# Patient Record
Sex: FEMALE | Race: OTHER | HISPANIC OR LATINO | URBAN - METROPOLITAN AREA
[De-identification: names, ages, dates, MRNs, and addresses within clinical notes are randomized per-mention and may not be internally consistent; named-entity substitution may affect disease eponyms.]

---

## 2017-06-26 ENCOUNTER — EMERGENCY (EMERGENCY)
Facility: HOSPITAL | Age: 52
LOS: 1 days | Discharge: HOME | End: 2017-06-26
Attending: EMERGENCY MEDICINE | Admitting: EMERGENCY MEDICINE
Payer: MEDICAID

## 2017-06-26 VITALS
HEART RATE: 65 BPM | HEIGHT: 65 IN | SYSTOLIC BLOOD PRESSURE: 183 MMHG | OXYGEN SATURATION: 97 % | RESPIRATION RATE: 20 BRPM | TEMPERATURE: 99 F | DIASTOLIC BLOOD PRESSURE: 98 MMHG

## 2017-06-26 LAB
ALBUMIN SERPL ELPH-MCNC: 4.2 G/DL — SIGNIFICANT CHANGE UP (ref 3.3–5)
ALP SERPL-CCNC: 76 U/L — SIGNIFICANT CHANGE UP (ref 40–120)
ALT FLD-CCNC: 25 U/L RC — SIGNIFICANT CHANGE UP (ref 10–45)
ANION GAP SERPL CALC-SCNC: 12 MMOL/L — SIGNIFICANT CHANGE UP (ref 5–17)
APTT BLD: 22.7 SEC — LOW (ref 27.5–37.4)
AST SERPL-CCNC: 68 U/L — HIGH (ref 10–40)
BILIRUB SERPL-MCNC: 0.9 MG/DL — SIGNIFICANT CHANGE UP (ref 0.2–1.2)
BUN SERPL-MCNC: 16 MG/DL — SIGNIFICANT CHANGE UP (ref 7–23)
CALCIUM SERPL-MCNC: 9.4 MG/DL — SIGNIFICANT CHANGE UP (ref 8.4–10.5)
CHLORIDE SERPL-SCNC: 102 MMOL/L — SIGNIFICANT CHANGE UP (ref 96–108)
CK MB BLD-MCNC: 1 % — SIGNIFICANT CHANGE UP (ref 0–3.5)
CK MB CFR SERPL CALC: 1.6 NG/ML — SIGNIFICANT CHANGE UP (ref 0–3.8)
CK SERPL-CCNC: 165 U/L — SIGNIFICANT CHANGE UP (ref 25–170)
CO2 SERPL-SCNC: 22 MMOL/L — SIGNIFICANT CHANGE UP (ref 22–31)
CREAT SERPL-MCNC: 0.85 MG/DL — SIGNIFICANT CHANGE UP (ref 0.5–1.3)
GLUCOSE SERPL-MCNC: 101 MG/DL — HIGH (ref 70–99)
INR BLD: 0.97 RATIO — SIGNIFICANT CHANGE UP (ref 0.88–1.16)
MAGNESIUM SERPL-MCNC: 2.2 MG/DL — SIGNIFICANT CHANGE UP (ref 1.6–2.6)
NT-PROBNP SERPL-SCNC: 14 PG/ML — SIGNIFICANT CHANGE UP (ref 0–300)
PHOSPHATE SERPL-MCNC: 3.4 MG/DL — SIGNIFICANT CHANGE UP (ref 2.5–4.5)
POTASSIUM SERPL-MCNC: 6.5 MMOL/L — CRITICAL HIGH (ref 3.5–5.3)
POTASSIUM SERPL-SCNC: 6.5 MMOL/L — CRITICAL HIGH (ref 3.5–5.3)
PROT SERPL-MCNC: 8.4 G/DL — HIGH (ref 6–8.3)
PROTHROM AB SERPL-ACNC: 10.6 SEC — SIGNIFICANT CHANGE UP (ref 9.8–12.7)
SODIUM SERPL-SCNC: 136 MMOL/L — SIGNIFICANT CHANGE UP (ref 135–145)
TROPONIN T SERPL-MCNC: <0.01 NG/ML — SIGNIFICANT CHANGE UP (ref 0–0.06)

## 2017-06-26 PROCEDURE — 71010: CPT | Mod: 26

## 2017-06-26 PROCEDURE — 99220: CPT | Mod: 25

## 2017-06-26 PROCEDURE — 93010 ELECTROCARDIOGRAM REPORT: CPT

## 2017-06-26 RX ORDER — ASPIRIN/CALCIUM CARB/MAGNESIUM 324 MG
324 TABLET ORAL ONCE
Qty: 0 | Refills: 0 | Status: COMPLETED | OUTPATIENT
Start: 2017-06-26 | End: 2017-06-26

## 2017-06-26 RX ADMIN — Medication 324 MILLIGRAM(S): at 23:16

## 2017-06-26 NOTE — ED PROVIDER NOTE - OBJECTIVE STATEMENT
51yF h/o HTN not on any meds, doesn't follow with a doctor because doesn't have insurance presents with 3 days of L sided chest pressure radiating to her back 51yF h/o HTN not on any meds, doesn't follow with a doctor because doesn't have insurance presents with 3 days of L sided chest pressure radiating to her back also with L arm numbness. Patient's son  last week from massive MI. Patient never smoker. Patient's father  in his 50s from MI. Patient never had cardiac testing. No PE risk factors.

## 2017-06-26 NOTE — ED ADULT NURSE NOTE - OBJECTIVE STATEMENT
Pt is a 50 yo female with the co CP and left arm numbness as tingling intermittently for the past several weeks. Pt denies anything making the pain better or worse, no SOB no N/V/D no cough fever or chills. Pt states she usually raises her arm above her head and the tingling resolved, pt denies any pmh, She reports her son dying 1 week ago due to an MI

## 2017-06-26 NOTE — ED PROVIDER NOTE - ATTENDING CONTRIBUTION TO CARE
I have seen and evaluated this patient with the resident.   I agree with the findings  unless other wise stated.  After my face to face bedside evaluation, I am notin year old female with chest pain intermittent tightness with radiation to left arm and back. PE: att exam: patient awake alert NAD. LUNGS CTAB no wheeze no crackle. CARD RRR no m/r/g.  Abdomen soft NT ND no rebound no guarding no CVA tenderness. EXT no edema no calf tenderness CV 2+DP/PT bilaterally. neuro A&Ox3, no focal deficits, normal sensation in all four extremities, 5/5 b/l le, 5/5 b/l ue, gait normal.  skin warm and dry no rash

## 2017-06-26 NOTE — ED ADULT TRIAGE NOTE - CHIEF COMPLAINT QUOTE
chest pain with LUE numbness x 2=3 wks, worse now, +dizzy, denies nausea/vomiting, (son  from MI 2 wks ago, father  from MI at age 77 yrs), denies long distance travel/+nonsmoker/denies recent surgeries/hormone therapy

## 2017-06-26 NOTE — ED PROVIDER NOTE - MEDICAL DECISION MAKING DETAILS
51yF h/o HTN not on antihypertensives presents with 3 days of chest pressure radiating to back. Strong family h/o heart disease. No PE risk factors. On examination, hypertensive, S1S2 normal, lungs clear, no calf tenderness, no pedal edema. Concern for dissection, ACS. Will obtain bloodwork including cardiac enzymes, CTA r/o dissection and if negative likely CDU for further cardiac workup. Gina PGY3

## 2017-06-27 VITALS
RESPIRATION RATE: 18 BRPM | OXYGEN SATURATION: 100 % | DIASTOLIC BLOOD PRESSURE: 79 MMHG | HEART RATE: 74 BPM | SYSTOLIC BLOOD PRESSURE: 136 MMHG

## 2017-06-27 LAB
ANION GAP SERPL CALC-SCNC: 12 MMOL/L — SIGNIFICANT CHANGE UP (ref 5–17)
BASOPHILS # BLD AUTO: 0.1 K/UL — SIGNIFICANT CHANGE UP (ref 0–0.2)
BASOPHILS NFR BLD AUTO: 1 % — SIGNIFICANT CHANGE UP (ref 0–2)
BUN SERPL-MCNC: 14 MG/DL — SIGNIFICANT CHANGE UP (ref 7–23)
CALCIUM SERPL-MCNC: 9.4 MG/DL — SIGNIFICANT CHANGE UP (ref 8.4–10.5)
CHLORIDE SERPL-SCNC: 105 MMOL/L — SIGNIFICANT CHANGE UP (ref 96–108)
CHOLEST SERPL-MCNC: 231 MG/DL — HIGH (ref 10–199)
CO2 SERPL-SCNC: 21 MMOL/L — LOW (ref 22–31)
CREAT SERPL-MCNC: 0.71 MG/DL — SIGNIFICANT CHANGE UP (ref 0.5–1.3)
EOSINOPHIL # BLD AUTO: 0.2 K/UL — SIGNIFICANT CHANGE UP (ref 0–0.5)
EOSINOPHIL NFR BLD AUTO: 2 % — SIGNIFICANT CHANGE UP (ref 0–6)
GLUCOSE SERPL-MCNC: 120 MG/DL — HIGH (ref 70–99)
HBA1C BLD-MCNC: 5.6 % — SIGNIFICANT CHANGE UP (ref 4–5.6)
HCT VFR BLD CALC: 38.4 % — SIGNIFICANT CHANGE UP (ref 34.5–45)
HDLC SERPL-MCNC: 67 MG/DL — SIGNIFICANT CHANGE UP (ref 40–125)
HGB BLD-MCNC: 13.4 G/DL — SIGNIFICANT CHANGE UP (ref 11.5–15.5)
LIPID PNL WITH DIRECT LDL SERPL: 140 MG/DL — HIGH
LYMPHOCYTES # BLD AUTO: 1.9 K/UL — SIGNIFICANT CHANGE UP (ref 1–3.3)
LYMPHOCYTES # BLD AUTO: 25.8 % — SIGNIFICANT CHANGE UP (ref 13–44)
MCHC RBC-ENTMCNC: 31.6 PG — SIGNIFICANT CHANGE UP (ref 27–34)
MCHC RBC-ENTMCNC: 34.8 GM/DL — SIGNIFICANT CHANGE UP (ref 32–36)
MCV RBC AUTO: 90.8 FL — SIGNIFICANT CHANGE UP (ref 80–100)
MONOCYTES # BLD AUTO: 0.8 K/UL — SIGNIFICANT CHANGE UP (ref 0–0.9)
MONOCYTES NFR BLD AUTO: 11.4 % — SIGNIFICANT CHANGE UP (ref 2–14)
NEUTROPHILS # BLD AUTO: 4.4 K/UL — SIGNIFICANT CHANGE UP (ref 1.8–7.4)
NEUTROPHILS NFR BLD AUTO: 59.8 % — SIGNIFICANT CHANGE UP (ref 43–77)
PLATELET # BLD AUTO: 202 K/UL — SIGNIFICANT CHANGE UP (ref 150–400)
POTASSIUM SERPL-MCNC: 3.8 MMOL/L — SIGNIFICANT CHANGE UP (ref 3.5–5.3)
POTASSIUM SERPL-SCNC: 3.8 MMOL/L — SIGNIFICANT CHANGE UP (ref 3.5–5.3)
RBC # BLD: 4.23 M/UL — SIGNIFICANT CHANGE UP (ref 3.8–5.2)
RBC # FLD: 13.8 % — SIGNIFICANT CHANGE UP (ref 10.3–14.5)
SODIUM SERPL-SCNC: 138 MMOL/L — SIGNIFICANT CHANGE UP (ref 135–145)
TOTAL CHOLESTEROL/HDL RATIO MEASUREMENT: 3.4 RATIO — SIGNIFICANT CHANGE UP (ref 3.3–7.1)
TRIGL SERPL-MCNC: 120 MG/DL — SIGNIFICANT CHANGE UP (ref 10–149)
TROPONIN T SERPL-MCNC: <0.01 NG/ML — SIGNIFICANT CHANGE UP (ref 0–0.06)
TSH SERPL-MCNC: 1.89 UIU/ML — SIGNIFICANT CHANGE UP (ref 0.27–4.2)
WBC # BLD: 7.4 K/UL — SIGNIFICANT CHANGE UP (ref 3.8–10.5)
WBC # FLD AUTO: 7.4 K/UL — SIGNIFICANT CHANGE UP (ref 3.8–10.5)

## 2017-06-27 PROCEDURE — 93017 CV STRESS TEST TRACING ONLY: CPT

## 2017-06-27 PROCEDURE — 78452 HT MUSCLE IMAGE SPECT MULT: CPT | Mod: 26

## 2017-06-27 PROCEDURE — 93018 CV STRESS TEST I&R ONLY: CPT

## 2017-06-27 PROCEDURE — 99217: CPT

## 2017-06-27 PROCEDURE — 85610 PROTHROMBIN TIME: CPT

## 2017-06-27 PROCEDURE — 84443 ASSAY THYROID STIM HORMONE: CPT

## 2017-06-27 PROCEDURE — 84484 ASSAY OF TROPONIN QUANT: CPT

## 2017-06-27 PROCEDURE — 83880 ASSAY OF NATRIURETIC PEPTIDE: CPT

## 2017-06-27 PROCEDURE — 71275 CT ANGIOGRAPHY CHEST: CPT | Mod: 26

## 2017-06-27 PROCEDURE — G0378: CPT

## 2017-06-27 PROCEDURE — 85027 COMPLETE CBC AUTOMATED: CPT

## 2017-06-27 PROCEDURE — 93016 CV STRESS TEST SUPVJ ONLY: CPT

## 2017-06-27 PROCEDURE — 74174 CTA ABD&PLVS W/CONTRAST: CPT

## 2017-06-27 PROCEDURE — 74174 CTA ABD&PLVS W/CONTRAST: CPT | Mod: 26

## 2017-06-27 PROCEDURE — 78452 HT MUSCLE IMAGE SPECT MULT: CPT

## 2017-06-27 PROCEDURE — A9500: CPT

## 2017-06-27 PROCEDURE — 82550 ASSAY OF CK (CPK): CPT

## 2017-06-27 PROCEDURE — 83036 HEMOGLOBIN GLYCOSYLATED A1C: CPT

## 2017-06-27 PROCEDURE — 82553 CREATINE MB FRACTION: CPT

## 2017-06-27 PROCEDURE — 71045 X-RAY EXAM CHEST 1 VIEW: CPT

## 2017-06-27 PROCEDURE — 80048 BASIC METABOLIC PNL TOTAL CA: CPT

## 2017-06-27 PROCEDURE — 84100 ASSAY OF PHOSPHORUS: CPT

## 2017-06-27 PROCEDURE — 99284 EMERGENCY DEPT VISIT MOD MDM: CPT | Mod: 25

## 2017-06-27 PROCEDURE — 85730 THROMBOPLASTIN TIME PARTIAL: CPT

## 2017-06-27 PROCEDURE — 80053 COMPREHEN METABOLIC PANEL: CPT

## 2017-06-27 PROCEDURE — 71275 CT ANGIOGRAPHY CHEST: CPT

## 2017-06-27 PROCEDURE — 83735 ASSAY OF MAGNESIUM: CPT

## 2017-06-27 PROCEDURE — 93005 ELECTROCARDIOGRAM TRACING: CPT | Mod: 76,XU

## 2017-06-27 PROCEDURE — 80061 LIPID PANEL: CPT

## 2017-06-27 RX ADMIN — Medication 0.5 MILLIGRAM(S): at 05:24

## 2017-06-27 NOTE — ED ADULT NURSE REASSESSMENT NOTE - COMFORT CARE
plan of care explained/repositioned/treatment delay explained/warm blanket provided/wait time explained

## 2017-06-27 NOTE — ED CDU PROVIDER NOTE - DETAILS
Chest pain  -Continuous telemetry monitoring  -Serial Cardiac Enzymes with EKG  -Cardiac Stress Test  -Frequent re-evaluations

## 2017-06-27 NOTE — ED CDU PROVIDER NOTE - OBJECTIVE STATEMENT
51yF h/o HTN not on any meds, doesn't follow with a doctor because doesn't have insurance presents with 3 days of L sided chest pressure radiating to her back also with L arm numbness. Patient's son  last week from massive MI. Patient never smoker. Patient's father  in his 50s from MI. Patient never had cardiac testing. CTA chest in ED performed to rule out dissection was a normal study.

## 2017-06-27 NOTE — ED CDU PROVIDER NOTE - PROGRESS NOTE DETAILS
Patient resting in bed comfortably. No distress, no complaints. Vital Signs Stable. No events on telemetry monitor. -Eddi Lau PA-C pt went up for stress. prior to leaving No distress, no complaints. Vital Signs Stable. No events on telemetry monitor -HYACINTH Rivas back from stress. no complaints -HYACINTH Rivas resting. no complaints. stress test with no signs of ischemia. case discussed with Dr. Garcia. MARY Rivas Jose: pt did well last night no pain episodes. Had stress test which showed only stable lesion.  I Eddi Garcia MD have personally performed a face to face diagnostic evaluation on this patient.  I have reviewed the ACP note and agree with the history, exam, and plan of care, except as noted.   My medical decison making and observations are found above.  The patient is stable for discharge home and will follow up with their primary physician.

## 2017-06-27 NOTE — ED CDU PROVIDER NOTE - ATTENDING CONTRIBUTION TO CARE
I have seen and evaluated this patient with the advance practice clinician.   I agree with the findings  unless other wise stated.  After my face to face bedside evaluation, I am notin year old female with left sided chest pressure. PE: att exam: patient awake alert NAD. NC/AT, eomi, PEERLA, LUNGS CTAB no wheeze no crackle. mild chest wall ttp, CARD RRR no m/r/g.  Abdomen soft NT ND no rebound no guarding no CVA tenderness. EXT WWP no edema no calf tenderness CV 2+DP/PT bilaterally. neuro A&Ox3, no focal deficits, gait normal.  skin warm and dry no rash

## 2017-06-27 NOTE — ED ADULT NURSE REASSESSMENT NOTE - NS ED NURSE REASSESS COMMENT FT1
Pts VS stable and she is in no acute distress, pt denies any pain at this time, daughter at bedside. Pt given a sandwich and tolerating PO
Received pt from Adriana RN  from ER , received pt alert and responsive, oriented x4, denies any respiratory distress, SOB, or difficulty breathing. Pt transferred to CDU for observation for chest pain, IV in place, patent and free of signs of infiltration, placed on continuos cardiac monitoring as ordered, NSR HR in the 70's,  pt denies chest pain or palpitations at this time. Awaiting stress test in the morning. V/S stable, pt afebrile, pt denies pain at this time. Pt educated on unit and unit rules, instructed patient to notify RN of any needed assistance, Pt verbalizes understanding, Call bell placed within reach. Safety maintained. Will continue to monitor.

## 2017-06-27 NOTE — ED CDU PROVIDER NOTE - MEDICAL DECISION MAKING DETAILS
Donn: Patient with left sided chest pressure. will c/w tele monitoring, serial cardiac enzymes, stress test, reassess.

## 2017-06-27 NOTE — ED CDU PROVIDER NOTE - PLAN OF CARE
Follow up with your Primary Care Physician within the next 2-3 days  Bring a copy of your test results with you to your appointment  Continue your current medication regimen  Return to the Emergency Room if you experience new or worsening symptoms Follow up with your Primary Care Physician within the next 2-3 days  Bring a copy of your test results with you to your appointment; Diet and exercise for high cholesterol. take aspirin 81mg once daily. Avoid fatty, fried foods.   Continue your current medication regimen  Return to the Emergency Room if you experience new or worsening symptoms

## 2017-08-11 PROBLEM — Z00.00 ENCOUNTER FOR PREVENTIVE HEALTH EXAMINATION: Status: ACTIVE | Noted: 2017-08-11

## 2022-02-12 ENCOUNTER — INPATIENT (INPATIENT)
Facility: HOSPITAL | Age: 57
LOS: 26 days | Discharge: NURSING, REHAB OR LONG-TERM CARE | DRG: 23 | End: 2022-03-11
Attending: NEUROLOGICAL SURGERY | Admitting: NEUROLOGICAL SURGERY
Payer: COMMERCIAL

## 2022-02-12 VITALS
HEART RATE: 111 BPM | RESPIRATION RATE: 18 BRPM | TEMPERATURE: 96 F | OXYGEN SATURATION: 99 % | DIASTOLIC BLOOD PRESSURE: 72 MMHG | SYSTOLIC BLOOD PRESSURE: 131 MMHG

## 2022-02-12 DIAGNOSIS — I10 ESSENTIAL (PRIMARY) HYPERTENSION: ICD-10-CM

## 2022-02-12 DIAGNOSIS — I61.9 NONTRAUMATIC INTRACEREBRAL HEMORRHAGE, UNSPECIFIED: ICD-10-CM

## 2022-02-12 DIAGNOSIS — R73.03 PREDIABETES: ICD-10-CM

## 2022-02-12 LAB
A1C WITH ESTIMATED AVERAGE GLUCOSE RESULT: 5.8 % — HIGH (ref 4–5.6)
ALBUMIN SERPL ELPH-MCNC: 4.4 G/DL — SIGNIFICANT CHANGE UP (ref 3.3–5)
ALBUMIN SERPL ELPH-MCNC: 4.8 G/DL — SIGNIFICANT CHANGE UP (ref 3.3–5)
ALP SERPL-CCNC: 101 U/L — SIGNIFICANT CHANGE UP (ref 40–120)
ALP SERPL-CCNC: 111 U/L — SIGNIFICANT CHANGE UP (ref 40–120)
ALT FLD-CCNC: 22 U/L — SIGNIFICANT CHANGE UP (ref 10–45)
ALT FLD-CCNC: SIGNIFICANT CHANGE UP U/L (ref 10–45)
AMPHET UR-MCNC: NEGATIVE — SIGNIFICANT CHANGE UP
ANION GAP SERPL CALC-SCNC: 16 MMOL/L — SIGNIFICANT CHANGE UP (ref 5–17)
ANION GAP SERPL CALC-SCNC: 18 MMOL/L — HIGH (ref 5–17)
APTT BLD: 30.1 SEC — SIGNIFICANT CHANGE UP (ref 27.5–35.5)
AST SERPL-CCNC: 28 U/L — SIGNIFICANT CHANGE UP (ref 10–40)
AST SERPL-CCNC: SIGNIFICANT CHANGE UP U/L (ref 10–40)
BARBITURATES UR SCN-MCNC: NEGATIVE — SIGNIFICANT CHANGE UP
BASE EXCESS BLDA CALC-SCNC: -2 MMOL/L — SIGNIFICANT CHANGE UP (ref -2–3)
BENZODIAZ UR-MCNC: NEGATIVE — SIGNIFICANT CHANGE UP
BILIRUB SERPL-MCNC: 0.9 MG/DL — SIGNIFICANT CHANGE UP (ref 0.2–1.2)
BILIRUB SERPL-MCNC: 1.3 MG/DL — HIGH (ref 0.2–1.2)
BLD GP AB SCN SERPL QL: NEGATIVE — SIGNIFICANT CHANGE UP
BUN SERPL-MCNC: 10 MG/DL — SIGNIFICANT CHANGE UP (ref 7–23)
BUN SERPL-MCNC: 10 MG/DL — SIGNIFICANT CHANGE UP (ref 7–23)
CALCIUM SERPL-MCNC: 9.7 MG/DL — SIGNIFICANT CHANGE UP (ref 8.4–10.5)
CALCIUM SERPL-MCNC: 9.8 MG/DL — SIGNIFICANT CHANGE UP (ref 8.4–10.5)
CHLORIDE SERPL-SCNC: 104 MMOL/L — SIGNIFICANT CHANGE UP (ref 96–108)
CHLORIDE SERPL-SCNC: 96 MMOL/L — SIGNIFICANT CHANGE UP (ref 96–108)
CHOLEST SERPL-MCNC: 235 MG/DL — HIGH
CHOLEST SERPL-MCNC: 261 MG/DL — HIGH
CO2 BLDA-SCNC: 24 MMOL/L — SIGNIFICANT CHANGE UP (ref 19–24)
CO2 SERPL-SCNC: 18 MMOL/L — LOW (ref 22–31)
CO2 SERPL-SCNC: 19 MMOL/L — LOW (ref 22–31)
COCAINE METAB.OTHER UR-MCNC: NEGATIVE — SIGNIFICANT CHANGE UP
CREAT SERPL-MCNC: 0.45 MG/DL — LOW (ref 0.5–1.3)
CREAT SERPL-MCNC: 0.47 MG/DL — LOW (ref 0.5–1.3)
ESTIMATED AVERAGE GLUCOSE: 120 MG/DL — HIGH (ref 68–114)
GLUCOSE BLDC GLUCOMTR-MCNC: 203 MG/DL — HIGH (ref 70–99)
GLUCOSE SERPL-MCNC: 200 MG/DL — HIGH (ref 70–99)
GLUCOSE SERPL-MCNC: 236 MG/DL — HIGH (ref 70–99)
HCO3 BLDA-SCNC: 23 MMOL/L — SIGNIFICANT CHANGE UP (ref 21–28)
HCT VFR BLD CALC: 44.4 % — SIGNIFICANT CHANGE UP (ref 34.5–45)
HDLC SERPL-MCNC: 51 MG/DL — SIGNIFICANT CHANGE UP
HDLC SERPL-MCNC: 70 MG/DL — SIGNIFICANT CHANGE UP
HGB BLD-MCNC: 15.6 G/DL — HIGH (ref 11.5–15.5)
INR BLD: 0.97 — SIGNIFICANT CHANGE UP (ref 0.88–1.16)
LACTATE SERPL-SCNC: 4.6 MMOL/L — CRITICAL HIGH (ref 0.5–2)
LDLC SERPL DIRECT ASSAY-MCNC: 156 MG/DL — HIGH
LIPID PNL WITH DIRECT LDL SERPL: 145 MG/DL — HIGH
LIPID PNL WITH DIRECT LDL SERPL: SIGNIFICANT CHANGE UP MG/DL
MAGNESIUM SERPL-MCNC: 1.7 MG/DL — SIGNIFICANT CHANGE UP (ref 1.6–2.6)
MCHC RBC-ENTMCNC: 32.5 PG — SIGNIFICANT CHANGE UP (ref 27–34)
MCHC RBC-ENTMCNC: 35.1 GM/DL — SIGNIFICANT CHANGE UP (ref 32–36)
MCV RBC AUTO: 92.5 FL — SIGNIFICANT CHANGE UP (ref 80–100)
METHADONE UR-MCNC: NEGATIVE — SIGNIFICANT CHANGE UP
NON HDL CHOLESTEROL: 165 MG/DL — HIGH
NON HDL CHOLESTEROL: 210 MG/DL — HIGH
NRBC # BLD: 0 /100 WBCS — SIGNIFICANT CHANGE UP (ref 0–0)
OPIATES UR-MCNC: NEGATIVE — SIGNIFICANT CHANGE UP
PCO2 BLDA: 40 MMHG — HIGH (ref 32–35)
PCP SPEC-MCNC: SIGNIFICANT CHANGE UP
PCP UR-MCNC: NEGATIVE — SIGNIFICANT CHANGE UP
PH BLDA: 7.37 — SIGNIFICANT CHANGE UP (ref 7.35–7.45)
PHOSPHATE SERPL-MCNC: 2.5 MG/DL — SIGNIFICANT CHANGE UP (ref 2.5–4.5)
PLATELET # BLD AUTO: 244 K/UL — SIGNIFICANT CHANGE UP (ref 150–400)
PO2 BLDA: 124 MMHG — HIGH (ref 83–108)
POTASSIUM SERPL-MCNC: 4.4 MMOL/L — SIGNIFICANT CHANGE UP (ref 3.5–5.3)
POTASSIUM SERPL-MCNC: SIGNIFICANT CHANGE UP MMOL/L (ref 3.5–5.3)
POTASSIUM SERPL-SCNC: 4.4 MMOL/L — SIGNIFICANT CHANGE UP (ref 3.5–5.3)
POTASSIUM SERPL-SCNC: SIGNIFICANT CHANGE UP MMOL/L (ref 3.5–5.3)
PROT SERPL-MCNC: 8.4 G/DL — HIGH (ref 6–8.3)
PROT SERPL-MCNC: 9 G/DL — HIGH (ref 6–8.3)
PROTHROM AB SERPL-ACNC: 11.6 SEC — SIGNIFICANT CHANGE UP (ref 10.6–13.6)
RBC # BLD: 4.8 M/UL — SIGNIFICANT CHANGE UP (ref 3.8–5.2)
RBC # FLD: 11.3 % — SIGNIFICANT CHANGE UP (ref 10.3–14.5)
RH IG SCN BLD-IMP: POSITIVE — SIGNIFICANT CHANGE UP
SAO2 % BLDA: 99.4 % — HIGH (ref 94–98)
SARS-COV-2 RNA SPEC QL NAA+PROBE: SIGNIFICANT CHANGE UP
SODIUM SERPL-SCNC: 133 MMOL/L — LOW (ref 135–145)
SODIUM SERPL-SCNC: 138 MMOL/L — SIGNIFICANT CHANGE UP (ref 135–145)
THC UR QL: NEGATIVE — SIGNIFICANT CHANGE UP
TRIGL SERPL-MCNC: 102 MG/DL — SIGNIFICANT CHANGE UP
TRIGL SERPL-MCNC: 1823 MG/DL — HIGH
TSH SERPL-MCNC: 0.74 UIU/ML — SIGNIFICANT CHANGE UP (ref 0.27–4.2)
WBC # BLD: 14.5 K/UL — HIGH (ref 3.8–10.5)
WBC # FLD AUTO: 14.5 K/UL — HIGH (ref 3.8–10.5)

## 2022-02-12 PROCEDURE — 71045 X-RAY EXAM CHEST 1 VIEW: CPT | Mod: 26,76

## 2022-02-12 PROCEDURE — 74018 RADEX ABDOMEN 1 VIEW: CPT | Mod: 26

## 2022-02-12 PROCEDURE — 43752 NASAL/OROGASTRIC W/TUBE PLMT: CPT | Mod: 59

## 2022-02-12 PROCEDURE — 70496 CT ANGIOGRAPHY HEAD: CPT | Mod: 26

## 2022-02-12 PROCEDURE — 36620 INSERTION CATHETER ARTERY: CPT

## 2022-02-12 PROCEDURE — 70450 CT HEAD/BRAIN W/O DYE: CPT | Mod: 26,59

## 2022-02-12 PROCEDURE — 61107 TDH PNXR IMPLT VENTR CATH: CPT

## 2022-02-12 PROCEDURE — 99291 CRITICAL CARE FIRST HOUR: CPT

## 2022-02-12 RX ORDER — SENNA PLUS 8.6 MG/1
2 TABLET ORAL AT BEDTIME
Refills: 0 | Status: DISCONTINUED | OUTPATIENT
Start: 2022-02-12 | End: 2022-02-12

## 2022-02-12 RX ORDER — AMLODIPINE BESYLATE 2.5 MG/1
5 TABLET ORAL DAILY
Refills: 0 | Status: DISCONTINUED | OUTPATIENT
Start: 2022-02-12 | End: 2022-02-15

## 2022-02-12 RX ORDER — SODIUM CHLORIDE 9 MG/ML
1000 INJECTION INTRAMUSCULAR; INTRAVENOUS; SUBCUTANEOUS
Refills: 0 | Status: DISCONTINUED | OUTPATIENT
Start: 2022-02-12 | End: 2022-02-12

## 2022-02-12 RX ORDER — SODIUM CHLORIDE 9 MG/ML
1000 INJECTION, SOLUTION INTRAVENOUS
Refills: 0 | Status: DISCONTINUED | OUTPATIENT
Start: 2022-02-12 | End: 2022-02-15

## 2022-02-12 RX ORDER — ATORVASTATIN CALCIUM 80 MG/1
40 TABLET, FILM COATED ORAL AT BEDTIME
Refills: 0 | Status: DISCONTINUED | OUTPATIENT
Start: 2022-02-12 | End: 2022-02-15

## 2022-02-12 RX ORDER — PROPOFOL 10 MG/ML
30 INJECTION, EMULSION INTRAVENOUS
Qty: 1000 | Refills: 0 | Status: DISCONTINUED | OUTPATIENT
Start: 2022-02-12 | End: 2022-02-14

## 2022-02-12 RX ORDER — MIDAZOLAM HYDROCHLORIDE 1 MG/ML
2 INJECTION, SOLUTION INTRAMUSCULAR; INTRAVENOUS ONCE
Refills: 0 | Status: DISCONTINUED | OUTPATIENT
Start: 2022-02-12 | End: 2022-02-12

## 2022-02-12 RX ORDER — CHLORHEXIDINE GLUCONATE 213 G/1000ML
15 SOLUTION TOPICAL EVERY 12 HOURS
Refills: 0 | Status: DISCONTINUED | OUTPATIENT
Start: 2022-02-12 | End: 2022-02-14

## 2022-02-12 RX ORDER — LEVETIRACETAM 250 MG/1
1000 TABLET, FILM COATED ORAL EVERY 12 HOURS
Refills: 0 | Status: DISCONTINUED | OUTPATIENT
Start: 2022-02-12 | End: 2022-02-12

## 2022-02-12 RX ORDER — DEXTROSE 50 % IN WATER 50 %
15 SYRINGE (ML) INTRAVENOUS ONCE
Refills: 0 | Status: DISCONTINUED | OUTPATIENT
Start: 2022-02-12 | End: 2022-02-15

## 2022-02-12 RX ORDER — MIDAZOLAM HYDROCHLORIDE 1 MG/ML
1 INJECTION, SOLUTION INTRAMUSCULAR; INTRAVENOUS ONCE
Refills: 0 | Status: DISCONTINUED | OUTPATIENT
Start: 2022-02-12 | End: 2022-02-12

## 2022-02-12 RX ORDER — PANTOPRAZOLE SODIUM 20 MG/1
40 TABLET, DELAYED RELEASE ORAL DAILY
Refills: 0 | Status: DISCONTINUED | OUTPATIENT
Start: 2022-02-12 | End: 2022-02-15

## 2022-02-12 RX ORDER — SODIUM CHLORIDE 9 MG/ML
1000 INJECTION INTRAMUSCULAR; INTRAVENOUS; SUBCUTANEOUS
Refills: 0 | Status: DISCONTINUED | OUTPATIENT
Start: 2022-02-12 | End: 2022-02-13

## 2022-02-12 RX ORDER — NICARDIPINE HYDROCHLORIDE 30 MG/1
5 CAPSULE, EXTENDED RELEASE ORAL
Qty: 40 | Refills: 0 | Status: DISCONTINUED | OUTPATIENT
Start: 2022-02-12 | End: 2022-02-13

## 2022-02-12 RX ORDER — INSULIN LISPRO 100/ML
VIAL (ML) SUBCUTANEOUS EVERY 6 HOURS
Refills: 0 | Status: DISCONTINUED | OUTPATIENT
Start: 2022-02-12 | End: 2022-02-15

## 2022-02-12 RX ORDER — FENTANYL CITRATE 50 UG/ML
25 INJECTION INTRAVENOUS ONCE
Refills: 0 | Status: DISCONTINUED | OUTPATIENT
Start: 2022-02-12 | End: 2022-02-12

## 2022-02-12 RX ORDER — DEXTROSE 50 % IN WATER 50 %
25 SYRINGE (ML) INTRAVENOUS ONCE
Refills: 0 | Status: DISCONTINUED | OUTPATIENT
Start: 2022-02-12 | End: 2022-02-15

## 2022-02-12 RX ORDER — SODIUM CHLORIDE 9 MG/ML
1000 INJECTION INTRAMUSCULAR; INTRAVENOUS; SUBCUTANEOUS ONCE
Refills: 0 | Status: COMPLETED | OUTPATIENT
Start: 2022-02-12 | End: 2022-02-12

## 2022-02-12 RX ORDER — FENTANYL CITRATE 50 UG/ML
25 INJECTION INTRAVENOUS
Refills: 0 | Status: DISCONTINUED | OUTPATIENT
Start: 2022-02-12 | End: 2022-02-13

## 2022-02-12 RX ORDER — ACETAMINOPHEN 500 MG
650 TABLET ORAL EVERY 6 HOURS
Refills: 0 | Status: DISCONTINUED | OUTPATIENT
Start: 2022-02-12 | End: 2022-02-18

## 2022-02-12 RX ORDER — GLUCAGON INJECTION, SOLUTION 0.5 MG/.1ML
1 INJECTION, SOLUTION SUBCUTANEOUS ONCE
Refills: 0 | Status: DISCONTINUED | OUTPATIENT
Start: 2022-02-12 | End: 2022-02-15

## 2022-02-12 RX ORDER — HYDRALAZINE HCL 50 MG
10 TABLET ORAL EVERY 4 HOURS
Refills: 0 | Status: DISCONTINUED | OUTPATIENT
Start: 2022-02-12 | End: 2022-02-20

## 2022-02-12 RX ORDER — CEFAZOLIN SODIUM 1 G
2000 VIAL (EA) INJECTION ONCE
Refills: 0 | Status: COMPLETED | OUTPATIENT
Start: 2022-02-12 | End: 2022-02-12

## 2022-02-12 RX ORDER — FENTANYL CITRATE 50 UG/ML
50 INJECTION INTRAVENOUS ONCE
Refills: 0 | Status: DISCONTINUED | OUTPATIENT
Start: 2022-02-12 | End: 2022-02-12

## 2022-02-12 RX ORDER — SENNA PLUS 8.6 MG/1
2 TABLET ORAL AT BEDTIME
Refills: 0 | Status: DISCONTINUED | OUTPATIENT
Start: 2022-02-12 | End: 2022-02-18

## 2022-02-12 RX ORDER — FENTANYL CITRATE 50 UG/ML
75 INJECTION INTRAVENOUS ONCE
Refills: 0 | Status: DISCONTINUED | OUTPATIENT
Start: 2022-02-12 | End: 2022-02-12

## 2022-02-12 RX ADMIN — LEVETIRACETAM 400 MILLIGRAM(S): 250 TABLET, FILM COATED ORAL at 17:04

## 2022-02-12 RX ADMIN — PANTOPRAZOLE SODIUM 40 MILLIGRAM(S): 20 TABLET, DELAYED RELEASE ORAL at 13:40

## 2022-02-12 RX ADMIN — FENTANYL CITRATE 25 MICROGRAM(S): 50 INJECTION INTRAVENOUS at 16:45

## 2022-02-12 RX ADMIN — MIDAZOLAM HYDROCHLORIDE 2 MILLIGRAM(S): 1 INJECTION, SOLUTION INTRAMUSCULAR; INTRAVENOUS at 14:20

## 2022-02-12 RX ADMIN — SODIUM CHLORIDE 70 MILLILITER(S): 9 INJECTION INTRAMUSCULAR; INTRAVENOUS; SUBCUTANEOUS at 13:40

## 2022-02-12 RX ADMIN — Medication 100 MILLIGRAM(S): at 13:39

## 2022-02-12 RX ADMIN — PROPOFOL 13.7 MICROGRAM(S)/KG/MIN: 10 INJECTION, EMULSION INTRAVENOUS at 17:14

## 2022-02-12 RX ADMIN — FENTANYL CITRATE 75 MICROGRAM(S): 50 INJECTION INTRAVENOUS at 14:45

## 2022-02-12 RX ADMIN — SODIUM CHLORIDE 1000 MILLILITER(S): 9 INJECTION INTRAMUSCULAR; INTRAVENOUS; SUBCUTANEOUS at 17:38

## 2022-02-12 RX ADMIN — NICARDIPINE HYDROCHLORIDE 25 MG/HR: 30 CAPSULE, EXTENDED RELEASE ORAL at 17:15

## 2022-02-12 RX ADMIN — FENTANYL CITRATE 25 MICROGRAM(S): 50 INJECTION INTRAVENOUS at 17:00

## 2022-02-12 RX ADMIN — SODIUM CHLORIDE 100 MILLILITER(S): 9 INJECTION INTRAMUSCULAR; INTRAVENOUS; SUBCUTANEOUS at 19:24

## 2022-02-12 RX ADMIN — MIDAZOLAM HYDROCHLORIDE 2 MILLIGRAM(S): 1 INJECTION, SOLUTION INTRAMUSCULAR; INTRAVENOUS at 13:30

## 2022-02-12 RX ADMIN — FENTANYL CITRATE 75 MICROGRAM(S): 50 INJECTION INTRAVENOUS at 14:30

## 2022-02-12 RX ADMIN — MIDAZOLAM HYDROCHLORIDE 1 MILLIGRAM(S): 1 INJECTION, SOLUTION INTRAMUSCULAR; INTRAVENOUS at 19:09

## 2022-02-12 RX ADMIN — AMLODIPINE BESYLATE 5 MILLIGRAM(S): 2.5 TABLET ORAL at 23:47

## 2022-02-12 RX ADMIN — FENTANYL CITRATE 50 MICROGRAM(S): 50 INJECTION INTRAVENOUS at 13:55

## 2022-02-12 RX ADMIN — MIDAZOLAM HYDROCHLORIDE 1 MILLIGRAM(S): 1 INJECTION, SOLUTION INTRAMUSCULAR; INTRAVENOUS at 18:05

## 2022-02-12 RX ADMIN — NICARDIPINE HYDROCHLORIDE 25 MG/HR: 30 CAPSULE, EXTENDED RELEASE ORAL at 17:42

## 2022-02-12 RX ADMIN — FENTANYL CITRATE 50 MICROGRAM(S): 50 INJECTION INTRAVENOUS at 13:40

## 2022-02-12 RX ADMIN — PROPOFOL 13.7 MICROGRAM(S)/KG/MIN: 10 INJECTION, EMULSION INTRAVENOUS at 17:42

## 2022-02-12 RX ADMIN — MIDAZOLAM HYDROCHLORIDE 1 MILLIGRAM(S): 1 INJECTION, SOLUTION INTRAMUSCULAR; INTRAVENOUS at 15:25

## 2022-02-12 RX ADMIN — NICARDIPINE HYDROCHLORIDE 25 MG/HR: 30 CAPSULE, EXTENDED RELEASE ORAL at 19:08

## 2022-02-12 RX ADMIN — SENNA PLUS 2 TABLET(S): 8.6 TABLET ORAL at 23:46

## 2022-02-12 RX ADMIN — Medication 4: at 17:19

## 2022-02-12 RX ADMIN — ATORVASTATIN CALCIUM 40 MILLIGRAM(S): 80 TABLET, FILM COATED ORAL at 23:47

## 2022-02-12 RX ADMIN — FENTANYL CITRATE 25 MICROGRAM(S): 50 INJECTION INTRAVENOUS at 22:18

## 2022-02-12 NOTE — H&P ADULT - HISTORY OF PRESENT ILLNESS
57 y/o female with PMHx of HTN, pre-DM presents transferred from [facility] for intracranial hemorrhage. As per [place] ED provider and son, patient called son around 7:30-8:00AM c/o severe headache and nausea. Son immediately rushed to her house and found her out of bed covered in her own vomit and called EMS. During transit, EMS reported SBP within 240s. Upon arrival to [place] ED, SBP within 180s, patient was given Decadron 10mg, Keppra 1g, started on a Cardene drip, Propofol, Mannitol 1mg/kg, GCS was 7 and intubated for airway support. 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  55 y/o female with PMHx of HTN, pre-DM presents transferred from [facility] for intracranial hemorrhage. As per [place] ED provider and son, patient called son around 7:30-8:00AM c/o severe headache and nausea. Son immediately rushed to her house and found her out of bed covered in her own vomit and called EMS. During transit, EMS reported SBP within 240s. Upon arrival to [place] ED, GCS 7, SBP within 180s, patient was given Decadron 10mg, Keppra 1g, started on a Cardene drip, Propofol, Mannitol 1mg/kg, and intubated for airway support. 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  57 y/o female with PMHx of HTN, pre-DM presents transferred from [facility] for intracranial hemorrhage. As per [place] ED provider and son, patient called son around 7:30-8:00AM c/o severe headache and nausea. Son immediately rushed to her house and found her out of bed, moaning and covered in her own vomit. During transit, EMS reported SBP within 240s with intractable vomiting and given Zofran 8mg. Upon arrival to [place] ED, GCS 7, SBP within 180s, patient was given Decadron 10mg, Keppra 1g, started on a Cardene drip, Propofol, Mannitol 1mg/kg. 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 Patient was intubated for airway support and transferred to [facility] for further management. ICH2, NIHSS 8.  Denies use of anticoagulants/antiplatelets.

## 2022-02-12 NOTE — H&P ADULT - NSHPPHYSICALEXAM_GEN_ALL_CORE
Constitutional: 57 y/o female intubated and sedated on propofol  Eyes:  Sclera anicteric, conjunctiva noninjected.  ENMT: Oropharyngeal mucosa moist, pink.   Respiratory: Clear to auscultation bilaterally.  No rales, rhonchi, wheezes.  Cardiovascular: tachycardic  Gastrointestinal:  Soft, nondistended.  Vascular: Extremities warm, no ulcers, no discoloration of skin.   Neurological: intubated and sedated on Propofol  AN x4 antigravity  pupils 2mm sluggish b/l  Skin: Warm, dry, no erythema. Constitutional: 55 y/o female intubated and sedated on propofol  Eyes:  Sclera anicteric, conjunctiva noninjected.  ENMT: Oropharyngeal mucosa moist, pink.   Respiratory: Clear to auscultation bilaterally.  No rales, rhonchi, wheezes.  Cardiovascular: tachycardic  Gastrointestinal:  Soft, nondistended.  Vascular: Extremities warm, no ulcers, no discoloration of skin.   Neurological:   intubated and sedated on Propofol  AN x4 antigravity  pupils 2mm sluggish b/l  + cough/gag   Skin: Warm, dry, no erythema.

## 2022-02-12 NOTE — PROCEDURE NOTE - COMPLICATIONS
No complications Ilumya Counseling: I discussed with the patient the risks of tildrakizumab including but not limited to immunosuppression, malignancy, posterior leukoencephalopathy syndrome, and serious infections.  The patient understands that monitoring is required including a PPD at baseline and must alert us or the primary physician if symptoms of infection or other concerning signs are noted.

## 2022-02-12 NOTE — H&P ADULT - NSHPLABSRESULTS_GEN_ALL_CORE
15.6   14.50 )-----------( 244      ( 12 Feb 2022 13:20 )             44.4     02-12    133<L>  |  96  |  10  ----------------------------<  200<H>  SEE NOTE   |  19<L>  |  0.45<L>    Ca    9.8      12 Feb 2022 13:20  Phos  2.5     02-12  Mg     1.7     02-12    TPro  9.0<H>  /  Alb  4.8  /  TBili  1.3<H>  /  DBili  x   /  AST  SEE NOTE  /  ALT  SEE NOTE  /  AlkPhos  111  02-12

## 2022-02-12 NOTE — PROGRESS NOTE ADULT - SUBJECTIVE AND OBJECTIVE BOX
HPI:    On admission, the patient was:  GCS:  Cifuentes-Rachel:  modified Duval:  ICH score:  NIHSS:      ICU Vital Signs Last 24 Hrs  T(C): 35.6 (12 Feb 2022 14:38), Max: 35.7 (12 Feb 2022 12:45)  T(F): 96.1 (12 Feb 2022 14:38), Max: 96.2 (12 Feb 2022 12:45)  HR: 118 (12 Feb 2022 13:15) (108 - 118)  BP: 158/77 (12 Feb 2022 13:15) (131/72 - 158/77)  BP(mean): 102 (12 Feb 2022 13:15) (97 - 104)  RR: 18 (12 Feb 2022 13:15) (18 - 18)  SpO2: 98% (12 Feb 2022 13:15) (98% - 100%)      02-12-22 @ 07:01  -  02-12-22 @ 14:44  --------------------------------------------------------  IN: 0 mL / OUT: 355 mL / NET: -355 mL      Mode: AC/ CMV (Assist Control/ Continuous Mandatory Ventilation), RR (machine): 18, TV (machine): 400, FiO2: 40, PEEP: 5, ITime: 1, MAP: 8, PIP: 15    EXAMINATION:  General:   HEENT:  MMM  Neuro:   Cards:  RRR  Respiratory:    Abdomen:   Extremities:  no edema  Skin:  warm/dry      MEDICATIONS:   acetaminophen    Suspension .. 650 milliGRAM(s) Oral every 6 hours PRN  dextrose 40% Gel 15 Gram(s) Oral once  dextrose 5%. 1000 milliLiter(s) (50 mL/Hr) IV Continuous <Continuous>  dextrose 50% Injectable 25 Gram(s) IV Push once  glucagon  Injectable 1 milliGRAM(s) IntraMuscular once  insulin lispro (ADMELOG) corrective regimen sliding scale   SubCutaneous every 6 hours  levETIRAcetam  IVPB 1000 milliGRAM(s) IV Intermittent every 12 hours  midazolam Injectable 2 milliGRAM(s) IV Push once  pantoprazole  Injectable 40 milliGRAM(s) IV Push daily  senna 2 Tablet(s) Oral at bedtime  senna 2 Tablet(s) Oral at bedtime PRN  sodium chloride 0.9%. 1000 milliLiter(s) (70 mL/Hr) IV Continuous <Continuous>      LABS:                        15.6   14.50 )-----------( 244      ( 12 Feb 2022 13:20 )             44.4     02-12    133<L>  |  96  |  10  ----------------------------<  200<H>  SEE NOTE   |  19<L>  |  0.45<L>    Ca    9.8      12 Feb 2022 13:20  Phos  2.5     02-12  Mg     1.7     02-12    TPro  9.0<H>  /  Alb  4.8  /  TBili  1.3<H>  /  DBili  x   /  AST  SEE NOTE  /  ALT  SEE NOTE  /  AlkPhos  111  02-12    LIVER FUNCTIONS - ( 12 Feb 2022 13:20 )  Alb: 4.8 g/dL / Pro: 9.0 g/dL / ALK PHOS: 111 U/L / ALT: SEE NOTE U/L / AST: SEE NOTE U/L / GGT: x                  HPI:  57 y/o female with PMHx of HTN, pre-DM presents transferred from [facility] for intracranial hemorrhage. As per [place] ED provider and son, patient called son around 7:30-8:00AM c/o severe headache and nausea. Son immediately rushed to her house and found her out of bed, moaning and covered in her own vomit. During transit, EMS reported SBP within 240s with intractable vomiting and given Zofran 8mg. Upon arrival to [place] ED, GCS 7, SBP within 180s, patient was given Decadron 10mg, Keppra 1g, started on a Cardene drip, Propofol, Mannitol 1mg/kg. 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 Patient was intubated for airway support and transferred to [facility] for further management. ICH2, NIHSS 8.  Denies use of anticoagulants/antiplatelets.  (12 Feb 2022 16:36).    On admission, the patient was:  GCS: 8  Hunt-Rachel:  modified Duval:  ICH score:2  NIHSS:18      ICU Vital Signs Last 24 Hrs  T(C): 35.6 (12 Feb 2022 14:38), Max: 35.7 (12 Feb 2022 12:45)  T(F): 96.1 (12 Feb 2022 14:38), Max: 96.2 (12 Feb 2022 12:45)  HR: 118 (12 Feb 2022 13:15) (108 - 118)  BP: 158/77 (12 Feb 2022 13:15) (131/72 - 158/77)  BP(mean): 102 (12 Feb 2022 13:15) (97 - 104)  RR: 18 (12 Feb 2022 13:15) (18 - 18)  SpO2: 98% (12 Feb 2022 13:15) (98% - 100%)      02-12-22 @ 07:01  -  02-12-22 @ 14:44  --------------------------------------------------------  IN: 0 mL / OUT: 355 mL / NET: -355 mL      Mode: AC/ CMV (Assist Control/ Continuous Mandatory Ventilation), RR (machine): 18, TV (machine): 400, FiO2: 40, PEEP: 5, ITime: 1, MAP: 8, PIP: 15    EXAMINATION:  General: Intubated, restless  HEENT:  MMM  Neuro: Sedated on propofol, pupils 3mm and reactive, cough/gag/ corneal/ overbreathes vent. Not following commands  Moving L>R though all extremities spontaneously, brisk WD of RLE  Cards: RRR  Respiratory: Clear lungs  Abdomen: Soft, non-tender  Extremities: No edema  Skin: Wrm/dry      MEDICATIONS:   acetaminophen    Suspension .. 650 milliGRAM(s) Oral every 6 hours PRN  dextrose 40% Gel 15 Gram(s) Oral once  dextrose 5%. 1000 milliLiter(s) (50 mL/Hr) IV Continuous <Continuous>  dextrose 50% Injectable 25 Gram(s) IV Push once  glucagon  Injectable 1 milliGRAM(s) IntraMuscular once  insulin lispro (ADMELOG) corrective regimen sliding scale   SubCutaneous every 6 hours  levETIRAcetam  IVPB 1000 milliGRAM(s) IV Intermittent every 12 hours  midazolam Injectable 2 milliGRAM(s) IV Push once  pantoprazole  Injectable 40 milliGRAM(s) IV Push daily  senna 2 Tablet(s) Oral at bedtime  senna 2 Tablet(s) Oral at bedtime PRN  sodium chloride 0.9%. 1000 milliLiter(s) (70 mL/Hr) IV Continuous <Continuous>      LABS:                        15.6   14.50 )-----------( 244      ( 12 Feb 2022 13:20 )             44.4     02-12    133<L>  |  96  |  10  ----------------------------<  200<H>  SEE NOTE   |  19<L>  |  0.45<L>    Ca    9.8      12 Feb 2022 13:20  Phos  2.5     02-12  Mg     1.7     02-12    TPro  9.0<H>  /  Alb  4.8  /  TBili  1.3<H>  /  DBili  x   /  AST  SEE NOTE  /  ALT  SEE NOTE  /  AlkPhos  111  02-12    LIVER FUNCTIONS - ( 12 Feb 2022 13:20 )  Alb: 4.8 g/dL / Pro: 9.0 g/dL / ALK PHOS: 111 U/L / ALT: SEE NOTE U/L / AST: SEE NOTE U/L / GGT: x

## 2022-02-12 NOTE — H&P ADULT - PROBLEM SELECTOR PLAN 1
- neuro checks/vital signs q1hr  - pend CTH/CTA  - R frontal EVD open at 15  - sedated on propofol  - Versed and Fentanyl PRN

## 2022-02-12 NOTE — PROGRESS NOTE ADULT - ASSESSMENT
ASSESSMENT/PLAN:    NEURO:  Activity: [] mobilize as tolerated [] Bedrest [] PT [] OT [] PMNR    PULM:    CV:  SBP goal    RENAL:  Fluids:    GI:  Diet:  GI prophylaxis [] not indicated [] PPI [] other:  Bowel regimen [] colace [] senna [] other:    ENDO:   Goal euglycemia (-180)    HEME/ONC:  VTE prophylaxis: [] SCDs [] chemoprophylaxis [] hold chemoprophylaxis due to: [] high risk of DVT/PE on admission due to:    ID:    MISC:    SOCIAL/FAMILY:  [] awaiting [] updated at bedside [] family meeting    CODE STATUS:  [] Full Code [] DNR [] DNI [] Palliative/Comfort Care    DISPOSITION:  [] ICU [] Stroke Unit [] Floor [] EMU [] RCU [] PCU    [] Patient is at high risk of neurologic deterioration/death due to:       Time spent: ___ [] critical care minutes     ASSESSMENT/PLAN: L thalamic bleed. Likely hypertensive  BD1. ICH score 2    NEURO:  Q1 neurochecks  CT/CTA  Sedation: Propofol for RASS 0- neg 2  Fentanyl 25mg Q2 prn  Stroke core measures. Stroke neurology  Activity: [] mobilize as tolerated [x] Bedrest [] PT [] OT [] PMNR    PULM:  ABG, CXR  Keep intubated for now. Wean as tolerated.    VAP bundle.     CV:  SBP goal 100-140  On cardene  Will add PO regimen to decrease cardene use  TTE with bubble study, baseline EKG  Start lipitor    RENAL: Elevated lactate.  Fluids: NS 1L bolus and NS @100cc/hr  Trend lactate Q6 until normalized  Na 135-145  Haley. Is/Os. Supplement magnesium.    GI:   Diet: NPO  GI prophylaxis [] not indicated [x] PPI [] other:  Bowel regimen [] colace [x] senna [] other:    ENDO: Hyperlipidemia, prediates  Goal euglycemia (-180)  A1c 5.8, TSH.  Start statin 40mg    HEME/ONC:  Monitor CBC  VTE prophylaxis: [x] SCDs [] chemoprophylaxis [x] hold chemoprophylaxis due to: [] high risk of DVT/PE on admission due to:    ID:  Afebrile  Leukocytosis    MISC:    SOCIAL/FAMILY:  [] awaiting [x] updated at bedside [] family meeting    CODE STATUS:  [x] Full Code [] DNR [] DNI [] Palliative/Comfort Care    DISPOSITION:  [x] ICU [] Stroke Unit [] Floor [] EMU [] RCU [] PCU    [x] Patient is at high risk of neurologic deterioration/death due to: hydrocephalus, hemorrhage, ICU delirium    Time spent: 35 critical care minutes     ASSESSMENT/PLAN: L thalamic bleed. Likely hypertensive  BD1. ICH score 2    NEURO:  Q1 neurochecks  CT/CTA  IVH/hydrocephalus: Emergent EVD placed. Monitor ICPs/output.   Sedation: Propofol for RASS 0- neg 2  Fentanyl 25mg Q2 prn  Stroke core measures. Stroke neurology  Activity: [] mobilize as tolerated [x] Bedrest [] PT [] OT [] PMNR    PULM:  ABG, CXR  Keep intubated for now. Wean as tolerated.    VAP bundle.     CV:  SBP goal 100-140  On cardene  Will add PO regimen to decrease cardene use  TTE with bubble study, baseline EKG  Start lipitor    RENAL: Elevated lactate.  Fluids: NS 1L bolus and NS @100cc/hr  Trend lactate Q6 until normalized  Na 135-145  Haley. Is/Os. Supplement magnesium.    GI:   Diet: NPO  GI prophylaxis [] not indicated [x] PPI [] other:  Bowel regimen [] colace [x] senna [] other:    ENDO: Hyperlipidemia, prediates  Goal euglycemia (-180)  A1c 5.8, TSH.  Start statin 40mg    HEME/ONC:  Monitor CBC  VTE prophylaxis: [x] SCDs [] chemoprophylaxis [x] hold chemoprophylaxis due to: [] high risk of DVT/PE on admission due to:    ID:  Afebrile  Leukocytosis    MISC:    SOCIAL/FAMILY:  [] awaiting [x] updated at bedside [] family meeting    CODE STATUS:  [x] Full Code [] DNR [] DNI [] Palliative/Comfort Care    DISPOSITION:  [x] ICU [] Stroke Unit [] Floor [] EMU [] RCU [] PCU    [x] Patient is at high risk of neurologic deterioration/death due to: hydrocephalus, hemorrhage, ICU delirium    Time spent: 35 critical care minutes     ASSESSMENT/PLAN: L thalamic bleed with extensive IVH and 5mm L to R MLS.  BD1. ICH score 2    NEURO:  Q1 neurochecks  CT/CTA.  IVH/hydrocephalus: Emergent EVD placed. Monitor ICPs/output.   Sedation: Propofol for RASS 0- neg 2  Fentanyl 25mg Q2 prn  Stroke core measures. Stroke neurology  Activity: [] mobilize as tolerated [x] Bedrest [] PT [] OT [] PMNR    PULM:  ABG, CXR  Keep intubated for now. Wean as tolerated.    VAP bundle.     CV:  SBP goal 100-140  On cardene  Will add PO regimen to decrease cardene use  TTE with bubble study, baseline EKG  Start lipitor    RENAL: Elevated lactate.  Fluids: NS 1L bolus and NS @100cc/hr  Trend lactate Q6 until normalized  Na 135-145  Haley. Is/Os. Supplement magnesium.    GI:   Diet: NPO  GI prophylaxis [] not indicated [x] PPI [] other:  Bowel regimen [] colace [x] senna [] other:    ENDO: Hyperlipidemia, prediates  Goal euglycemia (-180)  A1c 5.8, TSH.  Start statin 40mg    HEME/ONC:  Monitor CBC  VTE prophylaxis: [x] SCDs [] chemoprophylaxis [x] hold chemoprophylaxis due to: [] high risk of DVT/PE on admission due to:    ID:  Afebrile  Leukocytosis    MISC:    SOCIAL/FAMILY:  [] awaiting [x] updated at bedside [] family meeting    CODE STATUS:  [x] Full Code [] DNR [] DNI [] Palliative/Comfort Care    DISPOSITION:  [x] ICU [] Stroke Unit [] Floor [] EMU [] RCU [] PCU    [x] Patient is at high risk of neurologic deterioration/death due to: hydrocephalus, hemorrhage, ICU delirium    Time spent: 35 critical care minutes

## 2022-02-12 NOTE — H&P ADULT - ASSESSMENT
55 y/o female with PMHx of HTN, pre-DM presents transferred from [facility] after c/o severe headache and nausea being found down by son covered in vomit. Initial CTH revealed left thalamic parenchymal hematoma with intraventricular hemorrhage.

## 2022-02-13 LAB
A1C WITH ESTIMATED AVERAGE GLUCOSE RESULT: 5.8 % — HIGH (ref 4–5.6)
ANION GAP SERPL CALC-SCNC: 11 MMOL/L — SIGNIFICANT CHANGE UP (ref 5–17)
BASE EXCESS BLDA CALC-SCNC: 0.1 MMOL/L — SIGNIFICANT CHANGE UP (ref -2–3)
BASE EXCESS BLDA CALC-SCNC: 1 MMOL/L — SIGNIFICANT CHANGE UP (ref -2–3)
BUN SERPL-MCNC: 8 MG/DL — SIGNIFICANT CHANGE UP (ref 7–23)
CALCIUM SERPL-MCNC: 9.6 MG/DL — SIGNIFICANT CHANGE UP (ref 8.4–10.5)
CHLORIDE SERPL-SCNC: 111 MMOL/L — HIGH (ref 96–108)
CO2 BLDA-SCNC: 25 MMOL/L — HIGH (ref 19–24)
CO2 BLDA-SCNC: 26 MMOL/L — HIGH (ref 19–24)
CO2 SERPL-SCNC: 22 MMOL/L — SIGNIFICANT CHANGE UP (ref 22–31)
CREAT SERPL-MCNC: 0.46 MG/DL — LOW (ref 0.5–1.3)
ESTIMATED AVERAGE GLUCOSE: 120 MG/DL — HIGH (ref 68–114)
GAS PNL BLDA: SIGNIFICANT CHANGE UP
GAS PNL BLDA: SIGNIFICANT CHANGE UP
GLUCOSE BLDC GLUCOMTR-MCNC: 125 MG/DL — HIGH (ref 70–99)
GLUCOSE BLDC GLUCOMTR-MCNC: 137 MG/DL — HIGH (ref 70–99)
GLUCOSE BLDC GLUCOMTR-MCNC: 146 MG/DL — HIGH (ref 70–99)
GLUCOSE BLDC GLUCOMTR-MCNC: 147 MG/DL — HIGH (ref 70–99)
GLUCOSE BLDC GLUCOMTR-MCNC: 156 MG/DL — HIGH (ref 70–99)
GLUCOSE SERPL-MCNC: 165 MG/DL — HIGH (ref 70–99)
HCG SERPL-ACNC: 1 MIU/ML — SIGNIFICANT CHANGE UP
HCO3 BLDA-SCNC: 24 MMOL/L — SIGNIFICANT CHANGE UP (ref 21–28)
HCO3 BLDA-SCNC: 25 MMOL/L — SIGNIFICANT CHANGE UP (ref 21–28)
HCT VFR BLD CALC: 38.9 % — SIGNIFICANT CHANGE UP (ref 34.5–45)
HCV AB S/CO SERPL IA: 0.04 S/CO — SIGNIFICANT CHANGE UP
HCV AB SERPL-IMP: SIGNIFICANT CHANGE UP
HGB BLD-MCNC: 12.8 G/DL — SIGNIFICANT CHANGE UP (ref 11.5–15.5)
LACTATE SERPL-SCNC: 1.9 MMOL/L — SIGNIFICANT CHANGE UP (ref 0.5–2)
MAGNESIUM SERPL-MCNC: 2.1 MG/DL — SIGNIFICANT CHANGE UP (ref 1.6–2.6)
MCHC RBC-ENTMCNC: 30.3 PG — SIGNIFICANT CHANGE UP (ref 27–34)
MCHC RBC-ENTMCNC: 32.9 GM/DL — SIGNIFICANT CHANGE UP (ref 32–36)
MCV RBC AUTO: 92.2 FL — SIGNIFICANT CHANGE UP (ref 80–100)
NRBC # BLD: 0 /100 WBCS — SIGNIFICANT CHANGE UP (ref 0–0)
PCO2 BLDA: 34 MMHG — SIGNIFICANT CHANGE UP (ref 32–35)
PCO2 BLDA: 38 MMHG — HIGH (ref 32–35)
PH BLDA: 7.43 — SIGNIFICANT CHANGE UP (ref 7.35–7.45)
PH BLDA: 7.45 — SIGNIFICANT CHANGE UP (ref 7.35–7.45)
PHOSPHATE SERPL-MCNC: 3.3 MG/DL — SIGNIFICANT CHANGE UP (ref 2.5–4.5)
PLATELET # BLD AUTO: 253 K/UL — SIGNIFICANT CHANGE UP (ref 150–400)
PO2 BLDA: 103 MMHG — SIGNIFICANT CHANGE UP (ref 83–108)
PO2 BLDA: 74 MMHG — LOW (ref 83–108)
POTASSIUM SERPL-MCNC: 3.7 MMOL/L — SIGNIFICANT CHANGE UP (ref 3.5–5.3)
POTASSIUM SERPL-SCNC: 3.7 MMOL/L — SIGNIFICANT CHANGE UP (ref 3.5–5.3)
RBC # BLD: 4.22 M/UL — SIGNIFICANT CHANGE UP (ref 3.8–5.2)
RBC # FLD: 11.5 % — SIGNIFICANT CHANGE UP (ref 10.3–14.5)
SAO2 % BLDA: 97.2 % — SIGNIFICANT CHANGE UP (ref 94–98)
SAO2 % BLDA: 99.6 % — HIGH (ref 94–98)
SODIUM SERPL-SCNC: 144 MMOL/L — SIGNIFICANT CHANGE UP (ref 135–145)
TSH SERPL-MCNC: 0.4 UIU/ML — SIGNIFICANT CHANGE UP (ref 0.27–4.2)
WBC # BLD: 15.78 K/UL — HIGH (ref 3.8–10.5)
WBC # FLD AUTO: 15.78 K/UL — HIGH (ref 3.8–10.5)

## 2022-02-13 PROCEDURE — 99291 CRITICAL CARE FIRST HOUR: CPT

## 2022-02-13 PROCEDURE — 71045 X-RAY EXAM CHEST 1 VIEW: CPT | Mod: 26

## 2022-02-13 PROCEDURE — 70450 CT HEAD/BRAIN W/O DYE: CPT | Mod: 26

## 2022-02-13 PROCEDURE — 99222 1ST HOSP IP/OBS MODERATE 55: CPT

## 2022-02-13 PROCEDURE — 93970 EXTREMITY STUDY: CPT | Mod: 26

## 2022-02-13 PROCEDURE — 71045 X-RAY EXAM CHEST 1 VIEW: CPT | Mod: 26,77

## 2022-02-13 RX ORDER — NOREPINEPHRINE BITARTRATE/D5W 8 MG/250ML
0.05 PLASTIC BAG, INJECTION (ML) INTRAVENOUS
Qty: 8 | Refills: 0 | Status: DISCONTINUED | OUTPATIENT
Start: 2022-02-13 | End: 2022-02-13

## 2022-02-13 RX ORDER — FENTANYL CITRATE 50 UG/ML
25 INJECTION INTRAVENOUS ONCE
Refills: 0 | Status: DISCONTINUED | OUTPATIENT
Start: 2022-02-13 | End: 2022-02-13

## 2022-02-13 RX ORDER — POTASSIUM CHLORIDE 20 MEQ
40 PACKET (EA) ORAL ONCE
Refills: 0 | Status: COMPLETED | OUTPATIENT
Start: 2022-02-13 | End: 2022-02-13

## 2022-02-13 RX ORDER — FENTANYL CITRATE 50 UG/ML
25 INJECTION INTRAVENOUS
Refills: 0 | Status: DISCONTINUED | OUTPATIENT
Start: 2022-02-13 | End: 2022-02-14

## 2022-02-13 RX ORDER — POLYETHYLENE GLYCOL 3350 17 G/17G
17 POWDER, FOR SOLUTION ORAL DAILY
Refills: 0 | Status: DISCONTINUED | OUTPATIENT
Start: 2022-02-13 | End: 2022-02-14

## 2022-02-13 RX ORDER — POLYETHYLENE GLYCOL 3350 17 G/17G
17 POWDER, FOR SOLUTION ORAL DAILY
Refills: 0 | Status: DISCONTINUED | OUTPATIENT
Start: 2022-02-13 | End: 2022-02-18

## 2022-02-13 RX ORDER — FENTANYL CITRATE 50 UG/ML
25 INJECTION INTRAVENOUS
Refills: 0 | Status: DISCONTINUED | OUTPATIENT
Start: 2022-02-13 | End: 2022-02-13

## 2022-02-13 RX ORDER — SODIUM CHLORIDE 9 MG/ML
1000 INJECTION INTRAMUSCULAR; INTRAVENOUS; SUBCUTANEOUS ONCE
Refills: 0 | Status: COMPLETED | OUTPATIENT
Start: 2022-02-13 | End: 2022-02-13

## 2022-02-13 RX ORDER — DEXMEDETOMIDINE HYDROCHLORIDE IN 0.9% SODIUM CHLORIDE 4 UG/ML
0.2 INJECTION INTRAVENOUS
Qty: 200 | Refills: 0 | Status: DISCONTINUED | OUTPATIENT
Start: 2022-02-13 | End: 2022-02-13

## 2022-02-13 RX ORDER — MIDAZOLAM HYDROCHLORIDE 1 MG/ML
2 INJECTION, SOLUTION INTRAMUSCULAR; INTRAVENOUS ONCE
Refills: 0 | Status: DISCONTINUED | OUTPATIENT
Start: 2022-02-13 | End: 2022-02-13

## 2022-02-13 RX ORDER — SODIUM CHLORIDE 9 MG/ML
1000 INJECTION INTRAMUSCULAR; INTRAVENOUS; SUBCUTANEOUS
Refills: 0 | Status: DISCONTINUED | OUTPATIENT
Start: 2022-02-13 | End: 2022-02-16

## 2022-02-13 RX ADMIN — Medication 10 MILLIGRAM(S): at 22:12

## 2022-02-13 RX ADMIN — FENTANYL CITRATE 25 MICROGRAM(S): 50 INJECTION INTRAVENOUS at 13:00

## 2022-02-13 RX ADMIN — FENTANYL CITRATE 25 MICROGRAM(S): 50 INJECTION INTRAVENOUS at 19:48

## 2022-02-13 RX ADMIN — FENTANYL CITRATE 25 MICROGRAM(S): 50 INJECTION INTRAVENOUS at 11:00

## 2022-02-13 RX ADMIN — ATORVASTATIN CALCIUM 40 MILLIGRAM(S): 80 TABLET, FILM COATED ORAL at 22:12

## 2022-02-13 RX ADMIN — FENTANYL CITRATE 25 MICROGRAM(S): 50 INJECTION INTRAVENOUS at 22:12

## 2022-02-13 RX ADMIN — FENTANYL CITRATE 25 MICROGRAM(S): 50 INJECTION INTRAVENOUS at 22:26

## 2022-02-13 RX ADMIN — PANTOPRAZOLE SODIUM 40 MILLIGRAM(S): 20 TABLET, DELAYED RELEASE ORAL at 11:59

## 2022-02-13 RX ADMIN — CHLORHEXIDINE GLUCONATE 15 MILLILITER(S): 213 SOLUTION TOPICAL at 06:29

## 2022-02-13 RX ADMIN — FENTANYL CITRATE 25 MICROGRAM(S): 50 INJECTION INTRAVENOUS at 01:59

## 2022-02-13 RX ADMIN — AMLODIPINE BESYLATE 5 MILLIGRAM(S): 2.5 TABLET ORAL at 06:29

## 2022-02-13 RX ADMIN — PROPOFOL 13.7 MICROGRAM(S)/KG/MIN: 10 INJECTION, EMULSION INTRAVENOUS at 11:59

## 2022-02-13 RX ADMIN — MIDAZOLAM HYDROCHLORIDE 2 MILLIGRAM(S): 1 INJECTION, SOLUTION INTRAMUSCULAR; INTRAVENOUS at 03:41

## 2022-02-13 RX ADMIN — FENTANYL CITRATE 25 MICROGRAM(S): 50 INJECTION INTRAVENOUS at 10:21

## 2022-02-13 RX ADMIN — FENTANYL CITRATE 25 MICROGRAM(S): 50 INJECTION INTRAVENOUS at 03:52

## 2022-02-13 RX ADMIN — Medication: at 00:00

## 2022-02-13 RX ADMIN — Medication 40 MILLIEQUIVALENT(S): at 05:13

## 2022-02-13 RX ADMIN — SODIUM CHLORIDE 1000 MILLILITER(S): 9 INJECTION INTRAMUSCULAR; INTRAVENOUS; SUBCUTANEOUS at 14:54

## 2022-02-13 RX ADMIN — PROPOFOL 13.7 MICROGRAM(S)/KG/MIN: 10 INJECTION, EMULSION INTRAVENOUS at 07:54

## 2022-02-13 RX ADMIN — CHLORHEXIDINE GLUCONATE 15 MILLILITER(S): 213 SOLUTION TOPICAL at 18:41

## 2022-02-13 RX ADMIN — FENTANYL CITRATE 25 MICROGRAM(S): 50 INJECTION INTRAVENOUS at 19:59

## 2022-02-13 RX ADMIN — FENTANYL CITRATE 25 MICROGRAM(S): 50 INJECTION INTRAVENOUS at 17:00

## 2022-02-13 RX ADMIN — POLYETHYLENE GLYCOL 3350 17 GRAM(S): 17 POWDER, FOR SOLUTION ORAL at 11:59

## 2022-02-13 RX ADMIN — FENTANYL CITRATE 25 MICROGRAM(S): 50 INJECTION INTRAVENOUS at 03:43

## 2022-02-13 RX ADMIN — FENTANYL CITRATE 25 MICROGRAM(S): 50 INJECTION INTRAVENOUS at 12:00

## 2022-02-13 RX ADMIN — FENTANYL CITRATE 25 MICROGRAM(S): 50 INJECTION INTRAVENOUS at 07:40

## 2022-02-13 RX ADMIN — SENNA PLUS 2 TABLET(S): 8.6 TABLET ORAL at 22:12

## 2022-02-13 RX ADMIN — FENTANYL CITRATE 25 MICROGRAM(S): 50 INJECTION INTRAVENOUS at 05:11

## 2022-02-13 RX ADMIN — FENTANYL CITRATE 25 MICROGRAM(S): 50 INJECTION INTRAVENOUS at 16:32

## 2022-02-13 RX ADMIN — PROPOFOL 13.7 MICROGRAM(S)/KG/MIN: 10 INJECTION, EMULSION INTRAVENOUS at 23:58

## 2022-02-13 RX ADMIN — FENTANYL CITRATE 25 MICROGRAM(S): 50 INJECTION INTRAVENOUS at 04:14

## 2022-02-13 NOTE — DIETITIAN INITIAL EVALUATION ADULT. - OTHER CALCULATIONS
Ideal body weight used for calculations as pt >120% of IBW (%IBW: 159). Adjusted for age, slight increase PRO for critically ill, wound healing, fluid 1ml/kg.

## 2022-02-13 NOTE — DIETITIAN INITIAL EVALUATION ADULT. - OTHER INFO
57 y/o female with PMHx of HTN, pre-DM presents transferred from [facility] after c/o severe headache and nausea being found down by son covered in vomit. Initial CTH revealed left thalamic parenchymal hematoma with intraventricular hemorrhage. ICH score 2. NIHSS 18. s/p R frontal large bore EVD placement 2/12. No ws/p R frontal EVD placement. Stroke measure    Pt seen in 8EA. Pt is intubated on VC/AC mode. Sedated; propofol infusing @25ml/hr (provide 660 kcal/d). /62. MAP: 85. Afebrile 97.5F. NPO. NG tube placed 2/12, consult received for tube feed. See EN recs below. No family member present upon RD visit. Unable to assess PO intake PTA. A1C 5.8, pre-DM. Observed overweight physical appearance without fat/muscle loss. Pt not meeting criteria for malnutrition at this time. NKFA per EMR. RN reports no N/V. Abd soft, NT. Skin: R frontal EVD incision site, staples C/D/I. No edema noted. No BM yet, on bowel regimen. Please see additional nutrition recs below.

## 2022-02-13 NOTE — DIETITIAN INITIAL EVALUATION ADULT. - ADD RECOMMEND
1. NPO. See EN recs above w/ propofol. If able to wean prop, increase TF rate to 45ml/hr (Provide 1296 kcal,64.8g pro). Maintain aspiration precaution at all times. Align nutrition goal w/ POC at all times 2. Monitor [id number], lytes, trend weights, skin integrity 3. Pain, bowel regimen PRN 4. Provide diet education when appropriate 5. RD remains available

## 2022-02-13 NOTE — PROGRESS NOTE ADULT - SUBJECTIVE AND OBJECTIVE BOX
HPI:  57 y/o female with PMHx of HTN, pre-DM presents transferred from [facility] for intracranial hemorrhage. As per [place] ED provider and son, patient called son around 7:30-8:00AM c/o severe headache and nausea. Son immediately rushed to her house and found her out of bed, moaning and covered in her own vomit. During transit, EMS reported SBP within 240s with intractable vomiting and given Zofran 8mg. Upon arrival to [place] ED, GCS 7, SBP within 180s, patient was given Decadron 10mg, Keppra 1g, started on a Cardene drip, Propofol, Mannitol 1mg/kg. 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 Patient was intubated for airway support and transferred to [facility] for further management. ICH2, NIHSS 8.  Denies use of anticoagulants/antiplatelets.  (12 Feb 2022 16:36).    On admission, the patient was:  GCS: 8  Hunt-Rachel:  modified Duval:  ICH score:2  NIHSS:18      ICU Vital Signs Last 24 Hrs  T(C): 37.2 (13 Feb 2022 05:56), Max: 37.9 (13 Feb 2022 04:00)  T(F): 98.9 (13 Feb 2022 05:56), Max: 100.2 (13 Feb 2022 04:00)  HR: 95 (13 Feb 2022 08:00) (95 - 118)  BP: 113/60 (13 Feb 2022 08:00) (113/60 - 158/77)  BP(mean): 80 (13 Feb 2022 08:00) (80 - 104)  ABP: 107/47 (13 Feb 2022 08:00) (107/47 - 153/68)  ABP(mean): 67 (13 Feb 2022 08:00) (67 - 97)  RR: 18 (13 Feb 2022 08:00) (16 - 26)  SpO2: 98% (13 Feb 2022 08:00) (95% - 100%)      02-12-22 @ 07:01  -  02-13-22 @ 07:00  --------------------------------------------------------  IN: 3863.8 mL / OUT: 2807 mL / NET: 1056.8 mL      Mode: AC/ CMV (Assist Control/ Continuous Mandatory Ventilation), RR (machine): 18, TV (machine): 400, FiO2: 50, PEEP: 5, ITime: 1, MAP: 8.9, PIP: 18      EXAMINATION:  General: Intubated, restless  HEENT:  MMM  Neuro: Sedated on propofol (held sedation) pupils 3mm and reactive, cough/gag/ corneal/ overbreathes vent. Not following commands.  Moving L>R though all extremities spontaneously, brisk WD of RLE. WD of RUE  Cards: RRR  Respiratory: Clear lungs  Abdomen: Soft, non-tender  Extremities: No edema  Skin: Warm/dry      MEDICATIONS:   acetaminophen    Suspension .. 650 milliGRAM(s) Oral every 6 hours PRN  amLODIPine   Tablet 5 milliGRAM(s) Oral daily  atorvastatin 40 milliGRAM(s) Oral at bedtime  chlorhexidine 0.12% Liquid 15 milliLiter(s) Oral Mucosa every 12 hours  dextrose 40% Gel 15 Gram(s) Oral once  dextrose 5%. 1000 milliLiter(s) (50 mL/Hr) IV Continuous <Continuous>  dextrose 50% Injectable 25 Gram(s) IV Push once  fentaNYL    Injectable 25 MICROGram(s) IV Push every 2 hours PRN  glucagon  Injectable 1 milliGRAM(s) IntraMuscular once  hydrALAZINE Injectable 10 milliGRAM(s) IV Push every 4 hours PRN  insulin lispro (ADMELOG) corrective regimen sliding scale   SubCutaneous every 6 hours  niCARdipine Infusion 5 mG/Hr (25 mL/Hr) IV Continuous <Continuous>  pantoprazole  Injectable 40 milliGRAM(s) IV Push daily  polyethylene glycol 3350 17 Gram(s) Oral daily PRN  propofol Infusion 30 MICROgram(s)/kG/Min (13.7 mL/Hr) IV Continuous <Continuous>  propranolol 20 milliGRAM(s) Oral every 6 hours  senna 2 Tablet(s) Oral at bedtime  sodium chloride 0.9%. 1000 milliLiter(s) (100 mL/Hr) IV Continuous <Continuous>    LABS:              12.8   15.78 )-----------( 253      ( 13 Feb 2022 04:06 )             38.9     02-13    144  |  111<H>  |  8   ----------------------------<  165<H>  3.7   |  22  |  0.46<L>    Ca    9.6      13 Feb 2022 04:06  Phos  3.3     02-13  Mg     2.1     02-13    TPro  8.4<H>  /  Alb  4.4  /  TBili  0.9  /  DBili  x   /  AST  28  /  ALT  22  /  AlkPhos  101  02-12    LIVER FUNCTIONS - ( 12 Feb 2022 17:15 )  Alb: 4.4 g/dL / Pro: 8.4 g/dL / ALK PHOS: 101 U/L / ALT: 22 U/L / AST: 28 U/L / GGT: x           ABG - ( 13 Feb 2022 00:15 )  pH, Arterial: 7.43  pH, Blood: x     /  pCO2: 38    /  pO2: 74    / HCO3: 25    / Base Excess: 1.0   /  SaO2: 97.2                         HPI:  57 y/o female with PMHx of HTN, pre-DM presents transferred from [facility] for intracranial hemorrhage. As per [place] ED provider and son, patient called son around 7:30-8:00AM c/o severe headache and nausea. Son immediately rushed to her house and found her out of bed, moaning and covered in her own vomit. During transit, EMS reported SBP within 240s with intractable vomiting and given Zofran 8mg. Upon arrival to [place] ED, GCS 7, SBP within 180s, patient was given Decadron 10mg, Keppra 1g, started on a Cardene drip, Propofol, Mannitol 1mg/kg. 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 Patient was intubated for airway support and transferred to [facility] for further management. ICH2, NIHSS 8.  Denies use of anticoagulants/antiplatelets.  (12 Feb 2022 16:36).    On admission, the patient was:  GCS: 8  Hunt-Rachel:  modified Duval:  ICH score:2  NIHSS:18    Hospital Course:   2/12: Transferred from [place]. R frontal EVD emergently. High pressure. Elevated lactate, bolused. Not following commands.   2/13: Pt remains intubated and sedated. Moving all extremities though not following commands. Concern for storming.       ICU Vital Signs Last 24 Hrs  T(C): 37.2 (13 Feb 2022 05:56), Max: 37.9 (13 Feb 2022 04:00)  T(F): 98.9 (13 Feb 2022 05:56), Max: 100.2 (13 Feb 2022 04:00)  HR: 95 (13 Feb 2022 08:00) (95 - 118)  BP: 113/60 (13 Feb 2022 08:00) (113/60 - 158/77)  BP(mean): 80 (13 Feb 2022 08:00) (80 - 104)  ABP: 107/47 (13 Feb 2022 08:00) (107/47 - 153/68)  ABP(mean): 67 (13 Feb 2022 08:00) (67 - 97)  RR: 18 (13 Feb 2022 08:00) (16 - 26)  SpO2: 98% (13 Feb 2022 08:00) (95% - 100%)      02-12-22 @ 07:01  -  02-13-22 @ 07:00  --------------------------------------------------------  IN: 3863.8 mL / OUT: 2807 mL / NET: 1056.8 mL      Mode: AC/ CMV (Assist Control/ Continuous Mandatory Ventilation), RR (machine): 18, TV (machine): 400, FiO2: 50, PEEP: 5, ITime: 1, MAP: 8.9, PIP: 18      EXAMINATION:  General: Intubated, restless  HEENT:  MMM  Neuro: Sedated on propofol (held sedation) pupils 3mm and reactive, cough/gag/ corneal/ overbreathes vent. Not following commands.  Moving L>R though all extremities spontaneously, brisk WD of RLE. WD of RUE  Cards: RRR  Respiratory: Clear lungs  Abdomen: Soft, non-tender  Extremities: No edema  Skin: Warm/dry      MEDICATIONS:   acetaminophen    Suspension .. 650 milliGRAM(s) Oral every 6 hours PRN  amLODIPine   Tablet 5 milliGRAM(s) Oral daily  atorvastatin 40 milliGRAM(s) Oral at bedtime  chlorhexidine 0.12% Liquid 15 milliLiter(s) Oral Mucosa every 12 hours  dextrose 40% Gel 15 Gram(s) Oral once  dextrose 5%. 1000 milliLiter(s) (50 mL/Hr) IV Continuous <Continuous>  dextrose 50% Injectable 25 Gram(s) IV Push once  fentaNYL    Injectable 25 MICROGram(s) IV Push every 2 hours PRN  glucagon  Injectable 1 milliGRAM(s) IntraMuscular once  hydrALAZINE Injectable 10 milliGRAM(s) IV Push every 4 hours PRN  insulin lispro (ADMELOG) corrective regimen sliding scale   SubCutaneous every 6 hours  niCARdipine Infusion 5 mG/Hr (25 mL/Hr) IV Continuous <Continuous>  pantoprazole  Injectable 40 milliGRAM(s) IV Push daily  polyethylene glycol 3350 17 Gram(s) Oral daily PRN  propofol Infusion 30 MICROgram(s)/kG/Min (13.7 mL/Hr) IV Continuous <Continuous>  propranolol 20 milliGRAM(s) Oral every 6 hours  senna 2 Tablet(s) Oral at bedtime  sodium chloride 0.9%. 1000 milliLiter(s) (100 mL/Hr) IV Continuous <Continuous>    LABS:              12.8   15.78 )-----------( 253      ( 13 Feb 2022 04:06 )             38.9     02-13    144  |  111<H>  |  8   ----------------------------<  165<H>  3.7   |  22  |  0.46<L>    Ca    9.6      13 Feb 2022 04:06  Phos  3.3     02-13  Mg     2.1     02-13    TPro  8.4<H>  /  Alb  4.4  /  TBili  0.9  /  DBili  x   /  AST  28  /  ALT  22  /  AlkPhos  101  02-12    LIVER FUNCTIONS - ( 12 Feb 2022 17:15 )  Alb: 4.4 g/dL / Pro: 8.4 g/dL / ALK PHOS: 101 U/L / ALT: 22 U/L / AST: 28 U/L / GGT: x           ABG - ( 13 Feb 2022 00:15 )  pH, Arterial: 7.43  pH, Blood: x     /  pCO2: 38    /  pO2: 74    / HCO3: 25    / Base Excess: 1.0   /  SaO2: 97.2                         HPI:  55 y/o female with PMHx of HTN, pre-DM presents transferred from [facility] for intracranial hemorrhage. As per [place] ED provider and son, patient called son around 7:30-8:00AM c/o severe headache and nausea. Son immediately rushed to her house and found her out of bed, moaning and covered in her own vomit. During transit, EMS reported SBP within 240s with intractable vomiting and given Zofran 8mg. Upon arrival to [place] ED, GCS 7, SBP within 180s, patient was given Decadron 10mg, Keppra 1g, started on a Cardene drip, Propofol, Mannitol 1mg/kg. 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 Patient was intubated for airway support and transferred to [facility] for further management. ICH2, NIHSS 8.  Denies use of anticoagulants/antiplatelets.  (12 Feb 2022 16:36).    On admission, the patient was:  GCS: 8  Hunt-Rachel:  modified Duval:  ICH score:2  NIHSS:18    Hospital Course:   2/12: Transferred from [place]. R frontal EVD emergently. High pressure. Elevated lactate, bolused. Not following commands.   2/13: Pt remains intubated and sedated. Moving all extremities though not following commands. Concern for storming.       ICU Vital Signs Last 24 Hrs  T(C): 37.2 (13 Feb 2022 05:56), Max: 37.9 (13 Feb 2022 04:00)  T(F): 98.9 (13 Feb 2022 05:56), Max: 100.2 (13 Feb 2022 04:00)  HR: 95 (13 Feb 2022 08:00) (95 - 118)  BP: 113/60 (13 Feb 2022 08:00) (113/60 - 158/77)  BP(mean): 80 (13 Feb 2022 08:00) (80 - 104)  ABP: 107/47 (13 Feb 2022 08:00) (107/47 - 153/68)  ABP(mean): 67 (13 Feb 2022 08:00) (67 - 97)  RR: 18 (13 Feb 2022 08:00) (16 - 26)  SpO2: 98% (13 Feb 2022 08:00) (95% - 100%)      02-12-22 @ 07:01  -  02-13-22 @ 07:00  --------------------------------------------------------  IN: 3863.8 mL / OUT: 2807 mL / NET: 1056.8 mL      Mode: AC/ CMV (Assist Control/ Continuous Mandatory Ventilation), RR (machine): 18, TV (machine): 400, FiO2: 50, PEEP: 5, ITime: 1, MAP: 8.9, PIP: 18      EXAMINATION:  General: Intubated, restless  HEENT:  MMM  Neuro: Sedated on propofol (held sedation) pupils 3mm and reactive, cough/gag/ corneal/ overbreathes vent. Not following commands.  Moving L>R though all extremities spontaneously, brisk WD of RLE. WD of RUE. L side purposeful  Cards: RRR  Respiratory: Clear lungs  Abdomen: Soft, non-tender  Extremities: No edema  Skin: Warm/dry      MEDICATIONS:   acetaminophen    Suspension .. 650 milliGRAM(s) Oral every 6 hours PRN  amLODIPine   Tablet 5 milliGRAM(s) Oral daily  atorvastatin 40 milliGRAM(s) Oral at bedtime  chlorhexidine 0.12% Liquid 15 milliLiter(s) Oral Mucosa every 12 hours  dextrose 40% Gel 15 Gram(s) Oral once  dextrose 5%. 1000 milliLiter(s) (50 mL/Hr) IV Continuous <Continuous>  dextrose 50% Injectable 25 Gram(s) IV Push once  fentaNYL    Injectable 25 MICROGram(s) IV Push every 2 hours PRN  glucagon  Injectable 1 milliGRAM(s) IntraMuscular once  hydrALAZINE Injectable 10 milliGRAM(s) IV Push every 4 hours PRN  insulin lispro (ADMELOG) corrective regimen sliding scale   SubCutaneous every 6 hours  niCARdipine Infusion 5 mG/Hr (25 mL/Hr) IV Continuous <Continuous>  pantoprazole  Injectable 40 milliGRAM(s) IV Push daily  polyethylene glycol 3350 17 Gram(s) Oral daily PRN  propofol Infusion 30 MICROgram(s)/kG/Min (13.7 mL/Hr) IV Continuous <Continuous>  propranolol 20 milliGRAM(s) Oral every 6 hours  senna 2 Tablet(s) Oral at bedtime  sodium chloride 0.9%. 1000 milliLiter(s) (100 mL/Hr) IV Continuous <Continuous>    LABS:              12.8   15.78 )-----------( 253      ( 13 Feb 2022 04:06 )             38.9     02-13    144  |  111<H>  |  8   ----------------------------<  165<H>  3.7   |  22  |  0.46<L>    Ca    9.6      13 Feb 2022 04:06  Phos  3.3     02-13  Mg     2.1     02-13    TPro  8.4<H>  /  Alb  4.4  /  TBili  0.9  /  DBili  x   /  AST  28  /  ALT  22  /  AlkPhos  101  02-12    LIVER FUNCTIONS - ( 12 Feb 2022 17:15 )  Alb: 4.4 g/dL / Pro: 8.4 g/dL / ALK PHOS: 101 U/L / ALT: 22 U/L / AST: 28 U/L / GGT: x           ABG - ( 13 Feb 2022 00:15 )  pH, Arterial: 7.43  pH, Blood: x     /  pCO2: 38    /  pO2: 74    / HCO3: 25    / Base Excess: 1.0   /  SaO2: 97.2        Access:   3 peripheral IVs  R radial [place]  R frontal EVD at [id number]  Sudha

## 2022-02-13 NOTE — PROGRESS NOTE ADULT - SUBJECTIVE AND OBJECTIVE BOX
HPI:  57yo F with PMHx HTN, pre-DM, last known normal this morning 8am. Per son, patient was complaining of headaches this morning. When the son didn't hear from her, the son  rushed to the patient's home where patient was noted to be hanging off her bed, vomiting, and moaning. Zofran was given and patient was taken to [facility] where CTH revealed a left thalamic ICH with IVH and hydrocephalus. SBP on arrival was in 240s with GCS 7. Patient was given decadron 10mg, 80mg mannitol, 1g keppra, and started on cardene. Patient was intubated and transferred to [facility] for further management. ICH score 2. NIHSS 18.     S/Overnight events: Pt remains intubated and sedated overnight, SBP goal <140. Pt moving all extremities spontaneously and to noxious L > R.     Hospital Course:   2/12: transferred from [place]. R frontal EVD and R radial a-line placed. pend CTH/CTA. s/p 1L bolus for elevated lactate.   2/13: S/p R frontal EVD placedment @[id number] draining well. TOSHA o/n, neuro exam stable. Pt remains intubated and sedated, AN L>R. Trend lactate and abg. Amlodipine 5mg QD started for SBP goal < 140.     Vital Signs Last 24 Hrs  T(C): 36.6 (12 Feb 2022 21:45), Max: 36.7 (12 Feb 2022 18:01)  T(F): 97.9 (12 Feb 2022 21:45), Max: 98 (12 Feb 2022 18:01)  HR: 108 (12 Feb 2022 20:13) (105 - 118)  BP: 139/71 (12 Feb 2022 20:00) (118/58 - 158/77)  BP(mean): 97 (12 Feb 2022 20:00) (84 - 104)  RR: 18 (12 Feb 2022 20:00) (16 - 26)  SpO2: 100% (12 Feb 2022 20:13) (98% - 100%)    I&O's Detail    12 Feb 2022 07:01  -  13 Feb 2022 00:07  --------------------------------------------------------  IN:    IV PiggyBack: 100 mL    NiCARdipine: 520 mL    Propofol: 182.4 mL    sodium chloride 0.9%: 490 mL    sodium chloride 0.9%: 100 mL    Sodium Chloride 0.9% Bolus: 1000 mL  Total IN: 2392.4 mL    OUT:    External Ventricular Device (mL): 24 mL    Indwelling Catheter - Urethral (mL): 2135 mL  Total OUT: 2159 mL    Total NET: 233.4 mL        I&O's Summary    12 Feb 2022 07:01  -  13 Feb 2022 00:07  --------------------------------------------------------  IN: 2392.4 mL / OUT: 2159 mL / NET: 233.4 mL        PHYSICAL EXAM:  General: NAD, pt is comfortably laying in hospital bed, +intubated on full vent, +sedated   HEENT: PERRL 3mm, +corneal b/l, +cough/gag, +ET tube   Cardiovascular: RRR, normal S1 and S2   Respiratory: lungs CTAB, no wheezing, rhonchi, or crackles   GI: normoactive BS to auscultation, abd soft, NTND   Neuro: not following commands, AN spontaneously and strong L > R   LUE localizes to noxious, LLE briskly w/d, RUE w/d to noxious, RLE w/d to noxious   Extremities: distal pulses 2+ x4   Wound/incision: R frontal large bore EVD incision site with staples C/D/I   Drains: R frontal EVD @[id number]     TUBES/LINES:  [] CVC  [X] A-line - R radial   [] Lumbar Drain  [X] Ventriculostomy - R frontal EVD @ [id number]   [] Other    DIET:  [X] NPO  [] Mechanical  [] Tube feeds    LABS:  PT/INR - ( 12 Feb 2022 13:20 )   PT: 11.6 sec;   INR: 0.97          PTT - ( 12 Feb 2022 13:20 )  PTT:30.1 sec        CAPILLARY BLOOD GLUCOSE      POCT Blood Glucose.: 203 mg/dL (12 Feb 2022 17:08)      Drug Levels: [] N/A    CSF Analysis: [] N/A      Allergies    No Known Allergies    Intolerances      MEDICATIONS:  Antibiotics:    Neuro:  acetaminophen    Suspension .. 650 milliGRAM(s) Oral every 6 hours PRN  fentaNYL    Injectable 25 MICROGram(s) IV Push every 2 hours PRN  propofol Infusion 30 MICROgram(s)/kG/Min IV Continuous <Continuous>    Anticoagulation:    OTHER:  amLODIPine   Tablet 5 milliGRAM(s) Oral daily  atorvastatin 40 milliGRAM(s) Oral at bedtime  chlorhexidine 0.12% Liquid 15 milliLiter(s) Oral Mucosa every 12 hours  dextrose 40% Gel 15 Gram(s) Oral once  dextrose 50% Injectable 25 Gram(s) IV Push once  glucagon  Injectable 1 milliGRAM(s) IntraMuscular once  hydrALAZINE Injectable 10 milliGRAM(s) IV Push every 4 hours PRN  insulin lispro (ADMELOG) corrective regimen sliding scale   SubCutaneous every 6 hours  niCARdipine Infusion 5 mG/Hr IV Continuous <Continuous>  pantoprazole  Injectable 40 milliGRAM(s) IV Push daily  senna 2 Tablet(s) Oral at bedtime    IVF:  dextrose 5%. 1000 milliLiter(s) IV Continuous <Continuous>  sodium chloride 0.9%. 1000 milliLiter(s) IV Continuous <Continuous>    CULTURES:    RADIOLOGY & ADDITIONAL TESTS:  < from: CT Angio Head w/ IV Cont (02.12.22 @ 19:06) >  IMPRESSION:  1. No large vessel occlusion or significant stenosis. No aneurysm.  2. Mild right frontoparietal scalp swelling-hematoma with overlying   staples adjacent to calvarial  ventriculostomy site.    < from: CT Head No Cont (02.12.22 @ 18:36) >  IMPRESSION:  1. Left thalamic intraparenchymal and extensive intraventricular   hemorrhage per patient history.  2. Right frontal ventriculostomy tube.  3. Mild 5 mm left-to-right midline shift at the level of the septum   pellucidum.      ASSESSMENT:  57 y/o female with PMHx of HTN, pre-DM presents transferred from [facility] after c/o severe headache and nausea being found down by son covered in vomit. Initial CTH revealed left thalamic parenchymal hematoma with intraventricular hemorrhage. ICH score 2. NIHSS 18. s/p R frontal large bore EVD placement 2/12.       HEAD BLEED    Handoff    No pertinent past medical history    Hypertension    Pre-diabetes    ICH (intracerebral hemorrhage)    HTN (hypertension)    Pre-diabetes    SysAdmin_VstLnk        PLAN:  NEURO:  - neuro checks (coma checks)/vital signs q1hr  - CTH; L thalamic IPH with extensive IVH and mild L to R MLS (5mm). CTA; no vessel occlusion or aneurysm noted   - R frontal large bore EVD open at [id number], monitor ICP and output  - sedated on propofol  - Fentanyl PRN for agitation  - s/p Keppra 1g at [place] 2/12, no need for additional Keppra at this time  - pend PT/OT assessment when medically appropriate     PULM:  - intubated on full vent support  - vent settings: 400/18/50/5   - wean to extubate when appropriate   - abg with improvement in CO2 and less acidotic     CARDIO:  - SBP goal 100-140  - cardene gtt, wean as tolerated   - started on amlodipine 5mg QD via NGT   - continue lipitor, elevated total cholestrol and LDL   - pend echo on admission     GI:  - NPO except meds   - NGT in place  - bowel regimen PRN     RENAL:  - s/p Mannitol 1g/kg at [place]  - Na goal 135-145  - IVF NS at 100, 1l Bolus given 2/12 for elevated lactate    HEME:  - SCDs for DVT ppx  - no chemoprophylaxis at this time  - f/u LE dopplers    ENDO:  - ISS  - a1c 5.8  - hyperglycemic, trend FS     ID:  - given Ancef while placing EVD  - lactate 4.6 on admission, trend down to 1.9 after fluid bolus      Dispo: ICU status, family updated with plan    Assessment and plan discussed with Dr. D'Amico and Dr. Snow

## 2022-02-13 NOTE — DIETITIAN INITIAL EVALUATION ADULT. - PERTINENT MEDS FT
Insulin Lispro, protonix, lipitor, senna  Drips: Propofol Insulin Lispro, protonix, lipitor, senna  Drips: Propofol infusing @25ml/hr (provide 660 kcal/d)

## 2022-02-13 NOTE — DIETITIAN INITIAL EVALUATION ADULT. - PERTINENT LABORATORY DATA
Na 144, K 3.7, [id number], bUN8, creat 0.46L,  Glucose 165H  A1C: 5.8H  POCT BG 2/13-2/12:  147H, 156H

## 2022-02-13 NOTE — DIETITIAN INITIAL EVALUATION ADULT. - ENTERAL
1 If pt to start TF, Glucerna 1.2 @25ml/hr + sophie from Propofol (660cal)=1380 kcal. Start at 10ml/hr, increase 10ml q6-8hrs (provides 720kcal, 36g pro). Water flush per team.

## 2022-02-13 NOTE — PROGRESS NOTE ADULT - ASSESSMENT
ASSESSMENT/PLAN: L thalamic bleed. Likely hypertensive  BD1. ICH score 2    NEURO:  Q1 neurochecks  CT/CTA  Hydrocephalus; EVD@ [id number]:Monitor ICP/output  Sedation: Propofol for RASS 0- neg 2. Wean as tolerated-> attempt change to precedex.   PSH: Add propanolol. Fentanyl 25mg Q2  Stroke core measures. Stroke neurology  Activity: [] mobilize as tolerated [x] Bedrest [] PT [] OT [] PMNR    PULM:  ABG, CXR  Keep intubated for now. Wean as tolerated.    VAP bundle.     CV:  SBP goal 100-140  On cardene  Will add PO regimen to decrease cardene use*  TTE with bubble study, baseline EKG  Lipitor    RENAL: Elevated lactate.  Fluids: NS 1L bolus and NS @100cc/hr  Trend lactate Q6 until normalized  Na 135-145  Haley. Is/Os. Supplement magnesium.    GI:   Diet: NGT feeds  GI prophylaxis [] not indicated [x] PPI [] other:  Bowel regimen [] colace [x] senna [] other:    ENDO: Hyperlipidemia, prediates  Goal euglycemia (-180)  A1c 5.8, TSH.  Start statin 40mg    HEME/ONC:  Monitor CBC  VTE prophylaxis: [x] SCDs [] chemoprophylaxis [x] hold chemoprophylaxis due to: [] high risk of DVT/PE on admission due to:    ID:  Afebrile  Leukocytosis    MISC:    SOCIAL/FAMILY:  [] awaiting [x] updated at bedside [] family meeting    CODE STATUS:  [x] Full Code [] DNR [] DNI [] Palliative/Comfort Care    DISPOSITION:  [x] ICU [] Stroke Unit [] Floor [] EMU [] RCU [] PCU    [x] Patient is at high risk of neurologic deterioration/death due to: hydrocephalus, hemorrhage, ICU delirium    Time spent: 35 critical care minutes     ASSESSMENT/PLAN: L thalamic bleed wiith extensive IVH and 5mm L to R MLS.  Likely hypertensive.  BD2. ICH score 2    NEURO:  Q1 neurochecks  CT/CTA: Stable. No vessel abnormality noted.   Hydrocephalus; EVD@ [id number]:Monitor ICP/output  Sedation: Propofol for RASS 0- neg 2. Wean as tolerated-> attempt switch to precedex.   PSH: Add propanolol. Fentanyl 25mg Q2  Stroke core measures. Stroke neurology  Activity: [] mobilize as tolerated [x] Bedrest [] PT [] OT [] PMNR    PULM:  ABG, CXR  Keep intubated for now. Wean as tolerated.    VAP bundle.     CV:  SBP goal 100-140  On cardene  Will add PO regimen to decrease cardene*  TTE with bubble study, baseline EKG  Lipitor    RENAL: Elevated lactate.  Fluids: NS 1L bolus and NS @100cc/hr  Trend lactate Q6 until normalized  Na 135-145  Haley. Is/Os. Supplement magnesium.    GI:   Diet: NGT feeds  GI prophylaxis [] not indicated [x] PPI [] other:  Bowel regimen [] colace [x] senna [] other:    ENDO: Hyperlipidemia, prediates  Goal euglycemia (-180)  A1c 5.8, TSH.  Start statin 40mg    HEME/ONC:  Monitor CBC  VTE prophylaxis: [x] SCDs [] chemoprophylaxis [x] hold chemoprophylaxis due to: [] high risk of DVT/PE on admission due to:    ID:  Afebrile  Leukocytosis    MISC:    SOCIAL/FAMILY:  [] awaiting [x] updated at bedside [] family meeting    CODE STATUS:  [x] Full Code [] DNR [] DNI [] Palliative/Comfort Care    DISPOSITION:  [x] ICU [] Stroke Unit [] Floor [] EMU [] RCU [] PCU    [x] Patient is at high risk of neurologic deterioration/death due to: hydrocephalus, hemorrhage, ICU delirium    Time spent: 35 critical care minutes     ASSESSMENT/PLAN: L thalamic bleed with extensive IVH and 5mm L to R MLS.  Likely hypertensive.  BD2. ICH score 2    NEURO:  Q1 neurochecks  CT/CTA: Stable. No vessel abnormality noted.   Hydrocephalus; EVD@ [id number]:Monitor ICP/output (57cc/24 hours). Repeat CT head 2/13  Sedation: Propofol for RASS 0- neg 2. Wean as tolerated-> attempt switch to precedex.   PSH: Add propanolol. Fentanyl 25mg Q2  Stroke core measures. Stroke neurology  Activity: [] mobilize as tolerated [x] Bedrest [] PT [] OT [] PMNR    PULM:  ABG, CXR  Keep intubated for now. Wean as tolerated.    Vent 18/400/40/6.   VAP bundle.     CV:  SBP goal 100-140  Off cardene  On amlodipine  TTE with bubble study, baseline EKG  Continue Lipitor    RENAL: Elevated lactate on admission. S/P Mannitol at OSH  Fluids: NS @ 75  Lactate Q6 until normalized  Na 140- 145  DC antony. Primafit . Is/Os. Supplement lytes    GI:   Diet: NGT feeds. Start glucerna.   GI prophylaxis [] not indicated [x] PPI [] other:  Bowel regimen [] colace [x] senna [] other:  LBM: Awaiting. Add miralax    ENDO: Hyperlipidemia, prediabetic.  Goal euglycemia (-180)  A1c 5.8, TSH pending  On statin 40mg    HEME/ONC:  Monitor CBC  VTE prophylaxis: [x] SCDs [] chemoprophylaxis [x] hold chemoprophylaxis due to: [] high risk of DVT/PE on admission due to:  LE dopplers pending     ID:  Afebrile  Reactive reactive leukocytosis  Ancef 1x prior to eVD    MISC:    SOCIAL/FAMILY:  [] awaiting [x] updated at bedside [] family meeting    CODE STATUS:  [x] Full Code [] DNR [] DNI [] Palliative/Comfort Care    DISPOSITION:  [x] ICU [] Stroke Unit [] Floor [] EMU [] RCU [] PCU    [x] Patient is at high risk of neurologic deterioration/death due to: hydrocephalus, hemorrhage, ICU delirium    Time spent: 35 critical care minutes     ASSESSMENT/PLAN: L thalamic bleed with extensive IVH and 5mm L to R MLS.  Likely hypertensive.  BD2. ICH score 2    NEURO:  Q1 neurochecks  CT/CTA: Stable. No vessel abnormality noted.   Hydrocephalus; EVD@ [id number]:Monitor ICP/output (57cc/24 hours). Repeat CT head 2/13  Sedation: Propofol for RASS 0- neg 2. Wean as tolerated-> attempt switch to precedex for storming.   PSH: Fentanyl 25mg Q2 prn  Stroke core measures. Stroke neurology  Activity: [] mobilize as tolerated [x] Bedrest [] PT [] OT [] PMNR    PULM:  ABG, CXR  Keep intubated for now. Wean as tolerated.    Vent 18/400/40/6.   VAP bundle.     CV:  SBP goal 100-140  Off cardene  On amlodipine  TTE with bubble study, baseline EKG  Continue Lipitor    RENAL: Elevated lactate on admission. S/P Mannitol at OSH  Fluids: NS @ 75  Lactate normalized  Na 140- 145  DC antony. Primafit . Is/Os. Supplement lytes    GI:   Diet: NGT feeds. Start glucerna.   GI prophylaxis [] not indicated [x] PPI [] other:  Bowel regimen [] colace [x] senna [] other:  LBM: Awaiting. Add miralax    ENDO: Hyperlipidemia, prediabetic.  Goal euglycemia (-180)  A1c 5.8, TSH pending  On statin 40mg    HEME/ONC:  Monitor CBC  VTE prophylaxis: [x] SCDs [] chemoprophylaxis [x] hold chemoprophylaxis due to: [] high risk of DVT/PE on admission due to:  LE dopplers pending     ID:  Afebrile  Reactive reactive leukocytosis  Ancef 1x prior to eVD    MISC:    SOCIAL/FAMILY:  [] awaiting [x] updated at bedside son Bassam [] family meeting    CODE STATUS:  [x] Full Code [] DNR [] DNI [] Palliative/Comfort Care    DISPOSITION:  [x] ICU [] Stroke Unit [] Floor [] EMU [] RCU [] PCU    [x] Patient is at high risk of neurologic deterioration/death due to: hydrocephalus, hemorrhage, ICU delirium    Time spent: 40 critical care minutes.

## 2022-02-14 LAB
ANION GAP SERPL CALC-SCNC: 12 MMOL/L — SIGNIFICANT CHANGE UP (ref 5–17)
BASE EXCESS BLDA CALC-SCNC: -0.6 MMOL/L — SIGNIFICANT CHANGE UP (ref -2–3)
BASE EXCESS BLDA CALC-SCNC: -0.7 MMOL/L — SIGNIFICANT CHANGE UP (ref -2–3)
BASE EXCESS BLDA CALC-SCNC: -1.1 MMOL/L — SIGNIFICANT CHANGE UP (ref -2–3)
BASE EXCESS BLDA CALC-SCNC: -1.1 MMOL/L — SIGNIFICANT CHANGE UP (ref -2–3)
BUN SERPL-MCNC: 13 MG/DL — SIGNIFICANT CHANGE UP (ref 7–23)
CALCIUM SERPL-MCNC: 9.1 MG/DL — SIGNIFICANT CHANGE UP (ref 8.4–10.5)
CHLORIDE SERPL-SCNC: 112 MMOL/L — HIGH (ref 96–108)
CO2 BLDA-SCNC: 24 MMOL/L — SIGNIFICANT CHANGE UP (ref 19–24)
CO2 SERPL-SCNC: 21 MMOL/L — LOW (ref 22–31)
CREAT SERPL-MCNC: 0.46 MG/DL — LOW (ref 0.5–1.3)
GAS PNL BLDA: SIGNIFICANT CHANGE UP
GAS PNL BLDA: SIGNIFICANT CHANGE UP
GLUCOSE BLDC GLUCOMTR-MCNC: 120 MG/DL — HIGH (ref 70–99)
GLUCOSE BLDC GLUCOMTR-MCNC: 129 MG/DL — HIGH (ref 70–99)
GLUCOSE BLDC GLUCOMTR-MCNC: 130 MG/DL — HIGH (ref 70–99)
GLUCOSE BLDC GLUCOMTR-MCNC: 139 MG/DL — HIGH (ref 70–99)
GLUCOSE SERPL-MCNC: 139 MG/DL — HIGH (ref 70–99)
HCO3 BLDA-SCNC: 23 MMOL/L — SIGNIFICANT CHANGE UP (ref 21–28)
HCT VFR BLD CALC: 35.6 % — SIGNIFICANT CHANGE UP (ref 34.5–45)
HGB BLD-MCNC: 11.7 G/DL — SIGNIFICANT CHANGE UP (ref 11.5–15.5)
MAGNESIUM SERPL-MCNC: 2.2 MG/DL — SIGNIFICANT CHANGE UP (ref 1.6–2.6)
MCHC RBC-ENTMCNC: 31.1 PG — SIGNIFICANT CHANGE UP (ref 27–34)
MCHC RBC-ENTMCNC: 32.9 GM/DL — SIGNIFICANT CHANGE UP (ref 32–36)
MCV RBC AUTO: 94.7 FL — SIGNIFICANT CHANGE UP (ref 80–100)
NRBC # BLD: 0 /100 WBCS — SIGNIFICANT CHANGE UP (ref 0–0)
PCO2 BLDA: 34 MMHG — SIGNIFICANT CHANGE UP (ref 32–35)
PCO2 BLDA: 34 MMHG — SIGNIFICANT CHANGE UP (ref 32–35)
PCO2 BLDA: 35 MMHG — SIGNIFICANT CHANGE UP (ref 32–35)
PCO2 BLDA: 36 MMHG — HIGH (ref 32–35)
PH BLDA: 7.42 — SIGNIFICANT CHANGE UP (ref 7.35–7.45)
PH BLDA: 7.43 — SIGNIFICANT CHANGE UP (ref 7.35–7.45)
PHOSPHATE SERPL-MCNC: 1.5 MG/DL — LOW (ref 2.5–4.5)
PLATELET # BLD AUTO: 232 K/UL — SIGNIFICANT CHANGE UP (ref 150–400)
PO2 BLDA: 121 MMHG — HIGH (ref 83–108)
PO2 BLDA: 71 MMHG — LOW (ref 83–108)
PO2 BLDA: 90 MMHG — SIGNIFICANT CHANGE UP (ref 83–108)
PO2 BLDA: 91 MMHG — SIGNIFICANT CHANGE UP (ref 83–108)
POTASSIUM SERPL-MCNC: 3.8 MMOL/L — SIGNIFICANT CHANGE UP (ref 3.5–5.3)
POTASSIUM SERPL-SCNC: 3.8 MMOL/L — SIGNIFICANT CHANGE UP (ref 3.5–5.3)
RBC # BLD: 3.76 M/UL — LOW (ref 3.8–5.2)
RBC # FLD: 11.9 % — SIGNIFICANT CHANGE UP (ref 10.3–14.5)
SAO2 % BLDA: 96.9 % — SIGNIFICANT CHANGE UP (ref 94–98)
SAO2 % BLDA: 98.8 % — HIGH (ref 94–98)
SAO2 % BLDA: 99 % — HIGH (ref 94–98)
SAO2 % BLDA: 99.4 % — HIGH (ref 94–98)
SODIUM SERPL-SCNC: 145 MMOL/L — SIGNIFICANT CHANGE UP (ref 135–145)
WBC # BLD: 13.86 K/UL — HIGH (ref 3.8–10.5)
WBC # FLD AUTO: 13.86 K/UL — HIGH (ref 3.8–10.5)

## 2022-02-14 PROCEDURE — 70450 CT HEAD/BRAIN W/O DYE: CPT | Mod: 26

## 2022-02-14 PROCEDURE — 93306 TTE W/DOPPLER COMPLETE: CPT | Mod: 26

## 2022-02-14 PROCEDURE — 71045 X-RAY EXAM CHEST 1 VIEW: CPT | Mod: 26

## 2022-02-14 PROCEDURE — 99291 CRITICAL CARE FIRST HOUR: CPT

## 2022-02-14 PROCEDURE — 99233 SBSQ HOSP IP/OBS HIGH 50: CPT

## 2022-02-14 RX ORDER — IPRATROPIUM/ALBUTEROL SULFATE 18-103MCG
3 AEROSOL WITH ADAPTER (GRAM) INHALATION ONCE
Refills: 0 | Status: COMPLETED | OUTPATIENT
Start: 2022-02-14 | End: 2022-02-14

## 2022-02-14 RX ORDER — FENTANYL CITRATE 50 UG/ML
12.5 INJECTION INTRAVENOUS
Refills: 0 | Status: DISCONTINUED | OUTPATIENT
Start: 2022-02-14 | End: 2022-02-15

## 2022-02-14 RX ORDER — FENTANYL CITRATE 50 UG/ML
25 INJECTION INTRAVENOUS ONCE
Refills: 0 | Status: DISCONTINUED | OUTPATIENT
Start: 2022-02-14 | End: 2022-02-14

## 2022-02-14 RX ORDER — IPRATROPIUM/ALBUTEROL SULFATE 18-103MCG
3 AEROSOL WITH ADAPTER (GRAM) INHALATION EVERY 6 HOURS
Refills: 0 | Status: DISCONTINUED | OUTPATIENT
Start: 2022-02-14 | End: 2022-02-17

## 2022-02-14 RX ORDER — DEXMEDETOMIDINE HYDROCHLORIDE IN 0.9% SODIUM CHLORIDE 4 UG/ML
0.2 INJECTION INTRAVENOUS
Qty: 400 | Refills: 0 | Status: DISCONTINUED | OUTPATIENT
Start: 2022-02-14 | End: 2022-02-15

## 2022-02-14 RX ORDER — POTASSIUM CHLORIDE 20 MEQ
20 PACKET (EA) ORAL ONCE
Refills: 0 | Status: COMPLETED | OUTPATIENT
Start: 2022-02-14 | End: 2022-02-14

## 2022-02-14 RX ORDER — POTASSIUM PHOSPHATE, MONOBASIC POTASSIUM PHOSPHATE, DIBASIC 236; 224 MG/ML; MG/ML
30 INJECTION, SOLUTION INTRAVENOUS ONCE
Refills: 0 | Status: COMPLETED | OUTPATIENT
Start: 2022-02-14 | End: 2022-02-14

## 2022-02-14 RX ORDER — SODIUM CHLORIDE 9 MG/ML
500 INJECTION INTRAMUSCULAR; INTRAVENOUS; SUBCUTANEOUS ONCE
Refills: 0 | Status: COMPLETED | OUTPATIENT
Start: 2022-02-14 | End: 2022-02-14

## 2022-02-14 RX ORDER — ENOXAPARIN SODIUM 100 MG/ML
40 INJECTION SUBCUTANEOUS EVERY 24 HOURS
Refills: 0 | Status: DISCONTINUED | OUTPATIENT
Start: 2022-02-14 | End: 2022-02-24

## 2022-02-14 RX ORDER — QUETIAPINE FUMARATE 200 MG/1
12.5 TABLET, FILM COATED ORAL ONCE
Refills: 0 | Status: COMPLETED | OUTPATIENT
Start: 2022-02-14 | End: 2022-02-14

## 2022-02-14 RX ORDER — LOSARTAN POTASSIUM 100 MG/1
100 TABLET, FILM COATED ORAL DAILY
Refills: 0 | Status: DISCONTINUED | OUTPATIENT
Start: 2022-02-15 | End: 2022-02-20

## 2022-02-14 RX ORDER — NICARDIPINE HYDROCHLORIDE 30 MG/1
5 CAPSULE, EXTENDED RELEASE ORAL
Qty: 40 | Refills: 0 | Status: DISCONTINUED | OUTPATIENT
Start: 2022-02-14 | End: 2022-02-16

## 2022-02-14 RX ADMIN — FENTANYL CITRATE 25 MICROGRAM(S): 50 INJECTION INTRAVENOUS at 08:00

## 2022-02-14 RX ADMIN — FENTANYL CITRATE 25 MICROGRAM(S): 50 INJECTION INTRAVENOUS at 16:10

## 2022-02-14 RX ADMIN — FENTANYL CITRATE 25 MICROGRAM(S): 50 INJECTION INTRAVENOUS at 08:15

## 2022-02-14 RX ADMIN — PANTOPRAZOLE SODIUM 40 MILLIGRAM(S): 20 TABLET, DELAYED RELEASE ORAL at 12:41

## 2022-02-14 RX ADMIN — ATORVASTATIN CALCIUM 40 MILLIGRAM(S): 80 TABLET, FILM COATED ORAL at 21:30

## 2022-02-14 RX ADMIN — DEXMEDETOMIDINE HYDROCHLORIDE IN 0.9% SODIUM CHLORIDE 3.8 MICROGRAM(S)/KG/HR: 4 INJECTION INTRAVENOUS at 22:52

## 2022-02-14 RX ADMIN — FENTANYL CITRATE 25 MICROGRAM(S): 50 INJECTION INTRAVENOUS at 15:55

## 2022-02-14 RX ADMIN — FENTANYL CITRATE 12.5 MICROGRAM(S): 50 INJECTION INTRAVENOUS at 17:20

## 2022-02-14 RX ADMIN — FENTANYL CITRATE 25 MICROGRAM(S): 50 INJECTION INTRAVENOUS at 04:25

## 2022-02-14 RX ADMIN — FENTANYL CITRATE 25 MICROGRAM(S): 50 INJECTION INTRAVENOUS at 12:42

## 2022-02-14 RX ADMIN — POTASSIUM PHOSPHATE, MONOBASIC POTASSIUM PHOSPHATE, DIBASIC 83.33 MILLIMOLE(S): 236; 224 INJECTION, SOLUTION INTRAVENOUS at 12:41

## 2022-02-14 RX ADMIN — FENTANYL CITRATE 25 MICROGRAM(S): 50 INJECTION INTRAVENOUS at 05:00

## 2022-02-14 RX ADMIN — FENTANYL CITRATE 25 MICROGRAM(S): 50 INJECTION INTRAVENOUS at 12:57

## 2022-02-14 RX ADMIN — Medication 0.5 MILLIGRAM(S): at 17:21

## 2022-02-14 RX ADMIN — SODIUM CHLORIDE 1000 MILLILITER(S): 9 INJECTION INTRAMUSCULAR; INTRAVENOUS; SUBCUTANEOUS at 17:25

## 2022-02-14 RX ADMIN — FENTANYL CITRATE 25 MICROGRAM(S): 50 INJECTION INTRAVENOUS at 10:04

## 2022-02-14 RX ADMIN — FENTANYL CITRATE 25 MICROGRAM(S): 50 INJECTION INTRAVENOUS at 06:44

## 2022-02-14 RX ADMIN — FENTANYL CITRATE 25 MICROGRAM(S): 50 INJECTION INTRAVENOUS at 07:00

## 2022-02-14 RX ADMIN — PROPOFOL 13.7 MICROGRAM(S)/KG/MIN: 10 INJECTION, EMULSION INTRAVENOUS at 02:47

## 2022-02-14 RX ADMIN — ENOXAPARIN SODIUM 40 MILLIGRAM(S): 100 INJECTION SUBCUTANEOUS at 21:30

## 2022-02-14 RX ADMIN — AMLODIPINE BESYLATE 5 MILLIGRAM(S): 2.5 TABLET ORAL at 06:44

## 2022-02-14 RX ADMIN — Medication 20 MILLIEQUIVALENT(S): at 18:42

## 2022-02-14 RX ADMIN — DEXMEDETOMIDINE HYDROCHLORIDE IN 0.9% SODIUM CHLORIDE 3.8 MICROGRAM(S)/KG/HR: 4 INJECTION INTRAVENOUS at 13:00

## 2022-02-14 RX ADMIN — FENTANYL CITRATE 25 MICROGRAM(S): 50 INJECTION INTRAVENOUS at 09:49

## 2022-02-14 RX ADMIN — FENTANYL CITRATE 25 MICROGRAM(S): 50 INJECTION INTRAVENOUS at 13:40

## 2022-02-14 RX ADMIN — FENTANYL CITRATE 25 MICROGRAM(S): 50 INJECTION INTRAVENOUS at 00:15

## 2022-02-14 RX ADMIN — FENTANYL CITRATE 25 MICROGRAM(S): 50 INJECTION INTRAVENOUS at 13:55

## 2022-02-14 RX ADMIN — Medication 3 MILLILITER(S): at 16:38

## 2022-02-14 RX ADMIN — FENTANYL CITRATE 25 MICROGRAM(S): 50 INJECTION INTRAVENOUS at 02:00

## 2022-02-14 RX ADMIN — FENTANYL CITRATE 12.5 MICROGRAM(S): 50 INJECTION INTRAVENOUS at 17:35

## 2022-02-14 RX ADMIN — POLYETHYLENE GLYCOL 3350 17 GRAM(S): 17 POWDER, FOR SOLUTION ORAL at 18:43

## 2022-02-14 RX ADMIN — Medication 3 MILLILITER(S): at 21:21

## 2022-02-14 RX ADMIN — NICARDIPINE HYDROCHLORIDE 25 MG/HR: 30 CAPSULE, EXTENDED RELEASE ORAL at 12:58

## 2022-02-14 RX ADMIN — SODIUM CHLORIDE 75 MILLILITER(S): 9 INJECTION INTRAMUSCULAR; INTRAVENOUS; SUBCUTANEOUS at 18:43

## 2022-02-14 RX ADMIN — CHLORHEXIDINE GLUCONATE 15 MILLILITER(S): 213 SOLUTION TOPICAL at 06:44

## 2022-02-14 RX ADMIN — SENNA PLUS 2 TABLET(S): 8.6 TABLET ORAL at 21:30

## 2022-02-14 NOTE — PROGRESS NOTE ADULT - ASSESSMENT
ASSESSMENT/PLAN: L thalamic bleed with extensive IVH and 5mm L to R MLS.  Likely hypertensive.  BD2. ICH score 2    NEURO:  Q1 neurochecks  CT/CTA: Stable. No vessel abnormality noted.   Hydrocephalus; EVD@ [id number]:Monitor ICP/output (57cc/24 hours). Repeat CT head 2/13  Sedation: Propofol for RASS 0- neg 2. Hold for extubation  PSH: Fentanyl 25mg Q2 prn  Stroke core measures. Stroke neurology  Activity: [] mobilize as tolerated [x] Bedrest [] PT [] OT [] PMNR    PULM: Respiratory support secondary to above neurologic injury  ABG, CXR  Possible extubation 2/14  Vent 18/400/40/5  VAP bundle.     CV:  SBP goal 100-140  Off cardene  On amlodipine  TTE with bubble study, baseline EKG  Continue Lipitor    RENAL: Elevated lactate on admission. S/P Mannitol at OSH  Fluids: NS @ 75  Lactate normalized  Na 140- 145  DC antony. Primafit . Is/Os. Supplement lytes    GI:   Diet: NGT feeds. On glucerna- hold for extubation  GI prophylaxis [] not indicated [x] PPI [] other:  Bowel regimen [] colace [x] senna [] other:  LBM: Awaiting. Miralax    ENDO: Hyperlipidemia, prediabetic.  Goal euglycemia (-180)  A1c 5.8, TSH pending  On statin 40mg    HEME/ONC:  Monitor CBC  VTE prophylaxis: [x] SCDs [] chemoprophylaxis [x] hold chemoprophylaxis due to: heme**  LE dopplers pending     ID:  Afebrile  Reactive reactive leukocytosis  Ancef 1x prior to EVD    MISC:    SOCIAL/FAMILY:  [] awaiting [x] updated at bedside son Bassam 2/13 [] family meeting    CODE STATUS:  [x] Full Code [] DNR [] DNI [] Palliative/Comfort Care    DISPOSITION:  [x] ICU [] Stroke Unit [] Floor [] EMU [] RCU [] PCU    [x] Patient is at high risk of neurologic deterioration/death due to: hydrocephalus, hemorrhage, ICU delirium    Time spent: 40 critical care minutes.     ASSESSMENT/PLAN: L thalamic bleed with extensive IVH and 5mm L to R MLS.  Likely hypertensive.  BD3. ICH score 2    NEURO:  Q1 neurochecks  CT/CTA: Stable. No vessel abnormality noted.   Hydrocephalus; EVD@ [id number]-> 5cmH2O. Monitor ICP/output (/24 hours). Repeat CT head 2/14  Sedation: Propofol for RASS 0- neg 2. Hold for extubation  PSH: Fentanyl 25mg Q2 prn  Stroke core measures. Stroke neurology  Activity: [] mobilize as tolerated [x] Bedrest [] PT [] OT [] PMNR    PULM: Respiratory support secondary to above neurologic injury  ABG, CXR  Possible extubation 2/14  Vent 18/400/40/5  VAP bundle.     CV:  SBP goal 100-140  Off cardene  On amlodipine  TTE with bubble study, baseline EKG  Continue Lipitor    RENAL: Elevated lactate on admission. S/P Mannitol at OSH  Fluids: NS @ 75  Lactate normalized  Na 135- 145  Primafit . Is/Os. Supplement lytes    GI:   Diet: NGT feeds. On glucerna- hold for extubation  GI prophylaxis [] not indicated [x] PPI [] other:  Bowel regimen [] colace [x] senna [] other:  LBM: Awaiting. Miralax, senna    ENDO: Hyperlipidemia, prediabetic.  Goal euglycemia (-180)  A1c 5.8, TSH 0.39  On statin 40mg    HEME/ONC:  Monitor CBC  VTE prophylaxis: [x] SCDs [x] chemoprophylaxis start 2/14. Anti Xa level on 2/17 at 2:00am  LE dopplers pending     ID:  Afebrile  Reactive reactive leukocytosis  Ancef 1x prior to EVD    MISC:    SOCIAL/FAMILY:  [] awaiting [x] updated at bedside son Bassam 2/13 [] family meeting    CODE STATUS:  [x] Full Code [] DNR [] DNI [] Palliative/Comfort Care    DISPOSITION:  [x] ICU [] Stroke Unit [] Floor [] EMU [] RCU [] PCU    [x] Patient is at high risk of neurologic deterioration/death due to: hydrocephalus, hemorrhage, ICU delirium    Time spent: 40 critical care minutes.     ASSESSMENT/PLAN: L thalamic bleed with extensive IVH and 5mm L to R MLS.  Likely hypertensive.  BD3. ICH score 2    NEURO:  Q1 neurochecks  CT/CTA: Stable. No vessel abnormality noted.   Hydrocephalus; EVD@ [id number]-> 5cmH2O. Monitor ICP/output. Repeat CT head 2/14  Sedation: Propofol for RASS 0- neg 2. Hold for possible extubation.   Analgesia: Fentanyl prn  Stroke core measures. Stroke neurology consult.   Continue lipitor  Activity: [] mobilize as tolerated [x] Bedrest [] PT [] OT [] PMNR    PULM: Respiratory support secondary to above neurologic condition  Vent 18/400/40/5  ABG, CXR  Possible extubation 2/14  VAP bundle. Chest PT    CV:  SBP goal 100-140  Off cardene  On amlodipine  TTE with bubble study, baseline EKG  Continue Lipitor    RENAL: Elevated lactate on admission. S/P Mannitol at OSH  Fluids: NS @ 75  Lactate normalized  Na 135- 145  Primafit. Is/Os. Supplement lytes    GI:   Diet: NGT feeds. On glucerna- hold for extubation  GI prophylaxis [] not indicated [x] PPI [] other:  Bowel regimen [] colace [x] senna [] other:  LBM: Awaiting. Augment bowel regimen. Miralax, senna    ENDO: Hyperlipidemia, prediabetes.  Goal euglycemia (-180)  A1c 5.8, TSH 0.39  On statin 40mg    HEME/ONC:  Monitor CBC  VTE prophylaxis: [x] SCDs [x] chemoprophylaxis start 2/14. Anti Xa level on 2/17 at 2:00am  LE dopplers pending     ID:  Afebrile  Reactive leukocytosis    MISC:    SOCIAL/FAMILY:  [] awaiting [x] updated at bedside son Bassam 2/13 [] family meeting    CODE STATUS:  [x] Full Code [] DNR [] DNI [] Palliative/Comfort Care    DISPOSITION:  [x] ICU [] Stroke Unit [] Floor [] EMU [] RCU [] PCU    [x] Patient is at high risk of neurologic deterioration/death due to: hydrocephalus, hemorrhage, ICU delirium    Time spent: 40 critical care minutes.

## 2022-02-14 NOTE — PROVIDER CONTACT NOTE (OTHER) - ACTION/TREATMENT ORDERED:
Given ativan and fentanyl. On precedex gtt. Cardene gtt stopped. Given 500cc NS bolus. Pt placed on HFNC at 40% at 40L/min. O2 at 91%. Seroquel ordered. Will continue to monitor.

## 2022-02-14 NOTE — PROGRESS NOTE ADULT - SUBJECTIVE AND OBJECTIVE BOX
HPI:  55 y/o female with PMHx of HTN, pre-DM presents transferred from [facility] for intracranial hemorrhage. As per [place] ED provider and son, patient called son around 7:30-8:00AM c/o severe headache and nausea. Son immediately rushed to her house and found her out of bed, moaning and covered in her own vomit. During transit, EMS reported SBP within 240s with intractable vomiting and given Zofran 8mg. Upon arrival to [place] ED, GCS 7, SBP within 180s, patient was given Decadron 10mg, Keppra 1g, started on a Cardene drip, Propofol, Mannitol 1mg/kg. 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 Patient was intubated for airway support and transferred to [facility] for further management. ICH2, NIHSS 8.  Denies use of anticoagulants/antiplatelets.  (12 Feb 2022 16:36).    On admission, the patient was:  GCS: 8  Hunt-Rachel:  modified Duval:  ICH score:2  NIHSS:18    Hospital Course:   2/12: Transferred from [place]. R frontal EVD emergently. High pressure. Elevated lactate, bolused. Not following commands.   2/13: Pt remains intubated and sedated. Moving all extremities though not following commands. Concern for storming.   2/14: Spontaneous eye opening. Plan to extubate      ICU Vital Signs Last 24 Hrs  T(C): 36.9 (14 Feb 2022 05:44), Max: 36.9 (13 Feb 2022 17:30)  T(F): 98.4 (14 Feb 2022 05:44), Max: 98.5 (13 Feb 2022 17:30)  HR: 77 (14 Feb 2022 06:41) (70 - 104)  BP: 113/61 (14 Feb 2022 05:00) (101/57 - 168/85)  BP(mean): 82 (14 Feb 2022 05:00) (74 - 119)  ABP: 119/47 (14 Feb 2022 05:00) (102/46 - 167/64)  ABP(mean): 71 (14 Feb 2022 05:00) (65 - 109)  RR: 18 (14 Feb 2022 05:00) (18 - 26)  SpO2: 100% (14 Feb 2022 06:41) (96% - 100%)      02-12-22 @ 07:01  -  02-13-22 @ 07:00  --------------------------------------------------------  IN: 3863.8 mL / OUT: 2807 mL / NET: 1056.8 mL    02-13-22 @ 07:01  -  02-14-22 @ 06:44  --------------------------------------------------------  IN: 3343 mL / OUT: 1560 mL / NET: 1783 mL      Mode: AC/ CMV (Assist Control/ Continuous Mandatory Ventilation), RR (machine): 18, TV (machine): 400, FiO2: 40, PEEP: 6, ITime: 1, MAP: 9.1, PIP: 18      EXAMINATION:  General: Intubated, restless  HEENT:  MMM  Neuro: Sedated on propofol (held sedation) pupils 3mm and reactive, cough/gag/ corneal/ overbreathes vent. Not following commands.  Moving L>R though all extremities spontaneously, brisk WD of RLE. WD of RUE. L side purposeful  Cards: RRR  Respiratory: Clear lungs  Abdomen: Soft, non-tender  Extremities: No edema  Skin: Warm/dry      MEDICATIONS:   acetaminophen    Suspension .. 650 milliGRAM(s) Oral every 6 hours PRN  amLODIPine   Tablet 5 milliGRAM(s) Oral daily  atorvastatin 40 milliGRAM(s) Oral at bedtime  chlorhexidine 0.12% Liquid 15 milliLiter(s) Oral Mucosa every 12 hours  dextrose 40% Gel 15 Gram(s) Oral once  dextrose 5%. 1000 milliLiter(s) (50 mL/Hr) IV Continuous <Continuous>  dextrose 50% Injectable 25 Gram(s) IV Push once  fentaNYL    Injectable 25 MICROGram(s) IV Push every 2 hours  glucagon  Injectable 1 milliGRAM(s) IntraMuscular once  hydrALAZINE Injectable 10 milliGRAM(s) IV Push every 4 hours PRN  insulin lispro (ADMELOG) corrective regimen sliding scale   SubCutaneous every 6 hours  pantoprazole  Injectable 40 milliGRAM(s) IV Push daily  polyethylene glycol 3350 17 Gram(s) Oral daily  polyethylene glycol 3350 17 Gram(s) Oral daily PRN  propofol Infusion 30 MICROgram(s)/kG/Min (13.7 mL/Hr) IV Continuous <Continuous>  senna 2 Tablet(s) Oral at bedtime  sodium chloride 0.9%. 1000 milliLiter(s) (75 mL/Hr) IV Continuous <Continuous>                          11.7   13.86 )-----------( 232      ( 14 Feb 2022 06:15 )             35.6     02-13    144  |  111<H>  |  8   ----------------------------<  165<H>  3.7   |  22  |  0.46<L>    Ca    9.6      13 Feb 2022 04:06  Phos  3.3     02-13  Mg     2.1     02-13    TPro  8.4<H>  /  Alb  4.4  /  TBili  0.9  /  DBili  x   /  AST  28  /  ALT  22  /  AlkPhos  101  02-12    LIVER FUNCTIONS - ( 12 Feb 2022 17:15 )  Alb: 4.4 g/dL / Pro: 8.4 g/dL / ALK PHOS: 101 U/L / ALT: 22 U/L / AST: 28 U/L / GGT: x           ABG - ( 13 Feb 2022 16:36 )  pH, Arterial: 7.45  pH, Blood: x     /  pCO2: 34    /  pO2: 103   / HCO3: 24    / Base Excess: 0.1   /  SaO2: 99.6          Access:   3 peripheral IVs  R radial Rosey  R frontal EVD at [id number]  Sudha                 HPI:  55 y/o female with PMHx of HTN, pre-DM presents transferred from [facility] for intracranial hemorrhage. As per [place] ED provider and son, patient called son around 7:30-8:00AM c/o severe headache and nausea. Son immediately rushed to her house and found her out of bed, moaning and covered in her own vomit. During transit, EMS reported SBP within 240s with intractable vomiting and given Zofran 8mg. Upon arrival to [place] ED, GCS 7, SBP within 180s, patient was given Decadron 10mg, Keppra 1g, started on a Cardene drip, Propofol, Mannitol 1mg/kg. 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 Patient was intubated for airway support and transferred to [facility] for further management. ICH2, NIHSS 8.  Denies use of anticoagulants/antiplatelets.  (12 Feb 2022 16:36).    On admission, the patient was:  GCS: 8  Hunt-Rachel:  modified Duval:  ICH score:2  NIHSS:18    Hospital Course:   2/12: Transferred from [place]. R frontal EVD emergently. High pressure. Elevated lactate, bolused. Not following commands.   2/13: Pt remains intubated and sedated. Moving all extremities though not following commands. Concern for storming. Hypotensive on precedex.  2/14: Spontaneous eye opening. Agitated. On propofol- weaning.       ICU Vital Signs Last 24 Hrs  T(C): 36.9 (14 Feb 2022 05:44), Max: 36.9 (13 Feb 2022 17:30)  T(F): 98.4 (14 Feb 2022 05:44), Max: 98.5 (13 Feb 2022 17:30)  HR: 77 (14 Feb 2022 06:41) (70 - 104)  BP: 113/61 (14 Feb 2022 05:00) (101/57 - 168/85)  BP(mean): 82 (14 Feb 2022 05:00) (74 - 119)  ABP: 119/47 (14 Feb 2022 05:00) (102/46 - 167/64)  ABP(mean): 71 (14 Feb 2022 05:00) (65 - 109)  RR: 18 (14 Feb 2022 05:00) (18 - 26)  SpO2: 100% (14 Feb 2022 06:41) (96% - 100%)      02-12-22 @ 07:01  -  02-13-22 @ 07:00  --------------------------------------------------------  IN: 3863.8 mL / OUT: 2807 mL / NET: 1056.8 mL    02-13-22 @ 07:01  -  02-14-22 @ 06:44  --------------------------------------------------------  IN: 3343 mL / OUT: 1560 mL / NET: 1783 mL      Mode: AC/ CMV (Assist Control/ Continuous Mandatory Ventilation), RR (machine): 18, TV (machine): 400, FiO2: 40, PEEP: 6, ITime: 1, MAP: 9.1, PIP: 18    EXAMINATION:  General: Intubated, restless  HEENT:  MMM  Neuro: Sedated on propofol (held sedation) pupils 3mm and reactive, spontaneous eye opening.  Has cough/gag/ corneal/ overbreathes vent. Not following commands.  Moving L>R though all extremities spontaneously, brisk WD of RLE. WD of RUE. L side purposeful  Cards: RRR  Respiratory: Clear lungs  Abdomen: Soft, non-tender  Extremities: No edema  Skin: Warm/dry      MEDICATIONS:   acetaminophen    Suspension .. 650 milliGRAM(s) Oral every 6 hours PRN  amLODIPine   Tablet 5 milliGRAM(s) Oral daily  atorvastatin 40 milliGRAM(s) Oral at bedtime  chlorhexidine 0.12% Liquid 15 milliLiter(s) Oral Mucosa every 12 hours  dextrose 40% Gel 15 Gram(s) Oral once  dextrose 5%. 1000 milliLiter(s) (50 mL/Hr) IV Continuous <Continuous>  dextrose 50% Injectable 25 Gram(s) IV Push once  fentaNYL    Injectable 25 MICROGram(s) IV Push every 2 hours  glucagon  Injectable 1 milliGRAM(s) IntraMuscular once  hydrALAZINE Injectable 10 milliGRAM(s) IV Push every 4 hours PRN  insulin lispro (ADMELOG) corrective regimen sliding scale   SubCutaneous every 6 hours  pantoprazole  Injectable 40 milliGRAM(s) IV Push daily  polyethylene glycol 3350 17 Gram(s) Oral daily  polyethylene glycol 3350 17 Gram(s) Oral daily PRN  propofol Infusion 30 MICROgram(s)/kG/Min (13.7 mL/Hr) IV Continuous <Continuous>  senna 2 Tablet(s) Oral at bedtime  sodium chloride 0.9%. 1000 milliLiter(s) (75 mL/Hr) IV Continuous <Continuous>                          11.7   13.86 )-----------( 232      ( 14 Feb 2022 06:15 )             35.6     02-13    144  |  111<H>  |  8   ----------------------------<  165<H>  3.7   |  22  |  0.46<L>    Ca    9.6      13 Feb 2022 04:06  Phos  3.3     02-13  Mg     2.1     02-13    TPro  8.4<H>  /  Alb  4.4  /  TBili  0.9  /  DBili  x   /  AST  28  /  ALT  22  /  AlkPhos  101  02-12    LIVER FUNCTIONS - ( 12 Feb 2022 17:15 )  Alb: 4.4 g/dL / Pro: 8.4 g/dL / ALK PHOS: 101 U/L / ALT: 22 U/L / AST: 28 U/L / GGT: x           ABG - ( 13 Feb 2022 16:36 )  pH, Arterial: 7.45  pH, Blood: x     /  pCO2: 34    /  pO2: 103   / HCO3: 24    / Base Excess: 0.1   /  SaO2: 99.6          Access:   3 peripheral IVs  R radial Rosey  R frontal EVD at [id number]  Sudha                 HPI:  55 y/o female with PMHx of HTN, pre-DM presents transferred from [facility] for intracranial hemorrhage. As per [place] ED provider and son, patient called son around 7:30-8:00AM c/o severe headache and nausea. Son immediately rushed to her house and found her out of bed, moaning and covered in her own vomit. During transit, EMS reported SBP within 240s with intractable vomiting and given Zofran 8mg. Upon arrival to [place] ED, GCS 7, SBP within 180s, patient was given Decadron 10mg, Keppra 1g, started on a Cardene drip, Propofol, Mannitol 1mg/kg. 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 Patient was intubated for airway support and transferred to [facility] for further management. ICH2, NIHSS 8.  Denies use of anticoagulants/antiplatelets.  (12 Feb 2022 16:36).    On admission, the patient was:  GCS: 8  Hunt-Rachel:  modified Duval:  ICH score:2  NIHSS:18    Hospital Course:   2/12: Transferred from [place]. R frontal EVD emergently. High pressure. Elevated lactate, bolused. Not following commands.   2/13: Pt remains intubated and sedated. Moving all extremities though not following commands. Concern for storming. Hypotensive on precedex.  2/14: Spontaneous eye opening. Agitated. On propofol- weaning. SAT/SBT. Goal to extubate. EVD decreased to 5cmH2O      ICU Vital Signs Last 24 Hrs  T(C): 36.9 (14 Feb 2022 05:44), Max: 36.9 (13 Feb 2022 17:30)  T(F): 98.4 (14 Feb 2022 05:44), Max: 98.5 (13 Feb 2022 17:30)  HR: 77 (14 Feb 2022 06:41) (70 - 104)  BP: 113/61 (14 Feb 2022 05:00) (101/57 - 168/85)  BP(mean): 82 (14 Feb 2022 05:00) (74 - 119)  ABP: 119/47 (14 Feb 2022 05:00) (102/46 - 167/64)  ABP(mean): 71 (14 Feb 2022 05:00) (65 - 109)  RR: 18 (14 Feb 2022 05:00) (18 - 26)  SpO2: 100% (14 Feb 2022 06:41) (96% - 100%)      02-12-22 @ 07:01  -  02-13-22 @ 07:00  --------------------------------------------------------  IN: 3863.8 mL / OUT: 2807 mL / NET: 1056.8 mL    02-13-22 @ 07:01  -  02-14-22 @ 06:44  --------------------------------------------------------  IN: 3343 mL / OUT: 1560 mL / NET: 1783 mL      Mode: AC/ CMV (Assist Control/ Continuous Mandatory Ventilation), RR (machine): 18, TV (machine): 400, FiO2: 40, PEEP: 6, ITime: 1, MAP: 9.1, PIP: 18    EXAMINATION:  General: Intubated, restless when propofol held  HEENT:  MMM  Neuro: Held sedation- alert, restless, pupils 3mm and reactive, spontaneous eye opening.  Has cough/gag/ corneal/ overbreathes vent. Not following commands.  Moving L>R though all extremities spontaneously, brisk WD of RLE. WD of RUE. L side purposeful  Cards: RRR  Respiratory: Clear lungs  Abdomen: Soft, non-tender  Extremities: No edema  Skin: Warm/dry      MEDICATIONS:   acetaminophen    Suspension .. 650 milliGRAM(s) Oral every 6 hours PRN  amLODIPine   Tablet 5 milliGRAM(s) Oral daily  atorvastatin 40 milliGRAM(s) Oral at bedtime  chlorhexidine 0.12% Liquid 15 milliLiter(s) Oral Mucosa every 12 hours  dextrose 40% Gel 15 Gram(s) Oral once  dextrose 5%. 1000 milliLiter(s) (50 mL/Hr) IV Continuous <Continuous>  dextrose 50% Injectable 25 Gram(s) IV Push once  fentaNYL    Injectable 25 MICROGram(s) IV Push every 2 hours  glucagon  Injectable 1 milliGRAM(s) IntraMuscular once  hydrALAZINE Injectable 10 milliGRAM(s) IV Push every 4 hours PRN  insulin lispro (ADMELOG) corrective regimen sliding scale   SubCutaneous every 6 hours  pantoprazole  Injectable 40 milliGRAM(s) IV Push daily  polyethylene glycol 3350 17 Gram(s) Oral daily  polyethylene glycol 3350 17 Gram(s) Oral daily PRN  propofol Infusion 30 MICROgram(s)/kG/Min (13.7 mL/Hr) IV Continuous <Continuous>  senna 2 Tablet(s) Oral at bedtime  sodium chloride 0.9%. 1000 milliLiter(s) (75 mL/Hr) IV Continuous <Continuous>                          11.7   13.86 )-----------( 232      ( 14 Feb 2022 06:15 )             35.6     02-13    144  |  111<H>  |  8   ----------------------------<  165<H>  3.7   |  22  |  0.46<L>    Ca    9.6      13 Feb 2022 04:06  Phos  3.3     02-13  Mg     2.1     02-13    TPro  8.4<H>  /  Alb  4.4  /  TBili  0.9  /  DBili  x   /  AST  28  /  ALT  22  /  AlkPhos  101  02-12    LIVER FUNCTIONS - ( 12 Feb 2022 17:15 )  Alb: 4.4 g/dL / Pro: 8.4 g/dL / ALK PHOS: 101 U/L / ALT: 22 U/L / AST: 28 U/L / GGT: x           ABG - ( 13 Feb 2022 16:36 )  pH, Arterial: 7.45  pH, Blood: x     /  pCO2: 34    /  pO2: 103   / HCO3: 24    / Base Excess: 0.1   /  SaO2: 99.6          Access:   3 peripheral IVs  R radial Rosey  R frontal EVD at 15beD6I  Sudha

## 2022-02-14 NOTE — PROGRESS NOTE ADULT - SUBJECTIVE AND OBJECTIVE BOX
HPI:  57 y/o female with PMHx of HTN, pre-DM presents transferred from [facility] for intracranial hemorrhage. As per [place] ED provider and son, patient called son around 7:30-8:00AM c/o severe headache and nausea. Son immediately rushed to her house and found her out of bed, moaning and covered in her own vomit. During transit, EMS reported SBP within 240s with intractable vomiting and given Zofran 8mg. Upon arrival to [place] ED, GCS 7, SBP within 180s, patient was given Decadron 10mg, Keppra 1g, started on a Cardene drip, Propofol, Mannitol 1mg/kg. 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 Patient was intubated for airway support and transferred to [facility] for further management. ICH2, NIHSS 8.  Denies use of anticoagulants/antiplatelets.  (12 Feb 2022 16:36)    Hospital Course:  2/12: transferred from [place]. R frontal EVD and R radial a-line placed. pend CTH/CTA. s/p 1L bolus for elevated lactate.   2/13: S/p R frontal EVD placedment @[id number] draining well. O/n pt w/ episode of possible storming; tachy, HTN, agitated, temp 100.2F, given 2 versed and 50mcg fentanyl w/ improvement in symptoms Neuro exam stable. Pt remains intubated and sedated, AN L>R. Trend lactate and abg. Amlodipine 5mg QD started for SBP goal < 140, cardene dc'd.  EVD dropped 2/13 at 1pm. propanolol and fent standing added for neuro storming, propofol switched to precedex, with resultant hypotension. Propanolol dc'd, fent changed to prn and precedex off, 1 L bolus given, levo prn   2/14: TOSHA overnight. Patient remains on propofol. EVD open at 17ftQ3B. ICPs WNL. Neuro exam stable.     Vital Signs Last 24 Hrs  T(C): 36.9 (13 Feb 2022 21:25), Max: 37.9 (13 Feb 2022 04:00)  T(F): 98.5 (13 Feb 2022 21:25), Max: 100.2 (13 Feb 2022 04:00)  HR: 78 (14 Feb 2022 00:00) (70 - 111)  BP: 110/57 (14 Feb 2022 00:00) (104/58 - 168/85)  BP(mean): 77 (14 Feb 2022 00:00) (75 - 119)  RR: 18 (14 Feb 2022 00:00) (18 - 22)  SpO2: 98% (14 Feb 2022 00:00) (96% - 100%)    I&O's Summary    12 Feb 2022 07:01  -  13 Feb 2022 07:00  --------------------------------------------------------  IN: 3863.8 mL / OUT: 2807 mL / NET: 1056.8 mL    13 Feb 2022 07:01  -  14 Feb 2022 00:22  --------------------------------------------------------  IN: 2694 mL / OUT: 1224 mL / NET: 1470 mL      PHYSICAL EXAM:  General: NAD, pt is comfortably laying in hospital bed, +intubated on full vent, +sedated on propofol  HEENT: PERRL 3mm, +corneal b/l, +cough/gag, +ET tube   Cardiovascular: RRR, normal S1 and S2   Respiratory: lungs CTAB, no wheezing, rhonchi, or crackles   GI: normoactive BS to auscultation, abd soft, NTND   Neuro: not following commands, LUE/LLE moving spontaneously and strong    RUE and RLE intermittently moving spontaneously and withdrawing to noxious  Extremities: distal pulses 2+ x4   Wound/incision: R frontal large bore EVD incision site with staples C/D/I   Drains: R frontal EVD @22irD0I     TUBES/LINES:  [] CVC  [X] A-line - R radial   [] Lumbar Drain  [X] Ventriculostomy - R frontal EVD @ 65cyD4R   [] Other    DIET:  [] NPO  [] Mechanical  [x] Tube feeds    LABS:                        12.8   15.78 )-----------( 253      ( 13 Feb 2022 04:06 )             38.9     02-13    144  |  111<H>  |  8   ----------------------------<  165<H>  3.7   |  22  |  0.46<L>    Ca    9.6      13 Feb 2022 04:06  Phos  3.3     02-13  Mg     2.1     02-13    TPro  8.4<H>  /  Alb  4.4  /  TBili  0.9  /  DBili  x   /  AST  28  /  ALT  22  /  AlkPhos  101  02-12    PT/INR - ( 12 Feb 2022 13:20 )   PT: 11.6 sec;   INR: 0.97          PTT - ( 12 Feb 2022 13:20 )  PTT:30.1 sec        CAPILLARY BLOOD GLUCOSE      POCT Blood Glucose.: 125 mg/dL (13 Feb 2022 23:49)  POCT Blood Glucose.: 137 mg/dL (13 Feb 2022 17:22)  POCT Blood Glucose.: 146 mg/dL (13 Feb 2022 11:22)  POCT Blood Glucose.: 147 mg/dL (13 Feb 2022 06:33)  POCT Blood Glucose.: 156 mg/dL (13 Feb 2022 00:33)      Drug Levels: [] N/A    CSF Analysis: [] N/A      Allergies    No Known Allergies    Intolerances      MEDICATIONS:  Antibiotics:    Neuro:  acetaminophen    Suspension .. 650 milliGRAM(s) Oral every 6 hours PRN  fentaNYL    Injectable 25 MICROGram(s) IV Push every 2 hours  propofol Infusion 30 MICROgram(s)/kG/Min IV Continuous <Continuous>    Anticoagulation:    OTHER:  amLODIPine   Tablet 5 milliGRAM(s) Oral daily  atorvastatin 40 milliGRAM(s) Oral at bedtime  chlorhexidine 0.12% Liquid 15 milliLiter(s) Oral Mucosa every 12 hours  dextrose 40% Gel 15 Gram(s) Oral once  dextrose 50% Injectable 25 Gram(s) IV Push once  glucagon  Injectable 1 milliGRAM(s) IntraMuscular once  hydrALAZINE Injectable 10 milliGRAM(s) IV Push every 4 hours PRN  insulin lispro (ADMELOG) corrective regimen sliding scale   SubCutaneous every 6 hours  pantoprazole  Injectable 40 milliGRAM(s) IV Push daily  polyethylene glycol 3350 17 Gram(s) Oral daily  polyethylene glycol 3350 17 Gram(s) Oral daily PRN  senna 2 Tablet(s) Oral at bedtime    IVF:  dextrose 5%. 1000 milliLiter(s) IV Continuous <Continuous>  sodium chloride 0.9%. 1000 milliLiter(s) IV Continuous <Continuous>    CULTURES:    RADIOLOGY & ADDITIONAL TESTS:      ASSESSMENT:  57 y/o female with PMHx of HTN, pre-DM presents transferred from [facility] after c/o severe headache and nausea being found down by son covered in vomit. Initial CTH revealed left thalamic parenchymal hematoma with intraventricular hemorrhage. ICH score 2. NIHSS 18. s/p R frontal large bore EVD placement 2/12.     HEAD BLEED    Handoff    No pertinent past medical history    Hypertension    Pre-diabetes    ICH (intracerebral hemorrhage)    HTN (hypertension)    Pre-diabetes    SysAdmin_VstLnk        PLAN:  NEURO:  - neuro checks (coma checks)/vital signs q1hr  - CTH; L thalamic IPH with extensive IVH and mild L to R MLS (5mm). CTA; no vessel occlusion or aneurysm noted   - R frontal large bore EVD open at [id number], monitor ICP and output  - weaning propofol as tolerated, RASS 0 to -1  - Fentanyl 25q2 standing, wean off prop as tolerated, sensitive to precedex, becomes hypotensive   - s/p Keppra 1g at [place] 2/12, no need for additional Keppra at this time  - pend PT/OT assessment when medically appropriate     PULM:  - intubated on full vent support  - vent settings: 400/18/40/6   - wean to extubate when appropriate   - abg with improvement in CO2 and less acidotic     CARDIO:  - SBP goal 100-140  - cardene dc'd, norvasc  - amlodipine 5mg QD    - continue lipitor, elevated total cholestrol and LDL   - pend echo     GI:  - Diet: TF, glucerna via NGT   - bowel regimen     RENAL:  - s/p Mannitol 1g/kg at [place]  - Na goal 135-145  - IVF NS at 75, 1L Bolus given 2/12 for elevated lactate    HEME:  - SCDs for DVT ppx  - no chemoprophylaxis at this time  - f/u LE dopplers read    ENDO:  - f/u TSH  - ISS  - prediabetic, a1c 5.8    ID:  - given Ancef while placing EVD  - lactate 4.6 on admission, trended down to 1.9 after fluid bolus      Dispo: ICU status, family updated with plan    Assessment and plan discussed with Dr. D'Amico and Dr. Snow      Assessment:  Present when checked    []  GCS  E   V  M     Heart Failure: []Acute, [] acute on chronic , []chronic  Heart Failure:  [] Diastolic (HFpEF), [] Systolic (HFrEF), []Combined (HFpEF and HFrEF), [] RHF, [] Pulm HTN, [] Other    [] ZAIDA, [] ATN, [] AIN, [] other  [] CKD1, [] CKD2, [] CKD 3, [] CKD 4, [] CKD 5, []ESRD    Encephalopathy: [] Metabolic, [] Hepatic, [] toxic, [] Neurological, [] Other    Abnormal Nurtitional Status: [] malnurtition (see nutrition note), [ ]underweight: BMI < 19, [] morbid obesity: BMI >40, [] Cachexia    [] Sepsis  [] hypovolemic shock,[] cardiogenic shock, [] hemorrhagic shock, [] neuogenic shock  [] Acute Respiratory Failure  []Cerebral edema, [] Brain compression/ herniation,   [] Functional quadriplegia  [] Acute blood loss anemia

## 2022-02-15 LAB
ANION GAP SERPL CALC-SCNC: 12 MMOL/L — SIGNIFICANT CHANGE UP (ref 5–17)
BASE EXCESS BLDA CALC-SCNC: -0.5 MMOL/L — SIGNIFICANT CHANGE UP (ref -2–3)
BUN SERPL-MCNC: 9 MG/DL — SIGNIFICANT CHANGE UP (ref 7–23)
CALCIUM SERPL-MCNC: 9 MG/DL — SIGNIFICANT CHANGE UP (ref 8.4–10.5)
CHLORIDE SERPL-SCNC: 114 MMOL/L — HIGH (ref 96–108)
CO2 BLDA-SCNC: 24 MMOL/L — SIGNIFICANT CHANGE UP (ref 19–24)
CO2 SERPL-SCNC: 21 MMOL/L — LOW (ref 22–31)
CREAT SERPL-MCNC: 0.37 MG/DL — LOW (ref 0.5–1.3)
GAS PNL BLDA: SIGNIFICANT CHANGE UP
GLUCOSE SERPL-MCNC: 133 MG/DL — HIGH (ref 70–99)
HCO3 BLDA-SCNC: 23 MMOL/L — SIGNIFICANT CHANGE UP (ref 21–28)
HCT VFR BLD CALC: 35.7 % — SIGNIFICANT CHANGE UP (ref 34.5–45)
HGB BLD-MCNC: 11.9 G/DL — SIGNIFICANT CHANGE UP (ref 11.5–15.5)
MAGNESIUM SERPL-MCNC: 1.9 MG/DL — SIGNIFICANT CHANGE UP (ref 1.6–2.6)
MCHC RBC-ENTMCNC: 31 PG — SIGNIFICANT CHANGE UP (ref 27–34)
MCHC RBC-ENTMCNC: 33.3 GM/DL — SIGNIFICANT CHANGE UP (ref 32–36)
MCV RBC AUTO: 93 FL — SIGNIFICANT CHANGE UP (ref 80–100)
NRBC # BLD: 0 /100 WBCS — SIGNIFICANT CHANGE UP (ref 0–0)
PCO2 BLDA: 34 MMHG — SIGNIFICANT CHANGE UP (ref 32–35)
PH BLDA: 7.44 — SIGNIFICANT CHANGE UP (ref 7.35–7.45)
PHOSPHATE SERPL-MCNC: 2.5 MG/DL — SIGNIFICANT CHANGE UP (ref 2.5–4.5)
PLATELET # BLD AUTO: 222 K/UL — SIGNIFICANT CHANGE UP (ref 150–400)
PO2 BLDA: 82 MMHG — LOW (ref 83–108)
POTASSIUM SERPL-MCNC: 3.6 MMOL/L — SIGNIFICANT CHANGE UP (ref 3.5–5.3)
POTASSIUM SERPL-SCNC: 3.6 MMOL/L — SIGNIFICANT CHANGE UP (ref 3.5–5.3)
RBC # BLD: 3.84 M/UL — SIGNIFICANT CHANGE UP (ref 3.8–5.2)
RBC # FLD: 11.3 % — SIGNIFICANT CHANGE UP (ref 10.3–14.5)
SAO2 % BLDA: 98.9 % — HIGH (ref 94–98)
SODIUM SERPL-SCNC: 147 MMOL/L — HIGH (ref 135–145)
WBC # BLD: 8.82 K/UL — SIGNIFICANT CHANGE UP (ref 3.8–10.5)
WBC # FLD AUTO: 8.82 K/UL — SIGNIFICANT CHANGE UP (ref 3.8–10.5)

## 2022-02-15 PROCEDURE — 70450 CT HEAD/BRAIN W/O DYE: CPT | Mod: 26

## 2022-02-15 PROCEDURE — 71045 X-RAY EXAM CHEST 1 VIEW: CPT | Mod: 26

## 2022-02-15 PROCEDURE — 99291 CRITICAL CARE FIRST HOUR: CPT

## 2022-02-15 RX ORDER — METOCLOPRAMIDE HCL 10 MG
10 TABLET ORAL EVERY 8 HOURS
Refills: 0 | Status: DISCONTINUED | OUTPATIENT
Start: 2022-02-15 | End: 2022-02-18

## 2022-02-15 RX ORDER — ONDANSETRON 8 MG/1
4 TABLET, FILM COATED ORAL ONCE
Refills: 0 | Status: COMPLETED | OUTPATIENT
Start: 2022-02-15 | End: 2022-02-15

## 2022-02-15 RX ORDER — TRAMADOL HYDROCHLORIDE 50 MG/1
25 TABLET ORAL ONCE
Refills: 0 | Status: DISCONTINUED | OUTPATIENT
Start: 2022-02-15 | End: 2022-02-15

## 2022-02-15 RX ORDER — AMLODIPINE BESYLATE 2.5 MG/1
10 TABLET ORAL DAILY
Refills: 0 | Status: DISCONTINUED | OUTPATIENT
Start: 2022-02-15 | End: 2022-02-16

## 2022-02-15 RX ORDER — POTASSIUM PHOSPHATE, MONOBASIC POTASSIUM PHOSPHATE, DIBASIC 236; 224 MG/ML; MG/ML
30 INJECTION, SOLUTION INTRAVENOUS ONCE
Refills: 0 | Status: COMPLETED | OUTPATIENT
Start: 2022-02-15 | End: 2022-02-15

## 2022-02-15 RX ORDER — MAGNESIUM SULFATE 500 MG/ML
1 VIAL (ML) INJECTION ONCE
Refills: 0 | Status: COMPLETED | OUTPATIENT
Start: 2022-02-15 | End: 2022-02-15

## 2022-02-15 RX ADMIN — AMLODIPINE BESYLATE 5 MILLIGRAM(S): 2.5 TABLET ORAL at 06:18

## 2022-02-15 RX ADMIN — Medication 100 GRAM(S): at 07:43

## 2022-02-15 RX ADMIN — SODIUM CHLORIDE 75 MILLILITER(S): 9 INJECTION INTRAMUSCULAR; INTRAVENOUS; SUBCUTANEOUS at 22:48

## 2022-02-15 RX ADMIN — POLYETHYLENE GLYCOL 3350 17 GRAM(S): 17 POWDER, FOR SOLUTION ORAL at 06:18

## 2022-02-15 RX ADMIN — LOSARTAN POTASSIUM 100 MILLIGRAM(S): 100 TABLET, FILM COATED ORAL at 06:18

## 2022-02-15 RX ADMIN — NICARDIPINE HYDROCHLORIDE 25 MG/HR: 30 CAPSULE, EXTENDED RELEASE ORAL at 18:02

## 2022-02-15 RX ADMIN — SENNA PLUS 2 TABLET(S): 8.6 TABLET ORAL at 22:49

## 2022-02-15 RX ADMIN — Medication 650 MILLIGRAM(S): at 15:33

## 2022-02-15 RX ADMIN — TRAMADOL HYDROCHLORIDE 25 MILLIGRAM(S): 50 TABLET ORAL at 19:03

## 2022-02-15 RX ADMIN — Medication 3 MILLILITER(S): at 09:36

## 2022-02-15 RX ADMIN — TRAMADOL HYDROCHLORIDE 25 MILLIGRAM(S): 50 TABLET ORAL at 19:30

## 2022-02-15 RX ADMIN — ENOXAPARIN SODIUM 40 MILLIGRAM(S): 100 INJECTION SUBCUTANEOUS at 22:49

## 2022-02-15 RX ADMIN — Medication 650 MILLIGRAM(S): at 14:33

## 2022-02-15 RX ADMIN — Medication 3 MILLILITER(S): at 05:34

## 2022-02-15 RX ADMIN — Medication 3 MILLILITER(S): at 21:57

## 2022-02-15 RX ADMIN — Medication 650 MILLIGRAM(S): at 22:48

## 2022-02-15 RX ADMIN — NICARDIPINE HYDROCHLORIDE 25 MG/HR: 30 CAPSULE, EXTENDED RELEASE ORAL at 23:35

## 2022-02-15 RX ADMIN — ONDANSETRON 4 MILLIGRAM(S): 8 TABLET, FILM COATED ORAL at 13:16

## 2022-02-15 RX ADMIN — DEXMEDETOMIDINE HYDROCHLORIDE IN 0.9% SODIUM CHLORIDE 3.8 MICROGRAM(S)/KG/HR: 4 INJECTION INTRAVENOUS at 04:14

## 2022-02-15 RX ADMIN — POTASSIUM PHOSPHATE, MONOBASIC POTASSIUM PHOSPHATE, DIBASIC 83.33 MILLIMOLE(S): 236; 224 INJECTION, SOLUTION INTRAVENOUS at 09:36

## 2022-02-15 RX ADMIN — Medication 10 MILLIGRAM(S): at 15:52

## 2022-02-15 NOTE — PROGRESS NOTE ADULT - SUBJECTIVE AND OBJECTIVE BOX
EVENTS:   No acute events overnight.    VITALS:  T(C): , Max: 37.5 (02-14-22 @ 17:25)  HR:  (69 - 118)  BP:  (106/63 - 155/94)  ABP:  (112/50 - 178/73)  RR:  (18 - 38)  SpO2:  (88% - 100%)  Wt(kg): --      02-14-22 @ 07:01  -  02-15-22 @ 07:00  --------------------------------------------------------  IN: 3106.1 mL / OUT: 4160 mL / NET: -1053.9 mL      LABS:  Na: 147 (02-15 @ 05:27), 145 (02-14 @ 06:15), 144 (02-13 @ 04:06), 138 (02-12 @ 17:15), 133 (02-12 @ 13:20)  K: 3.6 (02-15 @ 05:27), 3.8 (02-14 @ 06:15), 3.7 (02-13 @ 04:06), 4.4 (02-12 @ 17:15), SEE NOTE (02-12 @ 13:20)  Cl: 114 (02-15 @ 05:27), 112 (02-14 @ 06:15), 111 (02-13 @ 04:06), 104 (02-12 @ 17:15), 96 (02-12 @ 13:20)  CO2: 21 (02-15 @ 05:27), 21 (02-14 @ 06:15), 22 (02-13 @ 04:06), 18 (02-12 @ 17:15), 19 (02-12 @ 13:20)  BUN: 9 (02-15 @ 05:27), 13 (02-14 @ 06:15), 8 (02-13 @ 04:06), 10 (02-12 @ 17:15), 10 (02-12 @ 13:20)  Cr: 0.37 (02-15 @ 05:27), 0.46 (02-14 @ 06:15), 0.46 (02-13 @ 04:06), 0.47 (02-12 @ 17:15), 0.45 (02-12 @ 13:20)  Glu: 133(02-15 @ 05:27), 139(02-14 @ 06:15), 165(02-13 @ 04:06), 236(02-12 @ 17:15), 200(02-12 @ 13:20)    Hgb: 11.9 (02-15 @ 05:27), 11.7 (02-14 @ 06:15), 12.8 (02-13 @ 04:06), 15.6 (02-12 @ 13:20)  Hct: 35.7 (02-15 @ 05:27), 35.6 (02-14 @ 06:15), 38.9 (02-13 @ 04:06), 44.4 (02-12 @ 13:20)  WBC: 8.82 (02-15 @ 05:27), 13.86 (02-14 @ 06:15), 15.78 (02-13 @ 04:06), 14.50 (02-12 @ 13:20)  Plt: 222 (02-15 @ 05:27), 232 (02-14 @ 06:15), 253 (02-13 @ 04:06), 244 (02-12 @ 13:20)    INR: 0.97 02-12-22 @ 13:20  PTT: 30.1 02-12-22 @ 13:20    MEDICATIONS:  acetaminophen    Suspension .. 650 milliGRAM(s) Oral every 6 hours PRN  albuterol/ipratropium for Nebulization 3 milliLiter(s) Nebulizer every 6 hours  amLODIPine   Tablet 5 milliGRAM(s) Oral daily  atorvastatin 40 milliGRAM(s) Oral at bedtime  dexMEDEtomidine Infusion 0.2 MICROgram(s)/kG/Hr IV Continuous <Continuous>  dextrose 40% Gel 15 Gram(s) Oral once  dextrose 5%. 1000 milliLiter(s) IV Continuous <Continuous>  dextrose 50% Injectable 25 Gram(s) IV Push once  enoxaparin Injectable 40 milliGRAM(s) SubCutaneous every 24 hours  fentaNYL    Injectable 12.5 MICROGram(s) IV Push every 3 hours PRN  glucagon  Injectable 1 milliGRAM(s) IntraMuscular once  hydrALAZINE Injectable 10 milliGRAM(s) IV Push every 4 hours PRN  insulin lispro (ADMELOG) corrective regimen sliding scale   SubCutaneous every 6 hours  losartan 100 milliGRAM(s) Oral daily  niCARdipine Infusion 5 mG/Hr IV Continuous <Continuous>  pantoprazole  Injectable 40 milliGRAM(s) IV Push daily  polyethylene glycol 3350 17 Gram(s) Oral every 12 hours  potassium phosphate IVPB 30 milliMole(s) IV Intermittent once  senna 2 Tablet(s) Oral at bedtime  sodium chloride 0.9%. 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 CTA neg for vascular malformation. S/p R frontal EVD. Intubated, now extubated 2/14. Last HCT 2/14 with improved hydrocephalus but still dilated R lateral ventrcile.     NEURO:  Q1 neurochecks  Hydrocephalus; EVD@ 29mvH0F-> 5cmH2O. Monitor ICP/output. Repeat CT head 2/14  Sedation: Propofol for RASS 0- neg 2. Hold for possible extubation.   Analgesia: Fentanyl prn  Stroke core measures. Stroke neurology consult.   Continue lipitor  Activity: [] mobilize as tolerated [x] Bedrest [] PT [] OT [] PMNR    PULM: Respiratory support secondary to above neurologic condition  Vent 18/400/40/5  ABG, CXR  Possible extubation 2/14  VAP bundle. Chest PT    CV:  SBP goal 100-140  Off cardene  On amlodipine  TTE with bubble study, baseline EKG  Continue Lipitor    RENAL: Elevated lactate on admission. S/P Mannitol at OSH  Fluids: NS @ 75  Lactate normalized  Na 135- 145  Primafit. Is/Os. Supplement lytes    GI:   Diet: NGT feeds. On glucerna- hold for extubation  GI prophylaxis [] not indicated [x] PPI [] other:  Bowel regimen [] colace [x] senna [] other:  LBM: Awaiting. Augment bowel regimen. Miralax, senna    ENDO: Hyperlipidemia, prediabetes.  Goal euglycemia (-180)  A1c 5.8, TSH 0.39  On statin 40mg    HEME/ONC:  Monitor CBC  VTE prophylaxis: [x] SCDs [x] chemoprophylaxis start 2/14. Anti Xa level on 2/17 at 2:00am  LE dopplers pending     ID:  Afebrile  Reactive leukocytosis   EVENTS:   No acute events overnight.  Afebrile.   EVD at 5, ICP 3-16, output for 24 hours was 185.   On cardene at 5.   Off precedex     VITALS:  T(C): , Max: 37.5 (02-14-22 @ 17:25)  HR:  (69 - 118)  BP:  (106/63 - 155/94)  ABP:  (112/50 - 178/73)  RR:  (18 - 38)  SpO2:  (88% - 100%)  Wt(kg): --      02-14-22 @ 07:01  -  02-15-22 @ 07:00  --------------------------------------------------------  IN: 3106.1 mL / OUT: 4160 mL / NET: -1053.9 mL      LABS:  Na: 147 (02-15 @ 05:27), 145 (02-14 @ 06:15), 144 (02-13 @ 04:06), 138 (02-12 @ 17:15), 133 (02-12 @ 13:20)  K: 3.6 (02-15 @ 05:27), 3.8 (02-14 @ 06:15), 3.7 (02-13 @ 04:06), 4.4 (02-12 @ 17:15), SEE NOTE (02-12 @ 13:20)  Cl: 114 (02-15 @ 05:27), 112 (02-14 @ 06:15), 111 (02-13 @ 04:06), 104 (02-12 @ 17:15), 96 (02-12 @ 13:20)  CO2: 21 (02-15 @ 05:27), 21 (02-14 @ 06:15), 22 (02-13 @ 04:06), 18 (02-12 @ 17:15), 19 (02-12 @ 13:20)  BUN: 9 (02-15 @ 05:27), 13 (02-14 @ 06:15), 8 (02-13 @ 04:06), 10 (02-12 @ 17:15), 10 (02-12 @ 13:20)  Cr: 0.37 (02-15 @ 05:27), 0.46 (02-14 @ 06:15), 0.46 (02-13 @ 04:06), 0.47 (02-12 @ 17:15), 0.45 (02-12 @ 13:20)  Glu: 133(02-15 @ 05:27), 139(02-14 @ 06:15), 165(02-13 @ 04:06), 236(02-12 @ 17:15), 200(02-12 @ 13:20)    Hgb: 11.9 (02-15 @ 05:27), 11.7 (02-14 @ 06:15), 12.8 (02-13 @ 04:06), 15.6 (02-12 @ 13:20)  Hct: 35.7 (02-15 @ 05:27), 35.6 (02-14 @ 06:15), 38.9 (02-13 @ 04:06), 44.4 (02-12 @ 13:20)  WBC: 8.82 (02-15 @ 05:27), 13.86 (02-14 @ 06:15), 15.78 (02-13 @ 04:06), 14.50 (02-12 @ 13:20)  Plt: 222 (02-15 @ 05:27), 232 (02-14 @ 06:15), 253 (02-13 @ 04:06), 244 (02-12 @ 13:20)    INR: 0.97 02-12-22 @ 13:20  PTT: 30.1 02-12-22 @ 13:20    MEDICATIONS:  acetaminophen    Suspension .. 650 milliGRAM(s) Oral every 6 hours PRN  albuterol/ipratropium for Nebulization 3 milliLiter(s) Nebulizer every 6 hours  amLODIPine   Tablet 5 milliGRAM(s) Oral daily  atorvastatin 40 milliGRAM(s) Oral at bedtime  dexMEDEtomidine Infusion 0.2 MICROgram(s)/kG/Hr IV Continuous <Continuous>  dextrose 40% Gel 15 Gram(s) Oral once  dextrose 5%. 1000 milliLiter(s) IV Continuous <Continuous>  dextrose 50% Injectable 25 Gram(s) IV Push once  enoxaparin Injectable 40 milliGRAM(s) SubCutaneous every 24 hours  fentaNYL    Injectable 12.5 MICROGram(s) IV Push every 3 hours PRN  glucagon  Injectable 1 milliGRAM(s) IntraMuscular once  hydrALAZINE Injectable 10 milliGRAM(s) IV Push every 4 hours PRN  insulin lispro (ADMELOG) corrective regimen sliding scale   SubCutaneous every 6 hours  losartan 100 milliGRAM(s) Oral daily  niCARdipine Infusion 5 mG/Hr IV Continuous <Continuous>  pantoprazole  Injectable 40 milliGRAM(s) IV Push daily  polyethylene glycol 3350 17 Gram(s) Oral every 12 hours  potassium phosphate IVPB 30 milliMole(s) IV Intermittent once  senna 2 Tablet(s) Oral at bedtime  sodium chloride 0.9%. 1000 milliLiter(s) IV Continuous <Continuous>    EXAMINATION:  General:  in NAD  HEENT:  MMM  Neuro:  awake, alert, oriented to hospital and month, but not year, follows commands, EOMI, face symmetric, R arm 2/5 but 4+/5 in biceps, DF 5/5, sensation intact to LT in all 4 extremities   Cards:  RRR  Respiratory:  no respiratory distress  Abdomen:  soft  Extremities:  no LE edema    Assessment/Plan:   55 y/o female with PMHx of HTN, pre-DM, admitted 2/12 with headache, found ot have a 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 CTA neg for vascular malformation. S/p R frontal EVD. Intubated, now extubated 2/14. Last HCT 2/14 with improved hydrocephalus but still dilated R lateral ventrcile.     NEURO:  Q1 neurochecks  Hydrocephalus; EVD@ 44sbQ1N-> 5cmH2O. Monitor ICP/output. Repeat CT head 2/14  Sedation: Propofol for RASS 0- neg 2. Hold for possible extubation.   Analgesia: Fentanyl prn  Stroke core measures. Stroke neurology consult.   Continue lipitor  PT/OT    PULM: Respiratory support secondary to above neurologic condition  wean HF    CV:  SBP goal 100-140  Off cardene  On amlodipine 5 mg daily and home losartan 100 mg daily (losartan started this AM)  TTE with bubble study, baseline EKG  Continue Lipitor    RENAL: Elevated lactate on admission. S/P Mannitol at OSH  Fluids: NS @ 75  Lactate normalized  Na 135- 145  Straight caths. Is/Os. Supplement lytes    GI:   Off tube feeds   Stop PPI  Bowel regimen [] colace [x] senna [] other:  LBM: Awaiting. Augment bowel regimen. Miralax, senna    ENDO: Hyperlipidemia, prediabetes.  Goal euglycemia (-180)  A1c 5.8, TSH 0.39  On statin 40mg    HEME/ONC:  Monitor CBC  Lovenox   LE dopplers pending     ID:  Afebrile  Reactive leukocytosis   EVENTS:   No acute events overnight.  Afebrile.   EVD at 5, ICP 3-16, output for 24 hours was 185.   On cardene at 5.   Off precedex     VITALS:  T(C): , Max: 37.5 (02-14-22 @ 17:25)  HR:  (69 - 118)  BP:  (106/63 - 155/94)  ABP:  (112/50 - 178/73)  RR:  (18 - 38)  SpO2:  (88% - 100%)  Wt(kg): --      02-14-22 @ 07:01  -  02-15-22 @ 07:00  --------------------------------------------------------  IN: 3106.1 mL / OUT: 4160 mL / NET: -1053.9 mL      LABS:  Na: 147 (02-15 @ 05:27), 145 (02-14 @ 06:15), 144 (02-13 @ 04:06), 138 (02-12 @ 17:15), 133 (02-12 @ 13:20)  K: 3.6 (02-15 @ 05:27), 3.8 (02-14 @ 06:15), 3.7 (02-13 @ 04:06), 4.4 (02-12 @ 17:15), SEE NOTE (02-12 @ 13:20)  Cl: 114 (02-15 @ 05:27), 112 (02-14 @ 06:15), 111 (02-13 @ 04:06), 104 (02-12 @ 17:15), 96 (02-12 @ 13:20)  CO2: 21 (02-15 @ 05:27), 21 (02-14 @ 06:15), 22 (02-13 @ 04:06), 18 (02-12 @ 17:15), 19 (02-12 @ 13:20)  BUN: 9 (02-15 @ 05:27), 13 (02-14 @ 06:15), 8 (02-13 @ 04:06), 10 (02-12 @ 17:15), 10 (02-12 @ 13:20)  Cr: 0.37 (02-15 @ 05:27), 0.46 (02-14 @ 06:15), 0.46 (02-13 @ 04:06), 0.47 (02-12 @ 17:15), 0.45 (02-12 @ 13:20)  Glu: 133(02-15 @ 05:27), 139(02-14 @ 06:15), 165(02-13 @ 04:06), 236(02-12 @ 17:15), 200(02-12 @ 13:20)    Hgb: 11.9 (02-15 @ 05:27), 11.7 (02-14 @ 06:15), 12.8 (02-13 @ 04:06), 15.6 (02-12 @ 13:20)  Hct: 35.7 (02-15 @ 05:27), 35.6 (02-14 @ 06:15), 38.9 (02-13 @ 04:06), 44.4 (02-12 @ 13:20)  WBC: 8.82 (02-15 @ 05:27), 13.86 (02-14 @ 06:15), 15.78 (02-13 @ 04:06), 14.50 (02-12 @ 13:20)  Plt: 222 (02-15 @ 05:27), 232 (02-14 @ 06:15), 253 (02-13 @ 04:06), 244 (02-12 @ 13:20)    INR: 0.97 02-12-22 @ 13:20  PTT: 30.1 02-12-22 @ 13:20    MEDICATIONS:  acetaminophen    Suspension .. 650 milliGRAM(s) Oral every 6 hours PRN  albuterol/ipratropium for Nebulization 3 milliLiter(s) Nebulizer every 6 hours  amLODIPine   Tablet 5 milliGRAM(s) Oral daily  atorvastatin 40 milliGRAM(s) Oral at bedtime  dexMEDEtomidine Infusion 0.2 MICROgram(s)/kG/Hr IV Continuous <Continuous>  dextrose 40% Gel 15 Gram(s) Oral once  dextrose 5%. 1000 milliLiter(s) IV Continuous <Continuous>  dextrose 50% Injectable 25 Gram(s) IV Push once  enoxaparin Injectable 40 milliGRAM(s) SubCutaneous every 24 hours  fentaNYL    Injectable 12.5 MICROGram(s) IV Push every 3 hours PRN  glucagon  Injectable 1 milliGRAM(s) IntraMuscular once  hydrALAZINE Injectable 10 milliGRAM(s) IV Push every 4 hours PRN  insulin lispro (ADMELOG) corrective regimen sliding scale   SubCutaneous every 6 hours  losartan 100 milliGRAM(s) Oral daily  niCARdipine Infusion 5 mG/Hr IV Continuous <Continuous>  pantoprazole  Injectable 40 milliGRAM(s) IV Push daily  polyethylene glycol 3350 17 Gram(s) Oral every 12 hours  potassium phosphate IVPB 30 milliMole(s) IV Intermittent once  senna 2 Tablet(s) Oral at bedtime  sodium chloride 0.9%. 1000 milliLiter(s) IV Continuous <Continuous>    EXAMINATION:  General:  in NAD  HEENT:  MMM  Neuro:  awake, alert, oriented to hospital and month, but not year, follows commands, EOMI, face symmetric, R arm 2/5 but 4+/5 in biceps, DF 5/5, sensation intact to LT in all 4 extremities   Cards:  RRR  Respiratory:  no respiratory distress  Abdomen:  soft  Extremities:  no LE edema    Assessment/Plan:   55 yo woman with HTN and pre-DM, admitted 2/12 with headache, found to have a 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 ICH score 2. CTA neg for vascular malformation. S/p R frontal EVD. Intubated, now extubated 2/14. Last HCT 2/14 with improved hydrocephalus but still dilated R lateral ventricle. Exam significantly improved. Etiology of ICH is likely 2/2 HTN.      NEURO:  Q1 neurochecks  Hydrocephalus; EVD@ 5cmH2O  off sedation  Tylenol PRN for pain  Stroke core measures. Stroke neurology consult.   Stop lipitor  PT/OT    PULM: extubated 2/14, on HFNC  wean HF    CV: TTE normal EF  SBP goal 100-140  Off cardene  On amlodipine 5 mg daily (new med) and home losartan 100 mg daily (losartan started this AM)    RENAL:   Fluids: NS @ 75 while NPO  Na goal 135- 145  Straight caths    GI:   Off tube feeds   Stop PPI  Senna    ENDO: pre-DM, A1c 5.8, TSH 0.39  Goal euglycemia (-180), JAMES    HEME/ONC: LE dopplers 2/13 neg   Lovenox ppx    ID: Afebrile, leukocytosis resolved   JAMES    Patient seen and examined by attending on 2/15/2022.    Patient is critically ill due to ICH with IVH and at high risk for neurological deterioration or death due to: ICH with IVH, hydrocephalus requiring an EVD for CSF diversion, hypoxic respiratory failure requiring high flow

## 2022-02-15 NOTE — PROGRESS NOTE ADULT - SUBJECTIVE AND OBJECTIVE BOX
HPI:  55 y/o female with PMHx of HTN, pre-DM presents transferred from [facility] for intracranial hemorrhage. As per [place] ED provider and son, patient called son around 7:30-8:00AM c/o severe headache and nausea. Son immediately rushed to her house and found her out of bed, moaning and covered in her own vomit. During transit, EMS reported SBP within 240s with intractable vomiting and given Zofran 8mg. Upon arrival to [place] ED, GCS 7, SBP within 180s, patient was given Decadron 10mg, Keppra 1g, started on a Cardene drip, Propofol, Mannitol 1mg/kg. 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 Patient was intubated for airway support and transferred to [facility] for further management. ICH2, NIHSS 8.  Denies use of anticoagulants/antiplatelets.  (12 Feb 2022 16:36)    OVERNIGHT EVENTS: no acute events, on precedex   Vital Signs Last 24 Hrs  T(C): 36.6 (15 Feb 2022 02:00), Max: 37.5 (14 Feb 2022 17:25)  T(F): 97.8 (15 Feb 2022 02:00), Max: 99.5 (14 Feb 2022 17:25)  HR: 71 (15 Feb 2022 02:00) (71 - 118)  BP: 111/60 (15 Feb 2022 02:00) (106/63 - 160/83)  BP(mean): 80 (15 Feb 2022 02:00) (78 - 117)  RR: 24 (15 Feb 2022 02:00) (18 - 38)  SpO2: 97% (15 Feb 2022 02:00) (88% - 100%)    I&O's Summary    13 Feb 2022 07:01  -  14 Feb 2022 07:00  --------------------------------------------------------  IN: 3577 mL / OUT: 2172 mL / NET: 1405 mL    14 Feb 2022 07:01  -  15 Feb 2022 03:54  --------------------------------------------------------  IN: 2514.6 mL / OUT: 4126 mL / NET: -1611.4 mL        PHYSICAL EXAM:  Neurological:    Motor exam:         [] Upper extremity              Bi(c5)  WE(c6)  EE(c7)   FF(c8)                                                R         5/5        5/5        5/5       5/5                                               L          5/5        5/5        5/5       5/5         [] Lower extremeity          HF(l2)   KE(l3)    TA(l4)   EHL(l5)  GS(s1)                                                 R        5/5        5/5        5/5       5/5         5/5                                               L         5/5        5/5       5/5       5/5          5/5                                                        [] warm well perfused; capillary refill <3 seconds     Sensation: [] intact to light touch  [] decreased:       Cardiovascular:  Respiratory:  Gastrointestinal:  Genitourinary:  Extremities:  Incision/Wound:    TUBES/LINES:  [] Haley  [] Lumbar Drain  [] Wound Drains  [] Others      DIET:  [] NPO  [] Mechanical  [] Tube feeds    LABS:                        11.7   13.86 )-----------( 232      ( 14 Feb 2022 06:15 )             35.6     02-14    145  |  112<H>  |  13  ----------------------------<  139<H>  3.8   |  21<L>  |  0.46<L>    Ca    9.1      14 Feb 2022 06:15  Phos  1.5     02-14  Mg     2.2     02-14              CAPILLARY BLOOD GLUCOSE      POCT Blood Glucose.: 129 mg/dL (14 Feb 2022 23:51)  POCT Blood Glucose.: 139 mg/dL (14 Feb 2022 16:36)  POCT Blood Glucose.: 120 mg/dL (14 Feb 2022 11:25)  POCT Blood Glucose.: 130 mg/dL (14 Feb 2022 06:22)      Drug Levels: [] N/A    CSF Analysis: [] N/A      Allergies    No Known Allergies    Intolerances      MEDICATIONS:  Antibiotics:    Neuro:  acetaminophen    Suspension .. 650 milliGRAM(s) Oral every 6 hours PRN  dexMEDEtomidine Infusion 0.2 MICROgram(s)/kG/Hr IV Continuous <Continuous>  fentaNYL    Injectable 12.5 MICROGram(s) IV Push every 3 hours PRN    Anticoagulation:  enoxaparin Injectable 40 milliGRAM(s) SubCutaneous every 24 hours    OTHER:  albuterol/ipratropium for Nebulization 3 milliLiter(s) Nebulizer every 6 hours  amLODIPine   Tablet 5 milliGRAM(s) Oral daily  atorvastatin 40 milliGRAM(s) Oral at bedtime  dextrose 40% Gel 15 Gram(s) Oral once  dextrose 50% Injectable 25 Gram(s) IV Push once  glucagon  Injectable 1 milliGRAM(s) IntraMuscular once  hydrALAZINE Injectable 10 milliGRAM(s) IV Push every 4 hours PRN  insulin lispro (ADMELOG) corrective regimen sliding scale   SubCutaneous every 6 hours  losartan 100 milliGRAM(s) Oral daily  niCARdipine Infusion 5 mG/Hr IV Continuous <Continuous>  pantoprazole  Injectable 40 milliGRAM(s) IV Push daily  polyethylene glycol 3350 17 Gram(s) Oral every 12 hours  senna 2 Tablet(s) Oral at bedtime    IVF:  dextrose 5%. 1000 milliLiter(s) IV Continuous <Continuous>  sodium chloride 0.9%. 1000 milliLiter(s) IV Continuous <Continuous>    CULTURES:    RADIOLOGY & ADDITIONAL TESTS:      ASSESSMENT:  56y Female s/p    HEAD BLEED    Handoff    No pertinent past medical history    Hypertension    Pre-diabetes    ICH (intracerebral hemorrhage)    HTN (hypertension)    Pre-diabetes    [id number]        PLAN:  NEURO:    CARDIOVASCULAR:    PULMONARY:    RENAL:    GI:    HEME:    ID:    ENDO:    DVT PROPHYLAXIS:  [] Venodynes                                [] Heparin/Lovenox    DISPOSITION:    Assessment:  Present when checked    []  GCS  E   V  M     Heart Failure: []Acute, [] acute on chronic , []chronic  Heart Failure:  [] Diastolic (HFpEF), [] Systolic (HFrEF), []Combined (HFpEF and HFrEF), [] RHF, [] Pulm HTN, [] Other    [] ZAIDA, [] ATN, [] AIN, [] other  [] CKD1, [] CKD2, [] CKD 3, [] CKD 4, [] CKD 5, []ESRD    Encephalopathy: [] Metabolic, [] Hepatic, [] toxic, [] Neurological, [] Other    Abnormal Nurtitional Status: [] malnurtition (see nutrition note), [ ]underweight: BMI < 19, [] morbid obesity: BMI >40, [] Cachexia    [] Sepsis  [] hypovolemic shock,[] cardiogenic shock, [] hemorrhagic shock, [] neuogenic shock  [] Acute Respiratory Failure  []Cerebral edema, [] Brain compression/ herniation,   [] Functional quadriplegia  [] Acute blood loss anemia   HPI:  57 y/o female with PMHx of HTN, pre-DM presents transferred from [facility] for intracranial hemorrhage. As per [place] ED provider and son, patient called son around 7:30-8:00AM c/o severe headache and nausea. Son immediately rushed to her house and found her out of bed, moaning and covered in her own vomit. During transit, EMS reported SBP within 240s with intractable vomiting and given Zofran 8mg. Upon arrival to [place] ED, GCS 7, SBP within 180s, patient was given Decadron 10mg, Keppra 1g, started on a Cardene drip, Propofol, Mannitol 1mg/kg. 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 Patient was intubated for airway support and transferred to [facility] for further management. ICH2, NIHSS 8.  Denies use of anticoagulants/antiplatelets.  (12 Feb 2022 16:36)  Hospital course:   2/12: transferred from [place]. R frontal EVD and R radial a-line placed. pend CTH/CTA. s/p 1L bolus for elevated lactate.   2/13: S/p R frontal EVD placedment @05dmC4C draining well. O/n pt w/ episode of possible storming; tachy, HTN, agitated, temp 100.2F, given 2 versed and 50mcg fentanyl w/ improvement in symptoms Neuro exam stable. Pt remains intubated and sedated, AN L>R. Trend lactate and abg. Amlodipine 5mg QD started for SBP goal < 140, cardene dc'd.  EVD dropped 2/13 at 1pm. propanolol and fent standing added for neuro storming, propofol switched to precedex, with resultant hypotension. Propanolol dc'd, fent changed to prn and precedex off, 1 L bolus given, levo prn   2/14: TOSHA overnight. Patient remains on propofol. EVD open at 76icP8L. ICPs WNL. Neuro exam stable. increased bowel regimen. started SQL. extubated on precedex. given 0.5 Ativan + Fentanyl pushes PRN for agitation. 500cc NS bolus.   2/15: BD#4.  On HFNC d/t desat to 88% on 70% non-rebreather.     OVERNIGHT EVENTS: no acute events, on precedex   Vital Signs Last 24 Hrs  T(C): 36.6 (15 Feb 2022 02:00), Max: 37.5 (14 Feb 2022 17:25)  T(F): 97.8 (15 Feb 2022 02:00), Max: 99.5 (14 Feb 2022 17:25)  HR: 71 (15 Feb 2022 02:00) (71 - 118)  BP: 111/60 (15 Feb 2022 02:00) (106/63 - 160/83)  BP(mean): 80 (15 Feb 2022 02:00) (78 - 117)  RR: 24 (15 Feb 2022 02:00) (18 - 38)  SpO2: 97% (15 Feb 2022 02:00) (88% - 100%)    I&O's Summary    13 Feb 2022 07:01  -  14 Feb 2022 07:00  --------------------------------------------------------  IN: 3577 mL / OUT: 2172 mL / NET: 1405 mL    14 Feb 2022 07:01  -  15 Feb 2022 03:54  --------------------------------------------------------  IN: 2514.6 mL / OUT: 4126 mL / NET: -1611.4 mL      PHYSICAL EXAM:  General: NAD, pt is comfortably laying in hospital bed, +intubated on full vent, +sedated on propofol  HEENT: PERRL 3mm, +corneal b/l, +cough/gag, +ET tube   Cardiovascular: RRR, normal S1 and S2   Respiratory: lungs CTAB, no wheezing, rhonchi, or crackles   GI: normoactive BS to auscultation, abd soft, NTND   Neuro: not following commands, LUE/LLE moving spontaneously and strong    RUE and RLE intermittently moving spontaneously and withdrawing to noxious  Extremities: distal pulses 2+ x4   Wound/incision: R frontal large bore EVD incision site with staples C/D/I   Drains: R frontal EVD @5cmH2O     TUBES/LINES:  [] CVC  [X] A-line - R radial   [] Lumbar Drain  [X] Ventriculostomy - R frontal EVD @ 5cmH2O   [] Other    DIET:  [] NPO  [] Mechanical  [x] Tube feeds    LABS:                        11.7   13.86 )-----------( 232      ( 14 Feb 2022 06:15 )             35.6     02-14    145  |  112<H>  |  13  ----------------------------<  139<H>  3.8   |  21<L>  |  0.46<L>    Ca    9.1      14 Feb 2022 06:15  Phos  1.5     02-14  Mg     2.2     02-14              CAPILLARY BLOOD GLUCOSE      POCT Blood Glucose.: 129 mg/dL (14 Feb 2022 23:51)  POCT Blood Glucose.: 139 mg/dL (14 Feb 2022 16:36)  POCT Blood Glucose.: 120 mg/dL (14 Feb 2022 11:25)  POCT Blood Glucose.: 130 mg/dL (14 Feb 2022 06:22)      Drug Levels: [] N/A    CSF Analysis: [] N/A      Allergies    No Known Allergies    Intolerances      MEDICATIONS:  Antibiotics:    Neuro:  acetaminophen    Suspension .. 650 milliGRAM(s) Oral every 6 hours PRN  dexMEDEtomidine Infusion 0.2 MICROgram(s)/kG/Hr IV Continuous <Continuous>  fentaNYL    Injectable 12.5 MICROGram(s) IV Push every 3 hours PRN    Anticoagulation:  enoxaparin Injectable 40 milliGRAM(s) SubCutaneous every 24 hours    OTHER:  albuterol/ipratropium for Nebulization 3 milliLiter(s) Nebulizer every 6 hours  amLODIPine   Tablet 5 milliGRAM(s) Oral daily  atorvastatin 40 milliGRAM(s) Oral at bedtime  dextrose 40% Gel 15 Gram(s) Oral once  dextrose 50% Injectable 25 Gram(s) IV Push once  glucagon  Injectable 1 milliGRAM(s) IntraMuscular once  hydrALAZINE Injectable 10 milliGRAM(s) IV Push every 4 hours PRN  insulin lispro (ADMELOG) corrective regimen sliding scale   SubCutaneous every 6 hours  losartan 100 milliGRAM(s) Oral daily  niCARdipine Infusion 5 mG/Hr IV Continuous <Continuous>  pantoprazole  Injectable 40 milliGRAM(s) IV Push daily  polyethylene glycol 3350 17 Gram(s) Oral every 12 hours  senna 2 Tablet(s) Oral at bedtime    IVF:  dextrose 5%. 1000 milliLiter(s) IV Continuous <Continuous>  sodium chloride 0.9%. 1000 milliLiter(s) IV Continuous <Continuous>    CULTURES:    RADIOLOGY & ADDITIONAL TESTS:    ASSESSMENT:  57 y/o female with PMHx of HTN, pre-DM presents transferred from [facility] after c/o severe headache and nausea being found down by son covered in vomit. Initial CTH revealed left thalamic parenchymal hematoma with intraventricular hemorrhage. ICH score 2. NIHSS 18. s/p R frontal large bore EVD placement 2/12.     PLAN:  NEURO:  - neuro checks (coma checks)/vital signs q1hr  - CTH; L thalamic IPH with extensive IVH and mild L to R MLS (5mm). CTA; no vessel occlusion or aneurysm noted   - R frontal large bore EVD open at 23ipH4X, monitor ICP and output  - RASS 0 to -1  - Fentanyl 25q2 standing  - sedated on precedex   - s/p Keppra 1g at [place] 2/12, no need for additional Keppra at this time  - pend PT/OT assessment when medically appropriate     PULM:   - extubated 2/14, on HFNC  - abg with improvement in CO2 and less acidotic, monitor hypercapnia.    CARDIO:  - SBP goal 100-140  - cardene dc'd, norvasc  - amlodipine 5mg QD    - lsoartan 100mg QD (home med) started 2/15   - continue lipitor, elevated total cholesterol and LDL   - echo 2/14: EF 65-70%     GI:  - Diet: TF, glucerna via NGT held for HFNC  - bowel regimen     RENAL:  - Na goal 135-145    HEME:  - SCDs/SQL for DVT ppx  - pend antixa level 2/17 @2AM  - f/u LE dopplers read    ENDO:  - ISS  - prediabetic, a1c 5.8  - TSH wnl     ID:  - given Ancef while placing EVD  - lactate 4.6 on admission, trended down to 1.9 after fluid bolus      Dispo: ICU status, family updated with plan    Assessment and plan discussed with Dr. D'Amico and Dr. Snow     HPI:  57 y/o female with PMHx of HTN, pre-DM presents transferred from [facility] for intracranial hemorrhage. As per [place] ED provider and son, patient called son around 7:30-8:00AM c/o severe headache and nausea. Son immediately rushed to her house and found her out of bed, moaning and covered in her own vomit. During transit, EMS reported SBP within 240s with intractable vomiting and given Zofran 8mg. Upon arrival to [place] ED, GCS 7, SBP within 180s, patient was given Decadron 10mg, Keppra 1g, started on a Cardene drip, Propofol, Mannitol 1mg/kg. 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 Patient was intubated for airway support and transferred to [facility] for further management. ICH2, NIHSS 8.  Denies use of anticoagulants/antiplatelets.  (12 Feb 2022 16:36)  Hospital course:   2/12: transferred from [facility]. R frontal EVD and R radial a-line placed. pend CTH/CTA. s/p 1L bolus for elevated lactate.   2/13: S/p R frontal EVD placedment @83ivW5Y draining well. O/n pt w/ episode of possible storming; tachy, HTN, agitated, temp 100.2F, given 2 versed and 50mcg fentanyl w/ improvement in symptoms Neuro exam stable. Pt remains intubated and sedated, AN L>R. Trend lactate and abg. Amlodipine 5mg QD started for SBP goal < 140, cardene dc'd.  EVD dropped 2/13 at 1pm. propanolol and fent standing added for neuro storming, propofol switched to precedex, with resultant hypotension. Propanolol dc'd, fent changed to prn and precedex off, 1 L bolus given, levo prn   2/14: TOSHA overnight. Patient remains on propofol. EVD open at 25foX2W. ICPs WNL. Neuro exam stable. increased bowel regimen. started SQL. extubated on precedex. given 0.5 Ativan + Fentanyl pushes PRN for agitation. 500cc NS bolus.   2/15: BD#4.  On HFNC d/t desat to 88% on 70% non-rebreather.     OVERNIGHT EVENTS: no acute events, on precedex   Vital Signs Last 24 Hrs  T(C): 36.6 (15 Feb 2022 02:00), Max: 37.5 (14 Feb 2022 17:25)  T(F): 97.8 (15 Feb 2022 02:00), Max: 99.5 (14 Feb 2022 17:25)  HR: 71 (15 Feb 2022 02:00) (71 - 118)  BP: 111/60 (15 Feb 2022 02:00) (106/63 - 160/83)  BP(mean): 80 (15 Feb 2022 02:00) (78 - 117)  RR: 24 (15 Feb 2022 02:00) (18 - 38)  SpO2: 97% (15 Feb 2022 02:00) (88% - 100%)    I&O's Summary    13 Feb 2022 07:01  -  14 Feb 2022 07:00  --------------------------------------------------------  IN: 3577 mL / OUT: 2172 mL / NET: 1405 mL    14 Feb 2022 07:01  -  15 Feb 2022 03:54  --------------------------------------------------------  IN: 2514.6 mL / OUT: 4126 mL / NET: -1611.4 mL      PHYSICAL EXAM:  General: NAD, pt is comfortably laying in hospital bed, +intubated on full vent, +sedated on propofol  HEENT: PERRL 3mm, +corneal b/l, +cough/gag, +ET tube   Cardiovascular: RRR, normal S1 and S2   Respiratory: lungs CTAB, no wheezing, rhonchi, or crackles   GI: normoactive BS to auscultation, abd soft, NTND   Neuro: not following commands, LUE/LLE moving spontaneously and strong    RUE and RLE intermittently moving spontaneously and withdrawing to noxious  Extremities: distal pulses 2+ x4   Wound/incision: R frontal large bore EVD incision site with staples C/D/I   Drains: R frontal EVD @5cmH2O     TUBES/LINES:  [] CVC  [X] A-line - R radial   [] Lumbar Drain  [X] Ventriculostomy - R frontal EVD @ 5cmH2O   [] Other    DIET:  [] NPO  [] Mechanical  [x] Tube feeds    LABS:                        11.7   13.86 )-----------( 232      ( 14 Feb 2022 06:15 )             35.6     02-14    145  |  112<H>  |  13  ----------------------------<  139<H>  3.8   |  21<L>  |  0.46<L>    Ca    9.1      14 Feb 2022 06:15  Phos  1.5     02-14  Mg     2.2     02-14              CAPILLARY BLOOD GLUCOSE      POCT Blood Glucose.: 129 mg/dL (14 Feb 2022 23:51)  POCT Blood Glucose.: 139 mg/dL (14 Feb 2022 16:36)  POCT Blood Glucose.: 120 mg/dL (14 Feb 2022 11:25)  POCT Blood Glucose.: 130 mg/dL (14 Feb 2022 06:22)      Drug Levels: [] N/A    CSF Analysis: [] N/A      Allergies    No Known Allergies    Intolerances      MEDICATIONS:  Antibiotics:    Neuro:  acetaminophen    Suspension .. 650 milliGRAM(s) Oral every 6 hours PRN  dexMEDEtomidine Infusion 0.2 MICROgram(s)/kG/Hr IV Continuous <Continuous>  fentaNYL    Injectable 12.5 MICROGram(s) IV Push every 3 hours PRN    Anticoagulation:  enoxaparin Injectable 40 milliGRAM(s) SubCutaneous every 24 hours    OTHER:  albuterol/ipratropium for Nebulization 3 milliLiter(s) Nebulizer every 6 hours  amLODIPine   Tablet 5 milliGRAM(s) Oral daily  atorvastatin 40 milliGRAM(s) Oral at bedtime  dextrose 40% Gel 15 Gram(s) Oral once  dextrose 50% Injectable 25 Gram(s) IV Push once  glucagon  Injectable 1 milliGRAM(s) IntraMuscular once  hydrALAZINE Injectable 10 milliGRAM(s) IV Push every 4 hours PRN  insulin lispro (ADMELOG) corrective regimen sliding scale   SubCutaneous every 6 hours  losartan 100 milliGRAM(s) Oral daily  niCARdipine Infusion 5 mG/Hr IV Continuous <Continuous>  pantoprazole  Injectable 40 milliGRAM(s) IV Push daily  polyethylene glycol 3350 17 Gram(s) Oral every 12 hours  senna 2 Tablet(s) Oral at bedtime    IVF:  dextrose 5%. 1000 milliLiter(s) IV Continuous <Continuous>  sodium chloride 0.9%. 1000 milliLiter(s) IV Continuous <Continuous>    CULTURES:    RADIOLOGY & ADDITIONAL TESTS:    ASSESSMENT:  57 y/o female with PMHx of HTN, pre-DM presents transferred from [facility] after c/o severe headache and nausea being found down by son covered in vomit. Initial CTH revealed left thalamic parenchymal hematoma with intraventricular hemorrhage. ICH score 2. NIHSS 18. s/p R frontal large bore EVD placement 2/12.     PLAN:  NEURO:  - neuro checks (coma checks)/vital signs q1hr  - CTH; L thalamic IPH with extensive IVH and mild L to R MLS (5mm). CTA; no vessel occlusion or aneurysm noted   - R frontal large bore EVD open at 5cmH2O, monitor ICP and output  - RASS 0 to -1  - Fentanyl 25q2 standing  - sedated on precedex   - s/p Keppra 1g at [facility] 2/12, no need for additional Keppra at this time  - pend PT/OT assessment when medically appropriate     PULM:   - extubated 2/14, on HFNC  - abg with improvement in CO2 and less acidotic, monitor hypercapnia.    CARDIO:  - SBP goal 100-140  - cardene dc'd, norvasc  - amlodipine 5mg QD    - lsoartan 100mg QD (home med) started 2/15   - continue lipitor, elevated total cholesterol and LDL   - echo 2/14: EF 65-70%     GI:  - Diet: TF, glucerna via NGT held for HFNC  - bowel regimen     RENAL:  - Na goal 135-145    HEME:  - SCDs/SQL for DVT ppx  - pend antixa level 2/17 @2AM  - f/u LE dopplers read    ENDO:  - ISS  - prediabetic, a1c 5.8  - TSH wnl     ID:  - given Ancef while placing EVD  - lactate 4.6 on admission, trended down to 1.9 after fluid bolus      Dispo: ICU status, family updated with plan    Assessment and plan discussed with Dr. D'Amico and Dr. Snow

## 2022-02-15 NOTE — PROVIDER CONTACT NOTE (CHANGE IN STATUS NOTIFICATION) - ASSESSMENT
Pt alert to self and place. R pupil 4 mm, L pupil 3mm, brisk, reactive. Follows commands on all four extremities. EVD patent and draining. ICP WDL.

## 2022-02-16 LAB
ALBUMIN SERPL ELPH-MCNC: 4 G/DL — SIGNIFICANT CHANGE UP (ref 3.3–5)
ALP SERPL-CCNC: 88 U/L — SIGNIFICANT CHANGE UP (ref 40–120)
ALT FLD-CCNC: 25 U/L — SIGNIFICANT CHANGE UP (ref 10–45)
ANION GAP SERPL CALC-SCNC: 12 MMOL/L — SIGNIFICANT CHANGE UP (ref 5–17)
ANION GAP SERPL CALC-SCNC: 13 MMOL/L — SIGNIFICANT CHANGE UP (ref 5–17)
ANION GAP SERPL CALC-SCNC: 9 MMOL/L — SIGNIFICANT CHANGE UP (ref 5–17)
AST SERPL-CCNC: 29 U/L — SIGNIFICANT CHANGE UP (ref 10–40)
BILIRUB DIRECT SERPL-MCNC: 0.2 MG/DL — SIGNIFICANT CHANGE UP (ref 0–0.3)
BILIRUB INDIRECT FLD-MCNC: 0.8 MG/DL — SIGNIFICANT CHANGE UP (ref 0.2–1)
BILIRUB SERPL-MCNC: 1 MG/DL — SIGNIFICANT CHANGE UP (ref 0.2–1.2)
BUN SERPL-MCNC: 7 MG/DL — SIGNIFICANT CHANGE UP (ref 7–23)
BUN SERPL-MCNC: 8 MG/DL — SIGNIFICANT CHANGE UP (ref 7–23)
BUN SERPL-MCNC: 9 MG/DL — SIGNIFICANT CHANGE UP (ref 7–23)
CALCIUM SERPL-MCNC: 8.6 MG/DL — SIGNIFICANT CHANGE UP (ref 8.4–10.5)
CALCIUM SERPL-MCNC: 8.6 MG/DL — SIGNIFICANT CHANGE UP (ref 8.4–10.5)
CALCIUM SERPL-MCNC: 8.7 MG/DL — SIGNIFICANT CHANGE UP (ref 8.4–10.5)
CHLORIDE SERPL-SCNC: 110 MMOL/L — HIGH (ref 96–108)
CHLORIDE SERPL-SCNC: 110 MMOL/L — HIGH (ref 96–108)
CHLORIDE SERPL-SCNC: 111 MMOL/L — HIGH (ref 96–108)
CO2 SERPL-SCNC: 18 MMOL/L — LOW (ref 22–31)
CO2 SERPL-SCNC: 18 MMOL/L — LOW (ref 22–31)
CO2 SERPL-SCNC: 20 MMOL/L — LOW (ref 22–31)
CREAT SERPL-MCNC: 0.38 MG/DL — LOW (ref 0.5–1.3)
CREAT SERPL-MCNC: 0.38 MG/DL — LOW (ref 0.5–1.3)
CREAT SERPL-MCNC: 0.4 MG/DL — LOW (ref 0.5–1.3)
GLUCOSE SERPL-MCNC: 141 MG/DL — HIGH (ref 70–99)
GLUCOSE SERPL-MCNC: 149 MG/DL — HIGH (ref 70–99)
GLUCOSE SERPL-MCNC: 159 MG/DL — HIGH (ref 70–99)
HCT VFR BLD CALC: 37.1 % — SIGNIFICANT CHANGE UP (ref 34.5–45)
HGB BLD-MCNC: 12 G/DL — SIGNIFICANT CHANGE UP (ref 11.5–15.5)
LIDOCAIN IGE QN: 16 U/L — SIGNIFICANT CHANGE UP (ref 7–60)
MAGNESIUM SERPL-MCNC: 1.9 MG/DL — SIGNIFICANT CHANGE UP (ref 1.6–2.6)
MCHC RBC-ENTMCNC: 29.9 PG — SIGNIFICANT CHANGE UP (ref 27–34)
MCHC RBC-ENTMCNC: 32.3 GM/DL — SIGNIFICANT CHANGE UP (ref 32–36)
MCV RBC AUTO: 92.3 FL — SIGNIFICANT CHANGE UP (ref 80–100)
NRBC # BLD: 0 /100 WBCS — SIGNIFICANT CHANGE UP (ref 0–0)
PHOSPHATE SERPL-MCNC: 2.3 MG/DL — LOW (ref 2.5–4.5)
PLATELET # BLD AUTO: 258 K/UL — SIGNIFICANT CHANGE UP (ref 150–400)
POTASSIUM SERPL-MCNC: 3.2 MMOL/L — LOW (ref 3.5–5.3)
POTASSIUM SERPL-MCNC: 3.9 MMOL/L — SIGNIFICANT CHANGE UP (ref 3.5–5.3)
POTASSIUM SERPL-MCNC: 3.9 MMOL/L — SIGNIFICANT CHANGE UP (ref 3.5–5.3)
POTASSIUM SERPL-SCNC: 3.2 MMOL/L — LOW (ref 3.5–5.3)
POTASSIUM SERPL-SCNC: 3.9 MMOL/L — SIGNIFICANT CHANGE UP (ref 3.5–5.3)
POTASSIUM SERPL-SCNC: 3.9 MMOL/L — SIGNIFICANT CHANGE UP (ref 3.5–5.3)
PROCALCITONIN SERPL-MCNC: 0.05 NG/ML — SIGNIFICANT CHANGE UP (ref 0.02–0.1)
PROT SERPL-MCNC: 7.2 G/DL — SIGNIFICANT CHANGE UP (ref 6–8.3)
RBC # BLD: 4.02 M/UL — SIGNIFICANT CHANGE UP (ref 3.8–5.2)
RBC # FLD: 11.3 % — SIGNIFICANT CHANGE UP (ref 10.3–14.5)
SODIUM SERPL-SCNC: 139 MMOL/L — SIGNIFICANT CHANGE UP (ref 135–145)
SODIUM SERPL-SCNC: 140 MMOL/L — SIGNIFICANT CHANGE UP (ref 135–145)
SODIUM SERPL-SCNC: 142 MMOL/L — SIGNIFICANT CHANGE UP (ref 135–145)
WBC # BLD: 8.82 K/UL — SIGNIFICANT CHANGE UP (ref 3.8–10.5)
WBC # FLD AUTO: 8.82 K/UL — SIGNIFICANT CHANGE UP (ref 3.8–10.5)

## 2022-02-16 PROCEDURE — 71275 CT ANGIOGRAPHY CHEST: CPT | Mod: 26

## 2022-02-16 PROCEDURE — 71045 X-RAY EXAM CHEST 1 VIEW: CPT | Mod: 26

## 2022-02-16 PROCEDURE — 99233 SBSQ HOSP IP/OBS HIGH 50: CPT

## 2022-02-16 PROCEDURE — 99291 CRITICAL CARE FIRST HOUR: CPT

## 2022-02-16 RX ORDER — INFLUENZA VIRUS VACCINE 15; 15; 15; 15 UG/.5ML; UG/.5ML; UG/.5ML; UG/.5ML
0.5 SUSPENSION INTRAMUSCULAR ONCE
Refills: 0 | Status: DISCONTINUED | OUTPATIENT
Start: 2022-02-16 | End: 2022-03-11

## 2022-02-16 RX ORDER — SODIUM CHLORIDE 5 G/100ML
500 INJECTION, SOLUTION INTRAVENOUS
Refills: 0 | Status: DISCONTINUED | OUTPATIENT
Start: 2022-02-16 | End: 2022-02-17

## 2022-02-16 RX ORDER — SODIUM CHLORIDE 5 G/100ML
500 INJECTION, SOLUTION INTRAVENOUS
Refills: 0 | Status: DISCONTINUED | OUTPATIENT
Start: 2022-02-16 | End: 2022-02-16

## 2022-02-16 RX ORDER — SODIUM,POTASSIUM PHOSPHATES 278-250MG
1 POWDER IN PACKET (EA) ORAL EVERY 6 HOURS
Refills: 0 | Status: COMPLETED | OUTPATIENT
Start: 2022-02-16 | End: 2022-02-16

## 2022-02-16 RX ORDER — AMLODIPINE BESYLATE 2.5 MG/1
10 TABLET ORAL DAILY
Refills: 0 | Status: DISCONTINUED | OUTPATIENT
Start: 2022-02-16 | End: 2022-02-18

## 2022-02-16 RX ORDER — HYDROMORPHONE HYDROCHLORIDE 2 MG/ML
0.25 INJECTION INTRAMUSCULAR; INTRAVENOUS; SUBCUTANEOUS ONCE
Refills: 0 | Status: DISCONTINUED | OUTPATIENT
Start: 2022-02-16 | End: 2022-02-16

## 2022-02-16 RX ORDER — POTASSIUM CHLORIDE 20 MEQ
40 PACKET (EA) ORAL EVERY 4 HOURS
Refills: 0 | Status: COMPLETED | OUTPATIENT
Start: 2022-02-16 | End: 2022-02-16

## 2022-02-16 RX ORDER — SODIUM CHLORIDE 9 MG/ML
1000 INJECTION, SOLUTION INTRAVENOUS ONCE
Refills: 0 | Status: COMPLETED | OUTPATIENT
Start: 2022-02-16 | End: 2022-02-16

## 2022-02-16 RX ORDER — SODIUM,POTASSIUM PHOSPHATES 278-250MG
1 POWDER IN PACKET (EA) ORAL EVERY 6 HOURS
Refills: 0 | Status: DISCONTINUED | OUTPATIENT
Start: 2022-02-16 | End: 2022-02-16

## 2022-02-16 RX ORDER — HYDROMORPHONE HYDROCHLORIDE 2 MG/ML
0.5 INJECTION INTRAMUSCULAR; INTRAVENOUS; SUBCUTANEOUS ONCE
Refills: 0 | Status: DISCONTINUED | OUTPATIENT
Start: 2022-02-16 | End: 2022-02-16

## 2022-02-16 RX ORDER — SODIUM CHLORIDE 9 MG/ML
1000 INJECTION INTRAMUSCULAR; INTRAVENOUS; SUBCUTANEOUS ONCE
Refills: 0 | Status: DISCONTINUED | OUTPATIENT
Start: 2022-02-16 | End: 2022-02-16

## 2022-02-16 RX ORDER — NICARDIPINE HYDROCHLORIDE 30 MG/1
5 CAPSULE, EXTENDED RELEASE ORAL
Qty: 40 | Refills: 0 | Status: ACTIVE | OUTPATIENT
Start: 2022-02-16 | End: 2023-01-15

## 2022-02-16 RX ORDER — TRAMADOL HYDROCHLORIDE 50 MG/1
25 TABLET ORAL ONCE
Refills: 0 | Status: DISCONTINUED | OUTPATIENT
Start: 2022-02-16 | End: 2022-02-16

## 2022-02-16 RX ORDER — NICARDIPINE HYDROCHLORIDE 30 MG/1
5 CAPSULE, EXTENDED RELEASE ORAL
Qty: 40 | Refills: 0 | Status: DISCONTINUED | OUTPATIENT
Start: 2022-02-16 | End: 2022-02-16

## 2022-02-16 RX ADMIN — SODIUM CHLORIDE 1000 MILLILITER(S): 9 INJECTION, SOLUTION INTRAVENOUS at 17:56

## 2022-02-16 RX ADMIN — HYDROMORPHONE HYDROCHLORIDE 0.25 MILLIGRAM(S): 2 INJECTION INTRAMUSCULAR; INTRAVENOUS; SUBCUTANEOUS at 04:15

## 2022-02-16 RX ADMIN — HYDROMORPHONE HYDROCHLORIDE 0.5 MILLIGRAM(S): 2 INJECTION INTRAMUSCULAR; INTRAVENOUS; SUBCUTANEOUS at 15:13

## 2022-02-16 RX ADMIN — Medication 1 PACKET(S): at 11:55

## 2022-02-16 RX ADMIN — TRAMADOL HYDROCHLORIDE 25 MILLIGRAM(S): 50 TABLET ORAL at 04:00

## 2022-02-16 RX ADMIN — Medication 650 MILLIGRAM(S): at 18:00

## 2022-02-16 RX ADMIN — Medication 1 PACKET(S): at 17:00

## 2022-02-16 RX ADMIN — HYDROMORPHONE HYDROCHLORIDE 0.5 MILLIGRAM(S): 2 INJECTION INTRAMUSCULAR; INTRAVENOUS; SUBCUTANEOUS at 15:15

## 2022-02-16 RX ADMIN — Medication 10 MILLIGRAM(S): at 03:09

## 2022-02-16 RX ADMIN — SODIUM CHLORIDE 50 MILLILITER(S): 5 INJECTION, SOLUTION INTRAVENOUS at 22:30

## 2022-02-16 RX ADMIN — Medication 40 MILLIEQUIVALENT(S): at 09:12

## 2022-02-16 RX ADMIN — Medication 3 MILLILITER(S): at 10:53

## 2022-02-16 RX ADMIN — HYDROMORPHONE HYDROCHLORIDE 0.25 MILLIGRAM(S): 2 INJECTION INTRAMUSCULAR; INTRAVENOUS; SUBCUTANEOUS at 04:07

## 2022-02-16 RX ADMIN — ENOXAPARIN SODIUM 40 MILLIGRAM(S): 100 INJECTION SUBCUTANEOUS at 22:01

## 2022-02-16 RX ADMIN — SODIUM CHLORIDE 30 MILLILITER(S): 5 INJECTION, SOLUTION INTRAVENOUS at 11:21

## 2022-02-16 RX ADMIN — NICARDIPINE HYDROCHLORIDE 25 MG/HR: 30 CAPSULE, EXTENDED RELEASE ORAL at 22:30

## 2022-02-16 RX ADMIN — Medication 650 MILLIGRAM(S): at 11:13

## 2022-02-16 RX ADMIN — POLYETHYLENE GLYCOL 3350 17 GRAM(S): 17 POWDER, FOR SOLUTION ORAL at 05:36

## 2022-02-16 RX ADMIN — Medication 40 MILLIEQUIVALENT(S): at 14:57

## 2022-02-16 RX ADMIN — Medication 650 MILLIGRAM(S): at 22:15

## 2022-02-16 RX ADMIN — Medication 3 MILLILITER(S): at 04:21

## 2022-02-16 RX ADMIN — NICARDIPINE HYDROCHLORIDE 25 MG/HR: 30 CAPSULE, EXTENDED RELEASE ORAL at 03:51

## 2022-02-16 RX ADMIN — SENNA PLUS 2 TABLET(S): 8.6 TABLET ORAL at 22:01

## 2022-02-16 RX ADMIN — Medication 650 MILLIGRAM(S): at 10:38

## 2022-02-16 RX ADMIN — Medication 650 MILLIGRAM(S): at 00:00

## 2022-02-16 RX ADMIN — NICARDIPINE HYDROCHLORIDE 25 MG/HR: 30 CAPSULE, EXTENDED RELEASE ORAL at 10:00

## 2022-02-16 RX ADMIN — NICARDIPINE HYDROCHLORIDE 25 MG/HR: 30 CAPSULE, EXTENDED RELEASE ORAL at 16:57

## 2022-02-16 RX ADMIN — POLYETHYLENE GLYCOL 3350 17 GRAM(S): 17 POWDER, FOR SOLUTION ORAL at 17:00

## 2022-02-16 RX ADMIN — Medication 650 MILLIGRAM(S): at 16:58

## 2022-02-16 RX ADMIN — LOSARTAN POTASSIUM 100 MILLIGRAM(S): 100 TABLET, FILM COATED ORAL at 05:35

## 2022-02-16 RX ADMIN — TRAMADOL HYDROCHLORIDE 25 MILLIGRAM(S): 50 TABLET ORAL at 03:24

## 2022-02-16 RX ADMIN — AMLODIPINE BESYLATE 10 MILLIGRAM(S): 2.5 TABLET ORAL at 09:12

## 2022-02-16 RX ADMIN — AMLODIPINE BESYLATE 10 MILLIGRAM(S): 2.5 TABLET ORAL at 05:35

## 2022-02-16 NOTE — PROGRESS NOTE ADULT - SUBJECTIVE AND OBJECTIVE BOX
EVENTS:   No acute events overnight.  Afebrile.  EVD at 5, ICP   On cardene at ?    VITALS:  T(C): , Max: 37.3 (02-15-22 @ 15:00)  HR:  (82 - 117)  BP:  (114/63 - 151/72)  ABP:  (109/81 - 144/57)  RR:  (20 - 34)  SpO2:  (94% - 98%)  Wt(kg): --      02-15-22 @ 07:01  -  02-16-22 @ 07:00  --------------------------------------------------------  IN: 3700.6 mL / OUT: 3086 mL / NET: 614.6 mL    02-16-22 @ 07:01  -  02-16-22 @ 07:41  --------------------------------------------------------  IN: 85 mL / OUT: 0 mL / NET: 85 mL      LABS:  Na: 140 (02-16 @ 05:22), 147 (02-15 @ 05:27), 145 (02-14 @ 06:15)  K: 3.2 (02-16 @ 05:22), 3.6 (02-15 @ 05:27), 3.8 (02-14 @ 06:15)  Cl: 110 (02-16 @ 05:22), 114 (02-15 @ 05:27), 112 (02-14 @ 06:15)  CO2: 18 (02-16 @ 05:22), 21 (02-15 @ 05:27), 21 (02-14 @ 06:15)  BUN: 7 (02-16 @ 05:22), 9 (02-15 @ 05:27), 13 (02-14 @ 06:15)  Cr: 0.38 (02-16 @ 05:22), 0.37 (02-15 @ 05:27), 0.46 (02-14 @ 06:15)  Glu: 159(02-16 @ 05:22), 133(02-15 @ 05:27), 139(02-14 @ 06:15)    Hgb: 12.0 (02-16 @ 05:22), 11.9 (02-15 @ 05:27), 11.7 (02-14 @ 06:15)  Hct: 37.1 (02-16 @ 05:22), 35.7 (02-15 @ 05:27), 35.6 (02-14 @ 06:15)  WBC: 8.82 (02-16 @ 05:22), 8.82 (02-15 @ 05:27), 13.86 (02-14 @ 06:15)  Plt: 258 (02-16 @ 05:22), 222 (02-15 @ 05:27), 232 (02-14 @ 06:15)    MEDICATIONS:  acetaminophen    Suspension .. 650 milliGRAM(s) Oral every 6 hours PRN  albuterol/ipratropium for Nebulization 3 milliLiter(s) Nebulizer every 6 hours  amLODIPine   Tablet 10 milliGRAM(s) Oral daily  enoxaparin Injectable 40 milliGRAM(s) SubCutaneous every 24 hours  hydrALAZINE Injectable 10 milliGRAM(s) IV Push every 4 hours PRN  losartan 100 milliGRAM(s) Oral daily  metoclopramide Injectable 10 milliGRAM(s) IV Push every 8 hours PRN  niCARdipine Infusion 5 mG/Hr IV Continuous <Continuous>  polyethylene glycol 3350 17 Gram(s) Oral every 12 hours  potassium chloride   Solution 40 milliEquivalent(s) Oral every 4 hours  potassium phosphate / sodium phosphate Powder (PHOS-NaK) 1 Packet(s) Oral every 6 hours  senna 2 Tablet(s) Oral at bedtime  sodium chloride 0.9%. 1000 milliLiter(s) IV Continuous <Continuous>    EXAMINATION:  General:  in NAD  HEENT:  MMM  Neuro:  awake, alert, oriented to hospital and month, but not year, follows commands, EOMI, face symmetric, R arm 2/5 but 4+/5 in biceps, DF 5/5, sensation intact to LT in all 4 extremities   Cards:  RRR  Respiratory:  no respiratory distress  Abdomen:  soft  Extremities:  no LE edema    Assessment/Plan:   55 yo woman with HTN and pre-DM, admitted 2/12 with headache, found to have a 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 ICH score 2. CTA neg for vascular malformation. S/p R frontal EVD. Intubated, now extubated 2/14. Last HCT 2/14 with improved hydrocephalus but still dilated R lateral ventricle. Exam significantly improved. Etiology of ICH is likely 2/2 HTN.      NEURO:  Q1 neurochecks  Hydrocephalus; EVD@ 5cmH2O  off sedation  Tylenol PRN for pain  Stroke core measures. Stroke neurology consult.   Stop lipitor  PT/OT    PULM: extubated 2/14, on HFNC  wean HF    CV: TTE normal EF  SBP goal 100-140  Off cardene  On amlodipine 5 mg daily (new med) and home losartan 100 mg daily (losartan started this AM)    RENAL:   Fluids: NS @ 75 while NPO  Na goal 135- 145  Straight caths    GI:   Off tube feeds   Stop PPI  Senna    ENDO: pre-DM, A1c 5.8, TSH 0.39  Goal euglycemia (-180), JAMES    HEME/ONC: LE dopplers 2/13 neg   Lovenox ppx    ID: Afebrile, leukocytosis resolved   JAMES    Patient seen and examined by attending on 2/16/2022.    Patient is critically ill due to ICH with IVH and at high risk for neurological deterioration or death due to: ICH with IVH, hydrocephalus requiring an EVD for CSF diversion, hypoxic respiratory failure requiring high flow    EVENTS:   Tachycardic overnight, improved with Dilaudid   Afebrile.  EVD at 5, ICPs -3 to 7, output 186 for 24 hours.   On cardene at 10cc/hr.   Appears uncomfortable but denies HA and nausea.     VITALS:  T(C): , Max: 37.3 (02-15-22 @ 15:00)  HR:  (82 - 117)  BP:  (114/63 - 151/72)  ABP:  (109/81 - 144/57)  RR:  (20 - 34)  SpO2:  (94% - 98%)  Wt(kg): --      02-15-22 @ 07:01  -  02-16-22 @ 07:00  --------------------------------------------------------  IN: 3700.6 mL / OUT: 3086 mL / NET: 614.6 mL    02-16-22 @ 07:01  -  02-16-22 @ 07:41  --------------------------------------------------------  IN: 85 mL / OUT: 0 mL / NET: 85 mL      LABS:  Na: 140 (02-16 @ 05:22), 147 (02-15 @ 05:27), 145 (02-14 @ 06:15)  K: 3.2 (02-16 @ 05:22), 3.6 (02-15 @ 05:27), 3.8 (02-14 @ 06:15)  Cl: 110 (02-16 @ 05:22), 114 (02-15 @ 05:27), 112 (02-14 @ 06:15)  CO2: 18 (02-16 @ 05:22), 21 (02-15 @ 05:27), 21 (02-14 @ 06:15)  BUN: 7 (02-16 @ 05:22), 9 (02-15 @ 05:27), 13 (02-14 @ 06:15)  Cr: 0.38 (02-16 @ 05:22), 0.37 (02-15 @ 05:27), 0.46 (02-14 @ 06:15)  Glu: 159(02-16 @ 05:22), 133(02-15 @ 05:27), 139(02-14 @ 06:15)    Hgb: 12.0 (02-16 @ 05:22), 11.9 (02-15 @ 05:27), 11.7 (02-14 @ 06:15)  Hct: 37.1 (02-16 @ 05:22), 35.7 (02-15 @ 05:27), 35.6 (02-14 @ 06:15)  WBC: 8.82 (02-16 @ 05:22), 8.82 (02-15 @ 05:27), 13.86 (02-14 @ 06:15)  Plt: 258 (02-16 @ 05:22), 222 (02-15 @ 05:27), 232 (02-14 @ 06:15)    MEDICATIONS:  acetaminophen    Suspension .. 650 milliGRAM(s) Oral every 6 hours PRN  albuterol/ipratropium for Nebulization 3 milliLiter(s) Nebulizer every 6 hours  amLODIPine   Tablet 10 milliGRAM(s) Oral daily  enoxaparin Injectable 40 milliGRAM(s) SubCutaneous every 24 hours  hydrALAZINE Injectable 10 milliGRAM(s) IV Push every 4 hours PRN  losartan 100 milliGRAM(s) Oral daily  metoclopramide Injectable 10 milliGRAM(s) IV Push every 8 hours PRN  niCARdipine Infusion 5 mG/Hr IV Continuous <Continuous>  polyethylene glycol 3350 17 Gram(s) Oral every 12 hours  potassium chloride   Solution 40 milliEquivalent(s) Oral every 4 hours  potassium phosphate / sodium phosphate Powder (PHOS-NaK) 1 Packet(s) Oral every 6 hours  senna 2 Tablet(s) Oral at bedtime  sodium chloride 0.9%. 1000 milliLiter(s) IV Continuous <Continuous>    EXAMINATION:  General: appear uncomfortable  HEENT:  MMM  Neuro:  awake, alert, oriented to hospital and year but not month, follows commands, R pupil 4mm, L pupil 2 mm both briskly reactive, EOMI, face symmetric, R arm 1/5 to noxious, L arm AG, moves b/l legs briefly AG, sensation intact in all 4 extremities   Cards:  RRR  Respiratory:  no respiratory distress  Abdomen:  soft  Extremities:  no LE edema    Assessment/Plan:   57 yo woman with HTN and pre-DM, admitted 2/12 with headache, found to have a 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 ICH score 2. CTA neg for vascular malformation. S/p R frontal EVD. Intubated, now extubated 2/14. Last HCT 2/14 with improved hydrocephalus but still dilated R lateral ventricle. Exam significantly improved. Etiology of ICH is likely 2/2 HTN.      NEURO:  Q1 neurochecks  Hydrocephalus; EVD@ 5cmH2O  off sedation  Tylenol PRN for pain  Stroke core measures. Stroke neurology consult.   PT/OT    PULM: extubated 2/14  on 4L NC, wean as tolerated     CV: TTE normal EF  SBP goal 100-140  Off cardene  On amlodipine 10 mg daily (new med) and home losartan 100 mg daily    RENAL:   Fluids: start HTS 3% at 30cc/hr for Na goal 145-155 for midline shift and dilated R pupil  Voiding, with primafit    GI: BM 2/15  On tube feeds, increase to goal   off PPI  Senna    ENDO: pre-DM, A1c 5.8, TSH 0.39  Goal euglycemia (-180), JAMES    HEME/ONC: LE dopplers 2/13 neg   Lovenox ppx    ID: Afebrile, leukocytosis resolved   JAMES    Patient seen and examined by attending on 2/16/2022.    Patient is critically ill due to ICH with IVH and at high risk for neurological deterioration or death due to: ICH with IVH, hydrocephalus requiring an EVD for CSF diversion, hypoxic respiratory failure requiring high flow    EVENTS:   Tachycardic overnight, improved with Dilaudid.  Afebrile.  EVD at 5, ICPs -3 to 7, output 186 for 24 hours.   On cardene at 10cc/hr.   Appears uncomfortable but denies HA and nausea.     VITALS:  T(C): , Max: 37.3 (02-15-22 @ 15:00)  HR:  (82 - 117)  BP:  (114/63 - 151/72)  ABP:  (109/81 - 144/57)  RR:  (20 - 34)  SpO2:  (94% - 98%)  Wt(kg): --      02-15-22 @ 07:01  -  02-16-22 @ 07:00  --------------------------------------------------------  IN: 3700.6 mL / OUT: 3086 mL / NET: 614.6 mL    02-16-22 @ 07:01  -  02-16-22 @ 07:41  --------------------------------------------------------  IN: 85 mL / OUT: 0 mL / NET: 85 mL      LABS:  Na: 140 (02-16 @ 05:22), 147 (02-15 @ 05:27), 145 (02-14 @ 06:15)  K: 3.2 (02-16 @ 05:22), 3.6 (02-15 @ 05:27), 3.8 (02-14 @ 06:15)  Cl: 110 (02-16 @ 05:22), 114 (02-15 @ 05:27), 112 (02-14 @ 06:15)  CO2: 18 (02-16 @ 05:22), 21 (02-15 @ 05:27), 21 (02-14 @ 06:15)  BUN: 7 (02-16 @ 05:22), 9 (02-15 @ 05:27), 13 (02-14 @ 06:15)  Cr: 0.38 (02-16 @ 05:22), 0.37 (02-15 @ 05:27), 0.46 (02-14 @ 06:15)  Glu: 159(02-16 @ 05:22), 133(02-15 @ 05:27), 139(02-14 @ 06:15)    Hgb: 12.0 (02-16 @ 05:22), 11.9 (02-15 @ 05:27), 11.7 (02-14 @ 06:15)  Hct: 37.1 (02-16 @ 05:22), 35.7 (02-15 @ 05:27), 35.6 (02-14 @ 06:15)  WBC: 8.82 (02-16 @ 05:22), 8.82 (02-15 @ 05:27), 13.86 (02-14 @ 06:15)  Plt: 258 (02-16 @ 05:22), 222 (02-15 @ 05:27), 232 (02-14 @ 06:15)    MEDICATIONS:  acetaminophen    Suspension .. 650 milliGRAM(s) Oral every 6 hours PRN  albuterol/ipratropium for Nebulization 3 milliLiter(s) Nebulizer every 6 hours  amLODIPine   Tablet 10 milliGRAM(s) Oral daily  enoxaparin Injectable 40 milliGRAM(s) SubCutaneous every 24 hours  hydrALAZINE Injectable 10 milliGRAM(s) IV Push every 4 hours PRN  losartan 100 milliGRAM(s) Oral daily  metoclopramide Injectable 10 milliGRAM(s) IV Push every 8 hours PRN  niCARdipine Infusion 5 mG/Hr IV Continuous <Continuous>  polyethylene glycol 3350 17 Gram(s) Oral every 12 hours  potassium chloride   Solution 40 milliEquivalent(s) Oral every 4 hours  potassium phosphate / sodium phosphate Powder (PHOS-NaK) 1 Packet(s) Oral every 6 hours  senna 2 Tablet(s) Oral at bedtime  sodium chloride 0.9%. 1000 milliLiter(s) IV Continuous <Continuous>    EXAMINATION:  General: appear uncomfortable  HEENT:  MMM  Neuro:  awake, alert, oriented to hospital and year but not month, follows commands, R pupil 4mm, L pupil 2 mm both briskly reactive, EOMI, face symmetric, R arm 1/5 to noxious (worse than yesterday), L arm AG, moves b/l legs briefly AG, sensation intact in all 4 extremities   Cards:  RRR  Respiratory:  no respiratory distress  Abdomen:  soft  Extremities:  no LE edema    Assessment/Plan:   57 yo woman with HTN and pre-DM, admitted 2/12 with headache, found to have a 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 ICH score 2. CTA neg for vascular malformation. S/p R frontal EVD. Intubated, now extubated 2/14. Last HCT 2/15 with improved hydrocephalus but trapped R lateral ventricle and 4mm MLS. Exam significantly improved but with dilated R pupil for which we are starting hypertonics. Etiology of ICH is likely 2/2 HTN.      NEURO:  Q1 neurochecks  Hydrocephalus; EVD@ 5cmH2O  off sedation  Tylenol PRN for pain  PT/OT    PULM: extubated 2/14  on 4L NC, wean as tolerated     CV: TTE normal EF  SBP goal 100-140  Wean cardene as tolerated   On amlodipine 10 mg daily (new med) and home losartan 100 mg daily    RENAL:   Fluids: start HTS 3% at 30cc/hr for Na goal 145-155 for midline shift and dilated R pupil  Voiding, with primafit    GI: BM 2/15  On tube feeds, increase to goal   off PPI  Senna    ENDO: pre-DM, A1c 5.8, TSH 0.39  Goal euglycemia (-180), JAMES    HEME/ONC: LE dopplers 2/13 neg   Lovenox ppx    ID: Afebrile, leukocytosis resolved   JAMES    Patient seen and examined by attending on 2/16/2022.    Patient is critically ill due to ICH with IVH and at high risk for neurological deterioration or death due to: ICH with IVH, hydrocephalus requiring an EVD for CSF diversion, midline shift of brain

## 2022-02-16 NOTE — PROVIDER CONTACT NOTE (OTHER) - ASSESSMENT
AOx1-2. Able to state she is in the hospital, but not which one.  Left pupil 2mm, brisk. Right pupil 4mm, brisk.  Left-side extremities: No drift. RUE: No effort to gravity. RLE: Some effort to gravity.

## 2022-02-16 NOTE — CHART NOTE - NSCHARTNOTEFT_GEN_A_CORE
Provider alerted for HR upto 120s. Pt was seen and examined at bedside. Pt is comfortably laying in bed, in no acute distress, respiratory or otherwise. Neuro exam is at baseline (note below on exam), pt   is on 3L NC, weaned down from 6 L overnight. On cardene 7.5mg. BP parameter has just been broadened (100-160)       Exam  A&O x 2, OES, following commands.   Pupils anisocoric, R 4mm and L 2mm briskly reactive   R facial droop.   RUE unable to lift antigravity, w/d to noxious  LUE and LLE moving spont.  5/5 strength, RLE 4+/5  No respiratory distress   grimaces with deep palpation of calf/legs      Vital Signs Last 24 Hrs  T(C): 37.9 (16 Feb 2022 19:00), Max: 38.7 (16 Feb 2022 17:00)  T(F): 100.2 (16 Feb 2022 19:00), Max: 101.6 (16 Feb 2022 17:00)  HR: 115 (16 Feb 2022 21:00) (96 - 122)  BP: 148/72 (16 Feb 2022 21:00) (123/56 - 151/75)  BP(mean): 103 (16 Feb 2022 21:00) (79 - 104)  RR: 28 (16 Feb 2022 21:00) (20 - 34)  SpO2: 96% (16 Feb 2022 21:00) (94% - 98%)    MEDICATIONS  (STANDING):  albuterol/ipratropium for Nebulization 3 milliLiter(s) Nebulizer every 6 hours  amLODIPine   Tablet 10 milliGRAM(s) Oral daily  enoxaparin Injectable 40 milliGRAM(s) SubCutaneous every 24 hours  influenza   Vaccine 0.5 milliLiter(s) IntraMuscular once  losartan 100 milliGRAM(s) Oral daily  niCARdipine Infusion 5 mG/Hr (25 mL/Hr) IV Continuous <Continuous>  polyethylene glycol 3350 17 Gram(s) Oral every 12 hours  senna 2 Tablet(s) Oral at bedtime  sodium chloride 3%. 500 milliLiter(s) (50 mL/Hr) IV Continuous <Continuous>      < from: CT Head No Cont (02.15.22 @ 17:04) >    decompressed right lateral ventricle with small amount of hemorrhage. The   left lateral ventricle is moderately dilated, nearly full width dense   hemorrhagic clot, similar to prior exam. Additional hemorrhage is again   noted within the third and fourth ventricles, which are again not   dilated. No significant change left thalamic hematoma with surrounding   edema no extra-axial collection. Unchanged 4 mm rightward shift at the   septum pellucidum accounting for differences in scan technique/   angulation.    < end of copied text >        A/P: Pt. 55 y/o female with PMHx of HTN, pre-DM w left thalamic parenchymal hematoma with extensive intraventricular hemorrhage. ICH score 2. NIHSS 18. s/p R frontal large bore EVD placement 2/12. Pt is now hypertensive requiring Cardene, on 2 standing antihypertensive agent. Tachycardic upto 120s. s/p boluses and straight cath for retention without improvement, and pupilary anisocoria.       - Neuro:   EVD is draining well and ICPs are low single digits. Recent CT from 2/15 shows that R vent is now collapsed, however L vent still contains casted hemorrhagic clot, MLS is still unchanged at 4mm, with perihematomal edema (L thalamic), Pt is weaker on R upper and R pupil is larger, however, this is likely the expected clinical course resulting from swelling. Although pt is on cardene, it's unlikely tachycardia is related to paroxysmal sympathetic hyperacitivity, however can not be ruled out. Plan to watch neuro exam. Continue hypertonics therapy for a Na+ above >145. hyperosmolar therapy as needed. Plan to keep R EVD at 5cmH2O.     - Cardio:   BP goal relaxed 100-160. Attempt to wean off Cardene. Recent echo significant for symmetric LVH with normal systolic function, EF wnl, recent doppler (2/14/22) neg for DVT. no arrhythmia noted on EKG. electrolytes repleted this morning.   - Pulm:   low suspicion for PE given improvement of O2 requirement,  xray shows questionable hazy opacity RLL (unclear if consolidation), pt was febrile earlier, however, no leukocytosis, procal is wnl. pt was pancultured, Provider alerted for HR upto 120s. Pt was seen and examined at bedside. Pt is comfortably laying in bed, in no acute distress, respiratory or otherwise. Neuro exam is at baseline (note below on exam), pt   is on 3L NC, weaned down from 6 L overnight. On cardene 7.5mg. BP parameter has just been broadened (100-160)       Exam  A&O x 2, OES, following commands.   Pupils anisocoric, R 4mm and L 2mm briskly reactive   R facial droop.   RUE unable to lift antigravity, w/d to noxious  LUE and LLE moving spont.  5/5 strength, RLE 4+/5  No respiratory distress   grimaces with deep palpation of calf/legs      Vital Signs Last 24 Hrs  T(C): 37.9 (16 Feb 2022 19:00), Max: 38.7 (16 Feb 2022 17:00)  T(F): 100.2 (16 Feb 2022 19:00), Max: 101.6 (16 Feb 2022 17:00)  HR: 115 (16 Feb 2022 21:00) (96 - 122)  BP: 148/72 (16 Feb 2022 21:00) (123/56 - 151/75)  BP(mean): 103 (16 Feb 2022 21:00) (79 - 104)  RR: 28 (16 Feb 2022 21:00) (20 - 34)  SpO2: 96% (16 Feb 2022 21:00) (94% - 98%)    MEDICATIONS  (STANDING):  albuterol/ipratropium for Nebulization 3 milliLiter(s) Nebulizer every 6 hours  amLODIPine   Tablet 10 milliGRAM(s) Oral daily  enoxaparin Injectable 40 milliGRAM(s) SubCutaneous every 24 hours  influenza   Vaccine 0.5 milliLiter(s) IntraMuscular once  losartan 100 milliGRAM(s) Oral daily  niCARdipine Infusion 5 mG/Hr (25 mL/Hr) IV Continuous <Continuous>  polyethylene glycol 3350 17 Gram(s) Oral every 12 hours  senna 2 Tablet(s) Oral at bedtime  sodium chloride 3%. 500 milliLiter(s) (50 mL/Hr) IV Continuous <Continuous>      < from: CT Head No Cont (02.15.22 @ 17:04) >    decompressed right lateral ventricle with small amount of hemorrhage. The   left lateral ventricle is moderately dilated, nearly full width dense   hemorrhagic clot, similar to prior exam. Additional hemorrhage is again   noted within the third and fourth ventricles, which are again not   dilated. No significant change left thalamic hematoma with surrounding   edema no extra-axial collection. Unchanged 4 mm rightward shift at the   septum pellucidum accounting for differences in scan technique/   angulation.    < end of copied text >        A/P: Pt. 55 y/o female with PMHx of HTN, pre-DM w left thalamic parenchymal hematoma with extensive intraventricular hemorrhage. ICH score 2. NIHSS 18. s/p R frontal large bore EVD placement 2/12. Pt is now hypertensive requiring Cardene, on 2 standing antihypertensive agent. Tachycardic upto 120s. s/p boluses and straight cath for retention without improvement, and pupilary anisocoria.       - Neuro:   EVD is draining well and ICPs are low single digits. Recent CT from 2/15 shows that R vent is now collapsed, however L vent still contains casted hemorrhagic clot, MLS is still unchanged at 4mm, with perihematomal edema (L thalamic), Pt is weaker on R upper and R pupil is larger, however, this is likely the expected clinical course resulting from swelling. Although pt is on cardene, it's unlikely tachycardia is related to paroxysmal sympathetic hyperacitivity, however can not be ruled out. Plan to watch neuro exam. Continue hypertonics therapy for a Na+ above >145. hyperosmolar therapy as needed. Plan to keep R EVD at 5cmH2O.   - Cardio:   BP goal relaxed 100-160. Attempt to wean off Cardene. Recent echo significant for symmetric LVH with normal systolic function, EF wnl, recent doppler (2/14/22) neg for DVT. no arrhythmia noted on EKG. electrolytes repleted this morning.   - Renal:   pt is intermittently retaining, however, HR is unaffected by bladder scan/straight catheterization.   - psych: pt takes xanax at home, however tachycardia is not correlating with anxiety episodes. pt was persistently tachy after dilaudid dose earlier, when resting/sleeping.   - Pulm/ID:   low suspicion for PE given improvement of O2 requirement, lack of hemodynamic instability, recent normal echo, neg doppler, xray shows questionable hazy opacity RLL (unclear if consolidation), pt was febrile earlier, however, no leukocytosis, procal is wnl. pt was pancultured, sputum pending (pt is not vented). however, since no clear etiology of tachycardia, lack of response from boluses/fever control, decision is made to proceed with PE protocol and close monitoring of hemodynamic status.     d/w Dr. D'amico

## 2022-02-16 NOTE — PROGRESS NOTE ADULT - SUBJECTIVE AND OBJECTIVE BOX
HPI: 57yo F with PMHx HTN, pre-DM, last known normal this morning 8am. Per son, patient was complaining of headaches this morning. When the son didn't hear from her, the son  rushed to the patient's home where patient was noted to be hanging off her bed, vomiting, and moaning. Zofran was given and patient was taken to [facility] where CTH revealed a left thalamic ICH with IVH and hydrocephalus. SBP on arrival was in 240s with GCS 7. Patient was given decadron 10mg, 80mg mannitol, 1g keppra, and started on cardene. Patient was intubated and transferred to [facility] for further management. ICH score 2. NIHSS 18. BD 1= 2/12.       S/Overnight events:  TOSHA overnight, neuro stable. EVD open @ 5    Hospital Course:   2/12: transferred from [place]. R frontal EVD and R radial a-line placed. pend CTH/CTA. s/p 1L bolus for elevated lactate.   2/13: S/p R frontal EVD placedment @[id number] draining well. O/n pt w/ episode of possible storming; tachy, HTN, agitated, temp 100.2F, given 2 versed and 50mcg fentanyl w/ improvement in symptoms Neuro exam stable. Pt remains intubated and sedated, AN L>R. Trend lactate and abg. Amlodipine 5mg QD started for SBP goal < 140, cardene dc'd.  EVD dropped 2/13 at 1pm. propanolol and fent standing added for neuro storming, propofol switched to precedex, with resultant hypotension. Propanolol dc'd, fent changed to prn and precedex off, 1 L bolus given, levo prn   2/14: TOSHA overnight. Patient remains on propofol. EVD open at [id number]. ICPs WNL. Neuro exam stable. increased bowel regimen. started SQL. extubated on precedex. given 0.5 Ativan + Fentanyl pushes PRN for agitation. 500cc NS bolus.   2/15: BD#4.  On HFNC d/t desat to 88% on 70% non-rebreather. On 0.5 precedex   2/16: BD5, TOSHA overnight, on 4L NC, s/p vomiting yesterday, TF being held, formal S/S pending, off precedex. EVD open @ 5      Vital Signs Last 24 Hrs  T(C): 37.1 (15 Feb 2022 23:00), Max: 37.3 (15 Feb 2022 15:00)  T(F): 98.8 (15 Feb 2022 23:00), Max: 99.2 (15 Feb 2022 15:00)  HR: 114 (16 Feb 2022 00:00) (69 - 116)  BP: 139/70 (16 Feb 2022 00:00) (111/60 - 149/68)  BP(mean): 96 (16 Feb 2022 00:00) (80 - 100)  RR: 27 (16 Feb 2022 00:00) (20 - 32)  SpO2: 95% (16 Feb 2022 00:00) (94% - 99%)    I&O's Detail    14 Feb 2022 07:01  -  15 Feb 2022 07:00  --------------------------------------------------------  IN:    Dexmedetomidine: 212.3 mL    Enteral Tube Flush: 80 mL    IV PiggyBack: 999.8 mL    NiCARdipine: 380 mL    Propofol: 84 mL    sodium chloride 0.9%: 1350 mL  Total IN: 3106.1 mL    OUT:    External Ventricular Device (mL): 185 mL    Intermittent Catheterization - Urethral (mL): 4325 mL    Voided (mL): 450 mL  Total OUT: 4960 mL    Total NET: -1853.9 mL      15 Feb 2022 07:01  -  16 Feb 2022 01:07  --------------------------------------------------------  IN:    Dexmedetomidine: 13.3 mL    Enteral Tube Flush: 140 mL    IV PiggyBack: 599.8 mL    NiCARdipine: 630 mL    sodium chloride 0.9%: 1350 mL  Total IN: 2733.1 mL    OUT:    External Ventricular Device (mL): 134 mL    Intermittent Catheterization - Urethral (mL): 850 mL    Voided (mL): 1850 mL  Total OUT: 2834 mL    Total NET: -100.9 mL        I&O's Summary    14 Feb 2022 07:01  -  15 Feb 2022 07:00  --------------------------------------------------------  IN: 3106.1 mL / OUT: 4960 mL / NET: -1853.9 mL    15 Feb 2022 07:01  -  16 Feb 2022 01:07  --------------------------------------------------------  IN: 2733.1 mL / OUT: 2834 mL / NET: -100.9 mL      PHYSICAL EXAM:  General: NAD, pt is comfortably laying in hospital bed  HEENT: Aniscoric pupils, R 4mm and L 3mm  Cardiovascular: RRR, normal S1 and S2   Respiratory: lungs CTAB, no wheezing, rhonchi, or crackles   GI: normoactive BS to auscultation, abd soft, NTND   Neuro: speech clear, A&O x2 to self and place, awake and alert. AN x4 spontaneously, L>R, R side 4/5, L side 4+/5  Extremities: distal pulses 2+ x4   Wound/incision: R frontal large bore EVD incision site with staples C/D/I     TUBES/LINES:  [] CVC  [X] A-line  [] Lumbar Drain  [X] Ventriculostomy- R frontal open @ 5  [] Other    DIET:  [x] NPO- due to nausea  [] Mechanical  [] Tube feeds    LABS:                        11.9   8.82  )-----------( 222      ( 15 Feb 2022 05:27 )             35.7     02-15    147<H>  |  114<H>  |  9   ----------------------------<  133<H>  3.6   |  21<L>  |  0.37<L>    Ca    9.0      15 Feb 2022 05:27  Phos  2.5     02-15  Mg     1.9     02-15              CAPILLARY BLOOD GLUCOSE          Drug Levels: [] N/A    CSF Analysis: [] N/A      Allergies    No Known Allergies    Intolerances      MEDICATIONS:  Antibiotics:    Neuro:  acetaminophen    Suspension .. 650 milliGRAM(s) Oral every 6 hours PRN  metoclopramide Injectable 10 milliGRAM(s) IV Push every 8 hours PRN    Anticoagulation:  enoxaparin Injectable 40 milliGRAM(s) SubCutaneous every 24 hours    OTHER:  albuterol/ipratropium for Nebulization 3 milliLiter(s) Nebulizer every 6 hours  amLODIPine   Tablet 10 milliGRAM(s) Oral daily  hydrALAZINE Injectable 10 milliGRAM(s) IV Push every 4 hours PRN  losartan 100 milliGRAM(s) Oral daily  niCARdipine Infusion 5 mG/Hr IV Continuous <Continuous>  polyethylene glycol 3350 17 Gram(s) Oral every 12 hours  senna 2 Tablet(s) Oral at bedtime    IVF:  sodium chloride 0.9%. 1000 milliLiter(s) IV Continuous <Continuous>    CULTURES:    RADIOLOGY & ADDITIONAL TESTS:      ASSESSMENT:  55 y/o female with PMHx of HTN, pre-DM presents transferred from [facility] after c/o severe headache and nausea being found down by son covered in vomit. Initial CTH revealed left thalamic parenchymal hematoma with intraventricular hemorrhage. ICH score 2. NIHSS 18. s/p R frontal large bore EVD placement 2/12.     PLAN:  NEURO:  - neuro checks (coma checks)/vital signs q1hr  - CTH; L thalamic IPH with extensive IVH and mild L to R MLS (5mm). CTA; no vessel occlusion or aneurysm noted  - CTH 2/15 for anisocoria, stable, trapped L ventricle, Dr. D'amico aware   - R frontal large bore EVD open at 5cmH2O, monitor ICP and output  - off precedex 2/15  - s/p Keppra 1g at [place] 2/12, no need for additional Keppra at this time    PULM:   - extubated 2/14, now on NC    CARDIO:  - SBP goal 100-140  - wean off cardene  - amlodipine 10mg QD    - losartan 100mg QD (home med) started 2/15   - dc'd lipitor s/p ICH, elevated total cholestrol and LDL   - echo 2/14: EF 65-70%     GI:  - Diet: TF held s/p nausea and vomiting, pending S/S   - bowel regimen   - Protonix dc'd (s/p extubation)     RENAL:  - Na goal 135-145  - NS@75 while NPO  - failed TOV, bladder scan q6hr, straight cath prn     HEME:  - SCDs/SQL for DVT ppx  - pend antixa level 2/17 @2AM  - Screening dopplers negative     ENDO:  - ISS  - prediabetic, a1c 5.8  - TSH wnl   - atorvastatin dc'd     ID:  - s/p Ancef while placing EVD  - lactate 4.6 on admission, trended down to 1.9 after fluid bolus    - afebrile    Dispo: ICU status, family updated with plan, pending PT/OT    Assessment and plan discussed with Dr. D'Amico and Dr. Smith

## 2022-02-16 NOTE — PATIENT PROFILE ADULT - FALL HARM RISK - HARM RISK INTERVENTIONS

## 2022-02-16 NOTE — SWALLOW BEDSIDE ASSESSMENT ADULT - NS SPL SWALLOW CLINIC TRIAL FT
24 mo WELL CHILD EXAM     Shannon  is a 1  y.o. 0  m.o. female child     History given by Parents     CONCERNS/QUESTIONS:   On Monday had nose bleeds back to back for 3 days.  Very constipated. Really only likes meat and rice. Trying to give prune juice,  Milk and water. Noticed some blood with stooling.    IMMUNIZATION: up to date and documented     NUTRITION HISTORY:   Vegetables? Limited  Fruits? Limited  Meats? Yes  Juice?  Yes  Water? Yes  Milk? Yes  Type:  Whole, 16oz/day    MULTIVITAMIN: Yes    ELIMINATION:   Has adequate wet diapers per day.   BM is soft? No    SLEEP PATTERN:   Sleeps through the night? Still waking at least once in the night generally between 4-5.  Sleeps in bed? Yes  Sleeps with parent? No      SOCIAL HISTORY:   The patient lives at home with parents, and does not attend day care. Has 0  siblings.  Smokers at home? No  Pets at home? No    DENTAL HISTORY  Family history of dental problems? No  Brushing teeth twice daily? Yes  Established dental home? No      Patient's medications, allergies, past medical, surgical, social and family histories were reviewed and updated as appropriate.    Past Medical History:   Diagnosis Date   • Healthy pediatric patient      Patient Active Problem List    Diagnosis Date Noted   • Healthy pediatric patient      No past surgical history on file.  Pediatric History   Patient Guardian Status   • Mother:  Aspen Santillan     Other Topics Concern   • Second-Hand Smoke Exposure No     Social History Narrative   • No narrative on file     Family History   Problem Relation Age of Onset   • Hypertension Mother      Gestation   • Hypertension Maternal Grandmother    • Diabetes Maternal Grandmother    • Cancer Maternal Grandfather      Lung   • Diabetes Maternal Grandfather    • Hypertension Maternal Grandfather      No current outpatient prescriptions on file.     No current facility-administered medications for this visit.      No Known Allergies    REVIEW OF  "SYSTEMS:  Constipation. No complaints of HEENT, chest, GI/, skin, neuro, or musculoskeletal problems.     DEVELOPMENT:  Reviewed Growth Chart in EMR.   Walks up steps? Yes  Scribbles? Yes  Throws ball overhand? Yes  Number of words? Full alphabet and counts to 20, 50+ wordsTwo word phrases? Yes  Kicks ball? Yes  Removes clothes? Yes  Knows one body part? Yes  Uses spoon well? Yes  Simple tasks around the house? Yes    ANTICIPATORY GUIDANCE (discussed the following):   Nutrition-May change to 1% or 2% milk.  Limit to 24 oz/day. Limit juice to 6 oz/ day.  Bedtime routine  Car seat safety  Routine safety measures  Routine toddler care  Signs of illness/when to call doctor   Tobacco free home/car  Toilet Training  Discipline-Time out       PHYSICAL EXAM:   Reviewed vital signs and growth parameters in EMR.     Pulse 132   Temp 36.8 °C (98.2 °F)   Resp 40   Ht 0.864 m (2' 10\")   Wt 11.2 kg (24 lb 11.9 oz)   HC 48.3 cm (19.02\")   BMI 15.05 kg/m²     Height - 60 %ile (Z= 0.25) based on CDC 2-20 Years stature-for-age data using vitals from 10/11/2017.  Weight - 22 %ile (Z= -0.76) based on CDC 2-20 Years weight-for-age data using vitals from 10/11/2017.  BMI - 15 %ile (Z= -1.04) based on CDC 2-20 Years BMI-for-age data using vitals from 10/11/2017.    General: This is an alert, active child in no distress.   HEAD: Normocephalic, atraumatic.   EYES: PERRL, positive red reflex bilaterally. No conjunctival injection or discharge.   EARS: TM’s are transparent with good landmarks. Canals are patent.  NOSE: Nares are patent and free of congestion.  THROAT: Oropharynx has no lesions, moist mucus membranes. Pharynx without erythema, tonsils normal.   NECK: Supple, no lymphadenopathy or masses.   HEART: Regular rate and rhythm without murmur. Pulses are 2+ and equal.   LUNGS: Clear bilaterally to auscultation, no wheezes or rhonchi. No retractions, nasal flaring, or distress noted.  ABDOMEN: Normal bowel sounds, soft and " non-tender without hepatomegaly or splenomegaly or masses.   GENITALIA: Normal female genitalia. Normal external genitalia, no erythema, no discharge  MUSCULOSKELETAL: Spine is straight. Extremities are without abnormalities. Moves all extremities well and symmetrically with normal tone.    NEURO: Active, alert, oriented per age.    SKIN: Intact without significant rash or birthmarks. Skin is warm, dry, and pink.     ASSESSMENT:     1. Well Child Exam:  Healthy 1  y.o. 0  m.o. with good growth and development.   2. No epistaxis noted on exam today.  3. Constipation - Encourage regular fruits and vegetables. Increase water intake. Increase fiber - may want to add fiber gummy daily. Toilet time 5 min twice daily after meals. Discussed daily Miralax to titrate to effect.     PLAN:    1. Anticipatory guidance was reviewed as above and Bright Futures handout provided.  2. Return to clinic for 2 year well child exam or as needed.  3. Immunizations given today: DtaP and Hep A  4. Vaccine Information statements given for each vaccine if administered.Discussed benefits and side effects of each vaccine with patient and family. Answered all patient /family questions.  5. Multivitamin with 400iu of Vitamin D po qd.  6. See Dentist yearly.       Limited assessment of oral phase as solids were deferred d/t observed deficits. However, pt with adequate bolus containment and processing with trialed consistencies. Verbal prompts occasionally provided to elicit swallow reflex. With purees x3/3, pt with delayed hawking and expectoration of puree. Observations are consistent with c/o sticking sensation with puree (?UES dysfunction).

## 2022-02-17 LAB
ANION GAP SERPL CALC-SCNC: 10 MMOL/L — SIGNIFICANT CHANGE UP (ref 5–17)
ANION GAP SERPL CALC-SCNC: 11 MMOL/L — SIGNIFICANT CHANGE UP (ref 5–17)
ANION GAP SERPL CALC-SCNC: 13 MMOL/L — SIGNIFICANT CHANGE UP (ref 5–17)
APPEARANCE CSF: SIGNIFICANT CHANGE UP
APPEARANCE SPUN FLD: ABNORMAL
APPEARANCE UR: CLEAR — SIGNIFICANT CHANGE UP
BACTERIA # UR AUTO: PRESENT /HPF
BILIRUB UR-MCNC: NEGATIVE — SIGNIFICANT CHANGE UP
BUN SERPL-MCNC: 8 MG/DL — SIGNIFICANT CHANGE UP (ref 7–23)
BUN SERPL-MCNC: 8 MG/DL — SIGNIFICANT CHANGE UP (ref 7–23)
BUN SERPL-MCNC: 9 MG/DL — SIGNIFICANT CHANGE UP (ref 7–23)
CALCIUM SERPL-MCNC: 8.8 MG/DL — SIGNIFICANT CHANGE UP (ref 8.4–10.5)
CALCIUM SERPL-MCNC: 8.9 MG/DL — SIGNIFICANT CHANGE UP (ref 8.4–10.5)
CALCIUM SERPL-MCNC: 9.4 MG/DL — SIGNIFICANT CHANGE UP (ref 8.4–10.5)
CHLORIDE SERPL-SCNC: 110 MMOL/L — HIGH (ref 96–108)
CHLORIDE SERPL-SCNC: 110 MMOL/L — HIGH (ref 96–108)
CHLORIDE SERPL-SCNC: 117 MMOL/L — HIGH (ref 96–108)
CO2 SERPL-SCNC: 18 MMOL/L — LOW (ref 22–31)
CO2 SERPL-SCNC: 19 MMOL/L — LOW (ref 22–31)
CO2 SERPL-SCNC: 20 MMOL/L — LOW (ref 22–31)
COLOR CSF: ABNORMAL
COLOR SPEC: YELLOW — SIGNIFICANT CHANGE UP
CREAT SERPL-MCNC: 0.35 MG/DL — LOW (ref 0.5–1.3)
CREAT SERPL-MCNC: 0.37 MG/DL — LOW (ref 0.5–1.3)
CREAT SERPL-MCNC: 0.39 MG/DL — LOW (ref 0.5–1.3)
CSF COMMENTS: SIGNIFICANT CHANGE UP
DIFF PNL FLD: NEGATIVE — SIGNIFICANT CHANGE UP
EPI CELLS # UR: SIGNIFICANT CHANGE UP /HPF (ref 0–5)
GLUCOSE CSF-MCNC: 106 MG/DL — HIGH (ref 40–70)
GLUCOSE SERPL-MCNC: 159 MG/DL — HIGH (ref 70–99)
GLUCOSE SERPL-MCNC: 160 MG/DL — HIGH (ref 70–99)
GLUCOSE SERPL-MCNC: 183 MG/DL — HIGH (ref 70–99)
GLUCOSE UR QL: 250
HCT VFR BLD CALC: 38.8 % — SIGNIFICANT CHANGE UP (ref 34.5–45)
HGB BLD-MCNC: 12.8 G/DL — SIGNIFICANT CHANGE UP (ref 11.5–15.5)
KETONES UR-MCNC: NEGATIVE — SIGNIFICANT CHANGE UP
LEUKOCYTE ESTERASE UR-ACNC: ABNORMAL
LMWH PPP CHRO-ACNC: 0.09 IU/ML — LOW (ref 0.5–1.1)
MAGNESIUM SERPL-MCNC: 2.1 MG/DL — SIGNIFICANT CHANGE UP (ref 1.6–2.6)
MCHC RBC-ENTMCNC: 30.3 PG — SIGNIFICANT CHANGE UP (ref 27–34)
MCHC RBC-ENTMCNC: 33 GM/DL — SIGNIFICANT CHANGE UP (ref 32–36)
MCV RBC AUTO: 91.7 FL — SIGNIFICANT CHANGE UP (ref 80–100)
MONOS+MACROS NFR CSF: 2 % — LOW (ref 15–45)
NEUTROPHILS # CSF: 1 % — SIGNIFICANT CHANGE UP (ref 0–6)
NITRITE UR-MCNC: NEGATIVE — SIGNIFICANT CHANGE UP
NRBC # BLD: 0 /100 WBCS — SIGNIFICANT CHANGE UP (ref 0–0)
NRBC NFR CSF: 3 /UL — SIGNIFICANT CHANGE UP (ref 0–5)
PH UR: 6.5 — SIGNIFICANT CHANGE UP (ref 5–8)
PHOSPHATE SERPL-MCNC: 2 MG/DL — LOW (ref 2.5–4.5)
PLATELET # BLD AUTO: 260 K/UL — SIGNIFICANT CHANGE UP (ref 150–400)
POTASSIUM SERPL-MCNC: 3.6 MMOL/L — SIGNIFICANT CHANGE UP (ref 3.5–5.3)
POTASSIUM SERPL-MCNC: 3.7 MMOL/L — SIGNIFICANT CHANGE UP (ref 3.5–5.3)
POTASSIUM SERPL-MCNC: 4 MMOL/L — SIGNIFICANT CHANGE UP (ref 3.5–5.3)
POTASSIUM SERPL-SCNC: 3.6 MMOL/L — SIGNIFICANT CHANGE UP (ref 3.5–5.3)
POTASSIUM SERPL-SCNC: 3.7 MMOL/L — SIGNIFICANT CHANGE UP (ref 3.5–5.3)
POTASSIUM SERPL-SCNC: 4 MMOL/L — SIGNIFICANT CHANGE UP (ref 3.5–5.3)
PROCALCITONIN SERPL-MCNC: 0.06 NG/ML — SIGNIFICANT CHANGE UP (ref 0.02–0.1)
PROT CSF-MCNC: 38 MG/DL — SIGNIFICANT CHANGE UP (ref 15–45)
PROT UR-MCNC: NEGATIVE MG/DL — SIGNIFICANT CHANGE UP
RBC # BLD: 4.23 M/UL — SIGNIFICANT CHANGE UP (ref 3.8–5.2)
RBC # CSF: 1400 /UL — HIGH (ref 0–0)
RBC # FLD: 11.6 % — SIGNIFICANT CHANGE UP (ref 10.3–14.5)
RBC CASTS # UR COMP ASSIST: < 5 /HPF — SIGNIFICANT CHANGE UP
SODIUM SERPL-SCNC: 141 MMOL/L — SIGNIFICANT CHANGE UP (ref 135–145)
SODIUM SERPL-SCNC: 141 MMOL/L — SIGNIFICANT CHANGE UP (ref 135–145)
SODIUM SERPL-SCNC: 146 MMOL/L — HIGH (ref 135–145)
SP GR SPEC: 1.02 — SIGNIFICANT CHANGE UP (ref 1–1.03)
TUBE TYPE: SIGNIFICANT CHANGE UP
UROBILINOGEN FLD QL: 1 E.U./DL — SIGNIFICANT CHANGE UP
WBC # BLD: 8.57 K/UL — SIGNIFICANT CHANGE UP (ref 3.8–10.5)
WBC # FLD AUTO: 8.57 K/UL — SIGNIFICANT CHANGE UP (ref 3.8–10.5)
WBC UR QL: ABNORMAL /HPF

## 2022-02-17 PROCEDURE — 87070 CULTURE OTHR SPECIMN AEROBIC: CPT

## 2022-02-17 PROCEDURE — 99291 CRITICAL CARE FIRST HOUR: CPT

## 2022-02-17 RX ORDER — POTASSIUM PHOSPHATE, MONOBASIC POTASSIUM PHOSPHATE, DIBASIC 236; 224 MG/ML; MG/ML
30 INJECTION, SOLUTION INTRAVENOUS ONCE
Refills: 0 | Status: DISCONTINUED | OUTPATIENT
Start: 2022-02-17 | End: 2022-02-17

## 2022-02-17 RX ORDER — SODIUM CHLORIDE 9 MG/ML
500 INJECTION INTRAMUSCULAR; INTRAVENOUS; SUBCUTANEOUS ONCE
Refills: 0 | Status: COMPLETED | OUTPATIENT
Start: 2022-02-17 | End: 2022-02-17

## 2022-02-17 RX ORDER — POTASSIUM CHLORIDE 20 MEQ
20 PACKET (EA) ORAL ONCE
Refills: 0 | Status: COMPLETED | OUTPATIENT
Start: 2022-02-17 | End: 2022-02-17

## 2022-02-17 RX ORDER — NYSTATIN 500MM UNIT
500000 POWDER (EA) MISCELLANEOUS THREE TIMES A DAY
Refills: 0 | Status: COMPLETED | OUTPATIENT
Start: 2022-02-17 | End: 2022-02-22

## 2022-02-17 RX ORDER — POTASSIUM PHOSPHATE, MONOBASIC POTASSIUM PHOSPHATE, DIBASIC 236; 224 MG/ML; MG/ML
30 INJECTION, SOLUTION INTRAVENOUS ONCE
Refills: 0 | Status: COMPLETED | OUTPATIENT
Start: 2022-02-17 | End: 2022-02-17

## 2022-02-17 RX ORDER — SODIUM CHLORIDE 9 MG/ML
2 INJECTION INTRAMUSCULAR; INTRAVENOUS; SUBCUTANEOUS EVERY 6 HOURS
Refills: 0 | Status: DISCONTINUED | OUTPATIENT
Start: 2022-02-17 | End: 2022-02-17

## 2022-02-17 RX ORDER — ACETAMINOPHEN 500 MG
1000 TABLET ORAL ONCE
Refills: 0 | Status: COMPLETED | OUTPATIENT
Start: 2022-02-17 | End: 2022-02-17

## 2022-02-17 RX ORDER — POTASSIUM CHLORIDE 20 MEQ
20 PACKET (EA) ORAL ONCE
Refills: 0 | Status: DISCONTINUED | OUTPATIENT
Start: 2022-02-17 | End: 2022-02-17

## 2022-02-17 RX ORDER — HYDRALAZINE HCL 50 MG
25 TABLET ORAL ONCE
Refills: 0 | Status: COMPLETED | OUTPATIENT
Start: 2022-02-17 | End: 2022-02-17

## 2022-02-17 RX ORDER — IPRATROPIUM/ALBUTEROL SULFATE 18-103MCG
3 AEROSOL WITH ADAPTER (GRAM) INHALATION EVERY 6 HOURS
Refills: 0 | Status: DISCONTINUED | OUTPATIENT
Start: 2022-02-17 | End: 2022-03-11

## 2022-02-17 RX ORDER — SODIUM CHLORIDE 9 MG/ML
3 INJECTION INTRAMUSCULAR; INTRAVENOUS; SUBCUTANEOUS EVERY 6 HOURS
Refills: 0 | Status: DISCONTINUED | OUTPATIENT
Start: 2022-02-17 | End: 2022-02-18

## 2022-02-17 RX ORDER — SODIUM CHLORIDE 5 G/100ML
500 INJECTION, SOLUTION INTRAVENOUS
Refills: 0 | Status: DISCONTINUED | OUTPATIENT
Start: 2022-02-17 | End: 2022-02-17

## 2022-02-17 RX ORDER — HYDRALAZINE HCL 50 MG
50 TABLET ORAL EVERY 8 HOURS
Refills: 0 | Status: DISCONTINUED | OUTPATIENT
Start: 2022-02-17 | End: 2022-02-19

## 2022-02-17 RX ORDER — SODIUM CHLORIDE 5 G/100ML
500 INJECTION, SOLUTION INTRAVENOUS
Refills: 0 | Status: DISCONTINUED | OUTPATIENT
Start: 2022-02-17 | End: 2022-02-18

## 2022-02-17 RX ORDER — HYDRALAZINE HCL 50 MG
25 TABLET ORAL EVERY 8 HOURS
Refills: 0 | Status: DISCONTINUED | OUTPATIENT
Start: 2022-02-17 | End: 2022-02-17

## 2022-02-17 RX ORDER — OXYCODONE HYDROCHLORIDE 5 MG/1
5 TABLET ORAL EVERY 6 HOURS
Refills: 0 | Status: DISCONTINUED | OUTPATIENT
Start: 2022-02-17 | End: 2022-02-18

## 2022-02-17 RX ADMIN — POLYETHYLENE GLYCOL 3350 17 GRAM(S): 17 POWDER, FOR SOLUTION ORAL at 17:12

## 2022-02-17 RX ADMIN — NICARDIPINE HYDROCHLORIDE 25 MG/HR: 30 CAPSULE, EXTENDED RELEASE ORAL at 12:13

## 2022-02-17 RX ADMIN — OXYCODONE HYDROCHLORIDE 5 MILLIGRAM(S): 5 TABLET ORAL at 09:23

## 2022-02-17 RX ADMIN — Medication 1000 MILLIGRAM(S): at 09:24

## 2022-02-17 RX ADMIN — NICARDIPINE HYDROCHLORIDE 25 MG/HR: 30 CAPSULE, EXTENDED RELEASE ORAL at 17:51

## 2022-02-17 RX ADMIN — SODIUM CHLORIDE 2 GRAM(S): 9 INJECTION INTRAMUSCULAR; INTRAVENOUS; SUBCUTANEOUS at 12:42

## 2022-02-17 RX ADMIN — OXYCODONE HYDROCHLORIDE 5 MILLIGRAM(S): 5 TABLET ORAL at 20:02

## 2022-02-17 RX ADMIN — OXYCODONE HYDROCHLORIDE 5 MILLIGRAM(S): 5 TABLET ORAL at 17:50

## 2022-02-17 RX ADMIN — Medication 3 MILLILITER(S): at 00:08

## 2022-02-17 RX ADMIN — Medication 3 MILLILITER(S): at 04:31

## 2022-02-17 RX ADMIN — Medication 20 MILLIEQUIVALENT(S): at 07:43

## 2022-02-17 RX ADMIN — Medication 1000 MILLIGRAM(S): at 10:21

## 2022-02-17 RX ADMIN — Medication 25 MILLIGRAM(S): at 17:12

## 2022-02-17 RX ADMIN — NICARDIPINE HYDROCHLORIDE 25 MG/HR: 30 CAPSULE, EXTENDED RELEASE ORAL at 06:28

## 2022-02-17 RX ADMIN — SODIUM CHLORIDE 1000 MILLILITER(S): 9 INJECTION INTRAMUSCULAR; INTRAVENOUS; SUBCUTANEOUS at 10:03

## 2022-02-17 RX ADMIN — AMLODIPINE BESYLATE 10 MILLIGRAM(S): 2.5 TABLET ORAL at 05:59

## 2022-02-17 RX ADMIN — Medication 650 MILLIGRAM(S): at 20:02

## 2022-02-17 RX ADMIN — LOSARTAN POTASSIUM 100 MILLIGRAM(S): 100 TABLET, FILM COATED ORAL at 05:59

## 2022-02-17 RX ADMIN — SODIUM CHLORIDE 1000 MILLILITER(S): 9 INJECTION INTRAMUSCULAR; INTRAVENOUS; SUBCUTANEOUS at 17:51

## 2022-02-17 RX ADMIN — Medication 500000 UNIT(S): at 10:02

## 2022-02-17 RX ADMIN — Medication 25 MILLIGRAM(S): at 17:50

## 2022-02-17 RX ADMIN — POTASSIUM PHOSPHATE, MONOBASIC POTASSIUM PHOSPHATE, DIBASIC 83.33 MILLIMOLE(S): 236; 224 INJECTION, SOLUTION INTRAVENOUS at 05:15

## 2022-02-17 RX ADMIN — SODIUM CHLORIDE 70 MILLILITER(S): 5 INJECTION, SOLUTION INTRAVENOUS at 04:00

## 2022-02-17 RX ADMIN — Medication 650 MILLIGRAM(S): at 01:00

## 2022-02-17 RX ADMIN — SODIUM CHLORIDE 75 MILLILITER(S): 5 INJECTION, SOLUTION INTRAVENOUS at 21:10

## 2022-02-17 RX ADMIN — Medication 25 MILLIGRAM(S): at 10:05

## 2022-02-17 RX ADMIN — SODIUM CHLORIDE 3 GRAM(S): 9 INJECTION INTRAMUSCULAR; INTRAVENOUS; SUBCUTANEOUS at 23:17

## 2022-02-17 RX ADMIN — Medication 650 MILLIGRAM(S): at 17:50

## 2022-02-17 RX ADMIN — Medication 500000 UNIT(S): at 17:12

## 2022-02-17 RX ADMIN — OXYCODONE HYDROCHLORIDE 5 MILLIGRAM(S): 5 TABLET ORAL at 10:22

## 2022-02-17 RX ADMIN — SODIUM CHLORIDE 50 MILLILITER(S): 5 INJECTION, SOLUTION INTRAVENOUS at 15:29

## 2022-02-17 RX ADMIN — SODIUM CHLORIDE 2 GRAM(S): 9 INJECTION INTRAMUSCULAR; INTRAVENOUS; SUBCUTANEOUS at 17:12

## 2022-02-17 NOTE — SWALLOW FEES ASSESSMENT ADULT - DIAGNOSTIC IMPRESSIONS
Pt presents with mild oropharyngeal dysphagia characterized by piecemeal deglutition, reduced bolus control and varying swallow timeliness. Transient laryngeal penetration occurred with larger liquid volumes. No episodes of aspiration were appreciated. However, pt with impaired sensory input triggering hyperactive gag vs psychogenic component. Suspect dysphagia to be acute 2/2 neurological event, endotracheal intubation and drowsiness. Pt appears to be at risk for dysphagia related aspiration PNA given reduced mobility and dependency on others for feeding assistance/oral hygiene. I suspect pt will be able to transition to a less restrictive solid diet as arousal continues to improve.

## 2022-02-17 NOTE — PROGRESS NOTE ADULT - SUBJECTIVE AND OBJECTIVE BOX
EVENTS:   No acute events overnight.    VITALS:  T(C): , Max: 38.7 (02-17-22 @ 17:38)  HR:  (100 - 123)  BP:  (148/72 - 152/76)  ABP:  (118/47 - 169/63)  RR:  (21 - 34)  SpO2:  (93% - 98%)  Wt(kg): --      02-16-22 @ 07:01  -  02-17-22 @ 07:00  --------------------------------------------------------  IN: 4495 mL / OUT: 3278 mL / NET: 1217 mL    02-17-22 @ 07:01  -  02-17-22 @ 20:43  --------------------------------------------------------  IN: 2837.5 mL / OUT: 1819 mL / NET: 1018.5 mL      LABS:  Na: 141 (02-17 @ 18:01), 146 (02-17 @ 09:49), 141 (02-17 @ 02:16), 142 (02-16 @ 22:32), 139 (02-16 @ 15:49), 140 (02-16 @ 05:22), 147 (02-15 @ 05:27)  K: 3.6 (02-17 @ 18:01), 4.0 (02-17 @ 09:49), 3.7 (02-17 @ 02:16), 3.9 (02-16 @ 22:32), 3.9 (02-16 @ 15:49), 3.2 (02-16 @ 05:22), 3.6 (02-15 @ 05:27)  Cl: 110 (02-17 @ 18:01), 117 (02-17 @ 09:49), 110 (02-17 @ 02:16), 111 (02-16 @ 22:32), 110 (02-16 @ 15:49), 110 (02-16 @ 05:22), 114 (02-15 @ 05:27)  CO2: 18 (02-17 @ 18:01), 19 (02-17 @ 09:49), 20 (02-17 @ 02:16), 18 (02-16 @ 22:32), 20 (02-16 @ 15:49), 18 (02-16 @ 05:22), 21 (02-15 @ 05:27)  BUN: 8 (02-17 @ 18:01), 9 (02-17 @ 09:49), 8 (02-17 @ 02:16), 8 (02-16 @ 22:32), 9 (02-16 @ 15:49), 7 (02-16 @ 05:22), 9 (02-15 @ 05:27)  Cr: 0.35 (02-17 @ 18:01), 0.39 (02-17 @ 09:49), 0.37 (02-17 @ 02:16), 0.38 (02-16 @ 22:32), 0.40 (02-16 @ 15:49), 0.38 (02-16 @ 05:22), 0.37 (02-15 @ 05:27)  Glu: 160(02-17 @ 18:01), 183(02-17 @ 09:49), 159(02-17 @ 02:16), 141(02-16 @ 22:32), 149(02-16 @ 15:49), 159(02-16 @ 05:22), 133(02-15 @ 05:27)    Hgb: 12.8 (02-17 @ 02:16), 12.0 (02-16 @ 05:22), 11.9 (02-15 @ 05:27)  Hct: 38.8 (02-17 @ 02:16), 37.1 (02-16 @ 05:22), 35.7 (02-15 @ 05:27)  WBC: 8.57 (02-17 @ 02:16), 8.82 (02-16 @ 05:22), 8.82 (02-15 @ 05:27)  Plt: 260 (02-17 @ 02:16), 258 (02-16 @ 05:22), 222 (02-15 @ 05:27)    MEDICATIONS:  acetaminophen    Suspension .. 650 milliGRAM(s) Oral every 6 hours PRN  albuterol/ipratropium for Nebulization 3 milliLiter(s) Nebulizer every 6 hours PRN  amLODIPine   Tablet 10 milliGRAM(s) Oral daily  enoxaparin Injectable 40 milliGRAM(s) SubCutaneous every 24 hours  hydrALAZINE 50 milliGRAM(s) Oral every 8 hours  hydrALAZINE Injectable 10 milliGRAM(s) IV Push every 4 hours PRN  influenza   Vaccine 0.5 milliLiter(s) IntraMuscular once  losartan 100 milliGRAM(s) Oral daily  metoclopramide Injectable 10 milliGRAM(s) IV Push every 8 hours PRN  niCARdipine Infusion 5 mG/Hr IV Continuous <Continuous>  nystatin    Suspension 033860 Unit(s) Oral three times a day  oxyCODONE    IR 5 milliGRAM(s) Oral every 6 hours PRN  polyethylene glycol 3350 17 Gram(s) Oral every 12 hours  senna 2 Tablet(s) Oral at bedtime  sodium chloride 2 Gram(s) Oral every 6 hours  sodium chloride 3%. 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 ICH score 2. CTA neg for vascular malformation. S/p R frontal EVD. Intubated, now extubated 2/14. Last HCT 2/15 with improved hydrocephalus but trapped R lateral ventricle and 4mm MLS. Exam significantly improved but with dilated R pupil for which we are starting hypertonics. Etiology of ICH is likely 2/2 HTN.   EVENTS:   Patient seen on evening rounds, no acute events.  Febrile to 38.7, cultured yesterday. CSF sent today. Tachycardic. Now RA.   Cardene at 5.   EVD at 5mm H2O, ICP   Last BM this AM.     VITALS:  T(C): , Max: 38.7 (02-17-22 @ 17:38)  HR:  (100 - 123)  BP:  (148/72 - 152/76)  ABP:  (118/47 - 169/63)  RR:  (21 - 34)  SpO2:  (93% - 98%)  Wt(kg): --      02-16-22 @ 07:01  -  02-17-22 @ 07:00  --------------------------------------------------------  IN: 4495 mL / OUT: 3278 mL / NET: 1217 mL    02-17-22 @ 07:01  -  02-17-22 @ 20:43  --------------------------------------------------------  IN: 2837.5 mL / OUT: 1819 mL / NET: 1018.5 mL      LABS:  Na: 141 (02-17 @ 18:01), 146 (02-17 @ 09:49), 141 (02-17 @ 02:16), 142 (02-16 @ 22:32), 139 (02-16 @ 15:49), 140 (02-16 @ 05:22), 147 (02-15 @ 05:27)  K: 3.6 (02-17 @ 18:01), 4.0 (02-17 @ 09:49), 3.7 (02-17 @ 02:16), 3.9 (02-16 @ 22:32), 3.9 (02-16 @ 15:49), 3.2 (02-16 @ 05:22), 3.6 (02-15 @ 05:27)  Cl: 110 (02-17 @ 18:01), 117 (02-17 @ 09:49), 110 (02-17 @ 02:16), 111 (02-16 @ 22:32), 110 (02-16 @ 15:49), 110 (02-16 @ 05:22), 114 (02-15 @ 05:27)  CO2: 18 (02-17 @ 18:01), 19 (02-17 @ 09:49), 20 (02-17 @ 02:16), 18 (02-16 @ 22:32), 20 (02-16 @ 15:49), 18 (02-16 @ 05:22), 21 (02-15 @ 05:27)  BUN: 8 (02-17 @ 18:01), 9 (02-17 @ 09:49), 8 (02-17 @ 02:16), 8 (02-16 @ 22:32), 9 (02-16 @ 15:49), 7 (02-16 @ 05:22), 9 (02-15 @ 05:27)  Cr: 0.35 (02-17 @ 18:01), 0.39 (02-17 @ 09:49), 0.37 (02-17 @ 02:16), 0.38 (02-16 @ 22:32), 0.40 (02-16 @ 15:49), 0.38 (02-16 @ 05:22), 0.37 (02-15 @ 05:27)  Glu: 160(02-17 @ 18:01), 183(02-17 @ 09:49), 159(02-17 @ 02:16), 141(02-16 @ 22:32), 149(02-16 @ 15:49), 159(02-16 @ 05:22), 133(02-15 @ 05:27)    Hgb: 12.8 (02-17 @ 02:16), 12.0 (02-16 @ 05:22), 11.9 (02-15 @ 05:27)  Hct: 38.8 (02-17 @ 02:16), 37.1 (02-16 @ 05:22), 35.7 (02-15 @ 05:27)  WBC: 8.57 (02-17 @ 02:16), 8.82 (02-16 @ 05:22), 8.82 (02-15 @ 05:27)  Plt: 260 (02-17 @ 02:16), 258 (02-16 @ 05:22), 222 (02-15 @ 05:27)    MEDICATIONS:  acetaminophen    Suspension .. 650 milliGRAM(s) Oral every 6 hours PRN  albuterol/ipratropium for Nebulization 3 milliLiter(s) Nebulizer every 6 hours PRN  amLODIPine   Tablet 10 milliGRAM(s) Oral daily  enoxaparin Injectable 40 milliGRAM(s) SubCutaneous every 24 hours  hydrALAZINE 50 milliGRAM(s) Oral every 8 hours  hydrALAZINE Injectable 10 milliGRAM(s) IV Push every 4 hours PRN  influenza   Vaccine 0.5 milliLiter(s) IntraMuscular once  losartan 100 milliGRAM(s) Oral daily  metoclopramide Injectable 10 milliGRAM(s) IV Push every 8 hours PRN  niCARdipine Infusion 5 mG/Hr IV Continuous <Continuous>  nystatin    Suspension 470857 Unit(s) Oral three times a day  oxyCODONE    IR 5 milliGRAM(s) Oral every 6 hours PRN  polyethylene glycol 3350 17 Gram(s) Oral every 12 hours  senna 2 Tablet(s) Oral at bedtime  sodium chloride 2 Gram(s) Oral every 6 hours  sodium chloride 3%. 500 milliLiter(s) IV Continuous <Continuous>    EXAMINATION:  General: appear comfortable   HEENT:  MMM  Neuro:  opens eyes to voice, oriented to hospital and month but not the year, follows commands, R pupil 4mm, L pupil 2 mm both briskly reactive, EOMI, mild R facial, R triceps 4/5 with encouragement but R arm not AG, R leg antigravity, L arm 5/5, L leg 5/5, sensation intact in all 4 extremities   Cards:  RRR  Respiratory:  no respiratory distress  Abdomen:  soft  Extremities:  no LE edema    Assessment/Plan:   57 yo woman with HTN and pre-DM, admitted 2/12 with headache, found to have a 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 ICH score 2. CTA neg for vascular malformation. S/p R frontal EVD. Intubated, now extubated 2/14. Last HCT 2/15 with improved hydrocephalus but trapped R lateral ventricle and 4mm MLS. Exam significantly improved but with dilated R pupil for which we are starting hypertonics. Etiology of ICH is likely 2/2 HTN.    Fevers are likely central, infectious w/u including UA, CSF and procal neg.     No change to plan from daytime except   increased naCl tabs to 3g q6h and increased rate of 3% HTS to 75cc/hr for Na goal 145-155    CT head in the AM to look at trapped L ventricle  SBO goal 1001-160  Lov ppx EVENTS:   Patient seen on evening rounds, no acute events.  Febrile to 38.7, cultured yesterday. CSF sent today. All cultures neg. CT PE neg for PE, with b/l lower lobe consolidations concerning for atelectasis. Tachycardia improved. Now RA.   Cardene at 5.   EVD at 5mm H2O, ICP 1-11, output for shift 109.   Last BM this AM.     VITALS:  T(C): , Max: 38.7 (02-17-22 @ 17:38)  HR:  (100 - 123)  BP:  (148/72 - 152/76)  ABP:  (118/47 - 169/63)  RR:  (21 - 34)  SpO2:  (93% - 98%)  Wt(kg): --      02-16-22 @ 07:01  -  02-17-22 @ 07:00  --------------------------------------------------------  IN: 4495 mL / OUT: 3278 mL / NET: 1217 mL    02-17-22 @ 07:01  -  02-17-22 @ 20:43  --------------------------------------------------------  IN: 2837.5 mL / OUT: 1819 mL / NET: 1018.5 mL      LABS:  Na: 141 (02-17 @ 18:01), 146 (02-17 @ 09:49), 141 (02-17 @ 02:16), 142 (02-16 @ 22:32), 139 (02-16 @ 15:49), 140 (02-16 @ 05:22), 147 (02-15 @ 05:27)  K: 3.6 (02-17 @ 18:01), 4.0 (02-17 @ 09:49), 3.7 (02-17 @ 02:16), 3.9 (02-16 @ 22:32), 3.9 (02-16 @ 15:49), 3.2 (02-16 @ 05:22), 3.6 (02-15 @ 05:27)  Cl: 110 (02-17 @ 18:01), 117 (02-17 @ 09:49), 110 (02-17 @ 02:16), 111 (02-16 @ 22:32), 110 (02-16 @ 15:49), 110 (02-16 @ 05:22), 114 (02-15 @ 05:27)  CO2: 18 (02-17 @ 18:01), 19 (02-17 @ 09:49), 20 (02-17 @ 02:16), 18 (02-16 @ 22:32), 20 (02-16 @ 15:49), 18 (02-16 @ 05:22), 21 (02-15 @ 05:27)  BUN: 8 (02-17 @ 18:01), 9 (02-17 @ 09:49), 8 (02-17 @ 02:16), 8 (02-16 @ 22:32), 9 (02-16 @ 15:49), 7 (02-16 @ 05:22), 9 (02-15 @ 05:27)  Cr: 0.35 (02-17 @ 18:01), 0.39 (02-17 @ 09:49), 0.37 (02-17 @ 02:16), 0.38 (02-16 @ 22:32), 0.40 (02-16 @ 15:49), 0.38 (02-16 @ 05:22), 0.37 (02-15 @ 05:27)  Glu: 160(02-17 @ 18:01), 183(02-17 @ 09:49), 159(02-17 @ 02:16), 141(02-16 @ 22:32), 149(02-16 @ 15:49), 159(02-16 @ 05:22), 133(02-15 @ 05:27)    Hgb: 12.8 (02-17 @ 02:16), 12.0 (02-16 @ 05:22), 11.9 (02-15 @ 05:27)  Hct: 38.8 (02-17 @ 02:16), 37.1 (02-16 @ 05:22), 35.7 (02-15 @ 05:27)  WBC: 8.57 (02-17 @ 02:16), 8.82 (02-16 @ 05:22), 8.82 (02-15 @ 05:27)  Plt: 260 (02-17 @ 02:16), 258 (02-16 @ 05:22), 222 (02-15 @ 05:27)    MEDICATIONS:  acetaminophen    Suspension .. 650 milliGRAM(s) Oral every 6 hours PRN  albuterol/ipratropium for Nebulization 3 milliLiter(s) Nebulizer every 6 hours PRN  amLODIPine   Tablet 10 milliGRAM(s) Oral daily  enoxaparin Injectable 40 milliGRAM(s) SubCutaneous every 24 hours  hydrALAZINE 50 milliGRAM(s) Oral every 8 hours  hydrALAZINE Injectable 10 milliGRAM(s) IV Push every 4 hours PRN  influenza   Vaccine 0.5 milliLiter(s) IntraMuscular once  losartan 100 milliGRAM(s) Oral daily  metoclopramide Injectable 10 milliGRAM(s) IV Push every 8 hours PRN  niCARdipine Infusion 5 mG/Hr IV Continuous <Continuous>  nystatin    Suspension 088478 Unit(s) Oral three times a day  oxyCODONE    IR 5 milliGRAM(s) Oral every 6 hours PRN  polyethylene glycol 3350 17 Gram(s) Oral every 12 hours  senna 2 Tablet(s) Oral at bedtime  sodium chloride 2 Gram(s) Oral every 6 hours  sodium chloride 3%. 500 milliLiter(s) IV Continuous <Continuous>    EXAMINATION:  General: appear comfortable   HEENT:  MMM  Neuro:  opens eyes to voice, oriented to hospital and month but not the year, follows commands, R pupil 4mm, L pupil 2 mm both briskly reactive, EOMI, mild R facial, R triceps 4/5 with encouragement but R arm not AG, R leg antigravity, L arm 5/5, L leg 5/5, sensation intact in all 4 extremities   Cards:  RRR  Respiratory:  no respiratory distress  Abdomen:  soft  Extremities:  no LE edema    Assessment/Plan:   55 yo woman with HTN and pre-DM, admitted 2/12 with headache, found to have a 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 ICH score 2. CTA neg for vascular malformation. S/p R frontal EVD. Intubated, now extubated 2/14. Last HCT 2/15 with improved hydrocephalus but trapped R lateral ventricle and 4mm MLS. Exam stable. Etiology of ICH is likely HTN.    Fevers are likely central, infectious w/u including UA, CSF and procal neg. CTPE neg.     No change to plan from daytime except   increased NaCl tabs to 3g q6h and increased rate of 3% HTS to 75cc/hr for Na goal 145-155    CT head in the AM to follow trapped L ventricle  SBP goal 100-160  Lov ppx    Tiffanie Smith  Neurocritical Care Attending

## 2022-02-17 NOTE — SWALLOW FEES ASSESSMENT ADULT - COMMENTS
Risks, benefits, and alternatives to this study were reviewed with pt who provided verbal agreement. Flexible endoscope passed transnasally to further assess swallow anatomy and physiology. Pt tolerated the passing of the scope but was highly sensate to its presence. Baseline diffuse pooling of secretions

## 2022-02-17 NOTE — PROGRESS NOTE ADULT - SUBJECTIVE AND OBJECTIVE BOX
HPI:  57 y/o female with PMHx of HTN, pre-DM presents transferred from [facility] for intracranial hemorrhage. As per [facility] ED provider and son, patient called son around 7:30-8:00AM c/o severe headache and nausea. Son immediately rushed to her house and found her out of bed, moaning and covered in her own vomit. During transit, EMS reported SBP within 240s with intractable vomiting and given Zofran 8mg. Upon arrival to [facility] ED, GCS 7, SBP within 180s, patient was given Decadron 10mg, Keppra 1g, started on a Cardene drip, Propofol, Mannitol 1mg/kg. 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 Patient was intubated for airway support and transferred to [facility] for further management. ICH2, NIHSS 8.  Denies use of anticoagulants/antiplatelets.  (12 Feb 2022 16:36)        S/Overnight events:   tachycardic, PE protocol. cardene gtt. neuro unchanged. EVD @ 5cmH2O.       Hospital Course:   2/12: transferred from [facility]. R frontal EVD and R radial a-line placed. pend CTH/CTA. s/p 1L bolus for elevated lactate.   2/13: S/p R frontal EVD placedment @[id number] draining well. O/n pt w/ episode of possible storming; tachy, HTN, agitated, temp 100.2F, given 2 versed and 50mcg fentanyl w/ improvement in symptoms Neuro exam stable. Pt remains intubated and sedated, AN L>R. Trend lactate and abg. Amlodipine 5mg QD started for SBP goal < 140, cardene dc'd.  EVD dropped 2/13 at 1pm. propanolol and fent standing added for neuro storming, propofol switched to precedex, with resultant hypotension. Propanolol dc'd, fent changed to prn and precedex off, 1 L bolus given, levo prn   2/14: TOSHA overnight. Patient remains on propofol. EVD open at [id number]. ICPs WNL. Neuro exam stable. increased bowel regimen. started SQL. extubated on precedex. given 0.5 Ativan + Fentanyl pushes PRN for agitation. 500cc NS bolus.   2/15: BD#4.  On HFNC d/t desat to 88% on 70% non-rebreather. On 0.5 precedex   2/16: BD5, TOSHA overnight, on 4L NC, s/p vomiting yesterday, TF being held, formal S/S pending, off precedex. EVD open @ 5. Started on 3% at 30, tmax 100.7   2/17: BD6   tachycardic, PE protocol. cardene gtt. neuro unchanged. EVD @ 5cmH2O.       Vital Signs Last 24 Hrs  T(C): 38 (16 Feb 2022 21:00), Max: 38.7 (16 Feb 2022 17:00)  T(F): 100.4 (16 Feb 2022 21:00), Max: 101.6 (16 Feb 2022 17:00)  HR: 108 (17 Feb 2022 00:11) (96 - 122)  BP: 152/76 (16 Feb 2022 22:00) (123/56 - 152/76)  BP(mean): 107 (16 Feb 2022 22:00) (79 - 107)  RR: 22 (17 Feb 2022 00:11) (20 - 34)  SpO2: 94% (17 Feb 2022 00:11) (93% - 98%)    I&O's Detail    15 Feb 2022 07:01  -  16 Feb 2022 07:00  --------------------------------------------------------  IN:    Dexmedetomidine: 13.3 mL    Enteral Tube Flush: 400 mL    Glucerna: 20 mL    IV PiggyBack: 599.8 mL    NiCARdipine: 867.5 mL    sodium chloride 0.9%: 1800 mL  Total IN: 3700.6 mL    OUT:    External Ventricular Device (mL): 186 mL    Intermittent Catheterization - Urethral (mL): 850 mL    Voided (mL): 2050 mL  Total OUT: 3086 mL    Total NET: 614.6 mL      16 Feb 2022 07:01  -  17 Feb 2022 00:43  --------------------------------------------------------  IN:    Enteral Tube Flush: 640 mL    Glucerna: 545 mL    Lactated Ringers Bolus: 1000 mL    NiCARdipine: 500 mL    NiCARdipine: 37.5 mL    sodium chloride 0.9%: 225 mL    sodium chloride 3%: 180 mL    sodium chloride 3%: 400 mL  Total IN: 3527.5 mL    OUT:    External Ventricular Device (mL): 143 mL    Intermittent Catheterization - Urethral (mL): 725 mL    Voided (mL): 1300 mL  Total OUT: 2168 mL    Total NET: 1359.5 mL        I&O's Summary    15 Feb 2022 07:01  -  16 Feb 2022 07:00  --------------------------------------------------------  IN: 3700.6 mL / OUT: 3086 mL / NET: 614.6 mL    16 Feb 2022 07:01  -  17 Feb 2022 00:43  --------------------------------------------------------  IN: 3527.5 mL / OUT: 2168 mL / NET: 1359.5 mL        PHYSICAL EXAM:  OE spont, conversive, A&O x 2, pupils anisocoric, R 4mm and Left 2 mm both briskly reactive, RUE w/d to pain, L side spont and 5/5, RLE 4/5, R facial droop   R calf ttp     DEVICE/DRAIN DRESSING:  EVD @ 5cmH2O (R frontal)     TUBES/LINES:  [] CVC  [x] A-line  [] Lumbar Drain  [] Ventriculostomy  [] Other    DIET:  [] NPO  [] Mechanical  [x] Tube feeds    LABS:                        12.0   8.82  )-----------( 258      ( 16 Feb 2022 05:22 )             37.1     02-16    142  |  111<H>  |  8   ----------------------------<  141<H>  3.9   |  18<L>  |  0.38<L>    Ca    8.6      16 Feb 2022 22:32  Phos  2.3     02-16  Mg     1.9     02-16    TPro  7.2  /  Alb  4.0  /  TBili  1.0  /  DBili  0.2  /  AST  29  /  ALT  25  /  AlkPhos  88  02-16            CAPILLARY BLOOD GLUCOSE          Drug Levels: [] N/A    CSF Analysis: [] N/A      Allergies    No Known Allergies    Intolerances      MEDICATIONS:  Antibiotics:    Neuro:  acetaminophen    Suspension .. 650 milliGRAM(s) Oral every 6 hours PRN  metoclopramide Injectable 10 milliGRAM(s) IV Push every 8 hours PRN    Anticoagulation:  enoxaparin Injectable 40 milliGRAM(s) SubCutaneous every 24 hours    OTHER:  albuterol/ipratropium for Nebulization 3 milliLiter(s) Nebulizer every 6 hours  amLODIPine   Tablet 10 milliGRAM(s) Oral daily  hydrALAZINE Injectable 10 milliGRAM(s) IV Push every 4 hours PRN  influenza   Vaccine 0.5 milliLiter(s) IntraMuscular once  losartan 100 milliGRAM(s) Oral daily  niCARdipine Infusion 5 mG/Hr IV Continuous <Continuous>  polyethylene glycol 3350 17 Gram(s) Oral every 12 hours  senna 2 Tablet(s) Oral at bedtime    IVF:  sodium chloride 3%. 500 milliLiter(s) IV Continuous <Continuous>    CULTURES:    RADIOLOGY & ADDITIONAL TESTS:      ASSESSMENT:  Pt. 57 y/o female with PMHx of HTN, pre-DM w left thalamic parenchymal hematoma with extensive intraventricular hemorrhage. ICH score 2. NIHSS 18. s/p R frontal large bore EVD placement 2/12. Pt is now hypertensive requiring Cardene, on 2 standing antihypertensive agent. Tachycardic upto 120s. s/p boluses and straight cath for retention without improvement, and pupilary anisocoria.     HEAD BLEED    Handoff    No pertinent past medical history    Hypertension    Pre-diabetes    ICH (intracerebral hemorrhage)    HTN (hypertension)    Pre-diabetes    SysAdmin_VstLnk        Plan:   ***  NEURO:  - CT from 2/15 shows that R vent is now collapsed, however L vent still contains casted hemorrhagic clot, MLS is still unchanged at 4mm, with perihematomal edema (L thalamic)  - L trapped Ventricle:  Dr. D'amico aware Continue hypertonics therapy for a Na+ above >145. hyperosmolar therapy as needed.  - R frontal large bore EVD open at 5cmH2O, ICPs single digits, trend ICPs and output for now   - RUE weakness and pupilary anisocoria: expected likely from perihematomal edema;   - possible paroxysmal sympathetic hyperacitivity? (tachy/htn..), continue monitoring neuro/vitals q 1   - CTA: no vessel occlusion or aneurysm noted  - off precedex 2/15, Keppra 1g given at [facility] 2/12, no keppra for now.    Cardio:   - SBP goal relaxed to 100-160 on 2/16   - cont weaning cardene off  - cont. amlodipine 10mg QD, losartan 100mg QD (home med) (2/15-)    - dc'd lipitor s/p ICH, elevated total cholestrol and LDL   - echo 2/14:  significant for symmetric LVH with normal systolic function, EF: 65-70%   - recent doppler (2/14/22) neg for DVT. no arrhythmia noted on EKG. electrolytes repleted this morning.     Pulm:   -   PULM/ID:   - extubated 2/14, HFNC--> now on NC  - O2 requirement is coming down   - no respiratory distress  - ? possible aspiration pna- febrile, however no leukocytosis w/ normal procal,   - xray without obvious consolidation. sputum culture not sent (not vented, oral contamination risk)   - will hold off empiric abx tx     Renal:   - intermittent straight cath for retention   - cont bladder scan q 6 hr   - Na goal >145  - 3% @ 50/hr, repeat labs     GI:  - Diet: TF held s/p nausea and vomiting, pending S/S   - bowel regimen   - Protonix dc'd (s/p extubation)   - BM 2/15     HEME:  - SCDs/SQL for DVT ppx  - pend antixa level 2/17 @2AM  - Screening dopplers negative     - psych: pt takes xanax at home, however tachycardia is not correlating with anxiety episodes. pt was persistently tachy after dilaudid dose earlier, when resting/sleeping.   - Pulm/ID:   low suspicion for PE given improvement of O2 requirement, lack of hemodynamic instability, recent normal echo, neg doppler, xray shows questionable hazy opacity RLL (unclear if consolidation), pt was febrile earlier, however, no leukocytosis, procal is wnl. pt was pancultured, sputum pending (pt is not vented). however, since no clear etiology of tachycardia, lack of response from boluses/fever control, decision is made to proceed with PE protocol and close monitoring of hemodynamic status.               ENDO:  - ISS  - prediabetic, a1c 5.8  - TSH wnl   - atorvastatin dc'd     ID:  - s/p Ancef while placing EVD  - lactate 4.6 on admission, trended down to 1.9 after fluid bolus    - afebrile    Dispo: ICU status, family updated with plan, pending PT/OT    Assessment and plan discussed with Dr. D'Amico and Dr. Smith   HPI:  55 y/o female with PMHx of HTN, pre-DM presents transferred from [facility] for intracranial hemorrhage. As per [facility] ED provider and son, patient called son around 7:30-8:00AM c/o severe headache and nausea. Son immediately rushed to her house and found her out of bed, moaning and covered in her own vomit. During transit, EMS reported SBP within 240s with intractable vomiting and given Zofran 8mg. Upon arrival to [facility] ED, GCS 7, SBP within 180s, patient was given Decadron 10mg, Keppra 1g, started on a Cardene drip, Propofol, Mannitol 1mg/kg. 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 Patient was intubated for airway support and transferred to [facility] for further management. ICH2, NIHSS 8.  Denies use of anticoagulants/antiplatelets.  (12 Feb 2022 16:36)        S/Overnight events:   tachycardic, PE protocol. cardene gtt. neuro unchanged. EVD @ 5cmH2O.       Hospital Course:   2/12: transferred from [facility]. R frontal EVD and R radial a-line placed. pend CTH/CTA. s/p 1L bolus for elevated lactate.   2/13: S/p R frontal EVD placedment @[id number] draining well. O/n pt w/ episode of possible storming; tachy, HTN, agitated, temp 100.2F, given 2 versed and 50mcg fentanyl w/ improvement in symptoms Neuro exam stable. Pt remains intubated and sedated, AN L>R. Trend lactate and abg. Amlodipine 5mg QD started for SBP goal < 140, cardene dc'd.  EVD dropped 2/13 at 1pm. propanolol and fent standing added for neuro storming, propofol switched to precedex, with resultant hypotension. Propanolol dc'd, fent changed to prn and precedex off, 1 L bolus given, levo prn   2/14: TOSHA overnight. Patient remains on propofol. EVD open at [id number]. ICPs WNL. Neuro exam stable. increased bowel regimen. started SQL. extubated on precedex. given 0.5 Ativan + Fentanyl pushes PRN for agitation. 500cc NS bolus.   2/15: BD#4.  On HFNC d/t desat to 88% on 70% non-rebreather. On 0.5 precedex   2/16: BD5, TOSHA overnight, on 4L NC, s/p vomiting yesterday, TF being held, formal S/S pending, off precedex. EVD open @ 5. Started on 3% at 30, tmax 100.7   2/17: BD6   tachycardic, PE protocol. cardene gtt. neuro unchanged. EVD @ 5cmH2O.       Vital Signs Last 24 Hrs  T(C): 38 (16 Feb 2022 21:00), Max: 38.7 (16 Feb 2022 17:00)  T(F): 100.4 (16 Feb 2022 21:00), Max: 101.6 (16 Feb 2022 17:00)  HR: 108 (17 Feb 2022 00:11) (96 - 122)  BP: 152/76 (16 Feb 2022 22:00) (123/56 - 152/76)  BP(mean): 107 (16 Feb 2022 22:00) (79 - 107)  RR: 22 (17 Feb 2022 00:11) (20 - 34)  SpO2: 94% (17 Feb 2022 00:11) (93% - 98%)    I&O's Detail    15 Feb 2022 07:01  -  16 Feb 2022 07:00  --------------------------------------------------------  IN:    Dexmedetomidine: 13.3 mL    Enteral Tube Flush: 400 mL    Glucerna: 20 mL    IV PiggyBack: 599.8 mL    NiCARdipine: 867.5 mL    sodium chloride 0.9%: 1800 mL  Total IN: 3700.6 mL    OUT:    External Ventricular Device (mL): 186 mL    Intermittent Catheterization - Urethral (mL): 850 mL    Voided (mL): 2050 mL  Total OUT: 3086 mL    Total NET: 614.6 mL      16 Feb 2022 07:01  -  17 Feb 2022 00:43  --------------------------------------------------------  IN:    Enteral Tube Flush: 640 mL    Glucerna: 545 mL    Lactated Ringers Bolus: 1000 mL    NiCARdipine: 500 mL    NiCARdipine: 37.5 mL    sodium chloride 0.9%: 225 mL    sodium chloride 3%: 180 mL    sodium chloride 3%: 400 mL  Total IN: 3527.5 mL    OUT:    External Ventricular Device (mL): 143 mL    Intermittent Catheterization - Urethral (mL): 725 mL    Voided (mL): 1300 mL  Total OUT: 2168 mL    Total NET: 1359.5 mL        I&O's Summary    15 Feb 2022 07:01  -  16 Feb 2022 07:00  --------------------------------------------------------  IN: 3700.6 mL / OUT: 3086 mL / NET: 614.6 mL    16 Feb 2022 07:01  -  17 Feb 2022 00:43  --------------------------------------------------------  IN: 3527.5 mL / OUT: 2168 mL / NET: 1359.5 mL        PHYSICAL EXAM:  OE spont, conversive, A&O x 2, pupils anisocoric, R 4mm and Left 2 mm both briskly reactive, RUE w/d to pain, L side spont and 5/5, RLE 4/5, R facial droop   R calf ttp     DEVICE/DRAIN DRESSING:  EVD @ 5cmH2O (R frontal)     TUBES/LINES:  [] CVC  [x] A-line  [] Lumbar Drain  [] Ventriculostomy  [] Other    DIET:  [] NPO  [] Mechanical  [x] Tube feeds    LABS:                        12.0   8.82  )-----------( 258      ( 16 Feb 2022 05:22 )             37.1     02-16    142  |  111<H>  |  8   ----------------------------<  141<H>  3.9   |  18<L>  |  0.38<L>    Ca    8.6      16 Feb 2022 22:32  Phos  2.3     02-16  Mg     1.9     02-16    TPro  7.2  /  Alb  4.0  /  TBili  1.0  /  DBili  0.2  /  AST  29  /  ALT  25  /  AlkPhos  88  02-16            CAPILLARY BLOOD GLUCOSE          Drug Levels: [] N/A    CSF Analysis: [] N/A      Allergies    No Known Allergies    Intolerances      MEDICATIONS:  Antibiotics:    Neuro:  acetaminophen    Suspension .. 650 milliGRAM(s) Oral every 6 hours PRN  metoclopramide Injectable 10 milliGRAM(s) IV Push every 8 hours PRN    Anticoagulation:  enoxaparin Injectable 40 milliGRAM(s) SubCutaneous every 24 hours    OTHER:  albuterol/ipratropium for Nebulization 3 milliLiter(s) Nebulizer every 6 hours  amLODIPine   Tablet 10 milliGRAM(s) Oral daily  hydrALAZINE Injectable 10 milliGRAM(s) IV Push every 4 hours PRN  influenza   Vaccine 0.5 milliLiter(s) IntraMuscular once  losartan 100 milliGRAM(s) Oral daily  niCARdipine Infusion 5 mG/Hr IV Continuous <Continuous>  polyethylene glycol 3350 17 Gram(s) Oral every 12 hours  senna 2 Tablet(s) Oral at bedtime    IVF:  sodium chloride 3%. 500 milliLiter(s) IV Continuous <Continuous>    CULTURES:    RADIOLOGY & ADDITIONAL TESTS:      ASSESSMENT:  Pt. 55 y/o female with PMHx of HTN, pre-DM w left thalamic parenchymal hematoma with extensive intraventricular hemorrhage. ICH score 2. NIHSS 18. s/p R frontal large bore EVD placement 2/12. Pt is now hypertensive requiring Cardene, on 2 standing antihypertensive agent. Tachycardic upto 120s. s/p boluses and straight cath for retention without improvement, and pupilary anisocoria.     HEAD BLEED    Handoff    No pertinent past medical history    Hypertension    Pre-diabetes    ICH (intracerebral hemorrhage)    HTN (hypertension)    Pre-diabetes    SysAdmin_VstLnk        Plan:     NEURO:  - CT from 2/15 shows that R vent is now collapsed, however L vent still contains casted hemorrhagic clot, MLS is still unchanged at 4mm, with perihematomal edema (L thalamic)  - L trapped Ventricle:  Dr. D'amico aware Continue hypertonics therapy for a Na+ above >145. hyperosmolar therapy as needed.  - R frontal large bore EVD open at 5cmH2O, ICPs single digits, trend ICPs and output for now   - RUE weakness and pupilary anisocoria: expected likely from perihematomal edema;   - possible paroxysmal sympathetic hyperacitivity? (tachy/htn..), continue monitoring neuro/vitals q 1   - CTA: no vessel occlusion or aneurysm noted  - off precedex 2/15, Keppra 1g given at [facility] 2/12, no keppra for now.    Cardio:   - SBP goal relaxed to 100-160 on 2/16   - cont weaning cardene off  - cont. amlodipine 10mg QD, losartan 100mg QD (home med) (2/15-)    - dc'd lipitor s/p ICH, elevated total cholestrol and LDL   - echo 2/14:  significant for symmetric LVH with normal systolic function, EF: 65-70%   - recent doppler (2/14/22) neg for DVT. no arrhythmia noted on EKG. electrolytes repleted this morning.     Pulm:   -   PULM/ID:   - extubated 2/14, HFNC--> now on NC  - O2 requirement is coming down   - f/u PE scan (tachycardic persistently )   - no respiratory distress  - ? possible aspiration pna- febrile, however no leukocytosis w/ normal procal,   - xray without obvious consolidation. sputum culture not sent (not vented, oral contamination risk)   - otherwise pancultured for fever w/u   - will hold off empiric abx tx     Renal:   - intermittent straight cath for retention   - cont bladder scan q 6 hr   - Na goal >145  - 3% @ 50/hr, repeat labs     GI:  - Diet: TF held s/p nausea and vomiting, pending S/S   - bowel regimen   - Protonix dc'd (s/p extubation)   - BM 2/15     HEME:  - SCDs/SQL for DVT ppx  - pend antixa level 2/17 @2AM  - Screening dopplers negative       ENDO:  - ISS  - prediabetic, a1c 5.8  - TSH wnl   - atorvastatin dc'd     - psych: pt takes xanax at home, however tachycardia is not correlating with anxiety episodes. pt was persistently tachy after dilaudid dose earlier, when resting/sleeping.     Dispo: ICU status, family updated with plan, pending PT/OT    Assessment and plan discussed with Dr. D'Amico and Dr. Smith

## 2022-02-17 NOTE — CONSULT NOTE ADULT - ASSESSMENT
56y Female with PMHx of HTN, Pre DM presents as a transfer to [facility] on 2/12 from [facility] after being found down by her family member at home in her own vomit. She called her son prior to being found saying that she had a terrible HA and was very nauseous. On route to [facility] SBP was in the 200s with perfuse vomitting. CTH upon arrival showed L thalamic ICH with hydro. Patient was intubated and received mannitol and Dex at [facility] and was transferred here, admitted to the [facility] service. Of note on this hospitalization patient s/p EVD placement and was admitted to the ICU, course complicated by tachycardia in which a CT PE was completed on 2/16 and was negative. Stability CTHs have since been stable.  Neurology was consulted on 2/17 to help assess patient and optimization.     Plan:  - continue SBP control <140  - RF EVD @ 5/hr per nsgy  - seizure precautions- consider Keppra PPX   - PTOT  - care primary team     Case to be discussed with Dr. Merrill

## 2022-02-17 NOTE — SWALLOW FEES ASSESSMENT ADULT - ADDITIONAL RECOMMENDATIONS
Control risk factors for aspiration PNA via providing frequent oral care and increasing physical mobility as tolerated

## 2022-02-17 NOTE — PROGRESS NOTE ADULT - SUBJECTIVE AND OBJECTIVE BOX
HPI:  57 y/o female with PMHx of HTN, pre-DM presents transferred from [facility] for intracranial hemorrhage. As per [place] ED provider and son, patient called son around 7:30-8:00AM c/o severe headache and nausea. Son immediately rushed to her house and found her out of bed, moaning and covered in her own vomit. During transit, EMS reported SBP within 240s with intractable vomiting and given Zofran 8mg. Upon arrival to [place] ED, GCS 7, SBP within 180s, patient was given Decadron 10mg, Keppra 1g, started on a Cardene drip, Propofol, Mannitol 1mg/kg. 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 Patient was intubated for airway support and transferred to [facility] for further management. ICH2, NIHSS 8.  Denies use of anticoagulants/antiplatelets.  (12 Feb 2022 16:36).    On admission, the patient was:  GCS: 8  Hunt-Rachel:  modified Duval:  ICH score:2  NIHSS:18    Hospital Course:   2/12: Transferred from [place]. R frontal EVD emergently. High pressure. Elevated lactate, bolused. Not following commands.   2/13: Pt remains intubated and sedated. Moving all extremities though not following commands. Concern for storming. Hypotensive on precedex.  2/14: Spontaneous eye opening. Agitated. On propofol- weaning. SAT/SBT. Goal to extubate. EVD decreased to 5cmH2O. EXTUBATED TO [facility]  2/15:  2/16:   2/17:       ICU Vital Signs Last 24 Hrs  T(C): 37.4 (17 Feb 2022 05:28), Max: 38.7 (16 Feb 2022 17:00)  T(F): 99.4 (17 Feb 2022 05:28), Max: 101.6 (16 Feb 2022 17:00)  HR: 114 (17 Feb 2022 05:30) (96 - 122)  BP: 152/76 (16 Feb 2022 22:00) (127/55 - 152/76)  BP(mean): 107 (16 Feb 2022 22:00) (85 - 107)  ABP: 156/67 (17 Feb 2022 04:00) (109/81 - 165/62)  ABP(mean): 99 (17 Feb 2022 04:00) (70 - 99)  RR: 26 (17 Feb 2022 05:30) (20 - 32)  SpO2: 95% (17 Feb 2022 05:30) (93% - 97%)      02-15-22 @ 07:01  -  02-16-22 @ 07:00  --------------------------------------------------------  IN: 3700.6 mL / OUT: 3086 mL / NET: 614.6 mL    02-16-22 @ 07:01  -  02-17-22 @ 06:42  --------------------------------------------------------  IN: 4035 mL / OUT: 2695 mL / NET: 1340 mL      General: Appear uncomfortable  HEENT:  MMM  Neuro:  awake, alert, oriented to hospital and year but not month, follows commands, R pupil 4mm, L pupil 2 mm both briskly reactive, EOMI, face symmetric, R arm 1/5 to noxious (worse than yesterday), L arm AG, moves b/l legs briefly AG, sensation intact in all 4 extremities   Cards:  RRR, tachycardic  Respiratory:  no respiratory distress  Abdomen:  soft  Extremities:  no LE edema      MEDICATIONS:   acetaminophen    Suspension .. 650 milliGRAM(s) Oral every 6 hours PRN  albuterol/ipratropium for Nebulization 3 milliLiter(s) Nebulizer every 6 hours  amLODIPine   Tablet 10 milliGRAM(s) Oral daily  enoxaparin Injectable 40 milliGRAM(s) SubCutaneous every 24 hours  hydrALAZINE Injectable 10 milliGRAM(s) IV Push every 4 hours PRN  influenza   Vaccine 0.5 milliLiter(s) IntraMuscular once  losartan 100 milliGRAM(s) Oral daily  metoclopramide Injectable 10 milliGRAM(s) IV Push every 8 hours PRN  niCARdipine Infusion 5 mG/Hr (25 mL/Hr) IV Continuous <Continuous>  polyethylene glycol 3350 17 Gram(s) Oral every 12 hours  senna 2 Tablet(s) Oral at bedtime  sodium chloride 3%. 500 milliLiter(s) (70 mL/Hr) IV Continuous <Continuous>                          12.8   8.57  )-----------( 260      ( 17 Feb 2022 02:16 )             38.8     02-17    141  |  110<H>  |  8   ----------------------------<  159<H>  3.7   |  20<L>  |  0.37<L>    Ca    9.4      17 Feb 2022 02:16  Phos  2.0     02-17  Mg     2.1     02-17    TPro  7.2  /  Alb  4.0  /  TBili  1.0  /  DBili  0.2  /  AST  29  /  ALT  25  /  AlkPhos  88  02-16    LIVER FUNCTIONS - ( 16 Feb 2022 15:49 )  Alb: 4.0 g/dL / Pro: 7.2 g/dL / ALK PHOS: 88 U/L / ALT: 25 U/L / AST: 29 U/L / GGT: x                   Access:   3 peripheral IVs  R radial Rosey  R frontal EVD at [id number]  Sudha                 HPI:  55 y/o female with PMHx of HTN, pre-DM presents transferred from [facility] for intracranial hemorrhage. As per [place] ED provider and son, patient called son around 7:30-8:00AM c/o severe headache and nausea. Son immediately rushed to her house and found her out of bed, moaning and covered in her own vomit. During transit, EMS reported SBP within 240s with intractable vomiting and given Zofran 8mg. Upon arrival to [place] ED, GCS 7, SBP within 180s, patient was given Decadron 10mg, Keppra 1g, started on a Cardene drip, Propofol, Mannitol 1mg/kg. 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 Patient was intubated for airway support and transferred to [facility] for further management. ICH2, NIHSS 8.  Denies use of anticoagulants/antiplatelets.  (12 Feb 2022 16:36).    On admission, the patient was:  GCS: 8  Hunt-Rachel:  modified Duval:  ICH score:2  NIHSS:18    Hospital Course:   2/12: Transferred from [place]. R frontal EVD emergently. High pressure. Elevated lactate, bolused. Not following commands.   2/13: Pt remains intubated and sedated. Moving all extremities though not following commands. Concern for storming. Hypotensive on precedex.  2/14: Spontaneous eye opening. Agitated. On propofol- weaning. SAT/SBT. Goal to extubate. EVD decreased to 5cmH2O. EXTUBATED TO [facility]  2/17: Persistent tachycardia HR 120s. CT/PE protocol done. Prelim neg. Febrile today 102.7F      ICU Vital Signs Last 24 Hrs  T(C): 37.4 (17 Feb 2022 05:28), Max: 38.7 (16 Feb 2022 17:00)  T(F): 99.4 (17 Feb 2022 05:28), Max: 101.6 (16 Feb 2022 17:00)  HR: 109 (17 Feb 2022 07:00) (105 - 122)  BP: 152/76 (16 Feb 2022 22:00) (127/55 - 152/76)  BP(mean): 107 (16 Feb 2022 22:00) (85 - 107)  ABP: 155/57 (17 Feb 2022 07:00) (121/47 - 165/62)  ABP(mean): 90 (17 Feb 2022 07:00) (70 - 99)  RR: 26 (17 Feb 2022 07:00) (21 - 34)  SpO2: 95% (17 Feb 2022 07:00) (93% - 98%)      02-15-22 @ 07:01  -  02-16-22 @ 07:00  --------------------------------------------------------  IN: 3700.6 mL / OUT: 3086 mL / NET: 614.6 mL    02-16-22 @ 07:01  -  02-17-22 @ 06:42  --------------------------------------------------------  IN: 4035 mL / OUT: 2695 mL / NET: 1340 mL      General: Appears intermittently restless   HEENT:  MMM  Neuro: Awake, alert, oriented to hospital and year but not month, follows commands, R pupil 3mm, L pupil 3 mm both briskly reactive, EOMI, face symmetric, R arm WD to noxious, L arm AG, moves b/l legs briefly AG, sensation intact in all 4 extremities   Cards:  RRR, tachycardic S1/S2  Respiratory: Clear, no wheeze.  Abdomen: Soft, non tender  Extremities:  no LE edema    MEDICATIONS:   acetaminophen    Suspension .. 650 milliGRAM(s) Oral every 6 hours PRN  albuterol/ipratropium for Nebulization 3 milliLiter(s) Nebulizer every 6 hours  amLODIPine   Tablet 10 milliGRAM(s) Oral daily  enoxaparin Injectable 40 milliGRAM(s) SubCutaneous every 24 hours  hydrALAZINE Injectable 10 milliGRAM(s) IV Push every 4 hours PRN  influenza   Vaccine 0.5 milliLiter(s) IntraMuscular once  losartan 100 milliGRAM(s) Oral daily  metoclopramide Injectable 10 milliGRAM(s) IV Push every 8 hours PRN  niCARdipine Infusion 5 mG/Hr (25 mL/Hr) IV Continuous <Continuous>  polyethylene glycol 3350 17 Gram(s) Oral every 12 hours  senna 2 Tablet(s) Oral at bedtime  sodium chloride 3%. 500 milliLiter(s) (70 mL/Hr) IV Continuous <Continuous>                          12.8   8.57  )-----------( 260      ( 17 Feb 2022 02:16 )             38.8     02-17    141  |  110<H>  |  8   ----------------------------<  159<H>  3.7   |  20<L>  |  0.37<L>    Ca    9.4      17 Feb 2022 02:16  Phos  2.0     02-17  Mg     2.1     02-17    TPro  7.2  /  Alb  4.0  /  TBili  1.0  /  DBili  0.2  /  AST  29  /  ALT  25  /  AlkPhos  88  02-16    LIVER FUNCTIONS - ( 16 Feb 2022 15:49 )  Alb: 4.0 g/dL / Pro: 7.2 g/dL / ALK PHOS: 88 U/L / ALT: 25 U/L / AST: 29 U/L / GGT: x             Access:   3 peripheral IVs  R radial Fulton  R frontal EVD at 5cmH2O  NGT                 HPI:  55 y/o female with PMHx of HTN, pre-DM presents transferred from [facility] for intracranial hemorrhage. As per [place] ED provider and son, patient called son around 7:30-8:00AM c/o severe headache and nausea. Son immediately rushed to her house and found her out of bed, moaning and covered in her own vomit. During transit, EMS reported SBP within 240s with intractable vomiting and given Zofran 8mg. Upon arrival to [place] ED, GCS 7, SBP within 180s, patient was given Decadron 10mg, Keppra 1g, started on a Cardene drip, Propofol, Mannitol 1mg/kg. 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 Patient was intubated for airway support and transferred to [facility] for further management. ICH2, NIHSS 8.  Denies use of anticoagulants/antiplatelets.  (12 Feb 2022 16:36).    On admission, the patient was:  GCS: 8  Hunt-Rachel:  modified Duval:  ICH score:2  NIHSS:18    Hospital Course:   2/12: Transferred from [place]. R frontal EVD emergently. High pressure. Elevated lactate, bolused. Not following commands.   2/13: Pt remains intubated and sedated. Moving all extremities though not following commands. Concern for storming. Hypotensive on precedex.  2/14: Spontaneous eye opening. Agitated. On propofol- weaning. SAT/SBT. Goal to extubate. EVD decreased to 5cmH2O. Extubated to [facility]NC  2/15: Anisocoria noted. CT head stable (trapped L vent). Na goal increased 145- 155.  2/17: Persistent tachycardia HR 120s. CT/PE protocol done. Prelim neg. Febrile today 102.7F. CSF and urine cx sent      ROS:   Patient denies pain, discomfort, SOB or any acute complaints.    ICU Vital Signs Last 24 Hrs  T(C): 37.4 (17 Feb 2022 05:28), Max: 38.7 (16 Feb 2022 17:00)  T(F): 99.4 (17 Feb 2022 05:28), Max: 101.6 (16 Feb 2022 17:00)  HR: 109 (17 Feb 2022 07:00) (105 - 122)  BP: 152/76 (16 Feb 2022 22:00) (127/55 - 152/76)  BP(mean): 107 (16 Feb 2022 22:00) (85 - 107)  ABP: 155/57 (17 Feb 2022 07:00) (121/47 - 165/62)  ABP(mean): 90 (17 Feb 2022 07:00) (70 - 99)  RR: 26 (17 Feb 2022 07:00) (21 - 34)  SpO2: 95% (17 Feb 2022 07:00) (93% - 98%)      02-15-22 @ 07:01  -  02-16-22 @ 07:00  --------------------------------------------------------  IN: 3700.6 mL / OUT: 3086 mL / NET: 614.6 mL    02-16-22 @ 07:01  -  02-17-22 @ 06:42  --------------------------------------------------------  IN: 4035 mL / OUT: 2695 mL / NET: 1340 mL      General: Appears intermittently restless   HEENT:  MMM  Neuro: Awake, alert, oriented to person and hospital (does not know year or month), follows commands, R pupil 3mm, L pupil 2mm both briskly reactive  EOMI, face symmetric, R arm WD to noxious, L arm AG, moves b/l legs briefly AG, sensation intact in all 4 extremities   Cards:  RRR, tachycardic S1/S2  Respiratory: Clear, no wheeze.  Abdomen: Soft, non tender  Extremities:  no LE edema    MEDICATIONS:   acetaminophen    Suspension .. 650 milliGRAM(s) Oral every 6 hours PRN  albuterol/ipratropium for Nebulization 3 milliLiter(s) Nebulizer every 6 hours  amLODIPine   Tablet 10 milliGRAM(s) Oral daily  enoxaparin Injectable 40 milliGRAM(s) SubCutaneous every 24 hours  hydrALAZINE Injectable 10 milliGRAM(s) IV Push every 4 hours PRN  influenza   Vaccine 0.5 milliLiter(s) IntraMuscular once  losartan 100 milliGRAM(s) Oral daily  metoclopramide Injectable 10 milliGRAM(s) IV Push every 8 hours PRN  niCARdipine Infusion 5 mG/Hr (25 mL/Hr) IV Continuous <Continuous>  polyethylene glycol 3350 17 Gram(s) Oral every 12 hours  senna 2 Tablet(s) Oral at bedtime  sodium chloride 3%. 500 milliLiter(s) (70 mL/Hr) IV Continuous <Continuous>      LABS:              12.8   8.57  )-----------( 260      ( 17 Feb 2022 02:16 )             38.8     02-17    141  |  110<H>  |  8   ----------------------------<  159<H>  3.7   |  20<L>  |  0.37<L>    Ca    9.4      17 Feb 2022 02:16  Phos  2.0     02-17  Mg     2.1     02-17    TPro  7.2  /  Alb  4.0  /  TBili  1.0  /  DBili  0.2  /  AST  29  /  ALT  25  /  AlkPhos  88  02-16    LIVER FUNCTIONS - ( 16 Feb 2022 15:49 )  Alb: 4.0 g/dL / Pro: 7.2 g/dL / ALK PHOS: 88 U/L / ALT: 25 U/L / AST: 29 U/L / GGT: x             Access:   3 peripheral IVs  R radial Rosey  R frontal EVD at 5cmH2O  NGT

## 2022-02-17 NOTE — SWALLOW FEES ASSESSMENT ADULT - ORAL PHASE COMMENTS
Intermittent verbal prompts to elicit lip rounding to spoon and cup. Reduced bolus control resulted in rare premature spillage of liquids.

## 2022-02-17 NOTE — SWALLOW FEES ASSESSMENT ADULT - PHARYNGEAL PHASE COMMENTS
Timeliness of the pharyngeal swallow was variable. At times, liquid spilled to the pyriform sinuses prior to the swallow trigger. With larger liquid volumes, bolus appeared to spill over the superior edge of the epiglottis and into the laryngeal vestibule prior to the swallow. However, bolus was consistently squeezed out during the swallow with no (visible) episodes of aspiration. Piecemeal deglutition noted across consistencies. Following the initial swallow, ~1/4 bolus spilled to the pyriform sinuses but was cleared with a spontaneous secondary swallow. Pharyngeal swallow efficiency was relatively WFL with only a trace amount of residue noted within the pharynx.

## 2022-02-17 NOTE — PROGRESS NOTE ADULT - ASSESSMENT
ASSESSMENT/PLAN: L thalamic bleed with extensive IVH and 5mm L to R MLS.  Likely hypertensive.  BD6. ICH score 2    NEURO:  Q1 neurochecks  CT/CTA: Stable. No vessel abnormality noted.   Hydrocephalus; EVD@ 85gpC3H-> 5cmH2O. Monitor ICP/output.  Sedation: None  Analgesia: Tylenol  Stroke core measures. Stroke neurology consult.   Continue lipitor  Activity: [] mobilize as tolerated [x] Bedrest [] PT [] OT [] PMNR    PULM:  CT PE neg    CV: Tachycardia  SBP goal 100-140  Off cardene. On amlodipine, losartan  TTE with bubble study, baseline EKG  Continue Lipitor    RENAL: Elevated lactate on admission. S/P Mannitol at OSH  Fluids: 3%  Na 145- 155  Primafit. Is/Os. Supplement lytes    GI:   Diet: NGT feeds. On glucerna- hold for extubation  GI prophylaxis [] not indicated [x] PPI [] other:  Bowel regimen [] colace [x] senna [] other:  LBM: Awaiting. Augment bowel regimen. Miralax, senna    ENDO: Hyperlipidemia, prediabetes.  Goal euglycemia (-180)  A1c 5.8, TSH 0.39  On statin 40mg    HEME/ONC:  Monitor CBC  VTE prophylaxis: [x] SCDs [x] chemoprophylaxis start 2/14. Anti Xa level 0.09. Repeat  LE dopplers neg    ID:  Afebrile  Reactive leukocytosis    MISC:    SOCIAL/FAMILY:  [] awaiting [x] updated at bedside son Bassam 2/13 [] family meeting    CODE STATUS:  [x] Full Code [] DNR [] DNI [] Palliative/Comfort Care    DISPOSITION:  [x] ICU [] Stroke Unit [] Floor [] EMU [] RCU [] PCU    [x] Patient is at high risk of neurologic deterioration/death due to: hydrocephalus, hemorrhage, ICU delirium    Time spent: 40 critical care minutes.     ASSESSMENT/PLAN: L thalamic bleed with extensive IVH and 5mm L to R MLS.  Likely hypertensive.  BD6. ICH score 2    NEURO:  Q1 neurochecks  CT/CTA: Stable. No vessel abnormality noted.   CT head 2/18  Hydrocephalus; EVD@ 5cmH2O. Monitor ICP/output. ICPs wnl  Sedation: None. Analgesia: Tylenol/ oxycodone  Stroke core measures. Stroke neurology consult.   Activity: [] mobilize as tolerated [] Bedrest [x] PT [x] OT [] PMNR    PULM:  CT PE protocol re; tachycardia  Weaned from HFNC to 2LNC. Now on room air.   Encourage incentive spirometry     CV: Tachycardia (rule out PE vs 2/2 pain/anxiety)  SBP goal 100-160  On cardene. On amlodipine 10mg (new med) losartan 100mg (home med)  Add hydralazine 25mg tid (2/17). Wean cardene  TTE with bubble study EF 65-70%    RENAL: Elevated lactate on admission. S/P Mannitol at OSH  Fluids: 3% @70  Na 145- 150. Follow up BMP.  Primafit. Is/Os. Supplement lytes  500cc NS bolus    GI:   Diet: NGT feeds. On glucerna at goal 45cc/hr   S/S following.   GI prophylaxis [x] not indicated [] PPI [] other:  Bowel regimen [] colace [x] senna [] other. LBM: 2/16    ENDO: Hyperlipidemia, prediabetes.  Goal euglycemia (-180)  A1c 5.8, TSH 0.39    HEME/ONC:  CBC Stable  VTE prophylaxis: [x] SCDs [x] Lovenox. Anti Xa level 0.09. Repeat 2/18   LE dopplers neg. CTPE protocol    ID: Persistently febrile  UA equivocal. Procal neg. CTA-? consolidation.  Send CSF, urine culture  Defer antibiotics at this time until more data obtained    MISC:    SOCIAL/FAMILY:  [] awaiting [x] updated at bedside son Bassam 2/13 [] family meeting    CODE STATUS:  [x] Full Code [] DNR [] DNI [] Palliative/Comfort Care    DISPOSITION:  [x] ICU [] Stroke Unit [] Floor [] EMU [] RCU [] PCU    [x] Patient is at high risk of neurologic deterioration/death due to: hydrocephalus, hemorrhage, ICU delirium    Time spent: 40 critical care minutes.     ASSESSMENT/PLAN: L thalamic bleed with extensive IVH and 5mm L to R MLS.  Likely hypertensive.  BD6. ICH score 2    NEURO:  Q1 neurochecks  CT/CTA: Stable. No vessel abnormality noted.   Repeat CT head 2/18  Hydrocephalus; EVD@ 5cmH2O. Monitor ICP/output. ICPs wnl  Sedation: None. Analgesia: Tylenol/ oxycodone  Stroke core measures. Stroke neurology consult.   Activity: [] mobilize as tolerated [] Bedrest [x] PT [x] OT [] PMNR    PULM:  CT PE protocol re: tachycardia. No PE. Does have bilateral consolidation ?atelectasis.  Weaned from HFNC ->Now on room air.   Encourage incentive spirometry     CV: Tachycardia (rule out PE vs 2/2 pain/anxiety)  SBP goal 100-160  On cardene. On amlodipine 10mg (new med) losartan 100mg (home med)  Add hydralazine 25mg tid (2/17). Wean cardene to off as tolerated  TTE with bubble study EF 65-70%    RENAL: Elevated lactate on admission. S/P Mannitol at OSH  Fluids: 3% @50. Add salt tabs and wean gtt.   Na 145- 150. Follow up BMPs.  Primafit. Is/Os. Supplement lytes  500cc NS bolus    GI:   Diet: NGT feeds. On glucerna at goal 45cc/hr   S/S following.   GI prophylaxis [x] not indicated [] PPI [] other:  Bowel regimen [] colace [x] senna [] other. LBM: 2/16    ENDO: Hyperlipidemia, prediabetes.  Goal euglycemia (-180)  A1c 5.8, TSH 0.39    HEME/ONC:  CBC Stable  VTE prophylaxis: [x] SCDs [x] Lovenox. Anti Xa level 0.09. Repeat 2/18   LE dopplers neg. CTPE negative    ID: Persistently febrile  UA equivocal. Procal neg. CTA-? consolidation vs atelectasis. No WBC count.  Send CSF, urine culture  Defer antibiotics at this time until more data obtained.    MISC:    SOCIAL/FAMILY:  [x] awaiting [] updated  [] family meeting    CODE STATUS:  [x] Full Code [] DNR [] DNI [] Palliative/Comfort Care    DISPOSITION:  [x] ICU [] Stroke Unit [] Floor [] EMU [] RCU [] PCU    [x] Patient is at high risk of neurologic deterioration/death due to: hydrocephalus, hemorrhage, ICU delirium    Time spent: 35 critical care minutes.

## 2022-02-18 LAB
-  COAGULASE NEGATIVE STAPHYLOCOCCUS: SIGNIFICANT CHANGE UP
ANION GAP SERPL CALC-SCNC: 10 MMOL/L — SIGNIFICANT CHANGE UP (ref 5–17)
ANION GAP SERPL CALC-SCNC: 11 MMOL/L — SIGNIFICANT CHANGE UP (ref 5–17)
ANION GAP SERPL CALC-SCNC: 12 MMOL/L — SIGNIFICANT CHANGE UP (ref 5–17)
APPEARANCE CSF: SIGNIFICANT CHANGE UP
APPEARANCE SPUN FLD: ABNORMAL
BUN SERPL-MCNC: 6 MG/DL — LOW (ref 7–23)
BUN SERPL-MCNC: 7 MG/DL — SIGNIFICANT CHANGE UP (ref 7–23)
BUN SERPL-MCNC: 7 MG/DL — SIGNIFICANT CHANGE UP (ref 7–23)
CALCIUM SERPL-MCNC: 8.5 MG/DL — SIGNIFICANT CHANGE UP (ref 8.4–10.5)
CALCIUM SERPL-MCNC: 8.6 MG/DL — SIGNIFICANT CHANGE UP (ref 8.4–10.5)
CALCIUM SERPL-MCNC: 9.1 MG/DL — SIGNIFICANT CHANGE UP (ref 8.4–10.5)
CHLORIDE SERPL-SCNC: 109 MMOL/L — HIGH (ref 96–108)
CHLORIDE SERPL-SCNC: 109 MMOL/L — HIGH (ref 96–108)
CHLORIDE SERPL-SCNC: 112 MMOL/L — HIGH (ref 96–108)
CO2 SERPL-SCNC: 16 MMOL/L — LOW (ref 22–31)
CO2 SERPL-SCNC: 17 MMOL/L — LOW (ref 22–31)
CO2 SERPL-SCNC: 18 MMOL/L — LOW (ref 22–31)
COLOR CSF: ABNORMAL
CREAT SERPL-MCNC: 0.37 MG/DL — LOW (ref 0.5–1.3)
CREAT SERPL-MCNC: 0.37 MG/DL — LOW (ref 0.5–1.3)
CREAT SERPL-MCNC: 0.4 MG/DL — LOW (ref 0.5–1.3)
CSF COMMENTS: SIGNIFICANT CHANGE UP
ENTEROCOC DNA BLD POS QL NAA+NON-PROBE: SIGNIFICANT CHANGE UP
GLUCOSE CSF-MCNC: 86 MG/DL — HIGH (ref 40–70)
GLUCOSE SERPL-MCNC: 150 MG/DL — HIGH (ref 70–99)
GLUCOSE SERPL-MCNC: 161 MG/DL — HIGH (ref 70–99)
GLUCOSE SERPL-MCNC: 167 MG/DL — HIGH (ref 70–99)
GRAM STN FLD: SIGNIFICANT CHANGE UP
GRAM STN FLD: SIGNIFICANT CHANGE UP
HCT VFR BLD CALC: 34.7 % — SIGNIFICANT CHANGE UP (ref 34.5–45)
HGB BLD-MCNC: 11.5 G/DL — SIGNIFICANT CHANGE UP (ref 11.5–15.5)
LMWH PPP CHRO-ACNC: 0.23 IU/ML — LOW (ref 0.5–1.1)
LYMPHOCYTES # CSF: 1 % — LOW (ref 40–80)
MAGNESIUM SERPL-MCNC: 1.8 MG/DL — SIGNIFICANT CHANGE UP (ref 1.6–2.6)
MCHC RBC-ENTMCNC: 30.8 PG — SIGNIFICANT CHANGE UP (ref 27–34)
MCHC RBC-ENTMCNC: 33.1 GM/DL — SIGNIFICANT CHANGE UP (ref 32–36)
MCV RBC AUTO: 93 FL — SIGNIFICANT CHANGE UP (ref 80–100)
METHOD TYPE: SIGNIFICANT CHANGE UP
MRSA PCR RESULT.: NEGATIVE — SIGNIFICANT CHANGE UP
NEUTROPHILS # CSF: 1 % — SIGNIFICANT CHANGE UP (ref 0–6)
NRBC # BLD: 0 /100 WBCS — SIGNIFICANT CHANGE UP (ref 0–0)
NRBC NFR CSF: 2 /UL — SIGNIFICANT CHANGE UP (ref 0–5)
PHOSPHATE SERPL-MCNC: 2.4 MG/DL — LOW (ref 2.5–4.5)
PLATELET # BLD AUTO: 245 K/UL — SIGNIFICANT CHANGE UP (ref 150–400)
POTASSIUM SERPL-MCNC: 3.5 MMOL/L — SIGNIFICANT CHANGE UP (ref 3.5–5.3)
POTASSIUM SERPL-MCNC: 3.8 MMOL/L — SIGNIFICANT CHANGE UP (ref 3.5–5.3)
POTASSIUM SERPL-MCNC: 4 MMOL/L — SIGNIFICANT CHANGE UP (ref 3.5–5.3)
POTASSIUM SERPL-SCNC: 3.5 MMOL/L — SIGNIFICANT CHANGE UP (ref 3.5–5.3)
POTASSIUM SERPL-SCNC: 3.8 MMOL/L — SIGNIFICANT CHANGE UP (ref 3.5–5.3)
POTASSIUM SERPL-SCNC: 4 MMOL/L — SIGNIFICANT CHANGE UP (ref 3.5–5.3)
PROCALCITONIN SERPL-MCNC: 0.09 NG/ML — SIGNIFICANT CHANGE UP (ref 0.02–0.1)
PROT CSF-MCNC: 55 MG/DL — HIGH (ref 15–45)
RBC # BLD: 3.73 M/UL — LOW (ref 3.8–5.2)
RBC # CSF: HIGH /UL (ref 0–0)
RBC # FLD: 11.9 % — SIGNIFICANT CHANGE UP (ref 10.3–14.5)
S AUREUS DNA NOSE QL NAA+PROBE: NEGATIVE — SIGNIFICANT CHANGE UP
SODIUM SERPL-SCNC: 137 MMOL/L — SIGNIFICANT CHANGE UP (ref 135–145)
SODIUM SERPL-SCNC: 137 MMOL/L — SIGNIFICANT CHANGE UP (ref 135–145)
SODIUM SERPL-SCNC: 140 MMOL/L — SIGNIFICANT CHANGE UP (ref 135–145)
SPECIMEN SOURCE: SIGNIFICANT CHANGE UP
TUBE TYPE: SIGNIFICANT CHANGE UP
WBC # BLD: 10.04 K/UL — SIGNIFICANT CHANGE UP (ref 3.8–10.5)
WBC # FLD AUTO: 10.04 K/UL — SIGNIFICANT CHANGE UP (ref 3.8–10.5)

## 2022-02-18 PROCEDURE — 76937 US GUIDE VASCULAR ACCESS: CPT | Mod: 26

## 2022-02-18 PROCEDURE — 71045 X-RAY EXAM CHEST 1 VIEW: CPT | Mod: 26

## 2022-02-18 PROCEDURE — 36000 PLACE NEEDLE IN VEIN: CPT

## 2022-02-18 PROCEDURE — ZZZZZ: CPT

## 2022-02-18 PROCEDURE — 99222 1ST HOSP IP/OBS MODERATE 55: CPT

## 2022-02-18 PROCEDURE — 70450 CT HEAD/BRAIN W/O DYE: CPT | Mod: 26

## 2022-02-18 PROCEDURE — 99291 CRITICAL CARE FIRST HOUR: CPT

## 2022-02-18 RX ORDER — OXYCODONE HYDROCHLORIDE 5 MG/1
10 TABLET ORAL EVERY 6 HOURS
Refills: 0 | Status: DISCONTINUED | OUTPATIENT
Start: 2022-02-18 | End: 2022-02-18

## 2022-02-18 RX ORDER — OXYCODONE HYDROCHLORIDE 5 MG/1
5 TABLET ORAL ONCE
Refills: 0 | Status: DISCONTINUED | OUTPATIENT
Start: 2022-02-18 | End: 2022-02-18

## 2022-02-18 RX ORDER — AMLODIPINE BESYLATE 2.5 MG/1
10 TABLET ORAL DAILY
Refills: 0 | Status: DISCONTINUED | OUTPATIENT
Start: 2022-02-18 | End: 2022-03-11

## 2022-02-18 RX ORDER — POTASSIUM CHLORIDE 20 MEQ
40 PACKET (EA) ORAL ONCE
Refills: 0 | Status: COMPLETED | OUTPATIENT
Start: 2022-02-18 | End: 2022-02-18

## 2022-02-18 RX ORDER — SODIUM,POTASSIUM PHOSPHATES 278-250MG
1 POWDER IN PACKET (EA) ORAL ONCE
Refills: 0 | Status: COMPLETED | OUTPATIENT
Start: 2022-02-18 | End: 2022-02-18

## 2022-02-18 RX ORDER — LABETALOL HCL 100 MG
100 TABLET ORAL EVERY 8 HOURS
Refills: 0 | Status: DISCONTINUED | OUTPATIENT
Start: 2022-02-18 | End: 2022-03-11

## 2022-02-18 RX ORDER — ACETAMINOPHEN 500 MG
650 TABLET ORAL EVERY 6 HOURS
Refills: 0 | Status: DISCONTINUED | OUTPATIENT
Start: 2022-02-18 | End: 2022-03-11

## 2022-02-18 RX ORDER — SODIUM CHLORIDE 9 MG/ML
3 INJECTION INTRAMUSCULAR; INTRAVENOUS; SUBCUTANEOUS EVERY 6 HOURS
Refills: 0 | Status: DISCONTINUED | OUTPATIENT
Start: 2022-02-18 | End: 2022-03-05

## 2022-02-18 RX ORDER — SENNA PLUS 8.6 MG/1
2 TABLET ORAL AT BEDTIME
Refills: 0 | Status: DISCONTINUED | OUTPATIENT
Start: 2022-02-18 | End: 2022-03-11

## 2022-02-18 RX ORDER — ACETAMINOPHEN 500 MG
650 TABLET ORAL ONCE
Refills: 0 | Status: COMPLETED | OUTPATIENT
Start: 2022-02-18 | End: 2022-02-18

## 2022-02-18 RX ORDER — VANCOMYCIN HCL 1 G
1000 VIAL (EA) INTRAVENOUS EVERY 12 HOURS
Refills: 0 | Status: DISCONTINUED | OUTPATIENT
Start: 2022-02-18 | End: 2022-02-20

## 2022-02-18 RX ORDER — OXYCODONE HYDROCHLORIDE 5 MG/1
5 TABLET ORAL EVERY 6 HOURS
Refills: 0 | Status: DISCONTINUED | OUTPATIENT
Start: 2022-02-18 | End: 2022-02-20

## 2022-02-18 RX ORDER — SODIUM CHLORIDE 9 MG/ML
500 INJECTION INTRAMUSCULAR; INTRAVENOUS; SUBCUTANEOUS ONCE
Refills: 0 | Status: COMPLETED | OUTPATIENT
Start: 2022-02-18 | End: 2022-02-18

## 2022-02-18 RX ORDER — MAGNESIUM SULFATE 500 MG/ML
2 VIAL (ML) INJECTION ONCE
Refills: 0 | Status: COMPLETED | OUTPATIENT
Start: 2022-02-18 | End: 2022-02-18

## 2022-02-18 RX ORDER — SODIUM CHLORIDE 5 G/100ML
500 INJECTION, SOLUTION INTRAVENOUS
Refills: 0 | Status: DISCONTINUED | OUTPATIENT
Start: 2022-02-18 | End: 2022-02-19

## 2022-02-18 RX ORDER — SODIUM CHLORIDE 5 G/100ML
500 INJECTION, SOLUTION INTRAVENOUS
Refills: 0 | Status: DISCONTINUED | OUTPATIENT
Start: 2022-02-18 | End: 2022-02-18

## 2022-02-18 RX ORDER — POLYETHYLENE GLYCOL 3350 17 G/17G
17 POWDER, FOR SOLUTION ORAL EVERY 12 HOURS
Refills: 0 | Status: DISCONTINUED | OUTPATIENT
Start: 2022-02-18 | End: 2022-03-11

## 2022-02-18 RX ADMIN — SODIUM CHLORIDE 3 GRAM(S): 9 INJECTION INTRAMUSCULAR; INTRAVENOUS; SUBCUTANEOUS at 12:34

## 2022-02-18 RX ADMIN — Medication 50 MILLIGRAM(S): at 22:26

## 2022-02-18 RX ADMIN — SODIUM CHLORIDE 100 MILLILITER(S): 5 INJECTION, SOLUTION INTRAVENOUS at 20:50

## 2022-02-18 RX ADMIN — Medication 650 MILLIGRAM(S): at 05:12

## 2022-02-18 RX ADMIN — Medication 100 MILLIGRAM(S): at 12:34

## 2022-02-18 RX ADMIN — LOSARTAN POTASSIUM 100 MILLIGRAM(S): 100 TABLET, FILM COATED ORAL at 05:13

## 2022-02-18 RX ADMIN — OXYCODONE HYDROCHLORIDE 5 MILLIGRAM(S): 5 TABLET ORAL at 23:56

## 2022-02-18 RX ADMIN — SODIUM CHLORIDE 75 MILLILITER(S): 5 INJECTION, SOLUTION INTRAVENOUS at 00:19

## 2022-02-18 RX ADMIN — Medication 40 MILLIEQUIVALENT(S): at 07:53

## 2022-02-18 RX ADMIN — Medication 100 MILLIGRAM(S): at 22:27

## 2022-02-18 RX ADMIN — AMLODIPINE BESYLATE 10 MILLIGRAM(S): 2.5 TABLET ORAL at 05:13

## 2022-02-18 RX ADMIN — OXYCODONE HYDROCHLORIDE 5 MILLIGRAM(S): 5 TABLET ORAL at 10:00

## 2022-02-18 RX ADMIN — OXYCODONE HYDROCHLORIDE 5 MILLIGRAM(S): 5 TABLET ORAL at 06:10

## 2022-02-18 RX ADMIN — OXYCODONE HYDROCHLORIDE 10 MILLIGRAM(S): 5 TABLET ORAL at 18:38

## 2022-02-18 RX ADMIN — Medication 650 MILLIGRAM(S): at 00:00

## 2022-02-18 RX ADMIN — Medication 250 MILLIGRAM(S): at 17:00

## 2022-02-18 RX ADMIN — SODIUM CHLORIDE 3 GRAM(S): 9 INJECTION INTRAMUSCULAR; INTRAVENOUS; SUBCUTANEOUS at 23:56

## 2022-02-18 RX ADMIN — Medication 650 MILLIGRAM(S): at 16:30

## 2022-02-18 RX ADMIN — OXYCODONE HYDROCHLORIDE 10 MILLIGRAM(S): 5 TABLET ORAL at 19:06

## 2022-02-18 RX ADMIN — NICARDIPINE HYDROCHLORIDE 25 MG/HR: 30 CAPSULE, EXTENDED RELEASE ORAL at 14:17

## 2022-02-18 RX ADMIN — Medication 650 MILLIGRAM(S): at 23:10

## 2022-02-18 RX ADMIN — OXYCODONE HYDROCHLORIDE 10 MILLIGRAM(S): 5 TABLET ORAL at 11:00

## 2022-02-18 RX ADMIN — SODIUM CHLORIDE 75 MILLILITER(S): 5 INJECTION, SOLUTION INTRAVENOUS at 14:18

## 2022-02-18 RX ADMIN — Medication 650 MILLIGRAM(S): at 05:45

## 2022-02-18 RX ADMIN — ENOXAPARIN SODIUM 40 MILLIGRAM(S): 100 INJECTION SUBCUTANEOUS at 22:26

## 2022-02-18 RX ADMIN — POLYETHYLENE GLYCOL 3350 17 GRAM(S): 17 POWDER, FOR SOLUTION ORAL at 18:38

## 2022-02-18 RX ADMIN — Medication 650 MILLIGRAM(S): at 17:00

## 2022-02-18 RX ADMIN — SODIUM CHLORIDE 3 GRAM(S): 9 INJECTION INTRAMUSCULAR; INTRAVENOUS; SUBCUTANEOUS at 18:38

## 2022-02-18 RX ADMIN — Medication 1 PACKET(S): at 07:55

## 2022-02-18 RX ADMIN — Medication 25 GRAM(S): at 07:53

## 2022-02-18 RX ADMIN — SODIUM CHLORIDE 75 MILLILITER(S): 5 INJECTION, SOLUTION INTRAVENOUS at 06:49

## 2022-02-18 RX ADMIN — OXYCODONE HYDROCHLORIDE 5 MILLIGRAM(S): 5 TABLET ORAL at 06:30

## 2022-02-18 RX ADMIN — Medication 500000 UNIT(S): at 05:13

## 2022-02-18 RX ADMIN — Medication 500000 UNIT(S): at 22:27

## 2022-02-18 RX ADMIN — Medication 650 MILLIGRAM(S): at 13:00

## 2022-02-18 RX ADMIN — OXYCODONE HYDROCHLORIDE 10 MILLIGRAM(S): 5 TABLET ORAL at 10:47

## 2022-02-18 RX ADMIN — Medication 50 MILLIGRAM(S): at 14:43

## 2022-02-18 RX ADMIN — Medication 650 MILLIGRAM(S): at 22:51

## 2022-02-18 RX ADMIN — SODIUM CHLORIDE 3 GRAM(S): 9 INJECTION INTRAMUSCULAR; INTRAVENOUS; SUBCUTANEOUS at 05:13

## 2022-02-18 RX ADMIN — Medication 50 MILLIGRAM(S): at 05:13

## 2022-02-18 RX ADMIN — SODIUM CHLORIDE 1000 MILLILITER(S): 9 INJECTION INTRAMUSCULAR; INTRAVENOUS; SUBCUTANEOUS at 22:00

## 2022-02-18 RX ADMIN — OXYCODONE HYDROCHLORIDE 5 MILLIGRAM(S): 5 TABLET ORAL at 09:30

## 2022-02-18 RX ADMIN — SENNA PLUS 2 TABLET(S): 8.6 TABLET ORAL at 22:58

## 2022-02-18 RX ADMIN — OXYCODONE HYDROCHLORIDE 5 MILLIGRAM(S): 5 TABLET ORAL at 02:32

## 2022-02-18 RX ADMIN — OXYCODONE HYDROCHLORIDE 5 MILLIGRAM(S): 5 TABLET ORAL at 03:00

## 2022-02-18 RX ADMIN — Medication 650 MILLIGRAM(S): at 12:40

## 2022-02-18 NOTE — OCCUPATIONAL THERAPY INITIAL EVALUATION ADULT - MD ORDER
Patient 57 y/o female with PMHx of HTN, pre-DM w/ left thalamic parenchymal hematoma with extensive intraventricular hemorrhage. ICH score 2. NIHSS 18. s/p R frontal large bore EVD placement 2/12. Pt is now hypertensive requiring Cardene, on 2 standing antihypertensive agent. Tachycardic upto 120s. s/p boluses and straight cath for retention without improvement, and pupilary anisocoria.

## 2022-02-18 NOTE — PHYSICAL THERAPY INITIAL EVALUATION ADULT - IMPAIRED TRANSFERS: SIT/STAND, REHAB EVAL
Statement Selected impaired balance/impaired motor control/impaired postural control/decreased strength

## 2022-02-18 NOTE — CHART NOTE - NSCHARTNOTEFT_GEN_A_CORE
Admitting Diagnosis:   Patient is a 56y old  Female who presents with a chief complaint of ICH (18 Feb 2022 06:49)    PAST MEDICAL & SURGICAL HISTORY:  Hypertension  Pre-diabetes    Current Nutrition Order: Minced and Moist diet     PO Intake: Good (%) [   ]  Fair (50-75%) [   ] Poor (<25%) [   ]    GI Issues:   WDL, last BM 2/14   No n/v/d. Started on bowel regimen  No abd distention noted    Pain:  No pain noted at this time    Skin Integrity:  Surgical incision, pal score 13  No edema present  No pressure ulcers noted    Labs:   02-18    140  |  112<H>  |  7   ----------------------------<  150<H>  3.5   |  18<L>  |  0.37<L>    Ca    8.5      18 Feb 2022 02:00  Phos  2.4     02-18  Mg     1.8     02-18    TPro  7.2  /  Alb  4.0  /  TBili  1.0  /  DBili  0.2  /  AST  29  /  ALT  25  /  AlkPhos  88  02-16    Medications:  MEDICATIONS  (STANDING):  amLODIPine   Tablet 10 milliGRAM(s) Oral daily  enoxaparin Injectable 40 milliGRAM(s) SubCutaneous every 24 hours  hydrALAZINE 50 milliGRAM(s) Oral every 8 hours  influenza   Vaccine 0.5 milliLiter(s) IntraMuscular once  losartan 100 milliGRAM(s) Oral daily  niCARdipine Infusion 5 mG/Hr (25 mL/Hr) IV Continuous <Continuous>  nystatin    Suspension 070838 Unit(s) Oral three times a day  polyethylene glycol 3350 17 Gram(s) Oral every 12 hours  senna 2 Tablet(s) Oral at bedtime  sodium chloride 3 Gram(s) Oral every 6 hours  sodium chloride 3%. 500 milliLiter(s) (75 mL/Hr) IV Continuous <Continuous>    MEDICATIONS  (PRN):  acetaminophen    Suspension .. 650 milliGRAM(s) Oral every 6 hours PRN Temp greater or equal to 38C (100.4F), Mild Pain (1 - 3)  albuterol/ipratropium for Nebulization 3 milliLiter(s) Nebulizer every 6 hours PRN Shortness of Breath and/or Wheezing  hydrALAZINE Injectable 10 milliGRAM(s) IV Push every 4 hours PRN SBP>140  metoclopramide Injectable 10 milliGRAM(s) IV Push every 8 hours PRN nausea  oxyCODONE    IR 5 milliGRAM(s) Oral every 6 hours PRN Moderate Pain (4 - 6)  oxyCODONE    IR 10 milliGRAM(s) Oral every 6 hours PRN Severe Pain (7 - 10)    Admitting Anthropometrics:  · Height for BMI (FEET)	5 Feet  · Height for BMI (INCHES)	1 Inch(s)  · Height for BMI (CENTIMETERS)	154.94 Centimeter(s)  · Weight for BMI (lbs)	167 lb  · Weight for BMI (kg)	75.7 kg  · Body Mass Index	31.5   · Ideal Body Weight (lbs)	105   · Ideal Body Weight (kg)	47.6     Weight:  2/12 167lbs  2/18 160lbs    Weight Change: Pt with a noted -7lb/ 4% weight loss over 1 week likely 2/2 inadequate energy intake. Please cont to trend weights weekly.     Nutrition Focused Physical Exam: Completed [   ]  Not Pertinent [ x  ]  Pt meets ASPEN guidelines for Moderate Malnutrition based on two criteria. Please see subjective from 2/18 for further details.     Estimated energy needs:   Ideal body weight used for calculations as pt >120% of IBW (%IBW: 159). Adjusted for age, slight increase PRO for critically ill, wound healing, suspected malnutrition.   Kcal (25-30 kcal/kg): 0921-9989 kcal  Protein (1.2-1.4 g/kg pro): 57-67g pro  Fluids (25-30 ml/kg): 9606-9414 ml    Subjective:   55 y/o female with PMHx of HTN, pre-DM presents transferred from [facility] after c/o severe headache and nausea being found down by son covered in vomit. Initial CTH revealed left thalamic parenchymal hematoma with intraventricular hemorrhage. ICH score 2. NIHSS 18. s/p R frontal large bore EVD placement 2/12. Now s/p R frontal EVD placement- dropped 2/13. Pt weaned and extubated 2/14. S/p formal SLP eval 2/16 with recs for NPO + NGT except for tsp of water pending reassessment. Follow Up FEES 2/17 with recs for minced and moist diet. EN d/c 02/17 once diet was advanced.     On assessment, pt resting in bed noted to be confused, but mental status improving per team. Currently on Minced and Moist diet tolerating PO.     Per new weight obtained 2/18, pt with a noted -7lbs/ 4% weight loss over 1 week. Suspect weight loss due to varying EN goal rates, and being NPO x4 days. Suspect meeting <75% est needs x1 week. NFPE was unremarkable. Per ASPEN guidelines, pt meets criteria for moderate malnutrition.     Previous Nutrition Diagnosis: Increased Nutrient Needs RT increased physical demand AEB intubated, s/p R frontal EVD.     Active [   ]  Resolved [ x  ]    If resolved, new PES: Moderate PCM RT suspected inadequate energy intake AEB meeting <75% est needs x1 week, -7lb/ 4% wt loss x1 week.     Goal/Expected Outcome Pt to meet >75% EER with good tolerance via tolerable route.    Recommendations:  1. Cont with Minced and Moist diet  >>Recommend addition of   2. Recommend addition of MVI daily  3. Pain and bowel regimen per team   4. Cont to monitor lytes and replete prn   5. RD diet edu prn    Education:     Risk Level: High [ x  ] Moderate [   ] Low [   ] Scheduled with Alvaro   Admitting Diagnosis:   Patient is a 56y old  Female who presents with a chief complaint of ICH (18 Feb 2022 06:49)    PAST MEDICAL & SURGICAL HISTORY:  Hypertension  Pre-diabetes    Current Nutrition Order: Minced and Moist diet     PO Intake: Good (%) [   ]  Fair (50-75%) [   ] Poor (<25%) [ x  ]    GI Issues:   WDL, last BM 2/14   No n/v/d. Started on bowel regimen  No abd distention noted    Pain:  No pain noted at this time    Skin Integrity:  Surgical incision, pal score 13  No edema present  No pressure ulcers noted    Labs:   02-18    140  |  112<H>  |  7   ----------------------------<  150<H>  3.5   |  18<L>  |  0.37<L>    Ca    8.5      18 Feb 2022 02:00  Phos  2.4     02-18  Mg     1.8     02-18    TPro  7.2  /  Alb  4.0  /  TBili  1.0  /  DBili  0.2  /  AST  29  /  ALT  25  /  AlkPhos  88  02-16    Medications:  MEDICATIONS  (STANDING):  amLODIPine   Tablet 10 milliGRAM(s) Oral daily  enoxaparin Injectable 40 milliGRAM(s) SubCutaneous every 24 hours  hydrALAZINE 50 milliGRAM(s) Oral every 8 hours  influenza   Vaccine 0.5 milliLiter(s) IntraMuscular once  losartan 100 milliGRAM(s) Oral daily  niCARdipine Infusion 5 mG/Hr (25 mL/Hr) IV Continuous <Continuous>  nystatin    Suspension 845342 Unit(s) Oral three times a day  polyethylene glycol 3350 17 Gram(s) Oral every 12 hours  senna 2 Tablet(s) Oral at bedtime  sodium chloride 3 Gram(s) Oral every 6 hours  sodium chloride 3%. 500 milliLiter(s) (75 mL/Hr) IV Continuous <Continuous>    MEDICATIONS  (PRN):  acetaminophen    Suspension .. 650 milliGRAM(s) Oral every 6 hours PRN Temp greater or equal to 38C (100.4F), Mild Pain (1 - 3)  albuterol/ipratropium for Nebulization 3 milliLiter(s) Nebulizer every 6 hours PRN Shortness of Breath and/or Wheezing  hydrALAZINE Injectable 10 milliGRAM(s) IV Push every 4 hours PRN SBP>140  metoclopramide Injectable 10 milliGRAM(s) IV Push every 8 hours PRN nausea  oxyCODONE    IR 5 milliGRAM(s) Oral every 6 hours PRN Moderate Pain (4 - 6)  oxyCODONE    IR 10 milliGRAM(s) Oral every 6 hours PRN Severe Pain (7 - 10)    Admitting Anthropometrics:  · Height for BMI (FEET)	5 Feet  · Height for BMI (INCHES)	1 Inch(s)  · Height for BMI (CENTIMETERS)	154.94 Centimeter(s)  · Weight for BMI (lbs)	167 lb  · Weight for BMI (kg)	75.7 kg  · Body Mass Index	31.5   · Ideal Body Weight (lbs)	105   · Ideal Body Weight (kg)	47.6     Weight:  2/12 167lbs  2/18 160lbs    Weight Change: Pt with a noted -7lb/ 4% weight loss over 1 week likely 2/2 inadequate energy intake. Please cont to trend weights weekly.     Nutrition Focused Physical Exam: Completed [   ]  Not Pertinent [ x  ]  Pt meets ASPEN guidelines for Moderate Malnutrition based on two criteria. Please see subjective from 2/18 for further details.     Estimated energy needs:   Ideal body weight used for calculations as pt >120% of IBW (%IBW: 159). Adjusted for age, slight increase PRO for critically ill, wound healing, suspected malnutrition.   Kcal (25-30 kcal/kg): 8825-0150 kcal  Protein (1.2-1.4 g/kg pro): 57-67g pro  Fluids (25-30 ml/kg): 1106-6760 ml    Subjective:   57 y/o female with PMHx of HTN, pre-DM presents transferred from [facility] after c/o severe headache and nausea being found down by son covered in vomit. Initial CTH revealed left thalamic parenchymal hematoma with intraventricular hemorrhage. ICH score 2. NIHSS 18. s/p R frontal large bore EVD placement 2/12. Now s/p R frontal EVD placement- dropped 2/13. Pt weaned and extubated 2/14. S/p formal SLP eval 2/16 with recs for NPO + NGT except for tsp of water pending reassessment. Follow Up FEES 2/17 with recs for minced and moist diet. EN d/c 02/17 once diet was advanced.     On assessment, pt resting in bed soundly sleeping with daughter at bedside. Pt mental status improving per team. Currently on Minced and Moist diet tolerating PO. Pt had minimal PO intake this am per team, and refused to eat lunch as she wanted to sleep. Daughter and RN will attempt to feed pt once she wakes up. No n/v/d/c. No Abd distention. Per new weight obtained 2/18, pt with a noted -7lbs/ 4% weight loss over 1 week. Suspect weight loss due to varying EN goal rates, and being NPO x4 days. Suspect meeting <75% est needs x1 week. NFPE was unremarkable. Per ASPEN guidelines, pt meets criteria for moderate malnutrition. Please see nutr recs below. RD to follow.     Previous Nutrition Diagnosis: Increased Nutrient Needs RT increased physical demand AEB intubated, s/p R frontal EVD.     Active [   ]  Resolved [ x  ]    If resolved, new PES: Moderate PCM RT suspected inadequate energy intake AEB meeting <75% est needs x1 week, -7lb/ 4% wt loss x1 week.     Goal/Expected Outcome Pt to meet >75% EER with good tolerance via tolerable route.    Recommendations:  1. Cont with Minced and Moist diet  >>Recommend addition of Ensure Enlive BID (700 kcal, 40g protein, 360 mL free H2O)  2. Recommend addition of MVI daily  3. Pain and bowel regimen per team   4. Cont to monitor lytes and replete prn   5. RD diet edu prn    Education: Deferred at this time.     Risk Level: High [ x  ] Moderate [   ] Low [   ]

## 2022-02-18 NOTE — OCCUPATIONAL THERAPY INITIAL EVALUATION ADULT - PLANNED THERAPY INTERVENTIONS, OT EVAL
ADL retraining/balance training/bed mobility training/fine motor coordination training/neuromuscular re-education/strengthening/transfer training

## 2022-02-18 NOTE — PHYSICAL THERAPY INITIAL EVALUATION ADULT - MANUAL MUSCLE TESTING RESULTS, REHAB EVAL
R LE: knee ext 3+/5, knee flex 3+/5, hip flex - pt unable to perform in sitting, DF 3+/5, PF 5/5. See OT note for UE. Left LE 5/5

## 2022-02-18 NOTE — OCCUPATIONAL THERAPY INITIAL EVALUATION ADULT - MANUAL MUSCLE TESTING RESULTS, REHAB EVAL
LUE/BLE 4+/5, RUE shoulder/elbow flexion 2-/5, wrist flexion/extension/supination/pronation 3/5, R hand grasp 3/5, L hand grasp 4+/5

## 2022-02-18 NOTE — PROCEDURE NOTE - NSPROCDETAILS_GEN_ALL_CORE
ultrasound-guided/location identified, draped/prepped, sterile technique used/blood seen on insertion/dressing applied/flushes easily/secured in place/sterile technique, catheter placed

## 2022-02-18 NOTE — OCCUPATIONAL THERAPY INITIAL EVALUATION ADULT - FINE MOTOR COORDINATION, LEFT HAND, FINGER TO NOSE, OT EVAL
Difficulty with motor planning and following multistep commands however able to perform 3/3/mild impairment

## 2022-02-18 NOTE — OCCUPATIONAL THERAPY INITIAL EVALUATION ADULT - COORDINATION ASSESSED, REHAB EVAL
3/3 with LUE however difficulty following multistep commands unable to rotate forearm to touch therapist finger, DANNI with RUE/finger to nose

## 2022-02-18 NOTE — OCCUPATIONAL THERAPY INITIAL EVALUATION ADULT - SENSORY TESTS
Visual fields are full to confrontation, H and Quad test intact, Eye movements are intact without nystagmus, Pupils equally round and reactive to light, facial sensation V1-V3 equal and intact, face is symmetric with normal eye closure and smile, tongue protrudes midlines, shoulder shrugs intact, puffing cheeks intact, b/l eye brow shrugs intact, hearing bilaterally with rubs intact

## 2022-02-18 NOTE — PHYSICAL THERAPY INITIAL EVALUATION ADULT - PERTINENT HX OF CURRENT PROBLEM, REHAB EVAL
56F w/ PMHx of HTN, pre-DM pres xfer from [facility] for intracranial hemorrhage. As per [place] ED provider and son, patient called son around 7:30-8:00AM c/o severe headache and nausea. Son immediately rushed to her house and found her out of bed, moaning and covered in her own vomit. 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 Patient was intubated for airway support and transferred to [facility] for further management, S/p R frontal EVD placedment

## 2022-02-18 NOTE — CONSULT NOTE ADULT - ASSESSMENT
57 yo F with HTN with L thalamic bleed (likely hypertensive) with fever, Enterococcal bacteremia, etiology unclear.  She had urinary retention requiring straight catheterization – could be from urine - now voiding well without urinary symptoms.  Otherwise, would evaluate for intra-abdominal source.  Liver enzymes were normal on 2/16, she is s/p cholecystectomy and has no abd signs/symptoms - low suspicion for biliary source.  Has never had colonoscopy.  No central lines.    Suggest:  - Please send differential with CBCs  - CMP with next labs  - F/U blood culture from 2/16 for ID of Enterococcus and susceptibilities  - F/U blood cultures X 2 from today  - F/U urine culture from today.  If negative, would consider CT A/P w/wo IVC.  - F/U CSF culture from 2/17.    - Start vancomycin 1 g IV q12h.  Trough prior to 4th administration.  Goal 13-17  Recommendations discussed with primary team.  Will follow with you – ID Team 2.  Dr. Kali Escalante will cover from [facility] through 2/21, I will return 2/22.   57 yo F with HTN with L thalamic bleed (likely hypertensive) with fever, Enterococcal bacteremia, etiology unclear.  She had urinary retention requiring straight catheterization – could be from urine - now voiding well without urinary symptoms.  Otherwise, would evaluate for intra-abdominal source.  Liver enzymes were normal on 2/16, she is s/p cholecystectomy and has no abd signs/symptoms - low suspicion for biliary source.  Has never had colonoscopy.  No central lines.  Suspect CNS isolate in blood culture is contaminant but hard to tell with one set of blood cultures.  Suggest:  - Please send differential with CBCs  - CMP with next labs  - F/U blood culture from 2/16 for ID of Enterococcus and susceptibilities  - F/U blood cultures X 2 from today  - F/U urine culture from today.  If negative, would consider CT A/P w/wo IVC.  - F/U CSF culture from 2/17.    - Start vancomycin 1 g IV q12h.  Trough prior to 4th administration.  Goal 13-17  Recommendations discussed with primary team.  Will follow with you – ID Team 2.  Dr. Kali Escalante will cover from [facility] through 2/21, I will return 2/22.   55 yo F with HTN with L thalamic bleed (likely hypertensive) with fever, Enterococcal bacteremia, etiology unclear.  She had urinary retention requiring straight catheterization – could be from urine - now voiding well without urinary symptoms.  Otherwise, would evaluate for intra-abdominal source.  Liver enzymes were normal on 2/16, she is s/p cholecystectomy and has no abd signs/symptoms - low suspicion for biliary source.  Has never had colonoscopy.  No central lines.  Suspect CNS isolate in blood culture is contaminant but hard to tell with one set of blood cultures.  Suggest:  - Please send differential with CBCs  - CMP with next labs  - F/U blood culture from 2/16 for ID of Enterococcus and susceptibilities  - F/U blood cultures X 2 from today  - F/U urine culture from today.  If negative, would consider CT A/P w/wo IVC.  - F/U CSF culture from 2/17.    - TTE  - Start vancomycin 1 g IV q12h.  Trough prior to 4th administration.  Goal 13-17  Recommendations discussed with primary team.  Will follow with you – ID Team 2.  Dr. Kali Escalante will cover from [facility] through 2/21, I will return 2/22.

## 2022-02-18 NOTE — PROGRESS NOTE ADULT - ASSESSMENT
ASSESSMENT/PLAN: L thalamic bleed with extensive IVH and 5mm L to R MLS.  Likely hypertensive.  BD7 ICH score 2    NEURO:  Q1 neurochecks  CT/CTA: Stable. No vessel abnormality noted.   Repeat CT head 2/18  Hydrocephalus; EVD@ 5cmH2O. Monitor ICP/output. ICPs wnl  Sedation: None. Analgesia: Tylenol/ oxycodone  Stroke core measures. Stroke neurology consult.   Activity: [] mobilize as tolerated [] Bedrest [x] PT [x] OT [] PMNR    PULM:  CT PE protocol re: tachycardia. No PE. Does have bilateral consolidation ?atelectasis.  Weaned from HFNC ->Now on room air.   Encourage incentive spirometry     CV: Tachycardia (rule out PE vs 2/2 pain/anxiety/fever)  SBP goal 100-160  On cardene. On amlodipine 10mg (new med) losartan 100mg (home med), hydralazine 50mg tid.  Wean cardene to off as tolerated  TTE with bubble study EF 65-70%    RENAL:  Fluids: 3% @75ml/hr. Salt tabs 3g Q6  Na 145- 150. Follow up BMPs.  Primafit. Is/Os. Supplement lytes    GI:   Diet: NGT feeds. On glucerna at goal 45cc/hr   S/S following.  Minced and moist  GI prophylaxis [x] not indicated [] PPI [] other:  Bowel regimen [] colace [x] senna [] other. LBM: 2/16    ENDO: Hyperlipidemia, prediabetes.  Goal euglycemia (-180)  A1c 5.8, TSH 0.39    HEME/ONC:  CBC Stable  VTE prophylaxis: [x] SCDs [x] Lovenox. Anti Xa level 0.23  LE dopplers neg. CTPE negative    ID: Persistently febrile  UA equivocal. Procal neg. CTA-? consolidation vs atelectasis. No WBC count.  Follow up CSF  Defer antibiotics at this time until more culture data obtained.    MISC:    SOCIAL/FAMILY:  [x] awaiting [] updated  [] family meeting    CODE STATUS:  [x] Full Code [] DNR [] DNI [] Palliative/Comfort Care    DISPOSITION:  [x] ICU [] Stroke Unit [] Floor [] EMU [] RCU [] PCU    [x] Patient is at high risk of neurologic deterioration/death due to: hydrocephalus, hemorrhage, ICU delirium    Time spent: 40 critical care minutes.     ASSESSMENT/PLAN: L thalamic bleed with extensive IVH and 5mm L to R MLS.  Likely hypertensive.  BD7 ICH score 2    NEURO:  Q1 neurochecks  CT/CTA: Stable. No vessel abnormality noted.   Repeat CT head 2/18: Stable vent size- decreased blood in R ventricle   Hydrocephalus; EVD@ 5cmH2O. Monitor ICP/output. ICPs wnl.  Sedation: None. Analgesia: Tylenol/ oxycodone  Stroke core measures. Stroke neurology consult.   Activity: [] mobilize as tolerated [] Bedrest [x] PT [x] OT [] PMNR    PULM:  CT PE protocol re: tachycardia. No PE. Does have bilateral consolidation ?atelectasis.  Weaned from HFNC ->Now on room air.   Encourage incentive spirometry     CV: Tachycardia (rule out PE vs 2/2 pain/anxiety/fever)  SBP goal 100-160  On cardene. On amlodipine 10mg (new med) losartan 100mg (home med), hydralazine 50mg tid.  Add labetalol.   Wean cardene to off as tolerated  TTE with bubble study EF 65-70%    RENAL:  Fluids: 3% @75ml/hr. Salt tabs 3g Q6  Na 145- 150. Follow up BMPs.   Primafit. Is/Os. Supplement lytes    GI:   Diet: NGT feeds. On glucerna at goal 45cc/hr   Minced and moist  GI prophylaxis [x] not indicated [] PPI [] other:  Bowel regimen [] colace [x] senna [] other. LBM: 2/17    ENDO: Hyperlipidemia, prediabetes.  Goal euglycemia (-180)  A1c 5.8, TSH 0.39    HEME/ONC:  CBC Stable  VTE prophylaxis: [x] SCDs [x] Lovenox. Anti Xa level 0.23  LE dopplers neg. CTPE negative    ID: Persistently febrile. GPC in clusters 2/16. Repeat.   UA equivocal. Procal neg. CTA-? consolidation vs atelectasis. No WBC count.  Follow up CSF culture 2/18  Defer antibiotics at this time until more culture data obtained.  MRSA nares     MISC:  LUE warm compresses for localised phlebitis.     SOCIAL/FAMILY:  [x] awaiting [] updated  [] family meeting    CODE STATUS:  [x] Full Code [] DNR [] DNI [] Palliative/Comfort Care    DISPOSITION:  [x] ICU [] Stroke Unit [] Floor [] EMU [] RCU [] PCU    [x] Patient is at high risk of neurologic deterioration/death due to: hydrocephalus, hemorrhage, ICU delirium    Time spent: 40 critical care minutes.     ASSESSMENT/PLAN: L thalamic bleed with extensive IVH and 5mm L to R MLS.  Likely hypertensive.  BD7 ICH score 2    NEURO:  Q1 neurochecks  Hydrocephalus; EVD@ 5cmH2O. Monitor ICP/output. ICPs wnl.  Repeat CT head 2/18: Stable vent size- decreased blood in R ventricle   Analgesia: Tylenol/ oxycodone  Stroke core measures (hold statin for now given bleed). Stroke neurology following.  Activity: [] mobilize as tolerated [] Bedrest [x] PT [x] OT [] PMNR    PULM:  CT PE protocol neg PE. Does have bilateral consolidation ?atelectasis.  Weaned from HFNC ->Now on room air.   Encourage incentive spirometry     CV: Tachycardia (rule out PE vs 2/2 pain/anxiety/fever)  SBP goal 100-160  On cardene. On amlodipine 10mg (new med) losartan 100mg (home med), hydralazine 50mg tid.  Add labetalol. Treat underlying factors above. Wean cardene to off as tolerated  TTE with bubble study EF 65-70%    RENAL:  Fluids: 3% @75ml/hr. Salt tabs 3g Q6  Na 145- 150. Follow up BMPs Q6.   Primafit. Is/Os. Supplement lytes    GI:   Diet: Minced and moist  GI prophylaxis [x] not indicated [] PPI [] other:  Bowel regimen [] colace [x] senna [] other. LBM: 2/17    ENDO: Hyperlipidemia, prediabetes.  Goal euglycemia (-180)  A1c 5.8, TSH 0.39    HEME/ONC:  CBC Stable  VTE prophylaxis: [x] SCDs [x] Lovenox. Anti Xa level 0.23  LE dopplers neg. CTPE negative    ID: GPC in clusters 2/16 (in one bottle- likely contaminant). Repeat pending.   UA equivocal. Procal neg. CTA-? consolidation vs atelectasis. No WBC count.  Follow up CSF culture 2/18  Defer antibiotics at this time until more culture data obtained.  Check MRSA nares     MISC:  LUE warm compresses for localised phlebitis.     SOCIAL/FAMILY:  [x] awaiting [] updated  [] family meeting    CODE STATUS:  [x] Full Code [] DNR [] DNI [] Palliative/Comfort Care    DISPOSITION:  [x] ICU [] Stroke Unit [] Floor [] EMU [] RCU [] PCU    [x] Patient is at high risk of neurologic deterioration/death due to: hydrocephalus, hemorrhage, ICU delirium    Time spent: 40 critical care minutes.     ASSESSMENT/PLAN: L thalamic bleed with extensive IVH and 5mm L to R MLS.  Likely hypertensive.  BD7 ICH score 2    NEURO:  Q1 neurochecks  Hydrocephalus; EVD@ 5cmH2O. Monitor ICP/output. ICPs wnl.  Repeat CT head 2/18: Stable vent size- decreased blood in R ventricle   Analgesia: Tylenol/ oxycodone  Stroke core measures (hold statin for now given bleed). Stroke neurology following.  Activity: [] mobilize as tolerated [] Bedrest [x] PT [x] OT [] PMNR    PULM:  CT PE protocol neg PE. Does have bilateral consolidation ?atelectasis.  Weaned from HFNC ->Now on room air.   Encourage incentive spirometry     CV: Tachycardia (rule out PE vs 2/2 pain/anxiety/fever)  SBP goal 100-160  On cardene. On amlodipine 10mg (new med) losartan 100mg (home med), hydralazine 50mg tid.  Add labetalol. Treat underlying factors above. Wean cardene to off as tolerated  TTE with bubble study EF 65-70%    RENAL:  Fluids: 3% @75ml/hr. Salt tabs 3g Q6  Na 145- 150. Follow up BMPs Q6.   Primafit. Is/Os. Supplement lytes    GI:   Diet: Minced and moist  GI prophylaxis [x] not indicated [] PPI [] other:  Bowel regimen [] colace [x] senna [] other. LBM: 2/17    ENDO: Hyperlipidemia, prediabetes.  Goal euglycemia (-180)  A1c 5.8, TSH 0.39    HEME/ONC:  CBC Stable  VTE prophylaxis: [x] SCDs [x] Lovenox. Anti Xa level 0.23  LE dopplers neg. CTPE negative    ID: GPC in clusters 2/16 (in one bottle- likely contaminant). Repeat pending.   UA equivocal. Procal neg. CTA-? consolidation vs atelectasis. No WBC count.  Follow up CSF culture 2/18  Defer antibiotics at this time until more culture data obtained.  Check MRSA nares     MISC:  LUE warm compresses for localised phlebitis.     SOCIAL/FAMILY:  [x] awaiting [] updated  [] family meeting    CODE STATUS:  [x] Full Code [] DNR [] DNI [] Palliative/Comfort Care    DISPOSITION:  [x] ICU [] Stroke Unit [] Floor [] EMU [] RCU [] PCU    [x] Patient is at high risk of neurologic deterioration/death due to: hydrocephalus, hemorrhage, ICU delirium    Time spent: 40 critical care minutes.

## 2022-02-18 NOTE — OCCUPATIONAL THERAPY INITIAL EVALUATION ADULT - ADDITIONAL COMMENTS
Patient reports living alone in a private home with 1 BRUNO and no steps inside. Patient states she was independent with all ADL's, IADL's, and functional mobility with no AD prior to admission. Patient is R hand dominant.

## 2022-02-18 NOTE — OCCUPATIONAL THERAPY INITIAL EVALUATION ADULT - MODIFIED CLINICAL TEST OF SENSORY INTEGRATION IN BALANCE TEST
Patient functionally side stepped x 5 to the R with b/l HH Mod A x 2 persons- patient noted with R foot eversion, difficulty weight shifting and initiating movement with RLE. Patient unable to initiate steps with b/l HH Mod A x 2 persons- patient noted with R foot eversion, difficulty weight shifting and initiating movement with RLE.

## 2022-02-18 NOTE — OCCUPATIONAL THERAPY INITIAL EVALUATION ADULT - LEVEL OF INDEPENDENCE: GROOMING, OT EVAL
Patient sat at EOB and performed wiping face/forehead with LUE using wet cloth with CGA/contact guard

## 2022-02-18 NOTE — OCCUPATIONAL THERAPY INITIAL EVALUATION ADULT - DIAGNOSIS, OT EVAL
Patient s/p R Frontal EVD on 2/13, BD 7, presents lethargic, difficulty maintaining eyes opened, c/o of head discomfort 6/10 throughout, decreased RUE/RLE muscle strength, trunk control, balance and activity tolerance impacting independence with functional activities and mobility. MRS 5. Patient s/p R Frontal EVD on 2/13, BD 7, presents lethargic, difficulty maintaining eyes opened, c/o of head discomfort 6/10 throughout, decreased RUE/RLE muscle strength, trunk control, balance and activity tolerance impacting independence with functional activities and mobility.

## 2022-02-18 NOTE — PROGRESS NOTE ADULT - SUBJECTIVE AND OBJECTIVE BOX
EVENTS:   No acute events overnight.    VITALS:  T(C): , Max: 38.9 (02-18-22 @ 16:00)  HR:  (84 - 111)  BP:  (137/69 - 153/96)  ABP:  (102/62 - 173/73)  RR:  (20 - 31)  SpO2:  (94% - 99%)  Wt(kg): --      02-17-22 @ 07:01  -  02-18-22 @ 07:00  --------------------------------------------------------  IN: 4695 mL / OUT: 2296 mL / NET: 2399 mL    02-18-22 @ 07:01  -  02-18-22 @ 20:02  --------------------------------------------------------  IN: 2445 mL / OUT: 1922 mL / NET: 523 mL      LABS:  Na: 137 (02-18 @ 18:28), 137 (02-18 @ 11:43), 140 (02-18 @ 02:00), 141 (02-17 @ 18:01), 146 (02-17 @ 09:49), 141 (02-17 @ 02:16), 142 (02-16 @ 22:32), 139 (02-16 @ 15:49), 140 (02-16 @ 05:22)  K: 3.8 (02-18 @ 18:28), 4.0 (02-18 @ 11:43), 3.5 (02-18 @ 02:00), 3.6 (02-17 @ 18:01), 4.0 (02-17 @ 09:49), 3.7 (02-17 @ 02:16), 3.9 (02-16 @ 22:32), 3.9 (02-16 @ 15:49), 3.2 (02-16 @ 05:22)  Cl: 109 (02-18 @ 18:28), 109 (02-18 @ 11:43), 112 (02-18 @ 02:00), 110 (02-17 @ 18:01), 117 (02-17 @ 09:49), 110 (02-17 @ 02:16), 111 (02-16 @ 22:32), 110 (02-16 @ 15:49), 110 (02-16 @ 05:22)  CO2: 16 (02-18 @ 18:28), 17 (02-18 @ 11:43), 18 (02-18 @ 02:00), 18 (02-17 @ 18:01), 19 (02-17 @ 09:49), 20 (02-17 @ 02:16), 18 (02-16 @ 22:32), 20 (02-16 @ 15:49), 18 (02-16 @ 05:22)  BUN: 6 (02-18 @ 18:28), 7 (02-18 @ 11:43), 7 (02-18 @ 02:00), 8 (02-17 @ 18:01), 9 (02-17 @ 09:49), 8 (02-17 @ 02:16), 8 (02-16 @ 22:32), 9 (02-16 @ 15:49), 7 (02-16 @ 05:22)  Cr: 0.40 (02-18 @ 18:28), 0.37 (02-18 @ 11:43), 0.37 (02-18 @ 02:00), 0.35 (02-17 @ 18:01), 0.39 (02-17 @ 09:49), 0.37 (02-17 @ 02:16), 0.38 (02-16 @ 22:32), 0.40 (02-16 @ 15:49), 0.38 (02-16 @ 05:22)  Glu: 161(02-18 @ 18:28), 167(02-18 @ 11:43), 150(02-18 @ 02:00), 160(02-17 @ 18:01), 183(02-17 @ 09:49), 159(02-17 @ 02:16), 141(02-16 @ 22:32), 149(02-16 @ 15:49), 159(02-16 @ 05:22)    Hgb: 11.5 (02-18 @ 02:00), 12.8 (02-17 @ 02:16), 12.0 (02-16 @ 05:22)  Hct: 34.7 (02-18 @ 02:00), 38.8 (02-17 @ 02:16), 37.1 (02-16 @ 05:22)  WBC: 10.04 (02-18 @ 02:00), 8.57 (02-17 @ 02:16), 8.82 (02-16 @ 05:22)  Plt: 245 (02-18 @ 02:00), 260 (02-17 @ 02:16), 258 (02-16 @ 05:22)      MEDICATIONS:  acetaminophen    Suspension .. 650 milliGRAM(s) Oral every 6 hours PRN  albuterol/ipratropium for Nebulization 3 milliLiter(s) Nebulizer every 6 hours PRN  amLODIPine   Tablet 10 milliGRAM(s) Oral daily  enoxaparin Injectable 40 milliGRAM(s) SubCutaneous every 24 hours  hydrALAZINE 50 milliGRAM(s) Oral every 8 hours  hydrALAZINE Injectable 10 milliGRAM(s) IV Push every 4 hours PRN  influenza   Vaccine 0.5 milliLiter(s) IntraMuscular once  labetalol 100 milliGRAM(s) Oral every 8 hours  losartan 100 milliGRAM(s) Oral daily  metoclopramide Injectable 10 milliGRAM(s) IV Push every 8 hours PRN  niCARdipine Infusion 5 mG/Hr IV Continuous <Continuous>  nystatin    Suspension 223887 Unit(s) Oral three times a day  oxyCODONE    IR 5 milliGRAM(s) Oral every 6 hours PRN  oxyCODONE    IR 10 milliGRAM(s) Oral every 6 hours PRN  polyethylene glycol 3350 17 Gram(s) Oral every 12 hours  senna 2 Tablet(s) Oral at bedtime  sodium chloride 3 Gram(s) Oral every 6 hours  sodium chloride 0.9% Bolus 500 milliLiter(s) IV Bolus once  sodium chloride 3%. 500 milliLiter(s) IV Continuous <Continuous>  vancomycin  IVPB 1000 milliGRAM(s) IV Intermittent every 12 hours    EXAMINATION:  General: appear comfortable   HEENT:  MMM  Neuro:  opens eyes to voice, oriented to hospital and month but not the year, follows commands, R pupil 4mm, L pupil 2 mm both briskly reactive, EOMI, mild R facial, R triceps 4/5 with encouragement but R arm not AG, R leg antigravity, L arm 5/5, L leg 5/5, sensation intact in all 4 extremities   Cards:  RRR  Respiratory:  no respiratory distress  Abdomen:  soft  Extremities:  no LE edema    Assessment/Plan:   55 yo woman with HTN and pre-DM, admitted 2/12 with headache, found to have a 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 ICH score 2. CTA neg for vascular malformation. S/p R frontal EVD. Intubated, now extubated 2/14. Last HCT 2/15 with improved hydrocephalus but trapped R lateral ventricle and 4mm MLS. Exam stable. Etiology of ICH is likely HTN.    With fevers and BCx 2/16 positive for Enterococcus.    No change to plan from daytime.  c/w vanc for enterococcus bacteremia   SBP goal 100-160  Lov ppx    Tiffanie Smith  Neurocritical Care Attending EVENTS:   No acute events overnight.  CT head today with mild improvement in the size of the L lateral ventricle, stable 4mm MLS.     VITALS:  T(C): , Max: 38.9 (02-18-22 @ 16:00)  HR:  (84 - 111)  BP:  (137/69 - 153/96)  ABP:  (102/62 - 173/73)  RR:  (20 - 31)  SpO2:  (94% - 99%)  Wt(kg): --      02-17-22 @ 07:01  -  02-18-22 @ 07:00  --------------------------------------------------------  IN: 4695 mL / OUT: 2296 mL / NET: 2399 mL    02-18-22 @ 07:01  -  02-18-22 @ 20:02  --------------------------------------------------------  IN: 2445 mL / OUT: 1922 mL / NET: 523 mL      LABS:  Na: 137 (02-18 @ 18:28), 137 (02-18 @ 11:43), 140 (02-18 @ 02:00), 141 (02-17 @ 18:01), 146 (02-17 @ 09:49), 141 (02-17 @ 02:16), 142 (02-16 @ 22:32), 139 (02-16 @ 15:49), 140 (02-16 @ 05:22)  K: 3.8 (02-18 @ 18:28), 4.0 (02-18 @ 11:43), 3.5 (02-18 @ 02:00), 3.6 (02-17 @ 18:01), 4.0 (02-17 @ 09:49), 3.7 (02-17 @ 02:16), 3.9 (02-16 @ 22:32), 3.9 (02-16 @ 15:49), 3.2 (02-16 @ 05:22)  Cl: 109 (02-18 @ 18:28), 109 (02-18 @ 11:43), 112 (02-18 @ 02:00), 110 (02-17 @ 18:01), 117 (02-17 @ 09:49), 110 (02-17 @ 02:16), 111 (02-16 @ 22:32), 110 (02-16 @ 15:49), 110 (02-16 @ 05:22)  CO2: 16 (02-18 @ 18:28), 17 (02-18 @ 11:43), 18 (02-18 @ 02:00), 18 (02-17 @ 18:01), 19 (02-17 @ 09:49), 20 (02-17 @ 02:16), 18 (02-16 @ 22:32), 20 (02-16 @ 15:49), 18 (02-16 @ 05:22)  BUN: 6 (02-18 @ 18:28), 7 (02-18 @ 11:43), 7 (02-18 @ 02:00), 8 (02-17 @ 18:01), 9 (02-17 @ 09:49), 8 (02-17 @ 02:16), 8 (02-16 @ 22:32), 9 (02-16 @ 15:49), 7 (02-16 @ 05:22)  Cr: 0.40 (02-18 @ 18:28), 0.37 (02-18 @ 11:43), 0.37 (02-18 @ 02:00), 0.35 (02-17 @ 18:01), 0.39 (02-17 @ 09:49), 0.37 (02-17 @ 02:16), 0.38 (02-16 @ 22:32), 0.40 (02-16 @ 15:49), 0.38 (02-16 @ 05:22)  Glu: 161(02-18 @ 18:28), 167(02-18 @ 11:43), 150(02-18 @ 02:00), 160(02-17 @ 18:01), 183(02-17 @ 09:49), 159(02-17 @ 02:16), 141(02-16 @ 22:32), 149(02-16 @ 15:49), 159(02-16 @ 05:22)    Hgb: 11.5 (02-18 @ 02:00), 12.8 (02-17 @ 02:16), 12.0 (02-16 @ 05:22)  Hct: 34.7 (02-18 @ 02:00), 38.8 (02-17 @ 02:16), 37.1 (02-16 @ 05:22)  WBC: 10.04 (02-18 @ 02:00), 8.57 (02-17 @ 02:16), 8.82 (02-16 @ 05:22)  Plt: 245 (02-18 @ 02:00), 260 (02-17 @ 02:16), 258 (02-16 @ 05:22)      MEDICATIONS:  acetaminophen    Suspension .. 650 milliGRAM(s) Oral every 6 hours PRN  albuterol/ipratropium for Nebulization 3 milliLiter(s) Nebulizer every 6 hours PRN  amLODIPine   Tablet 10 milliGRAM(s) Oral daily  enoxaparin Injectable 40 milliGRAM(s) SubCutaneous every 24 hours  hydrALAZINE 50 milliGRAM(s) Oral every 8 hours  hydrALAZINE Injectable 10 milliGRAM(s) IV Push every 4 hours PRN  influenza   Vaccine 0.5 milliLiter(s) IntraMuscular once  labetalol 100 milliGRAM(s) Oral every 8 hours  losartan 100 milliGRAM(s) Oral daily  metoclopramide Injectable 10 milliGRAM(s) IV Push every 8 hours PRN  niCARdipine Infusion 5 mG/Hr IV Continuous <Continuous>  nystatin    Suspension 674577 Unit(s) Oral three times a day  oxyCODONE    IR 5 milliGRAM(s) Oral every 6 hours PRN  oxyCODONE    IR 10 milliGRAM(s) Oral every 6 hours PRN  polyethylene glycol 3350 17 Gram(s) Oral every 12 hours  senna 2 Tablet(s) Oral at bedtime  sodium chloride 3 Gram(s) Oral every 6 hours  sodium chloride 0.9% Bolus 500 milliLiter(s) IV Bolus once  sodium chloride 3%. 500 milliLiter(s) IV Continuous <Continuous>  vancomycin  IVPB 1000 milliGRAM(s) IV Intermittent every 12 hours    EXAMINATION:  General: appear comfortable   HEENT:  MMM  Neuro:  opens eyes to voice, oriented to hospital and month but not the year, follows commands, R pupil 4mm, L pupil 2 mm both briskly reactive, EOMI, mild R facial, R triceps 4/5 with encouragement but R arm not AG, R leg antigravity, L arm 5/5, L leg 5/5, sensation intact in all 4 extremities   Cards:  RRR  Respiratory:  no respiratory distress  Abdomen:  soft  Extremities:  no LE edema    Assessment/Plan:   55 yo woman with HTN and pre-DM, admitted 2/12 with headache, found to have a 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 ICH score 2. CTA neg for vascular malformation. S/p R frontal EVD. Intubated, now extubated 2/14. Last HCT 2/15 with improved hydrocephalus but trapped R lateral ventricle and 4mm MLS. Exam stable. Etiology of ICH is likely HTN.    With fevers and BCx 2/16 positive for Enterococcus.    No change to plan from daytime.  c/w vanc for enterococcus bacteremia   SBP goal 100-160  Lov ppx    Tiffanie Smith  Neurocritical Care Attending EVENTS:   No acute events overnight.  CT head today with mild improvement in the size of the L lateral ventricle, stable 4mm MLS.   Febrile today, cultured today with no leukocytosis, neg CSF, procalc wnl, CXR clear. BCx 2/16 positive for Enterococcus, now on Vanc. ID following.   HR improved.   Started on Labetalol 100mg q8h during the day.   EVD at 5mm H2O, ICP 2-16, output for shift 122.  Cardene at     VITALS:  T(C): , Max: 38.9 (02-18-22 @ 16:00)  HR:  (84 - 111)  BP:  (137/69 - 153/96)  ABP:  (102/62 - 173/73)  RR:  (20 - 31)  SpO2:  (94% - 99%)  Wt(kg): --      02-17-22 @ 07:01  -  02-18-22 @ 07:00  --------------------------------------------------------  IN: 4695 mL / OUT: 2296 mL / NET: 2399 mL    02-18-22 @ 07:01  -  02-18-22 @ 20:02  --------------------------------------------------------  IN: 2445 mL / OUT: 1922 mL / NET: 523 mL      LABS:  Na: 137 (02-18 @ 18:28), 137 (02-18 @ 11:43), 140 (02-18 @ 02:00), 141 (02-17 @ 18:01), 146 (02-17 @ 09:49), 141 (02-17 @ 02:16), 142 (02-16 @ 22:32), 139 (02-16 @ 15:49), 140 (02-16 @ 05:22)  K: 3.8 (02-18 @ 18:28), 4.0 (02-18 @ 11:43), 3.5 (02-18 @ 02:00), 3.6 (02-17 @ 18:01), 4.0 (02-17 @ 09:49), 3.7 (02-17 @ 02:16), 3.9 (02-16 @ 22:32), 3.9 (02-16 @ 15:49), 3.2 (02-16 @ 05:22)  Cl: 109 (02-18 @ 18:28), 109 (02-18 @ 11:43), 112 (02-18 @ 02:00), 110 (02-17 @ 18:01), 117 (02-17 @ 09:49), 110 (02-17 @ 02:16), 111 (02-16 @ 22:32), 110 (02-16 @ 15:49), 110 (02-16 @ 05:22)  CO2: 16 (02-18 @ 18:28), 17 (02-18 @ 11:43), 18 (02-18 @ 02:00), 18 (02-17 @ 18:01), 19 (02-17 @ 09:49), 20 (02-17 @ 02:16), 18 (02-16 @ 22:32), 20 (02-16 @ 15:49), 18 (02-16 @ 05:22)  BUN: 6 (02-18 @ 18:28), 7 (02-18 @ 11:43), 7 (02-18 @ 02:00), 8 (02-17 @ 18:01), 9 (02-17 @ 09:49), 8 (02-17 @ 02:16), 8 (02-16 @ 22:32), 9 (02-16 @ 15:49), 7 (02-16 @ 05:22)  Cr: 0.40 (02-18 @ 18:28), 0.37 (02-18 @ 11:43), 0.37 (02-18 @ 02:00), 0.35 (02-17 @ 18:01), 0.39 (02-17 @ 09:49), 0.37 (02-17 @ 02:16), 0.38 (02-16 @ 22:32), 0.40 (02-16 @ 15:49), 0.38 (02-16 @ 05:22)  Glu: 161(02-18 @ 18:28), 167(02-18 @ 11:43), 150(02-18 @ 02:00), 160(02-17 @ 18:01), 183(02-17 @ 09:49), 159(02-17 @ 02:16), 141(02-16 @ 22:32), 149(02-16 @ 15:49), 159(02-16 @ 05:22)    Hgb: 11.5 (02-18 @ 02:00), 12.8 (02-17 @ 02:16), 12.0 (02-16 @ 05:22)  Hct: 34.7 (02-18 @ 02:00), 38.8 (02-17 @ 02:16), 37.1 (02-16 @ 05:22)  WBC: 10.04 (02-18 @ 02:00), 8.57 (02-17 @ 02:16), 8.82 (02-16 @ 05:22)  Plt: 245 (02-18 @ 02:00), 260 (02-17 @ 02:16), 258 (02-16 @ 05:22)      MEDICATIONS:  acetaminophen    Suspension .. 650 milliGRAM(s) Oral every 6 hours PRN  albuterol/ipratropium for Nebulization 3 milliLiter(s) Nebulizer every 6 hours PRN  amLODIPine   Tablet 10 milliGRAM(s) Oral daily  enoxaparin Injectable 40 milliGRAM(s) SubCutaneous every 24 hours  hydrALAZINE 50 milliGRAM(s) Oral every 8 hours  hydrALAZINE Injectable 10 milliGRAM(s) IV Push every 4 hours PRN  influenza   Vaccine 0.5 milliLiter(s) IntraMuscular once  labetalol 100 milliGRAM(s) Oral every 8 hours  losartan 100 milliGRAM(s) Oral daily  metoclopramide Injectable 10 milliGRAM(s) IV Push every 8 hours PRN  niCARdipine Infusion 5 mG/Hr IV Continuous <Continuous>  nystatin    Suspension 920860 Unit(s) Oral three times a day  oxyCODONE    IR 5 milliGRAM(s) Oral every 6 hours PRN  oxyCODONE    IR 10 milliGRAM(s) Oral every 6 hours PRN  polyethylene glycol 3350 17 Gram(s) Oral every 12 hours  senna 2 Tablet(s) Oral at bedtime  sodium chloride 3 Gram(s) Oral every 6 hours  sodium chloride 0.9% Bolus 500 milliLiter(s) IV Bolus once  sodium chloride 3%. 500 milliLiter(s) IV Continuous <Continuous>  vancomycin  IVPB 1000 milliGRAM(s) IV Intermittent every 12 hours    EXAMINATION:  General: appear comfortable   HEENT:  MMM  Neuro:  opens eyes to voice, oriented to hospital and month but not the year, follows commands, R pupil 4mm, L pupil 2 mm both briskly reactive, EOMI, mild R facial, R triceps 4/5 with encouragement but R arm not AG, R leg antigravity, L arm 5/5, L leg 5/5, sensation intact in all 4 extremities   Cards:  RRR  Respiratory:  no respiratory distress  Abdomen:  soft  Extremities:  no LE edema    Assessment/Plan:   57 yo woman with HTN and pre-DM, admitted 2/12 with headache, found to have a 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 ICH score 2. CTA neg for vascular malformation. S/p R frontal EVD. Intubated, now extubated 2/14. Last HCT 2/15 with improved hydrocephalus but trapped R lateral ventricle and 4mm MLS. Exam stable. Etiology of ICH is likely HTN.    With fevers and BCx 2/16 positive for Enterococcus.    No change to plan from daytime.  c/w vanc for enterococcus bacteremia   SBP goal 100-160  c/w 3% at 100cc/hr   Lov ppx    Tiffanie Smith  Neurocritical Care Attending EVENTS:   Patient seen on evening rounds, no acute events.   CT head today with mild improvement in the size of the L lateral ventricle, stable 4mm MLS.   Febrile today, cultured today with no leukocytosis, neg CSF, procalc wnl, CXR clear. BCx 2/16 positive for Enterococcus, now on Vanc. ID following.   HR improved.   Started on Labetalol 100mg q8h during the day.   EVD at 5mm H2O, ICP 2-16, output for shift 122.  Cardene at 6.   Patient denies headache, nausea and abdominal pain.     VITALS:  T(C): , Max: 38.9 (02-18-22 @ 16:00)  HR:  (84 - 111)  BP:  (137/69 - 153/96)  ABP:  (102/62 - 173/73)  RR:  (20 - 31)  SpO2:  (94% - 99%)  Wt(kg): --      02-17-22 @ 07:01  -  02-18-22 @ 07:00  --------------------------------------------------------  IN: 4695 mL / OUT: 2296 mL / NET: 2399 mL    02-18-22 @ 07:01  -  02-18-22 @ 20:02  --------------------------------------------------------  IN: 2445 mL / OUT: 1922 mL / NET: 523 mL      LABS:  Na: 137 (02-18 @ 18:28), 137 (02-18 @ 11:43), 140 (02-18 @ 02:00), 141 (02-17 @ 18:01), 146 (02-17 @ 09:49), 141 (02-17 @ 02:16), 142 (02-16 @ 22:32), 139 (02-16 @ 15:49), 140 (02-16 @ 05:22)  K: 3.8 (02-18 @ 18:28), 4.0 (02-18 @ 11:43), 3.5 (02-18 @ 02:00), 3.6 (02-17 @ 18:01), 4.0 (02-17 @ 09:49), 3.7 (02-17 @ 02:16), 3.9 (02-16 @ 22:32), 3.9 (02-16 @ 15:49), 3.2 (02-16 @ 05:22)  Cl: 109 (02-18 @ 18:28), 109 (02-18 @ 11:43), 112 (02-18 @ 02:00), 110 (02-17 @ 18:01), 117 (02-17 @ 09:49), 110 (02-17 @ 02:16), 111 (02-16 @ 22:32), 110 (02-16 @ 15:49), 110 (02-16 @ 05:22)  CO2: 16 (02-18 @ 18:28), 17 (02-18 @ 11:43), 18 (02-18 @ 02:00), 18 (02-17 @ 18:01), 19 (02-17 @ 09:49), 20 (02-17 @ 02:16), 18 (02-16 @ 22:32), 20 (02-16 @ 15:49), 18 (02-16 @ 05:22)  BUN: 6 (02-18 @ 18:28), 7 (02-18 @ 11:43), 7 (02-18 @ 02:00), 8 (02-17 @ 18:01), 9 (02-17 @ 09:49), 8 (02-17 @ 02:16), 8 (02-16 @ 22:32), 9 (02-16 @ 15:49), 7 (02-16 @ 05:22)  Cr: 0.40 (02-18 @ 18:28), 0.37 (02-18 @ 11:43), 0.37 (02-18 @ 02:00), 0.35 (02-17 @ 18:01), 0.39 (02-17 @ 09:49), 0.37 (02-17 @ 02:16), 0.38 (02-16 @ 22:32), 0.40 (02-16 @ 15:49), 0.38 (02-16 @ 05:22)  Glu: 161(02-18 @ 18:28), 167(02-18 @ 11:43), 150(02-18 @ 02:00), 160(02-17 @ 18:01), 183(02-17 @ 09:49), 159(02-17 @ 02:16), 141(02-16 @ 22:32), 149(02-16 @ 15:49), 159(02-16 @ 05:22)    Hgb: 11.5 (02-18 @ 02:00), 12.8 (02-17 @ 02:16), 12.0 (02-16 @ 05:22)  Hct: 34.7 (02-18 @ 02:00), 38.8 (02-17 @ 02:16), 37.1 (02-16 @ 05:22)  WBC: 10.04 (02-18 @ 02:00), 8.57 (02-17 @ 02:16), 8.82 (02-16 @ 05:22)  Plt: 245 (02-18 @ 02:00), 260 (02-17 @ 02:16), 258 (02-16 @ 05:22)      MEDICATIONS:  acetaminophen    Suspension .. 650 milliGRAM(s) Oral every 6 hours PRN  albuterol/ipratropium for Nebulization 3 milliLiter(s) Nebulizer every 6 hours PRN  amLODIPine   Tablet 10 milliGRAM(s) Oral daily  enoxaparin Injectable 40 milliGRAM(s) SubCutaneous every 24 hours  hydrALAZINE 50 milliGRAM(s) Oral every 8 hours  hydrALAZINE Injectable 10 milliGRAM(s) IV Push every 4 hours PRN  influenza   Vaccine 0.5 milliLiter(s) IntraMuscular once  labetalol 100 milliGRAM(s) Oral every 8 hours  losartan 100 milliGRAM(s) Oral daily  metoclopramide Injectable 10 milliGRAM(s) IV Push every 8 hours PRN  niCARdipine Infusion 5 mG/Hr IV Continuous <Continuous>  nystatin    Suspension 324859 Unit(s) Oral three times a day  oxyCODONE    IR 5 milliGRAM(s) Oral every 6 hours PRN  oxyCODONE    IR 10 milliGRAM(s) Oral every 6 hours PRN  polyethylene glycol 3350 17 Gram(s) Oral every 12 hours  senna 2 Tablet(s) Oral at bedtime  sodium chloride 3 Gram(s) Oral every 6 hours  sodium chloride 0.9% Bolus 500 milliLiter(s) IV Bolus once  sodium chloride 3%. 500 milliLiter(s) IV Continuous <Continuous>  vancomycin  IVPB 1000 milliGRAM(s) IV Intermittent every 12 hours    EXAMINATION:  General: appear comfortable   HEENT:  MMM  Neuro:  alert, oriented to hospital and month but not the year, follows commands, R pupil 3mm, L pupil 2 mm both briskly reactive, EOMI, mild R facial, R biceps 2/5, R triceps 4-/5, able to straighten R fingers, but R arm not AG, R leg moves in plane of the bed, L arm 5/5, L leg 5/5, sensation intact in all 4 extremities   Cards:  RRR  Respiratory:  no respiratory distress  Abdomen:  soft, not distended, not tender  Extremities:  no LE edema. With several warm erythematous area in the L arm concerning for venous clots.    Assessment/Plan:   57 yo woman with HTN and pre-DM, admitted 2/12 with headache, found to have a 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 ICH score 2. CTA neg for vascular malformation. S/p R frontal EVD. Intubated, now extubated 2/14. Last HCT 2/18 with improved hydrocephalus but trapped R lateral ventricle and 4mm MLS. Exam stable. Etiology of ICH is likely HTN.    With fevers and BCx 2/16 positive for Enterococcus, unclear source but possible sources are urine, less likely abdomen. Abdominal exam benign.     No change to plan from daytime except for d/c'ed oxy 10mg because made patient sedated  US of L arm pending for L arm clots   c/w vanc for enterococcus bacteremia   SBP goal 100-160, if still requiring cardene would increase labetalol   c/w 3% at 75cc/hr, BMPs q6h  Lov ppx    Tiffanie Smith  Neurocritical Care Attending

## 2022-02-18 NOTE — OCCUPATIONAL THERAPY INITIAL EVALUATION ADULT - GENERAL OBSERVATIONS, REHAB EVAL
Patient cleared by HYACINTH Hodgson. Patient received semisupine in bed +tele, +EVD (clamped by Yessenia), +R EVD incision w staples C/D/I, +primafit, +IV, +b/l SCD's, room air, NAD. Patient A&Ox4, agreeable and tolerated session fairly. Patient cleared by HYACINTH Hodgson. Patient received semisupine in bed +tele, +EVD (clamped by Yessenia), +R EVD incision w staples C/D/I, +NGT, +b/l wrist restraints, +primafit, +IV, +b/l SCD's, room air, NAD. Patient A&Ox4, agreeable and tolerated session fairly.

## 2022-02-18 NOTE — PROGRESS NOTE ADULT - SUBJECTIVE AND OBJECTIVE BOX
HPI:  57 y/o female with PMHx of HTN, pre-DM presents transferred from [facility] for intracranial hemorrhage. As per [place] ED provider and son, patient called son around 7:30-8:00AM c/o severe headache and nausea. Son immediately rushed to her house and found her out of bed, moaning and covered in her own vomit. During transit, EMS reported SBP within 240s with intractable vomiting and given Zofran 8mg. Upon arrival to [place] ED, GCS 7, SBP within 180s, patient was given Decadron 10mg, Keppra 1g, started on a Cardene drip, Propofol, Mannitol 1mg/kg. 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 Patient was intubated for airway support and transferred to [facility] for further management. ICH2, NIHSS 8.  Denies use of anticoagulants/antiplatelets.  (2022 16:36)    Hospital Course:   : transferred from [place]. R frontal EVD and R radial a-line placed. pend CTH/CTA. s/p 1L bolus for elevated lactate.   : S/p R frontal EVD placedment @[id number] draining well. O/n pt w/ episode of possible storming; tachy, HTN, agitated, temp 100.2F, given 2 versed and 50mcg fentanyl w/ improvement in symptoms Neuro exam stable. Pt remains intubated and sedated, AN L>R. Trend lactate and abg. Amlodipine 5mg QD started for SBP goal < 140, cardene dc'd.  EVD dropped  at 1pm. propanolol and fent standing added for neuro storming, propofol switched to precedex, with resultant hypotension. Propanolol dc'd, fent changed to prn and precedex off, 1 L bolus given, levo prn   : TOSHA overnight. Patient remains on propofol. EVD open at 06bqV0L. ICPs WNL. Neuro exam stable. increased bowel regimen. started SQL. extubated on precedex. given 0.5 Ativan + Fentanyl pushes PRN for agitation. 500cc NS bolus.   2/15: BD#4.  On HFNC d/t desat to 88% on 70% non-rebreather. On 0.5 precedex   2/16: BD5, TOSHA overnight, on 4L NC, s/p vomiting yesterday, TF being held, formal S/S pending, off precedex. EVD open @ 5. Started on 3% at 30, tmax 100.7   : BD6   tachycardic, PE protocol. cardene gtt. neuro unchanged. EVD @ 5cmH2O.   : BD 7. 3% Increased 75 cc/hr. TOSHA o/n.     Vital Signs Last 24 Hrs  T(C): 37.6 (2022 22:04), Max: 38.7 (2022 17:38)  T(F): 99.6 (2022 22:04), Max: 101.7 (2022 17:38)  HR: 105 (2022 01:00) (100 - 123)  BP: --  BP(mean): --  RR: 27 (2022 01:00) (24 - 34)  SpO2: 94% (2022 01:00) (93% - 98%)    I&O's Detail    2022 07:01  -  2022 07:00  --------------------------------------------------------  IN:    Enteral Tube Flush: 820 mL    Glucerna: 815 mL    Lactated Ringers Bolus: 1000 mL    NiCARdipine: 500 mL    NiCARdipine: 175 mL    sodium chloride 0.9%: 225 mL    sodium chloride 3%: 280 mL    sodium chloride 3%: 180 mL    sodium chloride 3%: 500 mL  Total IN: 4495 mL    OUT:    External Ventricular Device (mL): 203 mL    Intermittent Catheterization - Urethral (mL): 725 mL    Voided (mL): 2350 mL  Total OUT: 3278 mL    Total NET: 1217 mL    2022 07:01  -  2022 01:20  --------------------------------------------------------  IN:    Enteral Tube Flush: 225 mL    Glucerna: 585 mL    NiCARdipine: 450 mL    Sodium Chloride 0.9% Bolus: 1000 mL    sodium chloride 3%: 620 mL    sodium chloride 3%: 70 mL    sodium chloride 3%: 450 mL  Total IN: 3400 mL    OUT:    External Ventricular Device (mL): 155 mL    Voided (mL): 2100 mL  Total OUT: 2255 mL    Total NET: 1145 mL    I&O's Summary    2022 07:01  -  2022 07:00  --------------------------------------------------------  IN: 4495 mL / OUT: 3278 mL / NET: 1217 mL    2022 07:01  -  2022 01:20  --------------------------------------------------------  IN: 3400 mL / OUT: 2255 mL / NET: 1145 mL    PHYSICAL EXAM:   General: NAD, pt is comfortably laying in hospital bed   HEENT: Aniscoric pupils, R 4mm and L 3mm   Cardiovascular: RRR, normal S1 and S2   Respiratory: lungs CTAB, no wheezing, rhonchi, or crackles   GI: normoactive BS to auscultation, abd soft, NTND   Neuro: speech clear, A&O x 2 to self and place, awake and alert. AN x4 spontaneously, L>R, R side 4/5, L side 4+/5.   Extremities: distal pulses 2+ x 4    Wound/incision: R frontal large bore EVD incision site with staples C/D/I   EVD @ 5 C/D/I     LABS:                        12.8   8.57  )-----------( 260      ( 2022 02:16 )             38.8     02-17    141  |  110<H>  |  8   ----------------------------<  160<H>  3.6   |  18<L>  |  0.35<L>    Ca    8.8      2022 18:01  Phos  2.0       Mg     2.1         TPro  7.2  /  Alb  4.0  /  TBili  1.0  /  DBili  0.2  /  AST  29  /  ALT  25  /  AlkPhos  88      Urinalysis Basic - ( 2022 05:51 )    Color: Yellow / Appearance: Clear / S.020 / pH: x  Gluc: x / Ketone: NEGATIVE  / Bili: Negative / Urobili: 1.0 E.U./dL   Blood: x / Protein: NEGATIVE mg/dL / Nitrite: NEGATIVE   Leuk Esterase: Trace / RBC: < 5 /HPF / WBC 5-10 /HPF   Sq Epi: x / Non Sq Epi: 0-5 /HPF / Bacteria: Present /HPF    CAPILLARY BLOOD GLUCOSE    Drug Levels: [] N/A    CSF Analysis: [] N/A  RBC Count - Spinal Fluid: 1400 /uL ( @ 12:38)  Glucose, CSF: 106 mg/dL ( @ 12:38)  Protein, CSF: 38 mg/dL ( @ 12:38)    Allergies    No Known Allergies    Intolerances    MEDICATIONS:  Antibiotics:   nystatin Suspension 884813 Unit(s) Oral three times a day    Neuro:   acetaminophen    Suspension .. 650 milliGRAM(s) Oral every 6 hours PRN  metoclopramide Injectable 10 milliGRAM(s) IV Push every 8 hours PRN  oxyCODONE    IR 5 milliGRAM(s) Oral every 6 hours PRN    Anticoagulation:   enoxaparin Injectable 40 milliGRAM(s) SubCutaneous every 24 hours    OTHER:   albuterol/ipratropium for Nebulization 3 milliLiter(s) Nebulizer every 6 hours PRN  amLODIPine   Tablet 10 milliGRAM(s) Oral daily  hydrALAZINE 50 milliGRAM(s) Oral every 8 hours  hydrALAZINE Injectable 10 milliGRAM(s) IV Push every 4 hours PRN  influenza   Vaccine 0.5 milliLiter(s) IntraMuscular once  losartan 100 milliGRAM(s) Oral daily  niCARdipine Infusion 5 mG/Hr IV Continuous <Continuous>  polyethylene glycol 3350 17 Gram(s) Oral every 12 hours  senna 2 Tablet(s) Oral at bedtime    IVF:   sodium chloride 3 Gram(s) Oral every 6 hours  sodium chloride 3%. 500 milliLiter(s) IV Continuous <Continuous>    CULTURES:   Culture Results:   No growth at 1 day. ( @ 18:36)    RADIOLOGY & ADDITIONAL TESTS:    ASSESSMENT:  Patient 57 y/o female with PMHx of HTN, pre-DM w/ left thalamic parenchymal hematoma with extensive intraventricular hemorrhage. ICH score 2. NIHSS 18. s/p R frontal large bore EVD placement . Pt is now hypertensive requiring Cardene, on 2 standing antihypertensive agent. Tachycardic upto 120s. s/p boluses and straight cath for retention without improvement, and pupilary anisocoria.     Plan:   NEURO:   - CT from 2/15 shows that R vent is now collapsed, however L vent still contains casted hemorrhagic clot, MLS is still unchanged at 4mm, with perihematomal edema (L thalamic)   - L trapped Ventricle:  Dr. D'amico aware Continue hypertonics therapy for a Na+ above >145. hyperosmolar therapy as needed   - R frontal large bore EVD open at 5cmH2O, ICPs single digits, trend ICPs and output for now   - pend CTH    - RUE weakness and pupilary anisocoria: expected likely from perihematomal edema   - possible paroxysmal sympathetic hyperacitivity? (tachy/htn..), continue monitoring neuro/vitals q 1   - CTA: no vessel occlusion or aneurysm noted   - Keppra 1g given at [place] , no keppra for now     Cardio:   - SBP goal relaxed to 100-160 on    - Cont weaning cardene off  - Cont. amlodipine 10mg QD, losartan 100mg QD (home med) (2/15-), Hydralazine 50 mg TID (-)   - dc'd lipitor s/p ICH, elevated total cholestrol and LDL   - Echo : significant for symmetric LVH with normal systolic function, EF: 65-70%   - Persistent tachycardia - s/p bolus ?hypovolemia, ?fever, ?pain   - Recent doppler (22) neg for DVT. No arrhythmia noted on EKG. Electrolytes repleted this morning.     PULM:  - Extubated , on RA  - Duonebs PRN   - O2 requirement is coming down   - CT chest : Negative for PE, some consolidation   - IS    Renal:   - Intermittent straight cath for retention   - Cont bladder scan q6hr   - Na goal 145-150  - 3% @ 75/hr, salt tabs 3 g q 6     GI:  - Diet: TF + minced & moist diet  - Bowel regimen, last BM     HEME:  - SCDs/SQL for DVT ppx   - Antixa level : 0.09, pend repeat  @ 2AM  - LE dopplers : neg for DVT     ENDO:  - ISS  - Prediabetic, a1c 5.8   - TSH wnl   - atorvastatin dc'd     ID:  - ? possible aspiration pna- febrile, however no leukocytosis w/ normal procal   - Xray without obvious consolidation. sputum culture not sent (not vented, oral contamination risk)   - Pancx fever w/u (22), CSF sent    - Will hold off empiric abx tx     PSYCH:   - Pt takes xanax at home, however tachycardia is not correlating with anxiety episodes  - Pt was persistently tachy after dilaudid dose earlier, when resting/sleeping     Dispo: ICU status, family updated with plan, pending PT/OT    D/w Dr. D'Amico

## 2022-02-18 NOTE — DIETITIAN NUTRITION RISK NOTIFICATION - ADDITIONAL COMMENTS/DIETITIAN RECOMMENDATIONS
Recommendations:  1. Cont with Minced and Moist diet  >>Recommend addition of Ensure Enlive BID (700 kcal, 40g protein, 360 mL free H2O)  2. Recommend addition of MVI daily  3. Pain and bowel regimen per team   4. Cont to monitor lytes and replete prn   5. RD diet edu prn

## 2022-02-18 NOTE — PROGRESS NOTE ADULT - SUBJECTIVE AND OBJECTIVE BOX
HPI:  55 y/o female with PMHx of HTN, pre-DM presents transferred from [facility] for intracranial hemorrhage. As per [place] ED provider and son, patient called son around 7:30-8:00AM c/o severe headache and nausea. Son immediately rushed to her house and found her out of bed, moaning and covered in her own vomit. During transit, EMS reported SBP within 240s with intractable vomiting and given Zofran 8mg. Upon arrival to [place] ED, GCS 7, SBP within 180s, patient was given Decadron 10mg, Keppra 1g, started on a Cardene drip, Propofol, Mannitol 1mg/kg. 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 Patient was intubated for airway support and transferred to [facility] for further management. ICH2, NIHSS 8.  Denies use of anticoagulants/antiplatelets.  (12 Feb 2022 16:36).    On admission, the patient was:  GCS: 8  Hunt-Rachel:  modified [place]:  ICH score:2  NIHSS:18    Hospital Course:   2/12: Transferred from [place]. R frontal EVD emergently. High pressure. Elevated lactate, bolused. Not following commands.   2/13: Pt remains intubated and sedated. Moving all extremities though not following commands. Concern for storming. Hypotensive on precedex.  2/14: Spontaneous eye opening. Agitated. On propofol- weaning. SAT/SBT. Goal to extubate. EVD decreased to 5cmH2O. Extubated to HFNC  2/15: Anisocoria noted. CT head stable (trapped L vent). Na goal increased 145- 155.  2/17: Persistent tachycardia HR 120s. CT/PE protocol done. Prelim neg. Febrile today 102.7F. CSF and urine cx sent.  2/18:      ROS:   Patient denies pain, discomfort, SOB or any acute complaints.      ICU Vital Signs Last 24 Hrs  T(C): 36.8 (18 Feb 2022 06:01), Max: 38.7 (17 Feb 2022 17:38)  T(F): 98.2 (18 Feb 2022 06:01), Max: 101.7 (17 Feb 2022 17:38)  HR: 109 (18 Feb 2022 04:00) (97 - 123)  ABP: 155/61 (18 Feb 2022 04:00) (118/47 - 169/63)  ABP(mean): 95 (18 Feb 2022 04:00) (71 - 98)  RR: 25 (18 Feb 2022 04:00) (24 - 34)  SpO2: 96% (18 Feb 2022 04:00) (93% - 98%)      02-16-22 @ 07:01  -  02-17-22 @ 07:00  --------------------------------------------------------  IN: 4495 mL / OUT: 3278 mL / NET: 1217 mL    02-17-22 @ 07:01  -  02-18-22 @ 06:51  --------------------------------------------------------  IN: 3556 mL / OUT: 2275 mL / NET: 1281 mL      General:   HEENT:  MMM  Neuro: Awake, alert, oriented to person and hospital (does not know year or month), follows commands, R pupil 3mm, L pupil 2mm both briskly reactive  EOMI, face symmetric, R arm WD to noxious, L arm AG, moves b/l legs briefly AG, sensation intact in all 4 extremities   Cards:  RRR, tachycardic S1/S2  Respiratory: Clear, no wheeze.  Abdomen: Soft, non tender  Extremities:  no LE edema      MEDICATIONS:   acetaminophen    Suspension .. 650 milliGRAM(s) Oral every 6 hours PRN  albuterol/ipratropium for Nebulization 3 milliLiter(s) Nebulizer every 6 hours PRN  amLODIPine   Tablet 10 milliGRAM(s) Oral daily  enoxaparin Injectable 40 milliGRAM(s) SubCutaneous every 24 hours  hydrALAZINE 50 milliGRAM(s) Oral every 8 hours  hydrALAZINE Injectable 10 milliGRAM(s) IV Push every 4 hours PRN  influenza   Vaccine 0.5 milliLiter(s) IntraMuscular once  losartan 100 milliGRAM(s) Oral daily  metoclopramide Injectable 10 milliGRAM(s) IV Push every 8 hours PRN  niCARdipine Infusion 5 mG/Hr (25 mL/Hr) IV Continuous <Continuous>  nystatin    Suspension 878863 Unit(s) Oral three times a day  oxyCODONE    IR 5 milliGRAM(s) Oral every 6 hours PRN  oxyCODONE    IR 10 milliGRAM(s) Oral every 6 hours PRN  polyethylene glycol 3350 17 Gram(s) Oral every 12 hours  senna 2 Tablet(s) Oral at bedtime  sodium chloride 3 Gram(s) Oral every 6 hours  sodium chloride 3%. 500 milliLiter(s) (75 mL/Hr) IV Continuous <Continuous>      LABS:                               11.5   10.04 )-----------( 245      ( 18 Feb 2022 02:00 )             34.7     02-18    140  |  112<H>  |  7   ----------------------------<  150<H>  3.5   |  18<L>  |  0.37<L>    Ca    8.5      18 Feb 2022 02:00  Phos  2.4     02-18  Mg     1.8     02-18    TPro  7.2  /  Alb  4.0  /  TBili  1.0  /  DBili  0.2  /  AST  29  /  ALT  25  /  AlkPhos  88  02-16    LIVER FUNCTIONS - ( 16 Feb 2022 15:49 )  Alb: 4.0 g/dL / Pro: 7.2 g/dL / ALK PHOS: 88 U/L / ALT: 25 U/L / AST: 29 U/L / GGT: x             Access:   3 peripheral IVs  R radial [place]  R frontal EVD at 5cmH2O  NGT                 HPI:  57 y/o female with PMHx of HTN, pre-DM presents transferred from [facility] for intracranial hemorrhage. As per [place] ED provider and son, patient called son around 7:30-8:00AM c/o severe headache and nausea. Son immediately rushed to her house and found her out of bed, moaning and covered in her own vomit. During transit, EMS reported SBP within 240s with intractable vomiting and given Zofran 8mg. Upon arrival to [place] ED, GCS 7, SBP within 180s, patient was given Decadron 10mg, Keppra 1g, started on a Cardene drip, Propofol, Mannitol 1mg/kg. 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 Patient was intubated for airway support and transferred to [facility] for further management. ICH2, NIHSS 8.  Denies use of anticoagulants/antiplatelets.  (12 Feb 2022 16:36).    On admission, the patient was:  GCS: 8  Hunt-Rachel:  modified Duval:  ICH score:2  NIHSS:18    Hospital Course:   2/12: Transferred from [place]. R frontal EVD emergently. High pressure. Elevated lactate, bolused. Not following commands.   2/13: Pt remains intubated and sedated. Moving all extremities though not following commands. Concern for storming. Hypotensive on precedex.  2/14: Spontaneous eye opening. Agitated. On propofol- weaning. SAT/SBT. Goal to extubate. EVD decreased to 5cmH2O. Extubated to HFNC  2/15: Anisocoria noted. CT head stable (trapped L vent). Na goal increased 145- 155.  2/17: Persistent tachycardia HR 120s. CT/PE protocol done. Prelim neg. Febrile today 102.7F. CSF and urine cx sent.  2/18: CT head stable. Neuroexam continues to improved. GPC in clusters in blood cultures.     ROS:   Patient denies pain, discomfort, SOB or any acute complaints.  Says she slept well    ICU Vital Signs Last 24 Hrs  T(C): 36.8 (18 Feb 2022 06:01), Max: 38.7 (17 Feb 2022 17:38)  T(F): 98.2 (18 Feb 2022 06:01), Max: 101.7 (17 Feb 2022 17:38)  HR: 109 (18 Feb 2022 04:00) (97 - 123)  ABP: 155/61 (18 Feb 2022 04:00) (118/47 - 169/63)  ABP(mean): 95 (18 Feb 2022 04:00) (71 - 98)  RR: 25 (18 Feb 2022 04:00) (24 - 34)  SpO2: 96% (18 Feb 2022 04:00) (93% - 98%)      02-16-22 @ 07:01  -  02-17-22 @ 07:00  --------------------------------------------------------  IN: 4495 mL / OUT: 3278 mL / NET: 1217 mL    02-17-22 @ 07:01  -  02-18-22 @ 06:51  --------------------------------------------------------  IN: 3556 mL / OUT: 2275 mL / NET: 1281 mL      General:   HEENT:  MMM  Neuro: Awake, alert, oriented to person and hospital- also knows month with choice. Follows commands, R pupil 3mm, L pupil 2mm both briskly reactive  EOMI, face symmetric, RUE WD, otherwise bicep 1/5, 4/5 tricep. RLE 4-/5. Left side full strength  Sensation intact in all 4 extremities   Cards:  RRR, tachycardic S1/S2  Respiratory: Clear, no wheeze.  Abdomen: Soft, non tender  Extremities:  no LE edema      MEDICATIONS:   acetaminophen    Suspension .. 650 milliGRAM(s) Oral every 6 hours PRN  albuterol/ipratropium for Nebulization 3 milliLiter(s) Nebulizer every 6 hours PRN  amLODIPine   Tablet 10 milliGRAM(s) Oral daily  enoxaparin Injectable 40 milliGRAM(s) SubCutaneous every 24 hours  hydrALAZINE 50 milliGRAM(s) Oral every 8 hours  hydrALAZINE Injectable 10 milliGRAM(s) IV Push every 4 hours PRN  influenza   Vaccine 0.5 milliLiter(s) IntraMuscular once  losartan 100 milliGRAM(s) Oral daily  metoclopramide Injectable 10 milliGRAM(s) IV Push every 8 hours PRN  niCARdipine Infusion 5 mG/Hr (25 mL/Hr) IV Continuous <Continuous>  nystatin    Suspension 586207 Unit(s) Oral three times a day  oxyCODONE    IR 5 milliGRAM(s) Oral every 6 hours PRN  oxyCODONE    IR 10 milliGRAM(s) Oral every 6 hours PRN  polyethylene glycol 3350 17 Gram(s) Oral every 12 hours  senna 2 Tablet(s) Oral at bedtime  sodium chloride 3 Gram(s) Oral every 6 hours  sodium chloride 3%. 500 milliLiter(s) (75 mL/Hr) IV Continuous <Continuous>      LABS:                               11.5   10.04 )-----------( 245      ( 18 Feb 2022 02:00 )             34.7     02-18    140  |  112<H>  |  7   ----------------------------<  150<H>  3.5   |  18<L>  |  0.37<L>    Ca    8.5      18 Feb 2022 02:00  Phos  2.4     02-18  Mg     1.8     02-18    TPro  7.2  /  Alb  4.0  /  TBili  1.0  /  DBili  0.2  /  AST  29  /  ALT  25  /  AlkPhos  88  02-16    LIVER FUNCTIONS - ( 16 Feb 2022 15:49 )  Alb: 4.0 g/dL / Pro: 7.2 g/dL / ALK PHOS: 88 U/L / ALT: 25 U/L / AST: 29 U/L / GGT: x             Access:   3 peripheral IVs  R radial [place]  R frontal EVD at 5cmH2O  NGT                 HPI:  55 y/o female with PMHx of HTN, pre-DM presents transferred from [facility] for intracranial hemorrhage. As per [place] ED provider and son, patient called son around 7:30-8:00AM c/o severe headache and nausea. Son immediately rushed to her house and found her out of bed, moaning and covered emesis. During transit, EMS reported SBP within 240s with intractable vomiting and given Zofran 8mg. Upon arrival to [place] ED, GCS 7, SBP within 180s, patient was given Decadron 10mg, Keppra 1g, started on a Cardene drip, Propofol, Mannitol 1mg/kg. 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 Patient was intubated for airway support and transferred to [facility] for further management. ICH2, NIHSS 8.  Denies use of anticoagulants/antiplatelets. (12 Feb 2022 16:36).    On admission, the patient was:  GCS: 8  Hunt-Rachel:  modified [place]:  ICH score:2    Hospital Course:   2/12: Transferred from [place]. R frontal EVD emergently. High pressure. Elevated lactate, bolused. Not following commands.   2/13: Pt remains intubated and sedated. Moving all extremities though not following commands. Concern for storming. Hypotensive on precedex.  2/14: Spontaneous eye opening. Agitated. On propofol- weaning. SAT/SBT. Goal to extubate. EVD decreased to 5cmH2O. Extubated to HFNC  2/15: Anisocoria noted. CT head stable (trapped L vent). Na goal increased 145- 155.  2/17: Persistent tachycardia HR 120s. CT/PE protocol done. Prelim neg. Febrile today 102.7F. CSF and urine cx sent.  2/18: CT head stable. Neuroexam continues to improve. GPC in clusters in blood cultures. Repeat cultures sent. Afebrile.     ROS:   Patient denies pain, discomfort, SOB or any acute complaints.  Says that she slept well.    ICU Vital Signs Last 24 Hrs  T(C): 36.8 (18 Feb 2022 06:01), Max: 38.7 (17 Feb 2022 17:38)  T(F): 98.2 (18 Feb 2022 06:01), Max: 101.7 (17 Feb 2022 17:38)  HR: 109 (18 Feb 2022 04:00) (97 - 123)  ABP: 155/61 (18 Feb 2022 04:00) (118/47 - 169/63)  ABP(mean): 95 (18 Feb 2022 04:00) (71 - 98)  RR: 25 (18 Feb 2022 04:00) (24 - 34)  SpO2: 96% (18 Feb 2022 04:00) (93% - 98%)      02-16-22 @ 07:01  -  02-17-22 @ 07:00  --------------------------------------------------------  IN: 4495 mL / OUT: 3278 mL / NET: 1217 mL    02-17-22 @ 07:01  -  02-18-22 @ 06:51  --------------------------------------------------------  IN: 3556 mL / OUT: 2275 mL / NET: 1281 mL      General:   HEENT: MMM  Neuro: Awake, alert, oriented to person and hospital- also knows month with choice. Follows commands, R pupil 3mm, L pupil 2mm both briskly reactive  EOMI, face symmetric, RUE WD, otherwise R bicep 1/5, 4/5 tricep. RLE 4-/5. Left side full strength  Sensation intact in all 4 extremities   Cards:  RRR, tachycardic S1/S2  Respiratory: Clear, no wheeze.  Abdomen: Soft, non tender  Extremities:  no LE edema      MEDICATIONS:   acetaminophen    Suspension .. 650 milliGRAM(s) Oral every 6 hours PRN  albuterol/ipratropium for Nebulization 3 milliLiter(s) Nebulizer every 6 hours PRN  amLODIPine   Tablet 10 milliGRAM(s) Oral daily  enoxaparin Injectable 40 milliGRAM(s) SubCutaneous every 24 hours  hydrALAZINE 50 milliGRAM(s) Oral every 8 hours  hydrALAZINE Injectable 10 milliGRAM(s) IV Push every 4 hours PRN  influenza   Vaccine 0.5 milliLiter(s) IntraMuscular once  losartan 100 milliGRAM(s) Oral daily  metoclopramide Injectable 10 milliGRAM(s) IV Push every 8 hours PRN  niCARdipine Infusion 5 mG/Hr (25 mL/Hr) IV Continuous <Continuous>  nystatin    Suspension 012079 Unit(s) Oral three times a day  oxyCODONE    IR 5 milliGRAM(s) Oral every 6 hours PRN  oxyCODONE    IR 10 milliGRAM(s) Oral every 6 hours PRN  polyethylene glycol 3350 17 Gram(s) Oral every 12 hours  senna 2 Tablet(s) Oral at bedtime  sodium chloride 3 Gram(s) Oral every 6 hours  sodium chloride 3%. 500 milliLiter(s) (75 mL/Hr) IV Continuous <Continuous>      LABS:                               11.5   10.04 )-----------( 245      ( 18 Feb 2022 02:00 )             34.7     02-18    140  |  112<H>  |  7   ----------------------------<  150<H>  3.5   |  18<L>  |  0.37<L>    Ca    8.5      18 Feb 2022 02:00  Phos  2.4     02-18  Mg     1.8     02-18    TPro  7.2  /  Alb  4.0  /  TBili  1.0  /  DBili  0.2  /  AST  29  /  ALT  25  /  AlkPhos  88  02-16    LIVER FUNCTIONS - ( 16 Feb 2022 15:49 )  Alb: 4.0 g/dL / Pro: 7.2 g/dL / ALK PHOS: 88 U/L / ALT: 25 U/L / AST: 29 U/L / GGT: x             Access:   3 peripheral IVs  R radial [place]  R frontal EVD at 5cmH2O  NGT

## 2022-02-19 LAB
-  AMPICILLIN/SULBACTAM: SIGNIFICANT CHANGE UP
-  AMPICILLIN/SULBACTAM: SIGNIFICANT CHANGE UP
-  AMPICILLIN: SIGNIFICANT CHANGE UP
-  AMPICILLIN: SIGNIFICANT CHANGE UP
-  CEFAZOLIN: SIGNIFICANT CHANGE UP
-  CEFAZOLIN: SIGNIFICANT CHANGE UP
-  CEFEPIME: SIGNIFICANT CHANGE UP
-  CEFTRIAXONE: SIGNIFICANT CHANGE UP
-  CEFTRIAXONE: SIGNIFICANT CHANGE UP
-  CIPROFLOXACIN: SIGNIFICANT CHANGE UP
-  CIPROFLOXACIN: SIGNIFICANT CHANGE UP
-  ERTAPENEM: SIGNIFICANT CHANGE UP
-  ERTAPENEM: SIGNIFICANT CHANGE UP
-  GENTAMICIN: SIGNIFICANT CHANGE UP
-  GENTAMICIN: SIGNIFICANT CHANGE UP
-  NITROFURANTOIN: SIGNIFICANT CHANGE UP
-  NITROFURANTOIN: SIGNIFICANT CHANGE UP
-  PIPERACILLIN/TAZOBACTAM: SIGNIFICANT CHANGE UP
-  PIPERACILLIN/TAZOBACTAM: SIGNIFICANT CHANGE UP
-  TOBRAMYCIN: SIGNIFICANT CHANGE UP
-  TOBRAMYCIN: SIGNIFICANT CHANGE UP
-  TRIMETHOPRIM/SULFAMETHOXAZOLE: SIGNIFICANT CHANGE UP
-  TRIMETHOPRIM/SULFAMETHOXAZOLE: SIGNIFICANT CHANGE UP
ALBUMIN SERPL ELPH-MCNC: 3.6 G/DL — SIGNIFICANT CHANGE UP (ref 3.3–5)
ALP SERPL-CCNC: 74 U/L — SIGNIFICANT CHANGE UP (ref 40–120)
ALT FLD-CCNC: 22 U/L — SIGNIFICANT CHANGE UP (ref 10–45)
ANION GAP SERPL CALC-SCNC: 10 MMOL/L — SIGNIFICANT CHANGE UP (ref 5–17)
ANION GAP SERPL CALC-SCNC: 10 MMOL/L — SIGNIFICANT CHANGE UP (ref 5–17)
ANION GAP SERPL CALC-SCNC: 11 MMOL/L — SIGNIFICANT CHANGE UP (ref 5–17)
ANION GAP SERPL CALC-SCNC: 9 MMOL/L — SIGNIFICANT CHANGE UP (ref 5–17)
AST SERPL-CCNC: 27 U/L — SIGNIFICANT CHANGE UP (ref 10–40)
BILIRUB DIRECT SERPL-MCNC: 0.2 MG/DL — SIGNIFICANT CHANGE UP (ref 0–0.3)
BILIRUB INDIRECT FLD-MCNC: 0.8 MG/DL — SIGNIFICANT CHANGE UP (ref 0.2–1)
BILIRUB SERPL-MCNC: 1 MG/DL — SIGNIFICANT CHANGE UP (ref 0.2–1.2)
BUN SERPL-MCNC: 5 MG/DL — LOW (ref 7–23)
BUN SERPL-MCNC: 5 MG/DL — LOW (ref 7–23)
BUN SERPL-MCNC: 7 MG/DL — SIGNIFICANT CHANGE UP (ref 7–23)
BUN SERPL-MCNC: 7 MG/DL — SIGNIFICANT CHANGE UP (ref 7–23)
CALCIUM SERPL-MCNC: 7.8 MG/DL — LOW (ref 8.4–10.5)
CALCIUM SERPL-MCNC: 8.2 MG/DL — LOW (ref 8.4–10.5)
CALCIUM SERPL-MCNC: 8.3 MG/DL — LOW (ref 8.4–10.5)
CALCIUM SERPL-MCNC: 8.6 MG/DL — SIGNIFICANT CHANGE UP (ref 8.4–10.5)
CHLORIDE SERPL-SCNC: 109 MMOL/L — HIGH (ref 96–108)
CHLORIDE SERPL-SCNC: 113 MMOL/L — HIGH (ref 96–108)
CHLORIDE SERPL-SCNC: 115 MMOL/L — HIGH (ref 96–108)
CHLORIDE SERPL-SCNC: 115 MMOL/L — HIGH (ref 96–108)
CO2 SERPL-SCNC: 14 MMOL/L — LOW (ref 22–31)
CO2 SERPL-SCNC: 16 MMOL/L — LOW (ref 22–31)
CO2 SERPL-SCNC: 17 MMOL/L — LOW (ref 22–31)
CO2 SERPL-SCNC: 17 MMOL/L — LOW (ref 22–31)
CREAT SERPL-MCNC: 0.31 MG/DL — LOW (ref 0.5–1.3)
CREAT SERPL-MCNC: 0.34 MG/DL — LOW (ref 0.5–1.3)
CREAT SERPL-MCNC: 0.38 MG/DL — LOW (ref 0.5–1.3)
CREAT SERPL-MCNC: 0.39 MG/DL — LOW (ref 0.5–1.3)
CULTURE RESULTS: SIGNIFICANT CHANGE UP
GLUCOSE SERPL-MCNC: 140 MG/DL — HIGH (ref 70–99)
GLUCOSE SERPL-MCNC: 146 MG/DL — HIGH (ref 70–99)
GLUCOSE SERPL-MCNC: 156 MG/DL — HIGH (ref 70–99)
GLUCOSE SERPL-MCNC: 183 MG/DL — HIGH (ref 70–99)
HCT VFR BLD CALC: 35.3 % — SIGNIFICANT CHANGE UP (ref 34.5–45)
HGB BLD-MCNC: 11.3 G/DL — LOW (ref 11.5–15.5)
LACTATE CSF-MCNC: 3.2 MMOL/L — HIGH (ref 1.1–2.4)
MAGNESIUM SERPL-MCNC: 2 MG/DL — SIGNIFICANT CHANGE UP (ref 1.6–2.6)
MCHC RBC-ENTMCNC: 29.7 PG — SIGNIFICANT CHANGE UP (ref 27–34)
MCHC RBC-ENTMCNC: 32 GM/DL — SIGNIFICANT CHANGE UP (ref 32–36)
MCV RBC AUTO: 92.7 FL — SIGNIFICANT CHANGE UP (ref 80–100)
METHOD TYPE: SIGNIFICANT CHANGE UP
METHOD TYPE: SIGNIFICANT CHANGE UP
NRBC # BLD: 0 /100 WBCS — SIGNIFICANT CHANGE UP (ref 0–0)
ORGANISM # SPEC MICROSCOPIC CNT: SIGNIFICANT CHANGE UP
PHOSPHATE SERPL-MCNC: 2.4 MG/DL — LOW (ref 2.5–4.5)
PLATELET # BLD AUTO: 239 K/UL — SIGNIFICANT CHANGE UP (ref 150–400)
POTASSIUM SERPL-MCNC: 3.3 MMOL/L — LOW (ref 3.5–5.3)
POTASSIUM SERPL-MCNC: 3.6 MMOL/L — SIGNIFICANT CHANGE UP (ref 3.5–5.3)
POTASSIUM SERPL-MCNC: 3.8 MMOL/L — SIGNIFICANT CHANGE UP (ref 3.5–5.3)
POTASSIUM SERPL-MCNC: 3.8 MMOL/L — SIGNIFICANT CHANGE UP (ref 3.5–5.3)
POTASSIUM SERPL-SCNC: 3.3 MMOL/L — LOW (ref 3.5–5.3)
POTASSIUM SERPL-SCNC: 3.6 MMOL/L — SIGNIFICANT CHANGE UP (ref 3.5–5.3)
POTASSIUM SERPL-SCNC: 3.8 MMOL/L — SIGNIFICANT CHANGE UP (ref 3.5–5.3)
POTASSIUM SERPL-SCNC: 3.8 MMOL/L — SIGNIFICANT CHANGE UP (ref 3.5–5.3)
PROT SERPL-MCNC: 6.9 G/DL — SIGNIFICANT CHANGE UP (ref 6–8.3)
RBC # BLD: 3.81 M/UL — SIGNIFICANT CHANGE UP (ref 3.8–5.2)
RBC # FLD: 11.8 % — SIGNIFICANT CHANGE UP (ref 10.3–14.5)
SARS-COV-2 RNA SPEC QL NAA+PROBE: SIGNIFICANT CHANGE UP
SODIUM SERPL-SCNC: 136 MMOL/L — SIGNIFICANT CHANGE UP (ref 135–145)
SODIUM SERPL-SCNC: 139 MMOL/L — SIGNIFICANT CHANGE UP (ref 135–145)
SODIUM SERPL-SCNC: 140 MMOL/L — SIGNIFICANT CHANGE UP (ref 135–145)
SODIUM SERPL-SCNC: 141 MMOL/L — SIGNIFICANT CHANGE UP (ref 135–145)
SPECIMEN SOURCE: SIGNIFICANT CHANGE UP
VANCOMYCIN TROUGH SERPL-MCNC: 4 UG/ML — LOW (ref 10–20)
WBC # BLD: 10.35 K/UL — SIGNIFICANT CHANGE UP (ref 3.8–10.5)
WBC # FLD AUTO: 10.35 K/UL — SIGNIFICANT CHANGE UP (ref 3.8–10.5)

## 2022-02-19 PROCEDURE — 99291 CRITICAL CARE FIRST HOUR: CPT

## 2022-02-19 PROCEDURE — 36415 COLL VENOUS BLD VENIPUNCTURE: CPT

## 2022-02-19 PROCEDURE — 71045 X-RAY EXAM CHEST 1 VIEW: CPT | Mod: 26

## 2022-02-19 PROCEDURE — 99232 SBSQ HOSP IP/OBS MODERATE 35: CPT

## 2022-02-19 PROCEDURE — 36556 INSERT NON-TUNNEL CV CATH: CPT

## 2022-02-19 RX ORDER — ACETAMINOPHEN 500 MG
1000 TABLET ORAL ONCE
Refills: 0 | Status: COMPLETED | OUTPATIENT
Start: 2022-02-19 | End: 2022-02-19

## 2022-02-19 RX ORDER — POTASSIUM CHLORIDE 20 MEQ
40 PACKET (EA) ORAL
Refills: 0 | Status: DISCONTINUED | OUTPATIENT
Start: 2022-02-19 | End: 2022-02-19

## 2022-02-19 RX ORDER — HYDRALAZINE HCL 50 MG
75 TABLET ORAL EVERY 8 HOURS
Refills: 0 | Status: DISCONTINUED | OUTPATIENT
Start: 2022-02-19 | End: 2022-02-20

## 2022-02-19 RX ORDER — SODIUM CHLORIDE 9 MG/ML
1000 INJECTION INTRAMUSCULAR; INTRAVENOUS; SUBCUTANEOUS ONCE
Refills: 0 | Status: COMPLETED | OUTPATIENT
Start: 2022-02-19 | End: 2022-02-19

## 2022-02-19 RX ORDER — LIDOCAINE 4 G/100G
1 CREAM TOPICAL DAILY
Refills: 0 | Status: DISCONTINUED | OUTPATIENT
Start: 2022-02-19 | End: 2022-02-24

## 2022-02-19 RX ORDER — CEFTRIAXONE 500 MG/1
INJECTION, POWDER, FOR SOLUTION INTRAMUSCULAR; INTRAVENOUS
Refills: 0 | Status: DISCONTINUED | OUTPATIENT
Start: 2022-02-19 | End: 2022-02-22

## 2022-02-19 RX ORDER — MIDAZOLAM HYDROCHLORIDE 1 MG/ML
2 INJECTION, SOLUTION INTRAMUSCULAR; INTRAVENOUS ONCE
Refills: 0 | Status: DISCONTINUED | OUTPATIENT
Start: 2022-02-19 | End: 2022-02-19

## 2022-02-19 RX ORDER — HYDRALAZINE HCL 50 MG
25 TABLET ORAL ONCE
Refills: 0 | Status: COMPLETED | OUTPATIENT
Start: 2022-02-19 | End: 2022-02-19

## 2022-02-19 RX ORDER — CEFTRIAXONE 500 MG/1
2000 INJECTION, POWDER, FOR SOLUTION INTRAMUSCULAR; INTRAVENOUS EVERY 24 HOURS
Refills: 0 | Status: DISCONTINUED | OUTPATIENT
Start: 2022-02-20 | End: 2022-02-22

## 2022-02-19 RX ORDER — LACTOBACILLUS ACIDOPHILUS 100MM CELL
1 CAPSULE ORAL DAILY
Refills: 0 | Status: DISCONTINUED | OUTPATIENT
Start: 2022-02-19 | End: 2022-03-11

## 2022-02-19 RX ORDER — POTASSIUM CHLORIDE 20 MEQ
20 PACKET (EA) ORAL
Refills: 0 | Status: COMPLETED | OUTPATIENT
Start: 2022-02-19 | End: 2022-02-19

## 2022-02-19 RX ORDER — SODIUM,POTASSIUM PHOSPHATES 278-250MG
1 POWDER IN PACKET (EA) ORAL EVERY 6 HOURS
Refills: 0 | Status: COMPLETED | OUTPATIENT
Start: 2022-02-19 | End: 2022-02-19

## 2022-02-19 RX ORDER — CEFTRIAXONE 500 MG/1
INJECTION, POWDER, FOR SOLUTION INTRAMUSCULAR; INTRAVENOUS
Refills: 0 | Status: DISCONTINUED | OUTPATIENT
Start: 2022-02-19 | End: 2022-02-19

## 2022-02-19 RX ORDER — CEFTRIAXONE 500 MG/1
2000 INJECTION, POWDER, FOR SOLUTION INTRAMUSCULAR; INTRAVENOUS ONCE
Refills: 0 | Status: COMPLETED | OUTPATIENT
Start: 2022-02-19 | End: 2022-02-19

## 2022-02-19 RX ORDER — CYCLOBENZAPRINE HYDROCHLORIDE 10 MG/1
5 TABLET, FILM COATED ORAL ONCE
Refills: 0 | Status: COMPLETED | OUTPATIENT
Start: 2022-02-19 | End: 2022-02-19

## 2022-02-19 RX ORDER — SODIUM CHLORIDE 5 G/100ML
500 INJECTION, SOLUTION INTRAVENOUS
Refills: 0 | Status: DISCONTINUED | OUTPATIENT
Start: 2022-02-19 | End: 2022-02-20

## 2022-02-19 RX ORDER — FENTANYL CITRATE 50 UG/ML
25 INJECTION INTRAVENOUS ONCE
Refills: 0 | Status: DISCONTINUED | OUTPATIENT
Start: 2022-02-19 | End: 2022-02-19

## 2022-02-19 RX ORDER — SODIUM CHLORIDE 9 MG/ML
500 INJECTION INTRAMUSCULAR; INTRAVENOUS; SUBCUTANEOUS ONCE
Refills: 0 | Status: COMPLETED | OUTPATIENT
Start: 2022-02-19 | End: 2022-02-19

## 2022-02-19 RX ADMIN — Medication 1000 MILLIGRAM(S): at 19:53

## 2022-02-19 RX ADMIN — ENOXAPARIN SODIUM 40 MILLIGRAM(S): 100 INJECTION SUBCUTANEOUS at 21:24

## 2022-02-19 RX ADMIN — SODIUM CHLORIDE 500 MILLILITER(S): 9 INJECTION INTRAMUSCULAR; INTRAVENOUS; SUBCUTANEOUS at 18:20

## 2022-02-19 RX ADMIN — FENTANYL CITRATE 25 MICROGRAM(S): 50 INJECTION INTRAVENOUS at 14:21

## 2022-02-19 RX ADMIN — Medication 650 MILLIGRAM(S): at 11:37

## 2022-02-19 RX ADMIN — SODIUM CHLORIDE 3 GRAM(S): 9 INJECTION INTRAMUSCULAR; INTRAVENOUS; SUBCUTANEOUS at 05:38

## 2022-02-19 RX ADMIN — LIDOCAINE 1 PATCH: 4 CREAM TOPICAL at 18:43

## 2022-02-19 RX ADMIN — SODIUM CHLORIDE 3 GRAM(S): 9 INJECTION INTRAMUSCULAR; INTRAVENOUS; SUBCUTANEOUS at 11:37

## 2022-02-19 RX ADMIN — LIDOCAINE 1 PATCH: 4 CREAM TOPICAL at 19:54

## 2022-02-19 RX ADMIN — SODIUM CHLORIDE 1000 MILLILITER(S): 9 INJECTION INTRAMUSCULAR; INTRAVENOUS; SUBCUTANEOUS at 16:53

## 2022-02-19 RX ADMIN — OXYCODONE HYDROCHLORIDE 5 MILLIGRAM(S): 5 TABLET ORAL at 18:23

## 2022-02-19 RX ADMIN — OXYCODONE HYDROCHLORIDE 5 MILLIGRAM(S): 5 TABLET ORAL at 17:31

## 2022-02-19 RX ADMIN — Medication 25 MILLIGRAM(S): at 09:48

## 2022-02-19 RX ADMIN — SODIUM CHLORIDE 3 GRAM(S): 9 INJECTION INTRAMUSCULAR; INTRAVENOUS; SUBCUTANEOUS at 17:08

## 2022-02-19 RX ADMIN — Medication 500000 UNIT(S): at 21:29

## 2022-02-19 RX ADMIN — Medication 400 MILLIGRAM(S): at 06:40

## 2022-02-19 RX ADMIN — Medication 100 MILLIGRAM(S): at 21:23

## 2022-02-19 RX ADMIN — Medication 75 MILLIGRAM(S): at 13:12

## 2022-02-19 RX ADMIN — NICARDIPINE HYDROCHLORIDE 25 MG/HR: 30 CAPSULE, EXTENDED RELEASE ORAL at 03:51

## 2022-02-19 RX ADMIN — Medication 650 MILLIGRAM(S): at 22:30

## 2022-02-19 RX ADMIN — Medication 250 MILLIGRAM(S): at 05:40

## 2022-02-19 RX ADMIN — Medication 50 MILLIEQUIVALENT(S): at 19:36

## 2022-02-19 RX ADMIN — OXYCODONE HYDROCHLORIDE 5 MILLIGRAM(S): 5 TABLET ORAL at 00:30

## 2022-02-19 RX ADMIN — Medication 650 MILLIGRAM(S): at 12:55

## 2022-02-19 RX ADMIN — Medication 50 MILLIGRAM(S): at 05:39

## 2022-02-19 RX ADMIN — FENTANYL CITRATE 25 MICROGRAM(S): 50 INJECTION INTRAVENOUS at 14:53

## 2022-02-19 RX ADMIN — Medication 1 PACKET(S): at 09:48

## 2022-02-19 RX ADMIN — Medication 1000 MILLIGRAM(S): at 06:57

## 2022-02-19 RX ADMIN — Medication 500000 UNIT(S): at 05:39

## 2022-02-19 RX ADMIN — Medication 650 MILLIGRAM(S): at 22:00

## 2022-02-19 RX ADMIN — Medication 250 MILLIGRAM(S): at 17:08

## 2022-02-19 RX ADMIN — AMLODIPINE BESYLATE 10 MILLIGRAM(S): 2.5 TABLET ORAL at 05:39

## 2022-02-19 RX ADMIN — Medication 400 MILLIGRAM(S): at 19:52

## 2022-02-19 RX ADMIN — Medication 100 MILLIGRAM(S): at 05:39

## 2022-02-19 RX ADMIN — CYCLOBENZAPRINE HYDROCHLORIDE 5 MILLIGRAM(S): 10 TABLET, FILM COATED ORAL at 21:29

## 2022-02-19 RX ADMIN — Medication 50 MILLIEQUIVALENT(S): at 21:00

## 2022-02-19 RX ADMIN — MIDAZOLAM HYDROCHLORIDE 2 MILLIGRAM(S): 1 INJECTION, SOLUTION INTRAMUSCULAR; INTRAVENOUS at 14:21

## 2022-02-19 RX ADMIN — OXYCODONE HYDROCHLORIDE 5 MILLIGRAM(S): 5 TABLET ORAL at 06:00

## 2022-02-19 RX ADMIN — Medication 1 PACKET(S): at 17:08

## 2022-02-19 RX ADMIN — OXYCODONE HYDROCHLORIDE 5 MILLIGRAM(S): 5 TABLET ORAL at 05:38

## 2022-02-19 RX ADMIN — LOSARTAN POTASSIUM 100 MILLIGRAM(S): 100 TABLET, FILM COATED ORAL at 05:38

## 2022-02-19 RX ADMIN — Medication 50 MILLIEQUIVALENT(S): at 22:00

## 2022-02-19 RX ADMIN — POLYETHYLENE GLYCOL 3350 17 GRAM(S): 17 POWDER, FOR SOLUTION ORAL at 05:38

## 2022-02-19 RX ADMIN — Medication 75 MILLIGRAM(S): at 21:23

## 2022-02-19 RX ADMIN — Medication 500000 UNIT(S): at 13:13

## 2022-02-19 RX ADMIN — CEFTRIAXONE 100 MILLIGRAM(S): 500 INJECTION, POWDER, FOR SOLUTION INTRAMUSCULAR; INTRAVENOUS at 13:21

## 2022-02-19 RX ADMIN — SODIUM CHLORIDE 100 MILLILITER(S): 5 INJECTION, SOLUTION INTRAVENOUS at 23:07

## 2022-02-19 RX ADMIN — Medication 100 MILLIGRAM(S): at 13:13

## 2022-02-19 NOTE — PROGRESS NOTE ADULT - SUBJECTIVE AND OBJECTIVE BOX
HPI:  57 y/o female with PMHx of HTN, pre-DM presents transferred from [facility] for intracranial hemorrhage. As per [place] ED provider and son, patient called son around 7:30-8:00AM c/o severe headache and nausea. Son immediately rushed to her house and found her out of bed, moaning and covered in her own vomit. During transit, EMS reported SBP within 240s with intractable vomiting and given Zofran 8mg. Upon arrival to [place] ED, GCS 7, SBP within 180s, patient was given Decadron 10mg, Keppra 1g, started on a Cardene drip, Propofol, Mannitol 1mg/kg. 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 Patient was intubated for airway support and transferred to [facility] for further management. ICH2, NIHSS 8.  Denies use of anticoagulants/antiplatelets.  (2022 16:36)    OVERNIGHT EVENTS: TOSHA    Hospital Course:   : transferred from [place]. R frontal EVD and R radial a-line placed. pend CTH/CTA. s/p 1L bolus for elevated lactate.   : S/p R frontal EVD placedment @[id number] draining well. O/n pt w/ episode of possible storming; tachy, HTN, agitated, temp 100.2F, given 2 versed and 50mcg fentanyl w/ improvement in symptoms Neuro exam stable. Pt remains intubated and sedated, AN L>R. Trend lactate and abg. Amlodipine 5mg QD started for SBP goal < 140, cardene dc'd.  EVD dropped  at 1pm. propanolol and fent standing added for neuro storming, propofol switched to precedex, with resultant hypotension. Propanolol dc'd, fent changed to prn and precedex off, 1 L bolus given, levo prn   : TOSHA overnight. Patient remains on propofol. EVD open at [id number]. ICPs WNL. Neuro exam stable. increased bowel regimen. started SQL. extubated on precedex. given 0.5 Ativan + Fentanyl pushes PRN for agitation. 500cc NS bolus.   2/15: BD#4.  On HFNC d/t desat to 88% on 70% non-rebreather. On 0.5 precedex   : BD5, TOSHA overnight, on 4L NC, s/p vomiting yesterday, TF being held, formal S/S pending, off precedex. EVD open @ 5. Started on 3% at 30, tmax 100.7   : BD# 6   tachycardic, PE protocol. cardene gtt. neuro unchanged. EVD @ 5cmH2O. 1g IV tylenol for 102.7 fever, duonebs standing to PRN. UA with reflux ordered. Hydralazine 25mg TID added, increased to 50q8. 500cc NS bolus x2. started on nystatin for thrush. started oxy 5q6 PRN for pain for tachycardia. repeat Na 146, decreased 3% @50, started salt tabs 2q6. FEES done. started minced and moist diet  : BD 7. 3% Increased 75 cc/hr  : BD8. TOSHA o/n neuro stable. 3%@75cc/hr    Vital Signs Last 24 Hrs  T(C): 39 (2022 22:01), Max: 39 (2022 22:01)  T(F): 102.2 (2022 22:01), Max: 102.2 (2022 22:01)  HR: 103 (2022 01:00) (84 - 111)  BP: 161/73 (2022 01:00) (137/69 - 161/73)  BP(mean): 105 (2022 01:00) (90 - 121)  RR: 27 (2022 01:00) (20 - 30)  SpO2: 96% (2022 01:00) (94% - 99%)    I&O's Summary    2022 07:01  -  2022 07:00  --------------------------------------------------------  IN: 4695 mL / OUT: 2296 mL / NET: 2399 mL    2022 07:01  -  2022 01:04  --------------------------------------------------------  IN: 3215 mL / OUT: 2770 mL / NET: 445 mL        PHYSICAL EXAM:  GEN: laying in bed, appears well, NAD  NEURO: AOx2 (self/place). FC, OE spont, speech intact, face symmetric. CNII-XII intact. PERRL, EOMI. LUE/LLE 5/5. RUE delts/triceps 4-/5, biceps 2/5, HG 0/5. RLE 2/5  CV: RRR +S1/S2  PULM: CTAB  GI: Abd soft, NT/ND  EXT: ext warm, dry, nontender. b/l UE with multiple areas of erythema from IV infiltration    TUBES/LINES:  [] Haley  [] Lumbar Drain  [] Wound Drains  [x] R frontal ventriculostomy       DIET:  [] NPO  [x] Mechanical  [] Tube feeds    LABS:                        11.5   10.04 )-----------( 245      ( 2022 02:00 )             34.7     02-18    137  |  109<H>  |  6<L>  ----------------------------<  161<H>  3.8   |  16<L>  |  0.40<L>    Ca    9.1      2022 18:28  Phos  2.4       Mg     1.8             Urinalysis Basic - ( 2022 05:51 )    Color: Yellow / Appearance: Clear / S.020 / pH: x  Gluc: x / Ketone: NEGATIVE  / Bili: Negative / Urobili: 1.0 E.U./dL   Blood: x / Protein: NEGATIVE mg/dL / Nitrite: NEGATIVE   Leuk Esterase: Trace / RBC: < 5 /HPF / WBC 5-10 /HPF   Sq Epi: x / Non Sq Epi: 0-5 /HPF / Bacteria: Present /HPF          CAPILLARY BLOOD GLUCOSE          Drug Levels: [] N/A    CSF Analysis: [] N/A  RBC Count - Spinal Fluid: 23507 /uL ( @ 12:21)  CSF Lymphocytes: 1 % ( @ 12:21)  Glucose, CSF: 86 mg/dL ( @ 12:21)  Protein, CSF: 55 mg/dL ( @ 12:21)      Allergies    No Known Allergies    Intolerances      MEDICATIONS:  Antibiotics:  nystatin    Suspension 137068 Unit(s) Oral three times a day  vancomycin  IVPB 1000 milliGRAM(s) IV Intermittent every 12 hours    Neuro:  acetaminophen     Tablet .. 650 milliGRAM(s) Oral every 6 hours PRN  oxyCODONE    IR 5 milliGRAM(s) Oral every 6 hours PRN    Anticoagulation:  enoxaparin Injectable 40 milliGRAM(s) SubCutaneous every 24 hours    OTHER:  albuterol/ipratropium for Nebulization 3 milliLiter(s) Nebulizer every 6 hours PRN  amLODIPine   Tablet 10 milliGRAM(s) Oral daily  hydrALAZINE 50 milliGRAM(s) Oral every 8 hours  hydrALAZINE Injectable 10 milliGRAM(s) IV Push every 4 hours PRN  influenza   Vaccine 0.5 milliLiter(s) IntraMuscular once  labetalol 100 milliGRAM(s) Oral every 8 hours  losartan 100 milliGRAM(s) Oral daily  niCARdipine Infusion 5 mG/Hr IV Continuous <Continuous>  polyethylene glycol 3350 17 Gram(s) Oral every 12 hours  senna 2 Tablet(s) Oral at bedtime    IVF:  sodium chloride 3 Gram(s) Oral every 6 hours  sodium chloride 3%. 500 milliLiter(s) IV Continuous <Continuous>    CULTURES:  Culture Results:   No growth at 12 hours ( @ 12:45)  Culture Results:   No growth at 12 hours ( @ 12:45)    RADIOLOGY & ADDITIONAL TESTS:      ASSESSMENT:  Patient 57 y/o female with PMHx of HTN, pre-DM w/ left thalamic parenchymal hematoma with extensive intraventricular hemorrhage. ICH score 2. NIHSS 18. s/p R frontal large bore EVD placement . Pt is now hypertensive requiring Cardene, on 2 standing antihypertensive agent. Tachycardic upto 120s. s/p boluses and straight cath for retention without improvement, and pupilary anisocoria.     HEAD BLEED    Handoff    No pertinent past medical history    Hypertension    Pre-diabetes    ICH (intracerebral hemorrhage)    HTN (hypertension)    Pre-diabetes    Insertion of intravenous catheter with ultrasound guidance    Insertion of intravenous catheter with ultrasound guidance    [id number]        PLAN:  NEURO:  - neuro/vital q1h  - CT from 2/15 shows that R vent is now collapsed, however L vent still contains casted hemorrhagic clot, MLS is still unchanged at 4mm, with perihematomal edema (L thalamic)  - L trapped Ventricle:  Dr. D'amico aware Continue hypertonics therapy for a Na+ above >145. hyperosmolar therapy as needed.  - R frontal large bore EVD open at 5cmH2O, ICPs single digits, trend ICPs and output for now   - CTH  stable  - RUE weakness and pupilary anisocoria: expected likely from perihematomal edema;   - possible paroxysmal sympathetic hyperacitivity? (tachy/htn..), continue monitoring neuro/vitals q 1   - CTA: no vessel occlusion or aneurysm noted   - Keppra 1g given at [place] , no keppra for now     Cardio:   - SBP goal relaxed to 100-160 on    - cont weaning cardene off  - cont. amlodipine 10mg QD, losartan 100mg QD (home med) (2/15-), Hydralazine 50 mg TID (-), labetalol 100 q8h   - dc'd lipitor s/p ICH, elevated total cholestrol and LDL   - echo : significant for symmetric LVH with normal systolic function, EF: 65-70%   - Persistent tachycardia - s/p bolus ?hypovolemia, ?fever, ?pain  - recent doppler (22) neg for DVT. no arrhythmia noted on EKG. electrolytes repleted this morning.     PULM:  - extubated , on RA  - duonebs PRN   - O2 requirement is coming down   - CT chest : Negative for PE, some consolidation   - IS    Renal:   - intermittent straight cath for retention   - cont bladder scan q6hr   - Na goal 145-150  - 3% @ 75/hr, salt tabs 3 q6     GI:  - Diet: minced & moist diet, NGT removed    - bowel regimen, last BM     HEME:  - SCDs/SQL for DVT ppx   - antixa level : 0.09,  0.23  - LE dopplers : neg for DVT     ENDO:  - ISS  - prediabetic, a1c 5.8  - TSH wnl   - atorvastatin dc'd     ID:  - xray without obvious consolidation. sputum culture not sent (not vented, oral contamination risk)   - blood cx  +enterococcus, coag neg staph  - vanco 1g q12 per ID Dr. Salcedo [- ]; if negative w/u for infectious cause abdomen with abd US or abd CT (pt w/ h/o cholecystectomy)   - MRSA negative    PSYCH: pt takes xanax at home, however tachycardia is not correlating with anxiety episodes. pt was persistently tachy after dilaudid dose earlier, when resting/sleeping.     Dispo: ICU status, family updated with plan, pending PT/OT    D/w Dr. D'Amico         Assessment:  Present when checked    []  GCS  E   V  M     Heart Failure: []Acute, [] acute on chronic , []chronic  Heart Failure:  [] Diastolic (HFpEF), [] Systolic (HFrEF), []Combined (HFpEF and HFrEF), [] RHF, [] Pulm HTN, [] Other    [] ZAIDA, [] ATN, [] AIN, [] other  [] CKD1, [] CKD2, [] CKD 3, [] CKD 4, [] CKD 5, []ESRD    Encephalopathy: [] Metabolic, [] Hepatic, [] toxic, [] Neurological, [] Other    Abnormal Nurtitional Status: [] malnurtition (see nutrition note), [ ]underweight: BMI < 19, [] morbid obesity: BMI >40, [] Cachexia    [] Sepsis  [] hypovolemic shock,[] cardiogenic shock, [] hemorrhagic shock, [] neuogenic shock  [] Acute Respiratory Failure  []Cerebral edema, [] Brain compression/ herniation,   [] Functional quadriplegia  [] Acute blood loss anemia

## 2022-02-19 NOTE — PROGRESS NOTE ADULT - SUBJECTIVE AND OBJECTIVE BOX
HPI:  55 y/o female with PMHx of HTN, pre-DM presents transferred from [facility] for intracranial hemorrhage. As per [place] ED provider and son, patient called son around 7:30-8:00AM c/o severe headache and nausea. Son immediately rushed to her house and found her out of bed, moaning and covered emesis. During transit, EMS reported SBP within 240s with intractable vomiting and given Zofran 8mg. Upon arrival to [place] ED, GCS 7, SBP within 180s, patient was given Decadron 10mg, Keppra 1g, started on a Cardene drip, Propofol, Mannitol 1mg/kg. 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 Patient was intubated for airway support and transferred to [facility] for further management. ICH2, NIHSS 8.  Denies use of anticoagulants/antiplatelets. (12 Feb 2022 16:36).    On admission, the patient was:  GCS: 8  Hunt-Rachel:  modified Duval:  ICH score:2    Hospital Course:   2/12: Transferred from [place]. R frontal EVD emergently. High pressure. Elevated lactate, bolused. Not following commands.   2/13: Pt remains intubated and sedated. Moving all extremities though not following commands. Concern for storming. Hypotensive on precedex.  2/14: Spontaneous eye opening. Agitated. On propofol- weaning. SAT/SBT. Goal to extubate. EVD decreased to 5cmH2O. Extubated to HFNC  2/15: Anisocoria noted. CT head stable (trapped L vent). Na goal increased 145- 155.  2/17: Persistent tachycardia HR 120s. CT/PE protocol done. Prelim neg. Febrile today 102.7F. CSF and urine cx sent.  2/18: CT head stable. Neuroexam continues to improve. GPC in clusters in blood cultures. Repeat cultures sent. Afebrile.   2/19:     ROS:     ICU vital Signs Last 24 Hrs  T(C): 37.2 (19 Feb 2022 05:25), Max: 39 (18 Feb 2022 22:01)  T(F): 98.9 (19 Feb 2022 05:25), Max: 102.2 (18 Feb 2022 22:01)  HR: 122 (19 Feb 2022 05:00) (84 - 122)  BP: 159/69 (19 Feb 2022 02:00) (137/69 - 161/73)  BP(mean): 99 (19 Feb 2022 02:00) (90 - 121)  ABP: 155/62 (19 Feb 2022 05:00) (102/62 - 173/73)  ABP(mean): 92 (19 Feb 2022 05:00) (80 - 107)  RR: 27 (19 Feb 2022 05:00) (20 - 27)  SpO2: 97% (19 Feb 2022 05:00) (94% - 99%)      02-17-22 @ 07:01  -  02-18-22 @ 07:00  --------------------------------------------------------  IN: 4695 mL / OUT: 2296 mL / NET: 2399 mL    02-18-22 @ 07:01  -  02-19-22 @ 06:48  --------------------------------------------------------  IN: 4835 mL / OUT: 3101 mL / NET: 1734 mL      General:   HEENT: MMM  Neuro: Awake, alert, oriented to person and hospital- also knows month with choice. Follows commands, R pupil 3mm, L pupil 2mm both briskly reactive  EOMI, face symmetric, RUE WD, otherwise R bicep 1/5, 4/5 tricep. RLE 4-/5. Left side full strength  Sensation intact in all 4 extremities   Cards:  RRR, tachycardic S1/S2  Respiratory: Clear, no wheeze.  Abdomen: Soft, non tender  Extremities: Localized areas of thrombophlebitis on B/L upper extremities.       MEDICATIONS:   acetaminophen     Tablet .. 650 milliGRAM(s) Oral every 6 hours PRN  albuterol/ipratropium for Nebulization 3 milliLiter(s) Nebulizer every 6 hours PRN  amLODIPine   Tablet 10 milliGRAM(s) Oral daily  enoxaparin Injectable 40 milliGRAM(s) SubCutaneous every 24 hours  hydrALAZINE 50 milliGRAM(s) Oral every 8 hours  hydrALAZINE Injectable 10 milliGRAM(s) IV Push every 4 hours PRN  influenza   Vaccine 0.5 milliLiter(s) IntraMuscular once  labetalol 100 milliGRAM(s) Oral every 8 hours  losartan 100 milliGRAM(s) Oral daily  niCARdipine Infusion 5 mG/Hr (25 mL/Hr) IV Continuous <Continuous>  nystatin    Suspension 324178 Unit(s) Oral three times a day  oxyCODONE    IR 5 milliGRAM(s) Oral every 6 hours PRN  polyethylene glycol 3350 17 Gram(s) Oral every 12 hours  senna 2 Tablet(s) Oral at bedtime  sodium chloride 3 Gram(s) Oral every 6 hours  sodium chloride 3%. 500 milliLiter(s) (100 mL/Hr) IV Continuous <Continuous>  vancomycin  IVPB 1000 milliGRAM(s) IV Intermittent every 12 hours      LABS:                      11.3   10.35 )-----------( 239      ( 19 Feb 2022 04:50 )             35.3     02-19    141  |  113<H>  |  7   ----------------------------<  140<H>  3.6   |  17<L>  |  0.38<L>    Ca    8.6      19 Feb 2022 04:50  Phos  2.4     02-19  Mg     2.0     02-19        Culture - Blood (collected 02-18-22 @ 12:45)  Source: .Blood Blood  Preliminary Report (02-19-22 @ 01:01):    No growth at 12 hours    Culture - Blood (collected 02-18-22 @ 12:45)  Source: .Blood Blood  Preliminary Report (02-19-22 @ 01:01):    No growth at 12 hours    Culture - CSF with Gram Stain (collected 02-18-22 @ 12:43)  Source: .CSF CSF  Gram Stain (02-18-22 @ 13:43):    No organisms seen    Few WBC's        Access:   3 peripheral IVs  R radial [place]  R frontal EVD at 5cmH2O  NGT                 HPI:  57 y/o female with PMHx of HTN, pre-DM presents transferred from [facility] for intracranial hemorrhage. As per [place] ED provider and son, patient called son around 7:30-8:00AM c/o severe headache and nausea. Son immediately rushed to her house and found her out of bed, moaning and covered emesis. During transit, EMS reported SBP within 240s with intractable vomiting and given Zofran 8mg. Upon arrival to [place] ED, GCS 7, SBP within 180s, patient was given Decadron 10mg, Keppra 1g, started on a Cardene drip, Propofol, Mannitol 1mg/kg. 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 Patient was intubated for airway support and transferred to [facility] for further management. ICH2, NIHSS 8.  Denies use of anticoagulants/antiplatelets. (12 Feb 2022 16:36).    On admission, the patient was:  GCS: 8  Hunt-Rachel:  modified Duval:  ICH score:2    Hospital Course:   2/12: Transferred from [place]. R frontal EVD emergently. High pressure. Elevated lactate, bolused. Not following commands.   2/13: Pt remains intubated and sedated. Moving all extremities though not following commands. Concern for storming. Hypotensive on precedex.  2/14: Spontaneous eye opening. Agitated. On propofol- weaning. SAT/SBT. Goal to extubate. EVD decreased to 5cmH2O. Extubated to HFNC  2/15: Anisocoria noted. CT head stable (trapped L vent). Na goal increased 145- 155.  2/17: Persistent tachycardia HR 120s. CT/PE protocol done. Prelim neg. Febrile today 102.7F. CSF and urine cx sent.  2/18: CT head stable. Neuroexam continues to improve. GPC in clusters in blood cultures. Repeat cultures sent. Afebrile.   2/19: Neuroexam stable. Sites of IV shows induration/ erythema/edema.     ROS:   Pt complains of chills. Otherwise wnl.     ICU vital Signs Last 24 Hrs  T(C): 37.2 (19 Feb 2022 05:25), Max: 39 (18 Feb 2022 22:01)  T(F): 98.9 (19 Feb 2022 05:25), Max: 102.2 (18 Feb 2022 22:01)  HR: 122 (19 Feb 2022 05:00) (84 - 122)  BP: 159/69 (19 Feb 2022 02:00) (137/69 - 161/73)  BP(mean): 99 (19 Feb 2022 02:00) (90 - 121)  ABP: 155/62 (19 Feb 2022 05:00) (102/62 - 173/73)  ABP(mean): 92 (19 Feb 2022 05:00) (80 - 107)  RR: 27 (19 Feb 2022 05:00) (20 - 27)  SpO2: 97% (19 Feb 2022 05:00) (94% - 99%)      02-17-22 @ 07:01  -  02-18-22 @ 07:00  --------------------------------------------------------  IN: 4695 mL / OUT: 2296 mL / NET: 2399 mL    02-18-22 @ 07:01  -  02-19-22 @ 06:48  --------------------------------------------------------  IN: 4835 mL / OUT: 3101 mL / NET: 1734 mL      General:   HEENT: MMM  Neuro: Awake, alert, oriented to person and hospital- also knows month with choice. Follows commands, R pupil 3mm, L pupil 2mm both briskly reactive  EOMI, face symmetric, RUE WD, otherwise R bicep 1/5, 4/5 tricep. RLE 4-/5. Left side full strength  Sensation intact in all 4 extremities   Cards:  RRR, tachycardic S1/S2  Respiratory: Clear, no wheeze.  Abdomen: Soft, non tender  Extremities: Localized areas of thrombophlebitis on B/L upper extremities.       MEDICATIONS:   acetaminophen     Tablet .. 650 milliGRAM(s) Oral every 6 hours PRN  albuterol/ipratropium for Nebulization 3 milliLiter(s) Nebulizer every 6 hours PRN  amLODIPine   Tablet 10 milliGRAM(s) Oral daily  enoxaparin Injectable 40 milliGRAM(s) SubCutaneous every 24 hours  hydrALAZINE 50 milliGRAM(s) Oral every 8 hours  hydrALAZINE Injectable 10 milliGRAM(s) IV Push every 4 hours PRN  influenza   Vaccine 0.5 milliLiter(s) IntraMuscular once  labetalol 100 milliGRAM(s) Oral every 8 hours  losartan 100 milliGRAM(s) Oral daily  niCARdipine Infusion 5 mG/Hr (25 mL/Hr) IV Continuous <Continuous>  nystatin    Suspension 383626 Unit(s) Oral three times a day  oxyCODONE    IR 5 milliGRAM(s) Oral every 6 hours PRN  polyethylene glycol 3350 17 Gram(s) Oral every 12 hours  senna 2 Tablet(s) Oral at bedtime  sodium chloride 3 Gram(s) Oral every 6 hours  sodium chloride 3%. 500 milliLiter(s) (100 mL/Hr) IV Continuous <Continuous>  vancomycin  IVPB 1000 milliGRAM(s) IV Intermittent every 12 hours      LABS:                      11.3   10.35 )-----------( 239      ( 19 Feb 2022 04:50 )             35.3     02-19    141  |  113<H>  |  7   ----------------------------<  140<H>  3.6   |  17<L>  |  0.38<L>    Ca    8.6      19 Feb 2022 04:50  Phos  2.4     02-19  Mg     2.0     02-19        Culture - Blood (collected 02-18-22 @ 12:45)  Source: .Blood Blood  Preliminary Report (02-19-22 @ 01:01):    No growth at 12 hours    Culture - Blood (collected 02-18-22 @ 12:45)  Source: .Blood Blood  Preliminary Report (02-19-22 @ 01:01):    No growth at 12 hours    Culture - CSF with Gram Stain (collected 02-18-22 @ 12:43)  Source: .CSF CSF  Gram Stain (02-18-22 @ 13:43):    No organisms seen    Few WBC's        Access:   3 peripheral IVs  R radial [place]  R frontal EVD at 5cmH2O  NGT                 HPI:  55 y/o F PMH of HTN, pre-DM presents transferred from [facility] for intracranial hemorrhage. As per [place] ED provider and son, patient called son around 7:30-8:00AM c/o severe headache and nausea. Son immediately rushed to her house and found her out of bed, moaning and covered emesis. During transit, EMS reported SBP within 240s with intractable vomiting and given Zofran 8mg. Upon arrival to [place] ED, GCS 7, SBP within 180s, patient was given Decadron 10mg, Keppra 1g, started on a Cardene drip, Propofol, Mannitol 1mg/kg. 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 Patient was intubated for airway support and transferred to [facility] for further management. ICH2, NIHSS 8.  Denies use of anticoagulants/antiplatelets. (12 Feb 2022 16:36).    On admission, the patient was:  GCS: 8  Hunt-Rachel:  modified Duval:  ICH score:2    Hospital Course:   2/12: Transferred from [place]. R frontal EVD emergently. High pressure. Elevated lactate, bolused. Not following commands.   2/13: Pt remains intubated and sedated. Moving all extremities though not following commands. Concern for storming. Hypotensive on precedex.  2/14: Spontaneous eye opening. Agitated. On propofol- weaning. SAT/SBT. Goal to extubate. EVD decreased to 5cmH2O. Extubated to HFNC  2/15: Anisocoria noted. CT head stable (trapped L vent). Na goal increased 145- 155.  2/17: Persistent tachycardia HR 120s. CT/PE protocol done. Prelim neg. Febrile today 102.7F. CSF and urine cx sent.  2/18: CT head stable. Neuroexam continues to improve. GPC in clusters in blood cultures. Repeat cultures sent. Afebrile.   2/19: Neuroexam stable. Sites of IV shows induration/ erythema/edema. Central line. DC peripherals. Urine culture: Klebsiella and citrobacter. Ceftriaxone ordered.     ROS:   Pt complains of chills. Otherwise wnl.     ICU vital Signs Last 24 Hrs  T(C): 37.2 (19 Feb 2022 05:25), Max: 39 (18 Feb 2022 22:01)  T(F): 98.9 (19 Feb 2022 05:25), Max: 102.2 (18 Feb 2022 22:01)  HR: 122 (19 Feb 2022 05:00) (84 - 122)  BP: 159/69 (19 Feb 2022 02:00) (137/69 - 161/73)  BP(mean): 99 (19 Feb 2022 02:00) (90 - 121)  ABP: 155/62 (19 Feb 2022 05:00) (102/62 - 173/73)  ABP(mean): 92 (19 Feb 2022 05:00) (80 - 107)  RR: 27 (19 Feb 2022 05:00) (20 - 27)  SpO2: 97% (19 Feb 2022 05:00) (94% - 99%)      02-17-22 @ 07:01  -  02-18-22 @ 07:00  --------------------------------------------------------  IN: 4695 mL / OUT: 2296 mL / NET: 2399 mL    02-18-22 @ 07:01  -  02-19-22 @ 06:48  --------------------------------------------------------  IN: 4835 mL / OUT: 3101 mL / NET: 1734 mL      General: Pt with rigors.   HEENT: MMM  Neuro: Awake, alert, oriented to person and hospital- also knows month with choice. Follows commands, R pupil 3mm, L pupil 2mm both briskly reactive  EOMI, face symmetric, RUE bicep 3/5, tricep 4/5. RLE 4-/5. Left side full strength  Sensation intact in all 4 extremities   Cards:  RRR, tachycardic S1/S2  Respiratory: Clear, no wheeze.  Abdomen: Soft, non tender  Extremities: Localized, multiple areas of thrombophlebitis on B/L upper extremities.       MEDICATIONS:   acetaminophen     Tablet .. 650 milliGRAM(s) Oral every 6 hours PRN  albuterol/ipratropium for Nebulization 3 milliLiter(s) Nebulizer every 6 hours PRN  amLODIPine   Tablet 10 milliGRAM(s) Oral daily  enoxaparin Injectable 40 milliGRAM(s) SubCutaneous every 24 hours  hydrALAZINE 50 milliGRAM(s) Oral every 8 hours  hydrALAZINE Injectable 10 milliGRAM(s) IV Push every 4 hours PRN  influenza   Vaccine 0.5 milliLiter(s) IntraMuscular once  labetalol 100 milliGRAM(s) Oral every 8 hours  losartan 100 milliGRAM(s) Oral daily  niCARdipine Infusion 5 mG/Hr (25 mL/Hr) IV Continuous <Continuous>  nystatin    Suspension 007033 Unit(s) Oral three times a day  oxyCODONE    IR 5 milliGRAM(s) Oral every 6 hours PRN  polyethylene glycol 3350 17 Gram(s) Oral every 12 hours  senna 2 Tablet(s) Oral at bedtime  sodium chloride 3 Gram(s) Oral every 6 hours  sodium chloride 3%. 500 milliLiter(s) (100 mL/Hr) IV Continuous <Continuous>  vancomycin  IVPB 1000 milliGRAM(s) IV Intermittent every 12 hours      LABS:                      11.3   10.35 )-----------( 239      ( 19 Feb 2022 04:50 )             35.3     02-19    141  |  113<H>  |  7   ----------------------------<  140<H>  3.6   |  17<L>  |  0.38<L>    Ca    8.6      19 Feb 2022 04:50  Phos  2.4     02-19  Mg     2.0     02-19        Culture - Blood (collected 02-18-22 @ 12:45)  Source: .Blood Blood  Preliminary Report (02-19-22 @ 01:01):    No growth at 12 hours    Culture - Blood (collected 02-18-22 @ 12:45)  Source: .Blood Blood  Preliminary Report (02-19-22 @ 01:01):    No growth at 12 hours    Culture - CSF with Gram Stain (collected 02-18-22 @ 12:43)  Source: .CSF CSF  Gram Stain (02-18-22 @ 13:43):    No organisms seen    Few WBC's      Access:   2 peripheral IVs-> placing central line.   R radial [place]  R frontal EVD at 5cmH2O                   HPI:  55 y/o F PMH of HTN, pre-DM presents transferred from [facility] for intracranial hemorrhage. As per [place] ED provider and son, patient called son around 7:30-8:00AM c/o severe headache and nausea. Son immediately rushed to her house and found her out of bed, moaning and covered emesis. During transit, EMS reported SBP within 240s with intractable vomiting and given Zofran 8mg. Upon arrival to [place] ED, GCS 7, SBP within 180s, patient was given Decadron 10mg, Keppra 1g, started on a Cardene drip, Propofol, Mannitol 1mg/kg. 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 Patient was intubated for airway support and transferred to [facility] for further management. ICH2, NIHSS 8.  Denies use of anticoagulants/antiplatelets. (12 Feb 2022 16:36).    On admission, the patient was:  GCS: 8  Hunt-Rachel:  modified Duval:  ICH score:2    Hospital Course:   2/12: Transferred from [place]. R frontal EVD emergently. High pressure. Elevated lactate, bolused. Not following commands.   2/13: Pt remains intubated and sedated. Moving all extremities though not following commands. Concern for storming. Hypotensive on precedex.  2/14: Spontaneous eye opening. Agitated. On propofol- weaning. SAT/SBT. Goal to extubate. EVD decreased to 5cmH2O. Extubated to HFNC  2/15: Anisocoria noted. CT head stable (trapped L vent). Na goal increased 145- 155.  2/17: Persistent tachycardia HR 120s. CT/PE protocol done. Prelim neg. Febrile today 102.7F. CSF and urine cx sent.  2/18: CT head stable. Neuroexam continues to improve. GPC in clusters in blood cultures. Repeat cultures sent. Afebrile.   2/19: Neuroexam stable. Sites of IV shows induration/ erythema/edema. Central line. DC peripherals. Urine culture: Klebsiella and citrobacter. Ceftriaxone ordered.     ROS:   Pt complains of chills.     ICU vital Signs Last 24 Hrs  T(C): 37.2 (19 Feb 2022 05:25), Max: 39 (18 Feb 2022 22:01)  T(F): 98.9 (19 Feb 2022 05:25), Max: 102.2 (18 Feb 2022 22:01)  HR: 122 (19 Feb 2022 05:00) (84 - 122)  BP: 159/69 (19 Feb 2022 02:00) (137/69 - 161/73)  BP(mean): 99 (19 Feb 2022 02:00) (90 - 121)  ABP: 155/62 (19 Feb 2022 05:00) (102/62 - 173/73)  ABP(mean): 92 (19 Feb 2022 05:00) (80 - 107)  RR: 27 (19 Feb 2022 05:00) (20 - 27)  SpO2: 97% (19 Feb 2022 05:00) (94% - 99%)      02-17-22 @ 07:01  -  02-18-22 @ 07:00  --------------------------------------------------------  IN: 4695 mL / OUT: 2296 mL / NET: 2399 mL    02-18-22 @ 07:01  -  02-19-22 @ 06:48  --------------------------------------------------------  IN: 4835 mL / OUT: 3101 mL / NET: 1734 mL      General: Pt with rigors.   HEENT: MMM  Neuro: Awake, alert, oriented to person and hospital- also knows month with choice. Follows commands, R pupil 3mm, L pupil 2mm both briskly reactive  EOMI, face symmetric, RUE bicep 3/5, tricep 4/5. RLE 4-/5. Left side full strength  Sensation intact in all 4 extremities   Cards:  RRR, tachycardic S1/S2  Respiratory: Clear, no wheeze.  Abdomen: Soft, non tender  Extremities: Localized, multiple areas of thrombophlebitis on B/L upper extremities.       MEDICATIONS:   acetaminophen     Tablet .. 650 milliGRAM(s) Oral every 6 hours PRN  albuterol/ipratropium for Nebulization 3 milliLiter(s) Nebulizer every 6 hours PRN  amLODIPine   Tablet 10 milliGRAM(s) Oral daily  enoxaparin Injectable 40 milliGRAM(s) SubCutaneous every 24 hours  hydrALAZINE 50 milliGRAM(s) Oral every 8 hours  hydrALAZINE Injectable 10 milliGRAM(s) IV Push every 4 hours PRN  influenza   Vaccine 0.5 milliLiter(s) IntraMuscular once  labetalol 100 milliGRAM(s) Oral every 8 hours  losartan 100 milliGRAM(s) Oral daily  niCARdipine Infusion 5 mG/Hr (25 mL/Hr) IV Continuous <Continuous>  nystatin    Suspension 323218 Unit(s) Oral three times a day  oxyCODONE    IR 5 milliGRAM(s) Oral every 6 hours PRN  polyethylene glycol 3350 17 Gram(s) Oral every 12 hours  senna 2 Tablet(s) Oral at bedtime  sodium chloride 3 Gram(s) Oral every 6 hours  sodium chloride 3%. 500 milliLiter(s) (100 mL/Hr) IV Continuous <Continuous>  vancomycin  IVPB 1000 milliGRAM(s) IV Intermittent every 12 hours      LABS:                      11.3   10.35 )-----------( 239      ( 19 Feb 2022 04:50 )             35.3     02-19    141  |  113<H>  |  7   ----------------------------<  140<H>  3.6   |  17<L>  |  0.38<L>    Ca    8.6      19 Feb 2022 04:50  Phos  2.4     02-19  Mg     2.0     02-19        Culture - Blood (collected 02-18-22 @ 12:45)  Source: .Blood Blood  Preliminary Report (02-19-22 @ 01:01):    No growth at 12 hours    Culture - Blood (collected 02-18-22 @ 12:45)  Source: .Blood Blood  Preliminary Report (02-19-22 @ 01:01):    No growth at 12 hours    Culture - CSF with Gram Stain (collected 02-18-22 @ 12:43)  Source: .CSF CSF  Gram Stain (02-18-22 @ 13:43):    No organisms seen    Few WBC's      Access:   2 peripheral IVs-> placing central line.   R radial [place]  R frontal EVD at 5cmH2O

## 2022-02-19 NOTE — PROGRESS NOTE ADULT - ASSESSMENT
ASSESSMENT/PLAN: L thalamic bleed with extensive IVH and 5mm L to R MLS.  Likely hypertensive.  BD 8 ICH score 2    NEURO:  Q1 neurochecks  Hydrocephalus; EVD@ 5cmH2O. Monitor ICP/output. ICPs wnl.  Repeat CT head 2/18: Stable vent size- decreased blood in R ventricle   Analgesia: Tylenol/ oxycodone  Stroke core measures (hold statin for now given bleed). Stroke neurology following.  Activity: [] mobilize as tolerated [] Bedrest [x] PT [x] OT [] PMNR    PULM:  CT PE protocol neg PE. Does have bilateral consolidation ?atelectasis.  Weaned from HFNC ->Now on room air.   Encourage incentive spirometry     CV: Tachycardia (rule out PE vs 2/2 pain/anxiety/fever)  SBP goal 100-160  On cardene. On amlodipine 10mg (new med) losartan 100mg (home med), hydralazine 50mg tid.  Add labetalol. Treat underlying factors above. Wean cardene to off as tolerated  TTE with bubble study EF 65-70%    RENAL:  Fluids: 3% @100ml/hr. Salt tabs 3g Q6  Na 145- 150. Follow up BMPs Q6.   Primafit. Is/Os. Supplement lytes    GI:   Diet: Minced and moist  GI prophylaxis [x] not indicated [] PPI [] other:  Bowel regimen [] colace [x] senna [] other. LBM: 2/17    ENDO: Hyperlipidemia, prediabetes.  Goal euglycemia (-180)  A1c 5.8, TSH 0.39    HEME/ONC:  CBC Stable  VTE prophylaxis: [x] SCDs [x] Lovenox. Anti Xa level 0.23  LE dopplers neg. CTPE negative    ID: Enterococcus and coag neg staph bacteremia noted from cultures 2/16  Repeat cultures sent 2/19  ID following  On vancomycin 1g bid. Monitor troughs  Follow up CSF culture 2/18  MRSA nares neg    MISC:  Warm compresses for localised phlebitis.     SOCIAL/FAMILY:  [x] awaiting [] updated  [] family meeting    CODE STATUS:  [x] Full Code [] DNR [] DNI [] Palliative/Comfort Care    DISPOSITION:  [x] ICU [] Stroke Unit [] Floor [] EMU [] RCU [] PCU    [x] Patient is at high risk of neurologic deterioration/death due to: hydrocephalus, hemorrhage, ICU delirium, sepsis/shock, bacteremia, VTE.     Time spent: 40 critical care minutes.       ASSESSMENT/PLAN: L thalamic bleed with extensive IVH and 5mm L to R MLS.  Likely hypertensive.  BD 8 ICH score 2    NEURO:  Q1 neurochecks  Hydrocephalus; EVD@ 5cmH2O. Monitor ICP/output. ICPs wnl.  Repeat CT head 2/18: Stable vent size- decreased blood in R ventricle   Analgesia: Tylenol/ oxycodone  Stroke core measures (hold statin for now given bleed). Stroke neurology following.  Activity: [] mobilize as tolerated [] Bedrest [x] PT [x] OT [] PMNR    PULM:  CT PE protocol neg PE. Does have bilateral consolidation ?atelectasis.  Weaned from HFNC ->Now on room air.   Encourage incentive spirometry   CXR clear 2/18    CV: Tachycardia (rule out PE vs 2/2 pain/anxiety/fever)  SBP goal 100-160  On cardene. On amlodipine 10mg (new med) losartan 100mg (home med), hydralazine 50mg tid.  Add labetalol. Treat underlying factors above. Wean cardene to off as tolerated  TTE with bubble study EF 65-70%  TLC    RENAL:  Fluids: 3% @100ml/hr. Salt tabs 3g Q6  Na 145- 150. Follow up BMPs Q6.   Primafit. Is/Os. Supplement lytes    GI:   Diet: Minced and moist  GI prophylaxis [x] not indicated [] PPI [] other:  Bowel regimen [] colace [x] senna [] other.   LBM: 2/19  Add LFTs    ENDO: Hyperlipidemia, prediabetes.  Goal euglycemia (-180)  A1c 5.8, TSH 0.39    HEME/ONC:  CBC Stable  VTE prophylaxis: [x] SCDs [x] Lovenox. Anti Xa level 0.23  LE dopplers neg. CTPE negative  Check bilateral upper extremity dopplers.    ID: Febrile. Enterococcus and coag neg staph bacteremia noted from cultures 2/16. SOURCE UNCLEAR.   Repeat cultures sent 2/18 (thus far neg) repeat cultures on 2/18.    Urine culture result still pending. Will contact lab as done >24 hours. If urine neg, will scan abd/pelvis  ID following. On vancomycin 1g bid. Monitor troughs  Follow up CSF culture 2/18 (thus far NGTD)  MRSA nares neg    MISC:  DC all peripheral IVs once TLC in place. Warm compresses for areas of localised phlebitis.     SOCIAL/FAMILY:  [x] awaiting [] updated  [] family meeting    CODE STATUS:  [x] Full Code [] DNR [] DNI [] Palliative/Comfort Care    DISPOSITION:  [x] ICU [] Stroke Unit [] Floor [] EMU [] RCU [] PCU    [x] Patient is at high risk of neurologic deterioration/death due to: hydrocephalus, hemorrhage, ICU delirium, sepsis/shock, bacteremia, VTE.     Time spent: 40 critical care minutes.       ASSESSMENT/PLAN: L thalamic bleed with extensive IVH and 5mm L to R MLS.  Likely hypertensive.  BD 8 ICH score 2    NEURO:  Q1 neurochecks  Hydrocephalus; EVD@ 5cmH2O. Monitor ICP/output. ICPs wnl.  Repeat CT head 2/18: Stable vent size- decreased blood in R ventricle   Analgesia: Tylenol/ oxycodone  Stroke core measures (hold statin for now given bleed). Stroke neurology following.  Activity: [] mobilize as tolerated [] Bedrest [x] PT [x] OT [] PMNR    PULM:  CT PE protocol neg PE. Does have bilateral consolidation ?atelectasis.  Weaned from HFNC -> currently on room air.   Encourage incentive spirometry   CXR clear 2/18    CV: Tachycardia- likely related to fever/ bacteremia. HTN.  SBP goal 100-160  On cardene: Wean with amlodipine 10mg (new med) losartan 100mg (home med), hydralazine 75mg tid, labetalol.   TTE with bubble study EF 65-70%  TLC placement 2/19.     RENAL:  Fluids: 3% @100ml/hr. Salt tabs 3g Q6  Na 145- 150. Follow up BMPs Q6.   Primafit. Is/Os. Supplement lytes    GI:   Diet: Minced and moist  GI prophylaxis [x] not indicated [] PPI [] other:  Bowel regimen [] colace [x] senna [] other.   LBM: 2/19  Add LFTs    ENDO: Hyperlipidemia, prediabetes.  Goal euglycemia (-180)  A1c 5.8, TSH 0.39    HEME/ONC:  CBC Stable  VTE prophylaxis: [x] SCDs [x] Lovenox. Anti Xa level 0.23  LE dopplers neg. CTPE sutdy negative  Check bilateral upper extremity dopplers.    ID: Febrile. Enterococcus and coag neg staph bacteremia noted from cultures 2/16. SOURCE UNCLEAR.   Repeat cultures sent 2/18 (thus far neg). Will repeat cultures again on 2/20.   Urine culture result still pending. Will contact lab as done >24 hours. If urine neg, will scan abd/pelvis for source  ID following. On vancomycin 1g bid. Monitor troughs  Follow up CSF culture 2/18 (thus far NGTD)  MRSA nares neg    MISC:  DC all peripheral IVs once TLC in place. Warm compresses for areas of localised phlebitis. Avoid venipuncture.     SOCIAL/FAMILY:  [x] awaiting [] updated  [] family meeting    CODE STATUS:  [x] Full Code [] DNR [] DNI [] Palliative/Comfort Care    DISPOSITION:  [x] ICU [] Stroke Unit [] Floor [] EMU [] RCU [] PCU    [x] Patient is at high risk of neurologic deterioration/death due to: hydrocephalus, hemorrhage, ICU delirium, sepsis/shock, bacteremia, VTE.     Time spent: 40 critical care minutes.       ASSESSMENT/PLAN: L thalamic bleed with extensive IVH and 5mm L to R MLS.  Likely hypertensive.  BD 8 ICH score 2    NEURO:  Q1 neurochecks  Hydrocephalus; EVD@ 5cmH2O. Monitor ICP/output. ICPs wnl.  Repeat CT head 2/18: Stable vent size- decreased blood in R ventricle   Analgesia: Tylenol/ oxycodone  Stroke core measures (hold statin for now given bleed). Stroke neurology following.  Activity: [] mobilize as tolerated [] Bedrest [x] PT [x] OT [] PMNR    PULM:  CT PE protocol neg PE. Does have bilateral consolidation ?atelectasis.  Weaned from HFNC -> currently on room air.   Encourage incentive spirometry   CXR clear 2/18    CV: Tachycardia- likely related to fever/ bacteremia.   SBP goal 100-160  HTN: On cardene: Wean with amlodipine 10mg (new med) losartan 100mg (home med), hydralazine 75mg tid, labetalol.   TTE with bubble study EF 65-70%  TLC placement 2/19.     RENAL:  Fluids: 3% @100ml/hr. Salt tabs 3g Q6  Na 145- 150. Follow up BMPs Q6.   Primafit. Is/Os. Supplement lytes    GI:   Diet: Minced and moist  GI prophylaxis [x] not indicated [] PPI [] other:  Bowel regimen [] colace [x] senna [] other.   LBM: 2/19  Add LFTs    ENDO: Hyperlipidemia, prediabetes.  Goal euglycemia (-180)  A1c 5.8, TSH 0.39    HEME/ONC:  CBC Stable  VTE prophylaxis: [x] SCDs [x] Lovenox. Anti Xa level 0.23  LE dopplers neg. CTPE sutdy negative  Check bilateral upper extremity dopplers.    ID: Febrile. Enterococcus and coag neg staph bacteremia noted from cultures 2/16. SOURCE UNCLEAR.   Repeat blood cultures sent 2/18 (thus far neg). Will repeat blood cultures again on 2/19  Urine culture result still pending. Will contact lab as done >24 hours. If urine neg, will scan abd/pelvis for source  ID following. On vancomycin 1g bid. Monitor troughs  Follow up CSF culture 2/18 (thus far NGTD)  MRSA nares neg    MISC:  DC all peripheral IVs once TLC in place. Warm compresses for areas of localised phlebitis. Avoid venipuncture.     SOCIAL/FAMILY:  [x] awaiting [] updated  [] family meeting    CODE STATUS:  [x] Full Code [] DNR [] DNI [] Palliative/Comfort Care    DISPOSITION:  [x] ICU [] Stroke Unit [] Floor [] EMU [] RCU [] PCU    [x] Patient is at high risk of neurologic deterioration/death due to: hydrocephalus, hemorrhage, ICU delirium, sepsis/shock, bacteremia, VTE.     Time spent: 40 critical care minutes.

## 2022-02-19 NOTE — PROGRESS NOTE ADULT - ASSESSMENT
L thalamic CVA, course c/b urinary retention, fever/rigors on 2/19, Enterococcus spp in bcx and Klebsiella and Citrobacter in ucx.  - advise repeat bcx 2/19 in setting of new Tm and rigors  - f/u bcx 2/18  - f/u Enterococcus speciation and susceptibility from cx 2/16  - f/u TTE  - continue vancomycin 1g IV q12h and check true trough prior to AM dose 2/20  - start ceftriaxone 2g IV q24h    d/w primary team

## 2022-02-19 NOTE — PROGRESS NOTE ADULT - SUBJECTIVE AND OBJECTIVE BOX
INTERVAL HPI/OVERNIGHT EVENTS: Rigors today.    ROS: UTO      ANTIBIOTICS/RELEVANT:    MEDICATIONS  (STANDING):  amLODIPine   Tablet 10 milliGRAM(s) Oral daily  cefTRIAXone   IVPB      enoxaparin Injectable 40 milliGRAM(s) SubCutaneous every 24 hours  hydrALAZINE 75 milliGRAM(s) Oral every 8 hours  influenza   Vaccine 0.5 milliLiter(s) IntraMuscular once  labetalol 100 milliGRAM(s) Oral every 8 hours  losartan 100 milliGRAM(s) Oral daily  niCARdipine Infusion 5 mG/Hr (25 mL/Hr) IV Continuous <Continuous>  nystatin    Suspension 394905 Unit(s) Oral three times a day  polyethylene glycol 3350 17 Gram(s) Oral every 12 hours  potassium phosphate / sodium phosphate Powder (PHOS-NaK) 1 Packet(s) Oral every 6 hours  senna 2 Tablet(s) Oral at bedtime  sodium chloride 3 Gram(s) Oral every 6 hours  sodium chloride 3%. 500 milliLiter(s) (100 mL/Hr) IV Continuous <Continuous>  vancomycin  IVPB 1000 milliGRAM(s) IV Intermittent every 12 hours    MEDICATIONS  (PRN):  acetaminophen     Tablet .. 650 milliGRAM(s) Oral every 6 hours PRN Temp greater or equal to 38C (100.4F), Mild Pain (1 - 3)  albuterol/ipratropium for Nebulization 3 milliLiter(s) Nebulizer every 6 hours PRN Shortness of Breath and/or Wheezing  hydrALAZINE Injectable 10 milliGRAM(s) IV Push every 4 hours PRN SBP>140  oxyCODONE    IR 5 milliGRAM(s) Oral every 6 hours PRN Moderate Pain (4 - 6)        Vital Signs Last 24 Hrs  T(C): 38.1 (19 Feb 2022 14:01), Max: 39.7 (19 Feb 2022 06:00)  T(F): 100.6 (19 Feb 2022 14:01), Max: 103.4 (19 Feb 2022 06:00)  HR: 107 (19 Feb 2022 13:30) (93 - 122)  BP: 153/72 (19 Feb 2022 13:30) (138/69 - 161/73)  BP(mean): 104 (19 Feb 2022 13:30) (90 - 106)  RR: 28 (19 Feb 2022 13:30) (20 - 31)  SpO2: 96% (19 Feb 2022 13:30) (94% - 99%)    PHYSICAL EXAM:  Constitutional: NAD  Eyes: CHRISTIANO, EOMI  Ear/Nose/Throat: no oral lesion, no sinus tenderness on percussion	  Neck: no JVD, no lymphadenopathy, supple  Respiratory: CTA [place]  Cardiovascular: S1S2 RRR, no murmurs  Gastrointestinal:soft, (+) BS, no HSM  Extremities:no e/e/c  Vascular: DP Pulse:	right normal; left normal      LABS:                        11.3   10.35 )-----------( 239      ( 19 Feb 2022 04:50 )             35.3     02-19    141  |  113<H>  |  7   ----------------------------<  140<H>  3.6   |  17<L>  |  0.38<L>    Ca    8.6      19 Feb 2022 04:50  Phos  2.4     02-19  Mg     2.0     02-19    TPro  6.9  /  Alb  3.6  /  TBili  1.0  /  DBili  0.2  /  AST  27  /  ALT  22  /  AlkPhos  74  02-19          MICROBIOLOGY: Culture - Blood (02.16.22 @ 18:36)    -  Coagulase negative Staphylococcus: Detec    -  Enterococcus species: Detec    Gram Stain:   Anaerobic Bottle:  Gram Positive Cocci in Clusters  Result called to and read back byOscar Travis RN 02/18/2022 09:23:31  ***Blood Panel PCR results on this specimen are available  approximately 3 hours after the Gram stain result.***  Gram stain, PCR, and/or culture results may not always  correspond due to difference in methodologies.  ************************************************************  This PCR assay was performed using [employer].  The following targets are tested for: Enterococcus,  vancomycin resistant enterococci, Listeria monocytogenes,  coagulase negative staphylococci, S. aureus,  methicillin resistant S. aureus, Streptococcus agalactiae  (Group B), S. pneumoniae, S. pyogenes (Group A),  Acinetobacter baumannii,Enterobacter cloacae, E. coli,  Klebsiella oxytoca, K. pneumoniae, Proteus sp.,  Serratia marcescens, Haemophilus influenzae,  Neisseria meningitidis, Pseudomonas aeruginosa, Candida  albicans, C. glabrata, C krusei, C parapsilosis,  C. tropicalis and the KPC resistance gene.    Specimen Source: .Blood Blood    Organism: Blood Culture PCR    Culture Results:   Growth in anaerobic bottle: Staphylococcus epidermidis  Susceptibility to follow.  Culture in progress  Aerobic Bottle: No growth to date.  Change in culture status will be immediately reported.    Organism Identification: Blood Culture PCR    Method Type: PCR    Culture - Urine (02.17.22 @ 13:38)    -  Ampicillin: R >16 These ampicillin results predict results for amoxicillin    -  Ampicillin: R >16 These ampicillin results predict results for amoxicillin    -  Ampicillin/Sulbactam: S <=4/2 Enterobacter, Klebsiella aerogenes, Citrobacter, and Serratia may develop resistance during prolonged therapy (3-4 days)    -  Ampicillin/Sulbactam: S <=4/2 Enterobacter, Klebsiella aerogenes, Citrobacter, and Serratia may develop resistance during prolonged therapy (3-4 days)    -  Cefazolin: S <=2 ([id number]) For uncomplicated UTI with K. pneumoniae, E. coli, or P. mirablis: SASKIA <=16 is sensitive and SASKIA >=32 is resistant. This also predicts results for oral agents cefaclor, cefdinir, cefpodoxime, cefprozil, cefuroxime axetil, cephalexin and locarbef for uncomplicated UTI. Note that some isolates may be susceptible to these agents while testing resistant to cefazolin.    -  Cefazolin: S <=2    -  Cefepime: S <=2    -  Ceftriaxone: S <=1 Enterobacter, Klebsiella aerogenes, Citrobacter, and Serratia may develop resistance during prolonged therapy    -  Ceftriaxone: S <=1 Enterobacter, Klebsiella aerogenes, Citrobacter, and Serratia may develop resistance during prolonged therapy    -  Ciprofloxacin: S <=0.25    -  Ciprofloxacin: S <=0.25    -  Ertapenem: S <=0.5    -  Ertapenem: S <=0.5    -  Gentamicin: S <=2    -  Gentamicin: S <=2    -  Nitrofurantoin: S <=32 Should not be used to treat pyelonephritis    -  Nitrofurantoin: S <=32 Should not be used to treat pyelonephritis    -  Piperacillin/Tazobactam: S <=8    -  Piperacillin/Tazobactam: S <=8    -  Tobramycin: S <=2    -  Tobramycin: S <=2    -  Trimethoprim/Sulfamethoxazole: S <=0.5/9.5    -  Trimethoprim/Sulfamethoxazole: S <=0.5/9.5    Specimen Source: Catheterized Catheterized    Culture Results:   >100,000 CFU/ml Klebsiella pneumoniae  80,000 CFU/ml Citrobacter koseri    Organism Identification: Klebsiella pneumoniae  Citrobacter koseri    Organism: Klebsiella pneumoniae    Organism: Citrobacter koseri    Method Type: SASKIA    Method Type: SASKIA        RADIOLOGY & ADDITIONAL STUDIES: reviewed

## 2022-02-20 LAB
-  CEFAZOLIN: SIGNIFICANT CHANGE UP
-  CLINDAMYCIN: SIGNIFICANT CHANGE UP
-  ERYTHROMYCIN: SIGNIFICANT CHANGE UP
-  LINEZOLID: SIGNIFICANT CHANGE UP
-  OXACILLIN: SIGNIFICANT CHANGE UP
-  RIFAMPIN: SIGNIFICANT CHANGE UP
-  TRIMETHOPRIM/SULFAMETHOXAZOLE: SIGNIFICANT CHANGE UP
-  VANCOMYCIN: SIGNIFICANT CHANGE UP
ANION GAP SERPL CALC-SCNC: 10 MMOL/L — SIGNIFICANT CHANGE UP (ref 5–17)
ANION GAP SERPL CALC-SCNC: 11 MMOL/L — SIGNIFICANT CHANGE UP (ref 5–17)
ANION GAP SERPL CALC-SCNC: 13 MMOL/L — SIGNIFICANT CHANGE UP (ref 5–17)
ANION GAP SERPL CALC-SCNC: 8 MMOL/L — SIGNIFICANT CHANGE UP (ref 5–17)
BUN SERPL-MCNC: 4 MG/DL — LOW (ref 7–23)
BUN SERPL-MCNC: 5 MG/DL — LOW (ref 7–23)
CALCIUM SERPL-MCNC: 8.4 MG/DL — SIGNIFICANT CHANGE UP (ref 8.4–10.5)
CALCIUM SERPL-MCNC: 8.6 MG/DL — SIGNIFICANT CHANGE UP (ref 8.4–10.5)
CALCIUM SERPL-MCNC: 8.8 MG/DL — SIGNIFICANT CHANGE UP (ref 8.4–10.5)
CALCIUM SERPL-MCNC: 9.2 MG/DL — SIGNIFICANT CHANGE UP (ref 8.4–10.5)
CHLORIDE SERPL-SCNC: 110 MMOL/L — HIGH (ref 96–108)
CHLORIDE SERPL-SCNC: 111 MMOL/L — HIGH (ref 96–108)
CHLORIDE SERPL-SCNC: 115 MMOL/L — HIGH (ref 96–108)
CHLORIDE SERPL-SCNC: 116 MMOL/L — HIGH (ref 96–108)
CO2 SERPL-SCNC: 16 MMOL/L — LOW (ref 22–31)
CO2 SERPL-SCNC: 17 MMOL/L — LOW (ref 22–31)
CO2 SERPL-SCNC: 18 MMOL/L — LOW (ref 22–31)
CO2 SERPL-SCNC: 18 MMOL/L — LOW (ref 22–31)
CREAT SERPL-MCNC: 0.35 MG/DL — LOW (ref 0.5–1.3)
CREAT SERPL-MCNC: 0.36 MG/DL — LOW (ref 0.5–1.3)
CREAT SERPL-MCNC: 0.36 MG/DL — LOW (ref 0.5–1.3)
CREAT SERPL-MCNC: 0.38 MG/DL — LOW (ref 0.5–1.3)
GLUCOSE SERPL-MCNC: 138 MG/DL — HIGH (ref 70–99)
GLUCOSE SERPL-MCNC: 141 MG/DL — HIGH (ref 70–99)
GLUCOSE SERPL-MCNC: 144 MG/DL — HIGH (ref 70–99)
GLUCOSE SERPL-MCNC: 178 MG/DL — HIGH (ref 70–99)
HCT VFR BLD CALC: 35.9 % — SIGNIFICANT CHANGE UP (ref 34.5–45)
HGB BLD-MCNC: 11.7 G/DL — SIGNIFICANT CHANGE UP (ref 11.5–15.5)
MAGNESIUM SERPL-MCNC: 1.9 MG/DL — SIGNIFICANT CHANGE UP (ref 1.6–2.6)
MCHC RBC-ENTMCNC: 30 PG — SIGNIFICANT CHANGE UP (ref 27–34)
MCHC RBC-ENTMCNC: 32.6 GM/DL — SIGNIFICANT CHANGE UP (ref 32–36)
MCV RBC AUTO: 92.1 FL — SIGNIFICANT CHANGE UP (ref 80–100)
METHOD TYPE: SIGNIFICANT CHANGE UP
NRBC # BLD: 0 /100 WBCS — SIGNIFICANT CHANGE UP (ref 0–0)
PHOSPHATE SERPL-MCNC: 2.3 MG/DL — LOW (ref 2.5–4.5)
PLATELET # BLD AUTO: 270 K/UL — SIGNIFICANT CHANGE UP (ref 150–400)
POTASSIUM SERPL-MCNC: 3.3 MMOL/L — LOW (ref 3.5–5.3)
POTASSIUM SERPL-MCNC: 3.4 MMOL/L — LOW (ref 3.5–5.3)
POTASSIUM SERPL-MCNC: 3.6 MMOL/L — SIGNIFICANT CHANGE UP (ref 3.5–5.3)
POTASSIUM SERPL-MCNC: 3.8 MMOL/L — SIGNIFICANT CHANGE UP (ref 3.5–5.3)
POTASSIUM SERPL-SCNC: 3.3 MMOL/L — LOW (ref 3.5–5.3)
POTASSIUM SERPL-SCNC: 3.4 MMOL/L — LOW (ref 3.5–5.3)
POTASSIUM SERPL-SCNC: 3.6 MMOL/L — SIGNIFICANT CHANGE UP (ref 3.5–5.3)
POTASSIUM SERPL-SCNC: 3.8 MMOL/L — SIGNIFICANT CHANGE UP (ref 3.5–5.3)
PROCALCITONIN SERPL-MCNC: 0.09 NG/ML — SIGNIFICANT CHANGE UP (ref 0.02–0.1)
PROCALCITONIN SERPL-MCNC: 0.1 NG/ML — SIGNIFICANT CHANGE UP (ref 0.02–0.1)
RBC # BLD: 3.9 M/UL — SIGNIFICANT CHANGE UP (ref 3.8–5.2)
RBC # FLD: 12 % — SIGNIFICANT CHANGE UP (ref 10.3–14.5)
SODIUM SERPL-SCNC: 139 MMOL/L — SIGNIFICANT CHANGE UP (ref 135–145)
SODIUM SERPL-SCNC: 140 MMOL/L — SIGNIFICANT CHANGE UP (ref 135–145)
SODIUM SERPL-SCNC: 142 MMOL/L — SIGNIFICANT CHANGE UP (ref 135–145)
SODIUM SERPL-SCNC: 142 MMOL/L — SIGNIFICANT CHANGE UP (ref 135–145)
VANCOMYCIN TROUGH SERPL-MCNC: 4 UG/ML — LOW (ref 10–20)
WBC # BLD: 11.39 K/UL — HIGH (ref 3.8–10.5)
WBC # FLD AUTO: 11.39 K/UL — HIGH (ref 3.8–10.5)

## 2022-02-20 PROCEDURE — 99233 SBSQ HOSP IP/OBS HIGH 50: CPT

## 2022-02-20 PROCEDURE — 99291 CRITICAL CARE FIRST HOUR: CPT

## 2022-02-20 PROCEDURE — 36620 INSERTION CATHETER ARTERY: CPT

## 2022-02-20 RX ORDER — HYDRALAZINE HCL 50 MG
100 TABLET ORAL EVERY 8 HOURS
Refills: 0 | Status: DISCONTINUED | OUTPATIENT
Start: 2022-02-20 | End: 2022-03-08

## 2022-02-20 RX ORDER — ACETAMINOPHEN 500 MG
1000 TABLET ORAL ONCE
Refills: 0 | Status: COMPLETED | OUTPATIENT
Start: 2022-02-20 | End: 2022-02-20

## 2022-02-20 RX ORDER — LINEZOLID 600 MG/300ML
600 INJECTION, SOLUTION INTRAVENOUS EVERY 12 HOURS
Refills: 0 | Status: COMPLETED | OUTPATIENT
Start: 2022-02-20 | End: 2022-02-25

## 2022-02-20 RX ORDER — LABETALOL HCL 100 MG
10 TABLET ORAL ONCE
Refills: 0 | Status: COMPLETED | OUTPATIENT
Start: 2022-02-20 | End: 2022-02-20

## 2022-02-20 RX ORDER — LINEZOLID 600 MG/300ML
600 INJECTION, SOLUTION INTRAVENOUS EVERY 12 HOURS
Refills: 0 | Status: DISCONTINUED | OUTPATIENT
Start: 2022-02-20 | End: 2022-02-20

## 2022-02-20 RX ORDER — VANCOMYCIN HCL 1 G
1500 VIAL (EA) INTRAVENOUS ONCE
Refills: 0 | Status: COMPLETED | OUTPATIENT
Start: 2022-02-20 | End: 2022-02-20

## 2022-02-20 RX ORDER — POTASSIUM CHLORIDE 20 MEQ
40 PACKET (EA) ORAL ONCE
Refills: 0 | Status: COMPLETED | OUTPATIENT
Start: 2022-02-20 | End: 2022-02-20

## 2022-02-20 RX ORDER — FLUDROCORTISONE ACETATE 0.1 MG/1
0.1 TABLET ORAL
Refills: 0 | Status: DISCONTINUED | OUTPATIENT
Start: 2022-02-20 | End: 2022-02-23

## 2022-02-20 RX ORDER — MAGNESIUM SULFATE 500 MG/ML
1 VIAL (ML) INJECTION ONCE
Refills: 0 | Status: COMPLETED | OUTPATIENT
Start: 2022-02-20 | End: 2022-02-20

## 2022-02-20 RX ORDER — POTASSIUM CHLORIDE 20 MEQ
40 PACKET (EA) ORAL
Refills: 0 | Status: COMPLETED | OUTPATIENT
Start: 2022-02-20 | End: 2022-02-21

## 2022-02-20 RX ORDER — IBUPROFEN 200 MG
400 TABLET ORAL ONCE
Refills: 0 | Status: COMPLETED | OUTPATIENT
Start: 2022-02-20 | End: 2022-02-20

## 2022-02-20 RX ORDER — VANCOMYCIN HCL 1 G
VIAL (EA) INTRAVENOUS
Refills: 0 | Status: DISCONTINUED | OUTPATIENT
Start: 2022-02-20 | End: 2022-02-20

## 2022-02-20 RX ORDER — LOSARTAN POTASSIUM 100 MG/1
100 TABLET, FILM COATED ORAL DAILY
Refills: 0 | Status: DISCONTINUED | OUTPATIENT
Start: 2022-02-20 | End: 2022-03-11

## 2022-02-20 RX ORDER — SODIUM CHLORIDE 5 G/100ML
500 INJECTION, SOLUTION INTRAVENOUS
Refills: 0 | Status: DISCONTINUED | OUTPATIENT
Start: 2022-02-20 | End: 2022-02-21

## 2022-02-20 RX ORDER — IBUPROFEN 200 MG
400 TABLET ORAL EVERY 8 HOURS
Refills: 0 | Status: DISCONTINUED | OUTPATIENT
Start: 2022-02-20 | End: 2022-03-11

## 2022-02-20 RX ORDER — VANCOMYCIN HCL 1 G
1500 VIAL (EA) INTRAVENOUS EVERY 12 HOURS
Refills: 0 | Status: DISCONTINUED | OUTPATIENT
Start: 2022-02-20 | End: 2022-02-20

## 2022-02-20 RX ORDER — POTASSIUM CHLORIDE 20 MEQ
40 PACKET (EA) ORAL EVERY 4 HOURS
Refills: 0 | Status: COMPLETED | OUTPATIENT
Start: 2022-02-20 | End: 2022-02-20

## 2022-02-20 RX ORDER — HYDRALAZINE HCL 50 MG
25 TABLET ORAL ONCE
Refills: 0 | Status: COMPLETED | OUTPATIENT
Start: 2022-02-20 | End: 2022-02-20

## 2022-02-20 RX ADMIN — Medication 40 MILLIEQUIVALENT(S): at 13:29

## 2022-02-20 RX ADMIN — Medication 400 MILLIGRAM(S): at 11:03

## 2022-02-20 RX ADMIN — FLUDROCORTISONE ACETATE 0.1 MILLIGRAM(S): 0.1 TABLET ORAL at 17:28

## 2022-02-20 RX ADMIN — Medication 100 GRAM(S): at 04:45

## 2022-02-20 RX ADMIN — LOSARTAN POTASSIUM 100 MILLIGRAM(S): 100 TABLET, FILM COATED ORAL at 06:13

## 2022-02-20 RX ADMIN — FLUDROCORTISONE ACETATE 0.1 MILLIGRAM(S): 0.1 TABLET ORAL at 13:29

## 2022-02-20 RX ADMIN — Medication 100 MILLIGRAM(S): at 21:18

## 2022-02-20 RX ADMIN — LIDOCAINE 1 PATCH: 4 CREAM TOPICAL at 06:14

## 2022-02-20 RX ADMIN — Medication 100 MILLIGRAM(S): at 13:29

## 2022-02-20 RX ADMIN — OXYCODONE HYDROCHLORIDE 5 MILLIGRAM(S): 5 TABLET ORAL at 01:37

## 2022-02-20 RX ADMIN — SODIUM CHLORIDE 3 GRAM(S): 9 INJECTION INTRAMUSCULAR; INTRAVENOUS; SUBCUTANEOUS at 00:18

## 2022-02-20 RX ADMIN — CEFTRIAXONE 100 MILLIGRAM(S): 500 INJECTION, POWDER, FOR SOLUTION INTRAMUSCULAR; INTRAVENOUS at 13:30

## 2022-02-20 RX ADMIN — Medication 1 TABLET(S): at 11:45

## 2022-02-20 RX ADMIN — SODIUM CHLORIDE 3 GRAM(S): 9 INJECTION INTRAMUSCULAR; INTRAVENOUS; SUBCUTANEOUS at 05:40

## 2022-02-20 RX ADMIN — Medication 650 MILLIGRAM(S): at 17:41

## 2022-02-20 RX ADMIN — Medication 10 MILLIGRAM(S): at 00:40

## 2022-02-20 RX ADMIN — LIDOCAINE 1 PATCH: 4 CREAM TOPICAL at 11:46

## 2022-02-20 RX ADMIN — SODIUM CHLORIDE 3 GRAM(S): 9 INJECTION INTRAMUSCULAR; INTRAVENOUS; SUBCUTANEOUS at 17:28

## 2022-02-20 RX ADMIN — SODIUM CHLORIDE 3 GRAM(S): 9 INJECTION INTRAMUSCULAR; INTRAVENOUS; SUBCUTANEOUS at 11:45

## 2022-02-20 RX ADMIN — Medication 400 MILLIGRAM(S): at 23:16

## 2022-02-20 RX ADMIN — Medication 500000 UNIT(S): at 21:19

## 2022-02-20 RX ADMIN — Medication 75 MILLIGRAM(S): at 05:40

## 2022-02-20 RX ADMIN — Medication 85 MILLIMOLE(S): at 09:14

## 2022-02-20 RX ADMIN — OXYCODONE HYDROCHLORIDE 5 MILLIGRAM(S): 5 TABLET ORAL at 01:22

## 2022-02-20 RX ADMIN — ENOXAPARIN SODIUM 40 MILLIGRAM(S): 100 INJECTION SUBCUTANEOUS at 21:18

## 2022-02-20 RX ADMIN — Medication 400 MILLIGRAM(S): at 11:55

## 2022-02-20 RX ADMIN — Medication 1000 MILLIGRAM(S): at 10:13

## 2022-02-20 RX ADMIN — Medication 400 MILLIGRAM(S): at 17:29

## 2022-02-20 RX ADMIN — Medication 100 MILLIGRAM(S): at 05:40

## 2022-02-20 RX ADMIN — Medication 500000 UNIT(S): at 05:40

## 2022-02-20 RX ADMIN — Medication 400 MILLIGRAM(S): at 04:24

## 2022-02-20 RX ADMIN — NICARDIPINE HYDROCHLORIDE 25 MG/HR: 30 CAPSULE, EXTENDED RELEASE ORAL at 02:50

## 2022-02-20 RX ADMIN — Medication 1000 MILLIGRAM(S): at 23:30

## 2022-02-20 RX ADMIN — LINEZOLID 600 MILLIGRAM(S): 600 INJECTION, SOLUTION INTRAVENOUS at 17:28

## 2022-02-20 RX ADMIN — Medication 40 MILLIEQUIVALENT(S): at 11:48

## 2022-02-20 RX ADMIN — Medication 1000 MILLIGRAM(S): at 09:14

## 2022-02-20 RX ADMIN — Medication 40 MILLIEQUIVALENT(S): at 04:50

## 2022-02-20 RX ADMIN — Medication 25 MILLIGRAM(S): at 11:48

## 2022-02-20 RX ADMIN — Medication 10 MILLIGRAM(S): at 01:34

## 2022-02-20 RX ADMIN — SODIUM CHLORIDE 3 GRAM(S): 9 INJECTION INTRAMUSCULAR; INTRAVENOUS; SUBCUTANEOUS at 23:38

## 2022-02-20 RX ADMIN — Medication 650 MILLIGRAM(S): at 16:56

## 2022-02-20 RX ADMIN — Medication 300 MILLIGRAM(S): at 06:18

## 2022-02-20 RX ADMIN — Medication 500000 UNIT(S): at 13:28

## 2022-02-20 RX ADMIN — Medication 1000 MILLIGRAM(S): at 05:00

## 2022-02-20 RX ADMIN — AMLODIPINE BESYLATE 10 MILLIGRAM(S): 2.5 TABLET ORAL at 05:40

## 2022-02-20 NOTE — PROGRESS NOTE ADULT - ASSESSMENT
L thalamic CVA, course c/b urinary retention, fever/rigors on 2/19, Enterococcus spp in bcx and Klebsiella and Citrobacter in ucx. Fever curve improved with UTI treatment and in setting of subtherapeutic vancomycin levels. d/w microbiology--scant growth of colony pending further identification. Suspect Enterococcus to be a blood culture contaminant. LUE ?thrombophlebitis at sites of prior PIVs. Advise warm compresses and LUE US and start linezolid 600mg PO q12h. Continue ceftriaxone.  d/w primary team  Dr. Salcedo ID Team [id number] to resume care Tue 2/22

## 2022-02-20 NOTE — PROGRESS NOTE ADULT - SUBJECTIVE AND OBJECTIVE BOX
HPI:  57 y/o F PMH of HTN, pre-DM presents transferred from [facility] for intracranial hemorrhage. As per [place] ED provider and son, patient called son around 7:30-8:00AM c/o severe headache and nausea. Son immediately rushed to her house and found her out of bed, moaning and covered emesis. During transit, EMS reported SBP within 240s with intractable vomiting and given Zofran 8mg. Upon arrival to [facility] ED, GCS 7, SBP within 180s, patient was given Decadron 10mg, Keppra 1g, started on a Cardene drip, Propofol, Mannitol 1mg/kg. 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 Patient was intubated for airway support and transferred to [facility] for further management. ICH2, NIHSS 8.  Denies use of anticoagulants/antiplatelets. (12 Feb 2022 16:36).    On admission, the patient was:  GCS: 8  Hunt-Rachel:  modified Duval:  ICH score:2    Hospital Course:   2/12: Transferred from [place]. R frontal EVD emergently. High pressure. Elevated lactate, bolused. Not following commands.   2/13: Pt remains intubated and sedated. Moving all extremities though not following commands. Concern for storming. Hypotensive on precedex.  2/14: Spontaneous eye opening. Agitated. On propofol- weaning. SAT/SBT. Goal to extubate. EVD decreased to 5cmH2O. Extubated to HFNC  2/15: Anisocoria noted. CT head stable (trapped L vent). Na goal increased 145- 155.  2/17: Persistent tachycardia HR 120s. CT/PE protocol done. Prelim neg. Febrile today 102.7F. CSF and urine cx sent.  2/18: CT head stable. Neuroexam continues to improve. GPC in clusters in blood cultures. Repeat cultures sent. Afebrile.   2/19: Febrile with chills. Tmax 39F. Neuroexam stable. Sites of IV shows induration/ erythema/edema. Central line. DC peripherals. Urine culture: Klebsiella and citrobacter. Ceftriaxone ordered.   2/20:       ROS:       ICU Vital Signs Last 24 Hrs  T(C): 37.8 (20 Feb 2022 05:36), Max: 38.8 (19 Feb 2022 13:00)  T(F): 100 (20 Feb 2022 05:36), Max: 101.8 (19 Feb 2022 13:00)  HR: 95 (20 Feb 2022 06:00) (86 - 116)  BP: 133/62 (20 Feb 2022 06:00) (130/63 - 170/76)  BP(mean): 88 (20 Feb 2022 06:00) (88 - 122)  ABP: 128/52 (20 Feb 2022 06:00) (40/40 - 201/72)  ABP(mean): 79 (20 Feb 2022 06:00) (40 - 145)  RR: 20 (20 Feb 2022 06:00) (20 - 38)  SpO2: 98% (20 Feb 2022 06:00) (94% - 99%)      02-18-22 @ 07:01  -  02-19-22 @ 07:00  --------------------------------------------------------  IN: 5105 mL / OUT: 4116 mL / NET: 989 mL    02-19-22 @ 07:01  -  02-20-22 @ 06:57  --------------------------------------------------------  IN: 5893 mL / OUT: 5480 mL / NET: 413 mL    General:  HEENT: MMM  Neuro: Awake, alert, oriented to person and hospital- also knows month with choice. Follows commands, R pupil 3mm, L pupil 2mm both briskly reactive  EOMI, face symmetric, RUE bicep 3/5, tricep 4/5. RLE 4-/5. Left side full strength  Sensation intact in all 4 extremities   Cards:  RRR, tachycardic S1/S2  Respiratory: Clear, no wheeze.  Abdomen: Soft, non tender  Extremities: Localized, multiple areas of thrombophlebitis on B/L upper extremities.       MEDICATIONS:   acetaminophen     Tablet .. 650 milliGRAM(s) Oral every 6 hours PRN  albuterol/ipratropium for Nebulization 3 milliLiter(s) Nebulizer every 6 hours PRN  amLODIPine   Tablet 10 milliGRAM(s) Oral daily  cefTRIAXone   IVPB      cefTRIAXone   IVPB 2000 milliGRAM(s) IV Intermittent every 24 hours  enoxaparin Injectable 40 milliGRAM(s) SubCutaneous every 24 hours  hydrALAZINE 75 milliGRAM(s) Oral every 8 hours  hydrALAZINE Injectable 10 milliGRAM(s) IV Push every 4 hours PRN  influenza   Vaccine 0.5 milliLiter(s) IntraMuscular once  labetalol 100 milliGRAM(s) Oral every 8 hours  lactobacillus acidophilus 1 Tablet(s) Oral daily  lidocaine   4% Patch 1 Patch Transdermal daily  losartan 100 milliGRAM(s) Oral daily  niCARdipine Infusion 5 mG/Hr (25 mL/Hr) IV Continuous <Continuous>  nystatin    Suspension 145416 Unit(s) Oral three times a day  oxyCODONE    IR 5 milliGRAM(s) Oral every 6 hours PRN  polyethylene glycol 3350 17 Gram(s) Oral every 12 hours  senna 2 Tablet(s) Oral at bedtime  sodium chloride 3 Gram(s) Oral every 6 hours  sodium chloride 3%. 500 milliLiter(s) (100 mL/Hr) IV Continuous <Continuous>  sodium phosphate IVPB 30 milliMole(s) IV Intermittent once  vancomycin  IVPB      vancomycin  IVPB 1500 milliGRAM(s) IV Intermittent every 12 hours      LABS:                  11.7   11.39 )-----------( 270      ( 20 Feb 2022 03:47 )             35.9     02-20    140  |  111<H>  |  5<L>  ----------------------------<  144<H>  3.6   |  16<L>  |  0.36<L>    Ca    9.2      20 Feb 2022 03:47  Phos  2.3     02-20  Mg     1.9     02-20    TPro  6.9  /  Alb  3.6  /  TBili  1.0  /  DBili  0.2  /  AST  27  /  ALT  22  /  AlkPhos  74  02-19    LIVER FUNCTIONS - ( 19 Feb 2022 04:50 )  Alb: 3.6 g/dL / Pro: 6.9 g/dL / ALK PHOS: 74 U/L / ALT: 22 U/L / AST: 27 U/L / GGT: x           Culture - Blood (collected 02-19-22 @ 17:10)  Source: .Blood Blood  Preliminary Report (02-20-22 @ 06:00):    No growth at 12 hours      Culture - Blood (collected 02-18-22 @ 12:45)  Source: .Blood Blood  Preliminary Report (02-19-22 @ 01:01):    No growth at 12 hours    Culture - Blood (collected 02-18-22 @ 12:45)  Source: .Blood Blood  Preliminary Report (02-19-22 @ 01:01):    No growth at 12 hours    Culture - CSF with Gram Stain (collected 02-18-22 @ 12:43)  Source: .CSF CSF  Gram Stain (02-18-22 @ 13:43):    No organisms seen    Few WBC's      Access:   L I JTLC  **  R frontal EVD at 5cmH2O                   HPI:  55 y/o F PMH of HTN, pre-DM presents transferred from [facility] for intracranial hemorrhage. As per [place] ED provider and son, patient called son around 7:30-8:00AM c/o severe headache and nausea. Son immediately rushed to her house and found her out of bed, moaning and covered emesis. During transit, EMS reported SBP within 240s with intractable vomiting and given Zofran 8mg. Upon arrival to [place] ED, GCS 7, SBP within 180s, patient was given Decadron 10mg, Keppra 1g, started on a Cardene drip, Propofol, Mannitol 1mg/kg. 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 Patient was intubated for airway support and transferred to [facility] for further management. ICH2, NIHSS 8.  Denies use of anticoagulants/antiplatelets. (12 Feb 2022 16:36).    On admission, the patient was:  GCS: 8  Hunt-Rachel:  modified Duval:  ICH score:2    Hospital Course:   2/12: Transferred from [place]. R frontal EVD emergently. High pressure. Elevated lactate, bolused. Not following commands.   2/13: Pt remains intubated and sedated. Moving all extremities though not following commands. Concern for storming. Hypotensive on precedex.  2/14: Spontaneous eye opening. Agitated. On propofol- weaning. SAT/SBT. Goal to extubate. EVD decreased to 5cmH2O. Extubated to HFNC  2/15: Anisocoria noted. CT head stable (trapped L vent). Na goal increased 145- 155.  2/17: Persistent tachycardia HR 120s. CT/PE protocol done. Prelim neg. Febrile today 102.7F. CSF and urine cx sent.  2/18: CT head stable. Neuroexam continues to improve. GPC in clusters in blood cultures. Repeat cultures sent. Afebrile.   2/19: Febrile with chills. Tmax 39F. Neuroexam stable. Sites of IV shows induration/ erythema/edema. Central line. DC peripherals. Urine culture: Klebsiella and citrobacter. Ceftriaxone ordered.   2/20: Tmax 38.8C    ROS: Denies pain though still complains of severe chills and feeling cold.    ICU Vital Signs Last 24 Hrs  T(C): 37.8 (20 Feb 2022 05:36), Max: 38.8 (19 Feb 2022 13:00)  T(F): 100 (20 Feb 2022 05:36), Max: 101.8 (19 Feb 2022 13:00)  HR: 95 (20 Feb 2022 06:00) (86 - 116)  BP: 133/62 (20 Feb 2022 06:00) (130/63 - 170/76)  BP(mean): 88 (20 Feb 2022 06:00) (88 - 122)  ABP: 128/52 (20 Feb 2022 06:00) (40/40 - 201/72)  ABP(mean): 79 (20 Feb 2022 06:00) (40 - 145)  RR: 20 (20 Feb 2022 06:00) (20 - 38)  SpO2: 98% (20 Feb 2022 06:00) (94% - 99%)      02-18-22 @ 07:01  -  02-19-22 @ 07:00  --------------------------------------------------------  IN: 5105 mL / OUT: 4116 mL / NET: 989 mL    02-19-22 @ 07:01  -  02-20-22 @ 06:57  --------------------------------------------------------  IN: 5893 mL / OUT: 5480 mL / NET: 413 mL    General:  HEENT: MMM  Neuro: Drowsy but arousable, oriented to person and hospital, knows month- did not know year. Follows commands, R pupil 3mm, L pupil 2mm both briskly reactive  EOMI, face symmetric, RUE bicep 3/5, tricep 4/5. RLE 4-/5. Left side full strength.   Sensation intact in all 4 extremities   Cards:  RRR, tachycardic S1/S2  Respiratory: Clear, no wheeze.  Abdomen: Soft, non tender  Extremities: Localized, multiple areas of thrombophlebitis on B/L upper extremities.       MEDICATIONS:   acetaminophen     Tablet .. 650 milliGRAM(s) Oral every 6 hours PRN  albuterol/ipratropium for Nebulization 3 milliLiter(s) Nebulizer every 6 hours PRN  amLODIPine   Tablet 10 milliGRAM(s) Oral daily  cefTRIAXone   IVPB      cefTRIAXone   IVPB 2000 milliGRAM(s) IV Intermittent every 24 hours  enoxaparin Injectable 40 milliGRAM(s) SubCutaneous every 24 hours  hydrALAZINE 75 milliGRAM(s) Oral every 8 hours  hydrALAZINE Injectable 10 milliGRAM(s) IV Push every 4 hours PRN  influenza   Vaccine 0.5 milliLiter(s) IntraMuscular once  labetalol 100 milliGRAM(s) Oral every 8 hours  lactobacillus acidophilus 1 Tablet(s) Oral daily  lidocaine   4% Patch 1 Patch Transdermal daily  losartan 100 milliGRAM(s) Oral daily  niCARdipine Infusion 5 mG/Hr (25 mL/Hr) IV Continuous <Continuous>  nystatin    Suspension 013134 Unit(s) Oral three times a day  oxyCODONE    IR 5 milliGRAM(s) Oral every 6 hours PRN  polyethylene glycol 3350 17 Gram(s) Oral every 12 hours  senna 2 Tablet(s) Oral at bedtime  sodium chloride 3 Gram(s) Oral every 6 hours  sodium chloride 3%. 500 milliLiter(s) (100 mL/Hr) IV Continuous <Continuous>  sodium phosphate IVPB 30 milliMole(s) IV Intermittent once  vancomycin  IVPB      vancomycin  IVPB 1500 milliGRAM(s) IV Intermittent every 12 hours      LABS:                  11.7   11.39 )-----------( 270      ( 20 Feb 2022 03:47 )             35.9     02-20    140  |  111<H>  |  5<L>  ----------------------------<  144<H>  3.6   |  16<L>  |  0.36<L>    Ca    9.2      20 Feb 2022 03:47  Phos  2.3     02-20  Mg     1.9     02-20    TPro  6.9  /  Alb  3.6  /  TBili  1.0  /  DBili  0.2  /  AST  27  /  ALT  22  /  AlkPhos  74  02-19    LIVER FUNCTIONS - ( 19 Feb 2022 04:50 )  Alb: 3.6 g/dL / Pro: 6.9 g/dL / ALK PHOS: 74 U/L / ALT: 22 U/L / AST: 27 U/L / GGT: x           Culture - Blood (collected 02-19-22 @ 17:10)  Source: .Blood Blood  Preliminary Report (02-20-22 @ 06:00):    No growth at 12 hours      Culture - Blood (collected 02-18-22 @ 12:45)  Source: .Blood Blood  Preliminary Report (02-19-22 @ 01:01):    No growth at 12 hours    Culture - Blood (collected 02-18-22 @ 12:45)  Source: .Blood Blood  Preliminary Report (02-19-22 @ 01:01):    No growth at 12 hours    Culture - CSF with Gram Stain (collected 02-18-22 @ 12:43)  Source: .CSF CSF  Gram Stain (02-18-22 @ 13:43):    No organisms seen    Few WBC's      Access:   LIF TLC  R frontal EVD at 5cmH2O

## 2022-02-20 NOTE — PROGRESS NOTE ADULT - ASSESSMENT
ASSESSMENT/PLAN: L thalamic bleed with extensive IVH and 5mm L to R MLS.  Likely hypertensive.  BD 9 ICH score 2    NEURO:  Q1 neurochecks  Hydrocephalus; EVD@ 5cmH2O. Monitor ICP/output. ICPs wnl.  Repeat CT head 2/18: Stable vent size- decreased blood in R ventricle   Analgesia: Tylenol/ oxycodone  Stroke core measures (hold statin for now given bleed). Stroke neurology following.  Activity: [] mobilize as tolerated [] Bedrest [x] PT [x] OT [] PMNR    PULM:  CT PE protocol neg PE. Does have bilateral consolidation ?atelectasis.  Weaned from HFNC -> currently on room air.   Encourage incentive spirometry   CXR clear 2/18    CV: Tachycardia- likely related to fever/ bacteremia.   SBP goal 100-160  HTN: On cardene: Wean with amlodipine 10mg (new med) losartan 100mg (home med), hydralazine 75mg tid, labetalol.   TTE with bubble study EF 65-70%. Repeat TTE in setting of bacteremia  TLC    RENAL:  Fluids: 3% @100ml/hr. Salt tabs 3g Q6  Na 140- 150. Follow up BMPs Q6.   Primafit. Is/Os. Supplement lytes    GI:   Diet: Minced and moist  GI prophylaxis [x] not indicated [] PPI [] other:  Bowel regimen [] colace [x] senna [] other.   LBM: 2/19  LFTs    ENDO: Hyperlipidemia, prediabetes.  Goal euglycemia (-180)  A1c 5.8, TSH 0.39    HEME/ONC:  CBC Stable  VTE prophylaxis: [x] SCDs [x] Lovenox. Anti Xa level 0.23  LE dopplers neg. CTPE study negative  Bilateral upper extremity dopplers pending:     ID: Febrile. Enterococcus and coag neg staph bacteremia noted from cultures 2/16. SOURCE UNCLEAR.   Repeat blood cultures sent 2/18 (thus far neg). Repeat blood cultures again on 2/19- follow up  Urine culture: citrobacter and klebsiella  ID following. On vancomycin 1500mg bid and ceftriaxone 2g Q24 hours.  Monitor troughs  Follow up CSF culture 2/18 (thus far NGTD)  MRSA nares neg    MISC:  DC all peripheral IVs once TLC in place. Warm compresses for areas of localised phlebitis. Avoid venipuncture.     SOCIAL/FAMILY:  [x] awaiting [] updated  [] family meeting    CODE STATUS:  [x] Full Code [] DNR [] DNI [] Palliative/Comfort Care    DISPOSITION:  [x] ICU [] Stroke Unit [] Floor [] EMU [] RCU [] PCU    [x] Patient is at high risk of neurologic deterioration/death due to: hydrocephalus, hemorrhage, ICU delirium, sepsis/shock, bacteremia, VTE.     Time spent: 40 critical care minutes.       ASSESSMENT/PLAN: L thalamic bleed with extensive IVH and 5mm L to R MLS.  Likely hypertensive.  BD 9 ICH score 2    NEURO:  Q1 neurochecks  Hydrocephalus; EVD@ 5cmH2O. Monitor ICP/output. ICPs wnl.  Repeat CT head 2/18: Stable vent size- decreased blood in R ventricle   Analgesia: Tylenol/ oxycodone  Stroke core measures (hold statin for now given bleed). Stroke neurology following.  Febrile encephalopathy: See ID. Add ibuprofen.   Activity: [] mobilize as tolerated [] Bedrest [x] PT [x] OT [] PMNR    PULM:  CT PE protocol neg PE. Does have bilateral consolidation ?atelectasis.  Weaned from HFNC -> currently on room air.   Encourage incentive spirometry   CXR clear 2/19 clear.     CV: Tachycardia- likely related to fever/ bacteremia.   SBP goal 100-160  HTN: On cardene: Wean with amlodipine 10mg (new med) losartan 100mg (home med), hydralazine 100mg tid, labetalol.   TTE with bubble study EF 65-70%. Repeat TTE in setting of bacteremia.  TLC in place    RENAL:  Fluids: 3% @100ml/hr. Salt tabs 3g Q6  Na 140- 150. Follow up BMPs Q6.   Primafit. Is/Os. Supplement lytes    GI:   Diet: Minced and moist  GI prophylaxis [x] not indicated [] PPI [] other:  Bowel regimen [] colace [x] senna [] other.   LBM: 2/19  LFTs wnl    ENDO: Hyperlipidemia, prediabetes.  Goal euglycemia (-180)  A1c 5.8, TSH 0.39    HEME/ONC:  CBC Stable  VTE prophylaxis: [x] SCDs [x] Lovenox. Anti Xa level 0.23  LE dopplers neg on 2/13.   CT PE study: negative  Bilateral upper extremity and lower extremity dopplers pending    ID: Febrile. Enterococcus and coag neg staph bacteremia noted from cultures 2/16. SOURCE UNCLEAR.   Repeat blood cultures sent 2/18 (thus far neg). Repeat blood cultures again on 2/19- follow up  Urine culture: Citrobacter and Klebsiella  ID following. On vancomycin (increased to 1500mg bid) and ceftriaxone 2g Q24 hours. Monitor trough 2/20 5:00pm.   Follow up CSF culture 2/18 (thus far NGTD)  MRSA nares neg    MISC:  Warm compresses for areas of localised phlebitis. Avoid venipuncture.     SOCIAL/FAMILY:  [x] awaiting [] updated  [] family meeting    CODE STATUS:  [x] Full Code [] DNR [] DNI [] Palliative/Comfort Care    DISPOSITION:  [x] ICU [] Stroke Unit [] Floor [] EMU [] RCU [] PCU    [x] Patient is at high risk of neurologic deterioration/death due to: hydrocephalus, hemorrhage, ICU delirium, sepsis/shock, bacteremia, VTE.     Time spent: 40 critical care minutes.

## 2022-02-20 NOTE — PROGRESS NOTE ADULT - SUBJECTIVE AND OBJECTIVE BOX
HPI:  57 y/o female with PMHx of HTN, pre-DM presents transferred from [facility] for intracranial hemorrhage. As per [facility] ED provider and son, patient called son around 7:30-8:00AM c/o severe headache and nausea. Son immediately rushed to her house and found her out of bed, moaning and covered in her own vomit. During transit, EMS reported SBP within 240s with intractable vomiting and given Zofran 8mg. Upon arrival to [facility] ED, GCS 7, SBP within 180s, patient was given Decadron 10mg, Keppra 1g, started on a Cardene drip, Propofol, Mannitol 1mg/kg. 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 Patient was intubated for airway support and transferred to [facility] for further management. ICH2, NIHSS 8.  Denies use of anticoagulants/antiplatelets.  (12 Feb 2022 16:36)    OVERNIGHT EVENTS: 101.3 fever o/n. neuro stable    Hospital Course:   2/12: transferred from [facility]. R frontal EVD and R radial a-line placed. pend CTH/CTA. s/p 1L bolus for elevated lactate.   2/13: S/p R frontal EVD placedment @[id number] draining well. O/n pt w/ episode of possible storming; tachy, HTN, agitated, temp 100.2F, given 2 versed and 50mcg fentanyl w/ improvement in symptoms Neuro exam stable. Pt remains intubated and sedated, AN L>R. Trend lactate and abg. Amlodipine 5mg QD started for SBP goal < 140, cardene dc'd.  EVD dropped 2/13 at 1pm. propanolol and fent standing added for neuro storming, propofol switched to precedex, with resultant hypotension. Propanolol dc'd, fent changed to prn and precedex off, 1 L bolus given, levo prn   2/14: TOSHA overnight. Patient remains on propofol. EVD open at [id number]. ICPs WNL. Neuro exam stable. increased bowel regimen. started SQL. extubated on precedex. given 0.5 Ativan + Fentanyl pushes PRN for agitation. 500cc NS bolus.   2/15: BD#4.  On HFNC d/t desat to 88% on 70% non-rebreather. On 0.5 precedex   2/16: BD5, TOSHA overnight, on 4L NC, s/p vomiting yesterday, TF being held, formal S/S pending, off precedex. EVD open @ 5. Started on 3% at 30, tmax 100.7   2/17: BD# 6   tachycardic, PE protocol. cardene gtt. neuro unchanged. EVD @ 5cmH2O. 1g IV tylenol for 102.7 fever, duonebs standing to PRN. UA with reflux ordered. Hydralazine 25mg TID added, increased to 50q8. 500cc NS bolus x2. started on nystatin for thrush. started oxy 5q6 PRN for pain for tachycardia. repeat Na 146, decreased 3% @50, started salt tabs 2q6. FEES done. started minced and moist diet  2/18: BD 7. 3% Increased 75 cc/hr  2/19: BD8. TOSHA o/n neuro stable. 3%@75cc/hr. central line placed for vascular access  2/20: BD9. tmax 101.3 o/n, neuro stable. 3%@100cc/hr.     Vital Signs Last 24 Hrs  T(C): 38.1 (19 Feb 2022 23:00), Max: 39.7 (19 Feb 2022 06:00)  T(F): 100.5 (19 Feb 2022 23:00), Max: 103.4 (19 Feb 2022 06:00)  HR: 91 (19 Feb 2022 23:00) (91 - 122)  BP: 139/69 (19 Feb 2022 23:00) (130/63 - 170/76)  BP(mean): 95 (19 Feb 2022 23:00) (90 - 122)  RR: 22 (19 Feb 2022 23:00) (20 - 38)  SpO2: 98% (19 Feb 2022 23:00) (94% - 98%)    I&O's Summary    18 Feb 2022 07:01  -  19 Feb 2022 07:00  --------------------------------------------------------  IN: 5105 mL / OUT: 4116 mL / NET: 989 mL    19 Feb 2022 07:01  -  20 Feb 2022 00:05  --------------------------------------------------------  IN: 4235 mL / OUT: 3001 mL / NET: 1234 mL        PHYSICAL EXAM:  GEN: laying in bed, appears well, NAD  NEURO: AOx2 (self/place). FC, OE spont, speech intact, face symmetric. CNII-XII intact. PERRL, EOMI. LUE/LLE 5/5. RUE delts/triceps 4-/5, biceps 2/5, HG 1/5. RLE 2/5  CV: RRR +S1/S2  PULM: CTAB  GI: Abd soft, NT/ND  EXT: ext warm, dry, nontender. b/l UE with multiple areas of erythema from IV infiltration    TUBES/LINES:  [] Haley  [] Lumbar Drain  [] Wound Drains  [x] R frontal EVD  [x ] R IJ CVC      DIET:  [] NPO  [x] Mechanical  [] Tube feeds    LABS:                        11.3   10.35 )-----------( 239      ( 19 Feb 2022 04:50 )             35.3     02-19    140  |  115<H>  |  5<L>  ----------------------------<  146<H>  3.8   |  16<L>  |  0.34<L>    Ca    8.3<L>      19 Feb 2022 21:22  Phos  2.4     02-19  Mg     2.0     02-19    TPro  6.9  /  Alb  3.6  /  TBili  1.0  /  DBili  0.2  /  AST  27  /  ALT  22  /  AlkPhos  74  02-19            CAPILLARY BLOOD GLUCOSE          Drug Levels: [] N/A  Vancomycin Level, Trough: 4.0 ug/mL (02-19 @ 04:50)    CSF Analysis: [] N/A      Allergies    No Known Allergies    Intolerances      MEDICATIONS:  Antibiotics:  cefTRIAXone   IVPB      nystatin    Suspension 481051 Unit(s) Oral three times a day  vancomycin  IVPB 1000 milliGRAM(s) IV Intermittent every 12 hours    Neuro:  acetaminophen     Tablet .. 650 milliGRAM(s) Oral every 6 hours PRN  oxyCODONE    IR 5 milliGRAM(s) Oral every 6 hours PRN    Anticoagulation:  enoxaparin Injectable 40 milliGRAM(s) SubCutaneous every 24 hours    OTHER:  albuterol/ipratropium for Nebulization 3 milliLiter(s) Nebulizer every 6 hours PRN  amLODIPine   Tablet 10 milliGRAM(s) Oral daily  hydrALAZINE 75 milliGRAM(s) Oral every 8 hours  hydrALAZINE Injectable 10 milliGRAM(s) IV Push every 4 hours PRN  influenza   Vaccine 0.5 milliLiter(s) IntraMuscular once  labetalol 100 milliGRAM(s) Oral every 8 hours  lactobacillus acidophilus 1 Tablet(s) Oral daily  lidocaine   4% Patch 1 Patch Transdermal daily  losartan 100 milliGRAM(s) Oral daily  niCARdipine Infusion 5 mG/Hr IV Continuous <Continuous>  polyethylene glycol 3350 17 Gram(s) Oral every 12 hours  senna 2 Tablet(s) Oral at bedtime    IVF:  sodium chloride 3 Gram(s) Oral every 6 hours  sodium chloride 3%. 500 milliLiter(s) IV Continuous <Continuous>    CULTURES:  Culture Results:   No growth at 1 day. (02-18 @ 12:45)  Culture Results:   No growth at 1 day. (02-18 @ 12:45)    RADIOLOGY & ADDITIONAL TESTS:      ASSESSMENT:  Patient 57 y/o female with PMHx of HTN, pre-DM w/ left thalamic parenchymal hematoma with extensive intraventricular hemorrhage. ICH score 2. NIHSS 18. s/p R frontal large bore EVD placement 2/12. Pt is now hypertensive requiring Cardene, on 2 standing antihypertensive agent. Tachycardic upto 120s. s/p boluses and straight cath for retention without improvement, and pupilary anisocoria.     HEAD BLEED    Handoff    No pertinent past medical history    Hypertension    Pre-diabetes    ICH (intracerebral hemorrhage)    HTN (hypertension)    Pre-diabetes    Insertion of intravenous catheter with ultrasound guidance    Insertion of intravenous catheter with ultrasound guidance    [email or web address]        PLAN:  NEURO:  - neuro/vital q1h  - R frontal large bore EVD open at 5cmH2O, trend ICP/output  - CT from 2/15 shows that R vent is now collapsed, however L vent still contains casted hemorrhagic clot, MLS is still unchanged at 4mm, with perihematomal edema (L thalamic, CTH 2/18 stable  - CTA: no vessel occlusion or aneurysm noted   - Keppra 1g given at [facility] 2/12, no keppra for now     Cardio:   - SBP goal 100-160  - off cardene  - cont. amlodipine 10mg QD, losartan 100mg, Hydralazine 75 mg TID, labetalol 100 q8h   - echo 2/14: significant for symmetric LVH with normal systolic function, EF: 65-70%   - repeat echo pending to r/o endocarditis  - Persistent tachycardia - s/p bolus ?hypovolemia, ?fever, ?pain    PULM:  - extubated 2/14, on RA  - duonebs PRN   - CT chest 2/17: Negative for PE, some consolidation   - IS    Renal:   - intermittent straight cath for retention   - cont bladder scan q6hr   - Na goal 145-150  - 3% @ 100/hr, salt tabs 3 q6     GI:  - Diet: minced & moist diet  - bowel regimen, last BM 2/19    HEME:  - SCDs/SQL for DVT ppx   - antixa level 2/17: 0.09, 2/18 0.23  - LE dopplers 2/13: neg for DVT   - LUE dopplers pending for multiple sites infiltration    ENDO:  - ISS  - prediabetic, a1c 5.8  - TSH wnl     ID:  - blood cx 2/16 +staph epidermidis  - urine cx 2/17 +citrobacter koseri and klebsiella  - ID following, rec vanco [2/18- ], ceftriaxone [2/19- ]  - MRSA negative    PSYCH:   -pt takes xanax at home, however tachycardia is not correlating with anxiety episodes. pt was persistently tachy after dilaudid dose earlier, when resting/sleeping.     Dispo: ICU status, family updated with plan, pending AR    D/w Dr. D'Amico         Assessment:  Present when checked    []  GCS  E   V  M     Heart Failure: []Acute, [] acute on chronic , []chronic  Heart Failure:  [] Diastolic (HFpEF), [] Systolic (HFrEF), []Combined (HFpEF and HFrEF), [] RHF, [] Pulm HTN, [] Other    [] ZAIDA, [] ATN, [] AIN, [] other  [] CKD1, [] CKD2, [] CKD 3, [] CKD 4, [] CKD 5, []ESRD    Encephalopathy: [] Metabolic, [] Hepatic, [] toxic, [] Neurological, [] Other    Abnormal Nurtitional Status: [] malnurtition (see nutrition note), [ ]underweight: BMI < 19, [] morbid obesity: BMI >40, [] Cachexia    [] Sepsis  [] hypovolemic shock,[] cardiogenic shock, [] hemorrhagic shock, [] neuogenic shock  [] Acute Respiratory Failure  []Cerebral edema, [] Brain compression/ herniation,   [] Functional quadriplegia  [] Acute blood loss anemia

## 2022-02-20 NOTE — PROGRESS NOTE ADULT - SUBJECTIVE AND OBJECTIVE BOX
INTERVAL HPI/OVERNIGHT EVENTS: Fever curve improved.    ROS: UTO      ANTIBIOTICS/RELEVANT:    MEDICATIONS  (STANDING):  amLODIPine   Tablet 10 milliGRAM(s) Oral daily  cefTRIAXone   IVPB      cefTRIAXone   IVPB 2000 milliGRAM(s) IV Intermittent every 24 hours  enoxaparin Injectable 40 milliGRAM(s) SubCutaneous every 24 hours  fludroCORTISONE 0.1 milliGRAM(s) Oral two times a day  hydrALAZINE 100 milliGRAM(s) Oral every 8 hours  influenza   Vaccine 0.5 milliLiter(s) IntraMuscular once  labetalol 100 milliGRAM(s) Oral every 8 hours  lactobacillus acidophilus 1 Tablet(s) Oral daily  lidocaine   4% Patch 1 Patch Transdermal daily  linezolid    Tablet 600 milliGRAM(s) Oral every 12 hours  losartan 100 milliGRAM(s) Oral daily  niCARdipine Infusion 5 mG/Hr (25 mL/Hr) IV Continuous <Continuous>  nystatin    Suspension 741596 Unit(s) Oral three times a day  polyethylene glycol 3350 17 Gram(s) Oral every 12 hours  senna 2 Tablet(s) Oral at bedtime  sodium chloride 3 Gram(s) Oral every 6 hours  sodium chloride 3%. 500 milliLiter(s) (100 mL/Hr) IV Continuous <Continuous>    MEDICATIONS  (PRN):  acetaminophen     Tablet .. 650 milliGRAM(s) Oral every 6 hours PRN Temp greater or equal to 38C (100.4F), Mild Pain (1 - 3)  albuterol/ipratropium for Nebulization 3 milliLiter(s) Nebulizer every 6 hours PRN Shortness of Breath and/or Wheezing        Vital Signs Last 24 Hrs  T(C): 37.7 (20 Feb 2022 12:15), Max: 38.5 (19 Feb 2022 22:30)  T(F): 99.9 (20 Feb 2022 12:15), Max: 101.3 (19 Feb 2022 22:30)  HR: 91 (20 Feb 2022 15:00) (86 - 116)  BP: 128/65 (20 Feb 2022 15:00) (127/60 - 169/83)  BP(mean): 90 (20 Feb 2022 15:00) (87 - 119)  RR: 22 (20 Feb 2022 15:00) (20 - 32)  SpO2: 98% (20 Feb 2022 15:00) (94% - 100%)    PHYSICAL EXAM:  Constitutional: NAD  Eyes: CHRISTIANO, EOMI  Ear/Nose/Throat: no oral lesion, no sinus tenderness on percussion	  Neck: no JVD, no lymphadenopathy, supple  Respiratory: CTA [place]  Cardiovascular: S1S2 RRR, no murmurs  Gastrointestinal:soft, (+) BS, no HSM  Extremities L forearm two areas of induration, erythema; more proximal area with palpable cord at site of prior PIV  Vascular: DP Pulse:	right normal; left normal      LABS:                        11.7   11.39 )-----------( 270      ( 20 Feb 2022 03:47 )             35.9     02-20    139  |  110<H>  |  5<L>  ----------------------------<  178<H>  3.3<L>   |  18<L>  |  0.35<L>    Ca    8.6      20 Feb 2022 10:35  Phos  2.3     02-20  Mg     1.9     02-20    TPro  6.9  /  Alb  3.6  /  TBili  1.0  /  DBili  0.2  /  AST  27  /  ALT  22  /  AlkPhos  74  02-19          MICROBIOLOGY: Culture - Blood (02.19.22 @ 17:10)    Specimen Source: .Blood Blood    Culture Results:   No growth at 12 hours        RADIOLOGY & ADDITIONAL STUDIES: reviewed

## 2022-02-21 LAB
ANION GAP SERPL CALC-SCNC: 10 MMOL/L — SIGNIFICANT CHANGE UP (ref 5–17)
ANION GAP SERPL CALC-SCNC: 10 MMOL/L — SIGNIFICANT CHANGE UP (ref 5–17)
ANION GAP SERPL CALC-SCNC: 11 MMOL/L — SIGNIFICANT CHANGE UP (ref 5–17)
APPEARANCE CSF: SIGNIFICANT CHANGE UP
APPEARANCE SPUN FLD: SIGNIFICANT CHANGE UP
BUN SERPL-MCNC: 3 MG/DL — LOW (ref 7–23)
BUN SERPL-MCNC: 4 MG/DL — LOW (ref 7–23)
BUN SERPL-MCNC: 5 MG/DL — LOW (ref 7–23)
CALCIUM SERPL-MCNC: 8.6 MG/DL — SIGNIFICANT CHANGE UP (ref 8.4–10.5)
CALCIUM SERPL-MCNC: 8.7 MG/DL — SIGNIFICANT CHANGE UP (ref 8.4–10.5)
CALCIUM SERPL-MCNC: 8.7 MG/DL — SIGNIFICANT CHANGE UP (ref 8.4–10.5)
CHLORIDE SERPL-SCNC: 110 MMOL/L — HIGH (ref 96–108)
CHLORIDE SERPL-SCNC: 112 MMOL/L — HIGH (ref 96–108)
CHLORIDE SERPL-SCNC: 115 MMOL/L — HIGH (ref 96–108)
CO2 SERPL-SCNC: 18 MMOL/L — LOW (ref 22–31)
CO2 SERPL-SCNC: 20 MMOL/L — LOW (ref 22–31)
CO2 SERPL-SCNC: 21 MMOL/L — LOW (ref 22–31)
COLOR CSF: ABNORMAL
CREAT SERPL-MCNC: 0.33 MG/DL — LOW (ref 0.5–1.3)
CREAT SERPL-MCNC: 0.35 MG/DL — LOW (ref 0.5–1.3)
CREAT SERPL-MCNC: 0.36 MG/DL — LOW (ref 0.5–1.3)
GLUCOSE CSF-MCNC: 89 MG/DL — HIGH (ref 40–70)
GLUCOSE SERPL-MCNC: 121 MG/DL — HIGH (ref 70–99)
GLUCOSE SERPL-MCNC: 126 MG/DL — HIGH (ref 70–99)
GLUCOSE SERPL-MCNC: 142 MG/DL — HIGH (ref 70–99)
GRAM STN FLD: SIGNIFICANT CHANGE UP
HCT VFR BLD CALC: 31.5 % — LOW (ref 34.5–45)
HGB BLD-MCNC: 10.4 G/DL — LOW (ref 11.5–15.5)
LACTATE CSF-MCNC: 3 MMOL/L — HIGH (ref 1.1–2.4)
LYMPHOCYTES # CSF: 22 % — LOW (ref 40–80)
MAGNESIUM SERPL-MCNC: 1.9 MG/DL — SIGNIFICANT CHANGE UP (ref 1.6–2.6)
MCHC RBC-ENTMCNC: 30.8 PG — SIGNIFICANT CHANGE UP (ref 27–34)
MCHC RBC-ENTMCNC: 33 GM/DL — SIGNIFICANT CHANGE UP (ref 32–36)
MCV RBC AUTO: 93.2 FL — SIGNIFICANT CHANGE UP (ref 80–100)
NEUTROPHILS # CSF: 1 % — SIGNIFICANT CHANGE UP (ref 0–6)
NRBC # BLD: 0 /100 WBCS — SIGNIFICANT CHANGE UP (ref 0–0)
NRBC NFR CSF: 23 /UL — HIGH (ref 0–5)
PHOSPHATE SERPL-MCNC: 2.4 MG/DL — LOW (ref 2.5–4.5)
PLATELET # BLD AUTO: 249 K/UL — SIGNIFICANT CHANGE UP (ref 150–400)
POTASSIUM SERPL-MCNC: 3.5 MMOL/L — SIGNIFICANT CHANGE UP (ref 3.5–5.3)
POTASSIUM SERPL-MCNC: 3.7 MMOL/L — SIGNIFICANT CHANGE UP (ref 3.5–5.3)
POTASSIUM SERPL-MCNC: 3.7 MMOL/L — SIGNIFICANT CHANGE UP (ref 3.5–5.3)
POTASSIUM SERPL-SCNC: 3.5 MMOL/L — SIGNIFICANT CHANGE UP (ref 3.5–5.3)
POTASSIUM SERPL-SCNC: 3.7 MMOL/L — SIGNIFICANT CHANGE UP (ref 3.5–5.3)
POTASSIUM SERPL-SCNC: 3.7 MMOL/L — SIGNIFICANT CHANGE UP (ref 3.5–5.3)
PROCALCITONIN SERPL-MCNC: 0.09 NG/ML — SIGNIFICANT CHANGE UP (ref 0.02–0.1)
PROT CSF-MCNC: 27 MG/DL — SIGNIFICANT CHANGE UP (ref 15–45)
RBC # BLD: 3.38 M/UL — LOW (ref 3.8–5.2)
RBC # CSF: HIGH /UL (ref 0–0)
RBC # FLD: 12.2 % — SIGNIFICANT CHANGE UP (ref 10.3–14.5)
SODIUM SERPL-SCNC: 141 MMOL/L — SIGNIFICANT CHANGE UP (ref 135–145)
SODIUM SERPL-SCNC: 142 MMOL/L — SIGNIFICANT CHANGE UP (ref 135–145)
SODIUM SERPL-SCNC: 144 MMOL/L — SIGNIFICANT CHANGE UP (ref 135–145)
SPECIMEN SOURCE: SIGNIFICANT CHANGE UP
TUBE TYPE: SIGNIFICANT CHANGE UP
WBC # BLD: 9.34 K/UL — SIGNIFICANT CHANGE UP (ref 3.8–10.5)
WBC # FLD AUTO: 9.34 K/UL — SIGNIFICANT CHANGE UP (ref 3.8–10.5)

## 2022-02-21 PROCEDURE — 70450 CT HEAD/BRAIN W/O DYE: CPT | Mod: 26

## 2022-02-21 PROCEDURE — 99233 SBSQ HOSP IP/OBS HIGH 50: CPT

## 2022-02-21 PROCEDURE — 99291 CRITICAL CARE FIRST HOUR: CPT

## 2022-02-21 RX ORDER — SODIUM CHLORIDE 9 MG/ML
500 INJECTION INTRAMUSCULAR; INTRAVENOUS; SUBCUTANEOUS ONCE
Refills: 0 | Status: COMPLETED | OUTPATIENT
Start: 2022-02-21 | End: 2022-02-21

## 2022-02-21 RX ORDER — POTASSIUM PHOSPHATE, MONOBASIC POTASSIUM PHOSPHATE, DIBASIC 236; 224 MG/ML; MG/ML
15 INJECTION, SOLUTION INTRAVENOUS ONCE
Refills: 0 | Status: COMPLETED | OUTPATIENT
Start: 2022-02-21 | End: 2022-02-21

## 2022-02-21 RX ORDER — POTASSIUM CHLORIDE 20 MEQ
40 PACKET (EA) ORAL ONCE
Refills: 0 | Status: COMPLETED | OUTPATIENT
Start: 2022-02-21 | End: 2022-02-21

## 2022-02-21 RX ORDER — BROMOCRIPTINE MESYLATE 5 MG/1
5 CAPSULE ORAL EVERY 8 HOURS
Refills: 0 | Status: DISCONTINUED | OUTPATIENT
Start: 2022-02-21 | End: 2022-02-23

## 2022-02-21 RX ORDER — SODIUM CHLORIDE 5 G/100ML
500 INJECTION, SOLUTION INTRAVENOUS
Refills: 0 | Status: DISCONTINUED | OUTPATIENT
Start: 2022-02-21 | End: 2022-02-22

## 2022-02-21 RX ORDER — MAGNESIUM SULFATE 500 MG/ML
1 VIAL (ML) INJECTION ONCE
Refills: 0 | Status: COMPLETED | OUTPATIENT
Start: 2022-02-21 | End: 2022-02-21

## 2022-02-21 RX ADMIN — Medication 100 MILLIGRAM(S): at 05:16

## 2022-02-21 RX ADMIN — SODIUM CHLORIDE 3 GRAM(S): 9 INJECTION INTRAMUSCULAR; INTRAVENOUS; SUBCUTANEOUS at 05:18

## 2022-02-21 RX ADMIN — Medication 500000 UNIT(S): at 13:32

## 2022-02-21 RX ADMIN — LINEZOLID 600 MILLIGRAM(S): 600 INJECTION, SOLUTION INTRAVENOUS at 18:23

## 2022-02-21 RX ADMIN — FLUDROCORTISONE ACETATE 0.1 MILLIGRAM(S): 0.1 TABLET ORAL at 18:24

## 2022-02-21 RX ADMIN — LIDOCAINE 1 PATCH: 4 CREAM TOPICAL at 18:18

## 2022-02-21 RX ADMIN — Medication 40 MILLIEQUIVALENT(S): at 06:23

## 2022-02-21 RX ADMIN — Medication 650 MILLIGRAM(S): at 14:57

## 2022-02-21 RX ADMIN — Medication 40 MILLIEQUIVALENT(S): at 03:53

## 2022-02-21 RX ADMIN — Medication 400 MILLIGRAM(S): at 05:16

## 2022-02-21 RX ADMIN — Medication 100 MILLIGRAM(S): at 21:25

## 2022-02-21 RX ADMIN — LIDOCAINE 1 PATCH: 4 CREAM TOPICAL at 00:34

## 2022-02-21 RX ADMIN — SODIUM CHLORIDE 3 GRAM(S): 9 INJECTION INTRAMUSCULAR; INTRAVENOUS; SUBCUTANEOUS at 12:11

## 2022-02-21 RX ADMIN — Medication 400 MILLIGRAM(S): at 05:40

## 2022-02-21 RX ADMIN — BROMOCRIPTINE MESYLATE 5 MILLIGRAM(S): 5 CAPSULE ORAL at 18:24

## 2022-02-21 RX ADMIN — SENNA PLUS 2 TABLET(S): 8.6 TABLET ORAL at 21:26

## 2022-02-21 RX ADMIN — FLUDROCORTISONE ACETATE 0.1 MILLIGRAM(S): 0.1 TABLET ORAL at 06:50

## 2022-02-21 RX ADMIN — Medication 100 MILLIGRAM(S): at 05:18

## 2022-02-21 RX ADMIN — Medication 500000 UNIT(S): at 21:26

## 2022-02-21 RX ADMIN — LINEZOLID 600 MILLIGRAM(S): 600 INJECTION, SOLUTION INTRAVENOUS at 05:16

## 2022-02-21 RX ADMIN — Medication 1 TABLET(S): at 12:11

## 2022-02-21 RX ADMIN — Medication 400 MILLIGRAM(S): at 15:53

## 2022-02-21 RX ADMIN — Medication 500000 UNIT(S): at 05:15

## 2022-02-21 RX ADMIN — Medication 400 MILLIGRAM(S): at 16:53

## 2022-02-21 RX ADMIN — SODIUM CHLORIDE 3 GRAM(S): 9 INJECTION INTRAMUSCULAR; INTRAVENOUS; SUBCUTANEOUS at 23:02

## 2022-02-21 RX ADMIN — Medication 650 MILLIGRAM(S): at 13:57

## 2022-02-21 RX ADMIN — BROMOCRIPTINE MESYLATE 5 MILLIGRAM(S): 5 CAPSULE ORAL at 21:25

## 2022-02-21 RX ADMIN — Medication 100 GRAM(S): at 06:23

## 2022-02-21 RX ADMIN — SODIUM CHLORIDE 1000 MILLILITER(S): 9 INJECTION INTRAMUSCULAR; INTRAVENOUS; SUBCUTANEOUS at 14:11

## 2022-02-21 RX ADMIN — Medication 40 MILLIEQUIVALENT(S): at 00:29

## 2022-02-21 RX ADMIN — LIDOCAINE 1 PATCH: 4 CREAM TOPICAL at 12:11

## 2022-02-21 RX ADMIN — Medication 100 MILLIGRAM(S): at 13:32

## 2022-02-21 RX ADMIN — POTASSIUM PHOSPHATE, MONOBASIC POTASSIUM PHOSPHATE, DIBASIC 62.5 MILLIMOLE(S): 236; 224 INJECTION, SOLUTION INTRAVENOUS at 06:50

## 2022-02-21 RX ADMIN — CEFTRIAXONE 100 MILLIGRAM(S): 500 INJECTION, POWDER, FOR SOLUTION INTRAMUSCULAR; INTRAVENOUS at 13:31

## 2022-02-21 RX ADMIN — LOSARTAN POTASSIUM 100 MILLIGRAM(S): 100 TABLET, FILM COATED ORAL at 05:18

## 2022-02-21 RX ADMIN — SODIUM CHLORIDE 3 GRAM(S): 9 INJECTION INTRAMUSCULAR; INTRAVENOUS; SUBCUTANEOUS at 18:24

## 2022-02-21 RX ADMIN — Medication 100 MILLIGRAM(S): at 13:31

## 2022-02-21 RX ADMIN — AMLODIPINE BESYLATE 10 MILLIGRAM(S): 2.5 TABLET ORAL at 06:21

## 2022-02-21 RX ADMIN — SODIUM CHLORIDE 25 MILLILITER(S): 5 INJECTION, SOLUTION INTRAVENOUS at 22:01

## 2022-02-21 RX ADMIN — ENOXAPARIN SODIUM 40 MILLIGRAM(S): 100 INJECTION SUBCUTANEOUS at 21:25

## 2022-02-21 NOTE — PROCEDURE NOTE - GENERAL PROCEDURE DETAILS
2 sets of blood cultures were collected in a sterile fashion
9cc CSF was collected in a sterile fashion
The EVD was cleaned with chlorhexidene. Sterile gloves were donned. Syringe was attached to the EVD and 5 cc of freshly drained CSF was drained. 2.5 cc of CSF were put into sterile specimen containers.
Tourniquet was applied to patients left wrist. Venipuncture site was cleaned with chlorhexidene. Blood culture was obtained. Tourniuiet was removed. Gauze and tegaderm was placed over site after bleeding subsided.
1 set of blood cultures were obtained in a sterile fashion

## 2022-02-21 NOTE — PROCEDURE NOTE - GENERAL PROCEDURE NAME
CSF collection
Collection of Blood Cultures
Collection of Blood Cultures
CSF cultures
CSF cultures
Blood Culture

## 2022-02-21 NOTE — PROCEDURE NOTE - ADDITIONAL PROCEDURE DETAILS
The EVD was cleaned with chlorhexidene. Sterile gloves were donned. Syringe was attached to the EVD and 5 cc of freshly drained CSF was drained. 2.5 cc of CSF were put into sterile specimen containers. The EVD was cleaned with chlorhexidene. Sterile gloves were donned. Syringe was attached to the EVD and 5 cc of freshly drained CSF was drained and sent to lab. EVD reopened at 20.

## 2022-02-21 NOTE — PROGRESS NOTE ADULT - SUBJECTIVE AND OBJECTIVE BOX
O:  T(C): 37.9 (02-21-22 @ 04:53), Max: 38.5 (02-20-22 @ 11:00)  HR: 92 (02-21-22 @ 04:00) (85 - 105)  BP: 153/81 (02-21-22 @ 04:00) (122/56 - 163/74)  RR: 22 (02-21-22 @ 04:00) (20 - 32)  SpO2: 100% (02-21-22 @ 04:00) (94% - 100%)  02-19-22 @ 07:01  -  02-20-22 @ 07:00  --------------------------------------------------------  IN: 6518 mL / OUT: 6087 mL / NET: 431 mL    02-20-22 @ 07:01  -  02-21-22 @ 05:05  --------------------------------------------------------  IN: 3945 mL / OUT: 3452 mL / NET: 493 mL    acetaminophen     Tablet .. 650 milliGRAM(s) Oral every 6 hours PRN  albuterol/ipratropium for Nebulization 3 milliLiter(s) Nebulizer every 6 hours PRN  amLODIPine   Tablet 10 milliGRAM(s) Oral daily  cefTRIAXone   IVPB      cefTRIAXone   IVPB 2000 milliGRAM(s) IV Intermittent every 24 hours  enoxaparin Injectable 40 milliGRAM(s) SubCutaneous every 24 hours  fludroCORTISONE 0.1 milliGRAM(s) Oral two times a day  hydrALAZINE 100 milliGRAM(s) Oral every 8 hours  ibuprofen  Tablet. 400 milliGRAM(s) Oral every 8 hours PRN  influenza   Vaccine 0.5 milliLiter(s) IntraMuscular once  labetalol 100 milliGRAM(s) Oral every 8 hours  lactobacillus acidophilus 1 Tablet(s) Oral daily  lidocaine   4% Patch 1 Patch Transdermal daily  linezolid    Tablet 600 milliGRAM(s) Oral every 12 hours  losartan 100 milliGRAM(s) Oral daily  nystatin    Suspension 917627 Unit(s) Oral three times a day  polyethylene glycol 3350 17 Gram(s) Oral every 12 hours  senna 2 Tablet(s) Oral at bedtime  sodium chloride 3 Gram(s) Oral every 6 hours  sodium chloride 3%. 500 milliLiter(s) IV Continuous <Continuous>    General:  HEENT: MMM  Neuro: Drowsy but arousable, oriented to person and hospital, knows month- did not know year. Follows commands, R pupil 3mm, L pupil 2mm both briskly reactive  EOMI, face symmetric, RUE bicep 3/5, tricep 4/5. RLE 4-/5. Left side full strength.   Sensation intact in all 4 extremities   Cards:  RRR, tachycardic S1/S2  Respiratory: Clear, no wheeze.  Abdomen: Soft, non tender  Extremities: Localized, multiple areas of thrombophlebitis on B/L upper extremities.     LABS:  Na: 144 (02-21 @ 03:50), 142 (02-20 @ 22:56), 142 (02-20 @ 16:16), 139 (02-20 @ 10:35), 140 (02-20 @ 03:47), 140 (02-19 @ 21:22), 139 (02-19 @ 18:01), 141 (02-19 @ 04:50), 136 (02-19 @ 00:44), 137 (02-18 @ 18:28), 137 (02-18 @ 11:43)  K: 3.7 (02-21 @ 03:50), 3.4 (02-20 @ 22:56), 3.8 (02-20 @ 16:16), 3.3 (02-20 @ 10:35), 3.6 (02-20 @ 03:47), 3.8 (02-19 @ 21:22), 3.3 (02-19 @ 18:01), 3.6 (02-19 @ 04:50), 3.8 (02-19 @ 00:44), 3.8 (02-18 @ 18:28), 4.0 (02-18 @ 11:43)  Cl: 115 (02-21 @ 03:50), 115 (02-20 @ 22:56), 116 (02-20 @ 16:16), 110 (02-20 @ 10:35), 111 (02-20 @ 03:47), 115 (02-19 @ 21:22), 115 (02-19 @ 18:01), 113 (02-19 @ 04:50), 109 (02-19 @ 00:44), 109 (02-18 @ 18:28), 109 (02-18 @ 11:43)  CO2: 18 (02-21 @ 03:50), 17 (02-20 @ 22:56), 18 (02-20 @ 16:16), 18 (02-20 @ 10:35), 16 (02-20 @ 03:47), 16 (02-19 @ 21:22), 14 (02-19 @ 18:01), 17 (02-19 @ 04:50), 17 (02-19 @ 00:44), 16 (02-18 @ 18:28), 17 (02-18 @ 11:43)  BUN: 3 (02-21 @ 03:50), 4 (02-20 @ 22:56), 5 (02-20 @ 16:16), 5 (02-20 @ 10:35), 5 (02-20 @ 03:47), 5 (02-19 @ 21:22), 5 (02-19 @ 18:01), 7 (02-19 @ 04:50), 7 (02-19 @ 00:44), 6 (02-18 @ 18:28), 7 (02-18 @ 11:43)  Cr: 0.36 (02-21 @ 03:50), 0.38 (02-20 @ 22:56), 0.36 (02-20 @ 16:16), 0.35 (02-20 @ 10:35), 0.36 (02-20 @ 03:47), 0.34 (02-19 @ 21:22), 0.31 (02-19 @ 18:01), 0.38 (02-19 @ 04:50), 0.39 (02-19 @ 00:44), 0.40 (02-18 @ 18:28), 0.37 (02-18 @ 11:43)  Glu: 126(02-21 @ 03:50), 138(02-20 @ 22:56), 141(02-20 @ 16:16), 178(02-20 @ 10:35), 144(02-20 @ 03:47), 146(02-19 @ 21:22), 183(02-19 @ 18:01), 140(02-19 @ 04:50), 156(02-19 @ 00:44), 161(02-18 @ 18:28), 167(02-18 @ 11:43)    Hgb: 10.4 (02-21 @ 03:50), 11.7 (02-20 @ 03:47), 11.3 (02-19 @ 04:50)  Hct: 31.5 (02-21 @ 03:50), 35.9 (02-20 @ 03:47), 35.3 (02-19 @ 04:50)  WBC: 9.34 (02-21 @ 03:50), 11.39 (02-20 @ 03:47), 10.35 (02-19 @ 04:50)  Plt: 249 (02-21 @ 03:50), 270 (02-20 @ 03:47), 239 (02-19 @ 04:50)    INR:   PTT:             Culture - Blood (collected 19 Feb 2022 17:10)  Source: .Blood Blood  Preliminary Report (20 Feb 2022 18:00):    No growth at 1 day.    Culture - Blood (collected 18 Feb 2022 12:45)  Source: .Blood Blood  Preliminary Report (20 Feb 2022 13:00):    No growth at 2 days.    Culture - Blood (collected 18 Feb 2022 12:45)  Source: .Blood Blood  Preliminary Report (20 Feb 2022 13:00):    No growth at 2 days.    Culture - CSF with Gram Stain (collected 18 Feb 2022 12:43)  Source: .CSF CSF  Gram Stain (18 Feb 2022 13:43):    No organisms seen    Few WBC's  Preliminary Report (19 Feb 2022 10:52):    No growth to date      a/p left thalamic ICH with IVH and hydrocephalus   pathogenesis likely HTN   CTA no vascular malformation   Hydrocephalus: EVD at 5 cm water, output in 24 hr     , keep ICP< 22 mmhg. CPP> 65-70   Brain edema, hypertonic saline 3 %                 , keep sodium in                  , BMP q 6 hr , salt tablet  CV : SBP goal   100-160 mmhg   HTN on losartan, labetolol, amlodipine , hydralazine   Pulm:RA  GI: CCD diet   last BM   senna and miralax   Renal: see neuro   SIADH on salt tablets and hypertonic saline   ID enterococcus /sepsis  on linezolide and ceftriaxone   ID on consult  recent blood cx neg   Endo:  target sugar 120-180   Hem: SCD,  lovenox 40 mg sc qhs     35 critical care time as patient is at risk for brain herniation ICP crisis, seizures, ICH      O: low grade fever     T(C): 37.9 (02-21-22 @ 04:53), Max: 38.5 (02-20-22 @ 11:00)  HR: 92 (02-21-22 @ 04:00) (85 - 105)  BP: 153/81 (02-21-22 @ 04:00) (122/56 - 163/74)  RR: 22 (02-21-22 @ 04:00) (20 - 32)  SpO2: 100% (02-21-22 @ 04:00) (94% - 100%)  02-19-22 @ 07:01  -  02-20-22 @ 07:00  --------------------------------------------------------  IN: 6518 mL / OUT: 6087 mL / NET: 431 mL    02-20-22 @ 07:01  -  02-21-22 @ 05:05  --------------------------------------------------------  IN: 3945 mL / OUT: 3452 mL / NET: 493 mL    acetaminophen     Tablet .. 650 milliGRAM(s) Oral every 6 hours PRN  albuterol/ipratropium for Nebulization 3 milliLiter(s) Nebulizer every 6 hours PRN  amLODIPine   Tablet 10 milliGRAM(s) Oral daily  cefTRIAXone   IVPB      cefTRIAXone   IVPB 2000 milliGRAM(s) IV Intermittent every 24 hours  enoxaparin Injectable 40 milliGRAM(s) SubCutaneous every 24 hours  fludroCORTISONE 0.1 milliGRAM(s) Oral two times a day  hydrALAZINE 100 milliGRAM(s) Oral every 8 hours  ibuprofen  Tablet. 400 milliGRAM(s) Oral every 8 hours PRN  influenza   Vaccine 0.5 milliLiter(s) IntraMuscular once  labetalol 100 milliGRAM(s) Oral every 8 hours  lactobacillus acidophilus 1 Tablet(s) Oral daily  lidocaine   4% Patch 1 Patch Transdermal daily  linezolid    Tablet 600 milliGRAM(s) Oral every 12 hours  losartan 100 milliGRAM(s) Oral daily  nystatin    Suspension 608932 Unit(s) Oral three times a day  polyethylene glycol 3350 17 Gram(s) Oral every 12 hours  senna 2 Tablet(s) Oral at bedtime  sodium chloride 3 Gram(s) Oral every 6 hours  sodium chloride 3%. 500 milliLiter(s) IV Continuous <Continuous>    General:  HEENT: MMM  Neuro: Drowsy but arousable, oriented to person and hospital, knows month- did not know year. Follows commands, R pupil 3mm, L pupil 2mm both briskly reactive  EOMI, face symmetric, RUE bicep 3/5, tricep 4/5. RLE 4-/5. Left side full strength.   Sensation intact in all 4 extremities   Cards:  RRR, tachycardic S1/S2  Respiratory: Clear, no wheeze.  Abdomen: Soft, non tender  Extremities: Localized, multiple areas of thrombophlebitis on B/L upper extremities.     LABS:  Na: 144 (02-21 @ 03:50), 142 (02-20 @ 22:56), 142 (02-20 @ 16:16), 139 (02-20 @ 10:35), 140 (02-20 @ 03:47), 140 (02-19 @ 21:22), 139 (02-19 @ 18:01), 141 (02-19 @ 04:50), 136 (02-19 @ 00:44), 137 (02-18 @ 18:28), 137 (02-18 @ 11:43)  K: 3.7 (02-21 @ 03:50), 3.4 (02-20 @ 22:56), 3.8 (02-20 @ 16:16), 3.3 (02-20 @ 10:35), 3.6 (02-20 @ 03:47), 3.8 (02-19 @ 21:22), 3.3 (02-19 @ 18:01), 3.6 (02-19 @ 04:50), 3.8 (02-19 @ 00:44), 3.8 (02-18 @ 18:28), 4.0 (02-18 @ 11:43)  Cl: 115 (02-21 @ 03:50), 115 (02-20 @ 22:56), 116 (02-20 @ 16:16), 110 (02-20 @ 10:35), 111 (02-20 @ 03:47), 115 (02-19 @ 21:22), 115 (02-19 @ 18:01), 113 (02-19 @ 04:50), 109 (02-19 @ 00:44), 109 (02-18 @ 18:28), 109 (02-18 @ 11:43)  CO2: 18 (02-21 @ 03:50), 17 (02-20 @ 22:56), 18 (02-20 @ 16:16), 18 (02-20 @ 10:35), 16 (02-20 @ 03:47), 16 (02-19 @ 21:22), 14 (02-19 @ 18:01), 17 (02-19 @ 04:50), 17 (02-19 @ 00:44), 16 (02-18 @ 18:28), 17 (02-18 @ 11:43)  BUN: 3 (02-21 @ 03:50), 4 (02-20 @ 22:56), 5 (02-20 @ 16:16), 5 (02-20 @ 10:35), 5 (02-20 @ 03:47), 5 (02-19 @ 21:22), 5 (02-19 @ 18:01), 7 (02-19 @ 04:50), 7 (02-19 @ 00:44), 6 (02-18 @ 18:28), 7 (02-18 @ 11:43)  Cr: 0.36 (02-21 @ 03:50), 0.38 (02-20 @ 22:56), 0.36 (02-20 @ 16:16), 0.35 (02-20 @ 10:35), 0.36 (02-20 @ 03:47), 0.34 (02-19 @ 21:22), 0.31 (02-19 @ 18:01), 0.38 (02-19 @ 04:50), 0.39 (02-19 @ 00:44), 0.40 (02-18 @ 18:28), 0.37 (02-18 @ 11:43)  Glu: 126(02-21 @ 03:50), 138(02-20 @ 22:56), 141(02-20 @ 16:16), 178(02-20 @ 10:35), 144(02-20 @ 03:47), 146(02-19 @ 21:22), 183(02-19 @ 18:01), 140(02-19 @ 04:50), 156(02-19 @ 00:44), 161(02-18 @ 18:28), 167(02-18 @ 11:43)    Hgb: 10.4 (02-21 @ 03:50), 11.7 (02-20 @ 03:47), 11.3 (02-19 @ 04:50)  Hct: 31.5 (02-21 @ 03:50), 35.9 (02-20 @ 03:47), 35.3 (02-19 @ 04:50)  WBC: 9.34 (02-21 @ 03:50), 11.39 (02-20 @ 03:47), 10.35 (02-19 @ 04:50)  Plt: 249 (02-21 @ 03:50), 270 (02-20 @ 03:47), 239 (02-19 @ 04:50)    INR:   PTT:             Culture - Blood (collected 19 Feb 2022 17:10)  Source: .Blood Blood  Preliminary Report (20 Feb 2022 18:00):    No growth at 1 day.    Culture - Blood (collected 18 Feb 2022 12:45)  Source: .Blood Blood  Preliminary Report (20 Feb 2022 13:00):    No growth at 2 days.    Culture - Blood (collected 18 Feb 2022 12:45)  Source: .Blood Blood  Preliminary Report (20 Feb 2022 13:00):    No growth at 2 days.    Culture - CSF with Gram Stain (collected 18 Feb 2022 12:43)  Source: .CSF CSF  Gram Stain (18 Feb 2022 13:43):    No organisms seen    Few WBC's  Preliminary Report (19 Feb 2022 10:52):    No growth to date      a/p left thalamic ICH with IVH and hydrocephalus   pathogenesis likely HTN   CTA no vascular malformation   Hydrocephalus: EVD at 5 cm water, output in 24 hr     , keep ICP< 22 mmhg. CPP> 65-70 , EVD output 225 ml in 24 hr , clamp EVD   EEG if no seizures, will d/c   Brain edema, hypertonic saline 3 %                 , keep sodium in                  , BMP q 6 hr , salt tablet  CV : SBP goal   100-160 mmhg   HTN on losartan, labetolol, amlodipine , hydralazine   Pulm:RA  GI: CCD diet   last BM   senna and miralax   Renal: see neuro   SIADH on salt tablets and hypertonic saline   ID enterococcus /sepsis  on linezolide and ceftriaxone   ID on consult  recent blood cx neg   Endo:  target sugar 120-180   Hem: SCD,  lovenox 40 mg sc qhs     35 critical care time as patient is at risk for brain herniation ICP crisis, seizures, ICH      O: low grade fever     T(C): 37.9 (02-21-22 @ 04:53), Max: 38.5 (02-20-22 @ 11:00)  HR: 92 (02-21-22 @ 04:00) (85 - 105)  BP: 153/81 (02-21-22 @ 04:00) (122/56 - 163/74)  RR: 22 (02-21-22 @ 04:00) (20 - 32)  SpO2: 100% (02-21-22 @ 04:00) (94% - 100%)  02-19-22 @ 07:01  -  02-20-22 @ 07:00  --------------------------------------------------------  IN: 6518 mL / OUT: 6087 mL / NET: 431 mL    02-20-22 @ 07:01  -  02-21-22 @ 05:05  --------------------------------------------------------  IN: 3945 mL / OUT: 3452 mL / NET: 493 mL    acetaminophen     Tablet .. 650 milliGRAM(s) Oral every 6 hours PRN  albuterol/ipratropium for Nebulization 3 milliLiter(s) Nebulizer every 6 hours PRN  amLODIPine   Tablet 10 milliGRAM(s) Oral daily  cefTRIAXone   IVPB      cefTRIAXone   IVPB 2000 milliGRAM(s) IV Intermittent every 24 hours  enoxaparin Injectable 40 milliGRAM(s) SubCutaneous every 24 hours  fludroCORTISONE 0.1 milliGRAM(s) Oral two times a day  hydrALAZINE 100 milliGRAM(s) Oral every 8 hours  ibuprofen  Tablet. 400 milliGRAM(s) Oral every 8 hours PRN  influenza   Vaccine 0.5 milliLiter(s) IntraMuscular once  labetalol 100 milliGRAM(s) Oral every 8 hours  lactobacillus acidophilus 1 Tablet(s) Oral daily  lidocaine   4% Patch 1 Patch Transdermal daily  linezolid    Tablet 600 milliGRAM(s) Oral every 12 hours  losartan 100 milliGRAM(s) Oral daily  nystatin    Suspension 842737 Unit(s) Oral three times a day  polyethylene glycol 3350 17 Gram(s) Oral every 12 hours  senna 2 Tablet(s) Oral at bedtime  sodium chloride 3 Gram(s) Oral every 6 hours  sodium chloride 3%. 500 milliLiter(s) IV Continuous <Continuous>    General:  HEENT: MMM  Neuro: awake, alert  oriented to person and hospital, knows month- did not know year. Follows commands, R pupil 3mm, L pupil 2mm both briskly reactive, intact EOM  EOMI, face symmetric, RUE bicep 3/5, tricep 4/5. RLE 4-/5. Left side full strength.   Sensation intact in all 4 extremities   Cards:  RRR, tachycardic S1/S2  Respiratory: Clear, no wheeze.  Abdomen: Soft, non tender  Extremities: Localized, multiple areas of thrombophlebitis on B/L upper extremities.     LABS:  Na: 144 (02-21 @ 03:50), 142 (02-20 @ 22:56), 142 (02-20 @ 16:16), 139 (02-20 @ 10:35), 140 (02-20 @ 03:47), 140 (02-19 @ 21:22), 139 (02-19 @ 18:01), 141 (02-19 @ 04:50), 136 (02-19 @ 00:44), 137 (02-18 @ 18:28), 137 (02-18 @ 11:43)  K: 3.7 (02-21 @ 03:50), 3.4 (02-20 @ 22:56), 3.8 (02-20 @ 16:16), 3.3 (02-20 @ 10:35), 3.6 (02-20 @ 03:47), 3.8 (02-19 @ 21:22), 3.3 (02-19 @ 18:01), 3.6 (02-19 @ 04:50), 3.8 (02-19 @ 00:44), 3.8 (02-18 @ 18:28), 4.0 (02-18 @ 11:43)  Cl: 115 (02-21 @ 03:50), 115 (02-20 @ 22:56), 116 (02-20 @ 16:16), 110 (02-20 @ 10:35), 111 (02-20 @ 03:47), 115 (02-19 @ 21:22), 115 (02-19 @ 18:01), 113 (02-19 @ 04:50), 109 (02-19 @ 00:44), 109 (02-18 @ 18:28), 109 (02-18 @ 11:43)  CO2: 18 (02-21 @ 03:50), 17 (02-20 @ 22:56), 18 (02-20 @ 16:16), 18 (02-20 @ 10:35), 16 (02-20 @ 03:47), 16 (02-19 @ 21:22), 14 (02-19 @ 18:01), 17 (02-19 @ 04:50), 17 (02-19 @ 00:44), 16 (02-18 @ 18:28), 17 (02-18 @ 11:43)  BUN: 3 (02-21 @ 03:50), 4 (02-20 @ 22:56), 5 (02-20 @ 16:16), 5 (02-20 @ 10:35), 5 (02-20 @ 03:47), 5 (02-19 @ 21:22), 5 (02-19 @ 18:01), 7 (02-19 @ 04:50), 7 (02-19 @ 00:44), 6 (02-18 @ 18:28), 7 (02-18 @ 11:43)  Cr: 0.36 (02-21 @ 03:50), 0.38 (02-20 @ 22:56), 0.36 (02-20 @ 16:16), 0.35 (02-20 @ 10:35), 0.36 (02-20 @ 03:47), 0.34 (02-19 @ 21:22), 0.31 (02-19 @ 18:01), 0.38 (02-19 @ 04:50), 0.39 (02-19 @ 00:44), 0.40 (02-18 @ 18:28), 0.37 (02-18 @ 11:43)  Glu: 126(02-21 @ 03:50), 138(02-20 @ 22:56), 141(02-20 @ 16:16), 178(02-20 @ 10:35), 144(02-20 @ 03:47), 146(02-19 @ 21:22), 183(02-19 @ 18:01), 140(02-19 @ 04:50), 156(02-19 @ 00:44), 161(02-18 @ 18:28), 167(02-18 @ 11:43)    Hgb: 10.4 (02-21 @ 03:50), 11.7 (02-20 @ 03:47), 11.3 (02-19 @ 04:50)  Hct: 31.5 (02-21 @ 03:50), 35.9 (02-20 @ 03:47), 35.3 (02-19 @ 04:50)  WBC: 9.34 (02-21 @ 03:50), 11.39 (02-20 @ 03:47), 10.35 (02-19 @ 04:50)  Plt: 249 (02-21 @ 03:50), 270 (02-20 @ 03:47), 239 (02-19 @ 04:50)    INR:   PTT:             Culture - Blood (collected 19 Feb 2022 17:10)  Source: .Blood Blood  Preliminary Report (20 Feb 2022 18:00):    No growth at 1 day.    Culture - Blood (collected 18 Feb 2022 12:45)  Source: .Blood Blood  Preliminary Report (20 Feb 2022 13:00):    No growth at 2 days.    Culture - Blood (collected 18 Feb 2022 12:45)  Source: .Blood Blood  Preliminary Report (20 Feb 2022 13:00):    No growth at 2 days.    Culture - CSF with Gram Stain (collected 18 Feb 2022 12:43)  Source: .CSF CSF  Gram Stain (18 Feb 2022 13:43):    No organisms seen    Few WBC's  Preliminary Report (19 Feb 2022 10:52):    No growth to date      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   pathogenesis likely HTN   CTA no vascular malformation   neuro checks q 1 hr  Hydrocephalus: EVD at 5 cm water, output in 24 hr  214 ml    , keep ICP< 22 mmhg. CPP> 65-70   CT head todya, might clamp EVD    Brain edema, hypertonic saline 3 % 25 cc/hr  , keep sodium in 140-150 (sodium 144), BMP q 6 hr , salt tablet 3 gr q 6 hr   CV : SBP goal   100-160 mmhg   HTN on losartan 100 mg daily , labetolol 100 mg q 8 hr , amlodipine 10 mg daily  , hydralazine 100 mg q  8 hr   a line   left IJ central line   Pulm:RA  GI:minced and moist diet   last BM  2/19   senna and miralax   Renal: see neuro   hyponatremia on salt tablets and hypertonic saline , florinef ? salt wasting vs SIADH  ID enterococcus /sepsis  on linezolide 2/20 and ceftriaxone 2/19  ID on consult  recent blood cx neg   CSF cx neg  urine cx 2/17 citrobacter and klebsiella   tylenol and ibuprofen PRN for fever   Endo:  target sugar 120-180   HBA1 5.8   Hem:   UE and LE dopplers pending    lovenox 40 mg sc qhs     35 critical care time as patient is at risk for brain herniation ICP crisis, seizures, ICH

## 2022-02-21 NOTE — PROCEDURE NOTE - ADDITIONAL PROCEDURE DETAILS
Tourniquet was applied to patients left wrist. Venipuncture site was cleaned with chlorhexidene. Blood culture was obtained. Tourniquet was removed. Gauze and tegaderm was placed over site after bleeding subsided. blood cultures collected via venipuncture in sterile fashion, no complications.

## 2022-02-21 NOTE — PROGRESS NOTE ADULT - SUBJECTIVE AND OBJECTIVE BOX
HPI:  55 y/o female with PMHx of HTN, pre-DM presents transferred from [facility] for intracranial hemorrhage. As per [facility] ED provider and son, patient called son around 7:30-8:00AM c/o severe headache and nausea. Son immediately rushed to her house and found her out of bed, moaning and covered in her own vomit. During transit, EMS reported SBP within 240s with intractable vomiting and given Zofran 8mg. Upon arrival to [facility] ED, GCS 7, SBP within 180s, patient was given Decadron 10mg, Keppra 1g, started on a Cardene drip, Propofol, Mannitol 1mg/kg. 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 Patient was intubated for airway support and transferred to [facility] for further management. ICH2, NIHSS 8.  Denies use of anticoagulants/antiplatelets.  (12 Feb 2022 16:36)    Hospital Course:  2/12: transferred from [facility]. R frontal EVD and R radial a-line placed. pend CTH/CTA. s/p 1L bolus for elevated lactate.   2/13: S/p R frontal EVD placedment @[id number] draining well. O/n pt w/ episode of possible storming; tachy, HTN, agitated, temp 100.2F, given 2 versed and 50mcg fentanyl w/ improvement in symptoms Neuro exam stable. Pt remains intubated and sedated, AN L>R. Trend lactate and abg. Amlodipine 5mg QD started for SBP goal < 140, cardene dc'd.  EVD dropped 2/13 at 1pm. propanolol and fent standing added for neuro storming, propofol switched to precedex, with resultant hypotension. Propanolol dc'd, fent changed to prn and precedex off, 1 L bolus given, levo prn   2/14: TOSHA overnight. Patient remains on propofol. EVD open at [id number]. ICPs WNL. Neuro exam stable. increased bowel regimen. started SQL. extubated on precedex. given 0.5 Ativan + Fentanyl pushes PRN for agitation. 500cc NS bolus.   2/15: BD#4.  On HFNC d/t desat to 88% on 70% non-rebreather. On 0.5 precedex   2/16: BD5, TOSHA overnight, on 4L NC, s/p vomiting yesterday, TF being held, formal S/S pending, off precedex. EVD open @ 5. Started on 3% at 30, tmax 100.7   2/17: BD# 6   tachycardic, PE protocol. cardene gtt. neuro unchanged. EVD @ 5cmH2O. 1g IV tylenol for 102.7 fever, duonebs standing to PRN. UA with reflux ordered. Hydralazine 25mg TID added, increased to 50q8. 500cc NS bolus x2. started on nystatin for thrush. started oxy 5q6 PRN for pain for tachycardia. repeat Na 146, decreased 3% @50, started salt tabs 2q6. FEES done. started minced and moist diet  2/18: BD 7. 3% Increased 75 cc/hr  2/19: BD8. TOSHA o/n neuro stable. 3%@75cc/hr. central line placed for vascular access  2/20: BD9. tmax 101.3 o/n, neuro stable. 3%@100cc/hr. cardene gtt restarted. Ibuprofen x1 given for fever, ok per Dr. D'Amico. Vanc d/c'd per ID (MRSA neg), treating UTI not bacteremia, likely contaminant. Linezolid started for suspected thrombophlebitis and GS + coverage.   2/21: BD 10 Overnight, IV tylenol given for headache. Right radial arterial line re-wired. EVD remains at 5. ICPs WNL. Neuro exam stable. Remains on 3% lowered to 75cc/hr    Vital Signs Last 24 Hrs  T(C): 37.3 (20 Feb 2022 21:00), Max: 38.5 (20 Feb 2022 11:00)  T(F): 99.2 (20 Feb 2022 21:00), Max: 101.3 (20 Feb 2022 11:00)  HR: 92 (21 Feb 2022 00:00) (85 - 116)  BP: 140/67 (21 Feb 2022 00:00) (122/56 - 169/83)  BP(mean): 96 (21 Feb 2022 00:00) (83 - 119)  RR: 26 (21 Feb 2022 00:00) (20 - 32)  SpO2: 98% (21 Feb 2022 00:00) (94% - 100%)    I&O's Summary    19 Feb 2022 07:01  -  20 Feb 2022 07:00  --------------------------------------------------------  IN: 6518 mL / OUT: 6087 mL / NET: 431 mL    20 Feb 2022 07:01  -  21 Feb 2022 00:45  --------------------------------------------------------  IN: 3720 mL / OUT: 3011 mL / NET: 709 mL    PHYSICAL EXAM:  GEN: laying in bed, appears well, NAD  NEURO: AOx2 (self/place). FC, OE spont, speech intact, face symmetric. CNII-XII intact. PERRL, EOMI. LUE/LLE 5/5. RUE delts/triceps 4-/5, biceps 2-3/5, HG 4/5. RLE 2/5 HF otherwise 4-/5 throughout  CV: RRR +S1/S2  PULM: CTAB  GI: Abd soft, NT/ND  EXT: ext warm, dry, nontender. b/l UE with multiple areas of erythema/induration from IV infiltration (improving)  Scalp EVD site C/D/I    TUBES/LINES:  [] Haley  [] Lumbar Drain  [] Wound Drains  [x] R frontal EVD open at 5  [x ] L IJ CVC  [x] R radial a-line    DIET:  [] NPO  [x] Mechanical  [] Tube feeds    LABS:                        11.7   11.39 )-----------( 270      ( 20 Feb 2022 03:47 )             35.9     02-20    142  |  115<H>  |  4<L>  ----------------------------<  138<H>  3.4<L>   |  17<L>  |  0.38<L>    Ca    8.8      20 Feb 2022 22:56  Phos  2.3     02-20  Mg     1.9     02-20    TPro  6.9  /  Alb  3.6  /  TBili  1.0  /  DBili  0.2  /  AST  27  /  ALT  22  /  AlkPhos  74  02-19            CAPILLARY BLOOD GLUCOSE          Drug Levels: [] N/A  Vancomycin Level, Trough: 4.0 ug/mL (02-20 @ 03:47)  Vancomycin Level, Trough: 4.0 ug/mL (02-19 @ 04:50)    CSF Analysis: [] N/A      Allergies    No Known Allergies    Intolerances      MEDICATIONS:  Antibiotics:  cefTRIAXone   IVPB      cefTRIAXone   IVPB 2000 milliGRAM(s) IV Intermittent every 24 hours  linezolid    Tablet 600 milliGRAM(s) Oral every 12 hours  nystatin    Suspension 089074 Unit(s) Oral three times a day    Neuro:  acetaminophen     Tablet .. 650 milliGRAM(s) Oral every 6 hours PRN  ibuprofen  Tablet. 400 milliGRAM(s) Oral every 8 hours PRN    Anticoagulation:  enoxaparin Injectable 40 milliGRAM(s) SubCutaneous every 24 hours    OTHER:  albuterol/ipratropium for Nebulization 3 milliLiter(s) Nebulizer every 6 hours PRN  amLODIPine   Tablet 10 milliGRAM(s) Oral daily  fludroCORTISONE 0.1 milliGRAM(s) Oral two times a day  hydrALAZINE 100 milliGRAM(s) Oral every 8 hours  influenza   Vaccine 0.5 milliLiter(s) IntraMuscular once  labetalol 100 milliGRAM(s) Oral every 8 hours  lactobacillus acidophilus 1 Tablet(s) Oral daily  lidocaine   4% Patch 1 Patch Transdermal daily  losartan 100 milliGRAM(s) Oral daily  polyethylene glycol 3350 17 Gram(s) Oral every 12 hours  senna 2 Tablet(s) Oral at bedtime    IVF:  potassium chloride   Solution 40 milliEquivalent(s) Oral every 2 hours  sodium chloride 3 Gram(s) Oral every 6 hours  sodium chloride 3%. 500 milliLiter(s) IV Continuous <Continuous>    CULTURES:  Culture Results:   No growth at 1 day. (02-19 @ 17:10)  Culture Results:   No growth at 2 days. (02-18 @ 12:45)    RADIOLOGY & ADDITIONAL TESTS:      ASSESSMENT:  Patient 55 y/o female with PMHx of HTN, pre-DM w/ left thalamic parenchymal hematoma with extensive intraventricular hemorrhage. ICH score 2. NIHSS 18. s/p R frontal large bore EVD placement 2/12/22    HEAD BLEED    Handoff    No pertinent past medical history    Hypertension    Pre-diabetes    ICH (intracerebral hemorrhage)    HTN (hypertension)    Pre-diabetes    Insertion of intravenous catheter with ultrasound guidance    Insertion of intravenous catheter with ultrasound guidance    [email or web address]      PLAN:   NEURO:  - neuro/vital q1h  - R frontal large bore EVD open at 5cmH2O, trend ICP/output  - CT from 2/15 shows that R vent is now collapsed, however L vent still contains casted hemorrhagic clot, MLS is still unchanged at 4mm, with perihematomal edema (L thalamic)  - CTH 2/18 stable  - CTA 2/12: no vessel occlusion or aneurysm noted   - Keppra 1g given at [facility] 2/12, no keppra for now   - ibuprofen ok PRN for fever     Cardio:   - SBP goal 100-160  - cardene gtt  - cont. amlodipine 10mg QD, losartan 100mg, Hydralazine 100 mg TID, labetalol 100 q8h   - echo 2/14: significant for symmetric LVH with normal systolic function, EF: 65-70%   - repeat echo pending to r/o endocarditis  - Persistent tachycardia - s/p bolus ?hypovolemia, ?fever, ?pain    PULM:  - extubated 2/14, on RA  - duonebs PRN   - CT chest 2/17: Negative for PE, some consolidation   - IS  - CXR 2/20: clear     Renal:   - intermittent straight cath for retention   - cont bladder scan q6hr   - Na goal > 140  - 3% @ 75/hr, salt tabs 3 q6 and florinef 0.1mg BID    GI:  - Diet: minced & moist diet  - bowel regimen, last BM 2/19    HEME:  - SCDs/SQL for DVT ppx   - antixa level 2/17: 0.09, 2/18 0.23  - LE dopplers 2/13: neg for DVT   - Lower and upper extremitiy dopplers pending for multiple sites infiltration    ENDO:  - ISS  - prediabetic, a1c 5.8  - TSH wnl     ID:  - blood cx 2/16 +staph epidermidis  - urine cx 2/17 +citrobacter koseri and klebsiella  - ID following, vanco [2/18-2/20], ceftriaxone for UTI [2/19- ]  - linezolid for phlebilitis [2/20- ]  - MRSA negative    PSYCH:   -pt takes xanax at home, however tachycardia is not correlating with anxiety episodes. pt was persistently tachy after dilaudid when resting/sleeping.     Dispo: ICU status, full code, family updated with plan  -PT recs: AR    D/w Dr. D'Amico and Dr. Snow

## 2022-02-22 LAB
ALBUMIN SERPL ELPH-MCNC: 3.3 G/DL — SIGNIFICANT CHANGE UP (ref 3.3–5)
ALP SERPL-CCNC: 73 U/L — SIGNIFICANT CHANGE UP (ref 40–120)
ALT FLD-CCNC: 40 U/L — SIGNIFICANT CHANGE UP (ref 10–45)
AMYLASE P1 CFR SERPL: 29 U/L — SIGNIFICANT CHANGE UP (ref 25–125)
ANION GAP SERPL CALC-SCNC: 10 MMOL/L — SIGNIFICANT CHANGE UP (ref 5–17)
AST SERPL-CCNC: 35 U/L — SIGNIFICANT CHANGE UP (ref 10–40)
BASOPHILS # BLD AUTO: 0.05 K/UL — SIGNIFICANT CHANGE UP (ref 0–0.2)
BASOPHILS NFR BLD AUTO: 0.5 % — SIGNIFICANT CHANGE UP (ref 0–2)
BILIRUB DIRECT SERPL-MCNC: <0.2 MG/DL — SIGNIFICANT CHANGE UP (ref 0–0.3)
BILIRUB INDIRECT FLD-MCNC: SIGNIFICANT CHANGE UP MG/DL (ref 0.2–1)
BILIRUB SERPL-MCNC: 0.5 MG/DL — SIGNIFICANT CHANGE UP (ref 0.2–1.2)
BUN SERPL-MCNC: 4 MG/DL — LOW (ref 7–23)
BUN SERPL-MCNC: 5 MG/DL — LOW (ref 7–23)
BUN SERPL-MCNC: 6 MG/DL — LOW (ref 7–23)
CALCIUM SERPL-MCNC: 8.3 MG/DL — LOW (ref 8.4–10.5)
CALCIUM SERPL-MCNC: 8.6 MG/DL — SIGNIFICANT CHANGE UP (ref 8.4–10.5)
CALCIUM SERPL-MCNC: 9 MG/DL — SIGNIFICANT CHANGE UP (ref 8.4–10.5)
CHLORIDE SERPL-SCNC: 109 MMOL/L — HIGH (ref 96–108)
CHLORIDE SERPL-SCNC: 109 MMOL/L — HIGH (ref 96–108)
CHLORIDE SERPL-SCNC: 110 MMOL/L — HIGH (ref 96–108)
CO2 SERPL-SCNC: 18 MMOL/L — LOW (ref 22–31)
CO2 SERPL-SCNC: 20 MMOL/L — LOW (ref 22–31)
CO2 SERPL-SCNC: 20 MMOL/L — LOW (ref 22–31)
CREAT SERPL-MCNC: 0.35 MG/DL — LOW (ref 0.5–1.3)
CREAT SERPL-MCNC: 0.38 MG/DL — LOW (ref 0.5–1.3)
CREAT SERPL-MCNC: 0.38 MG/DL — LOW (ref 0.5–1.3)
CULTURE RESULTS: SIGNIFICANT CHANGE UP
EOSINOPHIL # BLD AUTO: 0.16 K/UL — SIGNIFICANT CHANGE UP (ref 0–0.5)
EOSINOPHIL NFR BLD AUTO: 1.5 % — SIGNIFICANT CHANGE UP (ref 0–6)
GLUCOSE SERPL-MCNC: 124 MG/DL — HIGH (ref 70–99)
GLUCOSE SERPL-MCNC: 138 MG/DL — HIGH (ref 70–99)
GLUCOSE SERPL-MCNC: 144 MG/DL — HIGH (ref 70–99)
HCT VFR BLD CALC: 31.1 % — LOW (ref 34.5–45)
HGB BLD-MCNC: 10.2 G/DL — LOW (ref 11.5–15.5)
IMM GRANULOCYTES NFR BLD AUTO: 0.9 % — SIGNIFICANT CHANGE UP (ref 0–1.5)
LACTATE CSF-MCNC: 3.7 MMOL/L — HIGH (ref 1.1–2.4)
LIDOCAIN IGE QN: 27 U/L — SIGNIFICANT CHANGE UP (ref 7–60)
LYMPHOCYTES # BLD AUTO: 1.88 K/UL — SIGNIFICANT CHANGE UP (ref 1–3.3)
LYMPHOCYTES # BLD AUTO: 17.9 % — SIGNIFICANT CHANGE UP (ref 13–44)
MAGNESIUM SERPL-MCNC: 2 MG/DL — SIGNIFICANT CHANGE UP (ref 1.6–2.6)
MCHC RBC-ENTMCNC: 30.8 PG — SIGNIFICANT CHANGE UP (ref 27–34)
MCHC RBC-ENTMCNC: 32.8 GM/DL — SIGNIFICANT CHANGE UP (ref 32–36)
MCV RBC AUTO: 94 FL — SIGNIFICANT CHANGE UP (ref 80–100)
MONOCYTES # BLD AUTO: 0.94 K/UL — HIGH (ref 0–0.9)
MONOCYTES NFR BLD AUTO: 8.9 % — SIGNIFICANT CHANGE UP (ref 2–14)
NEUTROPHILS # BLD AUTO: 7.41 K/UL — HIGH (ref 1.8–7.4)
NEUTROPHILS NFR BLD AUTO: 70.3 % — SIGNIFICANT CHANGE UP (ref 43–77)
NRBC # BLD: 0 /100 WBCS — SIGNIFICANT CHANGE UP (ref 0–0)
ORGANISM # SPEC MICROSCOPIC CNT: SIGNIFICANT CHANGE UP
PHOSPHATE SERPL-MCNC: 2.5 MG/DL — SIGNIFICANT CHANGE UP (ref 2.5–4.5)
PLATELET # BLD AUTO: 284 K/UL — SIGNIFICANT CHANGE UP (ref 150–400)
POTASSIUM SERPL-MCNC: 3.6 MMOL/L — SIGNIFICANT CHANGE UP (ref 3.5–5.3)
POTASSIUM SERPL-MCNC: 3.6 MMOL/L — SIGNIFICANT CHANGE UP (ref 3.5–5.3)
POTASSIUM SERPL-MCNC: 3.9 MMOL/L — SIGNIFICANT CHANGE UP (ref 3.5–5.3)
POTASSIUM SERPL-SCNC: 3.6 MMOL/L — SIGNIFICANT CHANGE UP (ref 3.5–5.3)
POTASSIUM SERPL-SCNC: 3.6 MMOL/L — SIGNIFICANT CHANGE UP (ref 3.5–5.3)
POTASSIUM SERPL-SCNC: 3.9 MMOL/L — SIGNIFICANT CHANGE UP (ref 3.5–5.3)
PROT SERPL-MCNC: 6.4 G/DL — SIGNIFICANT CHANGE UP (ref 6–8.3)
RBC # BLD: 3.31 M/UL — LOW (ref 3.8–5.2)
RBC # FLD: 12.2 % — SIGNIFICANT CHANGE UP (ref 10.3–14.5)
SODIUM SERPL-SCNC: 137 MMOL/L — SIGNIFICANT CHANGE UP (ref 135–145)
SODIUM SERPL-SCNC: 139 MMOL/L — SIGNIFICANT CHANGE UP (ref 135–145)
SODIUM SERPL-SCNC: 140 MMOL/L — SIGNIFICANT CHANGE UP (ref 135–145)
SPECIMEN SOURCE: SIGNIFICANT CHANGE UP
WBC # BLD: 10.33 K/UL — SIGNIFICANT CHANGE UP (ref 3.8–10.5)
WBC # FLD AUTO: 10.33 K/UL — SIGNIFICANT CHANGE UP (ref 3.8–10.5)

## 2022-02-22 PROCEDURE — 99291 CRITICAL CARE FIRST HOUR: CPT

## 2022-02-22 PROCEDURE — 99232 SBSQ HOSP IP/OBS MODERATE 35: CPT

## 2022-02-22 RX ORDER — SODIUM CHLORIDE 5 G/100ML
500 INJECTION, SOLUTION INTRAVENOUS
Refills: 0 | Status: DISCONTINUED | OUTPATIENT
Start: 2022-02-22 | End: 2022-02-22

## 2022-02-22 RX ORDER — SODIUM CHLORIDE 5 G/100ML
500 INJECTION, SOLUTION INTRAVENOUS
Refills: 0 | Status: DISCONTINUED | OUTPATIENT
Start: 2022-02-22 | End: 2022-02-23

## 2022-02-22 RX ORDER — TRAMADOL HYDROCHLORIDE 50 MG/1
50 TABLET ORAL ONCE
Refills: 0 | Status: DISCONTINUED | OUTPATIENT
Start: 2022-02-22 | End: 2022-02-22

## 2022-02-22 RX ORDER — CEFEPIME 1 G/1
2000 INJECTION, POWDER, FOR SOLUTION INTRAMUSCULAR; INTRAVENOUS EVERY 8 HOURS
Refills: 0 | Status: DISCONTINUED | OUTPATIENT
Start: 2022-02-22 | End: 2022-02-25

## 2022-02-22 RX ORDER — POTASSIUM CHLORIDE 20 MEQ
40 PACKET (EA) ORAL ONCE
Refills: 0 | Status: COMPLETED | OUTPATIENT
Start: 2022-02-22 | End: 2022-02-22

## 2022-02-22 RX ORDER — SODIUM CHLORIDE 9 MG/ML
1000 INJECTION INTRAMUSCULAR; INTRAVENOUS; SUBCUTANEOUS ONCE
Refills: 0 | Status: COMPLETED | OUTPATIENT
Start: 2022-02-22 | End: 2022-02-22

## 2022-02-22 RX ORDER — ACETAMINOPHEN 500 MG
1000 TABLET ORAL ONCE
Refills: 0 | Status: COMPLETED | OUTPATIENT
Start: 2022-02-22 | End: 2022-02-22

## 2022-02-22 RX ORDER — SODIUM,POTASSIUM PHOSPHATES 278-250MG
1 POWDER IN PACKET (EA) ORAL ONCE
Refills: 0 | Status: COMPLETED | OUTPATIENT
Start: 2022-02-22 | End: 2022-02-22

## 2022-02-22 RX ADMIN — SODIUM CHLORIDE 3 GRAM(S): 9 INJECTION INTRAMUSCULAR; INTRAVENOUS; SUBCUTANEOUS at 17:19

## 2022-02-22 RX ADMIN — POLYETHYLENE GLYCOL 3350 17 GRAM(S): 17 POWDER, FOR SOLUTION ORAL at 17:19

## 2022-02-22 RX ADMIN — SODIUM CHLORIDE 45 MILLILITER(S): 5 INJECTION, SOLUTION INTRAVENOUS at 13:58

## 2022-02-22 RX ADMIN — SENNA PLUS 2 TABLET(S): 8.6 TABLET ORAL at 21:01

## 2022-02-22 RX ADMIN — SODIUM CHLORIDE 1000 MILLILITER(S): 9 INJECTION INTRAMUSCULAR; INTRAVENOUS; SUBCUTANEOUS at 09:52

## 2022-02-22 RX ADMIN — Medication 100 MILLIGRAM(S): at 05:47

## 2022-02-22 RX ADMIN — LIDOCAINE 1 PATCH: 4 CREAM TOPICAL at 00:15

## 2022-02-22 RX ADMIN — LINEZOLID 600 MILLIGRAM(S): 600 INJECTION, SOLUTION INTRAVENOUS at 17:19

## 2022-02-22 RX ADMIN — LIDOCAINE 1 PATCH: 4 CREAM TOPICAL at 12:15

## 2022-02-22 RX ADMIN — FLUDROCORTISONE ACETATE 0.1 MILLIGRAM(S): 0.1 TABLET ORAL at 05:47

## 2022-02-22 RX ADMIN — BROMOCRIPTINE MESYLATE 5 MILLIGRAM(S): 5 CAPSULE ORAL at 21:01

## 2022-02-22 RX ADMIN — BROMOCRIPTINE MESYLATE 5 MILLIGRAM(S): 5 CAPSULE ORAL at 05:47

## 2022-02-22 RX ADMIN — Medication 40 MILLIEQUIVALENT(S): at 07:34

## 2022-02-22 RX ADMIN — Medication 1 TABLET(S): at 12:14

## 2022-02-22 RX ADMIN — LIDOCAINE 1 PATCH: 4 CREAM TOPICAL at 19:25

## 2022-02-22 RX ADMIN — Medication 100 MILLIGRAM(S): at 21:01

## 2022-02-22 RX ADMIN — Medication 1 PACKET(S): at 07:34

## 2022-02-22 RX ADMIN — TRAMADOL HYDROCHLORIDE 50 MILLIGRAM(S): 50 TABLET ORAL at 19:25

## 2022-02-22 RX ADMIN — AMLODIPINE BESYLATE 10 MILLIGRAM(S): 2.5 TABLET ORAL at 05:46

## 2022-02-22 RX ADMIN — ENOXAPARIN SODIUM 40 MILLIGRAM(S): 100 INJECTION SUBCUTANEOUS at 21:01

## 2022-02-22 RX ADMIN — LOSARTAN POTASSIUM 100 MILLIGRAM(S): 100 TABLET, FILM COATED ORAL at 05:48

## 2022-02-22 RX ADMIN — POLYETHYLENE GLYCOL 3350 17 GRAM(S): 17 POWDER, FOR SOLUTION ORAL at 05:48

## 2022-02-22 RX ADMIN — SODIUM CHLORIDE 3 GRAM(S): 9 INJECTION INTRAMUSCULAR; INTRAVENOUS; SUBCUTANEOUS at 23:01

## 2022-02-22 RX ADMIN — Medication 100 MILLIGRAM(S): at 14:55

## 2022-02-22 RX ADMIN — Medication 650 MILLIGRAM(S): at 22:21

## 2022-02-22 RX ADMIN — Medication 650 MILLIGRAM(S): at 16:51

## 2022-02-22 RX ADMIN — FLUDROCORTISONE ACETATE 0.1 MILLIGRAM(S): 0.1 TABLET ORAL at 17:19

## 2022-02-22 RX ADMIN — SODIUM CHLORIDE 45 MILLILITER(S): 5 INJECTION, SOLUTION INTRAVENOUS at 06:52

## 2022-02-22 RX ADMIN — CEFTRIAXONE 100 MILLIGRAM(S): 500 INJECTION, POWDER, FOR SOLUTION INTRAMUSCULAR; INTRAVENOUS at 13:08

## 2022-02-22 RX ADMIN — LINEZOLID 600 MILLIGRAM(S): 600 INJECTION, SOLUTION INTRAVENOUS at 05:48

## 2022-02-22 RX ADMIN — Medication 650 MILLIGRAM(S): at 22:30

## 2022-02-22 RX ADMIN — Medication 40 MILLIEQUIVALENT(S): at 14:55

## 2022-02-22 RX ADMIN — BROMOCRIPTINE MESYLATE 5 MILLIGRAM(S): 5 CAPSULE ORAL at 14:56

## 2022-02-22 RX ADMIN — Medication 100 MILLIGRAM(S): at 21:02

## 2022-02-22 RX ADMIN — Medication 500000 UNIT(S): at 05:48

## 2022-02-22 RX ADMIN — Medication 650 MILLIGRAM(S): at 16:14

## 2022-02-22 RX ADMIN — SODIUM CHLORIDE 3 GRAM(S): 9 INJECTION INTRAMUSCULAR; INTRAVENOUS; SUBCUTANEOUS at 12:14

## 2022-02-22 RX ADMIN — Medication 400 MILLIGRAM(S): at 09:44

## 2022-02-22 RX ADMIN — SODIUM CHLORIDE 3 GRAM(S): 9 INJECTION INTRAMUSCULAR; INTRAVENOUS; SUBCUTANEOUS at 05:49

## 2022-02-22 RX ADMIN — CEFEPIME 100 MILLIGRAM(S): 1 INJECTION, POWDER, FOR SOLUTION INTRAMUSCULAR; INTRAVENOUS at 17:12

## 2022-02-22 RX ADMIN — Medication 40 MILLIEQUIVALENT(S): at 02:17

## 2022-02-22 RX ADMIN — Medication 1000 MILLIGRAM(S): at 13:03

## 2022-02-22 RX ADMIN — TRAMADOL HYDROCHLORIDE 50 MILLIGRAM(S): 50 TABLET ORAL at 17:19

## 2022-02-22 RX ADMIN — CEFEPIME 100 MILLIGRAM(S): 1 INJECTION, POWDER, FOR SOLUTION INTRAMUSCULAR; INTRAVENOUS at 21:00

## 2022-02-22 NOTE — PROGRESS NOTE ADULT - ASSESSMENT
55 yo F with HTN with L thalamic bleed (likely hypertensive) with fever, etiology unclear.  Initial single blood culture with CNS in 1/2 bottles, Enterococcus detected by PCR but did not grow - ?error.  Blood cultures from 2/18 NG  - and prior to antibiotics.  She has been on linezolid for thrombophlebitis, ceftriaxone for urine culture with Klebsiella and Citrobacter.  Had had urinary retention but no urinary symptoms at time culture was obtained.  Source of ongoing fever, elevated lactate unclear.  She now is more lethargic - would like to do imaging to include detection of CNS infection.  Suggest:  - Please get differential with CBC  - Continue to f/u blood cultures from 2/18, 2/19 and 2/21  - Gallium scan  - Continue linezolid 600 mg po q12h  - Start cefepime 2 g IV q8h  - D/C ceftriaxone.  Recommendations discussed with primary team and Dr. Guzman.  Will follow with you - team 2.

## 2022-02-22 NOTE — CHART NOTE - NSCHARTNOTEFT_GEN_A_CORE
Admitting Diagnosis:   Patient is a 56y old  Female who presents with a chief complaint of ICH (22 Feb 2022 07:24)    PAST MEDICAL & SURGICAL HISTORY:  Hypertension  Pre-diabetes    Current Nutrition Order: Minced and Moist diet     PO Intake: Good (%) [   ]  Fair (50-75%) [ x  ] Poor (<25%) [   ]- Requires feeding assistance    GI Issues:   WDL, last BM 2/19   No n/v/d. Started on bowel regimen  No abd distention noted    Pain:  No pain noted at this time    Skin Integrity:  Surgical incision, pal score 16  No edema present  No pressure ulcers noted    Labs:   02-22    139  |  109<H>  |  5<L>  ----------------------------<  144<H>  3.6   |  20<L>  |  0.38<L>    Ca    9.0      22 Feb 2022 05:05  Phos  2.5     02-22  Mg     2.0     02-22    TPro  6.4  /  Alb  3.3  /  TBili  0.5  /  DBili  <0.2  /  AST  35  /  ALT  40  /  AlkPhos  73  02-22    Medications:  MEDICATIONS  (STANDING):  amLODIPine   Tablet 10 milliGRAM(s) Oral daily  bromocriptine Capsule 5 milliGRAM(s) Oral every 8 hours  cefTRIAXone   IVPB      cefTRIAXone   IVPB 2000 milliGRAM(s) IV Intermittent every 24 hours  enoxaparin Injectable 40 milliGRAM(s) SubCutaneous every 24 hours  fludroCORTISONE 0.1 milliGRAM(s) Oral two times a day  hydrALAZINE 100 milliGRAM(s) Oral every 8 hours  influenza   Vaccine 0.5 milliLiter(s) IntraMuscular once  labetalol 100 milliGRAM(s) Oral every 8 hours  lactobacillus acidophilus 1 Tablet(s) Oral daily  lidocaine   4% Patch 1 Patch Transdermal daily  linezolid    Tablet 600 milliGRAM(s) Oral every 12 hours  losartan 100 milliGRAM(s) Oral daily  polyethylene glycol 3350 17 Gram(s) Oral every 12 hours  senna 2 Tablet(s) Oral at bedtime  sodium chloride 3 Gram(s) Oral every 6 hours  sodium chloride 3%. 500 milliLiter(s) (45 mL/Hr) IV Continuous <Continuous>    MEDICATIONS  (PRN):  acetaminophen     Tablet .. 650 milliGRAM(s) Oral every 6 hours PRN Temp greater or equal to 38C (100.4F), Mild Pain (1 - 3)  albuterol/ipratropium for Nebulization 3 milliLiter(s) Nebulizer every 6 hours PRN Shortness of Breath and/or Wheezing  ibuprofen  Tablet. 400 milliGRAM(s) Oral every 8 hours PRN Temp greater or equal to 38C (100.4F), Moderate Pain (4 - 6)    Admitting Anthropometrics:  · Height for BMI (FEET)	5 Feet  · Height for BMI (INCHES)	1 Inch(s)  · Height for BMI (CENTIMETERS)	154.94 Centimeter(s)  · Weight for BMI (lbs)	167 lb  · Weight for BMI (kg)	75.7 kg  · Body Mass Index	31.5   · Ideal Body Weight (lbs)	105   · Ideal Body Weight (kg)	47.6     Weight:  2/12 167lbs  2/18 160lbs    Weight Change: Pt with a noted -7lb/ 4% weight loss over 1 week likely 2/2 inadequate energy intake. Please cont to trend weights weekly.     Nutrition Focused Physical Exam: Completed [   ]  Not Pertinent [ x  ]  Pt meets ASPEN guidelines for Moderate Malnutrition based on two criteria. Please see subjective from 2/18 for further details.     Estimated energy needs:   Ideal body weight used for calculations as pt >120% of IBW (%IBW: 159). Adjusted for age, slight increase PRO for critically ill, wound healing, suspected malnutrition.   Kcal (25-30 kcal/kg): 4725-1719 kcal  Protein (1.2-1.4 g/kg pro): 57-67g pro  Fluids (25-30 ml/kg): 7238-7046 ml    Subjective:   55 y/o female with PMHx of HTN, pre-DM presents transferred from [facility] after c/o severe headache and nausea being found down by son covered in vomit. Initial CTH revealed left thalamic parenchymal hematoma with intraventricular hemorrhage. ICH score 2. NIHSS 18. s/p R frontal large bore EVD placement 2/12. Now s/p R frontal EVD placement- dropped 2/13. Pt weaned and extubated 2/14. S/p formal SLP eval 2/16 with recs for NPO + NGT except for tsp of water pending reassessment. Follow Up FEES 2/17 with recs for minced and moist diet. EN d/c 02/17 once diet was advanced.     On assessment, pt resting in bed soundly sleeping all morning. RD attempted to visit pt x2. Currently on Minced and Moist diet tolerating PO. Cont to require feeding assistance noted to be tolerating well. No n/v/d/c. Last BM 2/19. No n/v/d/c. No abd distention or discomfort noted.     Previous Nutrition Diagnosis: Increased Nutrient Needs RT increased physical demand AEB intubated, s/p R frontal EVD.     Active [   ]  Resolved [ x  ]    If resolved, new PES: Moderate PCM RT suspected inadequate energy intake AEB meeting <75% est needs x1 week, -7lb/ 4% wt loss x1 week.     Goal/Expected Outcome Pt to meet >75% EER with good tolerance via tolerable route.    Recommendations:  1. Cont with Minced and Moist diet  >>Recommend addition of Ensure Enlive BID (700 kcal, 40g protein, 360 mL free H2O)  2. Recommend addition of MVI daily  3. Pain and bowel regimen per team   4. Cont to monitor lytes and replete prn   5. RD diet edu prn    Education: Deferred at this time.     Risk Level: High [ x  ] Moderate [   ] Low [   ]. Admitting Diagnosis:   Patient is a 56y old  Female who presents with a chief complaint of ICH (22 Feb 2022 07:24)    PAST MEDICAL & SURGICAL HISTORY:  Hypertension  Pre-diabetes    Current Nutrition Order: Minced and Moist diet     PO Intake: Good (%) [   ]  Fair (50-75%) [ x  ] Poor (<25%) [   ]- Requires feeding assistance    GI Issues:   WDL, last BM 2/19   No n/v/d. Started on bowel regimen  No abd distention noted    Pain:  No pain noted at this time    Skin Integrity:  Surgical incision, pal score 16  No edema present  No pressure ulcers noted    Labs:   02-22    139  |  109<H>  |  5<L>  ----------------------------<  144<H>  3.6   |  20<L>  |  0.38<L>    Ca    9.0      22 Feb 2022 05:05  Phos  2.5     02-22  Mg     2.0     02-22    TPro  6.4  /  Alb  3.3  /  TBili  0.5  /  DBili  <0.2  /  AST  35  /  ALT  40  /  AlkPhos  73  02-22    Medications:  MEDICATIONS  (STANDING):  amLODIPine   Tablet 10 milliGRAM(s) Oral daily  bromocriptine Capsule 5 milliGRAM(s) Oral every 8 hours  cefTRIAXone   IVPB      cefTRIAXone   IVPB 2000 milliGRAM(s) IV Intermittent every 24 hours  enoxaparin Injectable 40 milliGRAM(s) SubCutaneous every 24 hours  fludroCORTISONE 0.1 milliGRAM(s) Oral two times a day  hydrALAZINE 100 milliGRAM(s) Oral every 8 hours  influenza   Vaccine 0.5 milliLiter(s) IntraMuscular once  labetalol 100 milliGRAM(s) Oral every 8 hours  lactobacillus acidophilus 1 Tablet(s) Oral daily  lidocaine   4% Patch 1 Patch Transdermal daily  linezolid    Tablet 600 milliGRAM(s) Oral every 12 hours  losartan 100 milliGRAM(s) Oral daily  polyethylene glycol 3350 17 Gram(s) Oral every 12 hours  senna 2 Tablet(s) Oral at bedtime  sodium chloride 3 Gram(s) Oral every 6 hours  sodium chloride 3%. 500 milliLiter(s) (45 mL/Hr) IV Continuous <Continuous>    MEDICATIONS  (PRN):  acetaminophen     Tablet .. 650 milliGRAM(s) Oral every 6 hours PRN Temp greater or equal to 38C (100.4F), Mild Pain (1 - 3)  albuterol/ipratropium for Nebulization 3 milliLiter(s) Nebulizer every 6 hours PRN Shortness of Breath and/or Wheezing  ibuprofen  Tablet. 400 milliGRAM(s) Oral every 8 hours PRN Temp greater or equal to 38C (100.4F), Moderate Pain (4 - 6)    Admitting Anthropometrics:  · Height for BMI (FEET)	5 Feet  · Height for BMI (INCHES)	1 Inch(s)  · Height for BMI (CENTIMETERS)	154.94 Centimeter(s)  · Weight for BMI (lbs)	167 lb  · Weight for BMI (kg)	75.7 kg  · Body Mass Index	31.5   · Ideal Body Weight (lbs)	105   · Ideal Body Weight (kg)	47.6     Weight:  2/12 167lbs  2/18 160lbs    Weight Change: Pt with a noted -7lb/ 4% weight loss over 1 week likely 2/2 inadequate energy intake. Please cont to trend weights weekly.     Nutrition Focused Physical Exam: Completed [   ]  Not Pertinent [ x  ]  Pt meets ASPEN guidelines for Moderate Malnutrition based on two criteria. Please see subjective from 2/18 for further details.     Estimated energy needs:   Ideal body weight used for calculations as pt >120% of IBW (%IBW: 159). Adjusted for age, slight increase PRO for critically ill, wound healing, suspected malnutrition.   Kcal (25-30 kcal/kg): 0661-4067 kcal  Protein (1.2-1.4 g/kg pro): 57-67g pro  Fluids (25-30 ml/kg): 2355-2601 ml    Subjective:   57 y/o female with PMHx of HTN, pre-DM presents transferred from [facility] after c/o severe headache and nausea being found down by son covered in vomit. Initial CTH revealed left thalamic parenchymal hematoma with intraventricular hemorrhage. ICH score 2. NIHSS 18. s/p R frontal large bore EVD placement 2/12. Now s/p R frontal EVD placement- dropped 2/13. Pt weaned and extubated 2/14. S/p formal SLP eval 2/16 with recs for NPO + NGT except for tsp of water pending reassessment. Follow Up FEES 2/17 with recs for minced and moist diet. EN d/c 02/17 once diet was advanced.     On assessment, pt resting in bed soundly sleeping all morning. RD attempted to visit pt x2. Currently on Minced and Moist diet tolerating PO. Cont to require feeding assistance noted to be tolerating well. No n/v/d/c. Last BM 2/19. No n/v/d/c. No abd distention or discomfort noted. Education deferred. Please see nutr recs below.     Previous Nutrition Diagnosis: Increased Nutrient Needs RT increased physical demand AEB intubated, s/p R frontal EVD.     Active [   ]  Resolved [ x  ]    If resolved, new PES: Moderate PCM RT suspected inadequate energy intake AEB meeting <75% est needs x1 week, -7lb/ 4% wt loss x1 week.     Goal/Expected Outcome Pt to meet >75% EER with good tolerance via tolerable route.    Recommendations:  1. Cont with Minced and Moist diet  >>Recommend addition of Ensure Enlive BID (700 kcal, 40g protein, 360 mL free H2O)  2. Recommend addition of MVI daily  3. Pain and bowel regimen per team   4. Cont to monitor lytes and replete prn   5. RD diet edu prn    Education: Deferred at this time.     Risk Level: High [ x  ] Moderate [   ] Low [   ].

## 2022-02-22 NOTE — PROGRESS NOTE ADULT - SUBJECTIVE AND OBJECTIVE BOX
· Subjective and Objective:   O febrile overnight   is pancultured       T(C): 38.2 (02-22-22 @ 05:28), Max: 38.9 (02-21-22 @ 14:00)  HR: 88 (02-22-22 @ 07:00) (71 - 99)  BP: 164/77 (02-22-22 @ 07:00) (122/67 - 165/76)  RR: 29 (02-22-22 @ 07:00) (16 - 29)  SpO2: 96% (02-22-22 @ 07:00) (96% - 100%)  02-21-22 @ 07:01  -  02-22-22 @ 07:00  --------------------------------------------------------  IN: 2230 mL / OUT: 5527 mL / NET: -3297 mL    acetaminophen     Tablet .. 650 milliGRAM(s) Oral every 6 hours PRN  albuterol/ipratropium for Nebulization 3 milliLiter(s) Nebulizer every 6 hours PRN  amLODIPine   Tablet 10 milliGRAM(s) Oral daily  bromocriptine Capsule 5 milliGRAM(s) Oral every 8 hours  cefTRIAXone   IVPB      cefTRIAXone   IVPB 2000 milliGRAM(s) IV Intermittent every 24 hours  enoxaparin Injectable 40 milliGRAM(s) SubCutaneous every 24 hours  fludroCORTISONE 0.1 milliGRAM(s) Oral two times a day  hydrALAZINE 100 milliGRAM(s) Oral every 8 hours  ibuprofen  Tablet. 400 milliGRAM(s) Oral every 8 hours PRN  influenza   Vaccine 0.5 milliLiter(s) IntraMuscular once  labetalol 100 milliGRAM(s) Oral every 8 hours  lactobacillus acidophilus 1 Tablet(s) Oral daily  lidocaine   4% Patch 1 Patch Transdermal daily  linezolid    Tablet 600 milliGRAM(s) Oral every 12 hours  losartan 100 milliGRAM(s) Oral daily  polyethylene glycol 3350 17 Gram(s) Oral every 12 hours  potassium chloride    Tablet ER 40 milliEquivalent(s) Oral once  potassium phosphate / sodium phosphate Powder (PHOS-NaK) 1 Packet(s) Oral once  senna 2 Tablet(s) Oral at bedtime  sodium chloride 3 Gram(s) Oral every 6 hours  sodium chloride 3%. 500 milliLiter(s) IV Continuous <Continuous>    Culture - CSF with Gram Stain (collected 02-21-22 @ 15:05)  Source: .CSF CSF  Gram Stain (02-21-22 @ 15:27):    No organisms seen    Rare polymorphonuclear leukocytes    Culture - Blood (collected 02-21-22 @ 14:53)  Source: .Blood Blood-Venous  Preliminary Report (02-22-22 @ 03:00):    No growth at 12 hours        General:  HEENT: MMM  Neuro: awake, alert  oriented to person and hospital, knows month- did not know year. Follows commands, R pupil 3mm, L pupil 2mm both briskly reactive, intact EOM  EOMI, face symmetric, RUE bicep 3/5, tricep 4/5. RLE 4-/5. Left side full strength.   Sensation intact in all 4 extremities   Cards:  RRR, tachycardic S1/S2  Respiratory: Clear, no wheeze.  Abdomen: Soft, non tender  Extremities: Localized, multiple areas of thrombophlebitis on B/L upper extremities.         LABS:  Na: 139 (02-22 @ 05:05), 141 (02-21 @ 19:31), 142 (02-21 @ 09:57), 144 (02-21 @ 03:50), 142 (02-20 @ 22:56), 142 (02-20 @ 16:16), 139 (02-20 @ 10:35), 140 (02-20 @ 03:47), 140 (02-19 @ 21:22), 139 (02-19 @ 18:01)  K: 3.6 (02-22 @ 05:05), 3.5 (02-21 @ 19:31), 3.7 (02-21 @ 09:57), 3.7 (02-21 @ 03:50), 3.4 (02-20 @ 22:56), 3.8 (02-20 @ 16:16), 3.3 (02-20 @ 10:35), 3.6 (02-20 @ 03:47), 3.8 (02-19 @ 21:22), 3.3 (02-19 @ 18:01)  Cl: 109 (02-22 @ 05:05), 110 (02-21 @ 19:31), 112 (02-21 @ 09:57), 115 (02-21 @ 03:50), 115 (02-20 @ 22:56), 116 (02-20 @ 16:16), 110 (02-20 @ 10:35), 111 (02-20 @ 03:47), 115 (02-19 @ 21:22), 115 (02-19 @ 18:01)  CO2: 20 (02-22 @ 05:05), 21 (02-21 @ 19:31), 20 (02-21 @ 09:57), 18 (02-21 @ 03:50), 17 (02-20 @ 22:56), 18 (02-20 @ 16:16), 18 (02-20 @ 10:35), 16 (02-20 @ 03:47), 16 (02-19 @ 21:22), 14 (02-19 @ 18:01)  BUN: 5 (02-22 @ 05:05), 5 (02-21 @ 19:31), 4 (02-21 @ 09:57), 3 (02-21 @ 03:50), 4 (02-20 @ 22:56), 5 (02-20 @ 16:16), 5 (02-20 @ 10:35), 5 (02-20 @ 03:47), 5 (02-19 @ 21:22), 5 (02-19 @ 18:01)  Cr: 0.38 (02-22 @ 05:05), 0.35 (02-21 @ 19:31), 0.33 (02-21 @ 09:57), 0.36 (02-21 @ 03:50), 0.38 (02-20 @ 22:56), 0.36 (02-20 @ 16:16), 0.35 (02-20 @ 10:35), 0.36 (02-20 @ 03:47), 0.34 (02-19 @ 21:22), 0.31 (02-19 @ 18:01)  Glu: 144(02-22 @ 05:05), 121(02-21 @ 19:31), 142(02-21 @ 09:57), 126(02-21 @ 03:50), 138(02-20 @ 22:56), 141(02-20 @ 16:16), 178(02-20 @ 10:35), 144(02-20 @ 03:47), 146(02-19 @ 21:22), 183(02-19 @ 18:01)    Hgb: 10.2 (02-22 @ 05:05), 10.4 (02-21 @ 03:50), 11.7 (02-20 @ 03:47)  Hct: 31.1 (02-22 @ 05:05), 31.5 (02-21 @ 03:50), 35.9 (02-20 @ 03:47)  WBC: 10.33 (02-22 @ 05:05), 9.34 (02-21 @ 03:50), 11.39 (02-20 @ 03:47)  Plt: 284 (02-22 @ 05:05), 249 (02-21 @ 03:50), 270 (02-20 @ 03:47)    INR:   PTT:       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  likely HTN   neuro checks q 1 hr  Hydrocephalus: EVD now at 20  cm water, output in 24 hr  127 ml   , exam stable, will consider clamping EVD   CT head tomorrow   Brain edema, hypertonic saline 3 % 25 cc/hr  , keep sodium in 140-150 (sodium 139), BMP q 12 hr , salt tablet 3 gr q 6 hr   CV : SBP goal   100-160 mmhg   HTN on losartan 100 mg daily , labetolol 100 mg q 8 hr , amlodipine 10 mg daily  , hydralazine 100 mg q  8 hr   a line   left IJ central line   Pulm:RA  GI:minced and moist diet   last BM  2/19   senna and miralax   Renal: SIADH vs salt wasting   on salt tablets and hypertonic saline   d/c florinef   ID febrile  pancultured on 2/21   enterococcus /sepsis  on linezolide 2/20 and ceftriaxone 2/19  blood cx and CSF no growth   urine cx 2/17 citrobacter and klebsiella   tylenol and ibuprofen PRN for fever   could also be central fever, she was started on bromocryptine   Endo:  target sugar 120-180   Hem:   UE and LE dopplers pending    lovenox 40 mg sc qhs     35 critical care time as patient is at risk for brain herniation ICP crisis, seizures, ICH         Assessment and Plan:    Nutritional Assessment:     · Subjective and Objective:   O febrile overnight   is pancultured       T(C): 38.2 (02-22-22 @ 05:28), Max: 38.9 (02-21-22 @ 14:00)  HR: 88 (02-22-22 @ 07:00) (71 - 99)  BP: 164/77 (02-22-22 @ 07:00) (122/67 - 165/76)  RR: 29 (02-22-22 @ 07:00) (16 - 29)  SpO2: 96% (02-22-22 @ 07:00) (96% - 100%)  02-21-22 @ 07:01  -  02-22-22 @ 07:00  --------------------------------------------------------  IN: 2230 mL / OUT: 5527 mL / NET: -3297 mL    acetaminophen     Tablet .. 650 milliGRAM(s) Oral every 6 hours PRN  albuterol/ipratropium for Nebulization 3 milliLiter(s) Nebulizer every 6 hours PRN  amLODIPine   Tablet 10 milliGRAM(s) Oral daily  bromocriptine Capsule 5 milliGRAM(s) Oral every 8 hours  cefTRIAXone   IVPB      cefTRIAXone   IVPB 2000 milliGRAM(s) IV Intermittent every 24 hours  enoxaparin Injectable 40 milliGRAM(s) SubCutaneous every 24 hours  fludroCORTISONE 0.1 milliGRAM(s) Oral two times a day  hydrALAZINE 100 milliGRAM(s) Oral every 8 hours  ibuprofen  Tablet. 400 milliGRAM(s) Oral every 8 hours PRN  influenza   Vaccine 0.5 milliLiter(s) IntraMuscular once  labetalol 100 milliGRAM(s) Oral every 8 hours  lactobacillus acidophilus 1 Tablet(s) Oral daily  lidocaine   4% Patch 1 Patch Transdermal daily  linezolid    Tablet 600 milliGRAM(s) Oral every 12 hours  losartan 100 milliGRAM(s) Oral daily  polyethylene glycol 3350 17 Gram(s) Oral every 12 hours  potassium chloride    Tablet ER 40 milliEquivalent(s) Oral once  potassium phosphate / sodium phosphate Powder (PHOS-NaK) 1 Packet(s) Oral once  senna 2 Tablet(s) Oral at bedtime  sodium chloride 3 Gram(s) Oral every 6 hours  sodium chloride 3%. 500 milliLiter(s) IV Continuous <Continuous>    Culture - CSF with Gram Stain (collected 02-21-22 @ 15:05)  Source: .CSF CSF  Gram Stain (02-21-22 @ 15:27):    No organisms seen    Rare polymorphonuclear leukocytes    Culture - Blood (collected 02-21-22 @ 14:53)  Source: .Blood Blood-Venous  Preliminary Report (02-22-22 @ 03:00):    No growth at 12 hours        General:  HEENT: MMM  Neuro: awake, sleepy today,   oriented to person and hospital,not  year. Follows commands, R pupil 3mm, L pupil 2mm both briskly reactive, intact EOM  EOMI, face symmetric, RUE 3/5,  RLE 4-/5. Left side full strength.   Sensation intact in all 4 extremities   Cards:  RRR, tachycardic S1/S2  Respiratory: Clear, no wheeze.  Abdomen: Soft, non tender  Extremities: Localized, multiple areas of thrombophlebitis on B/L upper extremities.         LABS:  Na: 139 (02-22 @ 05:05), 141 (02-21 @ 19:31), 142 (02-21 @ 09:57), 144 (02-21 @ 03:50), 142 (02-20 @ 22:56), 142 (02-20 @ 16:16), 139 (02-20 @ 10:35), 140 (02-20 @ 03:47), 140 (02-19 @ 21:22), 139 (02-19 @ 18:01)  K: 3.6 (02-22 @ 05:05), 3.5 (02-21 @ 19:31), 3.7 (02-21 @ 09:57), 3.7 (02-21 @ 03:50), 3.4 (02-20 @ 22:56), 3.8 (02-20 @ 16:16), 3.3 (02-20 @ 10:35), 3.6 (02-20 @ 03:47), 3.8 (02-19 @ 21:22), 3.3 (02-19 @ 18:01)  Cl: 109 (02-22 @ 05:05), 110 (02-21 @ 19:31), 112 (02-21 @ 09:57), 115 (02-21 @ 03:50), 115 (02-20 @ 22:56), 116 (02-20 @ 16:16), 110 (02-20 @ 10:35), 111 (02-20 @ 03:47), 115 (02-19 @ 21:22), 115 (02-19 @ 18:01)  CO2: 20 (02-22 @ 05:05), 21 (02-21 @ 19:31), 20 (02-21 @ 09:57), 18 (02-21 @ 03:50), 17 (02-20 @ 22:56), 18 (02-20 @ 16:16), 18 (02-20 @ 10:35), 16 (02-20 @ 03:47), 16 (02-19 @ 21:22), 14 (02-19 @ 18:01)  BUN: 5 (02-22 @ 05:05), 5 (02-21 @ 19:31), 4 (02-21 @ 09:57), 3 (02-21 @ 03:50), 4 (02-20 @ 22:56), 5 (02-20 @ 16:16), 5 (02-20 @ 10:35), 5 (02-20 @ 03:47), 5 (02-19 @ 21:22), 5 (02-19 @ 18:01)  Cr: 0.38 (02-22 @ 05:05), 0.35 (02-21 @ 19:31), 0.33 (02-21 @ 09:57), 0.36 (02-21 @ 03:50), 0.38 (02-20 @ 22:56), 0.36 (02-20 @ 16:16), 0.35 (02-20 @ 10:35), 0.36 (02-20 @ 03:47), 0.34 (02-19 @ 21:22), 0.31 (02-19 @ 18:01)  Glu: 144(02-22 @ 05:05), 121(02-21 @ 19:31), 142(02-21 @ 09:57), 126(02-21 @ 03:50), 138(02-20 @ 22:56), 141(02-20 @ 16:16), 178(02-20 @ 10:35), 144(02-20 @ 03:47), 146(02-19 @ 21:22), 183(02-19 @ 18:01)    Hgb: 10.2 (02-22 @ 05:05), 10.4 (02-21 @ 03:50), 11.7 (02-20 @ 03:47)  Hct: 31.1 (02-22 @ 05:05), 31.5 (02-21 @ 03:50), 35.9 (02-20 @ 03:47)  WBC: 10.33 (02-22 @ 05:05), 9.34 (02-21 @ 03:50), 11.39 (02-20 @ 03:47)  Plt: 284 (02-22 @ 05:05), 249 (02-21 @ 03:50), 270 (02-20 @ 03:47)    INR:   PTT:       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  PBD 11 likely HTN   neuro checks q 1 hr  Hydrocephalus: Right frontal EVD now at 20  cm water, output in 24 hr  127 ml   , no ICP issues,  sleepy today probably due to fever, will give tylenol and re-examine, will hold off on EVD clamp   CT head tomorrow   Brain edema, hypertonic saline 3 % 50 cc/hr  , keep sodium in 140-150 (sodium 139), BMP q 8 hr , salt tablet 3 gr q 6 hr   CV : SBP goal   100-160 mmhg   HTN on losartan 100 mg daily , labetolol 100 mg q 8 hr , amlodipine 10 mg daily  , hydralazine 100 mg q  8 hr   a line   left IJ central line   sinus tachycardia , NS 1 L bolus , she is neg balance   Pulm:RA  carl PRN   GI: minced and moist diet , now NPO due to LOC   last BM  2/19   senna and miralax   add LFT , lipase   Renal: SIADH vs salt wasting   on salt tablets and hypertonic saline    florinef   NS 1 L bolus   straight cath and bladder scan as needed for urinary retention   ID febrile  pancultured on 2/21   enterococcus /sepsis  on linezolide 2/20 and ceftriaxone 2/19  blood cx and CSF no growth   urine cx 2/17 citrobacter and klebsiella   tylenol and ibuprofen PRN for fever   could also be central fever, she was started on bromocryptine   might consider CT gabriela abd pelvis to r/o abscess if she remains febrile   Endo:  target sugar 120-180   Hem: hgb and plt stable   UE and LE dopplers pending    lovenox 40 mg sc qhs     35 critical care time as patient is at risk for brain herniation ICP crisis, seizures, ICH         Assessment and Plan:    Nutritional Assessment:

## 2022-02-22 NOTE — PROGRESS NOTE ADULT - SUBJECTIVE AND OBJECTIVE BOX
HPI:  55 y/o female with PMHx of HTN, pre-DM presents transferred from [facility] for intracranial hemorrhage. As per [place] ED provider and son, patient called son around 7:30-8:00AM c/o severe headache and nausea. Son immediately rushed to her house and found her out of bed, moaning and covered in her own vomit. During transit, EMS reported SBP within 240s with intractable vomiting and given Zofran 8mg. Upon arrival to [facility] ED, GCS 7, SBP within 180s, patient was given Decadron 10mg, Keppra 1g, started on a Cardene drip, Propofol, Mannitol 1mg/kg. 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 Patient was intubated for airway support and transferred to [facility] for further management. ICH2, NIHSS 8.  Denies use of anticoagulants/antiplatelets.  (12 Feb 2022 16:36)        S/Overnight events:  TOSHA o/n. neuro at baseline. EVD at 78gsP3I, 3% kept at 25cc/hr       Hospital Course:   2/12: transferred from [place]. R frontal EVD and R radial a-line placed. pend CTH/CTA. s/p 1L bolus for elevated lactate.   2/13: S/p R frontal EVD placedment @64wsN8T draining well. O/n pt w/ episode of possible storming; tachy, HTN, agitated, temp 100.2F, given 2 versed and 50mcg fentanyl w/ improvement in symptoms Neuro exam stable. Pt remains intubated and sedated, AN L>R. Trend lactate and abg. Amlodipine 5mg QD started for SBP goal < 140, cardene dc'd.  EVD dropped 2/13 at 1pm. propanolol and fent standing added for neuro storming, propofol switched to precedex, with resultant hypotension. Propanolol dc'd, fent changed to prn and precedex off, 1 L bolus given, levo prn   2/14: TOSHA overnight. Patient remains on propofol. EVD open at 21bcS9I. ICPs WNL. Neuro exam stable. increased bowel regimen. started SQL. extubated on precedex. given 0.5 Ativan + Fentanyl pushes PRN for agitation. 500cc NS bolus.   2/15: BD#4.  On HFNC d/t desat to 88% on 70% non-rebreather. On 0.5 precedex   2/16: BD5, TOSHA overnight, on 4L NC, s/p vomiting yesterday, TF being held, formal S/S pending, off precedex. EVD open @ 5. Started on 3% at 30, tmax 100.7   2/17: BD# 6   tachycardic, PE protocol. cardene gtt. neuro unchanged. EVD @ 5cmH2O. 1g IV tylenol for 102.7 fever, duonebs standing to PRN. UA with reflux ordered. Hydralazine 25mg TID added, increased to 50q8. 500cc NS bolus x2. started on nystatin for thrush. started oxy 5q6 PRN for pain for tachycardia. repeat Na 146, decreased 3% @50, started salt tabs 2q6. FEES done. started minced and moist diet  2/18: BD 7. 3% Increased 75 cc/hr  2/19: BD8. TOSHA o/n neuro stable. 3%@75cc/hr. central line placed for vascular access  2/20: BD9. tmax 101.3 o/n, neuro stable. 3%@100cc/hr. cardene gtt restarted. Ibuprofen x1 given for fever, ok per Dr. D'Amico. Vanc d/c'd per ID (MRSA neg), treating UTI not bacteremia, likely contaminant. Linezolid started for suspected thrombophlebitis and GS + coverage.   2/21: BD 10 Overnight, IV tylenol given for headache. Right radial arterial line re-wired then discontinued. EVD clamped at 8AM. ICPs WNL. Neuro exam stable. Remains on 3% lowered to 75cc/hr. CTH stable, EVD raised to 20. Febrile 102 - CSF and blood cx sent.  2/22: TOSHA o/n. neuro at baseline. EVD at 75viI0Q, 3% kept at 25cc/hr        Vital Signs Last 24 Hrs  T(C): 37.1 (21 Feb 2022 21:31), Max: 38.9 (21 Feb 2022 14:00)  T(F): 98.8 (21 Feb 2022 21:31), Max: 102 (21 Feb 2022 14:00)  HR: 85 (21 Feb 2022 23:00) (71 - 99)  BP: 129/62 (21 Feb 2022 23:00) (122/67 - 165/76)  BP(mean): 89 (21 Feb 2022 23:00) (85 - 115)  RR: 16 (21 Feb 2022 23:00) (16 - 28)  SpO2: 98% (21 Feb 2022 23:00) (96% - 100%)    I&O's Detail    20 Feb 2022 07:01  -  21 Feb 2022 07:00  --------------------------------------------------------  IN:    IV PiggyBack: 550 mL    NiCARdipine: 100 mL    Oral Fluid: 1320 mL    sodium chloride 3%: 450 mL    sodium chloride 3%: 50 mL    sodium chloride 3%: 1600 mL  Total IN: 4070 mL    OUT:    External Ventricular Device (mL): 225 mL    Voided (mL): 3850 mL  Total OUT: 4075 mL    Total NET: -5 mL      21 Feb 2022 07:01  -  22 Feb 2022 00:07  --------------------------------------------------------  IN:    IV PiggyBack: 50 mL    Oral Fluid: 600 mL    Sodium Chloride 0.9% Bolus: 500 mL    sodium chloride 3%: 425 mL  Total IN: 1575 mL    OUT:    External Ventricular Device (mL): 74 mL    Voided (mL): 4100 mL  Total OUT: 4174 mL    Total NET: -2599 mL        I&O's Summary    20 Feb 2022 07:01  -  21 Feb 2022 07:00  --------------------------------------------------------  IN: 4070 mL / OUT: 4075 mL / NET: -5 mL    21 Feb 2022 07:01  -  22 Feb 2022 00:07  --------------------------------------------------------  IN: 1575 mL / OUT: 4174 mL / NET: -2599 mL        PHYSICAL EXAM:  AAO x 3 (self, hospital, month), OE spont, conversive, Mild R facial, pupils anisocoric, R 4mm and L 3 mm both briskly reactive, R tricep 4//5, Bic 2-3/5, HG 4/5, Delt 3/5, RLE 4-/5, L side spont and 5/5     DEVICE/DRAIN DRESSING:   EVD site c//di.     [x] antony (placed at [facility]) [2/12- 2/13]  [x] R radial a-line [2/12-2/20]  [ ] R frontal EVD [2/12- ]   [ ] L IJ [2/19- ]  [x] R radial a-line [2/20-2/21]       DIET:  [] NPO  x] Mechanical  [] Tube feeds    LABS:                        10.4   9.34  )-----------( 249      ( 21 Feb 2022 03:50 )             31.5     02-21    141  |  110<H>  |  5<L>  ----------------------------<  121<H>  3.5   |  21<L>  |  0.35<L>    Ca    8.7      21 Feb 2022 19:31  Phos  2.4     02-21  Mg     1.9     02-21              CAPILLARY BLOOD GLUCOSE          Drug Levels: [] N/A  Vancomycin Level, Trough: 4.0 ug/mL (02-20 @ 03:47)  Vancomycin Level, Trough: 4.0 ug/mL (02-19 @ 04:50)    CSF Analysis: [] N/A  RBC Count - Spinal Fluid: 86199 /uL (02-21 @ 14:45)  CSF Lymphocytes: 22 % (02-21 @ 14:45)  Glucose, CSF: 89 mg/dL (02-21 @ 14:45)  Protein, CSF: 27 mg/dL (02-21 @ 14:45)      Allergies    No Known Allergies    Intolerances      MEDICATIONS:  Antibiotics:  cefTRIAXone   IVPB      cefTRIAXone   IVPB 2000 milliGRAM(s) IV Intermittent every 24 hours  linezolid    Tablet 600 milliGRAM(s) Oral every 12 hours  nystatin    Suspension 837989 Unit(s) Oral three times a day    Neuro:  acetaminophen     Tablet .. 650 milliGRAM(s) Oral every 6 hours PRN  bromocriptine Capsule 5 milliGRAM(s) Oral every 8 hours  ibuprofen  Tablet. 400 milliGRAM(s) Oral every 8 hours PRN    Anticoagulation:  enoxaparin Injectable 40 milliGRAM(s) SubCutaneous every 24 hours    OTHER:  albuterol/ipratropium for Nebulization 3 milliLiter(s) Nebulizer every 6 hours PRN  amLODIPine   Tablet 10 milliGRAM(s) Oral daily  fludroCORTISONE 0.1 milliGRAM(s) Oral two times a day  hydrALAZINE 100 milliGRAM(s) Oral every 8 hours  influenza   Vaccine 0.5 milliLiter(s) IntraMuscular once  labetalol 100 milliGRAM(s) Oral every 8 hours  lactobacillus acidophilus 1 Tablet(s) Oral daily  lidocaine   4% Patch 1 Patch Transdermal daily  losartan 100 milliGRAM(s) Oral daily  polyethylene glycol 3350 17 Gram(s) Oral every 12 hours  senna 2 Tablet(s) Oral at bedtime    IVF:  sodium chloride 3 Gram(s) Oral every 6 hours  sodium chloride 3%. 500 milliLiter(s) IV Continuous <Continuous>    CULTURES:  Culture Results:   No growth at 2 days. (02-19 @ 17:10)  Culture Results:   No growth at 3 days. (02-18 @ 12:45)    RADIOLOGY & ADDITIONAL TESTS:      ASSESSMENT:  55 y/o female with PMHx of HTN, pre-DM w/ left thalamic parenchymal hematoma with extensive intraventricular hemorrhage. ICH score 2. NIHSS 18. s/p R frontal large bore EVD placement 2/12/22    HEAD BLEED    Handoff    No pertinent past medical history    Hypertension    Pre-diabetes    ICH (intracerebral hemorrhage)    HTN (hypertension)    Pre-diabetes    Insertion of intravenous catheter with ultrasound guidance    Insertion of intravenous catheter with ultrasound guidance    SysAdmin_VstLnk        PLAN:   NEURO:  - neuro/vital q1h  - R frontal large bore EVD @95vpl4P 2/21,trend ICP/output  - CT from 2/15 shows that R vent is now collapsed, however L vent still contains casted hemorrhagic clot, MLS is still unchanged at 4mm, with perihematomal edema (L thalamic)  - CTH 2/21: stable   - CTA 2/12: no vessel occlusion or aneurysm noted   - ibuprofen ok PRN for fever   - started bromocriptine 5mg q8h for ?central fever 2/21    Cardio:   - SBP goal 100-160  - cont. amlodipine 10mg QD, losartan 100mg, Hydralazine 100 mg TID, labetalol 100 q8h   - echo 2/14: significant for symmetric LVH with normal systolic function, EF: 65-70%   - Persistent tachycardia - s/p bolus ?hypovolemia, ?fever, ?pain    PULM:  - extubated 2/14, on RA  - duonebs PRN   - CT chest 2/17: Negative for PE, some consolidation   - IS  - CXR 2/20: clear     Renal:   - intermittent straight cath for retention   - cont bladder scan q6hr   - Na goal > 140  - 3% @ 25/hr, salt tabs 3 q6 and florinef 0.1mg BID    GI:  - Diet: minced & moist diet  - bowel regimen, last BM 2/19    HEME:  - SCDs/SQL for DVT ppx   - antixa level 2/17: 0.09, 2/18 0.23  - LE dopplers 2/13: neg for DVT   - Lower and upper extremitiy dopplers pending for multiple sites infiltration    ENDO:  - ISS  - prediabetic, a1c 5.8  - TSH wnl     ID:  - blood cx 2/16 +staph epidermidis  - urine cx 2/17 +citrobacter koseri and klebsiella  - ID following, vanco [2/18-2/20], ceftriaxone for UTI [2/19- ]  - linezolid for phlebilitis [2/20- ]  - MRSA negative    PSYCH:   -pt takes xanax at home, however tachycardia is not correlating with anxiety episodes. pt was persistently tachy after dilaudid when resting/sleeping.     Dispo: ICU status, full code, family updated with plan  -PT recs: AR    D/w Dr. D'Amico and Dr. Snow     HPI:  55 y/o female with PMHx of HTN, pre-DM presents transferred from [facility] for intracranial hemorrhage. As per [place] ED provider and son, patient called son around 7:30-8:00AM c/o severe headache and nausea. Son immediately rushed to her house and found her out of bed, moaning and covered in her own vomit. During transit, EMS reported SBP within 240s with intractable vomiting and given Zofran 8mg. Upon arrival to [place] ED, GCS 7, SBP within 180s, patient was given Decadron 10mg, Keppra 1g, started on a Cardene drip, Propofol, Mannitol 1mg/kg. 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 Patient was intubated for airway support and transferred to [facility] for further management. ICH2, NIHSS 8.  Denies use of anticoagulants/antiplatelets.  (12 Feb 2022 16:36)        S/Overnight events:  TOSHA o/n. neuro at baseline. EVD at 86hsI2Q, 3% kept at 25cc/hr       Hospital Course:   2/12: transferred from [place]. R frontal EVD and R radial a-line placed. pend CTH/CTA. s/p 1L bolus for elevated lactate.   2/13: S/p R frontal EVD placedment @22pyY4M draining well. O/n pt w/ episode of possible storming; tachy, HTN, agitated, temp 100.2F, given 2 versed and 50mcg fentanyl w/ improvement in symptoms Neuro exam stable. Pt remains intubated and sedated, AN L>R. Trend lactate and abg. Amlodipine 5mg QD started for SBP goal < 140, cardene dc'd.  EVD dropped 2/13 at 1pm. propanolol and fent standing added for neuro storming, propofol switched to precedex, with resultant hypotension. Propanolol dc'd, fent changed to prn and precedex off, 1 L bolus given, levo prn   2/14: TOSHA overnight. Patient remains on propofol. EVD open at 05jlS4P. ICPs WNL. Neuro exam stable. increased bowel regimen. started SQL. extubated on precedex. given 0.5 Ativan + Fentanyl pushes PRN for agitation. 500cc NS bolus.   2/15: BD#4.  On HFNC d/t desat to 88% on 70% non-rebreather. On 0.5 precedex   2/16: BD5, TOSHA overnight, on 4L NC, s/p vomiting yesterday, TF being held, formal S/S pending, off precedex. EVD open @ 5. Started on 3% at 30, tmax 100.7   2/17: BD# 6   tachycardic, PE protocol. cardene gtt. neuro unchanged. EVD @ 5cmH2O. 1g IV tylenol for 102.7 fever, duonebs standing to PRN. UA with reflux ordered. Hydralazine 25mg TID added, increased to 50q8. 500cc NS bolus x2. started on nystatin for thrush. started oxy 5q6 PRN for pain for tachycardia. repeat Na 146, decreased 3% @50, started salt tabs 2q6. FEES done. started minced and moist diet  2/18: BD 7. 3% Increased 75 cc/hr  2/19: BD8. TOSHA o/n neuro stable. 3%@75cc/hr. central line placed for vascular access  2/20: BD9. tmax 101.3 o/n, neuro stable. 3%@100cc/hr. cardene gtt restarted. Ibuprofen x1 given for fever, ok per Dr. D'Amico. Vanc d/c'd per ID (MRSA neg), treating UTI not bacteremia, likely contaminant. Linezolid started for suspected thrombophlebitis and GS + coverage.   2/21: BD 10 Overnight, IV tylenol given for headache. Right radial arterial line re-wired then discontinued. EVD clamped at 8AM. ICPs WNL. Neuro exam stable. Remains on 3% lowered to 75cc/hr. CTH stable, EVD raised to 20. Febrile 102 - CSF and blood cx sent.  2/22: TOSHA o/n. neuro at baseline. EVD at 09iaB7B, 3% kept at 25cc/hr        Vital Signs Last 24 Hrs  T(C): 37.1 (21 Feb 2022 21:31), Max: 38.9 (21 Feb 2022 14:00)  T(F): 98.8 (21 Feb 2022 21:31), Max: 102 (21 Feb 2022 14:00)  HR: 85 (21 Feb 2022 23:00) (71 - 99)  BP: 129/62 (21 Feb 2022 23:00) (122/67 - 165/76)  BP(mean): 89 (21 Feb 2022 23:00) (85 - 115)  RR: 16 (21 Feb 2022 23:00) (16 - 28)  SpO2: 98% (21 Feb 2022 23:00) (96% - 100%)    I&O's Detail    20 Feb 2022 07:01  -  21 Feb 2022 07:00  --------------------------------------------------------  IN:    IV PiggyBack: 550 mL    NiCARdipine: 100 mL    Oral Fluid: 1320 mL    sodium chloride 3%: 450 mL    sodium chloride 3%: 50 mL    sodium chloride 3%: 1600 mL  Total IN: 4070 mL    OUT:    External Ventricular Device (mL): 225 mL    Voided (mL): 3850 mL  Total OUT: 4075 mL    Total NET: -5 mL      21 Feb 2022 07:01  -  22 Feb 2022 00:07  --------------------------------------------------------  IN:    IV PiggyBack: 50 mL    Oral Fluid: 600 mL    Sodium Chloride 0.9% Bolus: 500 mL    sodium chloride 3%: 425 mL  Total IN: 1575 mL    OUT:    External Ventricular Device (mL): 74 mL    Voided (mL): 4100 mL  Total OUT: 4174 mL    Total NET: -2599 mL        I&O's Summary    20 Feb 2022 07:01  -  21 Feb 2022 07:00  --------------------------------------------------------  IN: 4070 mL / OUT: 4075 mL / NET: -5 mL    21 Feb 2022 07:01  -  22 Feb 2022 00:07  --------------------------------------------------------  IN: 1575 mL / OUT: 4174 mL / NET: -2599 mL        PHYSICAL EXAM:  AAO x 3 (self, hospital, month), OE spont, conversive, Mild R facial, pupils anisocoric, R 4mm and L 3 mm both briskly reactive, R tricep 4//5, Bic 2-3/5, HG 4/5, Delt 3/5, RLE 4-/5, L side spont and 5/5     DEVICE/DRAIN DRESSING:   EVD site c//di.     [x] antony (placed at [place]) [2/12- 2/13]  [x] R radial a-line [2/12-2/20]  [ ] R frontal EVD [2/12- ]   [ ] L IJ [2/19- ]  [x] R radial a-line [2/20-2/21]       DIET:  [] NPO  x] Mechanical  [] Tube feeds    LABS:                        10.4   9.34  )-----------( 249      ( 21 Feb 2022 03:50 )             31.5     02-21    141  |  110<H>  |  5<L>  ----------------------------<  121<H>  3.5   |  21<L>  |  0.35<L>    Ca    8.7      21 Feb 2022 19:31  Phos  2.4     02-21  Mg     1.9     02-21              CAPILLARY BLOOD GLUCOSE          Drug Levels: [] N/A  Vancomycin Level, Trough: 4.0 ug/mL (02-20 @ 03:47)  Vancomycin Level, Trough: 4.0 ug/mL (02-19 @ 04:50)    CSF Analysis: [] N/A  RBC Count - Spinal Fluid: 54230 /uL (02-21 @ 14:45)  CSF Lymphocytes: 22 % (02-21 @ 14:45)  Glucose, CSF: 89 mg/dL (02-21 @ 14:45)  Protein, CSF: 27 mg/dL (02-21 @ 14:45)      Allergies    No Known Allergies    Intolerances      MEDICATIONS:  Antibiotics:  cefTRIAXone   IVPB      cefTRIAXone   IVPB 2000 milliGRAM(s) IV Intermittent every 24 hours  linezolid    Tablet 600 milliGRAM(s) Oral every 12 hours  nystatin    Suspension 019394 Unit(s) Oral three times a day    Neuro:  acetaminophen     Tablet .. 650 milliGRAM(s) Oral every 6 hours PRN  bromocriptine Capsule 5 milliGRAM(s) Oral every 8 hours  ibuprofen  Tablet. 400 milliGRAM(s) Oral every 8 hours PRN    Anticoagulation:  enoxaparin Injectable 40 milliGRAM(s) SubCutaneous every 24 hours    OTHER:  albuterol/ipratropium for Nebulization 3 milliLiter(s) Nebulizer every 6 hours PRN  amLODIPine   Tablet 10 milliGRAM(s) Oral daily  fludroCORTISONE 0.1 milliGRAM(s) Oral two times a day  hydrALAZINE 100 milliGRAM(s) Oral every 8 hours  influenza   Vaccine 0.5 milliLiter(s) IntraMuscular once  labetalol 100 milliGRAM(s) Oral every 8 hours  lactobacillus acidophilus 1 Tablet(s) Oral daily  lidocaine   4% Patch 1 Patch Transdermal daily  losartan 100 milliGRAM(s) Oral daily  polyethylene glycol 3350 17 Gram(s) Oral every 12 hours  senna 2 Tablet(s) Oral at bedtime    IVF:  sodium chloride 3 Gram(s) Oral every 6 hours  sodium chloride 3%. 500 milliLiter(s) IV Continuous <Continuous>    CULTURES:  Culture Results:   No growth at 2 days. (02-19 @ 17:10)  Culture Results:   No growth at 3 days. (02-18 @ 12:45)    RADIOLOGY & ADDITIONAL TESTS:      ASSESSMENT:  55 y/o female with PMHx of HTN, pre-DM w/ left thalamic parenchymal hematoma with extensive intraventricular hemorrhage. ICH score 2. NIHSS 18. s/p R frontal large bore EVD placement 2/12/22    HEAD BLEED    Handoff    No pertinent past medical history    Hypertension    Pre-diabetes    ICH (intracerebral hemorrhage)    HTN (hypertension)    Pre-diabetes    Insertion of intravenous catheter with ultrasound guidance    Insertion of intravenous catheter with ultrasound guidance    SysAdmin_VstLnk        PLAN:   NEURO:  - neuro/vital q1h  - R frontal large bore EVD @25bsu4Z 2/21,trend ICP/output  - CT from 2/15 shows that R vent is now collapsed, however L vent still contains casted hemorrhagic clot, MLS is still unchanged at 4mm, with perihematomal edema (L thalamic)  - CTH 2/21: stable   - CTA 2/12: no vessel occlusion or aneurysm noted   - ibuprofen ok PRN for fever   - started bromocriptine 5mg q8h for possible central fever 2/21    Cardio:   - SBP goal 100-160  - cont. amlodipine 10mg QD, losartan 100mg, Hydralazine 100 mg TID, labetalol 100 q8h   - echo 2/14: significant for symmetric LVH with normal systolic function, EF: 65-70%   - Persistent tachycardia - s/p bolus ?hypovolemia, ?fever, ?pain: now improved     PULM:  - extubated 2/14, on RA  - duonebs PRN   - IS  - CXR 2/20: clear; CT chest 2/17: Negative for PE, some consolidation     Renal:   - intermittent straight cath for retention   - cont bladder scan q6hr   - Na goal > 140  - 3% @ 25/hr, salt tabs 3 q6 and florinef 0.1mg BID    GI:  - Diet: minced & moist diet  - bowel regimen, last BM 2/19    HEME:  - SCDs/SQL for DVT ppx   - antixa level 2/17: 0.09, 2/18 0.23  - LE dopplers 2/13: neg for DVT   - Lower and upper extremitiy dopplers pending for multiple sites infiltration    ENDO:  - ISS  - prediabetic, a1c 5.8  - TSH wnl     ID:  - blood cx 2/16 +staph epidermidis  - urine cx 2/17 +citrobacter koseri and klebsiella  - ID following, vanco [2/18-2/20], ceftriaxone for UTI [2/19- ]  - linezolid for phlebilitis [2/20- ]  - MRSA negative  - avoid oxycodone while on linezolid  - f/u pan cx 2/21       PSYCH:   -pt takes xanax at home, however tachycardia is not correlating with anxiety episodes. pt was persistently tachy after dilaudid when resting/sleeping.     Dispo: ICU status, full code, family updated with plan  -PT recs: AR    D/w Dr. D'Amico and Dr. Snow

## 2022-02-22 NOTE — PROGRESS NOTE ADULT - SUBJECTIVE AND OBJECTIVE BOX
INTERVAL HPI/OVERNIGHT EVENTS:  Tm 101.4.    ROS:  Limited - lethargic No HA, CP, SOB, Abd pain      ANTIBIOTICS/RELEVANT:  Linezolid 600 mg po q12h (2/20-present)  Ceftriaxone 2 g IV q24h (2/19-present)    Cefazolin 2/12    Vital Signs Last 24 Hrs  T(C): 37.5 (22 Feb 2022 14:11), Max: 38.6 (22 Feb 2022 08:53)  T(F): 99.5 (22 Feb 2022 14:11), Max: 101.4 (22 Feb 2022 08:53)  HR: 80 (22 Feb 2022 14:00) (78 - 93)  BP: 142/67 (22 Feb 2022 16:00) (114/55 - 164/77)  BP(mean): 96 (22 Feb 2022 16:00) (79 - 115)  RR: 20 (22 Feb 2022 16:00) (16 - 29)  SpO2: 100% (22 Feb 2022 16:00) (94% - 100%)    PHYSICAL EXAM:  Constitutional:  Lethargic, O X 2 (person, place;  year 20's[age])  Head:  R EVD with bloody CSF  Eyes:  Sclerae anicteric, conjunctivae clear, PERRL  Ear/Nose/Throat:  No nasal exudate or sinus tenderness;  No buccal mucosal lesions, no pharyngeal erythema or exudate	  Neck: LIJ TLC Supple, no adenopathy  Respiratory:  Clear bilaterally  Cardiovascular:  RRR, S1S2, no murmur appreciated  Gastrointestinal:  Symmetric, normoactive BS, soft, NT, no masses, guarding or rebound.  No HSM  :  Primafit in place  Extremities:  No edema.  L forearm with palpable tender cord, no overlying erythema      LABS:                        10.2   10.33 )-----------( 284      ( 22 Feb 2022 05:05 )             31.1         02-22    140  |  110<H>  |  4<L>  ----------------------------<  124<H>  3.6   |  20<L>  |  0.35<L>    Ca    8.3<L>      22 Feb 2022 12:27  Phos  2.5     02-22  Mg     2.0     02-22    TPro  6.4  /  Alb  3.3  /  TBili  0.5  /  DBili  <0.2  /  AST  35  /  ALT  40  /  AlkPhos  73  02-22    Lactate, CSF: 3.7 mmol/L (02.22.22 @ 00:23)      MICROBIOLOGY:  Blood cultures:  2/16 X 1 - CNS in anaerobic bottle, Enterococcus detected by PCR, not growth;  2/18 X 2 - NGTD;  2/19 X1 - NGTD;  2/21 X 1 - NGTD    Urine culture:  2/17:  Klebsiella pneumoniae and Citrobacter koseri    CSF:  2/18 - NGTD;  2/21 - NGTD      RADIOLOGY & ADDITIONAL STUDIES:  CTH 2/21:  < from: CT Head No Cont (02.21.22 @ 09:37) >  FINDINGS:    Right transfrontal EVD remains in place. The ventricles are similar, if   not slightly improved, in size. Specifically, the left temporal horn is   less conspicuous. Cast of hemorrhage in the left lateral ventricle is   similar if not slightly contracted, as is hemorrhage centered at the left   thalamus measuring about 2.3 cm in maximum. There is a similar degree of   midline shift but no downward herniation. Cerebral tonsils are low-lying,   stable from 2012. No CT evidence of recent ischemic infarct. No   extraaxial fluid collection. Bone window images show no fracture.      IMPRESSION:    Stable to slightly improved ventricular size since 2/18/22 with EVD in   place. Evolving left thalamic IPH with IVH; no new hemorrhage or   worsening mass effect.    < end of copied text >      < from: Xray Chest 1 View- PORTABLE-Urgent (Xray Chest 1 View- PORTABLE-Urgent .) (02.19.22 @ 17:11) >  INTERPRETATION:  Clinical History: Central line placement    Frontal examination of the chest demonstrates left internal jugular line   noted with tip overlying junction superior vena cava and right atrium.   The heart is within normal limits in transverse diameter. No acute   infiltrates. Visualized osseous structures are within normal limits.    IMPRESSION: No acute infiltrates    < end of copied text >

## 2022-02-23 LAB
ANION GAP SERPL CALC-SCNC: 11 MMOL/L — SIGNIFICANT CHANGE UP (ref 5–17)
BUN SERPL-MCNC: 4 MG/DL — LOW (ref 7–23)
BUN SERPL-MCNC: 5 MG/DL — LOW (ref 7–23)
BUN SERPL-MCNC: 5 MG/DL — LOW (ref 7–23)
CALCIUM SERPL-MCNC: 8.7 MG/DL — SIGNIFICANT CHANGE UP (ref 8.4–10.5)
CALCIUM SERPL-MCNC: 9.2 MG/DL — SIGNIFICANT CHANGE UP (ref 8.4–10.5)
CALCIUM SERPL-MCNC: 9.2 MG/DL — SIGNIFICANT CHANGE UP (ref 8.4–10.5)
CHLORIDE SERPL-SCNC: 107 MMOL/L — SIGNIFICANT CHANGE UP (ref 96–108)
CHLORIDE SERPL-SCNC: 109 MMOL/L — HIGH (ref 96–108)
CHLORIDE SERPL-SCNC: 112 MMOL/L — HIGH (ref 96–108)
CO2 SERPL-SCNC: 19 MMOL/L — LOW (ref 22–31)
CREAT SERPL-MCNC: 0.28 MG/DL — LOW (ref 0.5–1.3)
CREAT SERPL-MCNC: 0.35 MG/DL — LOW (ref 0.5–1.3)
CREAT SERPL-MCNC: 0.37 MG/DL — LOW (ref 0.5–1.3)
CULTURE RESULTS: SIGNIFICANT CHANGE UP
CULTURE RESULTS: SIGNIFICANT CHANGE UP
GLUCOSE SERPL-MCNC: 117 MG/DL — HIGH (ref 70–99)
GLUCOSE SERPL-MCNC: 138 MG/DL — HIGH (ref 70–99)
GLUCOSE SERPL-MCNC: 148 MG/DL — HIGH (ref 70–99)
HCT VFR BLD CALC: 31.2 % — LOW (ref 34.5–45)
HGB BLD-MCNC: 10.3 G/DL — LOW (ref 11.5–15.5)
MAGNESIUM SERPL-MCNC: 1.9 MG/DL — SIGNIFICANT CHANGE UP (ref 1.6–2.6)
MCHC RBC-ENTMCNC: 30.7 PG — SIGNIFICANT CHANGE UP (ref 27–34)
MCHC RBC-ENTMCNC: 33 GM/DL — SIGNIFICANT CHANGE UP (ref 32–36)
MCV RBC AUTO: 93.1 FL — SIGNIFICANT CHANGE UP (ref 80–100)
NRBC # BLD: 0 /100 WBCS — SIGNIFICANT CHANGE UP (ref 0–0)
PHOSPHATE SERPL-MCNC: 2.3 MG/DL — LOW (ref 2.5–4.5)
PLATELET # BLD AUTO: 296 K/UL — SIGNIFICANT CHANGE UP (ref 150–400)
POTASSIUM SERPL-MCNC: 3.5 MMOL/L — SIGNIFICANT CHANGE UP (ref 3.5–5.3)
POTASSIUM SERPL-MCNC: 3.8 MMOL/L — SIGNIFICANT CHANGE UP (ref 3.5–5.3)
POTASSIUM SERPL-MCNC: 4 MMOL/L — SIGNIFICANT CHANGE UP (ref 3.5–5.3)
POTASSIUM SERPL-SCNC: 3.5 MMOL/L — SIGNIFICANT CHANGE UP (ref 3.5–5.3)
POTASSIUM SERPL-SCNC: 3.8 MMOL/L — SIGNIFICANT CHANGE UP (ref 3.5–5.3)
POTASSIUM SERPL-SCNC: 4 MMOL/L — SIGNIFICANT CHANGE UP (ref 3.5–5.3)
RBC # BLD: 3.35 M/UL — LOW (ref 3.8–5.2)
RBC # FLD: 12.3 % — SIGNIFICANT CHANGE UP (ref 10.3–14.5)
SODIUM SERPL-SCNC: 137 MMOL/L — SIGNIFICANT CHANGE UP (ref 135–145)
SODIUM SERPL-SCNC: 139 MMOL/L — SIGNIFICANT CHANGE UP (ref 135–145)
SODIUM SERPL-SCNC: 142 MMOL/L — SIGNIFICANT CHANGE UP (ref 135–145)
SPECIMEN SOURCE: SIGNIFICANT CHANGE UP
SPECIMEN SOURCE: SIGNIFICANT CHANGE UP
WBC # BLD: 9.71 K/UL — SIGNIFICANT CHANGE UP (ref 3.8–10.5)
WBC # FLD AUTO: 9.71 K/UL — SIGNIFICANT CHANGE UP (ref 3.8–10.5)

## 2022-02-23 PROCEDURE — 99232 SBSQ HOSP IP/OBS MODERATE 35: CPT

## 2022-02-23 PROCEDURE — 99291 CRITICAL CARE FIRST HOUR: CPT

## 2022-02-23 PROCEDURE — 99233 SBSQ HOSP IP/OBS HIGH 50: CPT

## 2022-02-23 PROCEDURE — 93970 EXTREMITY STUDY: CPT | Mod: 26,59

## 2022-02-23 RX ORDER — BROMOCRIPTINE MESYLATE 5 MG/1
10 CAPSULE ORAL EVERY 8 HOURS
Refills: 0 | Status: DISCONTINUED | OUTPATIENT
Start: 2022-02-23 | End: 2022-02-25

## 2022-02-23 RX ORDER — TRAMADOL HYDROCHLORIDE 50 MG/1
25 TABLET ORAL EVERY 4 HOURS
Refills: 0 | Status: DISCONTINUED | OUTPATIENT
Start: 2022-02-23 | End: 2022-02-23

## 2022-02-23 RX ORDER — SODIUM CHLORIDE 5 G/100ML
500 INJECTION, SOLUTION INTRAVENOUS
Refills: 0 | Status: DISCONTINUED | OUTPATIENT
Start: 2022-02-23 | End: 2022-02-23

## 2022-02-23 RX ORDER — SODIUM CHLORIDE 5 G/100ML
500 INJECTION, SOLUTION INTRAVENOUS
Refills: 0 | Status: DISCONTINUED | OUTPATIENT
Start: 2022-02-23 | End: 2022-02-24

## 2022-02-23 RX ORDER — POTASSIUM PHOSPHATE, MONOBASIC POTASSIUM PHOSPHATE, DIBASIC 236; 224 MG/ML; MG/ML
30 INJECTION, SOLUTION INTRAVENOUS ONCE
Refills: 0 | Status: COMPLETED | OUTPATIENT
Start: 2022-02-23 | End: 2022-02-23

## 2022-02-23 RX ORDER — POTASSIUM CHLORIDE 20 MEQ
40 PACKET (EA) ORAL ONCE
Refills: 0 | Status: COMPLETED | OUTPATIENT
Start: 2022-02-23 | End: 2022-02-23

## 2022-02-23 RX ORDER — FLUDROCORTISONE ACETATE 0.1 MG/1
0.1 TABLET ORAL EVERY 12 HOURS
Refills: 0 | Status: DISCONTINUED | OUTPATIENT
Start: 2022-02-23 | End: 2022-02-24

## 2022-02-23 RX ADMIN — Medication 100 MILLIGRAM(S): at 21:12

## 2022-02-23 RX ADMIN — Medication 650 MILLIGRAM(S): at 17:20

## 2022-02-23 RX ADMIN — POLYETHYLENE GLYCOL 3350 17 GRAM(S): 17 POWDER, FOR SOLUTION ORAL at 17:23

## 2022-02-23 RX ADMIN — Medication 40 MILLIEQUIVALENT(S): at 11:56

## 2022-02-23 RX ADMIN — AMLODIPINE BESYLATE 10 MILLIGRAM(S): 2.5 TABLET ORAL at 05:08

## 2022-02-23 RX ADMIN — CEFEPIME 100 MILLIGRAM(S): 1 INJECTION, POWDER, FOR SOLUTION INTRAMUSCULAR; INTRAVENOUS at 13:54

## 2022-02-23 RX ADMIN — LIDOCAINE 1 PATCH: 4 CREAM TOPICAL at 00:40

## 2022-02-23 RX ADMIN — CEFEPIME 100 MILLIGRAM(S): 1 INJECTION, POWDER, FOR SOLUTION INTRAMUSCULAR; INTRAVENOUS at 05:07

## 2022-02-23 RX ADMIN — BROMOCRIPTINE MESYLATE 10 MILLIGRAM(S): 5 CAPSULE ORAL at 21:13

## 2022-02-23 RX ADMIN — SENNA PLUS 2 TABLET(S): 8.6 TABLET ORAL at 21:13

## 2022-02-23 RX ADMIN — CEFEPIME 100 MILLIGRAM(S): 1 INJECTION, POWDER, FOR SOLUTION INTRAMUSCULAR; INTRAVENOUS at 21:14

## 2022-02-23 RX ADMIN — Medication 650 MILLIGRAM(S): at 19:09

## 2022-02-23 RX ADMIN — Medication 100 MILLIGRAM(S): at 13:52

## 2022-02-23 RX ADMIN — SODIUM CHLORIDE 3 GRAM(S): 9 INJECTION INTRAMUSCULAR; INTRAVENOUS; SUBCUTANEOUS at 23:21

## 2022-02-23 RX ADMIN — FLUDROCORTISONE ACETATE 0.1 MILLIGRAM(S): 0.1 TABLET ORAL at 21:13

## 2022-02-23 RX ADMIN — Medication 100 MILLIGRAM(S): at 05:08

## 2022-02-23 RX ADMIN — LIDOCAINE 1 PATCH: 4 CREAM TOPICAL at 11:55

## 2022-02-23 RX ADMIN — SODIUM CHLORIDE 3 GRAM(S): 9 INJECTION INTRAMUSCULAR; INTRAVENOUS; SUBCUTANEOUS at 17:23

## 2022-02-23 RX ADMIN — FLUDROCORTISONE ACETATE 0.1 MILLIGRAM(S): 0.1 TABLET ORAL at 05:07

## 2022-02-23 RX ADMIN — Medication 400 MILLIGRAM(S): at 05:16

## 2022-02-23 RX ADMIN — BROMOCRIPTINE MESYLATE 10 MILLIGRAM(S): 5 CAPSULE ORAL at 13:54

## 2022-02-23 RX ADMIN — Medication 100 MILLIGRAM(S): at 13:56

## 2022-02-23 RX ADMIN — LOSARTAN POTASSIUM 100 MILLIGRAM(S): 100 TABLET, FILM COATED ORAL at 05:08

## 2022-02-23 RX ADMIN — BROMOCRIPTINE MESYLATE 5 MILLIGRAM(S): 5 CAPSULE ORAL at 05:16

## 2022-02-23 RX ADMIN — Medication 1 TABLET(S): at 11:57

## 2022-02-23 RX ADMIN — LIDOCAINE 1 PATCH: 4 CREAM TOPICAL at 19:09

## 2022-02-23 RX ADMIN — TRAMADOL HYDROCHLORIDE 25 MILLIGRAM(S): 50 TABLET ORAL at 19:09

## 2022-02-23 RX ADMIN — ENOXAPARIN SODIUM 40 MILLIGRAM(S): 100 INJECTION SUBCUTANEOUS at 21:12

## 2022-02-23 RX ADMIN — POLYETHYLENE GLYCOL 3350 17 GRAM(S): 17 POWDER, FOR SOLUTION ORAL at 05:03

## 2022-02-23 RX ADMIN — Medication 10 MILLIGRAM(S): at 11:56

## 2022-02-23 RX ADMIN — Medication 100 MILLIGRAM(S): at 05:07

## 2022-02-23 RX ADMIN — SODIUM CHLORIDE 3 GRAM(S): 9 INJECTION INTRAMUSCULAR; INTRAVENOUS; SUBCUTANEOUS at 05:08

## 2022-02-23 RX ADMIN — LINEZOLID 600 MILLIGRAM(S): 600 INJECTION, SOLUTION INTRAVENOUS at 05:08

## 2022-02-23 RX ADMIN — Medication 100 MILLIGRAM(S): at 21:13

## 2022-02-23 RX ADMIN — Medication 400 MILLIGRAM(S): at 05:03

## 2022-02-23 RX ADMIN — LINEZOLID 600 MILLIGRAM(S): 600 INJECTION, SOLUTION INTRAVENOUS at 17:23

## 2022-02-23 RX ADMIN — POTASSIUM PHOSPHATE, MONOBASIC POTASSIUM PHOSPHATE, DIBASIC 83.33 MILLIMOLE(S): 236; 224 INJECTION, SOLUTION INTRAVENOUS at 07:29

## 2022-02-23 RX ADMIN — SODIUM CHLORIDE 3 GRAM(S): 9 INJECTION INTRAMUSCULAR; INTRAVENOUS; SUBCUTANEOUS at 11:56

## 2022-02-23 RX ADMIN — TRAMADOL HYDROCHLORIDE 25 MILLIGRAM(S): 50 TABLET ORAL at 17:48

## 2022-02-23 NOTE — PROGRESS NOTE ADULT - ASSESSMENT
ASSESSMENT/PLAN: L thalamic bleed with extensive IVH and 5mm L to R MLS.  Likely hypertensive.  BD 12 ICH score 2    NEURO:  Q1 neurochecks  Hydrocephalus; EVD@ 5cmH2O. Monitor ICP/output. ICPs wnl.  Repeat CT head 2/18: Stable vent size- decreased blood in R ventricle   Analgesia: Tylenol/ oxycodone  Stroke core measures (hold statin for now given bleed). Stroke neurology following.  Febrile encephalopathy: See ID. Add ibuprofen.   Activity: [] mobilize as tolerated [] Bedrest [x] PT [x] OT [] PMNR    PULM:  CT PE protocol neg PE. Does have bilateral consolidation ?atelectasis.  Weaned from HFNC -> currently on room air.   Encourage incentive spirometry   CXR clear 2/19 clear.     CV: Tachycardia- likely related to fever/ bacteremia.   SBP goal 100-160  HTN: On cardene: Wean with amlodipine 10mg (new med) losartan 100mg (home med), hydralazine 100mg tid, labetalol.   TTE with bubble study EF 65-70%. Repeat TTE in setting of bacteremia.  TLC in place    RENAL:  Fluids: 3% @100ml/hr. Salt tabs 3g Q6  Na 140- 150. Follow up BMPs Q6.   Primafit. Is/Os. Supplement lytes    GI:   Diet: Minced and moist  GI prophylaxis [x] not indicated [] PPI [] other:  Bowel regimen [] colace [x] senna [] other.   LBM: 2/19  LFTs wnl    ENDO: Hyperlipidemia, prediabetes.  Goal euglycemia (-180)  A1c 5.8, TSH 0.39    HEME/ONC:  CBC Stable  VTE prophylaxis: [x] SCDs [x] Lovenox. Anti Xa level 0.23  LE dopplers neg on 2/13.   CT PE study: negative  Bilateral upper extremity and lower extremity dopplers pending    ID: Febrile. Enterococcus and coag neg staph bacteremia noted from cultures 2/16. SOURCE UNCLEAR.   Repeat blood cultures sent 2/18 (thus far neg). Repeat blood cultures again on 2/19- follow up  Urine culture: Citrobacter and Klebsiella  ID following. On vancomycin (increased to 1500mg bid) and ceftriaxone 2g Q24 hours. Monitor trough 2/20 5:00pm.   Follow up CSF culture 2/18 (thus far NGTD)  MRSA nares neg    MISC:  Warm compresses for areas of localised phlebitis. Avoid venipuncture.     SOCIAL/FAMILY:  [x] awaiting [] updated  [] family meeting    CODE STATUS:  [x] Full Code [] DNR [] DNI [] Palliative/Comfort Care    DISPOSITION:  [x] ICU [] Stroke Unit [] Floor [] EMU [] RCU [] PCU    [x] Patient is at high risk of neurologic deterioration/death due to: hydrocephalus, hemorrhage, ICU delirium, sepsis/shock, bacteremia, VTE.     Time spent: 40 critical care minutes.       ASSESSMENT/PLAN: L thalamic bleed with extensive IVH and 5mm L to R MLS.  Likely hypertensive.  BD 12 ICH score 2    NEURO:  Q1 neurochecks  Hydrocephalus; EVD@ 5cmH2O. Monitor ICP/output. ICPs wnl. Exam appears drain dependent, would not challenge today   Analgesia: Tylenol/ oxycodone  Stroke core measures (hold statin for now given bleed). Stroke neurology following.  Febrile encephalopathy: See ID. Add ibuprofen.   increase bromocriptine   Activity: [] mobilize as tolerated [] Bedrest [x] PT [x] OT [] PMNR    PULM:  CT PE protocol neg PE. Does have bilateral consolidation ?atelectasis.  Weaned from HFNC -> currently on room air, stable  Encourage incentive spirometry     CV:  SBP goal 100-160  HTN: continue amlodipine 10mg (new med) losartan 100mg (home med), hydralazine 100mg tid, labetalol; off nicadripine gtt  TTE with bubble study EF 65-70%. Repeat TTE in setting of bacteremia.  TLC in place    RENAL:  Fluids: 3% @50cc/hr  Na 140- 150 goal  Primafit. Is/Os. Supplement lytes    GI:   Diet: Minced and moist  GI prophylaxis [x] not indicated [] PPI [] other:  Bowel regimen [] colace [x] senna [] other.   LBM: 2/19  LFTs wnl    ENDO: Hyperlipidemia, prediabetes.  Goal euglycemia (-180)  A1c 5.8, TSH 0.39    HEME/ONC:  CBC Stable  VTE prophylaxis: [x] SCDs [x] Lovenox. Anti Xa level recheck tonight  LE below the knee DVT 2/23; repeat dopplers 2/28   UE superficial vein thrombosis   CT PE study: negative    ID: Febrile--likely due to DVT/superficial vein thromboses vs. central fever vs. infectious   Enterococcus and coag neg staph bacteremia noted from cultures 2/16. SOURCE UNCLEAR.   Repeat blood cultures sent 2/18, 2/19, 2/21 negative today   Urine culture: Citrobacter and Klebsiella  ID following. On cefepime and linezolid; f/u galium scan today   MRSA nares neg    MISC:  Warm compresses for areas of localised phlebitis. Avoid venipuncture.     CODE STATUS:  [x] Full Code [] DNR [] DNI [] Palliative/Comfort Care    DISPOSITION:  [x] ICU [] Stroke Unit [] Floor [] EMU [] RCU [] PCU    [x] Patient is at high risk of neurologic deterioration/death due to: hydrocephalus requiring CSF diversion, cerebral hemorrhage, ICU delirium, bacteremia, VTE.

## 2022-02-23 NOTE — PROGRESS NOTE ADULT - SUBJECTIVE AND OBJECTIVE BOX
HPI:  57 y/o female with PMHx of HTN, pre-DM presents transferred from [facility] for intracranial hemorrhage. As per [place] ED provider and son, patient called son around 7:30-8:00AM c/o severe headache and nausea. Son immediately rushed to her house and found her out of bed, moaning and covered in her own vomit. During transit, EMS reported SBP within 240s with intractable vomiting and given Zofran 8mg. Upon arrival to [place] ED, GCS 7, SBP within 180s, patient was given Decadron 10mg, Keppra 1g, started on a Cardene drip, Propofol, Mannitol 1mg/kg. 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 Patient was intubated for airway support and transferred to [facility] for further management. ICH2, NIHSS 8.  Denies use of anticoagulants/antiplatelets.  (12 Feb 2022 16:36)    Hospital Course:  2/12: transferred from [place]. R frontal EVD and R radial a-line placed. pend CTH/CTA. s/p 1L bolus for elevated lactate.   2/13: S/p R frontal EVD placedment @[id number] draining well. O/n pt w/ episode of possible storming; tachy, HTN, agitated, temp 100.2F, given 2 versed and 50mcg fentanyl w/ improvement in symptoms Neuro exam stable. Pt remains intubated and sedated, AN L>R. Trend lactate and abg. Amlodipine 5mg QD started for SBP goal < 140, cardene dc'd.  EVD dropped 2/13 at 1pm. propanolol and fent standing added for neuro storming, propofol switched to precedex, with resultant hypotension. Propanolol dc'd, fent changed to prn and precedex off, 1 L bolus given, levo prn   2/14: TOSHA overnight. Patient remains on propofol. EVD open at [id number]. ICPs WNL. Neuro exam stable. increased bowel regimen. started SQL. extubated on precedex. given 0.5 Ativan + Fentanyl pushes PRN for agitation. 500cc NS bolus.   2/15: BD#4.  On HFNC d/t desat to 88% on 70% non-rebreather. On 0.5 precedex   2/16: BD5, TOSHA overnight, on 4L NC, s/p vomiting yesterday, TF being held, formal S/S pending, off precedex. EVD open @ 5. Started on 3% at 30, tmax 100.7   2/17: BD# 6   tachycardic, PE protocol. cardene gtt. neuro unchanged. EVD @ 5cmH2O. 1g IV tylenol for 102.7 fever, duonebs standing to PRN. UA with reflux ordered. Hydralazine 25mg TID added, increased to 50q8. 500cc NS bolus x2. started on nystatin for thrush. started oxy 5q6 PRN for pain for tachycardia. repeat Na 146, decreased 3% @50, started salt tabs 2q6. FEES done. started minced and moist diet  2/18: BD 7. 3% Increased 75 cc/hr  2/19: BD8. TOSHA o/n neuro stable. 3%@75cc/hr. central line placed for vascular access  2/20: BD9. tmax 101.3 o/n, neuro stable. 3%@100cc/hr. cardene gtt restarted. Ibuprofen x1 given for fever, ok per Dr. D'Amico. Vanc d/c'd per ID (MRSA neg), treating UTI not bacteremia, likely contaminant. Linezolid started for suspected thrombophlebitis and GS + coverage.   2/21: BD#10 Overnight, IV tylenol given for headache. Right radial arterial line re-wired then discontinued. EVD clamped at 8AM. ICPs WNL. Neuro exam stable. Remains on 3% lowered to 75cc/hr. CTH stable, EVD raised to 20. Febrile 102 - CSF and blood cx sent. EVD lowered from 20 to 5 to drain more CSF and blood, increased lethargy. Ceftriaxone changed to cefepime per ID for broader coverage.   2/22: BD#11 TOSHA o/n. neuro at baseline. EVD at [id number], 3% kept at 25cc/hr   2/23: BD#12 TOSHA overnight. Remain on 3% saline. EVD kg0xpF1V. ICPs WNL. Neuro exam stable.    Vital Signs Last 24 Hrs  T(C): 37.9 (23 Feb 2022 00:00), Max: 38.6 (22 Feb 2022 08:53)  T(F): 100.3 (23 Feb 2022 00:00), Max: 101.4 (22 Feb 2022 08:53)  HR: 85 (23 Feb 2022 00:00) (80 - 93)  BP: 155/79 (23 Feb 2022 00:00) (114/55 - 164/77)  BP(mean): 110 (23 Feb 2022 00:00) (79 - 115)  RR: 30 (23 Feb 2022 00:00) (19 - 38)  SpO2: 92% (23 Feb 2022 00:00) (91% - 100%)    I&O's Summary    21 Feb 2022 07:01  -  22 Feb 2022 07:00  --------------------------------------------------------  IN: 2230 mL / OUT: 5527 mL / NET: -3297 mL    22 Feb 2022 07:01  -  23 Feb 2022 00:57  --------------------------------------------------------  IN: 2440 mL / OUT: 2076 mL / NET: 364 mL    PHYSICAL EXAM:  GEN: laying in bed, appears well, NAD  NEURO: AOx2 (self/place). FC, OE spont, speech intact, face symmetric. CNII-XII intact. PERRL, EOMI. LUE/LLE 5/5. RUE delts/triceps 4-/5, biceps 2-3/5, HG 4/5. RLE 2/5 HF otherwise 4-/5 throughout  CV: RRR +S1/S2  PULM: CTAB  GI: Abd soft, NT/ND  EXT: ext warm, dry, nontender. b/l UE with multiple areas of erythema/induration from IV infiltration (improving)  Scalp EVD site C/D/I    TUBES/LINES:  [] Haley  [] Lumbar Drain  [] Wound Drains  [x] R frontal EVD open at 5  [x ] L IJ CVC    DIET:  [] NPO  [x] Mechanical  [] Tube feeds    LABS:                        10.2   10.33 )-----------( 284      ( 22 Feb 2022 05:05 )             31.1     02-22    137  |  109<H>  |  6<L>  ----------------------------<  138<H>  3.9   |  18<L>  |  0.38<L>    Ca    8.6      22 Feb 2022 21:14  Phos  2.5     02-22  Mg     2.0     02-22    TPro  6.4  /  Alb  3.3  /  TBili  0.5  /  DBili  <0.2  /  AST  35  /  ALT  40  /  AlkPhos  73  02-22            CAPILLARY BLOOD GLUCOSE          Drug Levels: [] N/A  Vancomycin Level, Trough: 4.0 ug/mL (02-20 @ 03:47)  Vancomycin Level, Trough: 4.0 ug/mL (02-19 @ 04:50)    CSF Analysis: [] N/A      Allergies    No Known Allergies    Intolerances      MEDICATIONS:  Antibiotics:  cefepime   IVPB 2000 milliGRAM(s) IV Intermittent every 8 hours  linezolid    Tablet 600 milliGRAM(s) Oral every 12 hours    Neuro:  acetaminophen     Tablet .. 650 milliGRAM(s) Oral every 6 hours PRN  bromocriptine Capsule 5 milliGRAM(s) Oral every 8 hours  ibuprofen  Tablet. 400 milliGRAM(s) Oral every 8 hours PRN    Anticoagulation:  enoxaparin Injectable 40 milliGRAM(s) SubCutaneous every 24 hours    OTHER:  albuterol/ipratropium for Nebulization 3 milliLiter(s) Nebulizer every 6 hours PRN  amLODIPine   Tablet 10 milliGRAM(s) Oral daily  fludroCORTISONE 0.1 milliGRAM(s) Oral two times a day  hydrALAZINE 100 milliGRAM(s) Oral every 8 hours  influenza   Vaccine 0.5 milliLiter(s) IntraMuscular once  labetalol 100 milliGRAM(s) Oral every 8 hours  lactobacillus acidophilus 1 Tablet(s) Oral daily  lidocaine   4% Patch 1 Patch Transdermal daily  losartan 100 milliGRAM(s) Oral daily  polyethylene glycol 3350 17 Gram(s) Oral every 12 hours  senna 2 Tablet(s) Oral at bedtime    IVF:  sodium chloride 3 Gram(s) Oral every 6 hours  sodium chloride 3%. 500 milliLiter(s) IV Continuous <Continuous>    CULTURES:  Culture Results:   No growth to date (02-21 @ 15:05)  Culture Results:   No growth at 1 day. (02-21 @ 14:53)    RADIOLOGY & ADDITIONAL TESTS:      ASSESSMENT:  Patient 57 y/o female with PMHx of HTN, pre-DM w/ left thalamic parenchymal hematoma with extensive intraventricular hemorrhage. ICH score 2. NIHSS 18. s/p R frontal large bore EVD placement 2/12/22      HEAD BLEED    Handoff    No pertinent past medical history    Hypertension    Pre-diabetes    ICH (intracerebral hemorrhage)    HTN (hypertension)    Pre-diabetes    Insertion of intravenous catheter with ultrasound guidance    Insertion of intravenous catheter with ultrasound guidance    SysAdmin_[email or web address]      PLAN:   NEURO:  - neuro/vital q1h  - R frontal large bore EVD @5cmH2O 2/21,trend ICP/output  - CT from 2/15 shows that R vent is now collapsed, however L vent still contains casted hemorrhagic clot, MLS is still unchanged at 4mm, with perihematomal edema (L thalamic)  - CTH 2/21 stable   - CTA 2/12: no vessel occlusion or aneurysm noted   - ibuprofen ok PRN for fever   - started bromocriptine 5mg q8h for possible central fever 2/21    Cardio:   - SBP goal 100-160  - cont. amlodipine 10mg QD, losartan 100mg, Hydralazine 100 mg TID, labetalol 100 q8h   - echo 2/14: significant for symmetric LVH with normal systolic function, EF: 65-70%   - Persistent tachycardia - s/p bolus ?hypovolemia, ?fever, ?pain: now improved     PULM:  - extubated 2/14, on RA  - duonebs PRN   - IS  - CXR 2/20: clear; CT chest 2/17: Negative for PE, some consolidation     Renal:   - intermittent straight cath for retention   - cont bladder scan q6hr   - Na goal > 140  - 3% @ 70/hr, salt tabs 3 q6 and florinef 0.1mg BID    GI:  - Diet: minced & moist diet  - bowel regimen, last BM 2/19    HEME:  - SCDs/SQL for DVT ppx   - antixa level 2/17: 0.09, 2/18 0.23  - LE dopplers 2/13: neg for DVT   - Lower and upper extremitiy dopplers pending for multiple sites infiltration    ENDO:  - ISS  - prediabetic, a1c 5.8  - TSH wnl     ID:  - blood cx 2/16 +staph epidermidis  - urine cx 2/17 +citrobacter koseri and klebsiella  - ID following, vanco [2/18-2/20], ceftriaxone for UTI [2/19-22], cefepime [2/22- ]  - linezolid for phlebilitis [2/20- ]  - MRSA negative    PSYCH:   -pt takes xanax at home    Dispo: ICU status, full code, family updated with plan  PT recs: [facility]    D/w Dr. D'Amico and Dr. Guzman

## 2022-02-23 NOTE — PROGRESS NOTE ADULT - SUBJECTIVE AND OBJECTIVE BOX
INTERVAL HPI/OVERNIGHT EVENTS:  Tm 101.4    CONSTITUTIONAL:  No fever, chills, night sweats  EYES:  No photophobia or visual changes  CARDIOVASCULAR:  No chest pain  RESPIRATORY:  No cough, wheezing, or SOB   GASTROINTESTINAL:  No nausea, vomiting, diarrhea, constipation, or abdominal pain  GENITOURINARY:  No frequency, urgency, dysuria or hematuria  NEUROLOGIC:  No headache, lightheadedness      ANTIBIOTICS/RELEVANT:    Linezolid 600 mg po q12h (2/20-present)  Cefepime 2 g IV q8h (2/22-present)    Cefazolin 2/12  Ceftriaxone 2/19-2/22      Vital Signs Last 24 Hrs  T(C): 38.4 (23 Feb 2022 17:00), Max: 38.6 (23 Feb 2022 05:00)  T(F): 101.2 (23 Feb 2022 17:00), Max: 101.4 (23 Feb 2022 05:00)  HR: 97 (23 Feb 2022 18:00) (72 - 97)  BP: 152/79 (23 Feb 2022 18:00) (140/76 - 164/78)  BP(mean): 109 (23 Feb 2022 18:00) (101 - 114)  RR: 30 (23 Feb 2022 18:00) (20 - 38)  SpO2: 97% (23 Feb 2022 18:00) (91% - 99%)    PHYSICAL EXAM:  Constitutional:  more alert  Head:  R EVD with bloody CSF  Eyes:  Sclerae anicteric, conjunctivae clear, PERRL  Ear/Nose/Throat:  No nasal exudate or sinus tenderness;  No buccal mucosal lesions, no pharyngeal erythema or exudate	  Neck: LIJ TLC Supple, no adenopathy  Respiratory:  Clear bilaterally  Cardiovascular:  RRR, S1S2, no murmur appreciated  Gastrointestinal:  Symmetric, normoactive BS, soft, NT, no masses, guarding or rebound.  No HSM  :  Primafit in place  Extremities:  No edema.  L forearm with palpable cord, no longer tender, no overlying erythema      LABS:                        10.3   9.71  )-----------( 296      ( 23 Feb 2022 04:02 )             31.2         02-23    139  |  109<H>  |  4<L>  ----------------------------<  148<H>  4.0   |  19<L>  |  0.28<L>    Ca    9.2      23 Feb 2022 16:54  Phos  2.3     02-23  Mg     1.9     02-23    TPro  6.4  /  Alb  3.3  /  TBili  0.5  /  DBili  <0.2  /  AST  35  /  ALT  40  /  AlkPhos  73  02-22          MICROBIOLOGY:    Blood cultures:  2/16 X 1 - CNS in anaerobic bottle, Enterococcus detected by PCR, not growth;  2/18 X 2 - NG;  2/19 X1 - NGTD;  2/21 X 1 - NGTD    Urine culture:  2/17:  Klebsiella pneumoniae and Citrobacter koseri    CSF:  2/18 - NGTD;  2/21 - NGTD    MRSA/MSSA PCR 2/18 - ND    RADIOLOGY & ADDITIONAL STUDIES:  < from: US Duplex Venous Lower Ext Complete, Bilateral (02.23.22 @ 11:16) >  IMPRESSION:  Deep vein thrombosis of a right intramuscular calf vein.  No DVT above the knees.    < end of copied text >    < from: VA Duplex Upper Ext Vein Scan, Bilat (02.23.22 @ 11:43) >  IMPRESSION:  Superficial venous thrombosis of the right cephalic vein in the forearm   and the left cephalic vein extending from the arm through the forearm. No   deep vein thrombosis.    < end of copied text >

## 2022-02-23 NOTE — PROGRESS NOTE ADULT - SUBJECTIVE AND OBJECTIVE BOX
HPI:  57 y/o F PMH of HTN, pre-DM presents transferred from [facility] for intracranial hemorrhage. As per [place] ED provider and son, patient called son around 7:30-8:00AM c/o severe headache and nausea. Son immediately rushed to her house and found her out of bed, moaning and covered emesis. During transit, EMS reported SBP within 240s with intractable vomiting and given Zofran 8mg. Upon arrival to [place] ED, GCS 7, SBP within 180s, patient was given Decadron 10mg, Keppra 1g, started on a Cardene drip, Propofol, Mannitol 1mg/kg. 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 Patient was intubated for airway support and transferred to [facility] for further management. ICH2, NIHSS 8.  Denies use of anticoagulants/antiplatelets. (12 Feb 2022 16:36).    On admission, the patient was:  GCS: 8  Hunt-Rachel:  modified Duval:  ICH score:2    Hospital Course:   2/12: Transferred from [place]. R frontal EVD emergently. High pressure. Elevated lactate, bolused. Not following commands.   2/13: Pt remains intubated and sedated. Moving all extremities though not following commands. Concern for storming. Hypotensive on precedex.  2/14: Spontaneous eye opening. Agitated. On propofol- weaning. SAT/SBT. Goal to extubate. EVD decreased to 5cmH2O. Extubated to HFNC  2/15: Anisocoria noted. CT head stable (trapped L vent). Na goal increased 145- 155.  2/17: Persistent tachycardia HR 120s. CT/PE protocol done. Prelim neg. Febrile today 102.7F. CSF and urine cx sent.  2/18: CT head stable. Neuroexam continues to improve. GPC in clusters in blood cultures. Repeat cultures sent. Afebrile.   2/19: Febrile with chills. Tmax 39F. Neuroexam stable. Sites of IV shows induration/ erythema/edema. Central line. DC peripherals. Urine culture: Klebsiella and citrobacter. Ceftriaxone ordered.   2/20: Tmax 38.8C  2/22: febrile, antibiotics broadened     EVD was raised to [id number], now back down to 5cmH2O due to exam change    REVIEW OF SYSTEMS: [] Unable to Assess due to neurologic exam   [x ] All ROS addressed below are non-contributory, except:  Neuro: [ ] Headache [ ] Back pain [ ] Numbness [ ] Weakness [ ] Ataxia [ ] Dizziness [ ] Aphasia [ ] Dysarthria [ ] Visual disturbance  Resp: [ ] Shortness of breath/dyspnea [ ] Orthopnea [ ] Cough  CV: [ ] Chest pain [ ] Palpitation [ ] Lightheadedness [ ] Syncope  Renal: [ ] Thirst [ ] Edema  GI: [ ] Nausea [ ] Emesis [ ] Abdominal pain [ ] Constipation [ ] Diarrhea  Hem: [ ] Hematemesis [ ] bBright red blood per rectum  ID: [ ] Fever [ ] Chills [ ] Dysuria  ENT: [ ] Rhinorrhea    ICU Vital Signs Last 24 Hrs reviewed    General:  HEENT: MMM  Neuro: Drowsy but arousable, oriented to person and hospital, knows month- did not know year. Follows commands, R pupil 3mm, L pupil 2mm both briskly reactive  EOMI, face symmetric, RUE bicep 3/5, tricep 4/5. RLE 4-/5. Left side full strength.   Sensation intact in all 4 extremities   Cards:  RRR  Respiratory: Clear, no wheeze.  Abdomen: Soft, non tender  Extremities: Localized, multiple areas of thrombophlebitis on B/L upper extremities.   LIF TLC  R frontal EVD at 5cmH2O    MEDICATIONS: reviewed  LABS: reviewed                           HPI:  57 y/o F PMH of HTN, pre-DM presents transferred from [facility] for intracranial hemorrhage. As per [place] ED provider and son, patient called son around 7:30-8:00AM c/o severe headache and nausea. Son immediately rushed to her house and found her out of bed, moaning and covered emesis. During transit, EMS reported SBP within 240s with intractable vomiting and given Zofran 8mg. Upon arrival to [place] ED, GCS 7, SBP within 180s, patient was given Decadron 10mg, Keppra 1g, started on a Cardene drip, Propofol, Mannitol 1mg/kg. 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 Patient was intubated for airway support and transferred to [facility] for further management. ICH2, NIHSS 8.  Denies use of anticoagulants/antiplatelets. (12 Feb 2022 16:36).    On admission, the patient was:  GCS: 8  Hunt-Rachel:  modified Duval:  ICH score:2    Hospital Course:   2/12: Transferred from [place]. R frontal EVD emergently. High pressure. Elevated lactate, bolused. Not following commands.   2/13: Pt remains intubated and sedated. Moving all extremities though not following commands. Concern for storming. Hypotensive on precedex.  2/14: Spontaneous eye opening. Agitated. On propofol- weaning. SAT/SBT. Goal to extubate. EVD decreased to 5cmH2O. Extubated to HFNC  2/15: Anisocoria noted. CT head stable (trapped L vent). Na goal increased 145- 155.  2/17: Persistent tachycardia HR 120s. CT/PE protocol done. Prelim neg. Febrile today 102.7F. CSF and urine cx sent.  2/18: CT head stable. Neuroexam continues to improve. GPC in clusters in blood cultures. Repeat cultures sent. Afebrile.   2/19: Febrile with chills. Tmax 39F. Neuroexam stable. Sites of IV shows induration/ erythema/edema. Central line. DC peripherals. Urine culture: Klebsiella and citrobacter. Ceftriaxone ordered.   2/20: Tmax 38.8C  2/22: febrile, antibiotics broadened     EVD was raised to [id number], now back down to 5cmH2O due to exam change    REVIEW OF SYSTEMS: [] Unable to Assess due to neurologic exam   [x ] All ROS addressed below are non-contributory, except:  Neuro: [ ] Headache [ ] Back pain [ ] Numbness [ ] Weakness [ ] Ataxia [ ] Dizziness [ ] Aphasia [ ] Dysarthria [ ] Visual disturbance  Resp: [ ] Shortness of breath/dyspnea [ ] Orthopnea [ ] Cough  CV: [ ] Chest pain [ ] Palpitation [ ] Lightheadedness [ ] Syncope  Renal: [ ] Thirst [ ] Edema  GI: [ ] Nausea [ ] Emesis [ ] Abdominal pain [ ] Constipation [ ] Diarrhea  Hem: [ ] Hematemesis [ ] bBright red blood per rectum  ID: [ ] Fever [ ] Chills [ ] Dysuria  ENT: [ ] Rhinorrhea    ICU Vital Signs Last 24 Hrs reviewed    General:  HEENT: MMM  Neuro: Drowsy but arousable, oriented to person and hospital. Follows commands, R pupil 3mm, L pupil 2mm both briskly reactive  EOMI, face symmetric, RUE bicep 4-/5, tricep 3/5. RLE 4-/5. Left side full strength.   Sensation intact in all 4 extremities   Cards:  RRR  Respiratory: Clear, no wheeze.  Abdomen: Soft, non tender  Extremities: Localized, multiple areas of thrombophlebitis on B/L upper extremities.   LIF TLC  R frontal EVD at 5cmH2O    MEDICATIONS: reviewed  LABS: reviewed

## 2022-02-23 NOTE — PROGRESS NOTE ADULT - ASSESSMENT
57 yo F with HTN with L thalamic bleed (likely hypertensive) with fever, etiology unclear.  Initial single blood culture with CNS in 1/2 bottles, Enterococcus detected by PCR but did not grow - ?error.  Blood cultures from 2/18 NG  - and were obtained prior to antibiotics.  She has been on linezolid for thrombophlebitis, ceftriaxone for urine culture with Klebsiella and Citrobacter.  Had had urinary retention but no urinary symptoms at time culture was obtained.  Source of ongoing fever -  Infectious vs.  noninfectious.  She has R calf DVT and has been on bromocriptine for possible central fever since 2/21.  Suggest:  - Continue to f/u blood cultures from 2/19 and 2/21  - F/U gallium scan  - Continue linezolid 600 mg po q12h  - Continue cefepime 2 g IV q8h  Will follow with you - team 2.

## 2022-02-24 LAB
ANION GAP SERPL CALC-SCNC: 11 MMOL/L — SIGNIFICANT CHANGE UP (ref 5–17)
ANION GAP SERPL CALC-SCNC: 12 MMOL/L — SIGNIFICANT CHANGE UP (ref 5–17)
ANION GAP SERPL CALC-SCNC: 13 MMOL/L — SIGNIFICANT CHANGE UP (ref 5–17)
BLD GP AB SCN SERPL QL: NEGATIVE — SIGNIFICANT CHANGE UP
BUN SERPL-MCNC: 3 MG/DL — LOW (ref 7–23)
BUN SERPL-MCNC: 4 MG/DL — LOW (ref 7–23)
BUN SERPL-MCNC: 5 MG/DL — LOW (ref 7–23)
CALCIUM SERPL-MCNC: 8.9 MG/DL — SIGNIFICANT CHANGE UP (ref 8.4–10.5)
CALCIUM SERPL-MCNC: 9.2 MG/DL — SIGNIFICANT CHANGE UP (ref 8.4–10.5)
CALCIUM SERPL-MCNC: 9.3 MG/DL — SIGNIFICANT CHANGE UP (ref 8.4–10.5)
CHLORIDE SERPL-SCNC: 106 MMOL/L — SIGNIFICANT CHANGE UP (ref 96–108)
CHLORIDE SERPL-SCNC: 109 MMOL/L — HIGH (ref 96–108)
CHLORIDE SERPL-SCNC: 110 MMOL/L — HIGH (ref 96–108)
CO2 SERPL-SCNC: 19 MMOL/L — LOW (ref 22–31)
CO2 SERPL-SCNC: 19 MMOL/L — LOW (ref 22–31)
CO2 SERPL-SCNC: 20 MMOL/L — LOW (ref 22–31)
CREAT SERPL-MCNC: 0.29 MG/DL — LOW (ref 0.5–1.3)
CREAT SERPL-MCNC: 0.32 MG/DL — LOW (ref 0.5–1.3)
CREAT SERPL-MCNC: 0.34 MG/DL — LOW (ref 0.5–1.3)
CULTURE RESULTS: SIGNIFICANT CHANGE UP
GLUCOSE SERPL-MCNC: 122 MG/DL — HIGH (ref 70–99)
GLUCOSE SERPL-MCNC: 136 MG/DL — HIGH (ref 70–99)
GLUCOSE SERPL-MCNC: 153 MG/DL — HIGH (ref 70–99)
HCT VFR BLD CALC: 33.3 % — LOW (ref 34.5–45)
HGB BLD-MCNC: 11 G/DL — LOW (ref 11.5–15.5)
LMWH PPP CHRO-ACNC: 0.21 IU/ML — LOW (ref 0.5–1.1)
MAGNESIUM SERPL-MCNC: 2 MG/DL — SIGNIFICANT CHANGE UP (ref 1.6–2.6)
MCHC RBC-ENTMCNC: 30.9 PG — SIGNIFICANT CHANGE UP (ref 27–34)
MCHC RBC-ENTMCNC: 33 GM/DL — SIGNIFICANT CHANGE UP (ref 32–36)
MCV RBC AUTO: 93.5 FL — SIGNIFICANT CHANGE UP (ref 80–100)
NRBC # BLD: 0 /100 WBCS — SIGNIFICANT CHANGE UP (ref 0–0)
PHOSPHATE SERPL-MCNC: 2.6 MG/DL — SIGNIFICANT CHANGE UP (ref 2.5–4.5)
PLATELET # BLD AUTO: 323 K/UL — SIGNIFICANT CHANGE UP (ref 150–400)
POTASSIUM SERPL-MCNC: 3.5 MMOL/L — SIGNIFICANT CHANGE UP (ref 3.5–5.3)
POTASSIUM SERPL-SCNC: 3.5 MMOL/L — SIGNIFICANT CHANGE UP (ref 3.5–5.3)
PROCALCITONIN SERPL-MCNC: 0.06 NG/ML — SIGNIFICANT CHANGE UP (ref 0.02–0.1)
RBC # BLD: 3.56 M/UL — LOW (ref 3.8–5.2)
RBC # FLD: 12.4 % — SIGNIFICANT CHANGE UP (ref 10.3–14.5)
RH IG SCN BLD-IMP: POSITIVE — SIGNIFICANT CHANGE UP
SARS-COV-2 RNA SPEC QL NAA+PROBE: SIGNIFICANT CHANGE UP
SODIUM SERPL-SCNC: 137 MMOL/L — SIGNIFICANT CHANGE UP (ref 135–145)
SODIUM SERPL-SCNC: 141 MMOL/L — SIGNIFICANT CHANGE UP (ref 135–145)
SODIUM SERPL-SCNC: 141 MMOL/L — SIGNIFICANT CHANGE UP (ref 135–145)
SPECIMEN SOURCE: SIGNIFICANT CHANGE UP
WBC # BLD: 9.96 K/UL — SIGNIFICANT CHANGE UP (ref 3.8–10.5)
WBC # FLD AUTO: 9.96 K/UL — SIGNIFICANT CHANGE UP (ref 3.8–10.5)

## 2022-02-24 PROCEDURE — 99233 SBSQ HOSP IP/OBS HIGH 50: CPT

## 2022-02-24 PROCEDURE — 78802 RP LOCLZJ TUM WHBDY 1 D IMG: CPT | Mod: 26

## 2022-02-24 PROCEDURE — 70450 CT HEAD/BRAIN W/O DYE: CPT | Mod: 26

## 2022-02-24 PROCEDURE — 99291 CRITICAL CARE FIRST HOUR: CPT

## 2022-02-24 PROCEDURE — 99232 SBSQ HOSP IP/OBS MODERATE 35: CPT

## 2022-02-24 RX ORDER — CHLORHEXIDINE GLUCONATE 213 G/1000ML
1 SOLUTION TOPICAL
Refills: 0 | Status: COMPLETED | OUTPATIENT
Start: 2022-02-25 | End: 2022-02-28

## 2022-02-24 RX ORDER — POVIDONE-IODINE 5 %
1 AEROSOL (ML) TOPICAL ONCE
Refills: 0 | Status: COMPLETED | OUTPATIENT
Start: 2022-02-25 | End: 2022-02-25

## 2022-02-24 RX ORDER — FLUDROCORTISONE ACETATE 0.1 MG/1
0.2 TABLET ORAL EVERY 12 HOURS
Refills: 0 | Status: DISCONTINUED | OUTPATIENT
Start: 2022-02-24 | End: 2022-03-03

## 2022-02-24 RX ORDER — SODIUM CHLORIDE 5 G/100ML
500 INJECTION, SOLUTION INTRAVENOUS
Refills: 0 | Status: DISCONTINUED | OUTPATIENT
Start: 2022-02-24 | End: 2022-02-26

## 2022-02-24 RX ORDER — ENOXAPARIN SODIUM 100 MG/ML
40 INJECTION SUBCUTANEOUS AT BEDTIME
Refills: 0 | Status: DISCONTINUED | OUTPATIENT
Start: 2022-02-24 | End: 2022-02-24

## 2022-02-24 RX ORDER — POTASSIUM CHLORIDE 20 MEQ
40 PACKET (EA) ORAL ONCE
Refills: 0 | Status: COMPLETED | OUTPATIENT
Start: 2022-02-24 | End: 2022-02-24

## 2022-02-24 RX ADMIN — LOSARTAN POTASSIUM 100 MILLIGRAM(S): 100 TABLET, FILM COATED ORAL at 05:01

## 2022-02-24 RX ADMIN — Medication 400 MILLIGRAM(S): at 05:02

## 2022-02-24 RX ADMIN — POLYETHYLENE GLYCOL 3350 17 GRAM(S): 17 POWDER, FOR SOLUTION ORAL at 17:44

## 2022-02-24 RX ADMIN — Medication 100 MILLIGRAM(S): at 05:01

## 2022-02-24 RX ADMIN — Medication 40 MILLIEQUIVALENT(S): at 17:43

## 2022-02-24 RX ADMIN — BROMOCRIPTINE MESYLATE 10 MILLIGRAM(S): 5 CAPSULE ORAL at 13:06

## 2022-02-24 RX ADMIN — SENNA PLUS 2 TABLET(S): 8.6 TABLET ORAL at 23:01

## 2022-02-24 RX ADMIN — Medication 100 MILLIGRAM(S): at 23:01

## 2022-02-24 RX ADMIN — LINEZOLID 600 MILLIGRAM(S): 600 INJECTION, SOLUTION INTRAVENOUS at 05:02

## 2022-02-24 RX ADMIN — CEFEPIME 100 MILLIGRAM(S): 1 INJECTION, POWDER, FOR SOLUTION INTRAMUSCULAR; INTRAVENOUS at 23:00

## 2022-02-24 RX ADMIN — SODIUM CHLORIDE 3 GRAM(S): 9 INJECTION INTRAMUSCULAR; INTRAVENOUS; SUBCUTANEOUS at 17:43

## 2022-02-24 RX ADMIN — AMLODIPINE BESYLATE 10 MILLIGRAM(S): 2.5 TABLET ORAL at 05:01

## 2022-02-24 RX ADMIN — CEFEPIME 100 MILLIGRAM(S): 1 INJECTION, POWDER, FOR SOLUTION INTRAMUSCULAR; INTRAVENOUS at 05:00

## 2022-02-24 RX ADMIN — LIDOCAINE 1 PATCH: 4 CREAM TOPICAL at 00:08

## 2022-02-24 RX ADMIN — Medication 650 MILLIGRAM(S): at 19:01

## 2022-02-24 RX ADMIN — BROMOCRIPTINE MESYLATE 10 MILLIGRAM(S): 5 CAPSULE ORAL at 23:02

## 2022-02-24 RX ADMIN — SODIUM CHLORIDE 3 GRAM(S): 9 INJECTION INTRAMUSCULAR; INTRAVENOUS; SUBCUTANEOUS at 13:06

## 2022-02-24 RX ADMIN — Medication 1 TABLET(S): at 13:06

## 2022-02-24 RX ADMIN — Medication 100 MILLIGRAM(S): at 13:06

## 2022-02-24 RX ADMIN — CEFEPIME 100 MILLIGRAM(S): 1 INJECTION, POWDER, FOR SOLUTION INTRAMUSCULAR; INTRAVENOUS at 13:42

## 2022-02-24 RX ADMIN — Medication 650 MILLIGRAM(S): at 19:30

## 2022-02-24 RX ADMIN — BROMOCRIPTINE MESYLATE 10 MILLIGRAM(S): 5 CAPSULE ORAL at 05:00

## 2022-02-24 RX ADMIN — LINEZOLID 600 MILLIGRAM(S): 600 INJECTION, SOLUTION INTRAVENOUS at 17:43

## 2022-02-24 RX ADMIN — FLUDROCORTISONE ACETATE 0.2 MILLIGRAM(S): 0.1 TABLET ORAL at 17:43

## 2022-02-24 RX ADMIN — POLYETHYLENE GLYCOL 3350 17 GRAM(S): 17 POWDER, FOR SOLUTION ORAL at 05:00

## 2022-02-24 RX ADMIN — FLUDROCORTISONE ACETATE 0.1 MILLIGRAM(S): 0.1 TABLET ORAL at 05:02

## 2022-02-24 RX ADMIN — SODIUM CHLORIDE 3 GRAM(S): 9 INJECTION INTRAMUSCULAR; INTRAVENOUS; SUBCUTANEOUS at 05:01

## 2022-02-24 RX ADMIN — SODIUM CHLORIDE 3 GRAM(S): 9 INJECTION INTRAMUSCULAR; INTRAVENOUS; SUBCUTANEOUS at 23:02

## 2022-02-24 RX ADMIN — Medication 400 MILLIGRAM(S): at 05:00

## 2022-02-24 NOTE — PROGRESS NOTE ADULT - ASSESSMENT
57 yo F with HTN with L thalamic bleed (likely hypertensive) with fever, etiology unclear.  Initial single blood culture with CNS in 1/2 bottles, Enterococcus detected by PCR but did not grow - ?error.  Blood cultures from 2/18 NG  - and were obtained prior to antibiotics.  She has been on linezolid for thrombophlebitis, ceftriaxone for urine culture with Klebsiella and Citrobacter.  Had had urinary retention but no urinary symptoms at time culture was obtained.  Source of ongoing fever -  Infectious vs.  noninfectious.  She has R calf DVT and has been on bromocriptine for possible central fever since 2/21.  Suggest:  - Continue to f/u blood cultures from 2/19 and 2/21   55 yo F with HTN with L thalamic bleed (likely hypertensive) with fever, etiology unclear.  Initial single blood culture with CNS in 1/2 bottles, Enterococcus detected by PCR but did not grow - ?error.  Blood cultures from 2/18 NG  - and were obtained prior to antibiotics.  She has been on linezolid for thrombophlebitis, ceftriaxone for urine culture with Klebsiella and Citrobacter.  Had had urinary retention but no urinary symptoms at time culture was obtained.  Source of ongoing fever -  Infectious vs.  noninfectious.  She has R calf DVT and has been on bromocriptine for possible central fever since 2/21.  Gallium scan with some increased uptake in bilateral gluteal regions.  Per RN, skin is intact without erythema.  No other focus of infection seen.  Suggest:  - Continue to f/u blood cultures from 2/19 and 2/21  - Continue linezolid 600 mg po q12h - will consider d/c tomorrow.  - Continue cefepime 2 g IV q8h for now.  - No contra-indication to VPS from ID perspective.  Recommendations discussed with primary team.  Will follow with you - team 2.

## 2022-02-24 NOTE — PROVIDER CONTACT NOTE (OTHER) - ASSESSMENT
L pupil 4mm, brisk. R pupil 5 mm, brisk.    AOx2. Alert  LUE: No drift. RUE: Drift. LLE: Drift. RLE: Some effort to gravity.

## 2022-02-24 NOTE — PROGRESS NOTE ADULT - ASSESSMENT
56y/F with  No pertinent past medical history    Hypertension    Pre-diabetes        PLAN:   NEURO:  REHAB:  physical therapy evaluation and management    EARLY MOB:      PULM:  Room air, incentive spirometry  CARDIO:  SBP goal 100-150mm Hg  ENDO:  Blood sugar goals 140-180 mg/dL, continue insulin sliding scale  GI:  PPI for GI prophylaxis  DIET:  RENAL:    HEM/ONC:  VTE Prophylaxis:     ID: afebrile, no leukocytosis  ====================   T(F): , Max: 101.2 (02-23-22 @ 17:00)    WBC Count: 9.96 (02-24)  WBC Count: 9.71 (02-23)    ====================  Social: will update family    ATTENDING ATTESTATION:  I was physically present for the key portions of the evaluation and management (E/M) service provided.  I agree with the above history, physical and plan, which I have reviewed and edited where appropriate.    Patient at high risk for neurological deterioration or death due to:  ICU delirium, aspiration PNA, DVT / PE.  Critical care time:  I have personally provided 45 minutes of critical care time, excluding time spent on separate procedures.      Plan discussed with RN, house staff. 56y/F with  No pertinent past medical history    Hypertension    Pre-diabetes        PLAN:   NEURO: neurochecks q1h, PRN pain meds with acetaminophen  EVD @ 5cm H20 open to drain monitor ICPs, f/u final schedule for VPS; needs ID clearance  CT head today  REHAB:  physical therapy evaluation and management    EARLY MOB:      PULM:  Room air, incentive spirometry  CARDIO:  SBP goal 100-160mm Hg; cont amlodpine, hydralazine, labetalol, losartan  ENDO:  Blood sugar goals 140-180   GI:  bowel regimen  DIET: continue diet  RENAL:  Na goal 135-145, continue 3%@75cc/hr, BMP this afternoon, increase fludrocortisone to 0.2mg BID, continue salt tabs   HEM/ONC: Hb stable  VTE Prophylaxis: SCDs, SQL  ID: febrile, no leukocytosis; bromocriptine for central fever, continue cefepime (02/22 -   ) linezolid (02/20-  ) for now - as per ID, f/u repeat gallium scan; recheck PCT, panculture q3d whie fever  Social: will update family    ATTENDING ATTESTATION:  I was physically present for the key portions of the evaluation and management (E/M) service provided.  I agree with the above history, physical and plan, which I have reviewed and edited where appropriate.    Patient at high risk for neurological deterioration or death due to:  ICU delirium, aspiration PNA, DVT / PE.  Critical care time:  I have personally provided 45 minutes of critical care time, excluding time spent on separate procedures.      Plan discussed with RN, house staff. 56y/F with  ICH L thalamus, brain compression, cerebral edema  hydrocephalus, obstructive  Hypertension, prediabetes  superficial venous thrombosis R cephalic vein, L cephalic vein    PLAN:   NEURO: neurochecks q1h, PRN pain meds with acetaminophen  EVD @ 5cm H20 open to drain monitor ICPs, f/u final schedule for VPS; needs ID clearance  CT head today  REHAB:  physical therapy evaluation and management    EARLY MOB:      PULM:  Room air, incentive spirometry  CARDIO:  SBP goal 100-160mm Hg; cont amlodpine, hydralazine, labetalol, losartan  ENDO:  Blood sugar goals 140-180   GI:  bowel regimen  DIET: continue diet  RENAL:  Na goal 135-145, continue 3%@75cc/hr, BMP this afternoon, increase fludrocortisone to 0.2mg BID, continue salt tabs   HEM/ONC: Hb stable  VTE Prophylaxis: SCDs, SQL  ID: febrile, no leukocytosis; bromocriptine for central fever, continue cefepime (02/22 -   ) linezolid (02/20-  ) for now - as per ID, f/u repeat gallium scan; recheck PCT, panculture q3d whie fever  Social: will update family    ATTENDING ATTESTATION:  I was physically present for the key portions of the evaluation and management (E/M) service provided.  I agree with the above history, physical and plan, which I have reviewed and edited where appropriate.    Patient at high risk for neurological deterioration or death due to:  ICU delirium, aspiration PNA, DVT / PE.  Critical care time:  I have personally provided 45 minutes of critical care time, excluding time spent on separate procedures.      Plan discussed with RN, house staff.

## 2022-02-24 NOTE — PROGRESS NOTE ADULT - SUBJECTIVE AND OBJECTIVE BOX
=================================  NEUROCRITICAL CARE ATTENDING NOTE  =================================    GOLDEN CALHOUN   MRN-[id number]  Summary:  56y/F    HPI:  57 y/o female with PMHx of HTN, pre-DM presents transferred from [facility] for intracranial hemorrhage. As per [place] ED provider and son, patient called son around 7:30-8:00AM c/o severe headache and nausea. Son immediately rushed to her house and found her out of bed, moaning and covered in her own vomit. During transit, EMS reported SBP within 240s with intractable vomiting and given Zofran 8mg. Upon arrival to [place] ED, GCS 7, SBP within 180s, patient was given Decadron 10mg, Keppra 1g, started on a Cardene drip, Propofol, Mannitol 1mg/kg. 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 Patient was intubated for airway support and transferred to [facility] for further management. ICH2, NIHSS 8.  Denies use of anticoagulants/antiplatelets.  (12 Feb 2022 16:36)      COURSE IN THE HOSPITAL:    Past Medical History: No pertinent past medical history    Hypertension    Pre-diabetes      Allergies:  No Known Allergies    Home meds:   losartan 100 mg oral tablet: 1 tab(s) orally once a day  Vitamin D2: 5000 unit(s) orally once a week  Xanax 0.5 mg oral tablet: 0.5 milligram(s) orally once a day, As Needed  ZyrTEC 10 mg oral tablet: 1 tab(s) orally once a day      PHYSICAL EXAMINATION  T(C): 37.2 (02-24 @ 05:26), Max: 38.4 (02-23 @ 17:00)  HR: 85 (02-24 @ 06:00) (71 - 97)  BP: 13/61 (02-24 @ 06:00) (13/61 - 164/83)  RR: 24 (02-24 @ 06:00) (20 - 32)  SpO2: 94% (02-24 @ 06:00) (94% - 99%)  CVP(mm Hg): --  NEUROLOGIC EXAMINATION:  Patient is awake, alert, fully oriented, pupils 2-3mm equal and briskly reactive to light, EOMs intact, muscle strength 5/5 on all 4s.  GENERAL: not intubated, not in cardiorespiratory distress  EENT:  anicteric  CARDIOVASCULAR: (+) S1 S2, normal rate and regular rhythm  PULMONARY: clear to auscultation bilaterally  ABDOMEN: soft, nontender with normoactive bowel sounds  EXTREMITIES: no edema  SKIN: no rash    LABS:  CAPILLARY BLOOD GLUCOSE                              11.0   9.96  )-----------( 323      ( 24 Feb 2022 05:42 )             33.3     02-24    141  |  109<H>  |  3<L>  ----------------------------<  122<H>  3.5   |  19<L>  |  0.32<L>    Ca    8.9      24 Feb 2022 05:42  Phos  2.6     02-24  Mg     2.0     02-24 02-22 @ 07:01  - 02-23 @ 07:00  --------------------------------------------------------  IN: 2953.3 mL / OUT: 3160 mL / NET: -206.7 mL    02-23 @ 07:01  - 02-24 @ 06:58  --------------------------------------------------------  IN: 2259.8 mL / OUT: 3653 mL / NET: -1393.2 mL        Bacteriology:  CSF studies:  EEG:  Neuroimaging:  Other imaging:    MEDICATIONS:  enoxaparin Injectable 40 milliGRAM(s) SubCutaneous every 24 hours    cefepime   IVPB 2000 milliGRAM(s) IV Intermittent every 8 hours  linezolid    Tablet 600 milliGRAM(s) Oral every 12 hours    acetaminophen     Tablet .. 650 milliGRAM(s) Oral every 6 hours PRN  bromocriptine Capsule 10 milliGRAM(s) Oral every 8 hours  ibuprofen  Tablet. 400 milliGRAM(s) Oral every 8 hours PRN    amLODIPine   Tablet 10 milliGRAM(s) Oral daily  hydrALAZINE 100 milliGRAM(s) Oral every 8 hours  labetalol 100 milliGRAM(s) Oral every 8 hours  losartan 100 milliGRAM(s) Oral daily    albuterol/ipratropium for Nebulization 3 milliLiter(s) Nebulizer every 6 hours PRN    polyethylene glycol 3350 17 Gram(s) Oral every 12 hours  senna 2 Tablet(s) Oral at bedtime      fludroCORTISONE 0.1 milliGRAM(s) Oral every 12 hours    influenza   Vaccine 0.5 milliLiter(s) IntraMuscular once  lidocaine   4% Patch 1 Patch Transdermal daily  sodium chloride 3 Gram(s) Oral every 6 hours  sodium chloride 3%. 500 milliLiter(s) IV Continuous <Continuous>    lactobacillus acidophilus 1 Tablet(s) Oral daily    IV FLUIDS:  DRIPS:  DIET:  Lines:  Drains:    02-22-22 @ 07:01  -  02-23-22 @ 07:00  --------------------------------------------------------  OUT:    External Ventricular Device (mL): 260 mL    Voided (mL): 2900 mL  Total OUT: 3160 mL        Wounds:    CODE STATUS:  Full Code                       GOALS OF CARE:  aggressive                      DISPOSITION:  ICU =================================  NEUROCRITICAL CARE ATTENDING NOTE  =================================    GOLDEN CALHOUN   MRN-[id number]  Summary:  56y/F  with PMHx of HTN, pre-DM presents transferred from [facility] for intracranial hemorrhage. As per [place] ED provider and son, patient called son around 7:30-8:00AM c/o severe headache and nausea. Son immediately rushed to her house and found her out of bed, moaning and covered in her own vomit. During transit, EMS reported SBP within 240s with intractable vomiting and given Zofran 8mg. Upon arrival to [place] ED, GCS 7, SBP within 180s, patient was given Decadron 10mg, Keppra 1g, started on a Cardene drip, Propofol, Mannitol 1mg/kg. 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 Patient was intubated for airway support and transferred to [facility] for further management. ICH2, NIHSS 8.  Denies use of anticoagulants/antiplatelets.  (12 Feb 2022 16:36)    COURSE IN THE HOSPITAL:  02/12 admitted to [facility]  02/21 EVD clamped more lethargic, reopened  02/24 Tmax 38.4    Past Medical History: No pertinent past medical history Hypertension Pre-diabetes  Allergies:  No Known Allergies  Home meds:   ·	losartan 100 mg oral tablet: 1 tab(s) orally once a day  ·	Vitamin D2: 5000 unit(s) orally once a week  ·	Xanax 0.5 mg oral tablet: 0.5 milligram(s) orally once a day, As Needed  ·	ZyrTEC 10 mg oral tablet: 1 tab(s) orally once a day    PHYSICAL EXAMINATION  T(C): 37.2 (02-24 @ 05:26), Max: 38.4 (02-23 @ 17:00) HR: 85 (02-24 @ 06:00) (71 - 97) BP: 13/61 (02-24 @ 06:00) (?/61 - 164/83) RR: 24 (02-24 @ 06:00) (20 - 32) SpO2: 94% (02-24 @ 06:00) (94% - 99%)  NEUROLOGIC EXAMINATION:  Patient is awake, alert, fully oriented, pupils 2-3mm equal and briskly reactive to light, EOMs intact, muscle strength 5/5 on all 4s.  GENERAL: not intubated, not in cardiorespiratory distress  EENT:  anicteric  CARDIOVASCULAR: (+) S1 S2, normal rate and regular rhythm  PULMONARY: clear to auscultation bilaterally  ABDOMEN: soft, nontender with normoactive bowel sounds  EXTREMITIES: no edema  SKIN: no rash    LABS:                        11.0   9.96  )-----------( 323      ( 24 Feb 2022 05:42 )             33.3     141  |  109<H>  |  3<L>  ----------------------------<  122<H>  3.5   |  19<L>  |  0.32<L>    Ca    8.9      24 Feb 2022 05:42  Phos  2.6     02-24  Mg     2.0     02-24 02-22 @ 07:01  -  02-23 @ 07:00  IN: 2953.3 mL / OUT: 3160 mL / NET: -206.7 mL    Bacteriology:  02/21 CSF NGTD  02/21 Blood CS NG2D  02/19 Blood CS NG4D  02/18 Blood CS NG5D x2   Culture - CSF with Gram Stain (collected 02-21)  02/18 CSF NGTD  02/17 Urine:  >100K Kleb 80K citrobacter    CSF studies:  EEG:  Neuroimaging:  Other imaging:    MEDICATIONS:  ·	enoxaparin Injectable 40 milliGRAM(s) SubCutaneous every 24 hours  ·	cefepime   IVPB 2000 milliGRAM(s) IV Intermittent every 8 hours  ·	linezolid    Tablet 600 milliGRAM(s) Oral every 12 hours  ·	acetaminophen     Tablet .. 650 milliGRAM(s) Oral every 6 hours PRN  ·	bromocriptine Capsule 10 milliGRAM(s) Oral every 8 hours  ·	ibuprofen  Tablet. 400 milliGRAM(s) Oral every 8 hours PRN  ·	amLODIPine   Tablet 10 milliGRAM(s) Oral daily  ·	hydrALAZINE 100 milliGRAM(s) Oral every 8 hours  ·	labetalol 100 milliGRAM(s) Oral every 8 hours  ·	losartan 100 milliGRAM(s) Oral daily  ·	albuterol/ipratropium for Nebulization 3 milliLiter(s) Nebulizer every 6 hours PRN  ·	polyethylene glycol 3350 17 Gram(s) Oral every 12 hours  ·	senna 2 Tablet(s) Oral at bedtime  ·	fludroCORTISONE 0.1 milliGRAM(s) Oral every 12 hours  ·	lidocaine   4% Patch 1 Patch Transdermal daily  ·	sodium chloride 3 Gram(s) Oral every 6 hours  ·	lactobacillus acidophilus 1 Tablet(s) Oral daily    IV FLUIDS: 3%@  DRIPS:  DIET: minced & moist  Lines:  Drains:      External Ventricular Device @ 5cm H20 ICPs <10  output 260 mL  Wounds:    CODE STATUS:  Full Code                       GOALS OF CARE:  aggressive                      DISPOSITION:  ICU =================================  NEUROCRITICAL CARE ATTENDING NOTE  =================================    GOLDEN CALHOUN   MRN-[id number]  Summary:  56y/F  with PMHx of HTN, pre-DM presents transferred from [facility] for intracranial hemorrhage. As per [place] ED provider and son, patient called son around 7:30-8:00AM c/o severe headache and nausea. Son immediately rushed to her house and found her out of bed, moaning and covered in her own vomit. During transit, EMS reported SBP within 240s with intractable vomiting and given Zofran 8mg. Upon arrival to [place] ED, GCS 7, SBP within 180s, patient was given Decadron 10mg, Keppra 1g, started on a Cardene drip, Propofol, Mannitol 1mg/kg. 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 Patient was intubated for airway support and transferred to [facility] for further management. ICH2, NIHSS 8.  Denies use of anticoagulants/antiplatelets.  (2022 16:36)    COURSE IN THE HOSPITAL:   admitted to [facility]   EVD clamped more lethargic, reopened   Tmax 38.4    Past Medical History: No pertinent past medical history Hypertension Pre-diabetes  Allergies:  No Known Allergies  Home meds:   ·	losartan 100 mg oral tablet: 1 tab(s) orally once a day  ·	Vitamin D2: 5000 unit(s) orally once a week  ·	Xanax 0.5 mg oral tablet: 0.5 milligram(s) orally once a day, As Needed  ·	ZyrTEC 10 mg oral tablet: 1 tab(s) orally once a day    PHYSICAL EXAMINATION  T(C): 37.2 ( @ 05:26), Max: 38.4 ( @ 17:00) HR: 85 ( @ 06:00) (71 - 97) BP: 13/61 ( @ 06:00) (?/61 - 164/83) RR: 24 ( @ 06:00) (20 - 32) SpO2: 94% ( @ 06:00) (94% - 99%)  NEUROLOGIC EXAMINATION:  Patient is awake, alert, fully oriented, pupils 2-3mm equal and briskly reactive to light, EOMs intact, muscle strength 5/5 on all 4s.  GENERAL: not intubated, not in cardiorespiratory distress  EENT:  anicteric  CARDIOVASCULAR: (+) S1 S2, normal rate and regular rhythm  PULMONARY: clear to auscultation bilaterally  ABDOMEN: soft, nontender with normoactive bowel sounds  EXTREMITIES: no edema  SKIN: no rash    LABS:                        11.0   9.96  )-----------( 323      ( 2022 05:42 )             33.3     141  |  109<H>  |  3<L>  ----------------------------<  122<H>  3.5   |  19<L>  |  0.32<L>    Ca    8.9      2022 05:42  Phos  2.6       Mg     2.0      @ 07:01  -   @ 07:00  IN: 2953.3 mL / OUT: 3160 mL / NET: -206.7 mL    Bacteriology:   CSF NGTD   Blood CS NG2D   Blood CS NG4D   Blood CS NG5D x2   Culture - CSF with Gram Stain (collected )   CSF NGTD   Urine:  >100K Kleb 80K citrobacter    CSF studies:  EEG:  Neuroimagin/24 CT head: stable vents, IVH, evolving ICH L thalamus, unchanged MLS   CT head: stable to improved vent size, evolving L thalamic ICH  Other imagin/16 CT chest: no PE, small consolidation lung bases    MEDICATIONS:  ·	enoxaparin Injectable 40 milliGRAM(s) SubCutaneous every 24 hours  ·	cefepime   IVPB 2000 milliGRAM(s) IV Intermittent every 8 hours  ·	linezolid    Tablet 600 milliGRAM(s) Oral every 12 hours  ·	acetaminophen     Tablet .. 650 milliGRAM(s) Oral every 6 hours PRN  ·	bromocriptine Capsule 10 milliGRAM(s) Oral every 8 hours  ·	ibuprofen  Tablet. 400 milliGRAM(s) Oral every 8 hours PRN  ·	amLODIPine   Tablet 10 milliGRAM(s) Oral daily  ·	hydrALAZINE 100 milliGRAM(s) Oral every 8 hours  ·	labetalol 100 milliGRAM(s) Oral every 8 hours  ·	losartan 100 milliGRAM(s) Oral daily  ·	albuterol/ipratropium for Nebulization 3 milliLiter(s) Nebulizer every 6 hours PRN  ·	polyethylene glycol 3350 17 Gram(s) Oral every 12 hours  ·	senna 2 Tablet(s) Oral at bedtime  ·	fludroCORTISONE 0.1 milliGRAM(s) Oral every 12 hours  ·	lidocaine   4% Patch 1 Patch Transdermal daily  ·	sodium chloride 3 Gram(s) Oral every 6 hours  ·	lactobacillus acidophilus 1 Tablet(s) Oral daily    IV FLUIDS: 3%@  DRIPS:  DIET: minced & moist  Lines:  Drains:      External Ventricular Device @ 5cm H20 ICPs <10  output 260 mL  Wounds:    CODE STATUS:  Full Code                       GOALS OF CARE:  aggressive                      DISPOSITION:  ICU

## 2022-02-24 NOTE — PROGRESS NOTE ADULT - SUBJECTIVE AND OBJECTIVE BOX
INTERVAL HPI/OVERNIGHT EVENTS:  Tm 101.2    ROS:  Unobtainable - somnolent      ANTIBIOTICS/RELEVANT:    Linezolid 600 mg po q12h (2/20-present)  Cefepime 2 g IV q8h (2/22-present)    Cefazolin 2/12  Ceftriaxone 2/19-2/22      Vital Signs Last 24 Hrs  T(C): 36.2 (24 Feb 2022 08:55), Max: 38.4 (23 Feb 2022 17:00)  T(F): 97.2 (24 Feb 2022 08:55), Max: 101.2 (23 Feb 2022 17:00)  HR: 71 (24 Feb 2022 11:00) (71 - 97)  BP: 137/80 (24 Feb 2022 11:00) (13/61 - 164/83)  BP(mean): 103 (24 Feb 2022 11:00) (86 - 131)  RR: 23 (24 Feb 2022 11:00) (20 - 32)  SpO2: 96% (24 Feb 2022 11:00) (94% - 98%)    PHYSICAL EXAM:  Constitutional:  Somnolent  Head:  R EVD with bloody CSF  Eyes:  Sclerae anicteric, conjunctivae clear, PERRL  Ear/Nose/Throat:  No nasal exudate or sinus tenderness;  No buccal mucosal lesions, no pharyngeal erythema or exudate	  Neck: LIJ TLC Supple, no adenopathy  Respiratory:  Clear bilaterally  Cardiovascular:  RRR, S1S2, no murmur appreciated  Gastrointestinal:  Symmetric, normoactive BS, soft, NT, no masses, guarding or rebound.  No HSM  :  Primafit in place  Extremities:  No edema.  L forearm with palpable cord, no longer tender, no overlying erythema    LABS:                        11.0   9.96  )-----------( 323      ( 24 Feb 2022 05:42 )             33.3         02-24    141  |  109<H>  |  3<L>  ----------------------------<  122<H>  3.5   |  19<L>  |  0.32<L>    Ca    8.9      24 Feb 2022 05:42  Phos  2.6     02-24  Mg     2.0     02-24            MICROBIOLOGY:  Blood cultures:  2/16 X 1 - CNS in anaerobic bottle, Enterococcus detected by PCR, not growth;  2/18 X 2 - NG;  2/19 X1 - NGTD;  2/21 X 1 - NGTD    Urine culture:  2/17:  Klebsiella pneumoniae and Citrobacter koseri    CSF:  2/18 - NGTD;  2/21 - NGTD    MRSA/MSSA PCR 2/18 - ND        RADIOLOGY & ADDITIONAL STUDIES: INTERVAL HPI/OVERNIGHT EVENTS:  Tm 101.2    ROS:  Unobtainable - somnolent      ANTIBIOTICS/RELEVANT:    Linezolid 600 mg po q12h (2/20-present)  Cefepime 2 g IV q8h (2/22-present)    Cefazolin 2/12  Ceftriaxone 2/19-2/22      Vital Signs Last 24 Hrs  T(C): 36.2 (24 Feb 2022 08:55), Max: 38.4 (23 Feb 2022 17:00)  T(F): 97.2 (24 Feb 2022 08:55), Max: 101.2 (23 Feb 2022 17:00)  HR: 71 (24 Feb 2022 11:00) (71 - 97)  BP: 137/80 (24 Feb 2022 11:00) (13/61 - 164/83)  BP(mean): 103 (24 Feb 2022 11:00) (86 - 131)  RR: 23 (24 Feb 2022 11:00) (20 - 32)  SpO2: 96% (24 Feb 2022 11:00) (94% - 98%)    PHYSICAL EXAM:  Constitutional:  Somnolent  Head:  R EVD with bloody CSF  Eyes:  Sclerae anicteric, conjunctivae clear, PERRL  Ear/Nose/Throat:  No nasal exudate or sinus tenderness;  No buccal mucosal lesions, no pharyngeal erythema or exudate	  Neck: LIJ TLC Supple, no adenopathy  Respiratory:  Clear bilaterally  Cardiovascular:  RRR, S1S2, no murmur appreciated  Gastrointestinal:  Symmetric, normoactive BS, soft, NT, no masses, guarding or rebound.  No HSM  :  Primafit in place  Extremities:  No edema.  L forearm with palpable cord, no longer tender, no overlying erythema    LABS:                        11.0   9.96  )-----------( 323      ( 24 Feb 2022 05:42 )             33.3         02-24    141  |  109<H>  |  3<L>  ----------------------------<  122<H>  3.5   |  19<L>  |  0.32<L>    Ca    8.9      24 Feb 2022 05:42  Phos  2.6     02-24  Mg     2.0     02-24            MICROBIOLOGY:  Blood cultures:  2/16 X 1 - CNS in anaerobic bottle, Enterococcus detected by PCR, not growth;  2/18 X 2 - NG;  2/19 X1 - NGTD;  2/21 X 1 - NGTD    Urine culture:  2/17:  Klebsiella pneumoniae and Citrobacter koseri    CSF:  2/18 - NGTD;  2/21 - NGTD    MRSA/MSSA PCR 2/18 - ND        RADIOLOGY & ADDITIONAL STUDIES:  < from: NM Inflammatory Loc Wholebody Gallium, Single Day (02.24.22 @ 12:41) >  Findings: Initial scan demonstrated mild diffuse activity within the   lungs which resolved on delayed imaging. There is physiologic uptake in   the nasopharynx, liver, and bowel. There is mild hazy activity within the   bilateral gluteal regions.    Impression:  Mild hazy activity within the bilateral gluteal regions which may be   secondary to patient positioning however correlation for   cellulitis/decubitus ulcer is recommended. Otherwise no focal areas of   abnormal gallium uptake to suggest site of infection    < end of copied text >

## 2022-02-24 NOTE — PROGRESS NOTE ADULT - SUBJECTIVE AND OBJECTIVE BOX
HPI:  57 y/o female with PMHx of HTN, pre-DM presents transferred from [facility] for intracranial hemorrhage. As per [place] ED provider and son, patient called son around 7:30-8:00AM c/o severe headache and nausea. Son immediately rushed to her house and found her out of bed, moaning and covered in her own vomit. During transit, EMS reported SBP within 240s with intractable vomiting and given Zofran 8mg. Upon arrival to [place] ED, GCS 7, SBP within 180s, patient was given Decadron 10mg, Keppra 1g, started on a Cardene drip, Propofol, Mannitol 1mg/kg. 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 Patient was intubated for airway support and transferred to [facility] for further management. ICH2, NIHSS 8.  Denies use of anticoagulants/antiplatelets.  (12 Feb 2022 16:36)    Hospital Course:  2/12: transferred from [place]. R frontal EVD and R radial a-line placed. pend CTH/CTA. s/p 1L bolus for elevated lactate.   2/13: S/p R frontal EVD placedment @[id number] draining well. O/n pt w/ episode of possible storming; tachy, HTN, agitated, temp 100.2F, given 2 versed and 50mcg fentanyl w/ improvement in symptoms Neuro exam stable. Pt remains intubated and sedated, AN L>R. Trend lactate and abg. Amlodipine 5mg QD started for SBP goal < 140, cardene dc'd.  EVD dropped 2/13 at 1pm. propanolol and fent standing added for neuro storming, propofol switched to precedex, with resultant hypotension. Propanolol dc'd, fent changed to prn and precedex off, 1 L bolus given, levo prn   2/14: TOSHA overnight. Patient remains on propofol. EVD open at [id number]. ICPs WNL. Neuro exam stable. increased bowel regimen. started SQL. extubated on precedex. given 0.5 Ativan + Fentanyl pushes PRN for agitation. 500cc NS bolus.   2/15: BD#4.  On HFNC d/t desat to 88% on 70% non-rebreather. On 0.5 precedex   2/16: BD5, TOSHA overnight, on 4L NC, s/p vomiting yesterday, TF being held, formal S/S pending, off precedex. EVD open @ 5. Started on 3% at 30, tmax 100.7   2/17: BD# 6   tachycardic, PE protocol. cardene gtt. neuro unchanged. EVD @ 5cmH2O. 1g IV tylenol for 102.7 fever, duonebs standing to PRN. UA with reflux ordered. Hydralazine 25mg TID added, increased to 50q8. 500cc NS bolus x2. started on nystatin for thrush. started oxy 5q6 PRN for pain for tachycardia. repeat Na 146, decreased 3% @50, started salt tabs 2q6. FEES done. started minced and moist diet  2/18: BD 7. 3% Increased 75 cc/hr  2/19: BD8. TOSHA o/n neuro stable. 3%@75cc/hr. central line placed for vascular access  2/20: BD9. tmax 101.3 o/n, neuro stable. 3%@100cc/hr. cardene gtt restarted. Ibuprofen x1 given for fever, ok per Dr. D'Amico. Vanc d/c'd per ID (MRSA neg), treating UTI not bacteremia, likely contaminant. Linezolid started for suspected thrombophlebitis and GS + coverage.   2/21: BD#10 Overnight, IV tylenol given for headache. Right radial arterial line re-wired then discontinued. EVD clamped at 8AM. ICPs WNL. Neuro exam stable. Remains on 3% lowered to 75cc/hr. CTH stable, EVD raised to 20. Febrile 102 - CSF and blood cx sent. EVD lowered from 20 to 5 to drain more CSF and blood, increased lethargy. Ceftriaxone changed to cefepime per ID for broader coverage.   2/22: BD#11 TOSHA o/n. neuro at baseline. EVD at 17ieQ9C, 3% kept at 25cc/hr   2/23: BD#12 TOSHA overnight. Remain on 3% saline. EVD [id number]. ICPs WNL. Neuro exam stable.  2/24: BD#13 TOSHA overnight, Remains on 3% saline. EVD open at 5cmH2O. ICPs WNL. Neuro exam stable. Pending part 2 gallium scan and CTH today    Vital Signs Last 24 Hrs  T(C): 37.4 (24 Feb 2022 00:00), Max: 38.6 (23 Feb 2022 05:00)  T(F): 99.3 (24 Feb 2022 00:00), Max: 101.4 (23 Feb 2022 05:00)  HR: 74 (24 Feb 2022 00:00) (71 - 97)  BP: 155/87 (24 Feb 2022 00:00) (136/75 - 164/78)  BP(mean): 114 (24 Feb 2022 00:00) (97 - 114)  RR: 22 (24 Feb 2022 00:00) (20 - 32)  SpO2: 98% (24 Feb 2022 00:00) (93% - 99%)    I&O's Summary    22 Feb 2022 07:01  -  23 Feb 2022 07:00  --------------------------------------------------------  IN: 2953.3 mL / OUT: 3160 mL / NET: -206.7 mL    23 Feb 2022 07:01  -  24 Feb 2022 00:16  --------------------------------------------------------  IN: 1714.8 mL / OUT: 2906 mL / NET: -1191.2 mL      PHYSICAL EXAM:  GEN: laying in bed, appears well, NAD  NEURO: AOx2 (self/place). FC, OE spont, speech intact, face symmetric. CNII-XII intact. PERRL, EOMI. LUE/LLE 5/5. RUE delts/triceps 4-/5, biceps 2-3/5, HG 4/5. RLE 2/5 HF/DF otherwise 4-/5 throughout  CV: RRR +S1/S2  PULM: CTAB  GI: Abd soft, NT/ND  EXT: ext warm, dry, nontender. b/l UE with multiple areas of erythema/induration from IV infiltration (improving)  Scalp EVD site C/D/I    TUBES/LINES:  [] Haley  [] Lumbar Drain  [] Wound Drains  [x] R frontal EVD open at 5  [x ] L IJ CVC    DIET:  [] NPO  [x] Mechanical  [] Tube feeds    LABS:                        10.3   9.71  )-----------( 296      ( 23 Feb 2022 04:02 )             31.2     02-23    137  |  107  |  5<L>  ----------------------------<  117<H>  3.8   |  19<L>  |  0.37<L>    Ca    9.2      23 Feb 2022 21:41  Phos  2.3     02-23  Mg     1.9     02-23    TPro  6.4  /  Alb  3.3  /  TBili  0.5  /  DBili  <0.2  /  AST  35  /  ALT  40  /  AlkPhos  73  02-22            CAPILLARY BLOOD GLUCOSE          Drug Levels: [] N/A  Vancomycin Level, Trough: 4.0 ug/mL (02-20 @ 03:47)  Vancomycin Level, Trough: 4.0 ug/mL (02-19 @ 04:50)    CSF Analysis: [] N/A      Allergies    No Known Allergies    Intolerances      MEDICATIONS:  Antibiotics:  cefepime   IVPB 2000 milliGRAM(s) IV Intermittent every 8 hours  linezolid    Tablet 600 milliGRAM(s) Oral every 12 hours    Neuro:  acetaminophen     Tablet .. 650 milliGRAM(s) Oral every 6 hours PRN  bromocriptine Capsule 10 milliGRAM(s) Oral every 8 hours  ibuprofen  Tablet. 400 milliGRAM(s) Oral every 8 hours PRN    Anticoagulation:  enoxaparin Injectable 40 milliGRAM(s) SubCutaneous every 24 hours    OTHER:  albuterol/ipratropium for Nebulization 3 milliLiter(s) Nebulizer every 6 hours PRN  amLODIPine   Tablet 10 milliGRAM(s) Oral daily  fludroCORTISONE 0.1 milliGRAM(s) Oral every 12 hours  hydrALAZINE 100 milliGRAM(s) Oral every 8 hours  influenza   Vaccine 0.5 milliLiter(s) IntraMuscular once  labetalol 100 milliGRAM(s) Oral every 8 hours  lactobacillus acidophilus 1 Tablet(s) Oral daily  lidocaine   4% Patch 1 Patch Transdermal daily  losartan 100 milliGRAM(s) Oral daily  polyethylene glycol 3350 17 Gram(s) Oral every 12 hours  senna 2 Tablet(s) Oral at bedtime    IVF:  sodium chloride 3 Gram(s) Oral every 6 hours  sodium chloride 3%. 500 milliLiter(s) IV Continuous <Continuous>    CULTURES:  Culture Results:   No growth to date (02-21 @ 15:05)  Culture Results:   No growth at 2 days. (02-21 @ 14:53)    RADIOLOGY & ADDITIONAL TESTS:      ASSESSMENT:  Patient 57 y/o female with PMHx of HTN, pre-DM w/ left thalamic parenchymal hematoma with extensive intraventricular hemorrhage. ICH score 2. NIHSS 18. s/p R frontal large bore EVD placement 2/12/22    HEAD BLEED    Handoff    No pertinent past medical history    Hypertension    Pre-diabetes    ICH (intracerebral hemorrhage)    HTN (hypertension)    Pre-diabetes    Insertion of intravenous catheter with ultrasound guidance    Insertion of intravenous catheter with ultrasound guidance    [email or web address]        PLAN:   NEURO:  - neuro/vital q1h  - R frontal large bore EVD @5cmH2O 2/21,trend ICP/output  - CT from 2/15 shows that R vent is now collapsed, however L vent still contains casted hemorrhagic clot, MLS is still unchanged at 4mm, with perihematomal edema (L thalamic)  - CTH 2/21 stable   - CTA 2/12: no vessel occlusion or aneurysm noted   - ibuprofen ok PRN for fever   - bromocriptine 10mg q8h for possible central fever 2/21    Cardio:   - SBP goal 100-160  - cont. amlodipine 10mg QD, losartan 100mg, Hydralazine 100 mg TID, labetalol 100 q8h   - echo 2/14: significant for symmetric LVH with normal systolic function, EF: 65-70%   - Persistent tachycardia - s/p bolus ?hypovolemia, ?fever, ?pain: now improved     PULM:  - extubated 2/14, on RA  - duonebs PRN   - IS  - CXR 2/20: clear; CT chest 2/17: Negative for PE, some consolidation     Renal:   - intermittent straight cath for retention   - cont bladder scan q6hr   - Na goal 140-150  - 3% @ 75/hr, salt tabs 3 q6, florinef 0.1 BID     GI:  - Diet: minced & moist diet  - bowel regimen, last BM 2/23    HEME:  - SCDs only L leg/SQL for DVT ppx   - antixa level 2/17: 0.09, 2/18 0.23, 2/24   - LE dopplers 2/23: R IM calf thrombus  - UE dopp 2/23: superficial clots b/l cephalic , wam compresses and elevation    ENDO:  - ISS  - prediabetic, a1c 5.8  - TSH wnl     ID:  - blood cx 2/16 +staph epidermidis  - urine cx 2/17 +citrobacter koseri and klebsiella  - ID following, vanco [2/18-2/20], ceftriaxone for UTI [2/19-22], cefepime [2/22- ]  - linezolid for phlebilitis [2/20- ] - NO OXYCODONE while on linezolid  - MRSA negative  - pending ID clearance for VPS plan    PSYCH:   -pt takes xanax at home    Dispo: ICU status, full code, family updated with plan  PT recs: [facility]    D/w Dr. D'Amico and Dr. Rees

## 2022-02-25 ENCOUNTER — FORM ENCOUNTER (OUTPATIENT)
Age: 57
End: 2022-02-25

## 2022-02-25 LAB
ANION GAP SERPL CALC-SCNC: 10 MMOL/L — SIGNIFICANT CHANGE UP (ref 5–17)
ANION GAP SERPL CALC-SCNC: 14 MMOL/L — SIGNIFICANT CHANGE UP (ref 5–17)
ANION GAP SERPL CALC-SCNC: 9 MMOL/L — SIGNIFICANT CHANGE UP (ref 5–17)
APTT BLD: 29 SEC — SIGNIFICANT CHANGE UP (ref 27.5–35.5)
BLD GP AB SCN SERPL QL: NEGATIVE — SIGNIFICANT CHANGE UP
BUN SERPL-MCNC: 3 MG/DL — LOW (ref 7–23)
BUN SERPL-MCNC: 4 MG/DL — LOW (ref 7–23)
BUN SERPL-MCNC: 5 MG/DL — LOW (ref 7–23)
CALCIUM SERPL-MCNC: 9.1 MG/DL — SIGNIFICANT CHANGE UP (ref 8.4–10.5)
CALCIUM SERPL-MCNC: 9.3 MG/DL — SIGNIFICANT CHANGE UP (ref 8.4–10.5)
CALCIUM SERPL-MCNC: 9.8 MG/DL — SIGNIFICANT CHANGE UP (ref 8.4–10.5)
CHLORIDE SERPL-SCNC: 105 MMOL/L — SIGNIFICANT CHANGE UP (ref 96–108)
CHLORIDE SERPL-SCNC: 107 MMOL/L — SIGNIFICANT CHANGE UP (ref 96–108)
CHLORIDE SERPL-SCNC: 109 MMOL/L — HIGH (ref 96–108)
CO2 SERPL-SCNC: 19 MMOL/L — LOW (ref 22–31)
CO2 SERPL-SCNC: 20 MMOL/L — LOW (ref 22–31)
CO2 SERPL-SCNC: 20 MMOL/L — LOW (ref 22–31)
CREAT SERPL-MCNC: 0.29 MG/DL — LOW (ref 0.5–1.3)
CREAT SERPL-MCNC: 0.3 MG/DL — LOW (ref 0.5–1.3)
CREAT SERPL-MCNC: 0.32 MG/DL — LOW (ref 0.5–1.3)
GLUCOSE SERPL-MCNC: 127 MG/DL — HIGH (ref 70–99)
GLUCOSE SERPL-MCNC: 139 MG/DL — HIGH (ref 70–99)
GLUCOSE SERPL-MCNC: 197 MG/DL — HIGH (ref 70–99)
HCG UR QL: NEGATIVE — SIGNIFICANT CHANGE UP
HCT VFR BLD CALC: 33.8 % — LOW (ref 34.5–45)
HGB BLD-MCNC: 10.9 G/DL — LOW (ref 11.5–15.5)
INR BLD: 1.12 — SIGNIFICANT CHANGE UP (ref 0.88–1.16)
MAGNESIUM SERPL-MCNC: 1.9 MG/DL — SIGNIFICANT CHANGE UP (ref 1.6–2.6)
MCHC RBC-ENTMCNC: 30.3 PG — SIGNIFICANT CHANGE UP (ref 27–34)
MCHC RBC-ENTMCNC: 32.2 GM/DL — SIGNIFICANT CHANGE UP (ref 32–36)
MCV RBC AUTO: 93.9 FL — SIGNIFICANT CHANGE UP (ref 80–100)
NRBC # BLD: 0 /100 WBCS — SIGNIFICANT CHANGE UP (ref 0–0)
PHOSPHATE SERPL-MCNC: 2.5 MG/DL — SIGNIFICANT CHANGE UP (ref 2.5–4.5)
PLATELET # BLD AUTO: 338 K/UL — SIGNIFICANT CHANGE UP (ref 150–400)
POTASSIUM SERPL-MCNC: 3.3 MMOL/L — LOW (ref 3.5–5.3)
POTASSIUM SERPL-MCNC: 3.4 MMOL/L — LOW (ref 3.5–5.3)
POTASSIUM SERPL-MCNC: 3.7 MMOL/L — SIGNIFICANT CHANGE UP (ref 3.5–5.3)
POTASSIUM SERPL-SCNC: 3.3 MMOL/L — LOW (ref 3.5–5.3)
POTASSIUM SERPL-SCNC: 3.4 MMOL/L — LOW (ref 3.5–5.3)
POTASSIUM SERPL-SCNC: 3.7 MMOL/L — SIGNIFICANT CHANGE UP (ref 3.5–5.3)
PROTHROM AB SERPL-ACNC: 13.3 SEC — SIGNIFICANT CHANGE UP (ref 10.5–13.4)
RBC # BLD: 3.6 M/UL — LOW (ref 3.8–5.2)
RBC # FLD: 12.2 % — SIGNIFICANT CHANGE UP (ref 10.3–14.5)
RH IG SCN BLD-IMP: POSITIVE — SIGNIFICANT CHANGE UP
SODIUM SERPL-SCNC: 137 MMOL/L — SIGNIFICANT CHANGE UP (ref 135–145)
SODIUM SERPL-SCNC: 138 MMOL/L — SIGNIFICANT CHANGE UP (ref 135–145)
SODIUM SERPL-SCNC: 138 MMOL/L — SIGNIFICANT CHANGE UP (ref 135–145)
WBC # BLD: 9.31 K/UL — SIGNIFICANT CHANGE UP (ref 3.8–10.5)
WBC # FLD AUTO: 9.31 K/UL — SIGNIFICANT CHANGE UP (ref 3.8–10.5)

## 2022-02-25 PROCEDURE — 99232 SBSQ HOSP IP/OBS MODERATE 35: CPT

## 2022-02-25 PROCEDURE — 99291 CRITICAL CARE FIRST HOUR: CPT

## 2022-02-25 PROCEDURE — 70450 CT HEAD/BRAIN W/O DYE: CPT | Mod: 26

## 2022-02-25 RX ORDER — POTASSIUM CHLORIDE 20 MEQ
20 PACKET (EA) ORAL
Refills: 0 | Status: COMPLETED | OUTPATIENT
Start: 2022-02-25 | End: 2022-02-25

## 2022-02-25 RX ORDER — POTASSIUM CHLORIDE 20 MEQ
40 PACKET (EA) ORAL ONCE
Refills: 0 | Status: COMPLETED | OUTPATIENT
Start: 2022-02-25 | End: 2022-02-25

## 2022-02-25 RX ORDER — BROMOCRIPTINE MESYLATE 5 MG/1
5 CAPSULE ORAL EVERY 8 HOURS
Refills: 0 | Status: DISCONTINUED | OUTPATIENT
Start: 2022-02-25 | End: 2022-02-27

## 2022-02-25 RX ORDER — MAGNESIUM OXIDE 400 MG ORAL TABLET 241.3 MG
400 TABLET ORAL ONCE
Refills: 0 | Status: DISCONTINUED | OUTPATIENT
Start: 2022-02-25 | End: 2022-02-25

## 2022-02-25 RX ORDER — POTASSIUM CHLORIDE 20 MEQ
20 PACKET (EA) ORAL
Refills: 0 | Status: COMPLETED | OUTPATIENT
Start: 2022-02-25 | End: 2022-02-26

## 2022-02-25 RX ORDER — ENOXAPARIN SODIUM 100 MG/ML
40 INJECTION SUBCUTANEOUS AT BEDTIME
Refills: 0 | Status: DISCONTINUED | OUTPATIENT
Start: 2022-02-25 | End: 2022-03-01

## 2022-02-25 RX ORDER — TRAMADOL HYDROCHLORIDE 50 MG/1
25 TABLET ORAL EVERY 6 HOURS
Refills: 0 | Status: DISCONTINUED | OUTPATIENT
Start: 2022-02-25 | End: 2022-02-25

## 2022-02-25 RX ORDER — TRAMADOL HYDROCHLORIDE 50 MG/1
50 TABLET ORAL EVERY 6 HOURS
Refills: 0 | Status: DISCONTINUED | OUTPATIENT
Start: 2022-02-25 | End: 2022-03-03

## 2022-02-25 RX ADMIN — Medication 100 MILLIGRAM(S): at 23:47

## 2022-02-25 RX ADMIN — SODIUM CHLORIDE 3 GRAM(S): 9 INJECTION INTRAMUSCULAR; INTRAVENOUS; SUBCUTANEOUS at 23:46

## 2022-02-25 RX ADMIN — SODIUM CHLORIDE 3 GRAM(S): 9 INJECTION INTRAMUSCULAR; INTRAVENOUS; SUBCUTANEOUS at 11:48

## 2022-02-25 RX ADMIN — Medication 100 MILLIEQUIVALENT(S): at 09:01

## 2022-02-25 RX ADMIN — LOSARTAN POTASSIUM 100 MILLIGRAM(S): 100 TABLET, FILM COATED ORAL at 06:16

## 2022-02-25 RX ADMIN — Medication 100 MILLIGRAM(S): at 06:16

## 2022-02-25 RX ADMIN — Medication 650 MILLIGRAM(S): at 09:01

## 2022-02-25 RX ADMIN — FLUDROCORTISONE ACETATE 0.2 MILLIGRAM(S): 0.1 TABLET ORAL at 06:15

## 2022-02-25 RX ADMIN — Medication 100 MILLIGRAM(S): at 13:40

## 2022-02-25 RX ADMIN — BROMOCRIPTINE MESYLATE 5 MILLIGRAM(S): 5 CAPSULE ORAL at 13:40

## 2022-02-25 RX ADMIN — Medication 1 APPLICATION(S): at 06:17

## 2022-02-25 RX ADMIN — Medication 100 MILLIEQUIVALENT(S): at 11:47

## 2022-02-25 RX ADMIN — LINEZOLID 600 MILLIGRAM(S): 600 INJECTION, SOLUTION INTRAVENOUS at 06:17

## 2022-02-25 RX ADMIN — POLYETHYLENE GLYCOL 3350 17 GRAM(S): 17 POWDER, FOR SOLUTION ORAL at 17:17

## 2022-02-25 RX ADMIN — CEFEPIME 100 MILLIGRAM(S): 1 INJECTION, POWDER, FOR SOLUTION INTRAMUSCULAR; INTRAVENOUS at 13:42

## 2022-02-25 RX ADMIN — Medication 400 MILLIGRAM(S): at 07:22

## 2022-02-25 RX ADMIN — SODIUM CHLORIDE 50 MILLILITER(S): 5 INJECTION, SOLUTION INTRAVENOUS at 19:53

## 2022-02-25 RX ADMIN — BROMOCRIPTINE MESYLATE 5 MILLIGRAM(S): 5 CAPSULE ORAL at 23:47

## 2022-02-25 RX ADMIN — AMLODIPINE BESYLATE 10 MILLIGRAM(S): 2.5 TABLET ORAL at 06:17

## 2022-02-25 RX ADMIN — Medication 100 MILLIEQUIVALENT(S): at 07:00

## 2022-02-25 RX ADMIN — Medication 650 MILLIGRAM(S): at 09:30

## 2022-02-25 RX ADMIN — FLUDROCORTISONE ACETATE 0.2 MILLIGRAM(S): 0.1 TABLET ORAL at 17:17

## 2022-02-25 RX ADMIN — Medication 40 MILLIEQUIVALENT(S): at 15:42

## 2022-02-25 RX ADMIN — Medication 100 MILLIGRAM(S): at 14:33

## 2022-02-25 RX ADMIN — Medication 100 MILLIGRAM(S): at 23:46

## 2022-02-25 RX ADMIN — BROMOCRIPTINE MESYLATE 10 MILLIGRAM(S): 5 CAPSULE ORAL at 06:15

## 2022-02-25 RX ADMIN — SODIUM CHLORIDE 3 GRAM(S): 9 INJECTION INTRAMUSCULAR; INTRAVENOUS; SUBCUTANEOUS at 17:17

## 2022-02-25 RX ADMIN — SENNA PLUS 2 TABLET(S): 8.6 TABLET ORAL at 23:46

## 2022-02-25 RX ADMIN — Medication 1 TABLET(S): at 11:48

## 2022-02-25 RX ADMIN — CEFEPIME 100 MILLIGRAM(S): 1 INJECTION, POWDER, FOR SOLUTION INTRAMUSCULAR; INTRAVENOUS at 06:15

## 2022-02-25 RX ADMIN — SODIUM CHLORIDE 50 MILLILITER(S): 5 INJECTION, SOLUTION INTRAVENOUS at 00:27

## 2022-02-25 RX ADMIN — POLYETHYLENE GLYCOL 3350 17 GRAM(S): 17 POWDER, FOR SOLUTION ORAL at 06:16

## 2022-02-25 RX ADMIN — ENOXAPARIN SODIUM 40 MILLIGRAM(S): 100 INJECTION SUBCUTANEOUS at 23:33

## 2022-02-25 RX ADMIN — SODIUM CHLORIDE 3 GRAM(S): 9 INJECTION INTRAMUSCULAR; INTRAVENOUS; SUBCUTANEOUS at 06:16

## 2022-02-25 RX ADMIN — Medication 400 MILLIGRAM(S): at 08:00

## 2022-02-25 RX ADMIN — CHLORHEXIDINE GLUCONATE 1 APPLICATION(S): 213 SOLUTION TOPICAL at 06:17

## 2022-02-25 NOTE — PROVIDER CONTACT NOTE (OTHER) - ACTION/TREATMENT ORDERED:
Provider at bedside to assess. Give medications when pt is more alert. Provider at bedside to assess. Give medications when pt is more alert. CT scan.

## 2022-02-25 NOTE — PROGRESS NOTE ADULT - SUBJECTIVE AND OBJECTIVE BOX
Surgery: Right ventriculoperitoneal shunt placement   Consent: Signed by patient vs HCP (pending surgical consent)                    NAME/NUMBER of HCP:     Representative Consent: [  ] Signed by patient vs HCP                                                 [  ] N/A -> only for cerebral angiogram    No Known Allergies      OVERNIGHT EVENTS: None    T(C): 37.1 (22 @ 22:54), Max: 37.9 (22 @ 02:11)  HR: 74 (22 @ 23:00) (71 - 92)  BP: 157/73 (22 @ 23:00) (13/61 - 164/83)  RR: 23 (22 @ 23:00) (18 - 27)  SpO2: 97% (22 @ 23:00) (94% - 98%)  Wt(kg): --      EXAM:  GEN: laying in bed, appears well, NAD  NEURO: AOx2 (self/place). FC, OE spont, speech intact, face symmetric. CNII-XII intact. PERRL, EOMI. LUE/LLE 5/5. RUE delts/triceps 4-/5, biceps 2-3/5, HG 4/5. RLE 2/5 HF/DF otherwise 4-/5 throughout  CV: RRR +S1/S2  PULM: CTAB  GI: Abd soft, NT/ND  EXT: ext warm, dry, nontender. b/l UE with multiple areas of erythema/induration from IV infiltration (improving)  Scalp EVD site C/D/I          137  |  106  |  5<L>  ----------------------------<  153<H>  3.5   |  19<L>  |  0.34<L>    Ca    9.3      2022 21:40  Phos  2.6       Mg     2.0           CBC Full  -  ( 2022 05:42 )  WBC Count : 9.96 K/uL  RBC Count : 3.56 M/uL  Hemoglobin : 11.0 g/dL  Hematocrit : 33.3 %  Platelet Count - Automated : 323 K/uL  Mean Cell Volume : 93.5 fl  Mean Cell Hemoglobin : 30.9 pg  Mean Cell Hemoglobin Concentration : 33.0 gm/dL  Auto Neutrophil # : x  Auto Lymphocyte # : x  Auto Monocyte # : x  Auto Eosinophil # : x  Auto Basophil # : x  Auto Neutrophil % : x  Auto Lymphocyte % : x  Auto Monocyte % : x  Auto Eosinophil % : x  Auto Basophil % : x        Pregnancy test: pending   Type & Screen (in past 72hrs): pending      2 Type & Screen within 72 hours if anticipate blood need in OR:  x Y _ N     Blood ordered and on hold for OR:   [ ] No need     [x ] 1u pRBC on hold      [ ] 2u pRBC on hold    CXR: in chart   EKG: in chart   ECHO:  Medical Clearances: 22 EF 65-70%  Other Clearances:     Last dose of antiplatelet/anticoagulation dru22 SQL     Implanted Devices (pacemaker, drug pump...etc):  []YES   [x] NO                  If yes --> EPS consulted to interrogate/adjust device:    3M nasal swab ordered?  xY  _N  Cranial surgery: Order written for hair to be shampooed night before surgery and morning before surgery  [x] yes   []no  Chlorhexidine Wipes ordered for Neck Down?  x Y  _ N  (twice a day if 1 day before surgery, daily for 3 days if 3 days prior, daily if in ICU)                 Assessment: Patient 57 y/o female with PMHx of HTN, pre-DM w/ left thalamic parenchymal hematoma with extensive intraventricular hemorrhage. ICH score 2. NIHSS 18. s/p R frontal large bore EVD placement 22. Pre-op for right VPS placement      Plan:  Pending surgical consent signature  3M nasal swab, chlorhexadine wipes  PRBC on hold for OR  Shampoo hair in AM prior to surgery  NPO after midnight  IVF  Type and screen ordered  Coags  COVID negative 22    d/w Dr. D'Amico       Assessment:  Present when checked    []  GCS  E   V  M     Heart Failure: []Acute, [] acute on chronic , []chronic  Heart Failure:  [] Diastolic (HFpEF), [] Systolic (HFrEF), []Combined (HFpEF and HFrEF), [] RHF, [] Pulm HTN, [] Other    [] ZAIDA, [] ATN, [] AIN, [] other  [] CKD1, [] CKD2, [] CKD 3, [] CKD 4, [] CKD 5, []ESRD    Encephalopathy: [] Metabolic, [] Hepatic, [] toxic, [] Neurological, [] Other    Abnormal Nurtitional Status: [] malnurtition (see nutrition note), [ ]underweight: BMI < 19, [] morbid obesity: BMI >40, [] Cachexia    [] Sepsis  [] hypovolemic shock,[] cardiogenic shock, [] hemorrhagic shock, [] neuogenic shock  [] Acute Respiratory Failure  []Cerebral edema, [] Brain compression/ herniation,   [] Functional quadriplegia  [] Acute blood loss anemia

## 2022-02-25 NOTE — PROGRESS NOTE ADULT - ASSESSMENT
56y/F with  ICH L thalamus, brain compression, cerebral edema  hydrocephalus, obstructive  Hypertension, prediabetes  superficial venous thrombosis R cephalic vein, L cephalic vein    PLAN:   NEURO: neurochecks q1h, PRN pain meds with acetaminophen  EVD @ 5cm H20 open to drain monitor ICPs, f/u final schedule for VPS; needs ID clearance  CT head today  REHAB:  physical therapy evaluation and management    EARLY MOB:      PULM:  Room air, incentive spirometry  CARDIO:  SBP goal 100-160mm Hg; cont amlodpine, hydralazine, labetalol, losartan  ENDO:  Blood sugar goals 140-180   GI:  bowel regimen  DIET: continue diet  RENAL:  Na goal 135-145, continue 3%@75cc/hr, BMP this afternoon, increase fludrocortisone to 0.2mg BID, continue salt tabs   HEM/ONC: Hb stable  VTE Prophylaxis: SCDs, SQL, repeat dopplers 02/28  ID: febrile, no leukocytosis; bromocriptine for central fever, continue cefepime (02/22 -   ) linezolid (02/20-  ) for now - as per ID, f/u repeat gallium scan; recheck PCT, panculture q3d whie fever  Social: will update family    ATTENDING ATTESTATION:  I was physically present for the key portions of the evaluation and management (E/M) service provided.  I agree with the above history, physical and plan, which I have reviewed and edited where appropriate.    Patient at high risk for neurological deterioration or death due to:  ICU delirium, aspiration PNA, DVT / PE.  Critical care time:  I have personally provided 45 minutes of critical care time, excluding time spent on separate procedures.      Plan discussed with RN, house staff. 56y/F with  ICH L thalamus, brain compression, cerebral edema  hydrocephalus, obstructive  Hypertension, prediabetes  superficial venous thrombosis R cephalic vein, L cephalic vein    PLAN:   NEURO: neurochecks q1h, PRN pain meds with acetaminophen, tramadol, NSAIDs, opiates  EVD @ 5cm H20 open to drain monitor ICPs, VPS next week  CT head monday  REHAB:  physical therapy evaluation and management    EARLY MOB:  OOB to chair, ambulate    PULM:  Room air, incentive spirometry  CARDIO:  SBP goal 100-160mm Hg; cont amlodpine, hydralazine, labetalol, losartan  ENDO:  Blood sugar goals 140-180   GI:  bowel regimen  DIET: continue diet  RENAL:  Na goal 135-145, continue 3%@50cc/hr, BMP this afternoon, fludrocortisone to 0.2mg BID, continue salt tabs   HEM/ONC: Hb stable  VTE Prophylaxis: SCDs, SQL, repeat UE dopplers 02/28  ID: afebrile, no leukocytosis; decrease bromocriptine to 5 mg PO q8h , continue cefepime (02/22 -   ) off linezolid (02/20- 02/24) for now - as per ID, f/u repeat gallium scan; panculture q3d whie fever  Social: will update family    ATTENDING ATTESTATION:  I was physically present for the key portions of the evaluation and management (E/M) service provided.  I agree with the above history, physical and plan, which I have reviewed and edited where appropriate.    Patient at high risk for neurological deterioration or death due to:  ICU delirium, aspiration PNA, DVT / PE.  Critical care time:  I have personally provided 45 minutes of critical care time, excluding time spent on separate procedures.      Plan discussed with RN, house staff.

## 2022-02-25 NOTE — CHART NOTE - NSCHARTNOTEFT_GEN_A_CORE
Admitting Diagnosis:   Patient is a 56y old  Female who presents with a chief complaint of ICH (25 Feb 2022 09:23)    PAST MEDICAL & SURGICAL HISTORY:  Hypertension  Pre-diabetes    Current Nutrition Order:  Minced and Moist diet    PO Intake: Good (%) [   ]  Fair (50-75%) [   ] Poor (<25%) [   ]    GI Issues:   WDL, last BM 2/23  No n/v/d/c  No abd distention noted    Pain:  No pain noted at this time    Skin Integrity:  Surgical incision, pal score 14, ecchymotic  No edema present  No pressure ulcers noted    Labs:   02-25    138  |  109<H>  |  3<L>  ----------------------------<  127<H>  3.3<L>   |  20<L>  |  0.29<L>    Ca    9.1      25 Feb 2022 05:41  Phos  2.5     02-25  Mg     1.9     02-25    Medications:  MEDICATIONS  (STANDING):  amLODIPine   Tablet 10 milliGRAM(s) Oral daily  bromocriptine Capsule 5 milliGRAM(s) Oral every 8 hours  cefepime   IVPB 2000 milliGRAM(s) IV Intermittent every 8 hours  chlorhexidine 2% Cloths 1 Application(s) Topical <User Schedule>  enoxaparin Injectable 40 milliGRAM(s) SubCutaneous at bedtime  fludroCORTISONE 0.2 milliGRAM(s) Oral every 12 hours  hydrALAZINE 100 milliGRAM(s) Oral every 8 hours  influenza   Vaccine 0.5 milliLiter(s) IntraMuscular once  labetalol 100 milliGRAM(s) Oral every 8 hours  lactobacillus acidophilus 1 Tablet(s) Oral daily  losartan 100 milliGRAM(s) Oral daily  polyethylene glycol 3350 17 Gram(s) Oral every 12 hours  potassium chloride  20 mEq/100 mL IVPB 20 milliEquivalent(s) IV Intermittent every 2 hours  senna 2 Tablet(s) Oral at bedtime  sodium chloride 3 Gram(s) Oral every 6 hours  sodium chloride 3%. 500 milliLiter(s) (50 mL/Hr) IV Continuous <Continuous>    MEDICATIONS  (PRN):  acetaminophen     Tablet .. 650 milliGRAM(s) Oral every 6 hours PRN Temp greater or equal to 38C (100.4F), Mild Pain (1 - 3)  albuterol/ipratropium for Nebulization 3 milliLiter(s) Nebulizer every 6 hours PRN Shortness of Breath and/or Wheezing  ibuprofen  Tablet. 400 milliGRAM(s) Oral every 8 hours PRN Temp greater or equal to 38C (100.4F), Moderate Pain (4 - 6)  traMADol 50 milliGRAM(s) Oral every 6 hours PRN Severe Pain (7 - 10)    Admitting Anthropometrics:  · Height for BMI (FEET)	5 Feet  · Height for BMI (INCHES)	1 Inch(s)  · Height for BMI (CENTIMETERS)	154.94 Centimeter(s)  · Weight for BMI (lbs)	167 lb  · Weight for BMI (kg)	75.7 kg  · Body Mass Index	31.5   · Ideal Body Weight (lbs)	105   · Ideal Body Weight (kg)	47.6     Weight:  2/12 167lbs  2/18 160lbs  2/25 167.5lbs    Weight Change: Pt weight has fluctuated during this admission, likely 2/2 changes in PO intake. Please cont to trend weight weekly.     Nutrition Focused Physical Exam: Completed [   ]  Not Pertinent [ x  ]  Pt meets ASPEN guidelines for Moderate Malnutrition based on two criteria. Please see subjective from 2/18 for further details.     Estimated energy needs:   Ideal body weight used for calculations as pt >120% of IBW (%IBW: 159). Adjusted for age, slight increase PRO for critically ill, wound healing, suspected malnutrition.   Kcal (25-30 kcal/kg): 3582-2477 kcal  Protein (1.2-1.4 g/kg pro): 57-67g pro  Fluids (25-30 ml/kg): 9061-4578 ml    Subjective:   55 y/o female with PMHx of HTN, pre-DM presents transferred from [facility] after c/o severe headache and nausea being found down by son covered in vomit. Initial CTH revealed left thalamic parenchymal hematoma with intraventricular hemorrhage. ICH score 2. NIHSS 18. s/p R frontal large bore EVD placement 2/12. Now s/p R frontal EVD placement- dropped 2/13. Pt weaned and extubated 2/14. S/p formal SLP eval 2/16 with recs for NPO + NGT except for tsp of water pending reassessment. Follow Up FEES 2/17 with recs for minced and moist diet. EN d/c 02/17 once diet was advanced. Pt was planned for  shunt placement 2/25, but now rescheduled for next week.     On assessment, p      Previous Nutrition Diagnosis: Increased Nutrient Needs RT increased physical demand AEB intubated, s/p R frontal EVD, plan for  shunt placement.      Active [   ]  Resolved [ x  ]    If resolved, new PES: Moderate PCM RT suspected inadequate energy intake AEB meeting <75% est needs x1 week, -7lb/ 4% wt loss x1 week.     Goal/Expected Outcome Pt to meet >75% EER with good tolerance via tolerable route.    Recommendations:  1. Cont with Minced and Moist diet  >>Recommend addition of Ensure Enlive BID (700 kcal, 40g protein, 360 mL free H2O)  2. Recommend addition of MVI daily  3. Pain and bowel regimen per team   4. Cont to monitor lytes and replete prn   5. RD diet edu prn    Education: Deferred at this time.     Risk Level: High [ x  ] Moderate [   ] Low [   ]. Admitting Diagnosis:   Patient is a 56y old  Female who presents with a chief complaint of ICH (25 Feb 2022 09:23)    PAST MEDICAL & SURGICAL HISTORY:  Hypertension  Pre-diabetes    Current Nutrition Order:  Minced and Moist diet    PO Intake: Good (%) [   ]  Fair (50-75%) [   ] Poor (<25%) [ x  ]- cont to require 1:1 feeding assistance.     GI Issues:   WDL, last BM 2/23  No n/v/d/c  No abd distention noted    Pain:  No pain noted at this time    Skin Integrity:  Surgical incision, pal score 14, ecchymotic  No edema present  No pressure ulcers noted    Labs:   02-25    138  |  109<H>  |  3<L>  ----------------------------<  127<H>  3.3<L>   |  20<L>  |  0.29<L>    Ca    9.1      25 Feb 2022 05:41  Phos  2.5     02-25  Mg     1.9     02-25    Medications:  MEDICATIONS  (STANDING):  amLODIPine   Tablet 10 milliGRAM(s) Oral daily  bromocriptine Capsule 5 milliGRAM(s) Oral every 8 hours  cefepime   IVPB 2000 milliGRAM(s) IV Intermittent every 8 hours  chlorhexidine 2% Cloths 1 Application(s) Topical <User Schedule>  enoxaparin Injectable 40 milliGRAM(s) SubCutaneous at bedtime  fludroCORTISONE 0.2 milliGRAM(s) Oral every 12 hours  hydrALAZINE 100 milliGRAM(s) Oral every 8 hours  influenza   Vaccine 0.5 milliLiter(s) IntraMuscular once  labetalol 100 milliGRAM(s) Oral every 8 hours  lactobacillus acidophilus 1 Tablet(s) Oral daily  losartan 100 milliGRAM(s) Oral daily  polyethylene glycol 3350 17 Gram(s) Oral every 12 hours  potassium chloride  20 mEq/100 mL IVPB 20 milliEquivalent(s) IV Intermittent every 2 hours  senna 2 Tablet(s) Oral at bedtime  sodium chloride 3 Gram(s) Oral every 6 hours  sodium chloride 3%. 500 milliLiter(s) (50 mL/Hr) IV Continuous <Continuous>    MEDICATIONS  (PRN):  acetaminophen     Tablet .. 650 milliGRAM(s) Oral every 6 hours PRN Temp greater or equal to 38C (100.4F), Mild Pain (1 - 3)  albuterol/ipratropium for Nebulization 3 milliLiter(s) Nebulizer every 6 hours PRN Shortness of Breath and/or Wheezing  ibuprofen  Tablet. 400 milliGRAM(s) Oral every 8 hours PRN Temp greater or equal to 38C (100.4F), Moderate Pain (4 - 6)  traMADol 50 milliGRAM(s) Oral every 6 hours PRN Severe Pain (7 - 10)    Admitting Anthropometrics:  · Height for BMI (FEET)	5 Feet  · Height for BMI (INCHES)	1 Inch(s)  · Height for BMI (CENTIMETERS)	154.94 Centimeter(s)  · Weight for BMI (lbs)	167 lb  · Weight for BMI (kg)	75.7 kg  · Body Mass Index	31.5   · Ideal Body Weight (lbs)	105   · Ideal Body Weight (kg)	47.6     Weight:  2/12 167lbs  2/18 160lbs  2/25 167.5lbs    Weight Change: Pt weight has fluctuated during this admission, likely 2/2 changes in PO intake. Please cont to trend weight weekly.     Nutrition Focused Physical Exam: Completed [   ]  Not Pertinent [ x  ]  Pt meets ASPEN guidelines for Moderate Malnutrition based on two criteria. Please see subjective from 2/18 for further details.     Estimated energy needs:   Ideal body weight used for calculations as pt >120% of IBW (%IBW: 159). Adjusted for age, slight increase PRO for critically ill, wound healing, suspected malnutrition.   Kcal (25-30 kcal/kg): 2240-5861 kcal  Protein (1.2-1.4 g/kg pro): 57-67g pro  Fluids (25-30 ml/kg): 7680-6579 ml    Subjective:   57 y/o female with PMHx of HTN, pre-DM presents transferred from [facility] after c/o severe headache and nausea being found down by son covered in vomit. Initial CTH revealed left thalamic parenchymal hematoma with intraventricular hemorrhage. ICH score 2. NIHSS 18. s/p R frontal large bore EVD placement 2/12. Now s/p R frontal EVD placement- dropped 2/13. Pt weaned and extubated 2/14. S/p formal SLP eval 2/16 with recs for NPO + NGT except for tsp of water pending reassessment. Follow Up FEES 2/17 with recs for minced and moist diet. EN d/c 02/17 once diet was advanced. Pt was planned for  shunt placement 2/25, but now rescheduled for next week.     On assessment, pt resting in bed comfortably. Per disc with RN pt has been in and out of it all morning (AOx2). Currently on Minced and Moist diet only consuming <25% of meals. Cont to requiring 1;1 feeding assistance- noted to only take 2 bites then refuse to eat any more of that specific food. No n/v/d/c. Last BM 2/23. Education deferred at this time. Please see nutr recs below. RD to follow.     Previous Nutrition Diagnosis: Moderate PCM RT suspected inadequate energy intake AEB meeting <75% est needs x1 week, -7lb/ 4% wt loss x1 week.     Active [ x  ]  Resolved [   ]    If resolved, new PES:     Goal/Expected Outcome Pt to meet >75% EER with good tolerance via tolerable route.    Recommendations:  1. Cont with Minced and Moist diet  >>Recommend addition of Ensure Enlive BID (700 kcal, 40g protein, 360 mL free H2O)  2. Recommend addition of MVI daily  3. Pain and bowel regimen per team   4. Cont to monitor lytes and replete prn   5. RD diet edu prn    Education: Deferred at this time.     Risk Level: High [ x  ] Moderate [   ] Low [   ].

## 2022-02-25 NOTE — PROGRESS NOTE ADULT - SUBJECTIVE AND OBJECTIVE BOX
=================================  NEUROCRITICAL CARE ATTENDING NOTE  =================================    GOLDEN CALHOUN   MRN-[id number]  Summary:  56y/F  with PMHx of HTN, pre-DM presents transferred from [facility] for intracranial hemorrhage. As per [place] ED provider and son, patient called son around 7:30-8:00AM c/o severe headache and nausea. Son immediately rushed to her house and found her out of bed, moaning and covered in her own vomit. During transit, EMS reported SBP within 240s with intractable vomiting and given Zofran 8mg. Upon arrival to [place] ED, GCS 7, SBP within 180s, patient was given Decadron 10mg, Keppra 1g, started on a Cardene drip, Propofol, Mannitol 1mg/kg. 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 Patient was intubated for airway support and transferred to [facility] for further management. ICH2, NIHSS 8.  Denies use of anticoagulants/antiplatelets.  (2022 16:36)    COURSE IN THE HOSPITAL:   admitted to [facility]   EVD clamped more lethargic, reopened   Tmax 38.4 EVD increased to 15 --> 20   Tmax 37.5 EVD clamped overnight    Past Medical History: No pertinent past medical history Hypertension Pre-diabetes  Allergies:  No Known Allergies  Home meds:   ·	losartan 100 mg oral tablet: 1 tab(s) orally once a day  ·	Vitamin D2: 5000 unit(s) orally once a week  ·	Xanax 0.5 mg oral tablet: 0.5 milligram(s) orally once a day, As Needed  ·	ZyrTEC 10 mg oral tablet: 1 tab(s) orally once a day    PHYSICAL EXAMINATION  T(C): 36.9 ( @ 05:57), Max: 37.5 ( @ 17:33) HR: 80 ( @ 06:00) (66 - 92) BP: 167/79 ( @ 06:00) (128/70 - 167/79) RR: 25 ( @ 06:00) (18 - 27) SpO2: 96% ( @ 06:00) (94% - 98%)   NEUROLOGIC EXAMINATION:  Patient is awake, alert, oriented x2, pupils 2-3mm equal and briskly reactive to light, EOMs intact, moves all 4s with good strength  GENERAL: not intubated, not in cardiorespiratory distress  EENT:  anicteric  CARDIOVASCULAR: (+) S1 S2, normal rate and regular rhythm  PULMONARY: clear to auscultation bilaterally  ABDOMEN: soft, nontender with normoactive bowel sounds  EXTREMITIES: no edema  SKIN: no rash    LABS:              (9.96)   10.9 (11.0)  9.31  )-----------( 338      ( 2022 05:40 )             33.8   (137)  138  |  109<H>  |  3<L>  ----------------------------<  127<H>  3.3<L>   |  20<L>  |  0.29<L>    Ca    9.1      2022 05:41  Phos  2.5       Mg     1.9      @ 07:01  -   @ 07:00  IN: 2334.8 mL / OUT: 3673 mL / NET: -1338.2 mL    Bacteriology:   CSF NGTD   Blood CS NG2D   Blood CS NG4D   Blood CS NG5D x2   Culture - CSF with Gram Stain (collected )   CSF NGTD   Urine:  >100K Kleb 80K citrobacter    CSF studies:  EEG:  Neuroimagin/24 CT head: stable vents, IVH, evolving ICH L thalamus, unchanged MLS   CT head: stable to improved vent size, evolving L thalamic ICH  Other imagin/16 CT chest: no PE, small consolidation lung bases   Gallium scan: increased uptake in gluteal region    MEDICATIONS:     ·	cefepime   IVPB 2000 IV Intermittent every 8 hours  ·	bromocriptine Capsule 10 Oral every 8 hours  ·	amLODIPine   Tablet 10 milliGRAM(s) Oral daily  ·	hydrALAZINE 100 milliGRAM(s) Oral every 8 hours  ·	labetalol 100 milliGRAM(s) Oral every 8 hours  ·	losartan 100 milliGRAM(s) Oral daily  ·	polyethylene glycol 3350 17 Oral every 12 hours  ·	senna 2 Oral at bedtime  ·	fludroCORTISONE 0.2 Oral every 12 hours  ·	influenza   Vaccine 0.5 IntraMuscular once  ·	lactobacillus acidophilus 1 Oral daily  ·	potassium chloride  20 mEq/100 mL IVPB 20 IV Intermittent every 2 hours  ·	sodium chloride 3 Oral every 6 hours  ·	acetaminophen     Tablet .. 650 Oral every 6 hours PRN  ·	albuterol/ipratropium for Nebulization 3 Nebulizer every 6 hours PRN  ·	ibuprofen  Tablet. 400 Oral every 8 hours PRN     IV FLUIDS: 3%@50  DRIPS:  DIET: minced & moist  Lines:  Drains:      External Ventricular Device @ 20cm H20 ICPs <10  output 223 mL  Wounds:    CODE STATUS:  Full Code                       GOALS OF CARE:  aggressive                      DISPOSITION:  ICU =================================  NEUROCRITICAL CARE ATTENDING NOTE  =================================    GOLDEN CALHOUN   MRN-[id number]  Summary:  56y/F  with PMHx of HTN, pre-DM presents transferred from [facility] for intracranial hemorrhage. As per [place] ED provider and son, patient called son around 7:30-8:00AM c/o severe headache and nausea. Son immediately rushed to her house and found her out of bed, moaning and covered in her own vomit. During transit, EMS reported SBP within 240s with intractable vomiting and given Zofran 8mg. Upon arrival to [place] ED, GCS 7, SBP within 180s, patient was given Decadron 10mg, Keppra 1g, started on a Cardene drip, Propofol, Mannitol 1mg/kg. 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 Patient was intubated for airway support and transferred to [facility] for further management. ICH2, NIHSS 8.  Denies use of anticoagulants/antiplatelets.  (2022 16:36)    COURSE IN THE HOSPITAL:   admitted to [facility]   EVD clamped more lethargic, reopened   Tmax 38.4 EVD increased to 15 --> 20   Tmax 37.5 EVD clamped overnight, ICP to 16, more lethargic and increasing headaches, openedat 10    Past Medical History: No pertinent past medical history Hypertension Pre-diabetes  Allergies:  No Known Allergies  Home meds:   ·	losartan 100 mg oral tablet: 1 tab(s) orally once a day  ·	Vitamin D2: 5000 unit(s) orally once a week  ·	Xanax 0.5 mg oral tablet: 0.5 milligram(s) orally once a day, As Needed  ·	ZyrTEC 10 mg oral tablet: 1 tab(s) orally once a day    PHYSICAL EXAMINATION  T(C): 36.9 ( @ 05:57), Max: 37.5 ( @ 17:33) HR: 80 ( @ 06:00) (66 - 92) BP: 167/79 ( @ 06:00) (128/70 - 167/79) RR: 25 ( @ 06:00) (18 - 27) SpO2: 96% ( @ 06:00) (94% - 98%)   NEUROLOGIC EXAMINATION:  Patient is lethargic, oriented x2, pupils 2-3mm equal and briskly reactive to light, EOMs intact, moves all 4s with good strength  GENERAL: not intubated, not in cardiorespiratory distress  EENT:  anicteric  CARDIOVASCULAR: (+) S1 S2, normal rate and regular rhythm  PULMONARY: clear to auscultation bilaterally  ABDOMEN: soft, nontender with normoactive bowel sounds  EXTREMITIES: no edema  SKIN: no rash    LABS:              (9.96)   10.9 (11.0)  9.31  )-----------( 338      ( 2022 05:40 )             33.8   (137)  138  |  109<H>  |  3<L>  ----------------------------<  127<H>  3.3<L>   |  20<L>  |  0.29<L>    Ca    9.1      2022 05:41  Phos  2.5       Mg     1.9      @ 07:01  -   @ 07:00  IN: 2334.8 mL / OUT: 3673 mL / NET: -1338.2 mL    Bacteriology:   CSF NGTD   Blood CS NG3D   Blood CS NG5D (final)   Blood CS NG5D x2   Culture - CSF with Gram Stain (collected )   CSF NGTD   Urine:  >100K Kleb 80K citrobacter    CSF studies:  EEG:  Neuroimagin/24 CT head: stable vents, IVH, evolving ICH L thalamus, unchanged MLS   CT head: stable to improved vent size, evolving L thalamic ICH  Other imagin/16 CT chest: no PE, small consolidation lung bases   Gallium scan: increased uptake in gluteal region    MEDICATIONS:     ·	cefepime   IVPB 2000 IV Intermittent every 8 hours  ·	bromocriptine Capsule 10 Oral every 8 hours  ·	amLODIPine   Tablet 10 milliGRAM(s) Oral daily  ·	hydrALAZINE 100 milliGRAM(s) Oral every 8 hours  ·	labetalol 100 milliGRAM(s) Oral every 8 hours  ·	losartan 100 milliGRAM(s) Oral daily  ·	polyethylene glycol 3350 17 Oral every 12 hours  ·	senna 2 Oral at bedtime  ·	fludroCORTISONE 0.2 Oral every 12 hours  ·	lactobacillus acidophilus 1 Oral daily  ·	potassium chloride  20 mEq/100 mL IVPB 20 IV Intermittent every 2 hours  ·	sodium chloride 3 Oral every 6 hours  ·	acetaminophen     Tablet .. 650 Oral every 6 hours PRN  ·	albuterol/ipratropium for Nebulization 3 Nebulizer every 6 hours PRN  ·	ibuprofen  Tablet. 400 Oral every 8 hours PRN     IV FLUIDS: 3%@50  DRIPS:  DIET: minced & moist  Lines: L IJ  Drains:      External Ventricular Device @ 10 cm H20 ICPs <10  output 223 mL  Wounds:    CODE STATUS:  Full Code                       GOALS OF CARE:  aggressive                      DISPOSITION:  ICU

## 2022-02-25 NOTE — PROVIDER CONTACT NOTE (OTHER) - ASSESSMENT
L pupil 4mm, brisk. R pupil 5 mm, brisk.  No effort to gravity in all extremities. Responds to name.

## 2022-02-25 NOTE — PROGRESS NOTE ADULT - SUBJECTIVE AND OBJECTIVE BOX
SUBJECTIVE: Patient seen and examined at bedside. dnies LE pain, swelling; changes in sensation or motor function;     amLODIPine   Tablet 10 milliGRAM(s) Oral daily  cefepime   IVPB 2000 milliGRAM(s) IV Intermittent every 8 hours  enoxaparin Injectable 40 milliGRAM(s) SubCutaneous at bedtime  hydrALAZINE 100 milliGRAM(s) Oral every 8 hours  labetalol 100 milliGRAM(s) Oral every 8 hours  losartan 100 milliGRAM(s) Oral daily    MEDICATIONS  (PRN):  acetaminophen     Tablet .. 650 milliGRAM(s) Oral every 6 hours PRN Temp greater or equal to 38C (100.4F), Mild Pain (1 - 3)  albuterol/ipratropium for Nebulization 3 milliLiter(s) Nebulizer every 6 hours PRN Shortness of Breath and/or Wheezing  ibuprofen  Tablet. 400 milliGRAM(s) Oral every 8 hours PRN Temp greater or equal to 38C (100.4F), Moderate Pain (4 - 6)  traMADol 50 milliGRAM(s) Oral every 6 hours PRN Severe Pain (7 - 10)      I&O's Detail    24 Feb 2022 07:01  -  25 Feb 2022 07:00  --------------------------------------------------------  IN:    IV PiggyBack: 200 mL    Oral Fluid: 240 mL    sodium chloride 3%: 525 mL    sodium chloride 3%: 750 mL  Total IN: 1715 mL    OUT:    External Ventricular Device (mL): 103 mL    Voided (mL): 3400 mL  Total OUT: 3503 mL    Total NET: -1788 mL      25 Feb 2022 07:01  -  25 Feb 2022 09:24  --------------------------------------------------------  IN:    IV PiggyBack: 100 mL    sodium chloride 3%: 100 mL  Total IN: 200 mL    OUT:    External Ventricular Device (mL): 12 mL    Voided (mL): 400 mL  Total OUT: 412 mL    Total NET: -212 mL          T(C): 36.9 (02-25-22 @ 05:57), Max: 37.5 (02-24-22 @ 17:33)  HR: 85 (02-25-22 @ 09:00) (66 - 92)  BP: 157/76 (02-25-22 @ 09:00) (128/70 - 167/79)  RR: 27 (02-25-22 @ 09:00) (18 - 27)  SpO2: 95% (02-25-22 @ 09:00) (95% - 98%)    General: NAD  Cardio: S1,S2, No MRG, RRR  Pulm: Lungs bilaterally clear to auscultation  Abdomen: Soft, NTND  Extremities: WWP, bilateral lower extremities nontender, nonedematous with no overlying skin changes.   Pulses: Bilateral fem, pop, dp/pt pulses palpable. Significant motor weakness of RLE  Neuro: AAOx3, no focal deficits     LABS:                        10.9   9.31  )-----------( 338      ( 25 Feb 2022 05:40 )             33.8     02-25    138  |  109<H>  |  3<L>  ----------------------------<  127<H>  3.3<L>   |  20<L>  |  0.29<L>    Ca    9.1      25 Feb 2022 05:41  Phos  2.5     02-25  Mg     1.9     02-25      PT/INR - ( 25 Feb 2022 05:40 )   PT: 13.3 sec;   INR: 1.12          PTT - ( 25 Feb 2022 05:40 )  PTT:29.0 sec      RADIOLOGY & ADDITIONAL STUDIES:      Culture - CSF with Gram Stain (collected 02-21-22 @ 15:05)  Source: .CSF CSF  Gram Stain (02-21-22 @ 15:27):    No organisms seen    Rare polymorphonuclear leukocytes  Preliminary Report (02-22-22 @ 08:10):    No growth to date    Culture - Blood (collected 02-21-22 @ 14:53)  Source: .Blood Blood-Venous  Preliminary Report (02-24-22 @ 15:00):    No growth at 3 days.

## 2022-02-25 NOTE — PROGRESS NOTE ADULT - SUBJECTIVE AND OBJECTIVE BOX
INTERVAL HPI/OVERNIGHT EVENTS:  Tm 99.5    CONSTITUTIONAL:  No fever, chills, night sweats  EYES:  No photophobia or visual changes  CARDIOVASCULAR:  No chest pain  RESPIRATORY:  No cough, wheezing, or SOB   GASTROINTESTINAL:  No nausea, vomiting, diarrhea, constipation, or abdominal pain  GENITOURINARY:  No frequency, urgency, dysuria or hematuria  NEUROLOGIC:  No headache, lightheadedness      ANTIBIOTICS/RELEVANT:  Linezolid 600 mg po q12h (2/20-present)  Cefepime 2 g IV q8h (2/22-present)    Cefazolin 2/12  Ceftriaxone 2/19-2/22          Vital Signs Last 24 Hrs  T(C): 36.2 (25 Feb 2022 14:15), Max: 37.5 (24 Feb 2022 17:33)  T(F): 97.1 (25 Feb 2022 14:15), Max: 99.5 (24 Feb 2022 17:33)  HR: 76 (25 Feb 2022 14:00) (66 - 86)  BP: 134/78 (25 Feb 2022 14:00) (128/70 - 167/79)  BP(mean): 101 (25 Feb 2022 14:00) (93 - 119)  RR: 24 (25 Feb 2022 14:00) (18 - 27)  SpO2: 98% (25 Feb 2022 14:00) (95% - 98%)    PHYSICAL EXAM:  Constitutional:  Well-developed, well nourished.  OOB to chair.  Mildly lethargic  Head:  R EVD with bloody CSF  Eyes:  Sclerae anicteric, conjunctivae clear, PERRL  Ear/Nose/Throat:  No nasal exudate or sinus tenderness;  No buccal mucosal lesions, no pharyngeal erythema or exudate	  Neck:  Supple, no adenopathy.  LIJ TLC  Respiratory:  Clear bilaterally  Cardiovascular:  RRR, S1S2, no murmur appreciated  Gastrointestinal:  Symmetric, normoactive BS, soft, NT, no masses, guarding or rebound.  No HSM.   :  Primafit in place.  Extremities:  No edema      LABS:                        10.9   9.31  )-----------( 338      ( 25 Feb 2022 05:40 )             33.8         02-25    138  |  105  |  4<L>  ----------------------------<  197<H>  3.7   |  19<L>  |  0.32<L>    Ca    9.8      25 Feb 2022 13:40  Phos  2.5     02-25  Mg     1.9     02-25            MICROBIOLOGY:    Blood cultures:  2/16 X 1 - CNS in anaerobic bottle, Enterococcus detected by PCR, not growth;  2/18 X 2 - NG;  2/19 X1 - NGTD;  2/21 X 1 - NGTD    Urine culture:  2/17:  Klebsiella pneumoniae and Citrobacter koseri    CSF:  2/18 - NGTD;  2/21 - NGTD    MRSA/MSSA PCR 2/18 - ND      RADIOLOGY & ADDITIONAL STUDIES:

## 2022-02-25 NOTE — PROGRESS NOTE ADULT - ASSESSMENT
57 y/o female with PMHx of HTN, pre-DM presents as transfer from [facility] for intracranial hemorrhage on 2/12.  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 Per primary team, LE dopplers were obtained on 2/23 for multiple site infiltrations and r/o DVT. Vascular surgery consulted for finding of DVT of right intramuscular calf vein as patient is unable to be anticoagulated in setting of recent bleed; currntely with palpable pulses distally and no swelling or calf pain.     Recommendations:  - No acute vascular surgery intervention at this time  - Obtain serial duplex exam on Monday, 2/28  - Vascular will continue to follow

## 2022-02-25 NOTE — PROGRESS NOTE ADULT - ASSESSMENT
55 yo F with HTN with L thalamic bleed (likely hypertensive) with fever, etiology unclear.  Initial single blood culture with CNS in 1/2 bottles, Enterococcus detected by PCR but did not grow - ?error.  Blood cultures from 2/18 NG  - and were obtained prior to antibiotics.  She has been on linezolid for thrombophlebitis, ceftriaxone for urine culture with Klebsiella and Citrobacter.  Unclear that she had UTI as fever did not respond to appropriate antibiotics and she had no symptoms at the time of the culture.  Source of fever  likely noninfectious.  She has R calf DVT and has been on bromocriptine for possible central fever since 2/21.  No source for infection seen on gallium scan.  She now is afebrile.  Suggest:  - Continue to f/u blood cultures 2/21  - D/C linezolid and cefepime, monitor off antibiotics  - No contra-indication to VPS from ID perspective.  Recommendations discussed with primary team.  Please recall if further ID input is desired - team 2.

## 2022-02-26 LAB
ANION GAP SERPL CALC-SCNC: 10 MMOL/L — SIGNIFICANT CHANGE UP (ref 5–17)
ANION GAP SERPL CALC-SCNC: 13 MMOL/L — SIGNIFICANT CHANGE UP (ref 5–17)
ANION GAP SERPL CALC-SCNC: 13 MMOL/L — SIGNIFICANT CHANGE UP (ref 5–17)
BUN SERPL-MCNC: 4 MG/DL — LOW (ref 7–23)
BUN SERPL-MCNC: 5 MG/DL — LOW (ref 7–23)
BUN SERPL-MCNC: 6 MG/DL — LOW (ref 7–23)
CALCIUM SERPL-MCNC: 9.3 MG/DL — SIGNIFICANT CHANGE UP (ref 8.4–10.5)
CALCIUM SERPL-MCNC: 9.5 MG/DL — SIGNIFICANT CHANGE UP (ref 8.4–10.5)
CALCIUM SERPL-MCNC: 9.8 MG/DL — SIGNIFICANT CHANGE UP (ref 8.4–10.5)
CHLORIDE SERPL-SCNC: 101 MMOL/L — SIGNIFICANT CHANGE UP (ref 96–108)
CHLORIDE SERPL-SCNC: 104 MMOL/L — SIGNIFICANT CHANGE UP (ref 96–108)
CHLORIDE SERPL-SCNC: 108 MMOL/L — SIGNIFICANT CHANGE UP (ref 96–108)
CO2 SERPL-SCNC: 21 MMOL/L — LOW (ref 22–31)
CREAT SERPL-MCNC: 0.29 MG/DL — LOW (ref 0.5–1.3)
CREAT SERPL-MCNC: 0.33 MG/DL — LOW (ref 0.5–1.3)
CREAT SERPL-MCNC: 0.35 MG/DL — LOW (ref 0.5–1.3)
CULTURE RESULTS: SIGNIFICANT CHANGE UP
GLUCOSE SERPL-MCNC: 107 MG/DL — HIGH (ref 70–99)
GLUCOSE SERPL-MCNC: 109 MG/DL — HIGH (ref 70–99)
GLUCOSE SERPL-MCNC: 123 MG/DL — HIGH (ref 70–99)
HCT VFR BLD CALC: 35.1 % — SIGNIFICANT CHANGE UP (ref 34.5–45)
HGB BLD-MCNC: 11.2 G/DL — LOW (ref 11.5–15.5)
MAGNESIUM SERPL-MCNC: 2 MG/DL — SIGNIFICANT CHANGE UP (ref 1.6–2.6)
MCHC RBC-ENTMCNC: 30 PG — SIGNIFICANT CHANGE UP (ref 27–34)
MCHC RBC-ENTMCNC: 31.9 GM/DL — LOW (ref 32–36)
MCV RBC AUTO: 94.1 FL — SIGNIFICANT CHANGE UP (ref 80–100)
NRBC # BLD: 0 /100 WBCS — SIGNIFICANT CHANGE UP (ref 0–0)
PHOSPHATE SERPL-MCNC: 2.7 MG/DL — SIGNIFICANT CHANGE UP (ref 2.5–4.5)
PLATELET # BLD AUTO: 363 K/UL — SIGNIFICANT CHANGE UP (ref 150–400)
POTASSIUM SERPL-MCNC: 3.5 MMOL/L — SIGNIFICANT CHANGE UP (ref 3.5–5.3)
POTASSIUM SERPL-MCNC: 3.6 MMOL/L — SIGNIFICANT CHANGE UP (ref 3.5–5.3)
POTASSIUM SERPL-MCNC: 4.1 MMOL/L — SIGNIFICANT CHANGE UP (ref 3.5–5.3)
POTASSIUM SERPL-SCNC: 3.5 MMOL/L — SIGNIFICANT CHANGE UP (ref 3.5–5.3)
POTASSIUM SERPL-SCNC: 3.6 MMOL/L — SIGNIFICANT CHANGE UP (ref 3.5–5.3)
POTASSIUM SERPL-SCNC: 4.1 MMOL/L — SIGNIFICANT CHANGE UP (ref 3.5–5.3)
RBC # BLD: 3.73 M/UL — LOW (ref 3.8–5.2)
RBC # FLD: 12.7 % — SIGNIFICANT CHANGE UP (ref 10.3–14.5)
SODIUM SERPL-SCNC: 135 MMOL/L — SIGNIFICANT CHANGE UP (ref 135–145)
SODIUM SERPL-SCNC: 138 MMOL/L — SIGNIFICANT CHANGE UP (ref 135–145)
SODIUM SERPL-SCNC: 139 MMOL/L — SIGNIFICANT CHANGE UP (ref 135–145)
SPECIMEN SOURCE: SIGNIFICANT CHANGE UP
WBC # BLD: 7.56 K/UL — SIGNIFICANT CHANGE UP (ref 3.8–10.5)
WBC # FLD AUTO: 7.56 K/UL — SIGNIFICANT CHANGE UP (ref 3.8–10.5)

## 2022-02-26 PROCEDURE — 99232 SBSQ HOSP IP/OBS MODERATE 35: CPT

## 2022-02-26 PROCEDURE — 95718 EEG PHYS/QHP 2-12 HR W/VEEG: CPT

## 2022-02-26 PROCEDURE — 99291 CRITICAL CARE FIRST HOUR: CPT

## 2022-02-26 RX ORDER — SODIUM,POTASSIUM PHOSPHATES 278-250MG
1 POWDER IN PACKET (EA) ORAL ONCE
Refills: 0 | Status: COMPLETED | OUTPATIENT
Start: 2022-02-26 | End: 2022-02-26

## 2022-02-26 RX ORDER — POTASSIUM CHLORIDE 20 MEQ
40 PACKET (EA) ORAL ONCE
Refills: 0 | Status: COMPLETED | OUTPATIENT
Start: 2022-02-26 | End: 2022-02-26

## 2022-02-26 RX ORDER — SODIUM CHLORIDE 5 G/100ML
500 INJECTION, SOLUTION INTRAVENOUS
Refills: 0 | Status: DISCONTINUED | OUTPATIENT
Start: 2022-02-26 | End: 2022-02-26

## 2022-02-26 RX ADMIN — POLYETHYLENE GLYCOL 3350 17 GRAM(S): 17 POWDER, FOR SOLUTION ORAL at 17:03

## 2022-02-26 RX ADMIN — Medication 1 PACKET(S): at 07:35

## 2022-02-26 RX ADMIN — Medication 100 MILLIGRAM(S): at 22:01

## 2022-02-26 RX ADMIN — BROMOCRIPTINE MESYLATE 5 MILLIGRAM(S): 5 CAPSULE ORAL at 22:01

## 2022-02-26 RX ADMIN — Medication 10 MILLIGRAM(S): at 16:54

## 2022-02-26 RX ADMIN — Medication 400 MILLIGRAM(S): at 00:08

## 2022-02-26 RX ADMIN — Medication 650 MILLIGRAM(S): at 08:07

## 2022-02-26 RX ADMIN — Medication 100 MILLIEQUIVALENT(S): at 00:09

## 2022-02-26 RX ADMIN — FLUDROCORTISONE ACETATE 0.2 MILLIGRAM(S): 0.1 TABLET ORAL at 17:02

## 2022-02-26 RX ADMIN — SODIUM CHLORIDE 3 GRAM(S): 9 INJECTION INTRAMUSCULAR; INTRAVENOUS; SUBCUTANEOUS at 17:03

## 2022-02-26 RX ADMIN — Medication 5 MILLIGRAM(S): at 17:02

## 2022-02-26 RX ADMIN — FLUDROCORTISONE ACETATE 0.2 MILLIGRAM(S): 0.1 TABLET ORAL at 05:21

## 2022-02-26 RX ADMIN — SODIUM CHLORIDE 50 MILLILITER(S): 5 INJECTION, SOLUTION INTRAVENOUS at 03:50

## 2022-02-26 RX ADMIN — SODIUM CHLORIDE 3 GRAM(S): 9 INJECTION INTRAMUSCULAR; INTRAVENOUS; SUBCUTANEOUS at 14:43

## 2022-02-26 RX ADMIN — Medication 1 TABLET(S): at 14:43

## 2022-02-26 RX ADMIN — ENOXAPARIN SODIUM 40 MILLIGRAM(S): 100 INJECTION SUBCUTANEOUS at 22:01

## 2022-02-26 RX ADMIN — POLYETHYLENE GLYCOL 3350 17 GRAM(S): 17 POWDER, FOR SOLUTION ORAL at 05:23

## 2022-02-26 RX ADMIN — LOSARTAN POTASSIUM 100 MILLIGRAM(S): 100 TABLET, FILM COATED ORAL at 05:21

## 2022-02-26 RX ADMIN — Medication 40 MILLIEQUIVALENT(S): at 07:35

## 2022-02-26 RX ADMIN — AMLODIPINE BESYLATE 10 MILLIGRAM(S): 2.5 TABLET ORAL at 05:20

## 2022-02-26 RX ADMIN — BROMOCRIPTINE MESYLATE 5 MILLIGRAM(S): 5 CAPSULE ORAL at 06:04

## 2022-02-26 RX ADMIN — Medication 100 MILLIGRAM(S): at 14:41

## 2022-02-26 RX ADMIN — Medication 100 MILLIGRAM(S): at 06:06

## 2022-02-26 RX ADMIN — Medication 100 MILLIGRAM(S): at 06:04

## 2022-02-26 RX ADMIN — SODIUM CHLORIDE 3 GRAM(S): 9 INJECTION INTRAMUSCULAR; INTRAVENOUS; SUBCUTANEOUS at 23:02

## 2022-02-26 RX ADMIN — SENNA PLUS 2 TABLET(S): 8.6 TABLET ORAL at 22:01

## 2022-02-26 RX ADMIN — BROMOCRIPTINE MESYLATE 5 MILLIGRAM(S): 5 CAPSULE ORAL at 14:43

## 2022-02-26 RX ADMIN — Medication 100 MILLIEQUIVALENT(S): at 02:31

## 2022-02-26 RX ADMIN — Medication 100 MILLIGRAM(S): at 22:02

## 2022-02-26 RX ADMIN — Medication 650 MILLIGRAM(S): at 09:10

## 2022-02-26 RX ADMIN — SODIUM CHLORIDE 3 GRAM(S): 9 INJECTION INTRAMUSCULAR; INTRAVENOUS; SUBCUTANEOUS at 05:20

## 2022-02-26 RX ADMIN — CHLORHEXIDINE GLUCONATE 1 APPLICATION(S): 213 SOLUTION TOPICAL at 05:24

## 2022-02-26 RX ADMIN — Medication 400 MILLIGRAM(S): at 00:30

## 2022-02-26 NOTE — PROGRESS NOTE ADULT - ASSESSMENT
56y/F with  ICH L thalamus, brain compression, cerebral edema  hydrocephalus, obstructive  Hypertension, prediabetes  superficial venous thrombosis R cephalic vein, L cephalic vein    PLAN:   NEURO: neurochecks q1h, PRN pain meds with acetaminophen, tramadol, NSAIDs, opiates  EVD @ 5cm H20 open to drain monitor ICPs, VPS next week  CT head monday  REHAB:  physical therapy evaluation and management    EARLY MOB:  OOB to chair, ambulate    PULM:  Room air, incentive spirometry  CARDIO:  SBP goal 100-160mm Hg; cont amlodpine, hydralazine, labetalol, losartan  ENDO:  Blood sugar goals 140-180   GI:  bowel regimen  DIET: continue diet  RENAL:  Na goal 135-145, continue 3%@50cc/hr, BMP this afternoon, fludrocortisone to 0.2mg BID, continue salt tabs   HEM/ONC: Hb stable  VTE Prophylaxis: SCDs, SQL, repeat UE dopplers 02/28  ID: afebrile, no leukocytosis; decrease bromocriptine to 5 mg PO q8h , continue cefepime (02/22 -   ) off linezolid (02/20- 02/24) for now - as per ID, f/u repeat gallium scan; panculture q3d whie fever  Social: will update family    ATTENDING ATTESTATION:  I was physically present for the key portions of the evaluation and management (E/M) service provided.  I agree with the above history, physical and plan, which I have reviewed and edited where appropriate.    Patient at high risk for neurological deterioration or death due to:  ICU delirium, aspiration PNA, DVT / PE.  Critical care time:  I have personally provided 45 minutes of critical care time, excluding time spent on separate procedures.      Plan discussed with RN, house staff. 56y/F with  ICH L thalamus, brain compression, cerebral edema  hydrocephalus, obstructive  Hypertension, prediabetes  superficial venous thrombosis R cephalic vein, L cephalic vein    PLAN:   NEURO: neurochecks q1h, PRN pain meds with acetaminophen, tramadol, NSAIDs, opiates  EVD @ 10cm H20 open to drain monitor ICPs, VPS next week  d/c bromocriptine tomorrow  EEG r/o seizures  CT head monday  REHAB:  physical therapy evaluation and management    EARLY MOB:  OOB to chair, ambulate    PULM:  Room air, incentive spirometry  CARDIO:  SBP goal 100-160mm Hg; cont amlodpine, hydralazine, labetalol, losartan  ENDO:  Blood sugar goals 140-180   GI:  bowel regimen  DIET: continue diet  RENAL:  Na goal 135-145, decrease 3%@30cc/hr, BMP this afternoon, fludrocortisone to 0.2mg BID, continue salt tabs   HEM/ONC: Hb stable  VTE Prophylaxis: SCDs, SQL, repeat UE dopplers 02/28  ID: afebrile, no leukocytosis; off cefepime (02/22 - 02/25  ) off linezolid (02/20- 02/24) for now - as per ID  Social: will update family    ATTENDING ATTESTATION:  I was physically present for the key portions of the evaluation and management (E/M) service provided.  I agree with the above history, physical and plan, which I have reviewed and edited where appropriate.    Patient at high risk for neurological deterioration or death due to:  ICU delirium, aspiration PNA, DVT / PE.  Critical care time:  I have personally provided 45 minutes of critical care time, excluding time spent on separate procedures.      Plan discussed with RN, house staff.

## 2022-02-26 NOTE — PROGRESS NOTE ADULT - SUBJECTIVE AND OBJECTIVE BOX
HPI:  57 y/o female with PMHx of HTN, pre-DM presents transferred from [facility] for intracranial hemorrhage. As per [place] ED provider and son, patient called son around 7:30-8:00AM c/o severe headache and nausea. Son immediately rushed to her house and found her out of bed, moaning and covered in her own vomit. During transit, EMS reported SBP within 240s with intractable vomiting and given Zofran 8mg. Upon arrival to [place] ED, GCS 7, SBP within 180s, patient was given Decadron 10mg, Keppra 1g, started on a Cardene drip, Propofol, Mannitol 1mg/kg. 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 Patient was intubated for airway support and transferred to [facility] for further management. ICH2, NIHSS 8.  Denies use of anticoagulants/antiplatelets.  (12 Feb 2022 16:36)    Hospital Course:  2/12: transferred from [facility]. R frontal EVD and R radial a-line placed. pend CTH/CTA. s/p 1L bolus for elevated lactate.   2/13: S/p R frontal EVD placedment @[id number] draining well. O/n pt w/ episode of possible storming; tachy, HTN, agitated, temp 100.2F, given 2 versed and 50mcg fentanyl w/ improvement in symptoms Neuro exam stable. Pt remains intubated and sedated, AN L>R. Trend lactate and abg. Amlodipine 5mg QD started for SBP goal < 140, cardene dc'd.  EVD dropped 2/13 at 1pm. propanolol and fent standing added for neuro storming, propofol switched to precedex, with resultant hypotension. Propanolol dc'd, fent changed to prn and precedex off, 1 L bolus given, levo prn   2/14: TOSHA overnight. Patient remains on propofol. EVD open at [id number]. ICPs WNL. Neuro exam stable. increased bowel regimen. started SQL. extubated on precedex. given 0.5 Ativan + Fentanyl pushes PRN for agitation. 500cc NS bolus.   2/15: BD#4.  On HFNC d/t desat to 88% on 70% non-rebreather. On 0.5 precedex   2/16: BD5, TOSHA overnight, on 4L NC, s/p vomiting yesterday, TF being held, formal S/S pending, off precedex. EVD open @ 5. Started on 3% at 30, tmax 100.7   2/17: BD# 6   tachycardic, PE protocol. cardene gtt. neuro unchanged. EVD @ 5cmH2O. 1g IV tylenol for 102.7 fever, duonebs standing to PRN. UA with reflux ordered. Hydralazine 25mg TID added, increased to 50q8. 500cc NS bolus x2. started on nystatin for thrush. started oxy 5q6 PRN for pain for tachycardia. repeat Na 146, decreased 3% @50, started salt tabs 2q6. FEES done. started minced and moist diet  2/18: BD 7. 3% Increased 75 cc/hr  2/19: BD8. TOSHA o/n neuro stable. 3%@75cc/hr. central line placed for vascular access  2/20: BD9. tmax 101.3 o/n, neuro stable. 3%@100cc/hr. cardene gtt restarted. Ibuprofen x1 given for fever, ok per Dr. D'Amico. Vanc d/c'd per ID (MRSA neg), treating UTI not bacteremia, likely contaminant. Linezolid started for suspected thrombophlebitis and GS + coverage.   2/21: BD#10 Overnight, IV tylenol given for headache. Right radial arterial line re-wired then discontinued. EVD clamped at 8AM. ICPs WNL. Neuro exam stable. Remains on 3% lowered to 75cc/hr. CTH stable, EVD raised to 20. Febrile 102 - CSF and blood cx sent. EVD lowered from 20 to 5 to drain more CSF and blood, increased lethargy. Ceftriaxone changed to cefepime per ID for broader coverage.   2/22: BD#11 TOSHA o/n. neuro at baseline. EVD at 23lcT0D, 3% kept at 25cc/hr   2/23: BD#12 TOSHA overnight. Remain on 3% saline. EVD [id number]. ICPs WNL. Neuro exam stable.  2/24: BD#13 TOSHA overnight, Remains on 3% saline. EVD open at 5cmH2O. ICPs WNL. Neuro exam stable. Pending part 2 gallium scan and CTH today  2/25: BD 14. TOSHA overnight. Remains on hypertonics. EVD clamped at 20 yesterday evening, possible shunt in the afternoon. Patient lethargic with worsening headache. EVD reopened at 10, Linezolid and cefepime d/c'ed   2/26: BD 15. increasing lethargic overnight, CTH stable and reviewed with Dr. D'Amico. Remains on a course of hypertonics. EVD reopened at 10 yesterday morning due to patient experiencing increased lethargy and headaches. Plan is for CTH monday.       Vital Signs Last 24 Hrs  T(C): 36.8 (25 Feb 2022 22:04), Max: 36.9 (25 Feb 2022 05:57)  T(F): 98.2 (25 Feb 2022 22:04), Max: 98.5 (25 Feb 2022 05:57)  HR: 77 (26 Feb 2022 00:00) (64 - 86)  BP: 154/82 (26 Feb 2022 00:00) (126/65 - 167/79)  BP(mean): 112 (26 Feb 2022 00:00) (90 - 119)  RR: 18 (26 Feb 2022 00:00) (17 - 27)  SpO2: 97% (26 Feb 2022 00:00) (95% - 98%)    I&O's Detail    24 Feb 2022 07:01  -  25 Feb 2022 07:00  --------------------------------------------------------  IN:    IV PiggyBack: 200 mL    Oral Fluid: 240 mL    sodium chloride 3%: 525 mL    sodium chloride 3%: 750 mL  Total IN: 1715 mL    OUT:    External Ventricular Device (mL): 103 mL    Voided (mL): 3400 mL  Total OUT: 3503 mL    Total NET: -1788 mL      25 Feb 2022 07:01  -  26 Feb 2022 01:21  --------------------------------------------------------  IN:    IV PiggyBack: 200 mL    Oral Fluid: 120 mL    sodium chloride 3%: 900 mL  Total IN: 1220 mL    OUT:    External Ventricular Device (mL): 80 mL    Voided (mL): 1650 mL  Total OUT: 1730 mL    Total NET: -510 mL        I&O's Summary    24 Feb 2022 07:01  -  25 Feb 2022 07:00  --------------------------------------------------------  IN: 1715 mL / OUT: 3503 mL / NET: -1788 mL    25 Feb 2022 07:01  -  26 Feb 2022 01:21  --------------------------------------------------------  IN: 1220 mL / OUT: 1730 mL / NET: -510 mL        PHYSICAL EXAM:  GEN: laying in bed, appears well, NAD  NEURO: AOx2 (self/place). FC, OE spont, speech intact, face symmetric. CNII-XII intact. PERRL, EOMI. LUE/LLE 5/5. RUE delts/triceps 3/5, biceps 2-3/5, RLE HF 2/5 o/w 4-/5   CV: RRR +S1/S2  PULM: CTAB  GI: Abd soft, NT/ND  EXT: ext warm, dry, nontender. b/l UE with multiple areas of erythema/induration from IV infiltration (improving)  Scalp EVD site C/D/I    TUBES/LINES:  [] CVC  [] A-line  [] Lumbar Drain  [] Ventriculostomy  [] Other    DIET:  [] NPO  [] Mechanical  [] Tube feeds    LABS:                        10.9   9.31  )-----------( 338      ( 25 Feb 2022 05:40 )             33.8     02-25    137  |  107  |  5<L>  ----------------------------<  139<H>  3.4<L>   |  20<L>  |  0.30<L>    Ca    9.3      25 Feb 2022 22:19  Phos  2.5     02-25  Mg     1.9     02-25      PT/INR - ( 25 Feb 2022 05:40 )   PT: 13.3 sec;   INR: 1.12          PTT - ( 25 Feb 2022 05:40 )  PTT:29.0 sec        CAPILLARY BLOOD GLUCOSE          Drug Levels: [] N/A  Vancomycin Level, Trough: 4.0 ug/mL (02-20 @ 03:47)  Vancomycin Level, Trough: 4.0 ug/mL (02-19 @ 04:50)    CSF Analysis: [] N/A      Allergies    No Known Allergies    Intolerances      MEDICATIONS:  Antibiotics:    Neuro:  acetaminophen     Tablet .. 650 milliGRAM(s) Oral every 6 hours PRN  bromocriptine Capsule 5 milliGRAM(s) Oral every 8 hours  ibuprofen  Tablet. 400 milliGRAM(s) Oral every 8 hours PRN  traMADol 50 milliGRAM(s) Oral every 6 hours PRN    Anticoagulation:  enoxaparin Injectable 40 milliGRAM(s) SubCutaneous at bedtime    OTHER:  albuterol/ipratropium for Nebulization 3 milliLiter(s) Nebulizer every 6 hours PRN  amLODIPine   Tablet 10 milliGRAM(s) Oral daily  chlorhexidine 2% Cloths 1 Application(s) Topical <User Schedule>  fludroCORTISONE 0.2 milliGRAM(s) Oral every 12 hours  hydrALAZINE 100 milliGRAM(s) Oral every 8 hours  influenza   Vaccine 0.5 milliLiter(s) IntraMuscular once  labetalol 100 milliGRAM(s) Oral every 8 hours  lactobacillus acidophilus 1 Tablet(s) Oral daily  losartan 100 milliGRAM(s) Oral daily  polyethylene glycol 3350 17 Gram(s) Oral every 12 hours  senna 2 Tablet(s) Oral at bedtime    IVF:  potassium chloride  20 mEq/100 mL IVPB 20 milliEquivalent(s) IV Intermittent every 1 hour  sodium chloride 3 Gram(s) Oral every 6 hours  sodium chloride 3%. 500 milliLiter(s) IV Continuous <Continuous>    CULTURES:  Culture Results:   No growth to date (02-21 @ 15:05)  Culture Results:   No growth at 4 days. (02-21 @ 14:53)    RADIOLOGY & ADDITIONAL TESTS:      ASSESSMENT:  Patient 57 y/o female with PMHx of HTN, pre-DM w/ left thalamic parenchymal hematoma with extensive intraventricular hemorrhage. ICH score 2. NIHSS 18. s/p R frontal large bore EVD placement 2/12/22      HEAD BLEED    Handoff    No pertinent past medical history    Hypertension    Pre-diabetes    ICH (intracerebral hemorrhage)    HTN (hypertension)    Pre-diabetes    Insertion of intravenous catheter with ultrasound guidance    Insertion of intravenous catheter with ultrasound guidance    [email or web address]        PLAN:  NEURO:  - neuro/vital q1h  - R frontal large bore EVD raised to 20, clamped on 2/24, re-opened at 10 2/25  - CT from 2/15 shows that R vent is now collapsed, however L vent still contains casted hemorrhagic clot, MLS is still unchanged at 4mm, with perihematomal edema (L thalamic)  - CTH 2/24 and 2/25 - stable   - ibuprofen/Tylenol prn for fever    - bromocriptine 5 mg q 8h     Cardio:   - SBP goal 100-160  - cont. amlodipine 10mg QD, losartan 100mg, Hydralazine 100 mg TID, labetalol 100 q8h   - echo 2/14: LVH, EF: 65-70%     PULM:  - extubated 2/14, on RA  - duonebs PRN   - IS     Renal:   - Normal sodium goal   - 3% @ 50/hr, salt tabs 3 q6, florinef 0.2 BID     GI:  - Diet: minced & moist diet, ensure added 2/25  - bowel regimen, last BM 2/23    HEME:  - SCDs only L leg/SQL for DVT ppx   - LE dopplers 2/23: R IM calf thrombus, repeat dopplers 2/28, vascular following   - UE dopp 2/23: superficial clots b/l cephalic , wam compresses and elevation    ENDO:  - prediabetic, a1c 5.8, glucose under control, no insulin req   - TSH wnl     ID:  - blood cx 2/16 +staph epidermidis  - urine cx 2/17 +citrobacter koseri and klebsiella  - ID following, vanco [2/18-2/20], ceftriaxone for UTI [2/19-22], cefepime [2/22- 2/25]  - linezolid for phlebilitis [2/20- 2/25]   - MRSA negative  - Gallium scan with increased uptake in gluteal region, no cellulitis on physicial exam, probably d/t positioning   - per ID no CI to VPS if needed     PSYCH:   - pt takes xanax at home    Dispo:   - ICU status  - full code  - family updated with plan    PT recs: AR    D/w Dr. D'Amico and Dr. Davidson      Assessment:  Present when checked    []  GCS  E   V  M     Heart Failure: []Acute, [] acute on chronic , []chronic  Heart Failure:  [] Diastolic (HFpEF), [] Systolic (HFrEF), []Combined (HFpEF and HFrEF), [] RHF, [] Pulm HTN, [] Other    [] ZAIDA, [] ATN, [] AIN, [] other  [] CKD1, [] CKD2, [] CKD 3, [] CKD 4, [] CKD 5, []ESRD    Encephalopathy: [] Metabolic, [] Hepatic, [] toxic, [] Neurological, [] Other    Abnormal Nurtitional Status: [] malnurtition (see nutrition note), [ ]underweight: BMI < 19, [] morbid obesity: BMI >40, [] Cachexia    [] Sepsis  [] hypovolemic shock,[] cardiogenic shock, [] hemorrhagic shock, [] neuogenic shock  [] Acute Respiratory Failure  []Cerebral edema, [] Brain compression/ herniation,   [] Functional quadriplegia  [] Acute blood loss anemia

## 2022-02-26 NOTE — PROGRESS NOTE ADULT - SUBJECTIVE AND OBJECTIVE BOX
=================================  NEUROCRITICAL CARE ATTENDING NOTE  =================================    GOLDEN CALHOUN   MRN-[id number]  Summary:  56y/F  with PMHx of HTN, pre-DM presents transferred from [facility] for intracranial hemorrhage. As per [place] ED provider and son, patient called son around 7:30-8:00AM c/o severe headache and nausea. Son immediately rushed to her house and found her out of bed, moaning and covered in her own vomit. During transit, EMS reported SBP within 240s with intractable vomiting and given Zofran 8mg. Upon arrival to [place] ED, GCS 7, SBP within 180s, patient was given Decadron 10mg, Keppra 1g, started on a Cardene drip, Propofol, Mannitol 1mg/kg. 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 Patient was intubated for airway support and transferred to [facility] for further management. ICH2, NIHSS 8.  Denies use of anticoagulants/antiplatelets.  (2022 16:36)    COURSE IN THE HOSPITAL:   admitted to [facility]   EVD clamped more lethargic, reopened   Tmax 38.4 EVD increased to 15 --> 20   Tmax 37.5 EVD clamped overnight, ICP to 16, more lethargic and increasing headaches, open at 10   Tmax 37    Past Medical History: No pertinent past medical history Hypertension Pre-diabetes  Allergies:  No Known Allergies  Home meds:   ·	losartan 100 mg oral tablet: 1 tab(s) orally once a day  ·	Vitamin D2: 5000 unit(s) orally once a week  ·	Xanax 0.5 mg oral tablet: 0.5 milligram(s) orally once a day, As Needed  ·	ZyrTEC 10 mg oral tablet: 1 tab(s) orally once a day    PHYSICAL EXAMINATION  T(C): 36.4 ( @ 04:40), Max: 37 ( @ 01:27) HR: 70 ( @ 07:00) (64 - 86) BP: 156/75 ( @ 07:00) (126/65 - 163/83) RR: 20 ( @ 07:00) (17 - 27) SpO2: 96% ( @ 07:00) (95% - 98%)  NEUROLOGIC EXAMINATION:  Patient is lethargic, oriented x2, pupils 2-3mm equal and briskly reactive to light, EOMs intact, moves all 4s with good strength  GENERAL: not intubated, not in cardiorespiratory distress  EENT:  anicteric  CARDIOVASCULAR: (+) S1 S2, normal rate and regular rhythm  PULMONARY: clear to auscultation bilaterally  ABDOMEN: soft, nontender with normoactive bowel sounds  EXTREMITIES: no edema  SKIN: no rash    LABS:     (9.31)  11.2  (10.9)  7.56  )-----------( 363      ( 2022 05:28 )             35.1     139  |  108  |  4<L>  ----------------------------<  107<H>  3.5   |  21<L>  |  0.29<L>    Ca    9.3      2022 05:28  Phos  2.7       Mg     2.0      @ 07:01  -   @ 07:00  IN: 2200 mL / OUT: 2775 mL / NET: -575 mL     Bacteriology:   CSF NGTD   Blood CS NG3D   Blood CS NG5D (final)   Blood CS NG5D x2   Culture - CSF with Gram Stain (collected )   CSF NGTD   Urine:  >100K Kleb 80K citrobacter    CSF studies:  EEG:  Neuroimagin/24 CT head: stable vents, IVH, evolving ICH L thalamus, unchanged MLS   CT head: stable to improved vent size, evolving L thalamic ICH  Other imagin/16 CT chest: no PE, small consolidation lung bases   Gallium scan: increased uptake in gluteal region    MEDICATIONS:     ·	enoxaparin Injectable 40 SubCutaneous at bedtime  ·	bromocriptine Capsule 5 Oral every 8 hours  ·	amLODIPine   Tablet 10 milliGRAM(s) Oral daily  ·	hydrALAZINE 100 milliGRAM(s) Oral every 8 hours  ·	labetalol 100 milliGRAM(s) Oral every 8 hours  ·	losartan 100 milliGRAM(s) Oral daily  ·	polyethylene glycol 3350 17 Oral every 12 hours  ·	senna 2 Oral at bedtime  ·	fludroCORTISONE 0.2 Oral every 12 hours  ·	lactobacillus acidophilus 1 Oral daily  ·	sodium chloride 3 Oral every 6 hours  ·	acetaminophen     Tablet .. 650 Oral every 6 hours PRN  ·	albuterol/ipratropium for Nebulization 3 Nebulizer every 6 hours PRN  ·	ibuprofen  Tablet. 400 Oral every 8 hours PRN  ·	traMADol 50 Oral every 6 hours PRN     IV FLUIDS: 3%@50  DRIPS:  DIET: minced & moist  Lines: L IJ  Drains:      External Ventricular Device @ 10 cm H20 ICPs <10  output 223 mL  Wounds:    CODE STATUS:  Full Code                       GOALS OF CARE:  aggressive                      DISPOSITION:  ICU =================================  NEUROCRITICAL CARE ATTENDING NOTE  =================================    GOLDEN CALHOUN   MRN-[id number]  Summary:  56y/F  with PMHx of HTN, pre-DM presents transferred from [facility] for intracranial hemorrhage. As per [place] ED provider and son, patient called son around 7:30-8:00AM c/o severe headache and nausea. Son immediately rushed to her house and found her out of bed, moaning and covered in her own vomit. During transit, EMS reported SBP within 240s with intractable vomiting and given Zofran 8mg. Upon arrival to [place] ED, GCS 7, SBP within 180s, patient was given Decadron 10mg, Keppra 1g, started on a Cardene drip, Propofol, Mannitol 1mg/kg. 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 Patient was intubated for airway support and transferred to [facility] for further management. ICH2, NIHSS 8.  Denies use of anticoagulants/antiplatelets.  (2022 16:36)    COURSE IN THE HOSPITAL:   admitted to [facility]   EVD clamped more lethargic, reopened   Tmax 38.4 EVD increased to 15 --> 20   Tmax 37.5 EVD clamped overnight, ICP to 16, more lethargic and increasing headaches, open at 10   Tmax 37 sudden unresponsiveness even to deep noxious stimuli ~10p.m. - CT NEG, improved without any other intervention    Past Medical History: No pertinent past medical history Hypertension Pre-diabetes  Allergies:  No Known Allergies  Home meds:   ·	losartan 100 mg oral tablet: 1 tab(s) orally once a day  ·	Vitamin D2: 5000 unit(s) orally once a week  ·	Xanax 0.5 mg oral tablet: 0.5 milligram(s) orally once a day, As Needed  ·	ZyrTEC 10 mg oral tablet: 1 tab(s) orally once a day    PHYSICAL EXAMINATION  T(C): 36.4 ( @ 04:40), Max: 37 ( @ 01:27) HR: 70 ( @ 07:00) (64 - 86) BP: 156/75 ( @ 07:00) (126/65 - 163/83) RR: 20 ( @ 07:00) (17 - 27) SpO2: 96% ( @ 07:00) (95% - 98%)  NEUROLOGIC EXAMINATION:  Patient is awake, alert oriented x2, pupils 2-3mm equal and briskly reactive to light, EOMs intact, moves all 4s with good strength  GENERAL: not intubated, not in cardiorespiratory distress  EENT:  anicteric  CARDIOVASCULAR: (+) S1 S2, normal rate and regular rhythm  PULMONARY: clear to auscultation bilaterally  ABDOMEN: soft, nontender with normoactive bowel sounds  EXTREMITIES: no edema  SKIN: no rash    LABS:     (9.31)  11.2  (10.9)  7.56  )-----------( 363      ( 2022 05:28 )             35.1     139  |  108  |  4<L>  ----------------------------<  107<H>  3.5   |  21<L>  |  0.29<L>    Ca    9.3      2022 05:28  Phos  2.7       Mg     2.0      @ 07:01  -   @ 07:00  IN: 2200 mL / OUT: 2775 mL / NET: -575 mL     Bacteriology:   CSF NGTD   Blood CS NG4D   Blood CS NG5D (final)   Blood CS NG5D x2   Culture - CSF with Gram Stain (collected )   CSF NGTD   Urine:  >100K Kleb 80K citrobacter    CSF studies:  EEG:  Neuroimagin/24 CT head: stable vents, IVH, evolving ICH L thalamus, unchanged MLS   CT head: stable to improved vent size, evolving L thalamic ICH  Other imagin/16 CT chest: no PE, small consolidation lung bases   Gallium scan: increased uptake in gluteal region    MEDICATIONS:     ·	enoxaparin Injectable 40 SubCutaneous at bedtime  ·	bromocriptine Capsule 5 Oral every 8 hours  ·	amLODIPine   Tablet 10 milliGRAM(s) Oral daily  ·	hydrALAZINE 100 milliGRAM(s) Oral every 8 hours  ·	labetalol 100 milliGRAM(s) Oral every 8 hours  ·	losartan 100 milliGRAM(s) Oral daily  ·	polyethylene glycol 3350 17 Oral every 12 hours  ·	senna 2 Oral at bedtime  ·	fludroCORTISONE 0.2 Oral every 12 hours  ·	lactobacillus acidophilus 1 Oral daily  ·	sodium chloride 3 Oral every 6 hours  ·	acetaminophen     Tablet .. 650 Oral every 6 hours PRN  ·	albuterol/ipratropium for Nebulization 3 Nebulizer every 6 hours PRN  ·	ibuprofen  Tablet. 400 Oral every 8 hours PRN  ·	traMADol 50 Oral every 6 hours PRN     IV FLUIDS: 3%@50  DRIPS:  DIET: minced & moist  Lines: L IJ  Drains:      External Ventricular Device @ 10 cm H20 ICPs <10 output 125 / 24h  Wounds:    CODE STATUS:  Full Code                       GOALS OF CARE:  aggressive                      DISPOSITION:  ICU

## 2022-02-27 LAB
ANION GAP SERPL CALC-SCNC: 11 MMOL/L — SIGNIFICANT CHANGE UP (ref 5–17)
ANION GAP SERPL CALC-SCNC: 12 MMOL/L — SIGNIFICANT CHANGE UP (ref 5–17)
BUN SERPL-MCNC: 6 MG/DL — LOW (ref 7–23)
BUN SERPL-MCNC: 8 MG/DL — SIGNIFICANT CHANGE UP (ref 7–23)
CALCIUM SERPL-MCNC: 9.5 MG/DL — SIGNIFICANT CHANGE UP (ref 8.4–10.5)
CALCIUM SERPL-MCNC: 9.5 MG/DL — SIGNIFICANT CHANGE UP (ref 8.4–10.5)
CHLORIDE SERPL-SCNC: 101 MMOL/L — SIGNIFICANT CHANGE UP (ref 96–108)
CHLORIDE SERPL-SCNC: 103 MMOL/L — SIGNIFICANT CHANGE UP (ref 96–108)
CO2 SERPL-SCNC: 21 MMOL/L — LOW (ref 22–31)
CO2 SERPL-SCNC: 21 MMOL/L — LOW (ref 22–31)
CREAT SERPL-MCNC: 0.3 MG/DL — LOW (ref 0.5–1.3)
CREAT SERPL-MCNC: 0.39 MG/DL — LOW (ref 0.5–1.3)
GLUCOSE SERPL-MCNC: 140 MG/DL — HIGH (ref 70–99)
GLUCOSE SERPL-MCNC: 196 MG/DL — HIGH (ref 70–99)
HCT VFR BLD CALC: 35.1 % — SIGNIFICANT CHANGE UP (ref 34.5–45)
HGB BLD-MCNC: 11.2 G/DL — LOW (ref 11.5–15.5)
MAGNESIUM SERPL-MCNC: 1.9 MG/DL — SIGNIFICANT CHANGE UP (ref 1.6–2.6)
MCHC RBC-ENTMCNC: 30 PG — SIGNIFICANT CHANGE UP (ref 27–34)
MCHC RBC-ENTMCNC: 31.9 GM/DL — LOW (ref 32–36)
MCV RBC AUTO: 94.1 FL — SIGNIFICANT CHANGE UP (ref 80–100)
NRBC # BLD: 0 /100 WBCS — SIGNIFICANT CHANGE UP (ref 0–0)
PHOSPHATE SERPL-MCNC: 3.2 MG/DL — SIGNIFICANT CHANGE UP (ref 2.5–4.5)
PLATELET # BLD AUTO: 386 K/UL — SIGNIFICANT CHANGE UP (ref 150–400)
POTASSIUM SERPL-MCNC: 3.3 MMOL/L — LOW (ref 3.5–5.3)
POTASSIUM SERPL-MCNC: 3.4 MMOL/L — LOW (ref 3.5–5.3)
POTASSIUM SERPL-SCNC: 3.3 MMOL/L — LOW (ref 3.5–5.3)
POTASSIUM SERPL-SCNC: 3.4 MMOL/L — LOW (ref 3.5–5.3)
RBC # BLD: 3.73 M/UL — LOW (ref 3.8–5.2)
RBC # FLD: 12.6 % — SIGNIFICANT CHANGE UP (ref 10.3–14.5)
SODIUM SERPL-SCNC: 134 MMOL/L — LOW (ref 135–145)
SODIUM SERPL-SCNC: 135 MMOL/L — SIGNIFICANT CHANGE UP (ref 135–145)
WBC # BLD: 8.04 K/UL — SIGNIFICANT CHANGE UP (ref 3.8–10.5)
WBC # FLD AUTO: 8.04 K/UL — SIGNIFICANT CHANGE UP (ref 3.8–10.5)

## 2022-02-27 PROCEDURE — 76937 US GUIDE VASCULAR ACCESS: CPT | Mod: 26,77

## 2022-02-27 PROCEDURE — 95720 EEG PHY/QHP EA INCR W/VEEG: CPT

## 2022-02-27 PROCEDURE — 36000 PLACE NEEDLE IN VEIN: CPT | Mod: 76

## 2022-02-27 PROCEDURE — 76937 US GUIDE VASCULAR ACCESS: CPT | Mod: 26

## 2022-02-27 PROCEDURE — 99291 CRITICAL CARE FIRST HOUR: CPT

## 2022-02-27 PROCEDURE — 99232 SBSQ HOSP IP/OBS MODERATE 35: CPT

## 2022-02-27 PROCEDURE — 36000 PLACE NEEDLE IN VEIN: CPT

## 2022-02-27 RX ORDER — SODIUM CHLORIDE 5 G/100ML
500 INJECTION, SOLUTION INTRAVENOUS
Refills: 0 | Status: DISCONTINUED | OUTPATIENT
Start: 2022-02-27 | End: 2022-03-01

## 2022-02-27 RX ORDER — POTASSIUM CHLORIDE 20 MEQ
40 PACKET (EA) ORAL
Refills: 0 | Status: COMPLETED | OUTPATIENT
Start: 2022-02-27 | End: 2022-02-27

## 2022-02-27 RX ORDER — POTASSIUM CHLORIDE 20 MEQ
20 PACKET (EA) ORAL
Refills: 0 | Status: COMPLETED | OUTPATIENT
Start: 2022-02-27 | End: 2022-02-27

## 2022-02-27 RX ADMIN — SODIUM CHLORIDE 3 GRAM(S): 9 INJECTION INTRAMUSCULAR; INTRAVENOUS; SUBCUTANEOUS at 23:09

## 2022-02-27 RX ADMIN — SODIUM CHLORIDE 25 MILLILITER(S): 5 INJECTION, SOLUTION INTRAVENOUS at 04:58

## 2022-02-27 RX ADMIN — Medication 100 MILLIGRAM(S): at 13:30

## 2022-02-27 RX ADMIN — LOSARTAN POTASSIUM 100 MILLIGRAM(S): 100 TABLET, FILM COATED ORAL at 05:01

## 2022-02-27 RX ADMIN — CHLORHEXIDINE GLUCONATE 1 APPLICATION(S): 213 SOLUTION TOPICAL at 05:03

## 2022-02-27 RX ADMIN — Medication 40 MILLIEQUIVALENT(S): at 17:03

## 2022-02-27 RX ADMIN — ENOXAPARIN SODIUM 40 MILLIGRAM(S): 100 INJECTION SUBCUTANEOUS at 21:01

## 2022-02-27 RX ADMIN — Medication 5 MILLIGRAM(S): at 05:02

## 2022-02-27 RX ADMIN — Medication 40 MILLIEQUIVALENT(S): at 14:13

## 2022-02-27 RX ADMIN — Medication 1 TABLET(S): at 11:02

## 2022-02-27 RX ADMIN — AMLODIPINE BESYLATE 10 MILLIGRAM(S): 2.5 TABLET ORAL at 05:02

## 2022-02-27 RX ADMIN — Medication 100 MILLIGRAM(S): at 21:01

## 2022-02-27 RX ADMIN — Medication 100 MILLIEQUIVALENT(S): at 07:42

## 2022-02-27 RX ADMIN — SODIUM CHLORIDE 3 GRAM(S): 9 INJECTION INTRAMUSCULAR; INTRAVENOUS; SUBCUTANEOUS at 11:02

## 2022-02-27 RX ADMIN — Medication 100 MILLIGRAM(S): at 06:09

## 2022-02-27 RX ADMIN — Medication 100 MILLIGRAM(S): at 06:08

## 2022-02-27 RX ADMIN — FLUDROCORTISONE ACETATE 0.2 MILLIGRAM(S): 0.1 TABLET ORAL at 17:04

## 2022-02-27 RX ADMIN — SODIUM CHLORIDE 3 GRAM(S): 9 INJECTION INTRAMUSCULAR; INTRAVENOUS; SUBCUTANEOUS at 05:01

## 2022-02-27 RX ADMIN — Medication 5 MILLIGRAM(S): at 17:04

## 2022-02-27 RX ADMIN — BROMOCRIPTINE MESYLATE 5 MILLIGRAM(S): 5 CAPSULE ORAL at 06:09

## 2022-02-27 RX ADMIN — Medication 100 MILLIEQUIVALENT(S): at 06:27

## 2022-02-27 RX ADMIN — SENNA PLUS 2 TABLET(S): 8.6 TABLET ORAL at 21:01

## 2022-02-27 RX ADMIN — SODIUM CHLORIDE 3 GRAM(S): 9 INJECTION INTRAMUSCULAR; INTRAVENOUS; SUBCUTANEOUS at 17:04

## 2022-02-27 RX ADMIN — FLUDROCORTISONE ACETATE 0.2 MILLIGRAM(S): 0.1 TABLET ORAL at 05:00

## 2022-02-27 RX ADMIN — POLYETHYLENE GLYCOL 3350 17 GRAM(S): 17 POWDER, FOR SOLUTION ORAL at 05:02

## 2022-02-27 RX ADMIN — POLYETHYLENE GLYCOL 3350 17 GRAM(S): 17 POWDER, FOR SOLUTION ORAL at 17:04

## 2022-02-27 NOTE — PROCEDURE NOTE - NSPROCDETAILS_GEN_ALL_CORE
location identified, draped/prepped, sterile technique used/blood seen on insertion/dressing applied/flushes easily/secured in place/sterile technique, catheter placed Under ultrasound guidance, a 20gauge IV was placed in the left upper arm without complication./location identified, draped/prepped, sterile technique used/blood seen on insertion/dressing applied/flushes easily/secured in place/sterile technique, catheter placed

## 2022-02-27 NOTE — PROGRESS NOTE ADULT - SUBJECTIVE AND OBJECTIVE BOX
HPI:  55 y/o female with PMHx of HTN, pre-DM presents transferred from [facility] for intracranial hemorrhage. As per [place] ED provider and son, patient called son around 7:30-8:00AM c/o severe headache and nausea. Son immediately rushed to her house and found her out of bed, moaning and covered in her own vomit. During transit, EMS reported SBP within 240s with intractable vomiting and given Zofran 8mg. Upon arrival to [place] ED, GCS 7, SBP within 180s, patient was given Decadron 10mg, Keppra 1g, started on a Cardene drip, Propofol, Mannitol 1mg/kg. 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 Patient was intubated for airway support and transferred to [facility] for further management. ICH2, NIHSS 8.  Denies use of anticoagulants/antiplatelets.  (12 Feb 2022 16:36)    Hospital Course:  2/12: transferred from [place]. R frontal EVD and R radial a-line placed. pend CTH/CTA. s/p 1L bolus for elevated lactate.   2/13: S/p R frontal EVD placedment @[id number] draining well. O/n pt w/ episode of possible storming; tachy, HTN, agitated, temp 100.2F, given 2 versed and 50mcg fentanyl w/ improvement in symptoms Neuro exam stable. Pt remains intubated and sedated, AN L>R. Trend lactate and abg. Amlodipine 5mg QD started for SBP goal < 140, cardene dc'd.  EVD dropped 2/13 at 1pm. propanolol and fent standing added for neuro storming, propofol switched to precedex, with resultant hypotension. Propanolol dc'd, fent changed to prn and precedex off, 1 L bolus given, levo prn   2/14: TOSHA overnight. Patient remains on propofol. EVD open at 54hlA7F. ICPs WNL. Neuro exam stable. increased bowel regimen. started SQL. extubated on precedex. given 0.5 Ativan + Fentanyl pushes PRN for agitation. 500cc NS bolus.   2/15: BD#4.  On HFNC d/t desat to 88% on 70% non-rebreather. On 0.5 precedex   2/16: BD5, TOSHA overnight, on 4L NC, s/p vomiting yesterday, TF being held, formal S/S pending, off precedex. EVD open @ 5. Started on 3% at 30, tmax 100.7   2/17: BD# 6   tachycardic, PE protocol. cardene gtt. neuro unchanged. EVD @ 5cmH2O. 1g IV tylenol for 102.7 fever, duonebs standing to PRN. UA with reflux ordered. Hydralazine 25mg TID added, increased to 50q8. 500cc NS bolus x2. started on nystatin for thrush. started oxy 5q6 PRN for pain for tachycardia. repeat Na 146, decreased 3% @50, started salt tabs 2q6. FEES done. started minced and moist diet  2/18: BD 7. 3% Increased 75 cc/hr  2/19: BD8. TOSHA o/n neuro stable. 3%@75cc/hr. central line placed for vascular access  2/20: BD9. tmax 101.3 o/n, neuro stable. 3%@100cc/hr. cardene gtt restarted. Ibuprofen x1 given for fever, ok per Dr. D'Amico. Vanc d/c'd per ID (MRSA neg), treating UTI not bacteremia, likely contaminant. Linezolid started for suspected thrombophlebitis and GS + coverage.   2/21: BD#10 Overnight, IV tylenol given for headache. Right radial arterial line re-wired then discontinued. EVD clamped at 8AM. ICPs WNL. Neuro exam stable. Remains on 3% lowered to 75cc/hr. CTH stable, EVD raised to 20. Febrile 102 - CSF and blood cx sent. EVD lowered from 20 to 5 to drain more CSF and blood, increased lethargy. Ceftriaxone changed to cefepime per ID for broader coverage.   2/22: BD#11 TOSHA o/n. neuro at baseline. EVD at 25peA0V, 3% kept at 25cc/hr   2/23: BD#12 TOSHA overnight. Remain on 3% saline. EVD [id number]. ICPs WNL. Neuro exam stable.  2/24: BD#13 TOSHA overnight, Remains on 3% saline. EVD open at 5cmH2O. ICPs WNL. Neuro exam stable. Pending part 2 gallium scan and CTH today  2/25: BD 14. TOSHA overnight. Remains on hypertonics. EVD clamped at 20 yesterday evening, possible shunt in the afternoon. Patient lethargic with worsening headache. EVD reopened at 10, Linezolid and cefepime d/c'ed   2/26: BD 15. increasing lethargic overnight, CTH stable and reviewed with Dr. D'Amico. Remains on a course of hypertonics. EVD reopened at 10 yesterday morning due to patient experiencing increased lethargy and headaches. Plan is for CTH monday.   2/27: BD 16. TOSHA overnight. vEEG placed yesterday for waxing/waning exam. EVD remains open at 10. plan is likely to shunt next week.     Vital Signs Last 24 Hrs  T(C): 37.5 (26 Feb 2022 21:07), Max: 37.5 (26 Feb 2022 21:07)  T(F): 99.5 (26 Feb 2022 21:07), Max: 99.5 (26 Feb 2022 21:07)  HR: 91 (27 Feb 2022 00:00) (69 - 91)  BP: 130/69 (27 Feb 2022 00:00) (130/69 - 158/84)  BP(mean): 91 (27 Feb 2022 00:00) (90 - 114)  RR: 26 (27 Feb 2022 00:00) (17 - 26)  SpO2: 95% (27 Feb 2022 00:00) (95% - 98%)    I&O's Detail    25 Feb 2022 07:01  -  26 Feb 2022 07:00  --------------------------------------------------------  IN:    IV PiggyBack: 400 mL    Oral Fluid: 600 mL    sodium chloride 3%: 1200 mL  Total IN: 2200 mL    OUT:    External Ventricular Device (mL): 125 mL    Voided (mL): 2650 mL  Total OUT: 2775 mL    Total NET: -575 mL      26 Feb 2022 07:01  -  27 Feb 2022 00:13  --------------------------------------------------------  IN:    Oral Fluid: 1800 mL    sodium chloride 3%: 240 mL    sodium chloride 3%: 50 mL  Total IN: 2090 mL    OUT:    External Ventricular Device (mL): 146 mL    Voided (mL): 2100 mL  Total OUT: 2246 mL    Total NET: -156 mL        I&O's Summary    25 Feb 2022 07:01  -  26 Feb 2022 07:00  --------------------------------------------------------  IN: 2200 mL / OUT: 2775 mL / NET: -575 mL    26 Feb 2022 07:01  -  27 Feb 2022 00:13  --------------------------------------------------------  IN: 2090 mL / OUT: 2246 mL / NET: -156 mL        PHYSICAL EXAM:  GEN: laying in bed, appears well, NAD  NEURO: AOx2 (self/place). FC, OE spont, speech intact, face symmetric. CNII-XII intact. PERRL, EOMI. Moving all extremities x 4 anti-gravity  CV: RRR +S1/S2  PULM: CTAB  GI: Abd soft, NT/ND  EXT: ext warm, dry, nontender. b/l UE with multiple areas of erythema/induration from IV infiltration (improving)  Scalp EVD site C/D/I    TUBES/LINES:  [] CVC  [] A-line  [] Lumbar Drain  [] Ventriculostomy  [] Other    DIET:  [] NPO  [] Mechanical  [] Tube feeds    LABS:                        11.2   7.56  )-----------( 363      ( 26 Feb 2022 05:28 )             35.1     02-26    135  |  101  |  6<L>  ----------------------------<  123<H>  3.6   |  21<L>  |  0.35<L>    Ca    9.5      26 Feb 2022 21:54  Phos  2.7     02-26  Mg     2.0     02-26      PT/INR - ( 25 Feb 2022 05:40 )   PT: 13.3 sec;   INR: 1.12          PTT - ( 25 Feb 2022 05:40 )  PTT:29.0 sec        CAPILLARY BLOOD GLUCOSE          Drug Levels: [] N/A  Vancomycin Level, Trough: 4.0 ug/mL (02-20 @ 03:47)    CSF Analysis: [] N/A      Allergies    No Known Allergies    Intolerances      MEDICATIONS:  Antibiotics:    Neuro:  acetaminophen     Tablet .. 650 milliGRAM(s) Oral every 6 hours PRN  bromocriptine Capsule 5 milliGRAM(s) Oral every 8 hours  ibuprofen  Tablet. 400 milliGRAM(s) Oral every 8 hours PRN  traMADol 50 milliGRAM(s) Oral every 6 hours PRN    Anticoagulation:  enoxaparin Injectable 40 milliGRAM(s) SubCutaneous at bedtime    OTHER:  albuterol/ipratropium for Nebulization 3 milliLiter(s) Nebulizer every 6 hours PRN  amLODIPine   Tablet 10 milliGRAM(s) Oral daily  bisacodyl 5 milliGRAM(s) Oral every 12 hours  chlorhexidine 2% Cloths 1 Application(s) Topical <User Schedule>  fludroCORTISONE 0.2 milliGRAM(s) Oral every 12 hours  hydrALAZINE 100 milliGRAM(s) Oral every 8 hours  influenza   Vaccine 0.5 milliLiter(s) IntraMuscular once  labetalol 100 milliGRAM(s) Oral every 8 hours  lactobacillus acidophilus 1 Tablet(s) Oral daily  losartan 100 milliGRAM(s) Oral daily  polyethylene glycol 3350 17 Gram(s) Oral every 12 hours  senna 2 Tablet(s) Oral at bedtime    IVF:  sodium chloride 3 Gram(s) Oral every 6 hours    CULTURES:  Culture Results:   No growth to date (02-21 @ 15:05)  Culture Results:   No growth at 5 days. (02-21 @ 14:53)    RADIOLOGY & ADDITIONAL TESTS:      ASSESSMENT:  Patient 55 y/o female with PMHx of HTN, pre-DM w/ left thalamic parenchymal hematoma with extensive intraventricular hemorrhage. ICH score 2. NIHSS 18. s/p R frontal large bore EVD placement 2/12/22    HEAD BLEED    Handoff    No pertinent past medical history    Hypertension    Pre-diabetes    ICH (intracerebral hemorrhage)    HTN (hypertension)    Pre-diabetes    Insertion of intravenous catheter with ultrasound guidance    Insertion of intravenous catheter with ultrasound guidance    [email or web address]        PLAN:  NEURO:  - neuro/vital q1h  - R frontal large bore EVD raised to 20, clamped on 2/24, re-opened at 10 2/25  - CT from 2/15 shows that R vent is now collapsed, however L vent still contains casted hemorrhagic clot, MLS is still unchanged at 4mm, with perihematomal edema (L thalamic)  - CTH 2/24 and 2/25 - stable   - ibuprofen/Tylenol prn for fever    - bromocriptine 5 mg q 8h   - vEEG for fluctuating mental status - left hemisphere slowing, no seizures.     Cardio:   - SBP goal 100-160  - cont. amlodipine 10mg QD, losartan 100mg, Hydralazine 100 mg TID, labetalol 100 q8h   - echo 2/14: LVH, EF: 65-70%     PULM:  - extubated 2/14, on RA  - duonebs PRN   - IS     Renal:   - Normal sodium goal   - salt tabs 3 q6, florinef 0.2 BID     GI:  - Diet: minced & moist diet, ensure added 2/25  - bowel regimen, last BM 2/23    HEME:  - SCDs only L leg/SQL for DVT ppx   - LE dopplers 2/23: R IM calf thrombus, repeat dopplers 2/28, vascular following   - UE dopp 2/23: superficial clots b/l cephalic , wam compresses and elevation    ENDO:  - prediabetic, a1c 5.8, glucose under control, no insulin req   - TSH wnl     ID:  - blood cx 2/16 +staph epidermidis  - urine cx 2/17 +citrobacter koseri and klebsiella  - ID following, vanco [2/18-2/20], ceftriaxone for UTI [2/19-22], cefepime [2/22- 2/25]  - linezolid for phlebilitis [2/20- 2/25]   - MRSA negative  - Gallium scan with increased uptake in gluteal region, no cellulitis on physicial exam, probably d/t positioning   - per ID no CI to VPS if needed     PSYCH:   - pt takes xanax at home    Dispo:   - ICU status  - full code  - family updated with plan    PT recs: AR    D/w Dr. D'Amico and Dr. Davidson        Assessment:  Present when checked    []  GCS  E   V  M     Heart Failure: []Acute, [] acute on chronic , []chronic  Heart Failure:  [] Diastolic (HFpEF), [] Systolic (HFrEF), []Combined (HFpEF and HFrEF), [] RHF, [] Pulm HTN, [] Other    [] ZAIDA, [] ATN, [] AIN, [] other  [] CKD1, [] CKD2, [] CKD 3, [] CKD 4, [] CKD 5, []ESRD    Encephalopathy: [] Metabolic, [] Hepatic, [] toxic, [] Neurological, [] Other    Abnormal Nurtitional Status: [] malnurtition (see nutrition note), [ ]underweight: BMI < 19, [] morbid obesity: BMI >40, [] Cachexia    [] Sepsis  [] hypovolemic shock,[] cardiogenic shock, [] hemorrhagic shock, [] neuogenic shock  [] Acute Respiratory Failure  []Cerebral edema, [] Brain compression/ herniation,   [] Functional quadriplegia  [] Acute blood loss anemia

## 2022-02-27 NOTE — PROVIDER CONTACT NOTE (OTHER) - ACTION/TREATMENT ORDERED:
HYACINTH Rivers assessed pt at bedside. Central line removed completely. Pending CXR and peripheral access.

## 2022-02-27 NOTE — PROCEDURE NOTE - ATTENDING PROVIDER
Dr. D'Amico

## 2022-02-27 NOTE — PROGRESS NOTE ADULT - SUBJECTIVE AND OBJECTIVE BOX
=================================  NEUROCRITICAL CARE ATTENDING NOTE  =================================    GOLDEN CALHOUN   MRN-[id number]  Summary:  56y/F  with PMHx of HTN, pre-DM presents transferred from [facility] for intracranial hemorrhage. As per [place] ED provider and son, patient called son around 7:30-8:00AM c/o severe headache and nausea. Son immediately rushed to her house and found her out of bed, moaning and covered in her own vomit. During transit, EMS reported SBP within 240s with intractable vomiting and given Zofran 8mg. Upon arrival to [place] ED, GCS 7, SBP within 180s, patient was given Decadron 10mg, Keppra 1g, started on a Cardene drip, Propofol, Mannitol 1mg/kg. 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 Patient was intubated for airway support and transferred to [facility] for further management. ICH2, NIHSS 8.  Denies use of anticoagulants/antiplatelets.  (2022 16:36)    COURSE IN THE HOSPITAL:   admitted to [facility]   EVD clamped more lethargic, reopened   Tmax 38.4 EVD increased to 15 --> 20   Tmax 37.5 EVD clamped overnight, ICP to 16, more lethargic and increasing headaches, open at 10   Tmax 37 sudden unresponsiveness even to deep noxious stimuli ~10p.m. - CT NEG, improved without any other intervention       Past Medical History: No pertinent past medical history Hypertension Pre-diabetes  Allergies:  No Known Allergies  Home meds:   ·	losartan 100 mg oral tablet: 1 tab(s) orally once a day  ·	Vitamin D2: 5000 unit(s) orally once a week  ·	Xanax 0.5 mg oral tablet: 0.5 milligram(s) orally once a day, As Needed  ·	ZyrTEC 10 mg oral tablet: 1 tab(s) orally once a day    PHYSICAL EXAMINATION  T(C): 37.5 ( @ 05:43), Max: 37.5 ( @ 21:07) HR: 80 ( @ 07:00) (69 - 91) BP: 123/67 ( @ 07:00) (123/67 - 155/67) RR: 23 ( @ 07:00) (19 - 26) SpO2: 95% ( @ 07:00) (94% - 98%)  NEUROLOGIC EXAMINATION:  Patient is awake, alert oriented x2, pupils 2-3mm equal and briskly reactive to light, EOMs intact, moves all 4s with good strength  GENERAL: not intubated, not in cardiorespiratory distress  EENT:  anicteric  CARDIOVASCULAR: (+) S1 S2, normal rate and regular rhythm  PULMONARY: clear to auscultation bilaterally  ABDOMEN: soft, nontender with normoactive bowel sounds  EXTREMITIES: no edema  SKIN: no rash    LABS:                         11.2   8.04  )-----------( 386      ( 2022 04:14 )             35.1   (135, 138)  134<L>  |  101  |  8   ----------------------------<  196<H>  3.4<L>   |  21<L>  |  0.39<L>    Ca    9.5      2022 04:14  Phos  3.2       Mg     1.9      @ 07:01  -   @ 07:00  IN: 2605 mL / OUT: 2397 mL / NET: 208 mL     Bacteriology:   CSF NGTD   Blood CS NG5D (final)   Blood CS NG5D (final)   Blood CS NG5D x2   Culture - CSF with Gram Stain (collected )   CSF NGTD   Urine:  >100K Kleb 80K citrobacter    CSF studies:  EEG:  Neuroimagin/24 CT head: stable vents, IVH, evolving ICH L thalamus, unchanged MLS   CT head: stable to improved vent size, evolving L thalamic ICH  Other imagin/16 CT chest: no PE, small consolidation lung bases   Gallium scan: increased uptake in gluteal region    MEDICATIONS: 02-27    ·	enoxaparin Injectable 40 SubCutaneous at bedtime  ·	bromocriptine Capsule 5 Oral every 8 hours  ·	amLODIPine   Tablet 10 milliGRAM(s) Oral daily  ·	hydrALAZINE 100 milliGRAM(s) Oral every 8 hours  ·	labetalol 100 milliGRAM(s) Oral every 8 hours  ·	losartan 100 milliGRAM(s) Oral daily  ·	bisacodyl 5 Oral every 12 hours  ·	polyethylene glycol 3350 17 Oral every 12 hours  ·	senna 2 Oral at bedtime  ·	fludroCORTISONE 0.2 Oral every 12 hours  ·	lactobacillus acidophilus 1 Oral daily  ·	sodium chloride 3 Oral every 6 hours  ·	acetaminophen     Tablet .. 650 Oral every 6 hours PRN  ·	albuterol/ipratropium for Nebulization 3 Nebulizer every 6 hours PRN  ·	ibuprofen  Tablet. 400 Oral every 8 hours PRN  ·	traMADol 50 Oral every 6 hours PRN     IV FLUIDS: 3%@50  DRIPS:  DIET: minced & moist  Lines: L IJ  Drains:      External Ventricular Device @ 10 cm H20 ICPs <10 output 125 / 24h  Wounds:    CODE STATUS:  Full Code                       GOALS OF CARE:  aggressive                      DISPOSITION:  ICU =================================  NEUROCRITICAL CARE ATTENDING NOTE  =================================    GOLDEN CALHOUN   MRN-[id number]  Summary:  56y/F  with PMHx of HTN, pre-DM presents transferred from [facility] for intracranial hemorrhage. As per [place] ED provider and son, patient called son around 7:30-8:00AM c/o severe headache and nausea. Son immediately rushed to her house and found her out of bed, moaning and covered in her own vomit. During transit, EMS reported SBP within 240s with intractable vomiting and given Zofran 8mg. Upon arrival to [place] ED, GCS 7, SBP within 180s, patient was given Decadron 10mg, Keppra 1g, started on a Cardene drip, Propofol, Mannitol 1mg/kg. 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 Patient was intubated for airway support and transferred to [facility] for further management. ICH2, NIHSS 8.  Denies use of anticoagulants/antiplatelets.  (2022 16:36)    COURSE IN THE HOSPITAL:   admitted to [facility]   EVD clamped more lethargic, reopened   Tmax 38.4 EVD increased to 15 --> 20   Tmax 37.5 EVD clamped overnight, ICP to 16, more lethargic and increasing headaches, open at 10   Tmax 37 sudden unresponsiveness even to deep noxious stimuli ~10p.m. - CT NEG, improved without any other intervention; EEG negative   periods lethargy overnight (not worse than previous nights)    Past Medical History: No pertinent past medical history Hypertension Pre-diabetes  Allergies:  No Known Allergies  Home meds:   ·	losartan 100 mg oral tablet: 1 tab(s) orally once a day  ·	Vitamin D2: 5000 unit(s) orally once a week  ·	Xanax 0.5 mg oral tablet: 0.5 milligram(s) orally once a day, As Needed  ·	ZyrTEC 10 mg oral tablet: 1 tab(s) orally once a day    PHYSICAL EXAMINATION  T(C): 37.5 ( @ 05:43), Max: 37.5 ( @ 21:07) HR: 80 ( @ 07:00) (69 - 91) BP: 123/67 ( @ 07:00) (123/67 - 155/67) RR: 23 ( @ 07:00) (19 - 26) SpO2: 95% ( @ 07:00) (94% - 98%)  NEUROLOGIC EXAMINATION:  Patient is awake, alert oriented x2-3, pupils 2-3mm equal and briskly reactive to light, EOMs intact, moves all 4s with good strength  GENERAL: not intubated, not in cardiorespiratory distress  EENT:  anicteric  CARDIOVASCULAR: (+) S1 S2, normal rate and regular rhythm  PULMONARY: clear to auscultation bilaterally  ABDOMEN: soft, nontender with normoactive bowel sounds  EXTREMITIES: no edema  SKIN: no rash    LABS:                         11.2   8.04  )-----------( 386      ( 2022 04:14 )             35.1   (135, 138)  134<L>  |  101  |  8   ----------------------------<  196<H>  3.4<L>   |  21<L>  |  0.39<L>    Ca    9.5      2022 04:14  Phos  3.2       Mg     1.9      @ 07:01  -   @ 07:00  IN: 2605 mL / OUT: 2397 mL / NET: 208 mL     Bacteriology:   CSF NGTD   Blood CS NG5D (final)   Blood CS NG5D (final)   Blood CS NG5D x2   Culture - CSF with Gram Stain (collected )   CSF NGTD   Urine:  >100K Kleb 80K citrobacter    CSF studies:  EEG:  Neuroimagin/24 CT head: stable vents, IVH, evolving ICH L thalamus, unchanged MLS   CT head: stable to improved vent size, evolving L thalamic ICH  Other imagin/16 CT chest: no PE, small consolidation lung bases   Gallium scan: increased uptake in gluteal region    MEDICATIONS:     ·	enoxaparin Injectable 40 SubCutaneous at bedtime  ·	bromocriptine Capsule 5 Oral every 8 hours  ·	amLODIPine   Tablet 10 milliGRAM(s) Oral daily  ·	hydrALAZINE 100 milliGRAM(s) Oral every 8 hours  ·	labetalol 100 milliGRAM(s) Oral every 8 hours  ·	losartan 100 milliGRAM(s) Oral daily  ·	bisacodyl 5 Oral every 12 hours  ·	polyethylene glycol 3350 17 Oral every 12 hours  ·	senna 2 Oral at bedtime  ·	fludroCORTISONE 0.2 Oral every 12 hours  ·	lactobacillus acidophilus 1 Oral daily  ·	sodium chloride 3 Oral every 6 hours  ·	acetaminophen     Tablet .. 650 Oral every 6 hours PRN  ·	albuterol/ipratropium for Nebulization 3 Nebulizer every 6 hours PRN  ·	ibuprofen  Tablet. 400 Oral every 8 hours PRN  ·	traMADol 50 Oral every 6 hours PRN     IV FLUIDS: 3%@25  DRIPS:  DIET: minced & moist  Lines: L IJ  Drains:      External Ventricular Device @ 10 cm H20 ICPs <10 output 191 / 24h  Wounds:    CODE STATUS:  Full Code                       GOALS OF CARE:  aggressive                      DISPOSITION:  ICU

## 2022-02-28 LAB
ANION GAP SERPL CALC-SCNC: 11 MMOL/L — SIGNIFICANT CHANGE UP (ref 5–17)
ANION GAP SERPL CALC-SCNC: 11 MMOL/L — SIGNIFICANT CHANGE UP (ref 5–17)
BUN SERPL-MCNC: 8 MG/DL — SIGNIFICANT CHANGE UP (ref 7–23)
BUN SERPL-MCNC: 9 MG/DL — SIGNIFICANT CHANGE UP (ref 7–23)
CALCIUM SERPL-MCNC: 9.4 MG/DL — SIGNIFICANT CHANGE UP (ref 8.4–10.5)
CALCIUM SERPL-MCNC: 9.5 MG/DL — SIGNIFICANT CHANGE UP (ref 8.4–10.5)
CHLORIDE SERPL-SCNC: 103 MMOL/L — SIGNIFICANT CHANGE UP (ref 96–108)
CHLORIDE SERPL-SCNC: 105 MMOL/L — SIGNIFICANT CHANGE UP (ref 96–108)
CO2 SERPL-SCNC: 21 MMOL/L — LOW (ref 22–31)
CO2 SERPL-SCNC: 22 MMOL/L — SIGNIFICANT CHANGE UP (ref 22–31)
CREAT SERPL-MCNC: 0.34 MG/DL — LOW (ref 0.5–1.3)
CREAT SERPL-MCNC: 0.42 MG/DL — LOW (ref 0.5–1.3)
EGFR: 114 ML/MIN/1.73M2 — SIGNIFICANT CHANGE UP
GLUCOSE SERPL-MCNC: 118 MG/DL — HIGH (ref 70–99)
GLUCOSE SERPL-MCNC: 125 MG/DL — HIGH (ref 70–99)
HCT VFR BLD CALC: 36.9 % — SIGNIFICANT CHANGE UP (ref 34.5–45)
HGB BLD-MCNC: 12 G/DL — SIGNIFICANT CHANGE UP (ref 11.5–15.5)
MAGNESIUM SERPL-MCNC: 1.9 MG/DL — SIGNIFICANT CHANGE UP (ref 1.6–2.6)
MCHC RBC-ENTMCNC: 30.7 PG — SIGNIFICANT CHANGE UP (ref 27–34)
MCHC RBC-ENTMCNC: 32.5 GM/DL — SIGNIFICANT CHANGE UP (ref 32–36)
MCV RBC AUTO: 94.4 FL — SIGNIFICANT CHANGE UP (ref 80–100)
NRBC # BLD: 0 /100 WBCS — SIGNIFICANT CHANGE UP (ref 0–0)
PHOSPHATE SERPL-MCNC: 3.2 MG/DL — SIGNIFICANT CHANGE UP (ref 2.5–4.5)
PLATELET # BLD AUTO: 378 K/UL — SIGNIFICANT CHANGE UP (ref 150–400)
POTASSIUM SERPL-MCNC: 3.3 MMOL/L — LOW (ref 3.5–5.3)
POTASSIUM SERPL-MCNC: 3.7 MMOL/L — SIGNIFICANT CHANGE UP (ref 3.5–5.3)
POTASSIUM SERPL-SCNC: 3.3 MMOL/L — LOW (ref 3.5–5.3)
POTASSIUM SERPL-SCNC: 3.7 MMOL/L — SIGNIFICANT CHANGE UP (ref 3.5–5.3)
RBC # BLD: 3.91 M/UL — SIGNIFICANT CHANGE UP (ref 3.8–5.2)
RBC # FLD: 13 % — SIGNIFICANT CHANGE UP (ref 10.3–14.5)
SODIUM SERPL-SCNC: 136 MMOL/L — SIGNIFICANT CHANGE UP (ref 135–145)
SODIUM SERPL-SCNC: 137 MMOL/L — SIGNIFICANT CHANGE UP (ref 135–145)
WBC # BLD: 6.62 K/UL — SIGNIFICANT CHANGE UP (ref 3.8–10.5)
WBC # FLD AUTO: 6.62 K/UL — SIGNIFICANT CHANGE UP (ref 3.8–10.5)

## 2022-02-28 PROCEDURE — 70450 CT HEAD/BRAIN W/O DYE: CPT | Mod: 26

## 2022-02-28 PROCEDURE — 99291 CRITICAL CARE FIRST HOUR: CPT

## 2022-02-28 PROCEDURE — 93970 EXTREMITY STUDY: CPT | Mod: 26

## 2022-02-28 RX ORDER — MAGNESIUM SULFATE 500 MG/ML
1 VIAL (ML) INJECTION ONCE
Refills: 0 | Status: COMPLETED | OUTPATIENT
Start: 2022-02-28 | End: 2022-02-28

## 2022-02-28 RX ORDER — POTASSIUM CHLORIDE 20 MEQ
40 PACKET (EA) ORAL ONCE
Refills: 0 | Status: COMPLETED | OUTPATIENT
Start: 2022-02-28 | End: 2022-02-28

## 2022-02-28 RX ORDER — POTASSIUM CHLORIDE 20 MEQ
20 PACKET (EA) ORAL
Refills: 0 | Status: COMPLETED | OUTPATIENT
Start: 2022-02-28 | End: 2022-02-28

## 2022-02-28 RX ORDER — POTASSIUM CHLORIDE 20 MEQ
40 PACKET (EA) ORAL
Refills: 0 | Status: DISCONTINUED | OUTPATIENT
Start: 2022-02-28 | End: 2022-02-28

## 2022-02-28 RX ADMIN — POLYETHYLENE GLYCOL 3350 17 GRAM(S): 17 POWDER, FOR SOLUTION ORAL at 17:11

## 2022-02-28 RX ADMIN — SODIUM CHLORIDE 3 GRAM(S): 9 INJECTION INTRAMUSCULAR; INTRAVENOUS; SUBCUTANEOUS at 23:04

## 2022-02-28 RX ADMIN — CHLORHEXIDINE GLUCONATE 1 APPLICATION(S): 213 SOLUTION TOPICAL at 05:17

## 2022-02-28 RX ADMIN — Medication 100 MILLIGRAM(S): at 14:02

## 2022-02-28 RX ADMIN — Medication 5 MILLIGRAM(S): at 05:15

## 2022-02-28 RX ADMIN — Medication 100 MILLIGRAM(S): at 05:15

## 2022-02-28 RX ADMIN — LOSARTAN POTASSIUM 100 MILLIGRAM(S): 100 TABLET, FILM COATED ORAL at 05:16

## 2022-02-28 RX ADMIN — POLYETHYLENE GLYCOL 3350 17 GRAM(S): 17 POWDER, FOR SOLUTION ORAL at 05:15

## 2022-02-28 RX ADMIN — SODIUM CHLORIDE 25 MILLILITER(S): 5 INJECTION, SOLUTION INTRAVENOUS at 14:02

## 2022-02-28 RX ADMIN — AMLODIPINE BESYLATE 10 MILLIGRAM(S): 2.5 TABLET ORAL at 05:17

## 2022-02-28 RX ADMIN — SODIUM CHLORIDE 3 GRAM(S): 9 INJECTION INTRAMUSCULAR; INTRAVENOUS; SUBCUTANEOUS at 11:24

## 2022-02-28 RX ADMIN — SODIUM CHLORIDE 3 GRAM(S): 9 INJECTION INTRAMUSCULAR; INTRAVENOUS; SUBCUTANEOUS at 05:15

## 2022-02-28 RX ADMIN — Medication 100 GRAM(S): at 06:21

## 2022-02-28 RX ADMIN — Medication 20 MILLIEQUIVALENT(S): at 11:24

## 2022-02-28 RX ADMIN — Medication 40 MILLIEQUIVALENT(S): at 17:44

## 2022-02-28 RX ADMIN — Medication 100 MILLIGRAM(S): at 05:16

## 2022-02-28 RX ADMIN — FLUDROCORTISONE ACETATE 0.2 MILLIGRAM(S): 0.1 TABLET ORAL at 05:14

## 2022-02-28 RX ADMIN — FLUDROCORTISONE ACETATE 0.2 MILLIGRAM(S): 0.1 TABLET ORAL at 17:10

## 2022-02-28 RX ADMIN — Medication 100 MILLIGRAM(S): at 22:59

## 2022-02-28 RX ADMIN — Medication 1 TABLET(S): at 11:25

## 2022-02-28 RX ADMIN — SODIUM CHLORIDE 3 GRAM(S): 9 INJECTION INTRAMUSCULAR; INTRAVENOUS; SUBCUTANEOUS at 17:11

## 2022-02-28 RX ADMIN — SENNA PLUS 2 TABLET(S): 8.6 TABLET ORAL at 22:57

## 2022-02-28 RX ADMIN — Medication 3 MILLILITER(S): at 18:00

## 2022-02-28 RX ADMIN — Medication 20 MILLIEQUIVALENT(S): at 08:40

## 2022-02-28 RX ADMIN — Medication 20 MILLIEQUIVALENT(S): at 06:33

## 2022-02-28 RX ADMIN — Medication 5 MILLIGRAM(S): at 17:13

## 2022-02-28 RX ADMIN — ENOXAPARIN SODIUM 40 MILLIGRAM(S): 100 INJECTION SUBCUTANEOUS at 22:57

## 2022-02-28 NOTE — PROGRESS NOTE ADULT - SUBJECTIVE AND OBJECTIVE BOX
=================================  NEUROCRITICAL CARE ATTENDING NOTE  =================================    GOLDEN CALHOUN   MRN-[id number]  Summary:  56y/F  with PMHx of HTN, pre-DM presents transferred from [facility] for intracranial hemorrhage. As per [place] ED provider and son, patient called son around 7:30-8:00AM c/o severe headache and nausea. Son immediately rushed to her house and found her out of bed, moaning and covered in her own vomit. During transit, EMS reported SBP within 240s with intractable vomiting and given Zofran 8mg. Upon arrival to [facility] ED, GCS 7, SBP within 180s, patient was given Decadron 10mg, Keppra 1g, started on a Cardene drip, Propofol, Mannitol 1mg/kg. 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 Patient was intubated for airway support and transferred to [facility] for further management. ICH2, NIHSS 8.  Denies use of anticoagulants/antiplatelets.  (2022 16:36)    COURSE IN THE HOSPITAL:   admitted to [facility]   EVD clamped more lethargic, reopened   Tmax 38.4 EVD increased to 15 --> 20   Tmax 37.5 EVD clamped overnight, ICP to 16, more lethargic and increasing headaches, open at 10   Tmax 37 sudden unresponsiveness even to deep noxious stimuli ~10p.m. - CT NEG, improved without any other intervention; EEG negative   periods lethargy overnight (not worse than previous nights)   No significant events overnight.     Past Medical History: No pertinent past medical history Hypertension Pre-diabetes  Allergies:  No Known Allergies  Home meds:   ·	losartan 100 mg oral tablet: 1 tab(s) orally once a day  ·	Vitamin D2: 5000 unit(s) orally once a week  ·	Xanax 0.5 mg oral tablet: 0.5 milligram(s) orally once a day, As Needed  ·	ZyrTEC 10 mg oral tablet: 1 tab(s) orally once a day    PHYSICAL EXAMINATION  T(C): 36.9 ( @ 05:01), Max: 37.5 ( @ 20:00) HR: 78 ( @ 06:00) (77 - 86) BP: 143/70 ( @ 06:00) (123/67 - 148/67) RR: 19 ( @ 06:00) (16 - 28) SpO2: 96% ( @ 06:00) (94% - 99%)  NEUROLOGIC EXAMINATION:  Patient is awake, alert oriented x2, pupils 2-3mm equal and briskly reactive to light, EOMs intact, moves all 4s with good strength, RUE drift  GENERAL: not intubated, not in cardiorespiratory distress  EENT:  anicteric  CARDIOVASCULAR: (+) S1 S2, normal rate and regular rhythm  PULMONARY: clear to auscultation bilaterally  ABDOMEN: soft, nontender with normoactive bowel sounds  EXTREMITIES: no edema  SKIN: no rash    LABS:     (8.04)   12.0 (11.2)  6.62  )-----------( 378      ( 2022 05:22 )             36.9     136  |  103  |  8   ----------------------------<  118<H>  3.3<L>   |  22  |  0.34<L>    Ca    9.5      2022 05:22  Phos  3.2       Mg     1.9      @ 07:01  -   @ 07:00  IN: 2605 mL / OUT: 2397 mL / NET: 208 mL     Bacteriology:   CSF NGTD   Blood CS NG5D (final)   Blood CS NG5D (final)   Blood CS NG5D x2   Culture - CSF with Gram Stain (collected )   CSF NGTD   Urine:  >100K Kleb 80K citrobacter    CSF studies:  EEG:  Neuroimagin/24 CT head: stable vents, IVH, evolving ICH L thalamus, unchanged MLS   CT head: stable to improved vent size, evolving L thalamic ICH  Other imagin/16 CT chest: no PE, small consolidation lung bases   Gallium scan: increased uptake in gluteal region    MEDICATIONS:     ·	enoxaparin Injectable 40 SubCutaneous at bedtime  ·	bromocriptine Capsule 5 Oral every 8 hours  ·	amLODIPine   Tablet 10 milliGRAM(s) Oral daily  ·	hydrALAZINE 100 milliGRAM(s) Oral every 8 hours  ·	labetalol 100 milliGRAM(s) Oral every 8 hours  ·	losartan 100 milliGRAM(s) Oral daily  ·	bisacodyl 5 Oral every 12 hours  ·	polyethylene glycol 3350 17 Oral every 12 hours  ·	senna 2 Oral at bedtime  ·	fludroCORTISONE 0.2 Oral every 12 hours  ·	lactobacillus acidophilus 1 Oral daily  ·	sodium chloride 3 Oral every 6 hours  ·	acetaminophen     Tablet .. 650 Oral every 6 hours PRN  ·	albuterol/ipratropium for Nebulization 3 Nebulizer every 6 hours PRN  ·	ibuprofen  Tablet. 400 Oral every 8 hours PRN  ·	traMADol 50 Oral every 6 hours PRN     IV FLUIDS: 3%@25  DRIPS:  DIET: minced & moist  Lines: L IJ  Drains:      External Ventricular Device @ 10 cm H20 ICPs <10 output 191 / 24h  Wounds:    CODE STATUS:  Full Code                       GOALS OF CARE:  aggressive                      DISPOSITION:  ICU =================================  NEUROCRITICAL CARE ATTENDING NOTE  =================================    GOLDEN CALHOUN   MRN-[id number]  Summary:  56y/F  with PMHx of HTN, pre-DM presents transferred from [facility] for intracranial hemorrhage. As per [place] ED provider and son, patient called son around 7:30-8:00AM c/o severe headache and nausea. Son immediately rushed to her house and found her out of bed, moaning and covered in her own vomit. During transit, EMS reported SBP within 240s with intractable vomiting and given Zofran 8mg. Upon arrival to [place] ED, GCS 7, SBP within 180s, patient was given Decadron 10mg, Keppra 1g, started on a Cardene drip, Propofol, Mannitol 1mg/kg. 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 Patient was intubated for airway support and transferred to [facility] for further management. ICH2, NIHSS 8.  Denies use of anticoagulants/antiplatelets.  (2022 16:36)    COURSE IN THE HOSPITAL:   admitted to [facility]   EVD clamped more lethargic, reopened   Tmax 38.4 EVD increased to 15 --> 20   Tmax 37.5 EVD clamped overnight, ICP to 16, more lethargic and increasing headaches, open at 10   Tmax 37 sudden unresponsiveness even to deep noxious stimuli ~10p.m. - CT NEG, improved without any other intervention; EEG negative   periods lethargy overnight (not worse than previous nights)   No significant events overnight.     Past Medical History: No pertinent past medical history Hypertension Pre-diabetes  Allergies:  No Known Allergies  Home meds:   ·	losartan 100 mg oral tablet: 1 tab(s) orally once a day  ·	Vitamin D2: 5000 unit(s) orally once a week  ·	Xanax 0.5 mg oral tablet: 0.5 milligram(s) orally once a day, As Needed  ·	ZyrTEC 10 mg oral tablet: 1 tab(s) orally once a day    PHYSICAL EXAMINATION  T(C): 36.9 ( @ 05:01), Max: 37.5 ( @ 20:00) HR: 78 ( @ 06:00) (77 - 86) BP: 143/70 ( @ 06:00) (123/67 - 148/67) RR: 19 ( @ 06:00) (16 - 28) SpO2: 96% ( @ 06:00) (94% - 99%)  NEUROLOGIC EXAMINATION:  Patient is awake, alert oriented x2, pupils 2-3mm equal and briskly reactive to light, EOMs intact, moves all 4s with good strength, RUE drift  GENERAL: not intubated, not in cardiorespiratory distress  EENT:  anicteric  CARDIOVASCULAR: (+) S1 S2, normal rate and regular rhythm  PULMONARY: clear to auscultation bilaterally  ABDOMEN: soft, nontender with normoactive bowel sounds  EXTREMITIES: no edema  SKIN: no rash    LABS:     (8.04)   12.0 (11.2)  6.62  )-----------( 378      ( 2022 05:22 )             36.9     136  |  103  |  8   ----------------------------<  118<H>  3.3<L>   |  22  |  0.34<L>    Ca    9.5      2022 05:22  Phos  3.2       Mg     1.9      @ 07:01  -   @ 07:00  IN: 2605 mL / OUT: 2397 mL / NET: 208 mL     Bacteriology:   CSF NGTD   Blood CS NG5D (final)   Blood CS NG5D (final)   Blood CS NG5D x2   Culture - CSF with Gram Stain (collected )   CSF NGTD   Urine:  >100K Kleb 80K citrobacter    CSF studies:  EEG:  Neuroimagin/24 CT head: stable vents, IVH, evolving ICH L thalamus, unchanged MLS   CT head: stable to improved vent size, evolving L thalamic ICH  Other imagin/16 CT chest: no PE, small consolidation lung bases   Gallium scan: increased uptake in gluteal region    MEDICATIONS:     ·	enoxaparin Injectable 40 SubCutaneous at bedtime  ·	bromocriptine Capsule 5 Oral every 8 hours  ·	amLODIPine   Tablet 10 milliGRAM(s) Oral daily  ·	hydrALAZINE 100 milliGRAM(s) Oral every 8 hours  ·	labetalol 100 milliGRAM(s) Oral every 8 hours  ·	losartan 100 milliGRAM(s) Oral daily  ·	bisacodyl 5 Oral every 12 hours  ·	polyethylene glycol 3350 17 Oral every 12 hours  ·	senna 2 Oral at bedtime  ·	fludroCORTISONE 0.2 Oral every 12 hours  ·	lactobacillus acidophilus 1 Oral daily  ·	sodium chloride 3 Oral every 6 hours  ·	acetaminophen     Tablet .. 650 Oral every 6 hours PRN  ·	albuterol/ipratropium for Nebulization 3 Nebulizer every 6 hours PRN  ·	ibuprofen  Tablet. 400 Oral every 8 hours PRN  ·	traMADol 50 Oral every 6 hours PRN     IV FLUIDS: 3%@25  DRIPS:  DIET: minced & moist  Lines: L IJ  Drains:      External Ventricular Device @ 10 cm H20 ICPs <10 output 177 / 24h  Wounds:    CODE STATUS:  Full Code                       GOALS OF CARE:  aggressive                      DISPOSITION:  ICU

## 2022-02-28 NOTE — PROGRESS NOTE ADULT - SUBJECTIVE AND OBJECTIVE BOX
S/Overnight events:  no acute events, EVD open at 10       Hospital Course:   2/12: transferred from [place]. R frontal EVD and R radial a-line placed. pend CTH/CTA. s/p 1L bolus for elevated lactate.   2/13: S/p R frontal EVD placedment @[id number] draining well. O/n pt w/ episode of possible storming; tachy, HTN, agitated, temp 100.2F, given 2 versed and 50mcg fentanyl w/ improvement in symptoms Neuro exam stable. Pt remains intubated and sedated, AN L>R. Trend lactate and abg. Amlodipine 5mg QD started for SBP goal < 140, cardene dc'd.  EVD dropped 2/13 at 1pm. propanolol and fent standing added for neuro storming, propofol switched to precedex, with resultant hypotension. Propanolol dc'd, fent changed to prn and precedex off, 1 L bolus given, levo prn   2/14: INDIA overnight. Patient remains on propofol. EVD open at [id number]. ICPs WNL. Neuro exam stable. increased bowel regimen. started SQL. extubated on precedex. given 0.5 Ativan + Fentanyl pushes PRN for agitation. 500cc NS bolus.   2/15: BD#4.  On HFNC d/t desat to 88% on 70% non-rebreather. On 0.5 precedex   2/16: BD5, INDIA overnight, on 4L NC, s/p vomiting yesterday, TF being held, formal S/S pending, off precedex. EVD open @ 5. Started on 3% at 30, tmax 100.7   2/17: BD# 6   tachycardic, PE protocol. cardene gtt. neuro unchanged. EVD @ 5cmH2O. 1g IV tylenol for 102.7 fever, duonebs standing to PRN. UA with reflux ordered. Hydralazine 25mg TID added, increased to 50q8. 500cc NS bolus x2. started on nystatin for thrush. started oxy 5q6 PRN for pain for tachycardia. repeat Na 146, decreased 3% @50, started salt tabs 2q6. FEES done. started minced and moist diet  2/18: BD 7. 3% Increased 75 cc/hr  2/19: BD8. INDIA o/n neuro stable. 3%@75cc/hr. central line placed for vascular access  2/20: BD9. tmax 101.3 o/n, neuro stable. 3%@100cc/hr. cardene gtt restarted. Ibuprofen x1 given for fever, ok per Dr. D'Amico. Vanc d/c'd per ID (MRSA neg), treating UTI not bacteremia, likely contaminant. Linezolid started for suspected thrombophlebitis and GS + coverage.   2/21: BD#10 Overnight, IV tylenol given for headache. Right radial arterial line re-wired then discontinued. EVD clamped at 8AM. ICPs WNL. Neuro exam stable. Remains on 3% lowered to 75cc/hr. CTH stable, EVD raised to 20. Febrile 102 - CSF and blood cx sent. EVD lowered from 20 to 5 to drain more CSF and blood, increased lethargy. Ceftriaxone changed to cefepime per ID for broader coverage.   2/22: BD#11 INDIA o/n. neuro at baseline. EVD at [id number], 3% kept at 25cc/hr   2/23: BD#12 INDIA overnight. Remain on 3% saline. EVD [id number]. ICPs WNL. Neuro exam stable.  2/24: BD#13 INDIA overnight, Remains on 3% saline. EVD raised to 20, gallium scan done with increased uptake in gluteals but normal physical exam  2/25: BD 14. INDIA overnight. Remains on hypertonics. EVD clamped at 20 yesterday evening, possible shunt in the afternoon. Patient lethargic with worsening headache. EVD reopened at 10, Linezolid and cefepime d/c'ed   2/26: BD 15. increasing lethargic overnight, CTH stable and reviewed with Dr. D'Amico. Remains on a course of hypertonics. EVD reopened at 10 yesterday morning due to patient experiencing increased lethargy and headaches. EEG for fluctuating mental status - left hemisphere slowing, no seizures. 3% d/c'ed remains on salt tabs   2/27: BD 16. INDIA overnight. vEEG placed yesterday for waxing/waning exam. EVD remains open at 10. plan is likely to shunt next week. Remains with intermittent periods of lethargy. EEG negative for seizure, d/c'ed. Restarted on 3% @25. LIJ noticed to be dislodged from proper position, notified RN toremove, hemostasis achieved with no hematoma formation noted. Patient with clear breath sounds and no respiratory distress. Additional peripheral IV guided ultrasound placed in left upper forearm and RU forearm   2/28: BD 17, EVD@10, india         Vital Signs Last 24 Hrs  T(C): 37.5 (27 Feb 2022 20:00), Max: 37.5 (27 Feb 2022 04:00)  T(F): 99.5 (27 Feb 2022 20:00), Max: 99.5 (27 Feb 2022 04:00)  HR: 82 (28 Feb 2022 00:00) (77 - 88)  BP: 128/68 (28 Feb 2022 00:00) (123/67 - 147/71)  BP(mean): 91 (28 Feb 2022 00:00) (89 - 102)  RR: 21 (28 Feb 2022 00:00) (18 - 26)  SpO2: 94% (28 Feb 2022 00:00) (94% - 99%)    I&O's Detail    26 Feb 2022 07:01  -  27 Feb 2022 07:00  --------------------------------------------------------  IN:    IV PiggyBack: 200 mL    Oral Fluid: 2040 mL    sodium chloride 3%: 50 mL    sodium chloride 3%: 240 mL    sodium chloride 3%: 75 mL  Total IN: 2605 mL    OUT:    External Ventricular Device (mL): 197 mL    Voided (mL): 2200 mL  Total OUT: 2397 mL    Total NET: 208 mL      27 Feb 2022 07:01  -  28 Feb 2022 00:28  --------------------------------------------------------  IN:    Oral Fluid: 1200 mL    sodium chloride 3%: 400 mL  Total IN: 1600 mL    OUT:    External Ventricular Device (mL): 119 mL    Voided (mL): 1700 mL  Total OUT: 1819 mL    Total NET: -219 mL        I&O's Summary    26 Feb 2022 07:01  -  27 Feb 2022 07:00  --------------------------------------------------------  IN: 2605 mL / OUT: 2397 mL / NET: 208 mL    27 Feb 2022 07:01  -  28 Feb 2022 00:28  --------------------------------------------------------  IN: 1600 mL / OUT: 1819 mL / NET: -219 mL          PHYSICAL EXAM:  GEN: laying in bed, appears well, NAD  NEURO: : OE spont, conversive, oriented to self and situation, Pupils 3 mm bl briskly reactive, RUE drift o/w 4/5 throughout, RLE HF/DF 2/5 otherwise 4-/5 throughout, L side spont and 5/5   PULM: CTAB  GI: Abd soft, NT/ND  EXT: ext warm, dry, nontender. b/l UE with multiple areas of erythema/induration from IV infiltration (improving)  Scalp EVD site C/D/I    TUBES/LINES: PIV x2 BL, EVD open at 10 cm H20    DIET: mechanical (minced and moist)     LABS:                        11.2   8.04  )-----------( 386      ( 27 Feb 2022 04:14 )             35.1     02-27    135  |  103  |  6<L>  ----------------------------<  140<H>  3.3<L>   |  21<L>  |  0.30<L>    Ca    9.5      27 Feb 2022 12:59  Phos  3.2     02-27  Mg     1.9     02-27              CAPILLARY BLOOD GLUCOSE          Drug Levels: [] N/A    CSF Analysis: [] N/A      Allergies    No Known Allergies    Intolerances      MEDICATIONS:  Antibiotics:    Neuro:  acetaminophen     Tablet .. 650 milliGRAM(s) Oral every 6 hours PRN  ibuprofen  Tablet. 400 milliGRAM(s) Oral every 8 hours PRN  traMADol 50 milliGRAM(s) Oral every 6 hours PRN    Anticoagulation:  enoxaparin Injectable 40 milliGRAM(s) SubCutaneous at bedtime    OTHER:  albuterol/ipratropium for Nebulization 3 milliLiter(s) Nebulizer every 6 hours PRN  amLODIPine   Tablet 10 milliGRAM(s) Oral daily  bisacodyl 5 milliGRAM(s) Oral every 12 hours  chlorhexidine 2% Cloths 1 Application(s) Topical <User Schedule>  fludroCORTISONE 0.2 milliGRAM(s) Oral every 12 hours  hydrALAZINE 100 milliGRAM(s) Oral every 8 hours  influenza   Vaccine 0.5 milliLiter(s) IntraMuscular once  labetalol 100 milliGRAM(s) Oral every 8 hours  lactobacillus acidophilus 1 Tablet(s) Oral daily  losartan 100 milliGRAM(s) Oral daily  polyethylene glycol 3350 17 Gram(s) Oral every 12 hours  senna 2 Tablet(s) Oral at bedtime    IVF:  sodium chloride 3 Gram(s) Oral every 6 hours  sodium chloride 3%. 500 milliLiter(s) IV Continuous <Continuous>    CULTURES:  Culture Results:   No growth to date (02-21 @ 15:05)  Culture Results:   No growth at 5 days. (02-21 @ 14:53)    RADIOLOGY & ADDITIONAL TESTS:      Patient 55 y/o female with PMHx of HTN, pre-DM w/ left thalamic parenchymal hematoma with extensive intraventricular hemorrhage. ICH score 2. NIHSS 18. s/p R frontal large bore EVD placement 2/12/22.     PLAN:   NEURO:  - neuro/vital q1h  - R frontal large bore EVD raised to 20, clamped on 2/24, re-opened at 10 2/25, plan for VPS Friday 3/4   - CTH 2/24 and 2/25 - stable   - ibuprofen/Tylenol prn for fever    - vEEG for fluctuating mental status - left hemisphere slowing, no seizures. (2/26-2/27)    Cardio:   - SBP goal 100-160  - cont. amlodipine 10mg QD, losartan 100mg, Hydralazine 100 mg TID, labetalol 100 q8h   - echo 2/14: LVH, EF: 65-70%   - IV access: PIV x2     PULM:  - extubated 2/14, on RA  - duonebs PRN   - IS     Renal:   - Normal sodium goal   - 3% @25,  salt tabs 3 q6, florinef 0.2 BID     GI:  - Diet: minced & moist diet, ensure added 2/25  - bowel regimen, last BM 2/27    HEME:  - SCDs only L leg/SQL for DVT ppx   - LE dopplers 2/23: R IM calf thrombus, repeat dopplers 2/28, vascular following   - UE dopp 2/23: superficial clots b/l cephalic , wam compresses and elevation    ENDO:  - prediabetic, a1c 5.8, glucose under control, no insulin req   - TSH wnl     ID:  - blood cx 2/16 +staph epidermidis  - urine cx 2/17 +citrobacter koseri and klebsiella  - ID following s/p: vanco [2/18-2/20], ceftriaxone for UTI [2/19-22], cefepime [2/22- 2/25. linezolid for phlebilitis [2/20- 2/25]   - MRSA negative  - Gallium scan with increased uptake in gluteal region, no cellulitis on physicial exam, probably d/t positioning   - per ID no CI to VPS if needed     PSYCH:   - pt takes xanax at home    Dispo:   - ICU status  - full code  - family updated with plan    PT recs: [facility]    D/w Dr. D'Amico and Dr. Davidson

## 2022-02-28 NOTE — PROGRESS NOTE ADULT - ASSESSMENT
57 y/o female with PMHx of HTN, pre-DM presents as transfer from [facility] for intracranial hemorrhage on 2/12.  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 Per primary team, LE dopplers were obtained on 2/23 for multiple site infiltrations and r/o DVT. Vascular surgery consulted for finding of DVT of right intramuscular calf vein as patient is unable to be anticoagulated in setting of recent bleed; currently with palpable pulses distally and no swelling or calf pain.     Recommendations:  - No acute vascular surgery intervention at this time  - Recommend duplex exam of LE today   - Vascular will continue to follow

## 2022-02-28 NOTE — PROGRESS NOTE ADULT - SUBJECTIVE AND OBJECTIVE BOX
SUBJECTIVE: Patient seen and examined at bedside. denies headaches visual changes, numbness tingling, leg pain, swelling or calf tenderness. no other concerns or complaints.     amLODIPine   Tablet 10 milliGRAM(s) Oral daily  enoxaparin Injectable 40 milliGRAM(s) SubCutaneous at bedtime  hydrALAZINE 100 milliGRAM(s) Oral every 8 hours  labetalol 100 milliGRAM(s) Oral every 8 hours  losartan 100 milliGRAM(s) Oral daily    MEDICATIONS  (PRN):  acetaminophen     Tablet .. 650 milliGRAM(s) Oral every 6 hours PRN Temp greater or equal to 38C (100.4F), Mild Pain (1 - 3)  albuterol/ipratropium for Nebulization 3 milliLiter(s) Nebulizer every 6 hours PRN Shortness of Breath and/or Wheezing  ibuprofen  Tablet. 400 milliGRAM(s) Oral every 8 hours PRN Temp greater or equal to 38C (100.4F), Moderate Pain (4 - 6)  traMADol 50 milliGRAM(s) Oral every 6 hours PRN Severe Pain (7 - 10)      I&O's Detail    27 Feb 2022 07:01  -  28 Feb 2022 07:00  --------------------------------------------------------  IN:    IV PiggyBack: 100 mL    Oral Fluid: 1920 mL    sodium chloride 3%: 575 mL  Total IN: 2595 mL    OUT:    External Ventricular Device (mL): 177 mL    Voided (mL): 1700 mL  Total OUT: 1877 mL    Total NET: 718 mL      28 Feb 2022 07:01  -  28 Feb 2022 08:01  --------------------------------------------------------  IN:    sodium chloride 3%: 25 mL  Total IN: 25 mL    OUT:    External Ventricular Device (mL): 11 mL  Total OUT: 11 mL    Total NET: 14 mL          T(C): 36.9 (02-28-22 @ 05:01), Max: 37.5 (02-27-22 @ 20:00)  HR: 79 (02-28-22 @ 07:00) (77 - 86)  BP: 129/62 (02-28-22 @ 07:00) (125/65 - 148/67)  RR: 18 (02-28-22 @ 07:00) (16 - 28)  SpO2: 95% (02-28-22 @ 07:00) (94% - 99%)    GENERAL: NAD, Resting comfortably in bed, awake, opens eyes spontaneously  HEENT: NCAT, MMM, Normal conjunctiva, PERRL  RESP: Nonlabored breathing, No respiratory distress  CARD: Normal rate, Normal peripheral perfusion  GI: Soft, ND, NT, No guarding, No rebound tenderness  EXTREM: WWP, No edema, No gross deformity of extremities  SKIN: No rashes, no lesions  NEURO: AAOx3, No focal motor or sensory deficits  PSYCH: Affect and characteristics of appearance, verbalizations, and behaviors are appropriate    LABS:                        12.0   6.62  )-----------( 378      ( 28 Feb 2022 05:22 )             36.9     02-28    136  |  103  |  8   ----------------------------<  118<H>  3.3<L>   |  22  |  0.34<L>    Ca    9.5      28 Feb 2022 05:22  Phos  3.2     02-28  Mg     1.9     02-28            RADIOLOGY & ADDITIONAL STUDIES:     SUBJECTIVE: Patient seen and examined at bedside. denies headaches visual changes, numbness tingling, leg pain, swelling or calf tenderness. no other concerns or complaints.     amLODIPine   Tablet 10 milliGRAM(s) Oral daily  enoxaparin Injectable 40 milliGRAM(s) SubCutaneous at bedtime  hydrALAZINE 100 milliGRAM(s) Oral every 8 hours  labetalol 100 milliGRAM(s) Oral every 8 hours  losartan 100 milliGRAM(s) Oral daily    MEDICATIONS  (PRN):  acetaminophen     Tablet .. 650 milliGRAM(s) Oral every 6 hours PRN Temp greater or equal to 38C (100.4F), Mild Pain (1 - 3)  albuterol/ipratropium for Nebulization 3 milliLiter(s) Nebulizer every 6 hours PRN Shortness of Breath and/or Wheezing  ibuprofen  Tablet. 400 milliGRAM(s) Oral every 8 hours PRN Temp greater or equal to 38C (100.4F), Moderate Pain (4 - 6)  traMADol 50 milliGRAM(s) Oral every 6 hours PRN Severe Pain (7 - 10)      I&O's Detail    27 Feb 2022 07:01  -  28 Feb 2022 07:00  --------------------------------------------------------  IN:    IV PiggyBack: 100 mL    Oral Fluid: 1920 mL    sodium chloride 3%: 575 mL  Total IN: 2595 mL    OUT:    External Ventricular Device (mL): 177 mL    Voided (mL): 1700 mL  Total OUT: 1877 mL    Total NET: 718 mL      28 Feb 2022 07:01  -  28 Feb 2022 08:01  --------------------------------------------------------  IN:    sodium chloride 3%: 25 mL  Total IN: 25 mL    OUT:    External Ventricular Device (mL): 11 mL  Total OUT: 11 mL    Total NET: 14 mL          T(C): 36.9 (02-28-22 @ 05:01), Max: 37.5 (02-27-22 @ 20:00)  HR: 79 (02-28-22 @ 07:00) (77 - 86)  BP: 129/62 (02-28-22 @ 07:00) (125/65 - 148/67)  RR: 18 (02-28-22 @ 07:00) (16 - 28)  SpO2: 95% (02-28-22 @ 07:00) (94% - 99%)    General: NAD  Cardio: S1,S2, No MRG, RRR  Pulm: Lungs bilaterally clear to auscultation  Abdomen: Soft, NTND  Extremities: WWP, bilateral lower extremities nontender, nonedematous with no overlying skin changes.   Pulses: Bilateral fem, pop, dp/pt pulses palpable. Significant motor weakness of RLE  Neuro: AAOx3, no focal deficits     LABS:                        12.0   6.62  )-----------( 378      ( 28 Feb 2022 05:22 )             36.9     02-28    136  |  103  |  8   ----------------------------<  118<H>  3.3<L>   |  22  |  0.34<L>    Ca    9.5      28 Feb 2022 05:22  Phos  3.2     02-28  Mg     1.9     02-28            RADIOLOGY & ADDITIONAL STUDIES:

## 2022-02-28 NOTE — PROGRESS NOTE ADULT - ASSESSMENT
56y/F with  ICH L thalamus, brain compression, cerebral edema  hydrocephalus, obstructive  Hypertension, prediabetes  superficial venous thrombosis R cephalic vein, L cephalic vein    PLAN:   NEURO: neurochecks q1h, PRN pain meds with acetaminophen, tramadol, NSAIDs, opiates  EVD @ 10cm H20 open to drain monitor ICPs, VPS on Friday  d/c bromocriptine   EEG f/u results - d/c   CT head monday  REHAB:  physical therapy evaluation and management    EARLY MOB:  OOB to chair, ambulate    PULM:  Room air, incentive spirometry  CARDIO:  SBP goal 100-160mm Hg; cont amlodpine, hydralazine, labetalol, losartan  ENDO:  Blood sugar goals 140-180   GI:  bowel regimen   DIET: continue diet  RENAL:  Na goal 135-145, cont 3%@25cc/hr, BMP this afternoon, fludrocortisone to 0.2mg BID, continue salt tabs   HEM/ONC: Hb stable  VTE Prophylaxis: SCDs, SQL, repeat UE dopplers 02/28  ID: afebrile, no leukocytosis; off cefepime (02/22 - 02/25  ) off linezolid (02/20- 02/24) for now - as per ID  Social: will update family    ATTENDING ATTESTATION:  I was physically present for the key portions of the evaluation and management (E/M) service provided.  I agree with the above history, physical and plan, which I have reviewed and edited where appropriate.    Patient at high risk for neurological deterioration or death due to:  ICU delirium, aspiration PNA, DVT / PE.  Critical care time:  I have personally provided 45 minutes of critical care time, excluding time spent on separate procedures.      Plan discussed with RN, house staff. 56y/F with  ICH L thalamus, brain compression, cerebral edema  hydrocephalus, obstructive  Hypertension, prediabetes  superficial venous thrombosis R cephalic vein, L cephalic vein    PLAN:   NEURO: neurochecks q1h, PRN pain meds with acetaminophen, tramadol, NSAIDs, opiates  EVD @ 10cm H20 open to drain monitor ICPs, VPS on Friday  CT head today - ?clamp trial  REHAB:  physical therapy evaluation and management    EARLY MOB:  OOB to chair, ambulate    PULM:  Room air, incentive spirometry  CARDIO:  SBP goal 100-160mm Hg; cont amlodpine, hydralazine, labetalol, losartan  ENDO:  Blood sugar goals 140-180   GI:  bowel regimen   DIET: continue diet  RENAL:  Na goal 135-145, cont 3%@25cc/hr, BMP this afternoon, fludrocortisone to 0.2mg BID, continue salt tabs   HEM/ONC: Hb stable  VTE Prophylaxis: SCDs, SQL, repeat LE dopplers 02/28; UE doppler 03/04  ID: afebrile, no leukocytosis; off cefepime (02/22 - 02/25  ) off linezolid (02/20- 02/24) for now - as per ID  Social: will update family    ATTENDING ATTESTATION:  I was physically present for the key portions of the evaluation and management (E/M) service provided.  I agree with the above history, physical and plan, which I have reviewed and edited where appropriate.    Patient at high risk for neurological deterioration or death due to:  ICU delirium, aspiration PNA, DVT / PE.  Critical care time:  I have personally provided 45 minutes of critical care time, excluding time spent on separate procedures.      Plan discussed with RN, house staff.

## 2022-03-01 ENCOUNTER — TRANSCRIPTION ENCOUNTER (OUTPATIENT)
Age: 57
End: 2022-03-01

## 2022-03-01 LAB
ANION GAP SERPL CALC-SCNC: 9 MMOL/L — SIGNIFICANT CHANGE UP (ref 5–17)
BUN SERPL-MCNC: 7 MG/DL — SIGNIFICANT CHANGE UP (ref 7–23)
CALCIUM SERPL-MCNC: 9 MG/DL — SIGNIFICANT CHANGE UP (ref 8.4–10.5)
CHLORIDE SERPL-SCNC: 107 MMOL/L — SIGNIFICANT CHANGE UP (ref 96–108)
CO2 SERPL-SCNC: 23 MMOL/L — SIGNIFICANT CHANGE UP (ref 22–31)
CREAT SERPL-MCNC: 0.36 MG/DL — LOW (ref 0.5–1.3)
EGFR: 118 ML/MIN/1.73M2 — SIGNIFICANT CHANGE UP
GLUCOSE SERPL-MCNC: 114 MG/DL — HIGH (ref 70–99)
HCT VFR BLD CALC: 33.1 % — LOW (ref 34.5–45)
HGB BLD-MCNC: 10.8 G/DL — LOW (ref 11.5–15.5)
MAGNESIUM SERPL-MCNC: 2 MG/DL — SIGNIFICANT CHANGE UP (ref 1.6–2.6)
MCHC RBC-ENTMCNC: 30.5 PG — SIGNIFICANT CHANGE UP (ref 27–34)
MCHC RBC-ENTMCNC: 32.6 GM/DL — SIGNIFICANT CHANGE UP (ref 32–36)
MCV RBC AUTO: 93.5 FL — SIGNIFICANT CHANGE UP (ref 80–100)
NRBC # BLD: 0 /100 WBCS — SIGNIFICANT CHANGE UP (ref 0–0)
PHOSPHATE SERPL-MCNC: 3.1 MG/DL — SIGNIFICANT CHANGE UP (ref 2.5–4.5)
PLATELET # BLD AUTO: 377 K/UL — SIGNIFICANT CHANGE UP (ref 150–400)
POTASSIUM SERPL-MCNC: 3.5 MMOL/L — SIGNIFICANT CHANGE UP (ref 3.5–5.3)
POTASSIUM SERPL-SCNC: 3.5 MMOL/L — SIGNIFICANT CHANGE UP (ref 3.5–5.3)
RBC # BLD: 3.54 M/UL — LOW (ref 3.8–5.2)
RBC # FLD: 12.8 % — SIGNIFICANT CHANGE UP (ref 10.3–14.5)
SARS-COV-2 RNA SPEC QL NAA+PROBE: SIGNIFICANT CHANGE UP
SODIUM SERPL-SCNC: 139 MMOL/L — SIGNIFICANT CHANGE UP (ref 135–145)
WBC # BLD: 7.42 K/UL — SIGNIFICANT CHANGE UP (ref 3.8–10.5)
WBC # FLD AUTO: 7.42 K/UL — SIGNIFICANT CHANGE UP (ref 3.8–10.5)

## 2022-03-01 PROCEDURE — 70450 CT HEAD/BRAIN W/O DYE: CPT | Mod: 26

## 2022-03-01 PROCEDURE — 99291 CRITICAL CARE FIRST HOUR: CPT

## 2022-03-01 PROCEDURE — 71045 X-RAY EXAM CHEST 1 VIEW: CPT | Mod: 26

## 2022-03-01 RX ORDER — ENOXAPARIN SODIUM 100 MG/ML
40 INJECTION SUBCUTANEOUS ONCE
Refills: 0 | Status: DISCONTINUED | OUTPATIENT
Start: 2022-03-01 | End: 2022-03-01

## 2022-03-01 RX ORDER — CHLORHEXIDINE GLUCONATE 213 G/1000ML
1 SOLUTION TOPICAL
Refills: 0 | Status: DISCONTINUED | OUTPATIENT
Start: 2022-03-01 | End: 2022-03-03

## 2022-03-01 RX ORDER — SODIUM CHLORIDE 9 MG/ML
1000 INJECTION INTRAMUSCULAR; INTRAVENOUS; SUBCUTANEOUS
Refills: 0 | Status: DISCONTINUED | OUTPATIENT
Start: 2022-03-01 | End: 2022-03-03

## 2022-03-01 RX ORDER — POTASSIUM CHLORIDE 20 MEQ
40 PACKET (EA) ORAL EVERY 4 HOURS
Refills: 0 | Status: COMPLETED | OUTPATIENT
Start: 2022-03-01 | End: 2022-03-01

## 2022-03-01 RX ORDER — POVIDONE-IODINE 5 %
1 AEROSOL (ML) TOPICAL ONCE
Refills: 0 | Status: DISCONTINUED | OUTPATIENT
Start: 2022-03-01 | End: 2022-03-01

## 2022-03-01 RX ORDER — POVIDONE-IODINE 5 %
1 AEROSOL (ML) TOPICAL ONCE
Refills: 0 | Status: DISCONTINUED | OUTPATIENT
Start: 2022-03-02 | End: 2022-03-07

## 2022-03-01 RX ADMIN — Medication 40 MILLIEQUIVALENT(S): at 11:31

## 2022-03-01 RX ADMIN — POLYETHYLENE GLYCOL 3350 17 GRAM(S): 17 POWDER, FOR SOLUTION ORAL at 20:30

## 2022-03-01 RX ADMIN — SODIUM CHLORIDE 3 GRAM(S): 9 INJECTION INTRAMUSCULAR; INTRAVENOUS; SUBCUTANEOUS at 05:09

## 2022-03-01 RX ADMIN — Medication 5 MILLIGRAM(S): at 05:15

## 2022-03-01 RX ADMIN — SODIUM CHLORIDE 3 GRAM(S): 9 INJECTION INTRAMUSCULAR; INTRAVENOUS; SUBCUTANEOUS at 18:16

## 2022-03-01 RX ADMIN — Medication 100 MILLIGRAM(S): at 21:46

## 2022-03-01 RX ADMIN — SENNA PLUS 2 TABLET(S): 8.6 TABLET ORAL at 21:44

## 2022-03-01 RX ADMIN — FLUDROCORTISONE ACETATE 0.2 MILLIGRAM(S): 0.1 TABLET ORAL at 18:16

## 2022-03-01 RX ADMIN — Medication 1 TABLET(S): at 11:31

## 2022-03-01 RX ADMIN — AMLODIPINE BESYLATE 10 MILLIGRAM(S): 2.5 TABLET ORAL at 05:09

## 2022-03-01 RX ADMIN — POLYETHYLENE GLYCOL 3350 17 GRAM(S): 17 POWDER, FOR SOLUTION ORAL at 05:09

## 2022-03-01 RX ADMIN — FLUDROCORTISONE ACETATE 0.2 MILLIGRAM(S): 0.1 TABLET ORAL at 05:13

## 2022-03-01 RX ADMIN — SODIUM CHLORIDE 3 GRAM(S): 9 INJECTION INTRAMUSCULAR; INTRAVENOUS; SUBCUTANEOUS at 23:07

## 2022-03-01 RX ADMIN — Medication 100 MILLIGRAM(S): at 14:59

## 2022-03-01 RX ADMIN — Medication 100 MILLIGRAM(S): at 15:00

## 2022-03-01 RX ADMIN — Medication 100 MILLIGRAM(S): at 21:45

## 2022-03-01 RX ADMIN — Medication 5 MILLIGRAM(S): at 18:16

## 2022-03-01 RX ADMIN — Medication 40 MILLIEQUIVALENT(S): at 08:02

## 2022-03-01 RX ADMIN — Medication 100 MILLIGRAM(S): at 05:11

## 2022-03-01 RX ADMIN — Medication 100 MILLIGRAM(S): at 05:09

## 2022-03-01 RX ADMIN — SODIUM CHLORIDE 3 GRAM(S): 9 INJECTION INTRAMUSCULAR; INTRAVENOUS; SUBCUTANEOUS at 11:32

## 2022-03-01 RX ADMIN — LOSARTAN POTASSIUM 100 MILLIGRAM(S): 100 TABLET, FILM COATED ORAL at 05:09

## 2022-03-01 NOTE — DISCHARGE NOTE PROVIDER - NSDCFUADDINST_GEN_ALL_CORE_FT
Neurosurgery follow up appointment date/time:  - are staples/sutures in place?  - what day should staples/sutures be removed (POD 10-14)?  - please call the office to confirm appointment:     Wound Care:  - can patient shower?  - does dressing need to be changed/removed?    Devices:  - does patient need collar or brace?  - does collar/brace need to be worn at all times or just when OOB?    Drains/Lines:  - PICC in place? ID follow up? (Paper Rx for: antibiotics, heparin flush, weekly lab draws)  - MYA in place? Management?  - antony in place? Management/Urology follow up?     Activity:  - fatigue is common after surgery, rest if you feel tired   - no bending, lifting, twisting or heavy lifting   - walking is recommended, ambulate as tolerated  - you may shower when you get home, keep your incision dry  - no bathing   - no driving within 24 hours of anesthesia or while taking prescription pain medications   - keep hydrated, drink plenty of water   - skullbase precautions: no nose blowing, sneeze with mouth open, no drinking out of a straw, no straining      Inpatient consults:  - final recommendations  - you will need follow up with....    Please also follow up with your primary care doctor.     Pain Expectations:  - pain after surgery is expected  - please take pain meds as prescribed     Medications:  - changes to home meds (ex. AED's)?  - new meds?  - pain meds?  - when can antiplatelets or anticoagulants be restarted?  - were adverse affects of meds discussed with patients?   - pain medications can cause constipation, you should eat a high fiber diet and may take a stool softener while on pain meds   - Avoid taking Advil (ibuprofen), Motrin (naproxen), or Aspirin for pain as they can cause bleeding     Call the office or come to ED if:  - wound has drainage or bleeding, increased redness or pain at incision site, neurological change, fever (>101), chills, night sweats, syncope, nausea/vomiting      Playback:  - are discharge instruction on playback?  - is a picture of the incision on playback?     WITHIN 24 HOURS OF DISCHARGE, PLEASE CONTACT NEURO PA  WITH ANY QUESTIONS OR CONCERNS: [phone]   OTHERWISE, PLEASE CALL THE OFFICE WITH ANY QUESTIONS OR CONCERNS: [phone] Neurosurgery follow up appointment date/time:  - follow up in the office for a wound check   - please call the office to confirm appointment: [phone]    Wound Care:  - can patient shower?  - does dressing need to be changed/removed?    Devices:  - does patient need collar or brace?  - does collar/brace need to be worn at all times or just when OOB?    Drains/Lines:  - PICC in place? ID follow up? (Paper Rx for: antibiotics, heparin flush, weekly lab draws)  - MYA in place? Management?  - antony in place? Management/Urology follow up?     Activity:  - fatigue is common after surgery, rest if you feel tired   - no bending, lifting, twisting or heavy lifting   - walking is recommended, ambulate as tolerated  - you may shower when you get home, keep your incision dry  - no bathing   - no driving within 24 hours of anesthesia or while taking prescription pain medications   - keep hydrated, drink plenty of water   - skullbase precautions: no nose blowing, sneeze with mouth open, no drinking out of a straw, no straining      Inpatient consults:  - final recommendations  - you will need follow up with....    Please also follow up with your primary care doctor.     Pain Expectations:  - pain after surgery is expected  - please take pain meds as prescribed     Medications:  - changes to home meds (ex. AED's)?  - new meds?  - pain meds?  - when can antiplatelets or anticoagulants be restarted?  - were adverse affects of meds discussed with patients?   - pain medications can cause constipation, you should eat a high fiber diet and may take a stool softener while on pain meds   - Avoid taking Advil (ibuprofen), Motrin (naproxen), or Aspirin for pain as they can cause bleeding     Call the office or come to ED if:  - wound has drainage or bleeding, increased redness or pain at incision site, neurological change, fever (>101), chills, night sweats, syncope, nausea/vomiting      Playback:  - see [facility] for a copy of your discharge paperwork     WITHIN 24 HOURS OF DISCHARGE, PLEASE CONTACT NEURO PA  WITH ANY QUESTIONS OR CONCERNS: [phone]   OTHERWISE, PLEASE CALL THE OFFICE WITH ANY QUESTIONS OR CONCERNS: [phone] Neurosurgery follow up appointment date/time:  - follow up in the office for a wound check   - please call the office to confirm appointment: [phone]    Wound Care:  - you may shower and wash your hair  - pat dry incision after showering   - leave incision uncovered, open to air     Devices:    Drains/Lines:    Activity:  - fatigue is common after surgery, rest if you feel tired   - no bending, lifting, twisting or heavy lifting   - walking is recommended, ambulate as tolerated  - you may shower when you get home, keep your incision dry  - no bathing   - no driving within 24 hours of anesthesia or while taking prescription pain medications   - keep hydrated, drink plenty of water     Inpatient consults:  - Vascular  - Surgery    Please also follow up with your primary care doctor.     Pain Expectations:  - pain after surgery is expected  - please take pain meds as prescribed     Medications:  - changes to home meds (ex. AED's)?  - new meds?  - pain meds?  - when can antiplatelets or anticoagulants be restarted?  - were adverse affects of meds discussed with patients?   - pain medications can cause constipation, you should eat a high fiber diet and may take a stool softener while on pain meds   - Avoid taking Advil (ibuprofen), Motrin (naproxen), or Aspirin for pain as they can cause bleeding     Call the office or come to ED if:  - wound has drainage or bleeding, increased redness or pain at incision site, neurological change, fever (>101), chills, night sweats, syncope, nausea/vomiting      Playback:  - see [facility] for a copy of your discharge paperwork     WITHIN 24 HOURS OF DISCHARGE, PLEASE CONTACT NEURO PA  WITH ANY QUESTIONS OR CONCERNS: [phone]   OTHERWISE, PLEASE CALL THE OFFICE WITH ANY QUESTIONS OR CONCERNS: [phone] Neurosurgery follow up appointment date/time:  - follow up in the office for a wound check   - please call the office to confirm appointment: [phone]    Wound Care:  - you may shower and wash your hair  - pat dry incision after showering   - leave incision uncovered, open to air     Devices:    Drains/Lines:    Activity:  - fatigue is common after surgery, rest if you feel tired   - no bending, lifting, twisting or heavy lifting   - walking is recommended, ambulate as tolerated  - you may shower when you get home, keep your incision dry  - no bathing   - no driving within 24 hours of anesthesia or while taking prescription pain medications   - keep hydrated, drink plenty of water     Inpatient consults:  - Vascular  - Surgery    Please also follow up with your primary care doctor.     Pain Expectations:  - pain after surgery is expected  - please take pain meds as prescribed     Medications:  - changes to home meds (ex. AED's)?  - new meds?  - pain meds?  - pain medications can cause constipation, you should eat a high fiber diet and may take a stool softener while on pain meds   - Avoid taking Advil (ibuprofen), Motrin (naproxen), or Aspirin for pain as they can cause bleeding     Call the office or come to ED if:  - wound has drainage or bleeding, increased redness or pain at incision site, neurological change, fever (>101), chills, night sweats, syncope, nausea/vomiting      Playback:  - see [facility] for a copy of your discharge paperwork     WITHIN 24 HOURS OF DISCHARGE, PLEASE CONTACT NEURO PA  WITH ANY QUESTIONS OR CONCERNS: [phone]   OTHERWISE, PLEASE CALL THE OFFICE WITH ANY QUESTIONS OR CONCERNS: [phone] Neurosurgery follow up appointment date/time: 3/23/22 at 12:30pm  - follow up in the office for a wound check   - staples/sutures can be removed at rehab on POD14 (3/16/22)   - please call the office to confirm appointment: [phone]    Wound Care:  - you may shower and wash your hair  - pat dry incision after showering   - leave incision uncovered, open to air   - staples./sutures can be removed at rehab on 3/16/22  Activity:  - fatigue is common after surgery, rest if you feel tired   - no bending, lifting, twisting or heavy lifting   - walking is recommended, ambulate as tolerated  - you may shower when you get home, keep your incision dry  - no bathing   - no driving within 24 hours of anesthesia or while taking prescription pain medications   - keep hydrated, drink plenty of water     Inpatient consults:  Please follow up with Dr. Rodriguez from Vascular Surgery outpatient if needed for management of DVT's    Please follow up with Dr. Carrillo if needed for abdomen wound   Please also follow up with your primary care doctor.     ***AT REHAB***  1. Please remove staples/sutures POD 14 (3/16/22)  2. Please switch Eliquis 10mg BID to 5mg BID on 3/14/22    Pain Expectations:  - pain after surgery is expected  - please take pain meds as prescribed     Medications:  - current medications:  Eliquis 10mg BID for DVT (switch to 5mg BID On 3/14/22)  Hydralazine 50mg every 8 hours for hypertension  Losartan 100mg daily for hypertension  Amlodipine 10mg daily for hypertension  Labetalol 100mg every 8 hours for hypertension  Duonebs as needed for wheezing/SOB  Melatonin 5mg at bedtime for insomnia   - bowel regimen: Dulcolax, Miralax  - pain meds: Tylenol as needed, Oxycodone 5mg every 4 hours as needed for moderate/severe pain   - pain medications can cause constipation, you should eat a high fiber diet and may take a stool softener while on pain meds   - Avoid taking Advil (ibuprofen), Motrin (naproxen), or Aspirin for pain as they can cause bleeding     Call the office or come to ED if:  - wound has drainage or bleeding, increased redness or pain at incision site, neurological change, fever (>101), chills, night sweats, syncope, nausea/vomiting      Playback:  - see [facility] for a copy of your discharge paperwork     WITHIN 24 HOURS OF DISCHARGE, PLEASE CONTACT NEURO PA  WITH ANY QUESTIONS OR CONCERNS: [phone]   OTHERWISE, PLEASE CALL THE OFFICE WITH ANY QUESTIONS OR CONCERNS: [phone] Neurosurgery follow up appointment date/time: 3/23/22 at 12:30pm  - follow up in the office for a wound check   - staples/sutures can be removed at rehab on POD14 (3/16/22)   - please call the office to confirm appointment: [phone]    Wound Care:  - you may shower and wash your hair  - pat dry incision after showering   - leave incision uncovered, open to air   - staples./sutures can be removed at rehab on 3/16/22    Activity:  - fatigue is common after surgery, rest if you feel tired   - no bending, lifting, twisting or heavy lifting   - walking is recommended, ambulate as tolerated  - you may shower when you get home, keep your incision dry  - no bathing   - no driving within 24 hours of anesthesia or while taking prescription pain medications   - keep hydrated, drink plenty of water     Inpatient consults:  Please follow up with Dr. Rodriguez from Vascular Surgery outpatient if needed for management of DVT's    Please follow up with Dr. Carrillo if needed for abdomen wound   Please also follow up with your primary care doctor.     ***AT REHAB***  1. Please remove staples/sutures POD 14 (3/16/22)  2. Please switch Eliquis 10mg BID to 5mg BID on 3/14/22    Pain Expectations:  - pain after surgery is expected  - please take pain meds as prescribed     Medications:  - current medications:  Eliquis 10mg BID for DVT (switch to 5mg BID On 3/14/22)  Hydralazine 50mg every 8 hours for hypertension  Losartan 100mg daily for hypertension  Amlodipine 10mg daily for hypertension  Labetalol 100mg every 8 hours for hypertension  Duonebs as needed for wheezing/SOB  Melatonin 5mg at bedtime for insomnia   - Antibiotics: Macrobid 100mg every 12 hours x3 days (end date: 3/14/22)   - bowel regimen: Dulcolax, Miralax  - pain meds: Tylenol as needed, Oxycodone 5mg every 4 hours as needed for moderate/severe pain   - pain medications can cause constipation, you should eat a high fiber diet and may take a stool softener while on pain meds   - Avoid taking Advil (ibuprofen), Motrin (naproxen), or Aspirin for pain as they can cause bleeding     Call the office or come to ED if:  - wound has drainage or bleeding, increased redness or pain at incision site, neurological change, fever (>101), chills, night sweats, syncope, nausea/vomiting      Playback:  - see [facility] for a copy of your discharge paperwork     WITHIN 24 HOURS OF DISCHARGE, PLEASE CONTACT NEURO PA  WITH ANY QUESTIONS OR CONCERNS: [phone]   OTHERWISE, PLEASE CALL THE OFFICE WITH ANY QUESTIONS OR CONCERNS: [phone]

## 2022-03-01 NOTE — DISCHARGE NOTE PROVIDER - DETAILS OF MALNUTRITION DIAGNOSIS/DIAGNOSES
This patient has been assessed with a concern for Malnutrition and was treated during this hospitalization for the following Nutrition diagnosis/diagnoses:     -  02/18/2022: Moderate protein-calorie malnutrition

## 2022-03-01 NOTE — DISCHARGE NOTE PROVIDER - NSDCQMPCI_CARD_ALL_CORE
Chief Complaint   Patient presents with     Blood Draw     Labs drawn via PICC by RN in lab. VS taken.      Jodi Thorne RN     No

## 2022-03-01 NOTE — PROGRESS NOTE ADULT - SUBJECTIVE AND OBJECTIVE BOX
=================================  NEUROCRITICAL CARE ATTENDING NOTE  =================================    GOLDEN CALHOUN   MRN-[id number]  Summary:  56y/F  with PMHx of HTN, pre-DM presents transferred from [facility] for intracranial hemorrhage. As per [place] ED provider and son, patient called son around 7:30-8:00AM c/o severe headache and nausea. Son immediately rushed to her house and found her out of bed, moaning and covered in her own vomit. During transit, EMS reported SBP within 240s with intractable vomiting and given Zofran 8mg. Upon arrival to [place] ED, GCS 7, SBP within 180s, patient was given Decadron 10mg, Keppra 1g, started on a Cardene drip, Propofol, Mannitol 1mg/kg. 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 Patient was intubated for airway support and transferred to [facility] for further management. ICH2, NIHSS 8.  Denies use of anticoagulants/antiplatelets.  (2022 16:36)    COURSE IN THE HOSPITAL:   admitted to [facility]   EVD clamped more lethargic, reopened   Tmax 38.4 EVD increased to 15 --> 20   Tmax 37.5 EVD clamped overnight, ICP to 16, more lethargic and increasing headaches, open at 10   Tmax 37 sudden unresponsiveness even to deep noxious stimuli ~10p.m. - CT NEG, improved without any other intervention; EEG negative   periods lethargy overnight (not worse than previous nights)   No significant events overnight.    No significant events overnight.     Past Medical History: No pertinent past medical history Hypertension Pre-diabetes  Allergies:  No Known Allergies  Home meds:   ·	losartan 100 mg oral tablet: 1 tab(s) orally once a day  ·	Vitamin D2: 5000 unit(s) orally once a week  ·	Xanax 0.5 mg oral tablet: 0.5 milligram(s) orally once a day, As Needed  ·	ZyrTEC 10 mg oral tablet: 1 tab(s) orally once a day    PHYSICAL EXAMINATION  T(C): 36.7 ( @ 05:22), Max: 37.4 ( @ 17:00) HR: 84 ( @ 08:00) (75 - 91) BP: 136/66 ( @ 08:00) (118/59 - 151/81) RR: 22 ( @ 08:00) (17 - 26) SpO2: 100% ( @ 08:00) (95% - 100%)   NEUROLOGIC EXAMINATION:  Patient is awake, alert oriented x2, pupils 2-3mm equal and briskly reactive to light, EOMs intact, moves all 4s with good strength, RUE drift  GENERAL: not intubated, not in cardiorespiratory distress  EENT:  anicteric  CARDIOVASCULAR: (+) S1 S2, normal rate and regular rhythm  PULMONARY: clear to auscultation bilaterally  ABDOMEN: soft, nontender with normoactive bowel sounds  EXTREMITIES: no edema  SKIN: no rash    LABS:     (6.62)   10.8 (12.0)  7.42  )-----------( 377      ( 01 Mar 2022 05:03 )             33.1     139  |  107  |  7   ----------------------------<  114<H>  3.5   |  23  |  0.36<L>    Ca    9.0      01 Mar 2022 05:03  Phos  3.1       Mg     2.0      @ 07:01  -   @ 07:00  IN: 1545 mL / OUT: 1233 mL / NET: 312 mL       Bacteriology:   CSF NGTD   Blood CS NG5D (final)   Blood CS NG5D (final)   Blood CS NG5D x2   Culture - CSF with Gram Stain (collected )   CSF NGTD   Urine:  >100K Kleb 80K citrobacter    CSF studies:  EEG:  Neuroimagin/24 CT head: stable vents, IVH, evolving ICH L thalamus, unchanged MLS   CT head: stable to improved vent size, evolving L thalamic ICH  Other imagin/16 CT chest: no PE, small consolidation lung bases   Gallium scan: increased uptake in gluteal region    MEDICATIONS:     ·	enoxaparin Injectable 40 SubCutaneous at bedtime  ·	amLODIPine   Tablet 10 milliGRAM(s) Oral daily  ·	hydrALAZINE 100 milliGRAM(s) Oral every 8 hours  ·	labetalol 100 milliGRAM(s) Oral every 8 hours  ·	losartan 100 milliGRAM(s) Oral daily  ·	bisacodyl 5 Oral every 12 hours  ·	polyethylene glycol 3350 17 Oral every 12 hours  ·	senna 2 Oral at bedtime  ·	fludroCORTISONE 0.2 Oral every 12 hours  ·	influenza   Vaccine 0.5 IntraMuscular once  ·	lactobacillus acidophilus 1 Oral daily  ·	potassium chloride    Tablet ER 40 Oral every 4 hours  ·	sodium chloride 3 Oral every 6 hours  ·	acetaminophen     Tablet .. 650 Oral every 6 hours PRN  ·	albuterol/ipratropium for Nebulization 3 Nebulizer every 6 hours PRN  ·	ibuprofen  Tablet. 400 Oral every 8 hours PRN  ·	traMADol 50 Oral every 6 hours PRN    IV FLUIDS: 3%@25  DRIPS:  DIET: minced & moist  Lines: L IJ  Drains:      External Ventricular Device @ 10 cm H20 ICPs <10 output 177 / 24h  Wounds:    CODE STATUS:  Full Code                       GOALS OF CARE:  aggressive                      DISPOSITION:  ICU =================================  NEUROCRITICAL CARE ATTENDING NOTE  =================================    GOLDEN CALHOUN   MRN-[id number]  Summary:  56y/F  with PMHx of HTN, pre-DM presents transferred from [facility] for intracranial hemorrhage. As per [place] ED provider and son, patient called son around 7:30-8:00AM c/o severe headache and nausea. Son immediately rushed to her house and found her out of bed, moaning and covered in her own vomit. During transit, EMS reported SBP within 240s with intractable vomiting and given Zofran 8mg. Upon arrival to [place] ED, GCS 7, SBP within 180s, patient was given Decadron 10mg, Keppra 1g, started on a Cardene drip, Propofol, Mannitol 1mg/kg. 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 Patient was intubated for airway support and transferred to [facility] for further management. ICH2, NIHSS 8.  Denies use of anticoagulants/antiplatelets.  (2022 16:36)    COURSE IN THE HOSPITAL:   admitted to [facility]   EVD clamped more lethargic, reopened   Tmax 38.4 EVD increased to 15 --> 20   Tmax 37.5 EVD clamped overnight, ICP to 16, more lethargic and increasing headaches, open at 10   Tmax 37 sudden unresponsiveness even to deep noxious stimuli ~10p.m. - CT NEG, improved without any other intervention; EEG negative   periods lethargy overnight (not worse than previous nights)   No significant events overnight.    No significant events overnight.     Past Medical History: No pertinent past medical history Hypertension Pre-diabetes  Allergies:  No Known Allergies  Home meds:   ·	losartan 100 mg oral tablet: 1 tab(s) orally once a day  ·	Vitamin D2: 5000 unit(s) orally once a week  ·	Xanax 0.5 mg oral tablet: 0.5 milligram(s) orally once a day, As Needed  ·	ZyrTEC 10 mg oral tablet: 1 tab(s) orally once a day    PHYSICAL EXAMINATION  T(C): 36.7 ( @ 05:22), Max: 37.4 ( @ 17:00) HR: 84 ( @ 08:00) (75 - 91) BP: 136/66 ( @ 08:00) (118/59 - 151/81) RR: 22 ( @ 08:00) (17 - 26) SpO2: 100% ( @ 08:00) (95% - 100%)   NEUROLOGIC EXAMINATION:  Patient is awake, alert oriented x3, pupils 2-3mm equal and briskly reactive to light, EOMs intact, moves all 4s with good strength, RUE drift, R LE 3-4/5  GENERAL: not intubated, not in cardiorespiratory distress  EENT:  anicteric  CARDIOVASCULAR: (+) S1 S2, normal rate and regular rhythm  PULMONARY: clear to auscultation bilaterally  ABDOMEN: soft, nontender with normoactive bowel sounds  EXTREMITIES: no edema  SKIN: no rash    LABS:     (6.62)   10.8 (12.0)  7.42  )-----------( 377      ( 01 Mar 2022 05:03 )             33.1     139  |  107  |  7   ----------------------------<  114<H>  3.5   |  23  |  0.36<L>    Ca    9.0      01 Mar 2022 05:03  Phos  3.1       Mg     2.0      @ 07:01  -   @ 07:00  IN: 1545 mL / OUT: 1233 mL / NET: 312 mL    Bacteriology:   CSF NGTD   Blood CS NG5D (final)   Blood CS NG5D (final)   Blood CS NG5D x2   Culture - CSF with Gram Stain (collected )   CSF NGTD   Urine:  >100K Kleb 80K citrobacter    CSF studies:  EEG:  Neuroimagin/24 CT head: stable vents, IVH, evolving ICH L thalamus, unchanged MLS   CT head: stable to improved vent size, evolving L thalamic ICH  Other imagin/16 CT chest: no PE, small consolidation lung bases   Gallium scan: increased uptake in gluteal region    MEDICATIONS:     ·	enoxaparin Injectable 40 SubCutaneous at bedtime  ·	amLODIPine   Tablet 10 milliGRAM(s) Oral daily  ·	hydrALAZINE 100 milliGRAM(s) Oral every 8 hours  ·	labetalol 100 milliGRAM(s) Oral every 8 hours  ·	losartan 100 milliGRAM(s) Oral daily  ·	bisacodyl 5 Oral every 12 hours  ·	polyethylene glycol 3350 17 Oral every 12 hours  ·	senna 2 Oral at bedtime  ·	fludroCORTISONE 0.2 Oral every 12 hours  ·	influenza   Vaccine 0.5 IntraMuscular once  ·	lactobacillus acidophilus 1 Oral daily  ·	potassium chloride    Tablet ER 40 Oral every 4 hours  ·	sodium chloride 3 Oral every 6 hours  ·	acetaminophen     Tablet .. 650 Oral every 6 hours PRN  ·	albuterol/ipratropium for Nebulization 3 Nebulizer every 6 hours PRN  ·	ibuprofen  Tablet. 400 Oral every 8 hours PRN  ·	traMADol 50 Oral every 6 hours PRN    IV FLUIDS: 3%@25  DRIPS:  DIET: minced & moist  Lines:   Drains:      External Ventricular Device @ clamped 10 am   Wounds:    CODE STATUS:  Full Code                       GOALS OF CARE:  aggressive                      DISPOSITION:  ICU

## 2022-03-01 NOTE — PROGRESS NOTE ADULT - SUBJECTIVE AND OBJECTIVE BOX
Surgery:   Consent: Signed by patient     Representative Consent: [  ] Signed by patient vs HCP                                                 [  ] N/A -> only for cerebral angiogram    No Known Allergies      OVERNIGHT EVENTS: TOSHA o.n, EVD clamped     T(C): 36.3 (03-01-22 @ 14:27), Max: 37.4 (02-28-22 @ 17:00)  HR: 82 (03-01-22 @ 16:00) (73 - 91)  BP: 136/62 (03-01-22 @ 16:00) (118/59 - 151/81)  RR: 18 (03-01-22 @ 16:00) (15 - 27)  SpO2: 95% (03-01-22 @ 16:00) (95% - 100%)  Wt(kg): --    EXAM:      03-01    139  |  107  |  7   ----------------------------<  114<H>  3.5   |  23  |  0.36<L>    Ca    9.0      01 Mar 2022 05:03  Phos  3.1     03-01  Mg     2.0     03-01      CBC Full  -  ( 01 Mar 2022 05:03 )  WBC Count : 7.42 K/uL  RBC Count : 3.54 M/uL  Hemoglobin : 10.8 g/dL  Hematocrit : 33.1 %  Platelet Count - Automated : 377 K/uL  Mean Cell Volume : 93.5 fl  Mean Cell Hemoglobin : 30.5 pg  Mean Cell Hemoglobin Concentration : 32.6 gm/dL  Auto Neutrophil # : x  Auto Lymphocyte # : x  Auto Monocyte # : x  Auto Eosinophil # : x  Auto Basophil # : x  Auto Neutrophil % : x  Auto Lymphocyte % : x  Auto Monocyte % : x  Auto Eosinophil % : x  Auto Basophil % : x        Pregnancy test (serum hcg for any female under 56, must be resulted day before OR): [ ] Negative Result  [ ] Positive Result  [x ] N/A : male or female>57 y/o  Type & Screen (in past 72hrs):     2 Type & Screen within 72 hours if anticipate blood need in OR:  x_ Y _ N     Blood ordered and on hold for OR:   [ ] No need     [ ] 1u pRBC on hold      [ x] 2u pRBC on hold    COVID swab (in past 48hrs): x_ Y  _N    CXR: ordreed   EKG: ordreed   ECHO: ordred  Medical Clearances:  Other Clearances:     Last dose of antiplatelet/anticoagulation drug: off now     Implanted Devices (pacemaker, drug pump...etc):  []YES   [x] NO                  If yes --> EPS consulted to interrogate device: [ ] YES  [ ] NO                            If yes -->  EPS called to let them know patient going for surgery: [ ] device needs to be turned off                                                                                                                                                 [ ] magnet needs to be placed for surgery                                                                                                                                                [ ] nothing to do per EP, may proceed with cautery use in OR                                       3M nasal swab ordered?  x_Y  _N    Cranial surgery: Order written for hair to be shampooed night before surgery and morning before surgery  [x] yes   []no  Chlorhexidine Wipes ordered for Neck Down?  x_ Y  _ N  (twice a day if 1 day before surgery, daily for 3 days if 3 days prior, daily if in ICU)                 Assessment: Patient 55 y/o female with PMHx of HTN, pre-DM w/ left thalamic parenchymal hematoma with extensive intraventricular hemorrhage. ICH score 2. NIHSS 18. s/p R frontal large bore EVD placement 2/12/22.     Plan:  - npo after mn     Assessment:  Present when checked    []  GCS  E   V  M     Heart Failure: []Acute, [] acute on chronic , []chronic  Heart Failure:  [] Diastolic (HFpEF), [] Systolic (HFrEF), []Combined (HFpEF and HFrEF), [] RHF, [] Pulm HTN, [] Other    [] ZAIDA, [] ATN, [] AIN, [] other  [] CKD1, [] CKD2, [] CKD 3, [] CKD 4, [] CKD 5, []ESRD    Encephalopathy: [] Metabolic, [] Hepatic, [] toxic, [] Neurological, [] Other    Abnormal Nurtitional Status: [] malnurtition (see nutrition note), [ ]underweight: BMI < 19, [] morbid obesity: BMI >40, [] Cachexia    [] Sepsis  [] hypovolemic shock,[] cardiogenic shock, [] hemorrhagic shock, [] neuogenic shock  [] Acute Respiratory Failure  []Cerebral edema, [] Brain compression/ herniation,   [] Functional quadriplegia  [] Acute blood loss anemia   Surgery: R frontal VPS placement   Consent: Signed by patient's son Yves Walters (son) in chart     Representative Consent: [X] Signed by patient's son Yves Walters (son) in chart, phone #: [phone]                                            No Known Allergies      OVERNIGHT EVENTS: TOSHA o.n, EVD clamped     T(C): 36.3 (03-01-22 @ 14:27), Max: 37.4 (02-28-22 @ 17:00)  HR: 82 (03-01-22 @ 16:00) (73 - 91)  BP: 136/62 (03-01-22 @ 16:00) (118/59 - 151/81)  RR: 18 (03-01-22 @ 16:00) (15 - 27)  SpO2: 95% (03-01-22 @ 16:00) (95% - 100%)  Wt(kg): --    EXAM:  General: NAD, pt is comfortably laying in hospital bed, A&Ox 2 (self and place, difficulty with year), on RA   HEENT: PERRL 3mm, tracking b/l, face symmetric   Cardiovascular: RRR, normal S1 and S2   Respiratory: lungs CTAB, no wheezing, rhonchi, or crackles   GI: normoactive BS to auscultation, abd soft, NTND   Neuro: following commands x4 extremities  LUE/LLE strength 5/5, RUE/RLE strength 4-/5   Extremities: distal pulses 2+ x4   Wound/incision: R frontal large bore EVD incision site with staples C/D/I   Drains: R frontal EVD @[id number]     TUBES/LINES:  [] CVC  [] A-line  [] Lumbar Drain  [X] Ventriculostomy - EVD @[id number], clamped in preparation for OR   [] Other    DIET:  [X] NPO - after midnight for OR   [] Mechanical  [] Tube feeds      03-01    139  |  107  |  7   ----------------------------<  114<H>  3.5   |  23  |  0.36<L>    Ca    9.0      01 Mar 2022 05:03  Phos  3.1     03-01  Mg     2.0     03-01      CBC Full  -  ( 01 Mar 2022 05:03 )  WBC Count : 7.42 K/uL  RBC Count : 3.54 M/uL  Hemoglobin : 10.8 g/dL  Hematocrit : 33.1 %  Platelet Count - Automated : 377 K/uL  Mean Cell Volume : 93.5 fl  Mean Cell Hemoglobin : 30.5 pg  Mean Cell Hemoglobin Concentration : 32.6 gm/dL  Auto Neutrophil # : x  Auto Lymphocyte # : x  Auto Monocyte # : x  Auto Eosinophil # : x  Auto Basophil # : x  Auto Neutrophil % : x  Auto Lymphocyte % : x  Auto Monocyte % : x  Auto Eosinophil % : x  Auto Basophil % : x        Pregnancy test (serum hcg for any female under 56, must be resulted day before OR): [ ] Negative Result  [ ] Positive Result  [x] N/A : male or female>55 y/o  Type & Screen (in past 72hrs):     2 Type & Screen within 72 hours if anticipate blood need in OR:  x_ Y _ N     Blood ordered and on hold for OR:   [ ] No need     [ ] 1u pRBC on hold      [x] 2u pRBC on hold    COVID swab (in past 48hrs):  x_ Y  _N    CXR: no acute pathology   EKG: NSR  ECHO: EF 65-70%   Medical Clearances: N/A   Other Clearances: N?A     Last dose of antiplatelet/anticoagulation drug: off now     Implanted Devices (pacemaker, drug pump...etc):  []YES   [x] NO                  If yes --> EPS consulted to interrogate device: [ ] YES  [ ] NO                            If yes -->  EPS called to let them know patient going for surgery: [ ] device needs to be turned off                                                                                                                                                 [ ] magnet needs to be placed for surgery                                                                                                                                                [ ] nothing to do per EP, may proceed with cautery use in OR                                       3M nasal swab ordered?  x_Y  _N    Cranial surgery: Order written for hair to be shampooed night before surgery and morning before surgery  [x] yes   []no  Chlorhexidine Wipes ordered for Neck Down?  x_ Y  _ N  (twice a day if 1 day before surgery, daily for 3 days if 3 days prior, daily if in ICU)                 Assessment: Patient 57 y/o female with PMHx of HTN, pre-DM w/ left thalamic parenchymal hematoma with extensive intraventricular hemorrhage. ICH score 2. NIHSS 18. s/p R frontal large bore EVD placement 2/12/22. Failed EVD clamp trial on 3/1, plan for VPS.     Plan:  - NPO after midnight except for meds   - IVF while NPO   - am labs with type and screen and coags pend   - 2 units PRBC on hold for OR   - SQL held pre-op   - EVD clamped at midnight in preparation for OR   - consent signed in chart by patient's son       Assessment:  Present when checked    []  GCS  E   V  M     Heart Failure: []Acute, [] acute on chronic , []chronic  Heart Failure:  [] Diastolic (HFpEF), [] Systolic (HFrEF), []Combined (HFpEF and HFrEF), [] RHF, [] Pulm HTN, [] Other    [] ZAIDA, [] ATN, [] AIN, [] other  [] CKD1, [] CKD2, [] CKD 3, [] CKD 4, [] CKD 5, []ESRD    Encephalopathy: [] Metabolic, [] Hepatic, [] toxic, [] Neurological, [] Other    Abnormal Nurtitional Status: [] malnurtition (see nutrition note), [ ]underweight: BMI < 19, [] morbid obesity: BMI >40, [] Cachexia    [] Sepsis  [] hypovolemic shock,[] cardiogenic shock, [] hemorrhagic shock, [] neuogenic shock  [] Acute Respiratory Failure  []Cerebral edema, [] Brain compression/ herniation,   [] Functional quadriplegia  [] Acute blood loss anemia

## 2022-03-01 NOTE — DISCHARGE NOTE PROVIDER - CARE PROVIDER_API CALL
DAmico, Randy S (MD)  Neurosurgery  [address]  [place], [place] [address]  Phone: [phone]  Fax: [phone]  Follow Up Time:    DAmico, Randy S (MD)  Neurosurgery  [address], [address] [place] [address]  Phone: [phone]  Fax: [phone]  Follow Up Time:     Kamron Carrillo)  Surgery  [address], [address]  [place] [address]  Phone: [phone]  Fax: [phone]  Follow Up Time:    DAmico, Randy S (MD)  Neurosurgery  [address] [place] [address]  Phone: [phone]  Fax: [phone]  Follow Up Time:     Kamron Carrillo)  Surgery  [address], [address]  [place] [address]  Phone: [phone]  Fax: [phone]  Follow Up Time:     Tono Rodriguez)  [facility] Surgery  Vascular  [address] [place] [address]  Phone: [phone]  Fax: [phone]  Follow Up Time:

## 2022-03-01 NOTE — DISCHARGE NOTE PROVIDER - PROVIDER TOKENS
PROVIDER:[TOKEN:[[id number]]] PROVIDER:[TOKEN:[[id number]]],PROVIDER:[TOKEN:[[id number]]] PROVIDER:[TOKEN:[[id number]]],PROVIDER:[TOKEN:[[id number]]],PROVIDER:[TOKEN:[[id number]]]

## 2022-03-01 NOTE — DISCHARGE NOTE PROVIDER - NSDCFUADDAPPT_GEN_ALL_CORE_FT
Please follow up with Dr. D'Amico    Vascular    Please follow up with your primary care doctor outpatient  Please follow up with Dr. D'Amico    Vascular    Please follow up with Dr. Carrillo if needed for abdomen wound     Please follow up with your primary care doctor outpatient  Please follow up with Dr. D'Amico on 3/23/22 at 12:30pm, call the office to confirm appointment at [phone].    Please follow up with Dr. Rodriguez from Vascular Surgery outpatient if needed for management of DVT's      Please follow up with Dr. Carrillo if needed for abdomen wound     Please follow up with your primary care doctor outpatient

## 2022-03-01 NOTE — DISCHARGE NOTE PROVIDER - NSDCCPCAREPLAN_GEN_ALL_CORE_FT
PRINCIPAL DISCHARGE DIAGNOSIS  Diagnosis: ICH (intracerebral hemorrhage)  Assessment and Plan of Treatment:       SECONDARY DISCHARGE DIAGNOSES  Diagnosis: HTN (hypertension)  Assessment and Plan of Treatment:     Diagnosis: Pre-diabetes  Assessment and Plan of Treatment:     Diagnosis: Hydrocephalus  Assessment and Plan of Treatment:      PRINCIPAL DISCHARGE DIAGNOSIS  Diagnosis: ICH (intracerebral hemorrhage)  Assessment and Plan of Treatment:       SECONDARY DISCHARGE DIAGNOSES  Diagnosis: HTN (hypertension)  Assessment and Plan of Treatment:     Diagnosis: Pre-diabetes  Assessment and Plan of Treatment:     Diagnosis: Thrombocytosis  Assessment and Plan of Treatment:     Diagnosis: Anemia due to acute blood loss  Assessment and Plan of Treatment:     Diagnosis: Jugular vein thrombosis  Assessment and Plan of Treatment:     Diagnosis: Hydrocephalus  Assessment and Plan of Treatment:      PRINCIPAL DISCHARGE DIAGNOSIS  Diagnosis: ICH (intracerebral hemorrhage)  Assessment and Plan of Treatment:       SECONDARY DISCHARGE DIAGNOSES  Diagnosis: HTN (hypertension)  Assessment and Plan of Treatment:     Diagnosis: Pre-diabetes  Assessment and Plan of Treatment:     Diagnosis: Thrombocytosis  Assessment and Plan of Treatment:     Diagnosis: Anemia due to acute blood loss  Assessment and Plan of Treatment:     Diagnosis: Jugular vein thrombosis  Assessment and Plan of Treatment:     Diagnosis: Hydrocephalus  Assessment and Plan of Treatment:     Diagnosis: Hyponatremia  Assessment and Plan of Treatment:      PRINCIPAL DISCHARGE DIAGNOSIS  Diagnosis: ICH (intracerebral hemorrhage)  Assessment and Plan of Treatment:       SECONDARY DISCHARGE DIAGNOSES  Diagnosis: HTN (hypertension)  Assessment and Plan of Treatment:     Diagnosis: Pre-diabetes  Assessment and Plan of Treatment:     Diagnosis: Thrombocytosis  Assessment and Plan of Treatment:     Diagnosis: Anemia due to acute blood loss  Assessment and Plan of Treatment:     Diagnosis: Jugular vein thrombosis  Assessment and Plan of Treatment:     Diagnosis: Hydrocephalus  Assessment and Plan of Treatment:     Diagnosis: Hyponatremia  Assessment and Plan of Treatment:     Diagnosis: Urinary tract infection  Assessment and Plan of Treatment:

## 2022-03-01 NOTE — PROGRESS NOTE ADULT - ASSESSMENT
56y/F with  ICH L thalamus, brain compression, cerebral edema  hydrocephalus, obstructive  Hypertension, prediabetes  superficial venous thrombosis R cephalic vein, L cephalic vein    PLAN:   NEURO: neurochecks q1h, PRN pain meds with acetaminophen, tramadol, NSAIDs, opiates  EVD @ 10cm H20 open to drain monitor ICPs, VPS on Friday  CT head today - ?clamp trial  REHAB:  physical therapy evaluation and management    EARLY MOB:  OOB to chair, ambulate    PULM:  Room air, incentive spirometry  CARDIO:  SBP goal 100-160mm Hg; cont amlodpine, hydralazine, labetalol, losartan  ENDO:  Blood sugar goals 140-180   GI:  bowel regimen   DIET: continue diet  RENAL:  Na goal 135-145, cont 3%@25cc/hr, BMP this afternoon, fludrocortisone to 0.2mg BID, continue salt tabs   HEM/ONC: Hb stable  VTE Prophylaxis: SCDs, SQL, repeat LE dopplers 02/28; UE doppler 03/04  ID: afebrile, no leukocytosis; off cefepime (02/22 - 02/25  ) off linezolid (02/20- 02/24) for now - as per ID  Social: will update family    ATTENDING ATTESTATION:  I was physically present for the key portions of the evaluation and management (E/M) service provided.  I agree with the above history, physical and plan, which I have reviewed and edited where appropriate.    Patient at high risk for neurological deterioration or death due to:  ICU delirium, aspiration PNA, DVT / PE.  Critical care time:  I have personally provided 45 minutes of critical care time, excluding time spent on separate procedures.      Plan discussed with RN, house staff. 56y/F with  ICH L thalamus, brain compression, cerebral edema  hydrocephalus, obstructive  Hypertension, prediabetes  superficial venous thrombosis R cephalic vein, L cephalic vein    PLAN:   NEURO: neurochecks q1h, PRN pain meds with acetaminophen, tramadol, NSAIDs, opiates  EVD clamp til tomorrow; possible VPS tomorrow?   REHAB:  physical therapy evaluation and management    EARLY MOB:  OOB to chair, ambulate    PULM:  Room air, incentive spirometry  CARDIO:  SBP goal 100-160mm Hg; cont amlodpine, hydralazine, labetalol, losartan  ENDO:  Blood sugar goals 140-180   GI:  bowel regimen   DIET: continue diet  RENAL:  Na goal 135-145, fludrocortisone to 0.2mg BID, continue salt tabs   HEM/ONC: Hb stable  VTE Prophylaxis: SCDs, SQL at 6 p.m., repeat UE doppler 03/04  ID: afebrile, no leukocytosis; off cefepime (02/22 - 02/25  ) off linezolid (02/20- 02/24) for now - as per ID  Social: will update family    ATTENDING ATTESTATION:  I was physically present for the key portions of the evaluation and management (E/M) service provided.  I agree with the above history, physical and plan, which I have reviewed and edited where appropriate.    Patient at high risk for neurological deterioration or death due to:  ICU delirium, aspiration PNA, DVT / PE.  Critical care time:  I have personally provided 45 minutes of critical care time, excluding time spent on separate procedures.      Plan discussed with RN, house staff.

## 2022-03-01 NOTE — DISCHARGE NOTE PROVIDER - CARE PROVIDERS DIRECT ADDRESSES
,DirectAddress_Unknown ,DirectAddress_Unknown,[email or web address] ,DirectAddress_Unknown,[email or web address]

## 2022-03-01 NOTE — DISCHARGE NOTE PROVIDER - NSDCCPTREATMENT_GEN_ALL_CORE_FT
PRINCIPAL PROCEDURE  Procedure: Creation of ventriculo-peritoneal shunt  Findings and Treatment: R VPS, Certas @ 5

## 2022-03-01 NOTE — PROVIDER CONTACT NOTE (OTHER) - ASSESSMENT
Icp 20-25 Neuro - patient alert AOx4 siting up eating in bed pupils equal and reactive. RU & RL extremeties drift and RIMA &LL  no drift Patient denies pain

## 2022-03-01 NOTE — PROGRESS NOTE ADULT - SUBJECTIVE AND OBJECTIVE BOX
S/Overnight events:  no acute events, neuro stable       Hospital Course:   2/12: transferred from [place]. R frontal EVD and R radial a-line placed. pend CTH/CTA. s/p 1L bolus for elevated lactate.   2/13: S/p R frontal EVD placedment @28epT6R draining well. O/n pt w/ episode of possible storming; tachy, HTN, agitated, temp 100.2F, given 2 versed and 50mcg fentanyl w/ improvement in symptoms Neuro exam stable. Pt remains intubated and sedated, AN L>R. Trend lactate and abg. Amlodipine 5mg QD started for SBP goal < 140, cardene dc'd.  EVD dropped 2/13 at 1pm. propanolol and fent standing added for neuro storming, propofol switched to precedex, with resultant hypotension. Propanolol dc'd, fent changed to prn and precedex off, 1 L bolus given, levo prn   2/14: TOSHA overnight. Patient remains on propofol. EVD open at [id number]. ICPs WNL. Neuro exam stable. increased bowel regimen. started SQL. extubated on precedex. given 0.5 Ativan + Fentanyl pushes PRN for agitation. 500cc NS bolus.   2/15: BD#4.  On HFNC d/t desat to 88% on 70% non-rebreather. On 0.5 precedex   2/16: BD5, TOSHA overnight, on 4L NC, s/p vomiting yesterday, TF being held, formal S/S pending, off precedex. EVD open @ 5. Started on 3% at 30, tmax 100.7   2/17: BD# 6   tachycardic, PE protocol. cardene gtt. neuro unchanged. EVD @ 5cmH2O. 1g IV tylenol for 102.7 fever, duonebs standing to PRN. UA with reflux ordered. Hydralazine 25mg TID added, increased to 50q8. 500cc NS bolus x2. started on nystatin for thrush. started oxy 5q6 PRN for pain for tachycardia. repeat Na 146, decreased 3% @50, started salt tabs 2q6. FEES done. started minced and moist diet  2/18: BD 7. 3% Increased 75 cc/hr  2/19: BD8. TOSHA o/n neuro stable. 3%@75cc/hr. central line placed for vascular access  2/20: BD9. tmax 101.3 o/n, neuro stable. 3%@100cc/hr. cardene gtt restarted. Ibuprofen x1 given for fever, ok per Dr. D'Amico. Vanc d/c'd per ID (MRSA neg), treating UTI not bacteremia, likely contaminant. Linezolid started for suspected thrombophlebitis and GS + coverage.   2/21: BD#10 Overnight, IV tylenol given for headache. Right radial arterial line re-wired then discontinued. EVD clamped at 8AM. ICPs WNL. Neuro exam stable. Remains on 3% lowered to 75cc/hr. CTH stable, EVD raised to 20. Febrile 102 - CSF and blood cx sent. EVD lowered from 20 to 5 to drain more CSF and blood, increased lethargy. Ceftriaxone changed to cefepime per ID for broader coverage.   2/22: BD#11 TOSHA o/n. neuro at baseline. EVD at 40bpE8X, 3% kept at 25cc/hr   2/23: BD#12 TOSHA overnight. Remain on 3% saline. EVD [id number]. ICPs WNL. Neuro exam stable.  2/24: BD#13 TOSHA overnight, Remains on 3% saline. EVD raised to 20, gallium scan done with increased uptake in gluteals but normal physical exam  2/25: BD 14. TOSHA overnight. Remains on hypertonics. EVD clamped at 20 yesterday evening, possible shunt in the afternoon. Patient lethargic with worsening headache. EVD reopened at 10, Linezolid and cefepime d/c'ed   2/26: BD 15. increasing lethargic overnight, CTH stable and reviewed with Dr. D'Amico. Remains on a course of hypertonics. EVD reopened at 10 yesterday morning due to patient experiencing increased lethargy and headaches. EEG for fluctuating mental status - left hemisphere slowing, no seizures. 3% d/c'ed remains on salt tabs   2/27: BD 16. TOSHA overnight. vEEG placed yesterday for waxing/waning exam. EVD remains open at 10. plan is likely to shunt next week. Remains with intermittent periods of lethargy. EEG negative for seizure, d/c'ed. Restarted on 3% @25. LIJ noticed to be dislodged from proper position, notified RN toremove, hemostasis achieved with no hematoma formation noted. Patient with clear breath sounds and no respiratory distress. Additional peripheral IV.   2/28: BD 17, Repeat CTH this am stable, EVD clamped at 10:30AM, remains on 3% at 25.  2/29: BD 18, EVD clamped overnight, neuro remained stable         Vital Signs Last 24 Hrs  T(C): 37.2 (01 Mar 2022 01:40), Max: 37.4 (28 Feb 2022 17:00)  T(F): 98.9 (01 Mar 2022 01:40), Max: 99.3 (28 Feb 2022 17:00)  HR: 89 (01 Mar 2022 04:00) (77 - 91)  BP: 134/69 (01 Mar 2022 04:00) (118/59 - 151/81)  BP(mean): 95 (01 Mar 2022 04:00) (83 - 110)  RR: 24 (01 Mar 2022 04:00) (17 - 28)  SpO2: 97% (01 Mar 2022 04:00) (95% - 100%)    I&O's Detail    27 Feb 2022 07:01  -  28 Feb 2022 07:00  --------------------------------------------------------  IN:    IV PiggyBack: 100 mL    Oral Fluid: 1920 mL    sodium chloride 3%: 575 mL  Total IN: 2595 mL    OUT:    External Ventricular Device (mL): 177 mL    Voided (mL): 1700 mL  Total OUT: 1877 mL    Total NET: 718 mL      28 Feb 2022 07:01  -  01 Mar 2022 04:28  --------------------------------------------------------  IN:    Oral Fluid: 1020 mL    sodium chloride 3%: 325 mL  Total IN: 1345 mL    OUT:    External Ventricular Device (mL): 33 mL    Voided (mL): 1200 mL  Total OUT: 1233 mL    Total NET: 112 mL        I&O's Summary    27 Feb 2022 07:01  -  28 Feb 2022 07:00  --------------------------------------------------------  IN: 2595 mL / OUT: 1877 mL / NET: 718 mL    28 Feb 2022 07:01  -  01 Mar 2022 04:28  --------------------------------------------------------  IN: 1345 mL / OUT: 1233 mL / NET: 112 mL        PHYSICAL EXAM:  GEN: laying in bed, appears well, NAD  NEURO: : OE spont, conversive, oriented to self and situation, Pupils 3 mm bl briskly reactive, RUE drift o/w 4/5 throughout, RLE HF/DF 2/5 otherwise 4-/5 throughout, L side spont and 5/5   PULM: CTAB  GI: Abd soft, NT/ND  EXT: ext warm, dry, nontender. b/l UE with multiple areas of erythema/induration from IV infiltration (improving)  Scalp EVD site C/D/I    TUBES/LINES: PIV x2 BL, EVD clamped     DIET: mechanical (minced and moist)       LABS:                        12.0   6.62  )-----------( 378      ( 28 Feb 2022 05:22 )             36.9     02-28    137  |  105  |  9   ----------------------------<  125<H>  3.7   |  21<L>  |  0.42<L>    Ca    9.4      28 Feb 2022 16:49  Phos  3.2     02-28  Mg     1.9     02-28          CAPILLARY BLOOD GLUCOSE          Drug Levels: [] N/A    CSF Analysis: [] N/A      Allergies    No Known Allergies    Intolerances      MEDICATIONS:  Antibiotics:    Neuro:  acetaminophen     Tablet .. 650 milliGRAM(s) Oral every 6 hours PRN  ibuprofen  Tablet. 400 milliGRAM(s) Oral every 8 hours PRN  traMADol 50 milliGRAM(s) Oral every 6 hours PRN    Anticoagulation:  enoxaparin Injectable 40 milliGRAM(s) SubCutaneous at bedtime    OTHER:  albuterol/ipratropium for Nebulization 3 milliLiter(s) Nebulizer every 6 hours PRN  amLODIPine   Tablet 10 milliGRAM(s) Oral daily  bisacodyl 5 milliGRAM(s) Oral every 12 hours  fludroCORTISONE 0.2 milliGRAM(s) Oral every 12 hours  hydrALAZINE 100 milliGRAM(s) Oral every 8 hours  influenza   Vaccine 0.5 milliLiter(s) IntraMuscular once  labetalol 100 milliGRAM(s) Oral every 8 hours  lactobacillus acidophilus 1 Tablet(s) Oral daily  losartan 100 milliGRAM(s) Oral daily  polyethylene glycol 3350 17 Gram(s) Oral every 12 hours  senna 2 Tablet(s) Oral at bedtime    IVF:  sodium chloride 3 Gram(s) Oral every 6 hours  sodium chloride 3%. 500 milliLiter(s) IV Continuous <Continuous>    CULTURES:  Culture Results:   No growth to date (02-21 @ 15:05)  Culture Results:   No growth at 5 days. (02-21 @ 14:53)    RADIOLOGY & ADDITIONAL TESTS:      Patient 55 y/o female with PMHx of HTN, pre-DM w/ left thalamic parenchymal hematoma with extensive intraventricular hemorrhage. ICH score 2. NIHSS 18. s/p R frontal large bore EVD placement 2/12/22.     PLAN:   NEURO:  - neuro/vital q1h  - R frontal large bore EVD s/p failed clamp trial x2, now reclamped on 2/28, possible VPS Friday   - CTH 2/24 and 2/25 - stable   - CTH today   - ibuprofen/Tylenol prn for fever    - vEEG for fluctuating mental status - left hemisphere slowing, no seizures. (2/26-2/27)    Cardio:   - SBP goal 100-160  - cont. amlodipine 10mg QD, losartan 100mg, Hydralazine 100 mg TID, labetalol 100 q8h   - echo 2/14: LVH, EF: 65-70%   - IV access: PIV x2     PULM:  - extubated 2/14, on RA  - duonebs PRN   - IS     Renal:   - Normal sodium goal   - 3% @25,  salt tabs 3 q6, florinef 0.2 BID     GI:  - regualr diet, ensure added 2/25  - bowel regimen, last BM 2/28    HEME:  - SCDs only L leg/SQL for DVT ppx   - LE dopplers 2/23: R IM calf thrombus, repeat dopplers 2/28 showing persistence but no propogation   - UE dopp 2/23: superficial clots b/l cephalic , wam compresses and elevation, repeat pending 3/4    ENDO:  - prediabetic, a1c 5.8, glucose under control, no insulin req   - TSH wnl     ID:  - blood cx 2/16 +staph epidermidis  - urine cx 2/17 +citrobacter koseri and klebsiella  - ID following s/p: vanco [2/18-2/20], ceftriaxone for UTI [2/19-22], cefepime [2/22- 2/25. linezolid for phlebilitis [2/20- 2/25]   - MRSA negative  - Gallium scan with increased uptake in gluteal region, no cellulitis on physicial exam, probably d/t positioning   - per ID no CI to VPS if needed     PSYCH:   - pt takes xanax at home    Dispo:   - ICU status  - full code  - family updated with plan    PT recs: AR    D/w Dr. D'Amico and Dr. Davidson     S/Overnight events:  no acute events, neuro stable       Hospital Course:   2/12: transferred from [place]. R frontal EVD and R radial a-line placed. pend CTH/CTA. s/p 1L bolus for elevated lactate.   2/13: S/p R frontal EVD placedment @90yeG0A draining well. O/n pt w/ episode of possible storming; tachy, HTN, agitated, temp 100.2F, given 2 versed and 50mcg fentanyl w/ improvement in symptoms Neuro exam stable. Pt remains intubated and sedated, AN L>R. Trend lactate and abg. Amlodipine 5mg QD started for SBP goal < 140, cardene dc'd.  EVD dropped 2/13 at 1pm. propanolol and fent standing added for neuro storming, propofol switched to precedex, with resultant hypotension. Propanolol dc'd, fent changed to prn and precedex off, 1 L bolus given, levo prn   2/14: TOSHA overnight. Patient remains on propofol. EVD open at [id number]. ICPs WNL. Neuro exam stable. increased bowel regimen. started SQL. extubated on precedex. given 0.5 Ativan + Fentanyl pushes PRN for agitation. 500cc NS bolus.   2/15: BD#4.  On HFNC d/t desat to 88% on 70% non-rebreather. On 0.5 precedex   2/16: BD5, TOSHA overnight, on 4L NC, s/p vomiting yesterday, TF being held, formal S/S pending, off precedex. EVD open @ 5. Started on 3% at 30, tmax 100.7   2/17: BD# 6   tachycardic, PE protocol. cardene gtt. neuro unchanged. EVD @ 5cmH2O. 1g IV tylenol for 102.7 fever, duonebs standing to PRN. UA with reflux ordered. Hydralazine 25mg TID added, increased to 50q8. 500cc NS bolus x2. started on nystatin for thrush. started oxy 5q6 PRN for pain for tachycardia. repeat Na 146, decreased 3% @50, started salt tabs 2q6. FEES done. started minced and moist diet  2/18: BD 7. 3% Increased 75 cc/hr  2/19: BD8. TOSHA o/n neuro stable. 3%@75cc/hr. central line placed for vascular access  2/20: BD9. tmax 101.3 o/n, neuro stable. 3%@100cc/hr. cardene gtt restarted. Ibuprofen x1 given for fever, ok per Dr. D'Amico. Vanc d/c'd per ID (MRSA neg), treating UTI not bacteremia, likely contaminant. Linezolid started for suspected thrombophlebitis and GS + coverage.   2/21: BD#10 Overnight, IV tylenol given for headache. Right radial arterial line re-wired then discontinued. EVD clamped at 8AM. ICPs WNL. Neuro exam stable. Remains on 3% lowered to 75cc/hr. CTH stable, EVD raised to 20. Febrile 102 - CSF and blood cx sent. EVD lowered from 20 to 5 to drain more CSF and blood, increased lethargy. Ceftriaxone changed to cefepime per ID for broader coverage.   2/22: BD#11 TOSHA o/n. neuro at baseline. EVD at [id number], 3% kept at 25cc/hr   2/23: BD#12 TOSHA overnight. Remain on 3% saline. EVD [id number]. ICPs WNL. Neuro exam stable.  2/24: BD#13 TOSHA overnight, Remains on 3% saline. EVD raised to 20, gallium scan done with increased uptake in gluteals but normal physical exam  2/25: BD 14. TOSHA overnight. Remains on hypertonics. EVD clamped at 20 yesterday evening, possible shunt in the afternoon. Patient lethargic with worsening headache. EVD reopened at 10, Linezolid and cefepime d/c'ed   2/26: BD 15. increasing lethargic overnight, CTH stable and reviewed with Dr. D'Amico. Remains on a course of hypertonics. EVD reopened at 10 yesterday morning due to patient experiencing increased lethargy and headaches. EEG for fluctuating mental status - left hemisphere slowing, no seizures. 3% d/c'ed remains on salt tabs   2/27: BD 16. TOSHA overnight. vEEG placed yesterday for waxing/waning exam. EVD remains open at 10. plan is likely to shunt next week. Remains with intermittent periods of lethargy. EEG negative for seizure, d/c'ed. Restarted on 3% @25. LIJ noticed to be dislodged from proper position, notified RN toremove, hemostasis achieved with no hematoma formation noted. Patient with clear breath sounds and no respiratory distress. Additional peripheral IV.   2/28: BD 17, Repeat CTH this am stable, EVD clamped at 10:30AM, remains on 3% at 25.  3/1: BD 18, EVD clamped overnight, neuro remained stable         Vital Signs Last 24 Hrs  T(C): 37.2 (01 Mar 2022 01:40), Max: 37.4 (28 Feb 2022 17:00)  T(F): 98.9 (01 Mar 2022 01:40), Max: 99.3 (28 Feb 2022 17:00)  HR: 89 (01 Mar 2022 04:00) (77 - 91)  BP: 134/69 (01 Mar 2022 04:00) (118/59 - 151/81)  BP(mean): 95 (01 Mar 2022 04:00) (83 - 110)  RR: 24 (01 Mar 2022 04:00) (17 - 28)  SpO2: 97% (01 Mar 2022 04:00) (95% - 100%)    I&O's Detail    27 Feb 2022 07:01  -  28 Feb 2022 07:00  --------------------------------------------------------  IN:    IV PiggyBack: 100 mL    Oral Fluid: 1920 mL    sodium chloride 3%: 575 mL  Total IN: 2595 mL    OUT:    External Ventricular Device (mL): 177 mL    Voided (mL): 1700 mL  Total OUT: 1877 mL    Total NET: 718 mL      28 Feb 2022 07:01  -  01 Mar 2022 04:28  --------------------------------------------------------  IN:    Oral Fluid: 1020 mL    sodium chloride 3%: 325 mL  Total IN: 1345 mL    OUT:    External Ventricular Device (mL): 33 mL    Voided (mL): 1200 mL  Total OUT: 1233 mL    Total NET: 112 mL        I&O's Summary    27 Feb 2022 07:01  -  28 Feb 2022 07:00  --------------------------------------------------------  IN: 2595 mL / OUT: 1877 mL / NET: 718 mL    28 Feb 2022 07:01  -  01 Mar 2022 04:28  --------------------------------------------------------  IN: 1345 mL / OUT: 1233 mL / NET: 112 mL        PHYSICAL EXAM:  GEN: laying in bed, appears well, NAD  NEURO: : OE spont, conversive, oriented to self and situation, Pupils 3 mm bl briskly reactive, RUE drift o/w 4/5 throughout, RLE HF/DF 2/5 otherwise 4-/5 throughout, L side spont and 5/5   PULM: CTAB  GI: Abd soft, NT/ND  EXT: ext warm, dry, nontender. b/l UE with multiple areas of erythema/induration from IV infiltration (improving)  Scalp EVD site C/D/I    TUBES/LINES: PIV x2 BL, EVD clamped     DIET: mechanical (minced and moist)       LABS:                        12.0   6.62  )-----------( 378      ( 28 Feb 2022 05:22 )             36.9     02-28    137  |  105  |  9   ----------------------------<  125<H>  3.7   |  21<L>  |  0.42<L>    Ca    9.4      28 Feb 2022 16:49  Phos  3.2     02-28  Mg     1.9     02-28          CAPILLARY BLOOD GLUCOSE          Drug Levels: [] N/A    CSF Analysis: [] N/A      Allergies    No Known Allergies    Intolerances      MEDICATIONS:  Antibiotics:    Neuro:  acetaminophen     Tablet .. 650 milliGRAM(s) Oral every 6 hours PRN  ibuprofen  Tablet. 400 milliGRAM(s) Oral every 8 hours PRN  traMADol 50 milliGRAM(s) Oral every 6 hours PRN    Anticoagulation:  enoxaparin Injectable 40 milliGRAM(s) SubCutaneous at bedtime    OTHER:  albuterol/ipratropium for Nebulization 3 milliLiter(s) Nebulizer every 6 hours PRN  amLODIPine   Tablet 10 milliGRAM(s) Oral daily  bisacodyl 5 milliGRAM(s) Oral every 12 hours  fludroCORTISONE 0.2 milliGRAM(s) Oral every 12 hours  hydrALAZINE 100 milliGRAM(s) Oral every 8 hours  influenza   Vaccine 0.5 milliLiter(s) IntraMuscular once  labetalol 100 milliGRAM(s) Oral every 8 hours  lactobacillus acidophilus 1 Tablet(s) Oral daily  losartan 100 milliGRAM(s) Oral daily  polyethylene glycol 3350 17 Gram(s) Oral every 12 hours  senna 2 Tablet(s) Oral at bedtime    IVF:  sodium chloride 3 Gram(s) Oral every 6 hours  sodium chloride 3%. 500 milliLiter(s) IV Continuous <Continuous>    CULTURES:  Culture Results:   No growth to date (02-21 @ 15:05)  Culture Results:   No growth at 5 days. (02-21 @ 14:53)    RADIOLOGY & ADDITIONAL TESTS:      Patient 57 y/o female with PMHx of HTN, pre-DM w/ left thalamic parenchymal hematoma with extensive intraventricular hemorrhage. ICH score 2. NIHSS 18. s/p R frontal large bore EVD placement 2/12/22.     PLAN:   NEURO:  - neuro/vital q1h  - R frontal large bore EVD s/p failed clamp trial x2, now reclamped on 2/28, possible VPS Friday   - CTH 2/24 and 2/25 - stable   - CTH today   - ibuprofen/Tylenol prn for fever    - vEEG for fluctuating mental status - left hemisphere slowing, no seizures. (2/26-2/27)    Cardio:   - SBP goal 100-160  - cont. amlodipine 10mg QD, losartan 100mg, Hydralazine 100 mg TID, labetalol 100 q8h   - echo 2/14: LVH, EF: 65-70%   - IV access: PIV x2     PULM:  - extubated 2/14, on RA  - duonebs PRN   - IS     Renal:   - Normal sodium goal   - 3% @25,  salt tabs 3 q6, florinef 0.2 BID     GI:  - regualr diet, ensure added 2/25  - bowel regimen, last BM 2/28    HEME:  - SCDs only L leg/SQL for DVT ppx   - LE dopplers 2/23: R IM calf thrombus, repeat dopplers 2/28 showing persistence but no propogation   - UE dopp 2/23: superficial clots b/l cephalic , wam compresses and elevation, repeat pending 3/4    ENDO:  - prediabetic, a1c 5.8, glucose under control, no insulin req   - TSH wnl     ID:  - blood cx 2/16 +staph epidermidis  - urine cx 2/17 +citrobacter koseri and klebsiella  - ID following s/p: vanco [2/18-2/20], ceftriaxone for UTI [2/19-22], cefepime [2/22- 2/25. linezolid for phlebilitis [2/20- 2/25]   - MRSA negative  - Gallium scan with increased uptake in gluteal region, no cellulitis on physicial exam, probably d/t positioning   - per ID no CI to VPS if needed     PSYCH:   - pt takes xanax at home    Dispo:   - ICU status  - full code  - family updated with plan    PT recs: AR    D/w Dr. D'Amico and Dr. Davidson

## 2022-03-01 NOTE — DISCHARGE NOTE PROVIDER - NSDCMRMEDTOKEN_GEN_ALL_CORE_FT
losartan 100 mg oral tablet: 1 tab(s) orally once a day  Vitamin D2: 5000 unit(s) orally once a week  Xanax 0.5 mg oral tablet: 0.5 milligram(s) orally once a day, As Needed  ZyrTEC 10 mg oral tablet: 1 tab(s) orally once a day   acetaminophen 325 mg oral tablet: 2 tab(s) orally every 6 hours, As needed, Temp greater or equal to 38C (100.4F), Mild Pain (1 - 3)  amLODIPine 10 mg oral tablet: 1 tab(s) orally once a day  apixaban 5 mg oral tablet: 2 tab(s) orally every 12 hours, switch to 5mg (1 tab) every 12 hours on 3/14  bisacodyl 5 mg oral delayed release tablet: 1 tab(s) orally every 12 hours  hydrALAZINE 50 mg oral tablet: 1 tab(s) orally every 8 hours  ipratropium-albuterol 0.5 mg-2.5 mg/3 mL inhalation solution: 3 milliliter(s) inhaled every 6 hours, As needed, Shortness of Breath and/or Wheezing  labetalol 100 mg oral tablet: 1 tab(s) orally every 8 hours  lactobacillus acidophilus oral capsule: 1 tab(s) orally once a day  losartan 100 mg oral tablet: 1 tab(s) orally once a day  melatonin 5 mg oral tablet: 1 tab(s) orally once a day (at bedtime)  oxyCODONE 5 mg oral tablet: 1 tab(s) orally every 4 hours, As needed, Moderate Pain (4 - 6)  polyethylene glycol 3350 oral powder for reconstitution: 17 gram(s) orally every 12 hours  Vitamin D2: 5000 unit(s) orally once a week  ZyrTEC 10 mg oral tablet: 1 tab(s) orally once a day   acetaminophen 325 mg oral tablet: 2 tab(s) orally every 6 hours, As needed, Temp greater or equal to 38C (100.4F), Mild Pain (1 - 3)  amLODIPine 10 mg oral tablet: 1 tab(s) orally once a day  apixaban 5 mg oral tablet: 2 tab(s) orally every 12 hours, switch to 5mg (1 tab) every 12 hours on 3/14  bisacodyl 5 mg oral delayed release tablet: 1 tab(s) orally every 12 hours  hydrALAZINE 50 mg oral tablet: 1 tab(s) orally every 8 hours  ipratropium-albuterol 0.5 mg-2.5 mg/3 mL inhalation solution: 3 milliliter(s) inhaled every 6 hours, As needed, Shortness of Breath and/or Wheezing  labetalol 100 mg oral tablet: 1 tab(s) orally every 8 hours  lactobacillus acidophilus oral capsule: 1 tab(s) orally once a day  losartan 100 mg oral tablet: 1 tab(s) orally once a day  melatonin 5 mg oral tablet: 1 tab(s) orally once a day (at bedtime)  oxyCODONE 10 mg oral tablet: 1 tab(s) orally every 4 hours, As needed, Severe Pain (7 - 10)  oxyCODONE 5 mg oral tablet: 1 tab(s) orally every 4 hours, As needed, Moderate Pain (4 - 6)  polyethylene glycol 3350 oral powder for reconstitution: 17 gram(s) orally every 12 hours  Vitamin D2: 5000 unit(s) orally once a week  ZyrTEC 10 mg oral tablet: 1 tab(s) orally once a day   acetaminophen 325 mg oral tablet: 2 tab(s) orally every 6 hours, As needed, Temp greater or equal to 38C (100.4F), Mild Pain (1 - 3)  amLODIPine 10 mg oral tablet: 1 tab(s) orally once a day  apixaban 5 mg oral tablet: 2 tab(s) orally every 12 hours, switch to 5mg (1 tab) every 12 hours on 3/14  bisacodyl 5 mg oral delayed release tablet: 1 tab(s) orally every 12 hours  hydrALAZINE 50 mg oral tablet: 1 tab(s) orally every 8 hours  ipratropium-albuterol 0.5 mg-2.5 mg/3 mL inhalation solution: 3 milliliter(s) inhaled every 6 hours, As needed, Shortness of Breath and/or Wheezing  labetalol 100 mg oral tablet: 1 tab(s) orally every 8 hours  lactobacillus acidophilus oral capsule: 1 tab(s) orally once a day  losartan 100 mg oral tablet: 1 tab(s) orally once a day  melatonin 5 mg oral tablet: 1 tab(s) orally once a day (at bedtime)  nitrofurantoin macrocrystals-monohydrate 100 mg oral capsule: 1 cap(s) orally 2 times a day (end date: 3/14/22)  oxyCODONE 10 mg oral tablet: 1 tab(s) orally every 4 hours, As needed, Severe Pain (7 - 10)  oxyCODONE 5 mg oral tablet: 1 tab(s) orally every 4 hours, As needed, Moderate Pain (4 - 6)  polyethylene glycol 3350 oral powder for reconstitution: 17 gram(s) orally every 12 hours  Vitamin D2: 5000 unit(s) orally once a week  ZyrTEC 10 mg oral tablet: 1 tab(s) orally once a day

## 2022-03-01 NOTE — DISCHARGE NOTE PROVIDER - HOSPITAL COURSE
HPI:  57 y/o female with PMHx of HTN, pre-DM presents transferred from [facility] for intracranial hemorrhage. As per [place] ED provider and son, patient called son around 7:30-8:00AM c/o severe headache and nausea. Son immediately rushed to her house and found her out of bed, moaning and covered in her own vomit. During transit, EMS reported SBP within 240s with intractable vomiting and given Zofran 8mg. Upon arrival to [place] ED, GCS 7, SBP within 180s, patient was given Decadron 10mg, Keppra 1g, started on a Cardene drip, Propofol, Mannitol 1mg/kg. 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 Patient was intubated for airway support and transferred to [facility] for further management. ICH2, NIHSS 8.  Denies use of anticoagulants/antiplatelets.      Hospital Course:  2/12: transferred from [place]. R frontal EVD and R radial a-line placed. pend CTH/CTA. s/p 1L bolus for elevated lactate.   2/13: S/p R frontal EVD placedment @[id number] draining well. O/n pt w/ episode of possible storming; tachy, HTN, agitated, temp 100.2F, given 2 versed and 50mcg fentanyl w/ improvement in symptoms Neuro exam stable. Pt remains intubated and sedated, AN L>R. Trend lactate and abg. Amlodipine 5mg QD started for SBP goal < 140, cardene dc'd.  EVD dropped 2/13 at 1pm. propanolol and fent standing added for neuro storming, propofol switched to precedex, with resultant hypotension. Propanolol dc'd, fent changed to prn and precedex off, 1 L bolus given, levo prn   2/14: INDIA overnight. Patient remains on propofol. EVD open at 06plV5Z. ICPs WNL. Neuro exam stable. increased bowel regimen. started SQL. extubated on precedex. given 0.5 Ativan + Fentanyl pushes PRN for agitation. 500cc NS bolus.   2/15: BD#4.  On HFNC d/t desat to 88% on 70% non-rebreather. On 0.5 precedex   2/16: BD5, INDIA overnight, on 4L NC, s/p vomiting yesterday, TF being held, formal S/S pending, off precedex. EVD open @ 5. Started on 3% at 30, tmax 100.7   2/17: BD# 6   tachycardic, PE protocol. cardene gtt. neuro unchanged. EVD @ 5cmH2O. 1g IV tylenol for 102.7 fever, duonebs standing to PRN. UA with reflux ordered. Hydralazine 25mg TID added, increased to 50q8. 500cc NS bolus x2. started on nystatin for thrush. started oxy 5q6 PRN for pain for tachycardia. repeat Na 146, decreased 3% @50, started salt tabs 2q6. FEES done. started minced and moist diet  2/18: BD 7. 3% Increased 75 cc/hr  2/19: BD8. INDIA o/n neuro stable. 3%@75cc/hr. central line placed for vascular access  2/20: BD9. tmax 101.3 o/n, neuro stable. 3%@100cc/hr. cardene gtt restarted. Ibuprofen x1 given for fever, ok per Dr. D'Amico. Vanc d/c'd per ID (MRSA neg), treating UTI not bacteremia, likely contaminant. Linezolid started for suspected thrombophlebitis and GS + coverage.   2/21: BD#10 Overnight, IV tylenol given for headache. Right radial arterial line re-wired then discontinued. EVD clamped at 8AM. ICPs WNL. Neuro exam stable. Remains on 3% lowered to 75cc/hr. CTH stable, EVD raised to 20. Febrile 102 - CSF and blood cx sent. EVD lowered from 20 to 5 to drain more CSF and blood, increased lethargy. Ceftriaxone changed to cefepime per ID for broader coverage.   2/22: BD#11 INDIA o/n. neuro at baseline. EVD at 42fxE9Y, 3% kept at 25cc/hr   2/23: BD#12 INDIA overnight. Remain on 3% saline. EVD [id number]. ICPs WNL. Neuro exam stable.  2/24: BD#13 INDIA overnight, Remains on 3% saline. EVD raised to 20, gallium scan done with increased uptake in gluteals but normal physical exam  2/25: BD 14. INDIA overnight. Remains on hypertonics. EVD clamped at 20 yesterday evening, possible shunt in the afternoon. Patient lethargic with worsening headache. EVD reopened at 10, Linezolid and cefepime d/c'ed   2/26: BD 15. increasing lethargic overnight, CTH stable and reviewed with Dr. D'Amico. Remains on a course of hypertonics. EVD reopened at 10 yesterday morning due to patient experiencing increased lethargy and headaches. EEG for fluctuating mental status - left hemisphere slowing, no seizures. 3% d/c'ed remains on salt tabs   2/27: BD 16. INDIA overnight. vEEG placed yesterday for waxing/waning exam. EVD remains open at 10. plan is likely to shunt next week. Remains with intermittent periods of lethargy. EEG negative for seizure, d/c'ed. Restarted on 3% @25. LIJ noticed to be dislodged from proper position, notified RN toremove, hemostasis achieved with no hematoma formation noted. Patient with clear breath sounds and no respiratory distress. Additional peripheral IV guided ultrasound placed in left upper forearm and RU forearm   2/28: BD 17, EVD@10, india     Patient evaluated by PT/OT who recommended:  Patient is going home? rehab? hospice? Facility Name:     Hospital course c/b:     Exam on day of discharge:    Checklist:   - Obtained follow up appointment from NP  - Reviewed final recommendations of inpatient consults  - review discharge planning on provider handoff  - post op imaging completed  - Neurologically stable for discharge  - Vitals stable for discharge   - Afebrile for discharge  - WBC is stable  - Sodium level is normal  - Pain is adequately controlled  - Pt has PICC/walker/brace/collar        HPI:  55 y/o female with PMHx of HTN, pre-DM presents transferred from [facility] for intracranial hemorrhage. As per [place] ED provider and son, patient called son around 7:30-8:00AM c/o severe headache and nausea. Son immediately rushed to her house and found her out of bed, moaning and covered in her own vomit. During transit, EMS reported SBP within 240s with intractable vomiting and given Zofran 8mg. Upon arrival to [place] ED, GCS 7, SBP within 180s, patient was given Decadron 10mg, Keppra 1g, started on a Cardene drip, Propofol, Mannitol 1mg/kg. 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 Patient was intubated for airway support and transferred to [facility] for further management. ICH2, NIHSS 8.  Denies use of anticoagulants/antiplatelets.      Hospital Course:  2/12: transferred from [place]. R frontal EVD and R radial a-line placed. pend CTH/CTA. s/p 1L bolus for elevated lactate.   2/13: S/p R frontal EVD placedment @28yuD2G draining well. O/n pt w/ episode of possible storming; tachy, HTN, agitated, temp 100.2F, given 2 versed and 50mcg fentanyl w/ improvement in symptoms Neuro exam stable. Pt remains intubated and sedated, AN L>R. Trend lactate and abg. Amlodipine 5mg QD started for SBP goal < 140, cardene dc'd.  EVD dropped 2/13 at 1pm. propanolol and fent standing added for neuro storming, propofol switched to precedex, with resultant hypotension. Propanolol dc'd, fent changed to prn and precedex off, 1 L bolus given, levo prn   2/14: INDIA overnight. Patient remains on propofol. EVD open at 48wtU2Q. ICPs WNL. Neuro exam stable. increased bowel regimen. started SQL. extubated on precedex. given 0.5 Ativan + Fentanyl pushes PRN for agitation. 500cc NS bolus.   2/15: BD#4.  On HFNC d/t desat to 88% on 70% non-rebreather. On 0.5 precedex   2/16: BD5, INDIA overnight, on 4L NC, s/p vomiting yesterday, TF being held, formal S/S pending, off precedex. EVD open @ 5. Started on 3% at 30, tmax 100.7   2/17: BD# 6   tachycardic, PE protocol. cardene gtt. neuro unchanged. EVD @ 5cmH2O. 1g IV tylenol for 102.7 fever, duonebs standing to PRN. UA with reflux ordered. Hydralazine 25mg TID added, increased to 50q8. 500cc NS bolus x2. started on nystatin for thrush. started oxy 5q6 PRN for pain for tachycardia. repeat Na 146, decreased 3% @50, started salt tabs 2q6. FEES done. started minced and moist diet  2/18: BD 7. 3% Increased 75 cc/hr  2/19: BD8. INDIA o/n neuro stable. 3%@75cc/hr. central line placed for vascular access  2/20: BD9. tmax 101.3 o/n, neuro stable. 3%@100cc/hr. cardene gtt restarted. Ibuprofen x1 given for fever, ok per Dr. D'Amico. Vanc d/c'd per ID (MRSA neg), treating UTI not bacteremia, likely contaminant. Linezolid started for suspected thrombophlebitis and GS + coverage.   2/21: BD#10 Overnight, IV tylenol given for headache. Right radial arterial line re-wired then discontinued. EVD clamped at 8AM. ICPs WNL. Neuro exam stable. Remains on 3% lowered to 75cc/hr. CTH stable, EVD raised to 20. Febrile 102 - CSF and blood cx sent. EVD lowered from 20 to 5 to drain more CSF and blood, increased lethargy. Ceftriaxone changed to cefepime per ID for broader coverage.   2/22: BD#11 INDIA o/n. neuro at baseline. EVD at 54duJ8C, 3% kept at 25cc/hr   2/23: BD#12 INDIA overnight. Remain on 3% saline. EVD [id number]. ICPs WNL. Neuro exam stable.  2/24: BD#13 INDIA overnight, Remains on 3% saline. EVD raised to 20, gallium scan done with increased uptake in gluteals but normal physical exam  2/25: BD 14. INDIA overnight. Remains on hypertonics. EVD clamped at 20 yesterday evening, possible shunt in the afternoon. Patient lethargic with worsening headache. EVD reopened at 10, Linezolid and cefepime d/c'ed   2/26: BD 15. increasing lethargic overnight, CTH stable and reviewed with Dr. D'Amico. Remains on a course of hypertonics. EVD reopened at 10 yesterday morning due to patient experiencing increased lethargy and headaches. EEG for fluctuating mental status - left hemisphere slowing, no seizures. 3% d/c'ed remains on salt tabs   2/27: BD 16. INDIA overnight. vEEG placed yesterday for waxing/waning exam. EVD remains open at 10. plan is likely to shunt next week. Remains with intermittent periods of lethargy. EEG negative for seizure, d/c'ed. Restarted on 3% @25. LIJ noticed to be dislodged from proper position, notified RN toremove, hemostasis achieved with no hematoma formation noted. Patient with clear breath sounds and no respiratory distress. Additional peripheral IV guided ultrasound placed in left upper forearm and RU forearm   2/28: BD 17, EVD@10, india   3/1: BD 18, EVD clamped overnight, neuro remained stable   3/2: BD 19, POD0 s/p VPS. LE dopplers stable. CTH stable. stepdown. dilaudid and fentanyl needed for severe h/a. Given dilaudid 0.25 mg @ 10 PM.   3/3: BD 20. POD 1 R VPS placement (Certas @ 5). INDIA o/n. stepdown status.       Patient evaluated by PT/OT who recommended: Acute Rehab   Patient is going home? rehab? hospice? Facility Name:     Hospital course c/b: hypertension, hyponatremia, DVT     Exam on day of discharge:    Checklist:   - Obtained follow up appointment from NP  - Reviewed final recommendations of inpatient consults  - review discharge planning on provider handoff  - post op imaging completed  - Neurologically stable for discharge  - Vitals stable for discharge   - Afebrile for discharge  - WBC is stable  - Sodium level is normal  - Pain is adequately controlled  - Pt has PICC/walker/brace/collar        HPI:  55 y/o female with PMHx of HTN, pre-DM presents transferred from [facility] for intracranial hemorrhage. As per [place] ED provider and son, patient called son around 7:30-8:00AM c/o severe headache and nausea. Son immediately rushed to her house and found her out of bed, moaning and covered in her own vomit. During transit, EMS reported SBP within 240s with intractable vomiting and given Zofran 8mg. Upon arrival to [place] ED, GCS 7, SBP within 180s, patient was given Decadron 10mg, Keppra 1g, started on a Cardene drip, Propofol, Mannitol 1mg/kg. 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 Patient was intubated for airway support and transferred to [facility] for further management. ICH2, NIHSS 8.  Denies use of anticoagulants/antiplatelets.      Hospital Course:  2/12: transferred from [place]. R frontal EVD and R radial a-line placed. pend CTH/CTA. s/p 1L bolus for elevated lactate.   2/13: S/p R frontal EVD placedment @[id number] draining well. O/n pt w/ episode of possible storming; tachy, HTN, agitated, temp 100.2F, given 2 versed and 50mcg fentanyl w/ improvement in symptoms Neuro exam stable. Pt remains intubated and sedated, AN L>R. Trend lactate and abg. Amlodipine 5mg QD started for SBP goal < 140, cardene dc'd.  EVD dropped 2/13 at 1pm. propanolol and fent standing added for neuro storming, propofol switched to precedex, with resultant hypotension. Propanolol dc'd, fent changed to prn and precedex off, 1 L bolus given, levo prn   2/14: INDIA overnight. Patient remains on propofol. EVD open at 46etH0K. ICPs WNL. Neuro exam stable. increased bowel regimen. started SQL. extubated on precedex. given 0.5 Ativan + Fentanyl pushes PRN for agitation. 500cc NS bolus.   2/15: BD#4.  On HFNC d/t desat to 88% on 70% non-rebreather. On 0.5 precedex   2/16: BD5, INDIA overnight, on 4L NC, s/p vomiting yesterday, TF being held, formal S/S pending, off precedex. EVD open @ 5. Started on 3% at 30, tmax 100.7   2/17: BD# 6   tachycardic, PE protocol. cardene gtt. neuro unchanged. EVD @ 5cmH2O. 1g IV tylenol for 102.7 fever, duonebs standing to PRN. UA with reflux ordered. Hydralazine 25mg TID added, increased to 50q8. 500cc NS bolus x2. started on nystatin for thrush. started oxy 5q6 PRN for pain for tachycardia. repeat Na 146, decreased 3% @50, started salt tabs 2q6. FEES done. started minced and moist diet  2/18: BD 7. 3% Increased 75 cc/hr  2/19: BD8. INDIA o/n neuro stable. 3%@75cc/hr. central line placed for vascular access  2/20: BD9. tmax 101.3 o/n, neuro stable. 3%@100cc/hr. cardene gtt restarted. Ibuprofen x1 given for fever, ok per Dr. D'Amico. Vanc d/c'd per ID (MRSA neg), treating UTI not bacteremia, likely contaminant. Linezolid started for suspected thrombophlebitis and GS + coverage.   2/21: BD#10 Overnight, IV tylenol given for headache. Right radial arterial line re-wired then discontinued. EVD clamped at 8AM. ICPs WNL. Neuro exam stable. Remains on 3% lowered to 75cc/hr. CTH stable, EVD raised to 20. Febrile 102 - CSF and blood cx sent. EVD lowered from 20 to 5 to drain more CSF and blood, increased lethargy. Ceftriaxone changed to cefepime per ID for broader coverage.   2/22: BD#11 INDIA o/n. neuro at baseline. EVD at 39jdF1E, 3% kept at 25cc/hr   2/23: BD#12 INDIA overnight. Remain on 3% saline. EVD [id number]. ICPs WNL. Neuro exam stable.  2/24: BD#13 INDIA overnight, Remains on 3% saline. EVD raised to 20, gallium scan done with increased uptake in gluteals but normal physical exam  2/25: BD 14. INDIA overnight. Remains on hypertonics. EVD clamped at 20 yesterday evening, possible shunt in the afternoon. Patient lethargic with worsening headache. EVD reopened at 10, Linezolid and cefepime d/c'ed   2/26: BD 15. increasing lethargic overnight, CTH stable and reviewed with Dr. D'Amico. Remains on a course of hypertonics. EVD reopened at 10 yesterday morning due to patient experiencing increased lethargy and headaches. EEG for fluctuating mental status - left hemisphere slowing, no seizures. 3% d/c'ed remains on salt tabs   2/27: BD 16. INDIA overnight. vEEG placed yesterday for waxing/waning exam. EVD remains open at 10. plan is likely to shunt next week. Remains with intermittent periods of lethargy. EEG negative for seizure, d/c'ed. Restarted on 3% @25. LIJ noticed to be dislodged from proper position, notified RN toremove, hemostasis achieved with no hematoma formation noted. Patient with clear breath sounds and no respiratory distress. Additional peripheral IV guided ultrasound placed in left upper forearm and RU forearm   2/28: BD 17, EVD@10, india   3/1: BD 18, EVD clamped overnight, neuro remained stable   3/2: BD 19, POD0 s/p VPS. LE dopplers stable. CTH stable. stepdown. dilaudid and fentanyl needed for severe h/a. Given dilaudid 0.25 mg @ 10 PM.   3/3: BD 20. POD 1 R VPS placement (Certas @ 5). INDIA o/n. stepdown status.   3/4: BD 21, POD2 R VPS placement (Certas @ 5). Febrile overnight to 103, pancultured. UE dopplers +new L IJ DVT. started on heparin gtt. realienf d/c.   3/5: BD 22, POD3 R VPS (Certas @ 5). On heparin gtt @ 14 for L IJ DVT  3/6: BD 23, POD4 R VPS (Certas @5), PTT 38, heparin gtt increased to 17  3/7: BD 24, POD 5 VPS Certas at 5. INDIA overnight. remains on hep gtt for L IJ DVT. pending rehab placement. repeat CTH stable, dc;d heparin gtt started eliquis. salt tabs dc'd  3/8: BD25, POD6 VPS, remains on eliquis, INDIA overnight, pending AR.       Patient evaluated by PT/OT who recommended: Acute Rehab   Patient is going home? rehab? hospice? Facility Name:     Hospital course c/b: hydrocephalus (s/p VPS, Certas @ 5), hypertension (controlled on current regiment), hyponatremia (resolved), R IM calf and L IJ DVT (now on Eliquis)      Exam on day of discharge:    Checklist:   - Obtained follow up appointment from NP  - Reviewed final recommendations of inpatient consults  - review discharge planning on provider handoff  - post op imaging completed  - Neurologically stable for discharge  - Vitals stable for discharge   - Afebrile for discharge  - WBC is stable  - Sodium level is normal  - Pain is adequately controlled  - Pt has PICC/walker/brace/collar        HPI:  55 y/o female with PMHx of HTN, pre-DM presents transferred from [facility] for intracranial hemorrhage. As per [place] ED provider and son, patient called son around 7:30-8:00AM c/o severe headache and nausea. Son immediately rushed to her house and found her out of bed, moaning and covered in her own vomit. During transit, EMS reported SBP within 240s with intractable vomiting and given Zofran 8mg. Upon arrival to [place] ED, GCS 7, SBP within 180s, patient was given Decadron 10mg, Keppra 1g, started on a Cardene drip, Propofol, Mannitol 1mg/kg. 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 Patient was intubated for airway support and transferred to [facility] for further management. ICH2, NIHSS 8.  Denies use of anticoagulants/antiplatelets.      Hospital Course:  2/12: transferred from [place]. R frontal EVD and R radial a-line placed. pend CTH/CTA. s/p 1L bolus for elevated lactate.   2/13: S/p R frontal EVD placedment @93cqS6L draining well. O/n pt w/ episode of possible storming; tachy, HTN, agitated, temp 100.2F, given 2 versed and 50mcg fentanyl w/ improvement in symptoms Neuro exam stable. Pt remains intubated and sedated, AN L>R. Trend lactate and abg. Amlodipine 5mg QD started for SBP goal < 140, cardene dc'd.  EVD dropped 2/13 at 1pm. propanolol and fent standing added for neuro storming, propofol switched to precedex, with resultant hypotension. Propanolol dc'd, fent changed to prn and precedex off, 1 L bolus given, levo prn   2/14: INDIA overnight. Patient remains on propofol. EVD open at 18joK1R. ICPs WNL. Neuro exam stable. increased bowel regimen. started SQL. extubated on precedex. given 0.5 Ativan + Fentanyl pushes PRN for agitation. 500cc NS bolus.   2/15: BD#4.  On HFNC d/t desat to 88% on 70% non-rebreather. On 0.5 precedex   2/16: BD5, INDIA overnight, on 4L NC, s/p vomiting yesterday, TF being held, formal S/S pending, off precedex. EVD open @ 5. Started on 3% at 30, tmax 100.7   2/17: BD# 6   tachycardic, PE protocol. cardene gtt. neuro unchanged. EVD @ 5cmH2O. 1g IV tylenol for 102.7 fever, duonebs standing to PRN. UA with reflux ordered. Hydralazine 25mg TID added, increased to 50q8. 500cc NS bolus x2. started on nystatin for thrush. started oxy 5q6 PRN for pain for tachycardia. repeat Na 146, decreased 3% @50, started salt tabs 2q6. FEES done. started minced and moist diet  2/18: BD 7. 3% Increased 75 cc/hr  2/19: BD8. INDIA o/n neuro stable. 3%@75cc/hr. central line placed for vascular access  2/20: BD9. tmax 101.3 o/n, neuro stable. 3%@100cc/hr. cardene gtt restarted. Ibuprofen x1 given for fever, ok per Dr. D'Amico. Vanc d/c'd per ID (MRSA neg), treating UTI not bacteremia, likely contaminant. Linezolid started for suspected thrombophlebitis and GS + coverage.   2/21: BD#10 Overnight, IV tylenol given for headache. Right radial arterial line re-wired then discontinued. EVD clamped at 8AM. ICPs WNL. Neuro exam stable. Remains on 3% lowered to 75cc/hr. CTH stable, EVD raised to 20. Febrile 102 - CSF and blood cx sent. EVD lowered from 20 to 5 to drain more CSF and blood, increased lethargy. Ceftriaxone changed to cefepime per ID for broader coverage.   2/22: BD#11 INDIA o/n. neuro at baseline. EVD at 73kxE5W, 3% kept at 25cc/hr   2/23: BD#12 INDIA overnight. Remain on 3% saline. EVD ox1nrN3C. ICPs WNL. Neuro exam stable.  2/24: BD#13 INDIA overnight, Remains on 3% saline. EVD raised to 20, gallium scan done with increased uptake in gluteals but normal physical exam  2/25: BD 14. INDIA overnight. Remains on hypertonics. EVD clamped at 20 yesterday evening, possible shunt in the afternoon. Patient lethargic with worsening headache. EVD reopened at 10, Linezolid and cefepime d/c'ed   2/26: BD 15. increasing lethargic overnight, CTH stable and reviewed with Dr. D'Amico. Remains on a course of hypertonics. EVD reopened at 10 yesterday morning due to patient experiencing increased lethargy and headaches. EEG for fluctuating mental status - left hemisphere slowing, no seizures. 3% d/c'ed remains on salt tabs   2/27: BD 16. INDIA overnight. vEEG placed yesterday for waxing/waning exam. EVD remains open at 10. plan is likely to shunt next week. Remains with intermittent periods of lethargy. EEG negative for seizure, d/c'ed. Restarted on 3% @25. LIJ noticed to be dislodged from proper position, notified RN toremove, hemostasis achieved with no hematoma formation noted. Patient with clear breath sounds and no respiratory distress. Additional peripheral IV guided ultrasound placed in left upper forearm and RU forearm   2/28: BD 17, EVD@10, india   3/1: BD 18, EVD clamped overnight, neuro remained stable   3/2: BD 19, POD0 s/p VPS. LE dopplers stable. CTH stable. stepdown. dilaudid and fentanyl needed for severe h/a. Given dilaudid 0.25 mg @ 10 PM.   3/3: BD 20. POD 1 R VPS placement (Certas @ 5). INDIA o/n. stepdown status.   3/4: BD 21, POD2 R VPS placement (Certas @ 5). Febrile overnight to 103, pancultured. UE dopplers +new L IJ DVT. started on heparin gtt. realienf d/c.   3/5: BD 22, POD3 R VPS (Certas @ 5). On heparin gtt @ 14 for L IJ DVT  3/6: BD 23, POD4 R VPS (Certas @5), PTT 38, heparin gtt increased to 17  3/7: BD 24, POD 5 VPS Certas at 5. INDIA overnight. remains on hep gtt for L IJ DVT. pending rehab placement. repeat CTH stable, dc;d heparin gtt started eliquis. salt tabs dc'd  3/8: BD25, POD6 VPS, remains on eliquis, INDIA overnight, pending AR.   3/9: BD26, POD7 VPS, INDIA overnight, dilaudid 0.5mg for severe headache x 1 with improvement, pending AR.     Patient evaluated by PT/OT who recommended: Acute Rehab   Patient is going to [facility] course c/b: hydrocephalus (s/p VPS, Certas @ 5), hypertension (controlled on current regiment), hyponatremia (resolved), R IM calf and L IJ DVT (now on Eliquis)      Exam on day of discharge:  General: NAD, pt is comfortably sitting up in bed, on room air  HEENT: CN II-XII grossly intact, PERRL 3mm briskly reactive, EOMI b/l, face symmetric, tongue midline, neck FROM  Cardiovascular: RRR, normal S1 and S2   Respiratory: lungs CTAB, no wheezing, rhonchi, or crackles   GI: normoactive BS to auscultation, abd soft, NTND, +Abdominal incisions c/d/i   Neuro: A&Ox2-3 (sometimes forget year but knows president), No aphasia, speech clear, no dysmetria, +Right pronator drift. Follows commands.AN x4 spontaneously, 5/5 strength in all extremities throughout except RUE 4+/5. SILT throughout   Extremities: distal pulses 2+ x4  Wound/incision: +Right VPS incision with staples in place, C/D/I    Patient is neuro stable, vitals stable, afebrile, medically ready for discharge          HPI:  55 y/o female with PMHx of HTN, pre-DM presents transferred from [facility] for intracranial hemorrhage. As per [place] ED provider and son, patient called son around 7:30-8:00AM c/o severe headache and nausea. Son immediately rushed to her house and found her out of bed, moaning and covered in her own vomit. During transit, EMS reported SBP within 240s with intractable vomiting and given Zofran 8mg. Upon arrival to [place] ED, GCS 7, SBP within 180s, patient was given Decadron 10mg, Keppra 1g, started on a Cardene drip, Propofol, Mannitol 1mg/kg. 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 Patient was intubated for airway support and transferred to [facility] for further management. ICH2, NIHSS 8.  Denies use of anticoagulants/antiplatelets.      Hospital Course:  2/12: transferred from [place]. R frontal EVD and R radial a-line placed. pend CTH/CTA. s/p 1L bolus for elevated lactate.   2/13: S/p R frontal EVD placedment @[id number] draining well. O/n pt w/ episode of possible storming; tachy, HTN, agitated, temp 100.2F, given 2 versed and 50mcg fentanyl w/ improvement in symptoms Neuro exam stable. Pt remains intubated and sedated, AN L>R. Trend lactate and abg. Amlodipine 5mg QD started for SBP goal < 140, cardene dc'd.  EVD dropped 2/13 at 1pm. propanolol and fent standing added for neuro storming, propofol switched to precedex, with resultant hypotension. Propanolol dc'd, fent changed to prn and precedex off, 1 L bolus given, levo prn   2/14: INDIA overnight. Patient remains on propofol. EVD open at [id number]. ICPs WNL. Neuro exam stable. increased bowel regimen. started SQL. extubated on precedex. given 0.5 Ativan + Fentanyl pushes PRN for agitation. 500cc NS bolus.   2/15: BD#4.  On HFNC d/t desat to 88% on 70% non-rebreather. On 0.5 precedex   2/16: BD5, INDIA overnight, on 4L NC, s/p vomiting yesterday, TF being held, formal S/S pending, off precedex. EVD open @ 5. Started on 3% at 30, tmax 100.7   2/17: BD# 6   tachycardic, PE protocol. cardene gtt. neuro unchanged. EVD @ 5cmH2O. 1g IV tylenol for 102.7 fever, duonebs standing to PRN. UA with reflux ordered. Hydralazine 25mg TID added, increased to 50q8. 500cc NS bolus x2. started on nystatin for thrush. started oxy 5q6 PRN for pain for tachycardia. repeat Na 146, decreased 3% @50, started salt tabs 2q6. FEES done. started minced and moist diet  2/18: BD 7. 3% Increased 75 cc/hr  2/19: BD8. INDIA o/n neuro stable. 3%@75cc/hr. central line placed for vascular access  2/20: BD9. tmax 101.3 o/n, neuro stable. 3%@100cc/hr. cardene gtt restarted. Ibuprofen x1 given for fever, ok per Dr. D'Amico. Vanc d/c'd per ID (MRSA neg), treating UTI not bacteremia, likely contaminant. Linezolid started for suspected thrombophlebitis and GS + coverage.   2/21: BD#10 Overnight, IV tylenol given for headache. Right radial arterial line re-wired then discontinued. EVD clamped at 8AM. ICPs WNL. Neuro exam stable. Remains on 3% lowered to 75cc/hr. CTH stable, EVD raised to 20. Febrile 102 - CSF and blood cx sent. EVD lowered from 20 to 5 to drain more CSF and blood, increased lethargy. Ceftriaxone changed to cefepime per ID for broader coverage.   2/22: BD#11 INDIA o/n. neuro at baseline. EVD at [id number], 3% kept at 25cc/hr   2/23: BD#12 INDIA overnight. Remain on 3% saline. EVD [id number]. ICPs WNL. Neuro exam stable.  2/24: BD#13 INDIA overnight, Remains on 3% saline. EVD raised to 20, gallium scan done with increased uptake in gluteals but normal physical exam  2/25: BD 14. INDIA overnight. Remains on hypertonics. EVD clamped at 20 yesterday evening, possible shunt in the afternoon. Patient lethargic with worsening headache. EVD reopened at 10, Linezolid and cefepime d/c'ed   2/26: BD 15. increasing lethargic overnight, CTH stable and reviewed with Dr. D'Amico. Remains on a course of hypertonics. EVD reopened at 10 yesterday morning due to patient experiencing increased lethargy and headaches. EEG for fluctuating mental status - left hemisphere slowing, no seizures. 3% d/c'ed remains on salt tabs   2/27: BD 16. INDIA overnight. vEEG placed yesterday for waxing/waning exam. EVD remains open at 10. plan is likely to shunt next week. Remains with intermittent periods of lethargy. EEG negative for seizure, d/c'ed. Restarted on 3% @25. LIJ noticed to be dislodged from proper position, notified RN toremove, hemostasis achieved with no hematoma formation noted. Patient with clear breath sounds and no respiratory distress. Additional peripheral IV guided ultrasound placed in left upper forearm and RU forearm   2/28: BD 17, EVD@10, india   3/1: BD 18, EVD clamped overnight, neuro remained stable   3/2: BD 19, POD0 s/p VPS. LE dopplers stable. CTH stable. stepdown. dilaudid and fentanyl needed for severe h/a. Given dilaudid 0.25 mg @ 10 PM.   3/3: BD 20. POD 1 R VPS placement (Certas @ 5). INDIA o/n. stepdown status.   3/4: BD 21, POD2 R VPS placement (Certas @ 5). Febrile overnight to 103, pancultured. UE dopplers +new L IJ DVT. started on heparin gtt. realienf d/c.   3/5: BD 22, POD3 R VPS (Certas @ 5). On heparin gtt @ 14 for L IJ DVT  3/6: BD 23, POD4 R VPS (Certas @5), PTT 38, heparin gtt increased to 17  3/7: BD 24, POD 5 VPS Certas at 5. INDIA overnight. remains on hep gtt for L IJ DVT. pending rehab placement. repeat CTH stable, dc;d heparin gtt started eliquis. salt tabs dc'd  3/8: BD25, POD6 VPS, remains on eliquis, INDIA overnight, pending AR.   3/9: BD26, POD7 VPS, INIDA overnight, dilaudid 0.5mg for severe headache x 1 with improvement, pending AR.     Patient evaluated by PT/OT who recommended: Acute Rehab   Patient is going to [facility] course c/b: hydrocephalus (s/p VPS, Certas @ 5), hypertension (controlled on current regiment), hyponatremia (resolved), R IM calf and L IJ DVT (now on Eliquis), UTI (on Macrobid)      Exam on day of discharge:  General: NAD, pt is comfortably sitting up in bed, on room air  HEENT: CN II-XII grossly intact, PERRL 3mm briskly reactive, EOMI b/l, face symmetric, tongue midline, neck FROM  Cardiovascular: RRR, normal S1 and S2   Respiratory: lungs CTAB, no wheezing, rhonchi, or crackles   GI: normoactive BS to auscultation, abd soft, NTND, +Abdominal incisions c/d/i   Neuro: A&Ox2-3 (sometimes forget year but knows president), No aphasia, speech clear, no dysmetria, +Right pronator drift. Follows commands.AN x4 spontaneously, 5/5 strength in all extremities throughout except RUE 4+/5. SILT throughout   Extremities: distal pulses 2+ x4  Wound/incision: +Right VPS incision with staples in place, C/D/I    Patient is neuro stable, vitals stable, afebrile, medically ready for discharge

## 2022-03-02 LAB
ANION GAP SERPL CALC-SCNC: 12 MMOL/L — SIGNIFICANT CHANGE UP (ref 5–17)
ANION GAP SERPL CALC-SCNC: 13 MMOL/L — SIGNIFICANT CHANGE UP (ref 5–17)
APTT BLD: 30.9 SEC — SIGNIFICANT CHANGE UP (ref 27.5–35.5)
BLD GP AB SCN SERPL QL: NEGATIVE — SIGNIFICANT CHANGE UP
BUN SERPL-MCNC: 9 MG/DL — SIGNIFICANT CHANGE UP (ref 7–23)
BUN SERPL-MCNC: 9 MG/DL — SIGNIFICANT CHANGE UP (ref 7–23)
CALCIUM SERPL-MCNC: 9.2 MG/DL — SIGNIFICANT CHANGE UP (ref 8.4–10.5)
CALCIUM SERPL-MCNC: 9.3 MG/DL — SIGNIFICANT CHANGE UP (ref 8.4–10.5)
CHLORIDE SERPL-SCNC: 105 MMOL/L — SIGNIFICANT CHANGE UP (ref 96–108)
CHLORIDE SERPL-SCNC: 107 MMOL/L — SIGNIFICANT CHANGE UP (ref 96–108)
CO2 SERPL-SCNC: 18 MMOL/L — LOW (ref 22–31)
CO2 SERPL-SCNC: 20 MMOL/L — LOW (ref 22–31)
CREAT SERPL-MCNC: 0.32 MG/DL — LOW (ref 0.5–1.3)
CREAT SERPL-MCNC: 0.35 MG/DL — LOW (ref 0.5–1.3)
EGFR: 119 ML/MIN/1.73M2 — SIGNIFICANT CHANGE UP
EGFR: 122 ML/MIN/1.73M2 — SIGNIFICANT CHANGE UP
GLUCOSE SERPL-MCNC: 146 MG/DL — HIGH (ref 70–99)
GLUCOSE SERPL-MCNC: 163 MG/DL — HIGH (ref 70–99)
HCT VFR BLD CALC: 36.2 % — SIGNIFICANT CHANGE UP (ref 34.5–45)
HGB BLD-MCNC: 11.3 G/DL — LOW (ref 11.5–15.5)
INR BLD: 1.05 — SIGNIFICANT CHANGE UP (ref 0.88–1.16)
MAGNESIUM SERPL-MCNC: 1.8 MG/DL — SIGNIFICANT CHANGE UP (ref 1.6–2.6)
MCHC RBC-ENTMCNC: 30.1 PG — SIGNIFICANT CHANGE UP (ref 27–34)
MCHC RBC-ENTMCNC: 31.2 GM/DL — LOW (ref 32–36)
MCV RBC AUTO: 96.3 FL — SIGNIFICANT CHANGE UP (ref 80–100)
NRBC # BLD: 0 /100 WBCS — SIGNIFICANT CHANGE UP (ref 0–0)
PHOSPHATE SERPL-MCNC: 3.1 MG/DL — SIGNIFICANT CHANGE UP (ref 2.5–4.5)
PLATELET # BLD AUTO: 365 K/UL — SIGNIFICANT CHANGE UP (ref 150–400)
POTASSIUM SERPL-MCNC: 3.4 MMOL/L — LOW (ref 3.5–5.3)
POTASSIUM SERPL-MCNC: 4.3 MMOL/L — SIGNIFICANT CHANGE UP (ref 3.5–5.3)
POTASSIUM SERPL-SCNC: 3.4 MMOL/L — LOW (ref 3.5–5.3)
POTASSIUM SERPL-SCNC: 4.3 MMOL/L — SIGNIFICANT CHANGE UP (ref 3.5–5.3)
PROTHROM AB SERPL-ACNC: 12.5 SEC — SIGNIFICANT CHANGE UP (ref 10.5–13.4)
RBC # BLD: 3.76 M/UL — LOW (ref 3.8–5.2)
RBC # FLD: 13 % — SIGNIFICANT CHANGE UP (ref 10.3–14.5)
RH IG SCN BLD-IMP: POSITIVE — SIGNIFICANT CHANGE UP
SODIUM SERPL-SCNC: 137 MMOL/L — SIGNIFICANT CHANGE UP (ref 135–145)
SODIUM SERPL-SCNC: 138 MMOL/L — SIGNIFICANT CHANGE UP (ref 135–145)
WBC # BLD: 6.71 K/UL — SIGNIFICANT CHANGE UP (ref 3.8–10.5)
WBC # FLD AUTO: 6.71 K/UL — SIGNIFICANT CHANGE UP (ref 3.8–10.5)

## 2022-03-02 PROCEDURE — 62223 ESTABLISH BRAIN CAVITY SHUNT: CPT | Mod: 62

## 2022-03-02 PROCEDURE — 70450 CT HEAD/BRAIN W/O DYE: CPT | Mod: 26

## 2022-03-02 PROCEDURE — 93970 EXTREMITY STUDY: CPT | Mod: 26

## 2022-03-02 PROCEDURE — 99291 CRITICAL CARE FIRST HOUR: CPT

## 2022-03-02 DEVICE — VALVE CODMAN CERTAS PLUS INLINE WITH SIPHONGUARD: Type: IMPLANTABLE DEVICE | Status: FUNCTIONAL

## 2022-03-02 DEVICE — BACTISEAL SHUNT CATH KIT WITH BARIUM: Type: IMPLANTABLE DEVICE | Status: FUNCTIONAL

## 2022-03-02 RX ORDER — POTASSIUM CHLORIDE 20 MEQ
40 PACKET (EA) ORAL EVERY 4 HOURS
Refills: 0 | Status: COMPLETED | OUTPATIENT
Start: 2022-03-02 | End: 2022-03-02

## 2022-03-02 RX ORDER — ACETAMINOPHEN 500 MG
1000 TABLET ORAL ONCE
Refills: 0 | Status: DISCONTINUED | OUTPATIENT
Start: 2022-03-02 | End: 2022-03-02

## 2022-03-02 RX ORDER — HYDROMORPHONE HYDROCHLORIDE 2 MG/ML
0.25 INJECTION INTRAMUSCULAR; INTRAVENOUS; SUBCUTANEOUS ONCE
Refills: 0 | Status: DISCONTINUED | OUTPATIENT
Start: 2022-03-02 | End: 2022-03-02

## 2022-03-02 RX ORDER — MAGNESIUM SULFATE 500 MG/ML
2 VIAL (ML) INJECTION ONCE
Refills: 0 | Status: CANCELLED | OUTPATIENT
Start: 2022-03-02 | End: 2022-03-11

## 2022-03-02 RX ORDER — FENTANYL CITRATE 50 UG/ML
25 INJECTION INTRAVENOUS ONCE
Refills: 0 | Status: DISCONTINUED | OUTPATIENT
Start: 2022-03-02 | End: 2022-03-02

## 2022-03-02 RX ORDER — CEFAZOLIN SODIUM 1 G
2000 VIAL (EA) INJECTION EVERY 8 HOURS
Refills: 0 | Status: COMPLETED | OUTPATIENT
Start: 2022-03-02 | End: 2022-03-03

## 2022-03-02 RX ORDER — MAGNESIUM SULFATE 500 MG/ML
2 VIAL (ML) INJECTION ONCE
Refills: 0 | Status: COMPLETED | OUTPATIENT
Start: 2022-03-02 | End: 2022-03-02

## 2022-03-02 RX ADMIN — HYDROMORPHONE HYDROCHLORIDE 0.25 MILLIGRAM(S): 2 INJECTION INTRAMUSCULAR; INTRAVENOUS; SUBCUTANEOUS at 16:26

## 2022-03-02 RX ADMIN — Medication 100 MILLIGRAM(S): at 17:18

## 2022-03-02 RX ADMIN — SODIUM CHLORIDE 3 GRAM(S): 9 INJECTION INTRAMUSCULAR; INTRAVENOUS; SUBCUTANEOUS at 05:19

## 2022-03-02 RX ADMIN — SENNA PLUS 2 TABLET(S): 8.6 TABLET ORAL at 21:30

## 2022-03-02 RX ADMIN — Medication 650 MILLIGRAM(S): at 13:51

## 2022-03-02 RX ADMIN — Medication 650 MILLIGRAM(S): at 11:33

## 2022-03-02 RX ADMIN — FLUDROCORTISONE ACETATE 0.2 MILLIGRAM(S): 0.1 TABLET ORAL at 17:19

## 2022-03-02 RX ADMIN — FENTANYL CITRATE 25 MICROGRAM(S): 50 INJECTION INTRAVENOUS at 16:26

## 2022-03-02 RX ADMIN — Medication 5 MILLIGRAM(S): at 05:20

## 2022-03-02 RX ADMIN — Medication 100 MILLIGRAM(S): at 21:31

## 2022-03-02 RX ADMIN — Medication 25 GRAM(S): at 04:21

## 2022-03-02 RX ADMIN — Medication 100 MILLIGRAM(S): at 13:56

## 2022-03-02 RX ADMIN — TRAMADOL HYDROCHLORIDE 50 MILLIGRAM(S): 50 TABLET ORAL at 18:36

## 2022-03-02 RX ADMIN — TRAMADOL HYDROCHLORIDE 50 MILLIGRAM(S): 50 TABLET ORAL at 13:51

## 2022-03-02 RX ADMIN — CHLORHEXIDINE GLUCONATE 1 APPLICATION(S): 213 SOLUTION TOPICAL at 05:21

## 2022-03-02 RX ADMIN — SODIUM CHLORIDE 3 GRAM(S): 9 INJECTION INTRAMUSCULAR; INTRAVENOUS; SUBCUTANEOUS at 17:18

## 2022-03-02 RX ADMIN — Medication 400 MILLIGRAM(S): at 05:20

## 2022-03-02 RX ADMIN — Medication 40 MILLIEQUIVALENT(S): at 05:19

## 2022-03-02 RX ADMIN — Medication 1 TABLET(S): at 11:36

## 2022-03-02 RX ADMIN — HYDROMORPHONE HYDROCHLORIDE 0.25 MILLIGRAM(S): 2 INJECTION INTRAMUSCULAR; INTRAVENOUS; SUBCUTANEOUS at 15:00

## 2022-03-02 RX ADMIN — HYDROMORPHONE HYDROCHLORIDE 0.25 MILLIGRAM(S): 2 INJECTION INTRAMUSCULAR; INTRAVENOUS; SUBCUTANEOUS at 22:30

## 2022-03-02 RX ADMIN — TRAMADOL HYDROCHLORIDE 50 MILLIGRAM(S): 50 TABLET ORAL at 12:28

## 2022-03-02 RX ADMIN — AMLODIPINE BESYLATE 10 MILLIGRAM(S): 2.5 TABLET ORAL at 05:20

## 2022-03-02 RX ADMIN — Medication 400 MILLIGRAM(S): at 14:47

## 2022-03-02 RX ADMIN — SODIUM CHLORIDE 75 MILLILITER(S): 9 INJECTION INTRAMUSCULAR; INTRAVENOUS; SUBCUTANEOUS at 00:00

## 2022-03-02 RX ADMIN — Medication 400 MILLIGRAM(S): at 14:13

## 2022-03-02 RX ADMIN — LOSARTAN POTASSIUM 100 MILLIGRAM(S): 100 TABLET, FILM COATED ORAL at 05:19

## 2022-03-02 RX ADMIN — Medication 40 MILLIEQUIVALENT(S): at 11:36

## 2022-03-02 RX ADMIN — FLUDROCORTISONE ACETATE 0.2 MILLIGRAM(S): 0.1 TABLET ORAL at 05:32

## 2022-03-02 RX ADMIN — Medication 100 MILLIGRAM(S): at 05:20

## 2022-03-02 RX ADMIN — TRAMADOL HYDROCHLORIDE 50 MILLIGRAM(S): 50 TABLET ORAL at 19:31

## 2022-03-02 RX ADMIN — FENTANYL CITRATE 25 MICROGRAM(S): 50 INJECTION INTRAVENOUS at 17:33

## 2022-03-02 RX ADMIN — HYDROMORPHONE HYDROCHLORIDE 0.25 MILLIGRAM(S): 2 INJECTION INTRAMUSCULAR; INTRAVENOUS; SUBCUTANEOUS at 21:30

## 2022-03-02 RX ADMIN — Medication 100 MILLIGRAM(S): at 13:55

## 2022-03-02 RX ADMIN — Medication 100 MILLIGRAM(S): at 05:19

## 2022-03-02 RX ADMIN — POLYETHYLENE GLYCOL 3350 17 GRAM(S): 17 POWDER, FOR SOLUTION ORAL at 05:19

## 2022-03-02 RX ADMIN — Medication 100 MILLIGRAM(S): at 21:51

## 2022-03-02 RX ADMIN — POLYETHYLENE GLYCOL 3350 17 GRAM(S): 17 POWDER, FOR SOLUTION ORAL at 17:19

## 2022-03-02 RX ADMIN — Medication 5 MILLIGRAM(S): at 17:19

## 2022-03-02 RX ADMIN — Medication 400 MILLIGRAM(S): at 05:33

## 2022-03-02 RX ADMIN — SODIUM CHLORIDE 3 GRAM(S): 9 INJECTION INTRAMUSCULAR; INTRAVENOUS; SUBCUTANEOUS at 11:36

## 2022-03-02 NOTE — BRIEF OPERATIVE NOTE - OPERATION/FINDINGS
INSERTION OF CERTAS 5 Valve, with ASD.
Pt placed in supine position. Prepped and draped in sterile fashion. Neurosurgery placed Certas 5 Valve. Two ports placed in LLQ. Lysis of adhesion. Shunt passed into peritoneum and shunt placed in stable position. Hemostasis achieved. Incisions closed with 4.0 monocryl and dermabond.

## 2022-03-02 NOTE — PROGRESS NOTE ADULT - SUBJECTIVE AND OBJECTIVE BOX
=================================  NEUROCRITICAL CARE ATTENDING NOTE  =================================    GOLDEN CALHOUN   MRN-[id number]  Summary:  56y/F  with PMHx of HTN, pre-DM presents transferred from [facility] for intracranial hemorrhage. As per [place] ED provider and son, patient called son around 7:30-8:00AM c/o severe headache and nausea. Son immediately rushed to her house and found her out of bed, moaning and covered in her own vomit. During transit, EMS reported SBP within 240s with intractable vomiting and given Zofran 8mg. Upon arrival to [place] ED, GCS 7, SBP within 180s, patient was given Decadron 10mg, Keppra 1g, started on a Cardene drip, Propofol, Mannitol 1mg/kg. 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 Patient was intubated for airway support and transferred to [facility] for further management. ICH2, NIHSS 8.  Denies use of anticoagulants/antiplatelets.  (2022 16:36)    COURSE IN THE HOSPITAL:   admitted to [facility]   EVD clamped more lethargic, reopened   Tmax 38.4 EVD increased to 15 --> 20   Tmax 37.5 EVD clamped overnight, ICP to 16, more lethargic and increasing headaches, open at 10   Tmax 37 sudden unresponsiveness even to deep noxious stimuli ~10p.m. - CT NEG, improved without any other intervention; EEG negative   periods lethargy overnight (not worse than previous nights)   No significant events overnight.    No significant events overnight.    ICP increased to 20s (not recorded), unclamped, then clamped at MN for OR today     Past Medical History: No pertinent past medical history Hypertension Pre-diabetes  Allergies:  No Known Allergies  Home meds:   ·	losartan 100 mg oral tablet: 1 tab(s) orally once a day  ·	Vitamin D2: 5000 unit(s) orally once a week  ·	Xanax 0.5 mg oral tablet: 0.5 milligram(s) orally once a day, As Needed  ·	ZyrTEC 10 mg oral tablet: 1 tab(s) orally once a day    PHYSICAL EXAMINATION  T(C): 36.7 ( @ 06:20), Max: 36.9 ( @ 22:24) HR: 78 ( @ 07:00) (73 - 95) BP: 112/59 ( @ 07:00) (112/59 - 154/63) RR: 18 ( @ 07:00) (15 - 27) SpO2: 94% ( @ 07:00) (93% - 100%)  NEUROLOGIC EXAMINATION:  Patient is awake, alert oriented x2-3, pupils 2-3mm equal and briskly reactive to light, EOMs intact, moves all 4s with good strength, RUE drift, R LE 3-4/5  GENERAL: not intubated, not in cardiorespiratory distress  EENT:  anicteric  CARDIOVASCULAR: (+) S1 S2, normal rate and regular rhythm  PULMONARY: clear to auscultation bilaterally  ABDOMEN: soft, nontender with normoactive bowel sounds  EXTREMITIES: no edema  SKIN: no rash    LABS:     (7.42)   11.3   6.71  )-----------( 365      ( 02 Mar 2022 02:55 )             36.2     138  |  105  |  9   ----------------------------<  146<H>  3.4<L>   |  20<L>  |  0.35<L>    Ca    9.3      02 Mar 2022 02:55  Phos  3.1     -  Mg     1.8      @ 07:01  -   @ 07:00  IN: 2110 mL / OUT: 3240 mL / NET: -1130 mL    Bacteriology:   CSF NGTD   Blood CS NG5D (final)   Blood CS NG5D (final)   Blood CS NG5D x2   Culture - CSF with Gram Stain (collected )   CSF NGTD   Urine:  >100K Kleb 80K citrobacter    CSF studies:  EEG:  Neuroimagin/24 CT head: stable vents, IVH, evolving ICH L thalamus, unchanged MLS   CT head: stable to improved vent size, evolving L thalamic ICH  Other imagin/16 CT chest: no PE, small consolidation lung bases   Gallium scan: increased uptake in gluteal region    MEDICATIONS:     ·	amLODIPine   Tablet 10 milliGRAM(s) Oral daily  ·	hydrALAZINE 100 milliGRAM(s) Oral every 8 hours  ·	labetalol 100 milliGRAM(s) Oral every 8 hours  ·	losartan 100 milliGRAM(s) Oral daily  ·	bisacodyl 5 Oral every 12 hours  ·	polyethylene glycol 3350 17 Oral every 12 hours  ·	senna 2 Oral at bedtime  ·	fludroCORTISONE 0.2 Oral every 12 hours  ·	lactobacillus acidophilus 1 Oral daily  ·	sodium chloride 3 Oral every 6 hours  ·	acetaminophen     Tablet .. 650 Oral every 6 hours PRN  ·	albuterol/ipratropium for Nebulization 3 Nebulizer every 6 hours PRN  ·	ibuprofen  Tablet. 400 Oral every 8 hours PRN  ·	traMADol 50 Oral every 6 hours PRN    IV FLUIDS: NS@75  DRIPS:  DIET: NPO (was on minced and moist)  Lines:   Drains:      External Ventricular Device @ clamped 12 midnight   Wounds:    CODE STATUS:  Full Code                       GOALS OF CARE:  aggressive                      DISPOSITION:  ICU

## 2022-03-02 NOTE — PRE-OP CHECKLIST - SELECT TESTS ORDERED
BMP/CBC/PT/PTT/Type and Screen
BMP/CBC/CMP/PT/PTT/INR/Type and Screen/UCG/Results in MD note/COVID-19

## 2022-03-02 NOTE — PROGRESS NOTE ADULT - SUBJECTIVE AND OBJECTIVE BOX
POST-OPERATIVE NOTE    Procedure:  shunt    Diagnosis/Indication: cerebral edema    Surgeon: Dr. Carrillo    S: Pt has no complaints. Resting comfortably. Minimal abdominal pain.    O:  T(C): --  T(F): --  HR: 78 (03-02-22 @ 07:00) (78 - 78)  BP: 112/59 (03-02-22 @ 07:00) (112/59 - 112/59)  RR: 18 (03-02-22 @ 07:00) (18 - 18)  SpO2: 94% (03-02-22 @ 07:00) (94% - 94%)  Wt(kg): --                        11.3   6.71  )-----------( 365      ( 02 Mar 2022 02:55 )             36.2     03-02    138  |  105  |  9   ----------------------------<  146<H>  3.4<L>   |  20<L>  |  0.35<L>    Ca    9.3      02 Mar 2022 02:55  Phos  3.1     03-02  Mg     1.8     03-02        Gen: NAD, resting comfortably in bed  Head: R scalp incision c/d/i, sutures in place  Pulm: Nonlabored breathing, no respiratory distress  Abd: soft, minimally and appropriately tender, ND, incisions c/d/i        A/P: 56y Female s/p above procedure  Diet: NPO  IVF: NS@75  Pain/nausea control  SQH/SCDs/OOBA/IS  Dispo pending pain control, PO tolerance, clinical improvement

## 2022-03-02 NOTE — PROGRESS NOTE ADULT - ASSESSMENT
56y/F with  ICH L thalamus, brain compression, cerebral edema  hydrocephalus, obstructive  Hypertension, prediabetes  superficial venous thrombosis R cephalic vein, L cephalic vein    PLAN:   NEURO: neurochecks q1h, PRN pain meds with acetaminophen, tramadol, NSAIDs, opiates  VPS today  REHAB:  physical therapy evaluation and management    EARLY MOB:  bed rest    PULM:  PRN O2 support to keep sats >/=92%, incentive spirometry  CARDIO:  SBP goal 100-160mm Hg; cont amlodpine, hydralazine, labetalol, losartan  ENDO:  Blood sugar goals 140-180   GI:  bowel regimen   DIET: NPO  RENAL:  Na goal 135-145, fludrocortisone to 0.2mg BID, continue salt tabs; NS@75cc/hr  HEM/ONC: Hb stable  VTE Prophylaxis: SCDs L only, off DVT chemo for surgery; UE 03/03 LE doppler 03/07  ID: afebrile, no leukocytosis; off cefepime (02/22 - 02/25  ) off linezolid (02/20- 02/24) for now - as per ID  Social: will update family    ATTENDING ATTESTATION:  I was physically present for the key portions of the evaluation and management (E/M) service provided.  I agree with the above history, physical and plan, which I have reviewed and edited where appropriate.    Patient at high risk for neurological deterioration or death due to:  ICU delirium, aspiration PNA, DVT / PE.  Critical care time:  I have personally provided 45 minutes of critical care time, excluding time spent on separate procedures.      Plan discussed with RN, house staff.

## 2022-03-02 NOTE — PROGRESS NOTE ADULT - SUBJECTIVE AND OBJECTIVE BOX
NEUROSURGERY POST OP NOTE:    POD# 0 S/P vetriculoperitoneal shunt insertion    S: Patient is laying in bed, awake, NAD. Reports mild pain that is being well managed.       T(C): 36.7 (03-02-22 @ 06:20), Max: 36.9 (03-01-22 @ 22:24)  HR: 78 (03-02-22 @ 07:00) (73 - 95)  BP: 112/59 (03-02-22 @ 07:00) (112/59 - 154/63)  RR: 18 (03-02-22 @ 07:00) (15 - 27)  SpO2: 94% (03-02-22 @ 07:00) (93% - 97%)      03-01-22 @ 07:01  -  03-02-22 @ 07:00  --------------------------------------------------------  IN: 2110 mL / OUT: 3240 mL / NET: -1130 mL        acetaminophen     Tablet .. 650 milliGRAM(s) Oral every 6 hours PRN  albuterol/ipratropium for Nebulization 3 milliLiter(s) Nebulizer every 6 hours PRN  amLODIPine   Tablet 10 milliGRAM(s) Oral daily  bisacodyl 5 milliGRAM(s) Oral every 12 hours  chlorhexidine 2% Cloths 1 Application(s) Topical <User Schedule>  fludroCORTISONE 0.2 milliGRAM(s) Oral every 12 hours  hydrALAZINE 100 milliGRAM(s) Oral every 8 hours  ibuprofen  Tablet. 400 milliGRAM(s) Oral every 8 hours PRN  influenza   Vaccine 0.5 milliLiter(s) IntraMuscular once  labetalol 100 milliGRAM(s) Oral every 8 hours  lactobacillus acidophilus 1 Tablet(s) Oral daily  losartan 100 milliGRAM(s) Oral daily  polyethylene glycol 3350 17 Gram(s) Oral every 12 hours  potassium chloride    Tablet ER 40 milliEquivalent(s) Oral every 4 hours  povidone iodine 5% Nasal Swab 1 Application(s) Both Nostrils once  senna 2 Tablet(s) Oral at bedtime  sodium chloride 3 Gram(s) Oral every 6 hours  sodium chloride 0.9%. 1000 milliLiter(s) IV Continuous <Continuous>  traMADol 50 milliGRAM(s) Oral every 6 hours PRN    RADIOLOGY: x    PHYSICAL EXAM:  Constitutional: awake, alert, laying in bed. NAD.   Respiratory: non-labored breathing. Normal chest rise.   Cardiovascular: Regular rate and rhythm  Gastrointestinal:  Soft, nontender, nondistended.  .  Vascular: Extremities warm, no ulcers, no discoloration of skin.   Neurological: Gen Alert and oriented to self, place, year. Conversant, appropriate.    CN II-XII grossly intact. Pupils 4mm and reactive b/l.     Motor: AN x 4, RUE/RLE 4-/5. LUE/LLL 5/5.     Sens: Sensation intact to light touch throughout.    Extremities: distal pulses 2+ x 4 DPPT symmetric throughout.     Right side pronator drift.   Wound/incision: Right frontal crani incision closed with sutures, c/d/i with no dressing in place. 2 Left sided abdominal puncture sites closed with surgical glue, c/d/i with no dressings in place. No hematoma or hemorrhage.      Assessment: Patient 55 y/o female with PMHx of HTN, pre-DM w/ left thalamic parenchymal hematoma with extensive intraventricular hemorrhage. ICH score 2. NIHSS 18. s/p R frontal large bore EVD placement 2/12/22. Now s/p  shunt insertion 3/2/22.    Plan:    NEURO:  - neuro/vital q1h  - VPS inserted 3/2/22, s/p failed clamp trial x2  - Post op imaging pending 3/2.   - CTH 2/28, stable  - OOB ok   - ibuprofen/Tylenol prn for fever    - vEEG for fluctuating mental status - left hemisphere slowing, no seizures. (2/26-2/27)    Cardio:   - SBP goal 100-160  - cont. amlodipine 10mg QD, losartan 100mg, Hydralazine 100 mg TID, labetalol 100 q8h   - echo 2/14: LVH, EF: 65-70%   - IV access: PIV x2     PULM:  - extubated 2/14, on RA  - duonebs PRN   - IS     Renal:   - Normal sodium goal   - 3% off,  salt tabs 3 q6, florinef 0.2 BID     GI:  - regular diet, ensure added 2/25  - bowel regimen, last BM 2/28    HEME:  - SCDs only L leg/SQL for DVT ppx at 6 PM 3/2  - LE dopplers 2/23: R IM calf thrombus, repeat dopplers 2/28 showing persistence but no propogation, repeat pending 3/7  - UE dopp 2/23: superficial clots b/l cephalic , wam compresses and elevation, repeat pending 3/4    ENDO:  - prediabetic, a1c 5.8, glucose under control, no insulin req   - TSH wnl     ID:  - blood cx 2/16 +staph epidermidis  - urine cx 2/17 +citrobacter koseri and klebsiella  - CSF cx 2/21 - pending. NGTD  - ID following s/p: vanco [2/18-2/20], ceftriaxone for UTI [2/19-22], cefepime [2/22- 2/25. linezolid for phlebilitis [2/20- 2/25]   - MRSA negative  - Gallium scan with increased uptake in gluteal region, no cellulitis on physicial exam, probably d/t positioning     PSYCH:   - pt takes xanax at home    Dispo:   - ICU status/ PT/OT   - full code  - family updated with plan    PT recs: AR    D/w Dr. D'Amico and Dr. Davidson    Assessment:  Present when checked

## 2022-03-03 DIAGNOSIS — D75.839 THROMBOCYTOSIS, UNSPECIFIED: ICD-10-CM

## 2022-03-03 DIAGNOSIS — D62 ACUTE POSTHEMORRHAGIC ANEMIA: ICD-10-CM

## 2022-03-03 LAB
ANION GAP SERPL CALC-SCNC: 12 MMOL/L — SIGNIFICANT CHANGE UP (ref 5–17)
APPEARANCE UR: CLEAR — SIGNIFICANT CHANGE UP
BACTERIA # UR AUTO: PRESENT /HPF
BILIRUB UR-MCNC: NEGATIVE — SIGNIFICANT CHANGE UP
BUN SERPL-MCNC: 9 MG/DL — SIGNIFICANT CHANGE UP (ref 7–23)
CALCIUM SERPL-MCNC: 9.8 MG/DL — SIGNIFICANT CHANGE UP (ref 8.4–10.5)
CHLORIDE SERPL-SCNC: 103 MMOL/L — SIGNIFICANT CHANGE UP (ref 96–108)
CO2 SERPL-SCNC: 25 MMOL/L — SIGNIFICANT CHANGE UP (ref 22–31)
COLOR SPEC: YELLOW — SIGNIFICANT CHANGE UP
CREAT SERPL-MCNC: 0.39 MG/DL — LOW (ref 0.5–1.3)
DIFF PNL FLD: NEGATIVE — SIGNIFICANT CHANGE UP
EGFR: 116 ML/MIN/1.73M2 — SIGNIFICANT CHANGE UP
EPI CELLS # UR: SIGNIFICANT CHANGE UP /HPF (ref 0–5)
GLUCOSE SERPL-MCNC: 180 MG/DL — HIGH (ref 70–99)
GLUCOSE UR QL: 250
HCT VFR BLD CALC: 34 % — LOW (ref 34.5–45)
HGB BLD-MCNC: 10.8 G/DL — LOW (ref 11.5–15.5)
KETONES UR-MCNC: NEGATIVE — SIGNIFICANT CHANGE UP
LEUKOCYTE ESTERASE UR-ACNC: ABNORMAL
MAGNESIUM SERPL-MCNC: 2.2 MG/DL — SIGNIFICANT CHANGE UP (ref 1.6–2.6)
MCHC RBC-ENTMCNC: 30 PG — SIGNIFICANT CHANGE UP (ref 27–34)
MCHC RBC-ENTMCNC: 31.8 GM/DL — LOW (ref 32–36)
MCV RBC AUTO: 94.4 FL — SIGNIFICANT CHANGE UP (ref 80–100)
NITRITE UR-MCNC: NEGATIVE — SIGNIFICANT CHANGE UP
NRBC # BLD: 0 /100 WBCS — SIGNIFICANT CHANGE UP (ref 0–0)
PH UR: 6.5 — SIGNIFICANT CHANGE UP (ref 5–8)
PHOSPHATE SERPL-MCNC: 3.2 MG/DL — SIGNIFICANT CHANGE UP (ref 2.5–4.5)
PLATELET # BLD AUTO: 432 K/UL — HIGH (ref 150–400)
POTASSIUM SERPL-MCNC: 3.7 MMOL/L — SIGNIFICANT CHANGE UP (ref 3.5–5.3)
POTASSIUM SERPL-SCNC: 3.7 MMOL/L — SIGNIFICANT CHANGE UP (ref 3.5–5.3)
PROT UR-MCNC: NEGATIVE MG/DL — SIGNIFICANT CHANGE UP
RBC # BLD: 3.6 M/UL — LOW (ref 3.8–5.2)
RBC # FLD: 12.9 % — SIGNIFICANT CHANGE UP (ref 10.3–14.5)
RBC CASTS # UR COMP ASSIST: < 5 /HPF — SIGNIFICANT CHANGE UP
SODIUM SERPL-SCNC: 140 MMOL/L — SIGNIFICANT CHANGE UP (ref 135–145)
SP GR SPEC: 1.02 — SIGNIFICANT CHANGE UP (ref 1–1.03)
UROBILINOGEN FLD QL: 0.2 E.U./DL — SIGNIFICANT CHANGE UP
WBC # BLD: 9.95 K/UL — SIGNIFICANT CHANGE UP (ref 3.8–10.5)
WBC # FLD AUTO: 9.95 K/UL — SIGNIFICANT CHANGE UP (ref 3.8–10.5)
WBC UR QL: < 5 /HPF — SIGNIFICANT CHANGE UP

## 2022-03-03 PROCEDURE — 99233 SBSQ HOSP IP/OBS HIGH 50: CPT

## 2022-03-03 PROCEDURE — 71045 X-RAY EXAM CHEST 1 VIEW: CPT | Mod: 26

## 2022-03-03 RX ORDER — LACTULOSE 10 G/15ML
10 SOLUTION ORAL ONCE
Refills: 0 | Status: COMPLETED | OUTPATIENT
Start: 2022-03-03 | End: 2022-03-03

## 2022-03-03 RX ORDER — MULTIVIT WITH MIN/MFOLATE/K2 340-15/3 G
1 POWDER (GRAM) ORAL ONCE
Refills: 0 | Status: COMPLETED | OUTPATIENT
Start: 2022-03-03 | End: 2022-03-03

## 2022-03-03 RX ORDER — OXYCODONE HYDROCHLORIDE 5 MG/1
5 TABLET ORAL EVERY 4 HOURS
Refills: 0 | Status: DISCONTINUED | OUTPATIENT
Start: 2022-03-03 | End: 2022-03-10

## 2022-03-03 RX ORDER — ENOXAPARIN SODIUM 100 MG/ML
40 INJECTION SUBCUTANEOUS
Refills: 0 | Status: DISCONTINUED | OUTPATIENT
Start: 2022-03-03 | End: 2022-03-04

## 2022-03-03 RX ORDER — POTASSIUM CHLORIDE 20 MEQ
40 PACKET (EA) ORAL ONCE
Refills: 0 | Status: COMPLETED | OUTPATIENT
Start: 2022-03-03 | End: 2022-03-03

## 2022-03-03 RX ORDER — FLUDROCORTISONE ACETATE 0.1 MG/1
0.1 TABLET ORAL EVERY 12 HOURS
Refills: 0 | Status: DISCONTINUED | OUTPATIENT
Start: 2022-03-03 | End: 2022-03-04

## 2022-03-03 RX ORDER — SODIUM CHLORIDE 9 MG/ML
500 INJECTION INTRAMUSCULAR; INTRAVENOUS; SUBCUTANEOUS ONCE
Refills: 0 | Status: COMPLETED | OUTPATIENT
Start: 2022-03-03 | End: 2022-03-03

## 2022-03-03 RX ADMIN — Medication 100 MILLIGRAM(S): at 21:02

## 2022-03-03 RX ADMIN — SODIUM CHLORIDE 3 GRAM(S): 9 INJECTION INTRAMUSCULAR; INTRAVENOUS; SUBCUTANEOUS at 18:07

## 2022-03-03 RX ADMIN — Medication 650 MILLIGRAM(S): at 21:01

## 2022-03-03 RX ADMIN — Medication 650 MILLIGRAM(S): at 22:00

## 2022-03-03 RX ADMIN — LOSARTAN POTASSIUM 100 MILLIGRAM(S): 100 TABLET, FILM COATED ORAL at 10:07

## 2022-03-03 RX ADMIN — SODIUM CHLORIDE 3 GRAM(S): 9 INJECTION INTRAMUSCULAR; INTRAVENOUS; SUBCUTANEOUS at 23:53

## 2022-03-03 RX ADMIN — Medication 1 TABLET(S): at 13:22

## 2022-03-03 RX ADMIN — Medication 40 MILLIEQUIVALENT(S): at 07:33

## 2022-03-03 RX ADMIN — FLUDROCORTISONE ACETATE 0.1 MILLIGRAM(S): 0.1 TABLET ORAL at 18:07

## 2022-03-03 RX ADMIN — Medication 400 MILLIGRAM(S): at 06:34

## 2022-03-03 RX ADMIN — SODIUM CHLORIDE 3 GRAM(S): 9 INJECTION INTRAMUSCULAR; INTRAVENOUS; SUBCUTANEOUS at 00:28

## 2022-03-03 RX ADMIN — Medication 100 MILLIGRAM(S): at 00:28

## 2022-03-03 RX ADMIN — Medication 1 BOTTLE: at 17:04

## 2022-03-03 RX ADMIN — Medication 100 MILLIGRAM(S): at 13:22

## 2022-03-03 RX ADMIN — SODIUM CHLORIDE 2000 MILLILITER(S): 9 INJECTION INTRAMUSCULAR; INTRAVENOUS; SUBCUTANEOUS at 21:45

## 2022-03-03 RX ADMIN — Medication 100 MILLIGRAM(S): at 21:16

## 2022-03-03 RX ADMIN — SODIUM CHLORIDE 3 GRAM(S): 9 INJECTION INTRAMUSCULAR; INTRAVENOUS; SUBCUTANEOUS at 06:35

## 2022-03-03 RX ADMIN — SODIUM CHLORIDE 3 GRAM(S): 9 INJECTION INTRAMUSCULAR; INTRAVENOUS; SUBCUTANEOUS at 13:21

## 2022-03-03 RX ADMIN — FLUDROCORTISONE ACETATE 0.2 MILLIGRAM(S): 0.1 TABLET ORAL at 06:35

## 2022-03-03 RX ADMIN — Medication 400 MILLIGRAM(S): at 07:04

## 2022-03-03 RX ADMIN — SENNA PLUS 2 TABLET(S): 8.6 TABLET ORAL at 21:02

## 2022-03-03 RX ADMIN — Medication 100 MILLIGRAM(S): at 07:33

## 2022-03-03 RX ADMIN — AMLODIPINE BESYLATE 10 MILLIGRAM(S): 2.5 TABLET ORAL at 10:07

## 2022-03-03 RX ADMIN — Medication 100 MILLIGRAM(S): at 06:36

## 2022-03-03 RX ADMIN — Medication 5 MILLIGRAM(S): at 06:35

## 2022-03-03 RX ADMIN — Medication 100 MILLIGRAM(S): at 13:21

## 2022-03-03 RX ADMIN — ENOXAPARIN SODIUM 40 MILLIGRAM(S): 100 INJECTION SUBCUTANEOUS at 21:02

## 2022-03-03 RX ADMIN — CHLORHEXIDINE GLUCONATE 1 APPLICATION(S): 213 SOLUTION TOPICAL at 06:42

## 2022-03-03 RX ADMIN — Medication 5 MILLIGRAM(S): at 18:07

## 2022-03-03 RX ADMIN — POLYETHYLENE GLYCOL 3350 17 GRAM(S): 17 POWDER, FOR SOLUTION ORAL at 06:38

## 2022-03-03 RX ADMIN — LACTULOSE 10 GRAM(S): 10 SOLUTION ORAL at 10:06

## 2022-03-03 NOTE — PROGRESS NOTE ADULT - SUBJECTIVE AND OBJECTIVE BOX
Subjective: patient reports that she is feeling well this AM. Had HA postop yesterday now resolved. Passing lot of flatus below. Denies belching. No BM yet. Tolerating regular diet. No nausea.    MEDICATIONS  (STANDING):  amLODIPine   Tablet 10 milliGRAM(s) Oral daily  bisacodyl 5 milliGRAM(s) Oral every 12 hours  fludroCORTISONE 0.2 milliGRAM(s) Oral every 12 hours  hydrALAZINE 100 milliGRAM(s) Oral every 8 hours  influenza   Vaccine 0.5 milliLiter(s) IntraMuscular once  labetalol 100 milliGRAM(s) Oral every 8 hours  lactobacillus acidophilus 1 Tablet(s) Oral daily  losartan 100 milliGRAM(s) Oral daily  polyethylene glycol 3350 17 Gram(s) Oral every 12 hours  povidone iodine 5% Nasal Swab 1 Application(s) Both Nostrils once  senna 2 Tablet(s) Oral at bedtime  sodium chloride 3 Gram(s) Oral every 6 hours    MEDICATIONS  (PRN):  acetaminophen     Tablet .. 650 milliGRAM(s) Oral every 6 hours PRN Temp greater or equal to 38C (100.4F), Mild Pain (1 - 3)  albuterol/ipratropium for Nebulization 3 milliLiter(s) Nebulizer every 6 hours PRN Shortness of Breath and/or Wheezing  ibuprofen  Tablet. 400 milliGRAM(s) Oral every 8 hours PRN Temp greater or equal to 38C (100.4F), Moderate Pain (4 - 6)  traMADol 50 milliGRAM(s) Oral every 6 hours PRN Severe Pain (7 - 10)      Vital Signs Last 24 Hrs  T(C): 37.3 (03 Mar 2022 05:04), Max: 37.6 (02 Mar 2022 21:49)  T(F): 99.2 (03 Mar 2022 05:04), Max: 99.6 (02 Mar 2022 21:49)  HR: 92 (03 Mar 2022 06:28) (67 - 100)  BP: 150/74 (03 Mar 2022 06:28) (115/63 - 159/79)  BP(mean): 105 (03 Mar 2022 06:28) (84 - 111)  RR: 18 (03 Mar 2022 06:28) (15 - 24)  SpO2: 94% (03 Mar 2022 06:28) (93% - 100%)    Physical Exam  General: NAD, resting comfortably in bed  Head: R scalp incision c/d/i  Pulm: Nonlabored breathing, no respiratory distress  Abd: soft, ND, NT, incisions c/d/i  Extrem: WWP, no edema  Neuro: A/O x 3, CNs II-XII grossly intact, no focal deficits, normal sensation      I&O's Summary    02 Mar 2022 07:01  -  03 Mar 2022 07:00  --------------------------------------------------------  IN: 740 mL / OUT: 3600 mL / NET: -2860 mL                              10.8   9.95  )-----------( 432      ( 03 Mar 2022 06:35 )             34.0       03-03    140  |  103  |  9   ----------------------------<  180<H>  3.7   |  25  |  0.39<L>    Ca    9.8      03 Mar 2022 06:35  Phos  3.2     03-03  Mg     2.2     03-03        Micro: no     Imaging: no new

## 2022-03-03 NOTE — PROGRESS NOTE ADULT - SUBJECTIVE AND OBJECTIVE BOX
HOSPITALIST INITIAL CONSULT NOTE    CHIEF COMPLAINT:      HPI:  55 y/o female with PMHx of HTN, pre-DM presents transferred from [facility] for intracranial hemorrhage. As per [facility] provider and son, patient called son around 7:30-8:00AM c/o severe headache and nausea. Son immediately rushed to her house and found her out of bed, moaning and covered in her own vomit. During transit, EMS reported SBP within 240s with intractable vomiting and given Zofran 8mg. Upon arrival to [facility] ED, GCS 7, SBP within 180s, patient was given Decadron 10mg, Keppra 1g, started on a Cardene drip, Propofol, Mannitol 1mg/kg. 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 Patient was intubated for airway support and transferred to [facility] for further management. ICH2, NIHSS 8.  Denies use of anticoagulants/antiplatelets.  (12 Feb 2022 16:36)      PAST MEDICAL & SURGICAL HISTORY:  Hypertension    Pre-diabetes        REVIEW OF SYSTEMS:  As per HPI, otherwise negative for Constitutional, Eyes, Ears/Nose/Mouth/Throat, Neck, Cardiovascular, Respiratory, Gastrointestinal, Genitourinary, Skin, Endocrine, Musculoskeletal, Psychiatric, and Hematologic/Lymphatic.    MEDICATIONS  (STANDING):  amLODIPine   Tablet 10 milliGRAM(s) Oral daily  bisacodyl 5 milliGRAM(s) Oral every 12 hours  enoxaparin Injectable 40 milliGRAM(s) SubCutaneous <User Schedule>  fludroCORTISONE 0.1 milliGRAM(s) Oral every 12 hours  hydrALAZINE 100 milliGRAM(s) Oral every 8 hours  influenza   Vaccine 0.5 milliLiter(s) IntraMuscular once  labetalol 100 milliGRAM(s) Oral every 8 hours  lactobacillus acidophilus 1 Tablet(s) Oral daily  losartan 100 milliGRAM(s) Oral daily  polyethylene glycol 3350 17 Gram(s) Oral every 12 hours  povidone iodine 5% Nasal Swab 1 Application(s) Both Nostrils once  senna 2 Tablet(s) Oral at bedtime  sodium chloride 3 Gram(s) Oral every 6 hours    MEDICATIONS  (PRN):  acetaminophen     Tablet .. 650 milliGRAM(s) Oral every 6 hours PRN Temp greater or equal to 38C (100.4F), Mild Pain (1 - 3)  albuterol/ipratropium for Nebulization 3 milliLiter(s) Nebulizer every 6 hours PRN Shortness of Breath and/or Wheezing  ibuprofen  Tablet. 400 milliGRAM(s) Oral every 8 hours PRN Temp greater or equal to 38C (100.4F), Moderate Pain (4 - 6)  oxyCODONE    IR 5 milliGRAM(s) Oral every 4 hours PRN Moderate Pain (4 - 6)      Allergies    No Known Allergies    Intolerances        FAMILY HISTORY:      SOCIAL HISTORY:  reviewed    VITALS  Vital Signs Last 24 Hrs  T(C): 37.1 (03 Mar 2022 14:00), Max: 37.6 (02 Mar 2022 21:49)  T(F): 98.8 (03 Mar 2022 14:00), Max: 99.6 (02 Mar 2022 21:49)  HR: 98 (03 Mar 2022 12:03) (78 - 100)  BP: 137/75 (03 Mar 2022 12:03) (129/58 - 159/79)  BP(mean): 99 (03 Mar 2022 12:03) (87 - 111)  RR: 18 (03 Mar 2022 08:34) (17 - 23)  SpO2: 96% (03 Mar 2022 12:03) (93% - 97%)    I&O's Summary    02 Mar 2022 07:01  -  03 Mar 2022 07:00  --------------------------------------------------------  IN: 740 mL / OUT: 3600 mL / NET: -2860 mL    03 Mar 2022 07:01  -  03 Mar 2022 14:57  --------------------------------------------------------  IN: 0 mL / OUT: 800 mL / NET: -800 mL        CAPILLARY BLOOD GLUCOSE    PHYSICAL EXAM  General: A&Ox3; NAD  Head: NC/AT; PERRL; EOMI; anicteric sclera  Neck: Supple; no JVD  Respiratory: CTA B/L; no wheezes/crackles/rales auscultated w/ good air movement  Cardiovascular: Regular rhythm/rate; S1/S2; no gallops or murmurs auscultated  Gastrointestinal: Soft; NTND w/out rebound tenderness or guarding; bowel sounds normal  Extremities: WWP; no edema or cyanosis; radial/pedal pulses palpable  Neurological:  CNII-XII grossly intact; Right sided weakness, slight r pronator drift  Vasc: +2 b/l radial   Skin: No rashes, crani wound dressing c/d/i, abdominal wound c/d/i   Psych: Appropriate affect    LABS:                        10.8   9.95  )-----------( 432      ( 03 Mar 2022 06:35 )             34.0     03-03    140  |  103  |  9   ----------------------------<  180<H>  3.7   |  25  |  0.39<L>    Ca    9.8      03 Mar 2022 06:35  Phos  3.2     03-03  Mg     2.2     03-03      PT/INR - ( 02 Mar 2022 02:55 )   PT: 12.5 sec;   INR: 1.05          PTT - ( 02 Mar 2022 02:55 )  PTT:30.9 sec      RADIOLOGY & ADDITIONAL STUDIES:  reviewed

## 2022-03-03 NOTE — PROGRESS NOTE ADULT - ASSESSMENT
Patient 57 y/o female with PMHx of HTN, pre-DM w/ left thalamic parenchymal hematoma with extensive intraventricular hemorrhage. ICH score 2. NIHSS 18. s/p R frontal large bore EVD placement 2/12/22. Now s/p VPS Certas @5 3/2/22.

## 2022-03-03 NOTE — PROVIDER CONTACT NOTE (OTHER) - ASSESSMENT
Alert and oriented x3, disoriented to time. Able to follow commands. Verbalizes feeling cold and having a headache. Pt easily arousable but appears sleepy.

## 2022-03-03 NOTE — PROGRESS NOTE ADULT - SUBJECTIVE AND OBJECTIVE BOX
HPI: 55yo F with PMHx HTN, pre-DM, last known normal this morning 8am. Per son, patient was complaining of headaches this morning. When the son didn't hear from her, the son  rushed to the patient's home where patient was noted to be hanging off her bed, vomiting, and moaning. Zofran was given and patient was taken to [facility] where CTH revealed a left thalamic ICH with IVH and hydrocephalus. SBP on arrival was in 240s with GCS 7. Patient was given decadron 10mg, 80mg mannitol, 1g keppra, and started on cardene. Patient was intubated and transferred to [facility] for further management. ICH score 2. NIHSS 18. BD 1= 2/12.     Hospital Course:   2/12: transferred from [facility]. R frontal EVD and R radial a-line placed. pend CTH/CTA. s/p 1L bolus for elevated lactate.   2/13: S/p R frontal EVD placedment @[id number] draining well. O/n pt w/ episode of possible storming; tachy, HTN, agitated, temp 100.2F, given 2 versed and 50mcg fentanyl w/ improvement in symptoms Neuro exam stable. Pt remains intubated and sedated, AN L>R. Trend lactate and abg. Amlodipine 5mg QD started for SBP goal < 140, cardene dc'd.  EVD dropped 2/13 at 1pm. propanolol and fent standing added for neuro storming, propofol switched to precedex, with resultant hypotension. Propanolol dc'd, fent changed to prn and precedex off, 1 L bolus given, levo prn   2/14: TOSHA overnight. Patient remains on propofol. EVD open at [id number]. ICPs WNL. Neuro exam stable. increased bowel regimen. started SQL. extubated on precedex. given 0.5 Ativan + Fentanyl pushes PRN for agitation. 500cc NS bolus.   2/15: BD#4.  On HFNC d/t desat to 88% on 70% non-rebreather. On 0.5 precedex   2/16: BD5, TOSHA overnight, on 4L NC, s/p vomiting yesterday, TF being held, formal S/S pending, off precedex. EVD open @ 5. Started on 3% at 30, tmax 100.7   2/17: BD# 6   tachycardic, PE protocol. cardene gtt. neuro unchanged. EVD @ 5cmH2O. 1g IV tylenol for 102.7 fever, duonebs standing to PRN. UA with reflux ordered. Hydralazine 25mg TID added, increased to 50q8. 500cc NS bolus x2. started on nystatin for thrush. started oxy 5q6 PRN for pain for tachycardia. repeat Na 146, decreased 3% @50, started salt tabs 2q6. FEES done. started minced and moist diet  2/18: BD 7. 3% Increased 75 cc/hr  2/19: BD8. TOSHA o/n neuro stable. 3%@75cc/hr. central line placed for vascular access  2/20: BD9. tmax 101.3 o/n, neuro stable. 3%@100cc/hr. cardene gtt restarted. Ibuprofen x1 given for fever, ok per Dr. D'Amico. Vanc d/c'd per ID (MRSA neg), treating UTI not bacteremia, likely contaminant. Linezolid started for suspected thrombophlebitis and GS + coverage.   2/21: BD#10 Overnight, IV tylenol given for headache. Right radial arterial line re-wired then discontinued. EVD clamped at 8AM. ICPs WNL. Neuro exam stable. Remains on 3% lowered to 75cc/hr. CTH stable, EVD raised to 20. Febrile 102 - CSF and blood cx sent. EVD lowered from 20 to 5 to drain more CSF and blood, increased lethargy. Ceftriaxone changed to cefepime per ID for broader coverage.   2/22: BD#11 TOSHA o/n. neuro at baseline. EVD at [id number], 3% kept at 25cc/hr   2/23: BD#12 TOSHA overnight. Remain on 3% saline. EVD [id number]. ICPs WNL. Neuro exam stable.  2/24: BD#13 TOSHA overnight, Remains on 3% saline. EVD raised to 20, gallium scan done with increased uptake in gluteals but normal physical exam  2/25: BD 14. TOSHA overnight. Remains on hypertonics. EVD clamped at 20 yesterday evening, possible shunt in the afternoon. Patient lethargic with worsening headache. EVD reopened at 10, Linezolid and cefepime d/c'ed   2/26: BD 15. increasing lethargic overnight, CTH stable and reviewed with Dr. D'Amico. Remains on a course of hypertonics. EVD reopened at 10 yesterday morning due to patient experiencing increased lethargy and headaches. EEG for fluctuating mental status - left hemisphere slowing, no seizures. 3% d/c'ed remains on salt tabs   2/27: BD 16. TOSHA overnight. vEEG placed yesterday for waxing/waning exam. EVD remains open at 10. plan is likely to shunt next week. Remains with intermittent periods of lethargy. EEG negative for seizure, d/c'ed. Restarted on 3% @25. LIJ noticed to be dislodged from proper position, notified RN toremove, hemostasis achieved with no hematoma formation noted. Patient with clear breath sounds and no respiratory distress. Additional peripheral IV.   2/28: BD 17, Repeat CTH this am stable, EVD clamped at 10:30AM, remains on 3% at 25.  3/1: BD 18, EVD clamped overnight, neuro remained stable   3/2: BD 19, POD0 s/p VPS. LE dopplers stable. CTH stable. stepdown. dilaudid and fentanyl needed for severe h/a. Given dilaudid 0.25 mg @ 10 PM.   3/3: BD 20. POD 1 R VPS placement (Certas @ 5). TOSHA o/n. stepdown status.     S: Patient examined at bedside. Complains of moderate headache and pain at incision site. Otherwise doing well, looking forward to sleeping.     Vital Signs Last 24 Hrs  T(C): 37.6 (02 Mar 2022 21:49), Max: 37.6 (02 Mar 2022 21:49)  T(F): 99.6 (02 Mar 2022 21:49), Max: 99.6 (02 Mar 2022 21:49)  HR: 92 (02 Mar 2022 20:23) (67 - 95)  BP: 142/68 (02 Mar 2022 20:23) (112/59 - 159/79)  BP(mean): 97 (02 Mar 2022 20:23) (80 - 111)  RR: 18 (02 Mar 2022 20:23) (15 - 24)  SpO2: 96% (02 Mar 2022 20:23) (93% - 100%)    I&O's Detail    01 Mar 2022 07:01  -  02 Mar 2022 07:00  --------------------------------------------------------  IN:    IV PiggyBack: 50 mL    Oral Fluid: 1460 mL    sodium chloride 0.9%: 600 mL  Total IN: 2110 mL    OUT:    External Ventricular Device (mL): 40 mL    Voided (mL): 3200 mL  Total OUT: 3240 mL    Total NET: -1130 mL      02 Mar 2022 07:01  -  03 Mar 2022 00:26  --------------------------------------------------------  IN:    IV PiggyBack: 50 mL    Oral Fluid: 240 mL    sodium chloride 0.9%: 450 mL  Total IN: 740 mL    OUT:    Voided (mL): 2400 mL  Total OUT: 2400 mL    Total NET: -1660 mL    I&O's Summary    01 Mar 2022 07:01  -  02 Mar 2022 07:00  --------------------------------------------------------  IN: 2110 mL / OUT: 3240 mL / NET: -1130 mL    02 Mar 2022 07:01  -  03 Mar 2022 00:26  --------------------------------------------------------  IN: 740 mL / OUT: 2400 mL / NET: -1660 mL      PHYSICAL EXAM:  Constitutional: awake, alert, laying in bed. NAD.   Respiratory: non-labored breathing. Normal chest rise.   Cardiovascular: Regular rate and rhythm  Gastrointestinal:  Soft, nontender, nondistended.  .  Vascular: Extremities warm, no ulcers, no discoloration of skin.   Neurological: Gen Alert and oriented to self, place, year. Conversant, appropriate.    CN II-XII grossly intact. Pupils 4mm and reactive b/l.     Motor: AN x 4, RUE/RLE 4-/5. LUE/LLL 5/5.     Sens: Sensation intact to light touch throughout.    Extremities: distal pulses 2+ x 4 DPPT symmetric throughout.     Right side pronator drift.   Wound/incision: Right frontal crani incision closed with sutures, c/d/i with no dressing in place. 2 Left sided abdominal puncture sites closed with dermabond, c/d/i with no dressings in place. No hematoma or hemorrhage.      LABS:                        11.3   6.71  )-----------( 365      ( 02 Mar 2022 02:55 )             36.2     03-02    137  |  107  |  9   ----------------------------<  163<H>  4.3   |  18<L>  |  0.32<L>    Ca    9.2      02 Mar 2022 11:09  Phos  3.1     03-02  Mg     1.8     03-02      PT/INR - ( 02 Mar 2022 02:55 )   PT: 12.5 sec;   INR: 1.05          PTT - ( 02 Mar 2022 02:55 )  PTT:30.9 sec        CAPILLARY BLOOD GLUCOSE          Drug Levels: [] N/A    CSF Analysis: [] N/A      Allergies    No Known Allergies    Intolerances      MEDICATIONS:  Antibiotics:  ceFAZolin   IVPB 2000 milliGRAM(s) IV Intermittent every 8 hours    Neuro:  acetaminophen     Tablet .. 650 milliGRAM(s) Oral every 6 hours PRN  ibuprofen  Tablet. 400 milliGRAM(s) Oral every 8 hours PRN  traMADol 50 milliGRAM(s) Oral every 6 hours PRN    Anticoagulation:    OTHER:  albuterol/ipratropium for Nebulization 3 milliLiter(s) Nebulizer every 6 hours PRN  amLODIPine   Tablet 10 milliGRAM(s) Oral daily  bisacodyl 5 milliGRAM(s) Oral every 12 hours  chlorhexidine 2% Cloths 1 Application(s) Topical <User Schedule>  fludroCORTISONE 0.2 milliGRAM(s) Oral every 12 hours  hydrALAZINE 100 milliGRAM(s) Oral every 8 hours  influenza   Vaccine 0.5 milliLiter(s) IntraMuscular once  labetalol 100 milliGRAM(s) Oral every 8 hours  lactobacillus acidophilus 1 Tablet(s) Oral daily  losartan 100 milliGRAM(s) Oral daily  polyethylene glycol 3350 17 Gram(s) Oral every 12 hours  povidone iodine 5% Nasal Swab 1 Application(s) Both Nostrils once  senna 2 Tablet(s) Oral at bedtime    IVF:  sodium chloride 3 Gram(s) Oral every 6 hours  sodium chloride 0.9%. 1000 milliLiter(s) IV Continuous <Continuous>    CULTURES:  Culture Results:   No growth to date (02-21 @ 15:05)  Culture Results:   No growth at 5 days. (02-21 @ 14:53)    RADIOLOGY & ADDITIONAL TESTS:      ASSESSMENT:  Patient 57 y/o female with PMHx of HTN, pre-DM w/ left thalamic parenchymal hematoma with extensive intraventricular hemorrhage. ICH score 2. NIHSS 18. s/p R frontal large bore EVD placement 2/12/22. Now s/p VPS Certas @5 3/2/22.     PLAN:   NEURO:  - neuro/vital q 4   - R VPS (C @ 2) inserted 3/2/22, s/p failed clamp trial x2  - psotop CTH 3/2, stable  - OOB ok   - ibuprofen/Tylenol prn for fever    - vEEG for fluctuating mental status - left hemisphere slowing, no seizures. (2/26-2/27)    Cardio:   - SBP goal 100-160   - cont. amlodipine 10mg QD, losartan 100mg, Hydralazine 100 mg TID, labetalol 100 q8h   - echo 2/14: LVH, EF: 65-70%   - IV access: PIV x2     PULM:  - duonebs PRN   - IS     Renal:   - Normal sodium goal   - Continue salt tabs 3 q6, florinef 0.2 BID     GI:  - regular diet w/ ensure   - bowel regimen, last BM 2/28     HEME:  - SCDs only L leg/SQL held postop  - LE dopplers 2/23: R IM calf thrombus, repeat dopplers 2/28 showing persistence but no propogation, repeat 3/2 stable   - UE dopp 2/23: superficial clots b/l cephalic, wam compresses and elevation, repeat pending 3/4     ENDO:  - prediabetic, a1c 5.8, glucose under control, no insulin req   - TSH wnl     ID:  - postop Ancef  - blood cx 2/16 +staph epidermidis  - urine cx 2/17 +citrobacter koseri and klebsiella  - CSF cx 2/21 - pending. NGTD  - ID following s/p: vanco [2/18-2/20], ceftriaxone for UTI [2/19-22], cefepime [2/22- 2/25. linezolid for phlebilitis [2/20- 2/25]   - MRSA negative  - Gallium scan with increased uptake in gluteal region, no cellulitis on physicial exam, probably d/t positioning     PSYCH:   - pt takes xanax at home    Dispo:   - ICU status/ PT/OT   - full code  - family updated with plan    PT recs: [facility]    D/w Dr. D'Amico and Dr. Davidson

## 2022-03-03 NOTE — PROGRESS NOTE ADULT - ASSESSMENT
56F s/p  shunt placement with JAJA and Dr. Carrillo yesterday POD1. Tolerated procedure well, tolerating diet, +F/-BM, abdomen benign.     - monitor for BMs  - continue regular diet  - surgery team 4c signing off  - discussed w/ Dr. Carrillo

## 2022-03-04 ENCOUNTER — TRANSCRIPTION ENCOUNTER (OUTPATIENT)
Age: 57
End: 2022-03-04

## 2022-03-04 DIAGNOSIS — I82.890 ACUTE EMBOLISM AND THROMBOSIS OF OTHER SPECIFIED VEINS: ICD-10-CM

## 2022-03-04 LAB
ANION GAP SERPL CALC-SCNC: 12 MMOL/L — SIGNIFICANT CHANGE UP (ref 5–17)
ANION GAP SERPL CALC-SCNC: 9 MMOL/L — SIGNIFICANT CHANGE UP (ref 5–17)
APTT BLD: 32.2 SEC — SIGNIFICANT CHANGE UP (ref 27.5–35.5)
APTT BLD: 60.9 SEC — HIGH (ref 27.5–35.5)
BUN SERPL-MCNC: 6 MG/DL — LOW (ref 7–23)
BUN SERPL-MCNC: 6 MG/DL — LOW (ref 7–23)
CALCIUM SERPL-MCNC: 9.3 MG/DL — SIGNIFICANT CHANGE UP (ref 8.4–10.5)
CALCIUM SERPL-MCNC: 9.3 MG/DL — SIGNIFICANT CHANGE UP (ref 8.4–10.5)
CHLORIDE SERPL-SCNC: 103 MMOL/L — SIGNIFICANT CHANGE UP (ref 96–108)
CHLORIDE SERPL-SCNC: 106 MMOL/L — SIGNIFICANT CHANGE UP (ref 96–108)
CO2 SERPL-SCNC: 23 MMOL/L — SIGNIFICANT CHANGE UP (ref 22–31)
CO2 SERPL-SCNC: 26 MMOL/L — SIGNIFICANT CHANGE UP (ref 22–31)
CREAT SERPL-MCNC: 0.34 MG/DL — LOW (ref 0.5–1.3)
CREAT SERPL-MCNC: 0.36 MG/DL — LOW (ref 0.5–1.3)
EGFR: 118 ML/MIN/1.73M2 — SIGNIFICANT CHANGE UP
EGFR: 120 ML/MIN/1.73M2 — SIGNIFICANT CHANGE UP
GLUCOSE SERPL-MCNC: 116 MG/DL — HIGH (ref 70–99)
GLUCOSE SERPL-MCNC: 177 MG/DL — HIGH (ref 70–99)
HCT VFR BLD CALC: 32.8 % — LOW (ref 34.5–45)
HCT VFR BLD CALC: 34.5 % — SIGNIFICANT CHANGE UP (ref 34.5–45)
HGB BLD-MCNC: 10.4 G/DL — LOW (ref 11.5–15.5)
HGB BLD-MCNC: 11.1 G/DL — LOW (ref 11.5–15.5)
MAGNESIUM SERPL-MCNC: 2.2 MG/DL — SIGNIFICANT CHANGE UP (ref 1.6–2.6)
MCHC RBC-ENTMCNC: 30.1 PG — SIGNIFICANT CHANGE UP (ref 27–34)
MCHC RBC-ENTMCNC: 30.7 PG — SIGNIFICANT CHANGE UP (ref 27–34)
MCHC RBC-ENTMCNC: 31.7 GM/DL — LOW (ref 32–36)
MCHC RBC-ENTMCNC: 32.2 GM/DL — SIGNIFICANT CHANGE UP (ref 32–36)
MCV RBC AUTO: 94.8 FL — SIGNIFICANT CHANGE UP (ref 80–100)
MCV RBC AUTO: 95.3 FL — SIGNIFICANT CHANGE UP (ref 80–100)
NRBC # BLD: 0 /100 WBCS — SIGNIFICANT CHANGE UP (ref 0–0)
NRBC # BLD: 0 /100 WBCS — SIGNIFICANT CHANGE UP (ref 0–0)
PHOSPHATE SERPL-MCNC: 3.2 MG/DL — SIGNIFICANT CHANGE UP (ref 2.5–4.5)
PLATELET # BLD AUTO: 383 K/UL — SIGNIFICANT CHANGE UP (ref 150–400)
PLATELET # BLD AUTO: 436 K/UL — HIGH (ref 150–400)
POTASSIUM SERPL-MCNC: 3.2 MMOL/L — LOW (ref 3.5–5.3)
POTASSIUM SERPL-MCNC: 3.6 MMOL/L — SIGNIFICANT CHANGE UP (ref 3.5–5.3)
POTASSIUM SERPL-SCNC: 3.2 MMOL/L — LOW (ref 3.5–5.3)
POTASSIUM SERPL-SCNC: 3.6 MMOL/L — SIGNIFICANT CHANGE UP (ref 3.5–5.3)
PROCALCITONIN SERPL-MCNC: 0.05 NG/ML — SIGNIFICANT CHANGE UP (ref 0.02–0.1)
RBC # BLD: 3.46 M/UL — LOW (ref 3.8–5.2)
RBC # BLD: 3.62 M/UL — LOW (ref 3.8–5.2)
RBC # FLD: 13.2 % — SIGNIFICANT CHANGE UP (ref 10.3–14.5)
RBC # FLD: 13.2 % — SIGNIFICANT CHANGE UP (ref 10.3–14.5)
SODIUM SERPL-SCNC: 138 MMOL/L — SIGNIFICANT CHANGE UP (ref 135–145)
SODIUM SERPL-SCNC: 141 MMOL/L — SIGNIFICANT CHANGE UP (ref 135–145)
WBC # BLD: 7.18 K/UL — SIGNIFICANT CHANGE UP (ref 3.8–10.5)
WBC # BLD: 7.83 K/UL — SIGNIFICANT CHANGE UP (ref 3.8–10.5)
WBC # FLD AUTO: 7.18 K/UL — SIGNIFICANT CHANGE UP (ref 3.8–10.5)
WBC # FLD AUTO: 7.83 K/UL — SIGNIFICANT CHANGE UP (ref 3.8–10.5)

## 2022-03-04 PROCEDURE — 99233 SBSQ HOSP IP/OBS HIGH 50: CPT

## 2022-03-04 PROCEDURE — 70450 CT HEAD/BRAIN W/O DYE: CPT | Mod: 26

## 2022-03-04 PROCEDURE — 93970 EXTREMITY STUDY: CPT | Mod: 26

## 2022-03-04 PROCEDURE — 99024 POSTOP FOLLOW-UP VISIT: CPT

## 2022-03-04 RX ORDER — POTASSIUM CHLORIDE 20 MEQ
40 PACKET (EA) ORAL EVERY 4 HOURS
Refills: 0 | Status: DISCONTINUED | OUTPATIENT
Start: 2022-03-02 | End: 2022-03-04

## 2022-03-04 RX ORDER — HEPARIN SODIUM 5000 [USP'U]/ML
INJECTION INTRAVENOUS; SUBCUTANEOUS
Qty: 25000 | Refills: 0 | Status: DISCONTINUED | OUTPATIENT
Start: 2022-03-04 | End: 2022-03-06

## 2022-03-04 RX ORDER — POTASSIUM CHLORIDE 20 MEQ
40 PACKET (EA) ORAL ONCE
Refills: 0 | Status: COMPLETED | OUTPATIENT
Start: 2022-03-04 | End: 2022-03-04

## 2022-03-04 RX ORDER — POTASSIUM CHLORIDE 20 MEQ
40 PACKET (EA) ORAL
Refills: 0 | Status: COMPLETED | OUTPATIENT
Start: 2022-03-04 | End: 2022-03-04

## 2022-03-04 RX ADMIN — Medication 100 MILLIGRAM(S): at 21:53

## 2022-03-04 RX ADMIN — Medication 400 MILLIGRAM(S): at 15:46

## 2022-03-04 RX ADMIN — Medication 100 MILLIGRAM(S): at 05:50

## 2022-03-04 RX ADMIN — Medication 1 TABLET(S): at 13:27

## 2022-03-04 RX ADMIN — POLYETHYLENE GLYCOL 3350 17 GRAM(S): 17 POWDER, FOR SOLUTION ORAL at 05:48

## 2022-03-04 RX ADMIN — LOSARTAN POTASSIUM 100 MILLIGRAM(S): 100 TABLET, FILM COATED ORAL at 05:49

## 2022-03-04 RX ADMIN — Medication 100 MILLIGRAM(S): at 13:27

## 2022-03-04 RX ADMIN — Medication 40 MILLIEQUIVALENT(S): at 21:53

## 2022-03-04 RX ADMIN — Medication 5 MILLIGRAM(S): at 17:51

## 2022-03-04 RX ADMIN — FLUDROCORTISONE ACETATE 0.1 MILLIGRAM(S): 0.1 TABLET ORAL at 05:49

## 2022-03-04 RX ADMIN — Medication 5 MILLIGRAM(S): at 05:49

## 2022-03-04 RX ADMIN — Medication 400 MILLIGRAM(S): at 00:00

## 2022-03-04 RX ADMIN — Medication 400 MILLIGRAM(S): at 01:00

## 2022-03-04 RX ADMIN — Medication 40 MILLIEQUIVALENT(S): at 13:26

## 2022-03-04 RX ADMIN — SODIUM CHLORIDE 3 GRAM(S): 9 INJECTION INTRAMUSCULAR; INTRAVENOUS; SUBCUTANEOUS at 13:26

## 2022-03-04 RX ADMIN — POLYETHYLENE GLYCOL 3350 17 GRAM(S): 17 POWDER, FOR SOLUTION ORAL at 17:51

## 2022-03-04 RX ADMIN — SODIUM CHLORIDE 3 GRAM(S): 9 INJECTION INTRAMUSCULAR; INTRAVENOUS; SUBCUTANEOUS at 05:49

## 2022-03-04 RX ADMIN — Medication 650 MILLIGRAM(S): at 03:45

## 2022-03-04 RX ADMIN — SENNA PLUS 2 TABLET(S): 8.6 TABLET ORAL at 21:53

## 2022-03-04 RX ADMIN — Medication 650 MILLIGRAM(S): at 04:30

## 2022-03-04 RX ADMIN — AMLODIPINE BESYLATE 10 MILLIGRAM(S): 2.5 TABLET ORAL at 05:49

## 2022-03-04 RX ADMIN — HEPARIN SODIUM 1400 UNIT(S)/HR: 5000 INJECTION INTRAVENOUS; SUBCUTANEOUS at 16:02

## 2022-03-04 RX ADMIN — Medication 100 MILLIGRAM(S): at 21:52

## 2022-03-04 RX ADMIN — Medication 40 MILLIEQUIVALENT(S): at 07:47

## 2022-03-04 RX ADMIN — SODIUM CHLORIDE 3 GRAM(S): 9 INJECTION INTRAMUSCULAR; INTRAVENOUS; SUBCUTANEOUS at 17:50

## 2022-03-04 RX ADMIN — Medication 650 MILLIGRAM(S): at 16:02

## 2022-03-04 NOTE — PROGRESS NOTE ADULT - SUBJECTIVE AND OBJECTIVE BOX
O/N Events: TOSHA  Subjective/ROS: No complaints. Denies HA, CP, SOB, n/v, changes in bowel/urinary habits.  12pt ROS otherwise negative.    VITALS  Vital Signs Last 24 Hrs  T(C): 36.8 (04 Mar 2022 09:10), Max: 39.5 (03 Mar 2022 20:35)  T(F): 98.2 (04 Mar 2022 09:10), Max: 103.1 (03 Mar 2022 20:35)  HR: 90 (04 Mar 2022 11:45) (80 - 104)  BP: 166/72 (04 Mar 2022 11:45) (139/64 - 166/72)  BP(mean): 104 (04 Mar 2022 11:45) (92 - 113)  RR: 16 (04 Mar 2022 11:45) (16 - 18)  SpO2: 95% (04 Mar 2022 11:45) (93% - 95%)    CAPILLARY BLOOD GLUCOSE    PHYSICAL EXAM  General: A&Ox3; NAD  Head: NC/AT; PERRL; EOMI; anicteric sclera  Neck: Supple; no JVD  Respiratory: CTA B/L; no wheezes/crackles/rales auscultated w/ good air movement  Cardiovascular: Regular rhythm/rate; S1/S2; no gallops or murmurs auscultated  Gastrointestinal: Soft; NTND w/out rebound tenderness or guarding; bowel sounds normal  Extremities: WWP; no edema or cyanosis; radial/pedal pulses palpable  Neurological:  CNII-XII grossly intact; Right sided weakness, slight r pronator drift  Vasc: +2 b/l radial   Skin: No rashes, crani wound dressing c/d/i, abdominal wound c/d/i   Psych: Appropriate affect    MEDICATIONS  (STANDING):  amLODIPine   Tablet 10 milliGRAM(s) Oral daily  bisacodyl 5 milliGRAM(s) Oral every 12 hours  enoxaparin Injectable 40 milliGRAM(s) SubCutaneous <User Schedule>  hydrALAZINE 100 milliGRAM(s) Oral every 8 hours  influenza   Vaccine 0.5 milliLiter(s) IntraMuscular once  labetalol 100 milliGRAM(s) Oral every 8 hours  lactobacillus acidophilus 1 Tablet(s) Oral daily  losartan 100 milliGRAM(s) Oral daily  polyethylene glycol 3350 17 Gram(s) Oral every 12 hours  povidone iodine 5% Nasal Swab 1 Application(s) Both Nostrils once  senna 2 Tablet(s) Oral at bedtime  sodium chloride 3 Gram(s) Oral every 6 hours    MEDICATIONS  (PRN):  acetaminophen     Tablet .. 650 milliGRAM(s) Oral every 6 hours PRN Temp greater or equal to 38C (100.4F), Mild Pain (1 - 3)  albuterol/ipratropium for Nebulization 3 milliLiter(s) Nebulizer every 6 hours PRN Shortness of Breath and/or Wheezing  ibuprofen  Tablet. 400 milliGRAM(s) Oral every 8 hours PRN Temp greater or equal to 38C (100.4F), Moderate Pain (4 - 6)  oxyCODONE    IR 5 milliGRAM(s) Oral every 4 hours PRN Moderate Pain (4 - 6)      No Known Allergies      LABS                        10.4   7.18  )-----------( 383      ( 04 Mar 2022 06:45 )             32.8     03-04    141  |  106  |  6<L>  ----------------------------<  116<H>  3.2<L>   |  26  |  0.34<L>    Ca    9.3      04 Mar 2022 06:45  Phos  3.2     03-04  Mg     2.2     03-04        Urinalysis Basic - ( 03 Mar 2022 21:39 )    Color: Yellow / Appearance: Clear / S.020 / pH: x  Gluc: x / Ketone: NEGATIVE  / Bili: Negative / Urobili: 0.2 E.U./dL   Blood: x / Protein: NEGATIVE mg/dL / Nitrite: NEGATIVE   Leuk Esterase: Trace / RBC: < 5 /HPF / WBC < 5 /HPF   Sq Epi: x / Non Sq Epi: 0-5 /HPF / Bacteria: Present /HPF            IMAGING/EKG/ETC reviewed

## 2022-03-04 NOTE — PROGRESS NOTE ADULT - SUBJECTIVE AND OBJECTIVE BOX
HPI:  57 y/o female with PMHx of HTN, pre-DM presents transferred from [facility] for intracranial hemorrhage. As per [facility] ED provider and son, patient called son around 7:30-8:00AM c/o severe headache and nausea. Son immediately rushed to her house and found her out of bed, moaning and covered in her own vomit. During transit, EMS reported SBP within 240s with intractable vomiting and given Zofran 8mg. Upon arrival to [facility] ED, GCS 7, SBP within 180s, patient was given Decadron 10mg, Keppra 1g, started on a Cardene drip, Propofol, Mannitol 1mg/kg. 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 Patient was intubated for airway support and transferred to [facility] for further management. ICH2, NIHSS 8.  Denies use of anticoagulants/antiplatelets.  (2022 16:36)    OVERNIGHT EVENTS: Febrile overnight, pancultured. Otherwise TOSHA, neuro stable    Hospital Course:  : transferred from [facility]. R frontal EVD and R radial a-line placed. pend CTH/CTA. s/p 1L bolus for elevated lactate.   : S/p R frontal EVD placedment @[id number] draining well. O/n pt w/ episode of possible storming; tachy, HTN, agitated, temp 100.2F, given 2 versed and 50mcg fentanyl w/ improvement in symptoms Neuro exam stable. Pt remains intubated and sedated, AN L>R. Trend lactate and abg. Amlodipine 5mg QD started for SBP goal < 140, cardene dc'd.  EVD dropped  at 1pm. propanolol and fent standing added for neuro storming, propofol switched to precedex, with resultant hypotension. Propanolol dc'd, fent changed to prn and precedex off, 1 L bolus given, levo prn   : TOSHA overnight. Patient remains on propofol. EVD open at 95ywS4M. ICPs WNL. Neuro exam stable. increased bowel regimen. started SQL. extubated on precedex. given 0.5 Ativan + Fentanyl pushes PRN for agitation. 500cc NS bolus.   2/15: BD#4.  On HFNC d/t desat to 88% on 70% non-rebreather. On 0.5 precedex   : BD5, TOSHA overnight, on 4L NC, s/p vomiting yesterday, TF being held, formal S/S pending, off precedex. EVD open @ 5. Started on 3% at 30, tmax 100.7   : BD# 6   tachycardic, PE protocol. cardene gtt. neuro unchanged. EVD @ 5cmH2O. 1g IV tylenol for 102.7 fever, duonebs standing to PRN. UA with reflux ordered. Hydralazine 25mg TID added, increased to 50q8. 500cc NS bolus x2. started on nystatin for thrush. started oxy 5q6 PRN for pain for tachycardia. repeat Na 146, decreased 3% @50, started salt tabs 2q6. FEES done. started minced and moist diet  : BD 7. 3% Increased 75 cc/hr  : BD8. TOSHA o/n neuro stable. 3%@75cc/hr. central line placed for vascular access  : BD9. tmax 101.3 o/n, neuro stable. 3%@100cc/hr. cardene gtt restarted. Ibuprofen x1 given for fever, ok per Dr. D'Amico. Vanc d/c'd per ID (MRSA neg), treating UTI not bacteremia, likely contaminant. Linezolid started for suspected thrombophlebitis and GS + coverage.   : BD#10 Overnight, IV tylenol given for headache. Right radial arterial line re-wired then discontinued. EVD clamped at 8AM. ICPs WNL. Neuro exam stable. Remains on 3% lowered to 75cc/hr. CTH stable, EVD raised to 20. Febrile 102 - CSF and blood cx sent. EVD lowered from 20 to 5 to drain more CSF and blood, increased lethargy. Ceftriaxone changed to cefepime per ID for broader coverage.   : BD#11 TOSHA o/n. neuro at baseline. EVD at [id number], 3% kept at 25cc/hr   : BD#12 TOSHA overnight. Remain on 3% saline. EVD [id number]. ICPs WNL. Neuro exam stable.  : BD#13 TOSHA overnight, Remains on 3% saline. EVD raised to 20, gallium scan done with increased uptake in gluteals but normal physical exam  : BD 14. TOSHA overnight. Remains on hypertonics. EVD clamped at 20 yesterday evening, possible shunt in the afternoon. Patient lethargic with worsening headache. EVD reopened at 10, Linezolid and cefepime d/c'ed   : BD 15. increasing lethargic overnight, CTH stable and reviewed with Dr. D'Amico. Remains on a course of hypertonics. EVD reopened at 10 yesterday morning due to patient experiencing increased lethargy and headaches. EEG for fluctuating mental status - left hemisphere slowing, no seizures. 3% d/c'ed remains on salt tabs   : BD 16. TOSHA overnight. vEEG placed yesterday for waxing/waning exam. EVD remains open at 10. plan is likely to shunt next week. Remains with intermittent periods of lethargy. EEG negative for seizure, d/c'ed. Restarted on 3% @25. LIJ noticed to be dislodged from proper position, notified RN toremove, hemostasis achieved with no hematoma formation noted. Patient with clear breath sounds and no respiratory distress. Additional peripheral IV.   : BD 17, Repeat CTH this am stable, EVD clamped at 10:30AM, remains on 3% at 25.  3/1: BD 18, EVD clamped overnight, neuro remained stable.   3/2: BD 19, POD0 s/p VPS. LE dopplers stable. CTH stable. stepdown. dilaudid and fentanyl needed for severe h/a. Given dilaudid 0.25 mg @ 10 PM.   3/3: BD 20. POD 1 R VPS placement (Certas @ 5). TOSHA o/n. stepdown status. florinef weaned  3/4: BD 21, POD2 R VPS placement (Certas @ 5). pending repeat UE dopplers. Febrile overnight to 103, pancultured    Vital Signs Last 24 Hrs  T(C): 38.3 (03 Mar 2022 23:50), Max: 39.5 (03 Mar 2022 20:35)  T(F): 101 (03 Mar 2022 23:50), Max: 103.1 (03 Mar 2022 20:35)  HR: 92 (03 Mar 2022 23:50) (92 - 104)  BP: 140/67 (03 Mar 2022 23:50) (133/80 - 158/80)  BP(mean): 96 (03 Mar 2022 23:50) (90 - 113)  RR: 18 (03 Mar 2022 23:50) (18 - 18)  SpO2: 93% (03 Mar 2022 23:50) (93% - 96%)    I&O's Summary    02 Mar 2022 07:01  -  03 Mar 2022 07:00  --------------------------------------------------------  IN: 740 mL / OUT: 3600 mL / NET: -2860 mL    03 Mar 2022 07:01  -  04 Mar 2022 01:56  --------------------------------------------------------  IN: 500 mL / OUT: 1850 mL / NET: -1350 mL        PHYSICAL EXAM:  General: NAD, pt is comfortably sitting up in bed, on room air  HEENT: CN II-XII grossly intact, PERRL 3mm briskly reactive, EOMI b/l, face symmetric, tongue midline, neck FROM  Cardiovascular: RRR, normal S1 and S2   Respiratory: lungs CTAB, no wheezing, rhonchi, or crackles   GI: normoactive BS to auscultation, abd soft, NTND   Neuro: A&Ox2, No aphasia, speech clear, no dysmetria, Follows commands.  AN x4 spontaneously, RU extremity 4+/5, RU extremity pronator drift otherwise, 5/5 strength in all extremities throughout. SILT throughout   Extremities: distal pulses 2+ x4  Wound/incision:  Drain:     TUBES/LINES:  [] Haley  [] Lumbar Drain  [] Wound Drains  [] Others      DIET:  [] NPO  [x] Mechanical  [] Tube feeds    LABS:                        10.8   9.95  )-----------( 432      ( 03 Mar 2022 06:35 )             34.0     03-03    140  |  103  |  9   ----------------------------<  180<H>  3.7   |  25  |  0.39<L>    Ca    9.8      03 Mar 2022 06:35  Phos  3.2     03-03  Mg     2.2     03-03      PT/INR - ( 02 Mar 2022 02:55 )   PT: 12.5 sec;   INR: 1.05          PTT - ( 02 Mar 2022 02:55 )  PTT:30.9 sec  Urinalysis Basic - ( 03 Mar 2022 21:39 )    Color: Yellow / Appearance: Clear / S.020 / pH: x  Gluc: x / Ketone: NEGATIVE  / Bili: Negative / Urobili: 0.2 E.U./dL   Blood: x / Protein: NEGATIVE mg/dL / Nitrite: NEGATIVE   Leuk Esterase: Trace / RBC: < 5 /HPF / WBC < 5 /HPF   Sq Epi: x / Non Sq Epi: 0-5 /HPF / Bacteria: Present /HPF          CAPILLARY BLOOD GLUCOSE          Drug Levels: [] N/A    CSF Analysis: [] N/A      Allergies    No Known Allergies    Intolerances      MEDICATIONS:  Antibiotics:    Neuro:  acetaminophen     Tablet .. 650 milliGRAM(s) Oral every 6 hours PRN  ibuprofen  Tablet. 400 milliGRAM(s) Oral every 8 hours PRN  oxyCODONE    IR 5 milliGRAM(s) Oral every 4 hours PRN    Anticoagulation:  enoxaparin Injectable 40 milliGRAM(s) SubCutaneous <User Schedule>    OTHER:  albuterol/ipratropium for Nebulization 3 milliLiter(s) Nebulizer every 6 hours PRN  amLODIPine   Tablet 10 milliGRAM(s) Oral daily  bisacodyl 5 milliGRAM(s) Oral every 12 hours  fludroCORTISONE 0.1 milliGRAM(s) Oral every 12 hours  hydrALAZINE 100 milliGRAM(s) Oral every 8 hours  influenza   Vaccine 0.5 milliLiter(s) IntraMuscular once  labetalol 100 milliGRAM(s) Oral every 8 hours  lactobacillus acidophilus 1 Tablet(s) Oral daily  losartan 100 milliGRAM(s) Oral daily  polyethylene glycol 3350 17 Gram(s) Oral every 12 hours  povidone iodine 5% Nasal Swab 1 Application(s) Both Nostrils once  senna 2 Tablet(s) Oral at bedtime    IVF:  sodium chloride 3 Gram(s) Oral every 6 hours    CULTURES:  Culture Results:   No growth to date ( @ 15:05)  Culture Results:   No growth at 5 days. ( @ 14:53)    RADIOLOGY & ADDITIONAL TESTS:      ASSESSMENT:  Patient 57 y/o female with PMHx of HTN, pre-DM w/ left thalamic parenchymal hematoma with extensive intraventricular hemorrhage. ICH score 2. NIHSS 18. s/p R frontal large bore EVD placement 22. Now s/p VPS Certas @5 3/2/22.     PLAN:   NEURO:  - neuro/vital q 4   - R VPS (C @ 5) inserted 3/2/22, s/p failed clamp trial x2  - psotop CTH 3/2, stable  - pain control PRN  - vEEG for fluctuating mental status - left hemisphere slowing, no seizures. (-)    Cardio:   - SBP goal 100-160   - cont. amlodipine 10mg QD, losartan 100mg, Hydralazine 100 mg TID, labetalol 100 q8h   - echo : LVH, EF: 65-70%     PULM:  - duonebs PRN   - IS     Renal:   - Normal sodium goal   - Continue salt tabs 3 q6, florinef 0.1 BID (weaned 3/3)    GI:  - regular diet w/ ensure   - bowel regimen, last BM      HEME:  - SCDs only L leg/SQL   - LE dopplers : R IM calf thrombus, repeat dopplers  showing persistence but no propogation, repeat 3/2 stable   - UE dopp : superficial clots b/l cephalic, wam compresses and elevation, repeat pending 3/4     ENDO:  - prediabetic, a1c 5.8, glucose under control, no insulin req   - TSH wnl     ID:  - blood cx  +staph epidermidis  - urine cx  +citrobacter koseri and klebsiella  - CSF cx  - pending. NGTD  - ID following s/p: vanco [-], ceftriaxone for UTI [-], cefepime [- . linezolid for phlebilitis [- ]   - Gallium scan with increased uptake in gluteal region, no cellulitis on physicial exam, probably d/t positioning   -febrile 3/3, pancultured    PSYCH:   - pt takes xanax at home    Dispo:   - SDU status/ PT/OT   - full code  - family updated with plan    PT recs: [facility]    D/w Dr. D'Amico

## 2022-03-04 NOTE — DISCHARGE NOTE NURSING/CASE MANAGEMENT/SOCIAL WORK - NSDCFUADDAPPT_GEN_ALL_CORE_FT
Please follow up with Dr. D'Amico    Vascular    Please follow up with Dr. Carrillo if needed for abdomen wound     Please follow up with your primary care doctor outpatient

## 2022-03-04 NOTE — DISCHARGE NOTE NURSING/CASE MANAGEMENT/SOCIAL WORK - NSDCPEFALRISK_GEN_ALL_CORE
For information on Fall & Injury Prevention, visit: [email or web address] OR  [email or web address] OR  https://www.cdc.gov/steadi/patient.html

## 2022-03-04 NOTE — PROGRESS NOTE ADULT - SUBJECTIVE AND OBJECTIVE BOX
24 hr events  No major events      Vital Signs Last 24 Hrs  T(C): 37.7 (04 Mar 2022 04:53), Max: 39.5 (03 Mar 2022 20:35)  T(F): 99.8 (04 Mar 2022 04:53), Max: 103.1 (03 Mar 2022 20:35)  HR: 84 (04 Mar 2022 04:05) (84 - 104)  BP: 158/73 (04 Mar 2022 04:05) (133/80 - 158/80)  BP(mean): 105 (04 Mar 2022 04:05) (92 - 113)  RR: 18 (04 Mar 2022 04:05) (18 - 18)  SpO2: 93% (04 Mar 2022 04:05) (93% - 96%)    I&O's Summary    03 Mar 2022 07:01  -  04 Mar 2022 07:00  --------------------------------------------------------  IN: 500 mL / OUT: 2050 mL / NET: -1550 mL        Physical Exam:  General: NAD, resting comfortably  Pulmonary: normal resp effort  Cardiovascular: NSR  Abdominal: soft, NT/ND  Extremities: WWP, normal strength, no clubbing/cyanosis/edema          Lines/drains/tubes:    LABS:                        10.4   7.18  )-----------( 383      ( 04 Mar 2022 06:45 )             32.8     03-03    140  |  103  |  9   ----------------------------<  180<H>  3.7   |  25  |  0.39<L>    Ca    9.8      03 Mar 2022 06:35  Phos  3.2     03-03  Mg     2.2     03-03        Urinalysis Basic - ( 03 Mar 2022 21:39 )    Color: Yellow / Appearance: Clear / S.020 / pH: x  Gluc: x / Ketone: NEGATIVE  / Bili: Negative / Urobili: 0.2 E.U./dL   Blood: x / Protein: NEGATIVE mg/dL / Nitrite: NEGATIVE   Leuk Esterase: Trace / RBC: < 5 /HPF / WBC < 5 /HPF   Sq Epi: x / Non Sq Epi: 0-5 /HPF / Bacteria: Present /HPF        CAPILLARY BLOOD GLUCOSE          RADIOLOGY & ADDITIONAL TESTS:

## 2022-03-04 NOTE — CHART NOTE - NSCHARTNOTEFT_GEN_A_CORE
The patient was started on heparin infusion this morning. As a result no more serial US is needed unless clinically changed. We will sign off at this time.  Re-consult if clinically changed.

## 2022-03-04 NOTE — PROGRESS NOTE ADULT - ASSESSMENT
55 y/o female with PMHx of HTN, pre-DM currently admitted for intracranial hemorrhage on 2/12. LE dopplers were obtained on 2/23 for multiple site infiltrations and r/o DVT. Vascular surgery consulted for finding of DVT of right intramuscular calf vein as patient is unable to be anticoagulated in setting of recent bleed; currently with palpable pulses distally and no swelling or calf pain. Most recent LE US on 3/1 showed stable DVT without worsening. Recs:    Recommendations:  - anticoagulate as soon as deemed safe from ICH standpoint  - Repeat US next Wednesday 3/9  - ambulate   - Vascular surgery will continue to follow

## 2022-03-04 NOTE — DISCHARGE NOTE NURSING/CASE MANAGEMENT/SOCIAL WORK - PATIENT PORTAL LINK FT
You can access the FollowMyHealth Patient Portal offered by [facility] by registering at the following website: [email or web address]. By joining [facility]’s FollowMyHealth portal, you will also be able to view your health information using other applications (apps) compatible with our system.

## 2022-03-05 LAB
ANION GAP SERPL CALC-SCNC: 11 MMOL/L — SIGNIFICANT CHANGE UP (ref 5–17)
APTT BLD: 58 SEC — HIGH (ref 27.5–35.5)
APTT BLD: 78.7 SEC — HIGH (ref 27.5–35.5)
BUN SERPL-MCNC: 8 MG/DL — SIGNIFICANT CHANGE UP (ref 7–23)
CALCIUM SERPL-MCNC: 9.3 MG/DL — SIGNIFICANT CHANGE UP (ref 8.4–10.5)
CHLORIDE SERPL-SCNC: 106 MMOL/L — SIGNIFICANT CHANGE UP (ref 96–108)
CO2 SERPL-SCNC: 21 MMOL/L — LOW (ref 22–31)
CREAT SERPL-MCNC: 0.32 MG/DL — LOW (ref 0.5–1.3)
CULTURE RESULTS: NO GROWTH — SIGNIFICANT CHANGE UP
EGFR: 122 ML/MIN/1.73M2 — SIGNIFICANT CHANGE UP
GLUCOSE SERPL-MCNC: 138 MG/DL — HIGH (ref 70–99)
HCT VFR BLD CALC: 35.3 % — SIGNIFICANT CHANGE UP (ref 34.5–45)
HGB BLD-MCNC: 11 G/DL — LOW (ref 11.5–15.5)
MAGNESIUM SERPL-MCNC: 2.2 MG/DL — SIGNIFICANT CHANGE UP (ref 1.6–2.6)
MCHC RBC-ENTMCNC: 29.6 PG — SIGNIFICANT CHANGE UP (ref 27–34)
MCHC RBC-ENTMCNC: 31.2 GM/DL — LOW (ref 32–36)
MCV RBC AUTO: 95.1 FL — SIGNIFICANT CHANGE UP (ref 80–100)
NRBC # BLD: 0 /100 WBCS — SIGNIFICANT CHANGE UP (ref 0–0)
PHOSPHATE SERPL-MCNC: 3.2 MG/DL — SIGNIFICANT CHANGE UP (ref 2.5–4.5)
PLATELET # BLD AUTO: 406 K/UL — HIGH (ref 150–400)
POTASSIUM SERPL-MCNC: 3.7 MMOL/L — SIGNIFICANT CHANGE UP (ref 3.5–5.3)
POTASSIUM SERPL-SCNC: 3.7 MMOL/L — SIGNIFICANT CHANGE UP (ref 3.5–5.3)
RBC # BLD: 3.71 M/UL — LOW (ref 3.8–5.2)
RBC # FLD: 13.1 % — SIGNIFICANT CHANGE UP (ref 10.3–14.5)
SODIUM SERPL-SCNC: 138 MMOL/L — SIGNIFICANT CHANGE UP (ref 135–145)
SPECIMEN SOURCE: SIGNIFICANT CHANGE UP
WBC # BLD: 7.79 K/UL — SIGNIFICANT CHANGE UP (ref 3.8–10.5)
WBC # FLD AUTO: 7.79 K/UL — SIGNIFICANT CHANGE UP (ref 3.8–10.5)

## 2022-03-05 PROCEDURE — 76937 US GUIDE VASCULAR ACCESS: CPT | Mod: 26

## 2022-03-05 PROCEDURE — 99024 POSTOP FOLLOW-UP VISIT: CPT

## 2022-03-05 PROCEDURE — 99233 SBSQ HOSP IP/OBS HIGH 50: CPT

## 2022-03-05 PROCEDURE — 36000 PLACE NEEDLE IN VEIN: CPT

## 2022-03-05 RX ORDER — SODIUM CHLORIDE 9 MG/ML
2 INJECTION INTRAMUSCULAR; INTRAVENOUS; SUBCUTANEOUS EVERY 6 HOURS
Refills: 0 | Status: DISCONTINUED | OUTPATIENT
Start: 2022-03-05 | End: 2022-03-06

## 2022-03-05 RX ORDER — POTASSIUM CHLORIDE 20 MEQ
20 PACKET (EA) ORAL
Refills: 0 | Status: COMPLETED | OUTPATIENT
Start: 2022-03-05 | End: 2022-03-05

## 2022-03-05 RX ADMIN — Medication 100 MILLIGRAM(S): at 22:30

## 2022-03-05 RX ADMIN — Medication 100 MILLIGRAM(S): at 14:29

## 2022-03-05 RX ADMIN — SODIUM CHLORIDE 3 GRAM(S): 9 INJECTION INTRAMUSCULAR; INTRAVENOUS; SUBCUTANEOUS at 11:30

## 2022-03-05 RX ADMIN — SODIUM CHLORIDE 3 GRAM(S): 9 INJECTION INTRAMUSCULAR; INTRAVENOUS; SUBCUTANEOUS at 05:39

## 2022-03-05 RX ADMIN — SODIUM CHLORIDE 3 GRAM(S): 9 INJECTION INTRAMUSCULAR; INTRAVENOUS; SUBCUTANEOUS at 00:04

## 2022-03-05 RX ADMIN — Medication 650 MILLIGRAM(S): at 02:30

## 2022-03-05 RX ADMIN — Medication 5 MILLIGRAM(S): at 05:38

## 2022-03-05 RX ADMIN — Medication 100 MILLIGRAM(S): at 05:39

## 2022-03-05 RX ADMIN — Medication 100 MILLIGRAM(S): at 23:38

## 2022-03-05 RX ADMIN — POLYETHYLENE GLYCOL 3350 17 GRAM(S): 17 POWDER, FOR SOLUTION ORAL at 05:38

## 2022-03-05 RX ADMIN — SODIUM CHLORIDE 2 GRAM(S): 9 INJECTION INTRAMUSCULAR; INTRAVENOUS; SUBCUTANEOUS at 22:30

## 2022-03-05 RX ADMIN — Medication 650 MILLIGRAM(S): at 20:26

## 2022-03-05 RX ADMIN — Medication 20 MILLIEQUIVALENT(S): at 11:30

## 2022-03-05 RX ADMIN — Medication 650 MILLIGRAM(S): at 19:56

## 2022-03-05 RX ADMIN — SENNA PLUS 2 TABLET(S): 8.6 TABLET ORAL at 22:30

## 2022-03-05 RX ADMIN — Medication 1 TABLET(S): at 11:30

## 2022-03-05 RX ADMIN — LOSARTAN POTASSIUM 100 MILLIGRAM(S): 100 TABLET, FILM COATED ORAL at 05:38

## 2022-03-05 RX ADMIN — AMLODIPINE BESYLATE 10 MILLIGRAM(S): 2.5 TABLET ORAL at 05:39

## 2022-03-05 RX ADMIN — Medication 100 MILLIGRAM(S): at 05:38

## 2022-03-05 RX ADMIN — Medication 650 MILLIGRAM(S): at 01:55

## 2022-03-05 RX ADMIN — Medication 5 MILLIGRAM(S): at 18:09

## 2022-03-05 RX ADMIN — Medication 20 MILLIEQUIVALENT(S): at 14:30

## 2022-03-05 RX ADMIN — POLYETHYLENE GLYCOL 3350 17 GRAM(S): 17 POWDER, FOR SOLUTION ORAL at 18:08

## 2022-03-05 RX ADMIN — SODIUM CHLORIDE 3 GRAM(S): 9 INJECTION INTRAMUSCULAR; INTRAVENOUS; SUBCUTANEOUS at 18:08

## 2022-03-05 NOTE — PROCEDURE NOTE - PROCEDURE DATE TIME, MLM
18-Feb-2022 13:04
19-Feb-2022 15:48
21-Feb-2022 14:48
12-Feb-2022 21:26
19-Feb-2022 15:48
05-Mar-2022 20:10
21-Feb-2022 14:52
27-Feb-2022 16:30
18-Feb-2022 13:06
18-Feb-2022 21:45
12-Feb-2022 16:51
17-Feb-2022 16:00
18-Feb-2022 12:00
20-Feb-2022 23:03
18-Feb-2022 19:10
27-Feb-2022 21:15

## 2022-03-05 NOTE — PROCEDURE NOTE - NSPOSTCAREGUIDE_GEN_A_CORE
Verbal/written post procedure instructions were given to patient/caregiver
Verbal/written post procedure instructions were given to patient/caregiver/Instructed patient/caregiver to follow-up with primary care physician/Instructed patient/caregiver regarding signs and symptoms of infection/Keep the cast/splint/dressing clean and dry/Care for catheter as per unit/ICU protocols
Verbal/written post procedure instructions were given to patient/caregiver/Instructed patient/caregiver regarding signs and symptoms of infection/Keep the cast/splint/dressing clean and dry/Care for catheter as per unit/ICU protocols
Verbal/written post procedure instructions were given to patient/caregiver/Instructed patient/caregiver to follow-up with primary care physician/Instructed patient/caregiver regarding signs and symptoms of infection/Keep the cast/splint/dressing clean and dry/Care for catheter as per unit/ICU protocols
Verbal/written post procedure instructions were given to patient/caregiver/Instructed patient/caregiver regarding signs and symptoms of infection/Keep the cast/splint/dressing clean and dry/Care for catheter as per unit/ICU protocols

## 2022-03-05 NOTE — PROCEDURE NOTE - NSINDICATIONS_GEN_A_CORE
critical illness/venous access
fluid administration
feeding tube replacement
critical patient/monitoring purposes
Venous Access/other
emergency venous access
other
critical patient

## 2022-03-05 NOTE — PROGRESS NOTE ADULT - SUBJECTIVE AND OBJECTIVE BOX
HPI:  57 y/o female with PMHx of HTN, pre-DM presents transferred from [facility] for intracranial hemorrhage. As per [place] ED provider and son, patient called son around 7:30-8:00AM c/o severe headache and nausea. Son immediately rushed to her house and found her out of bed, moaning and covered in her own vomit. During transit, EMS reported SBP within 240s with intractable vomiting and given Zofran 8mg. Upon arrival to [place] ED, GCS 7, SBP within 180s, patient was given Decadron 10mg, Keppra 1g, started on a Cardene drip, Propofol, Mannitol 1mg/kg. 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 Patient was intubated for airway support and transferred to [facility] for further management. ICH2, NIHSS 8.  Denies use of anticoagulants/antiplatelets.  (2022 16:36)    OVERNIGHT EVENTS: TOSHA overnight, neuro stable    Hospital Course:  : transferred from [place]. R frontal EVD and R radial a-line placed. pend CTH/CTA. s/p 1L bolus for elevated lactate.   : S/p R frontal EVD placedment @[id number] draining well. O/n pt w/ episode of possible storming; tachy, HTN, agitated, temp 100.2F, given 2 versed and 50mcg fentanyl w/ improvement in symptoms Neuro exam stable. Pt remains intubated and sedated, AN L>R. Trend lactate and abg. Amlodipine 5mg QD started for SBP goal < 140, cardene dc'd.  EVD dropped  at 1pm. propanolol and fent standing added for neuro storming, propofol switched to precedex, with resultant hypotension. Propanolol dc'd, fent changed to prn and precedex off, 1 L bolus given, levo prn   : TOSHA overnight. Patient remains on propofol. EVD open at [id number]. ICPs WNL. Neuro exam stable. increased bowel regimen. started SQL. extubated on precedex. given 0.5 Ativan + Fentanyl pushes PRN for agitation. 500cc NS bolus.   2/15: BD#4.  On HFNC d/t desat to 88% on 70% non-rebreather. On 0.5 precedex   : BD5, TOSHA overnight, on 4L NC, s/p vomiting yesterday, TF being held, formal S/S pending, off precedex. EVD open @ 5. Started on 3% at 30, tmax 100.7   : BD# 6   tachycardic, PE protocol. cardene gtt. neuro unchanged. EVD @ 5cmH2O. 1g IV tylenol for 102.7 fever, duonebs standing to PRN. UA with reflux ordered. Hydralazine 25mg TID added, increased to 50q8. 500cc NS bolus x2. started on nystatin for thrush. started oxy 5q6 PRN for pain for tachycardia. repeat Na 146, decreased 3% @50, started salt tabs 2q6. FEES done. started minced and moist diet  : BD 7. 3% Increased 75 cc/hr  : BD8. TOSHA o/n neuro stable. 3%@75cc/hr. central line placed for vascular access  : BD9. tmax 101.3 o/n, neuro stable. 3%@100cc/hr. cardene gtt restarted. Ibuprofen x1 given for fever, ok per Dr. D'Amico. Vanc d/c'd per ID (MRSA neg), treating UTI not bacteremia, likely contaminant. Linezolid started for suspected thrombophlebitis and GS + coverage.   : BD#10 Overnight, IV tylenol given for headache. Right radial arterial line re-wired then discontinued. EVD clamped at 8AM. ICPs WNL. Neuro exam stable. Remains on 3% lowered to 75cc/hr. CTH stable, EVD raised to 20. Febrile 102 - CSF and blood cx sent. EVD lowered from 20 to 5 to drain more CSF and blood, increased lethargy. Ceftriaxone changed to cefepime per ID for broader coverage.   : BD#11 TOSHA o/n. neuro at baseline. EVD at [id number], 3% kept at 25cc/hr   : BD#12 TOSHA overnight. Remain on 3% saline. EVD [id number]. ICPs WNL. Neuro exam stable.  : BD#13 TOSHA overnight, Remains on 3% saline. EVD raised to 20, gallium scan done with increased uptake in gluteals but normal physical exam  : BD 14. TOSHA overnight. Remains on hypertonics. EVD clamped at 20 yesterday evening, possible shunt in the afternoon. Patient lethargic with worsening headache. EVD reopened at 10, Linezolid and cefepime d/c'ed   : BD 15. increasing lethargic overnight, CTH stable and reviewed with Dr. D'Amico. Remains on a course of hypertonics. EVD reopened at 10 yesterday morning due to patient experiencing increased lethargy and headaches. EEG for fluctuating mental status - left hemisphere slowing, no seizures. 3% d/c'ed remains on salt tabs   : BD 16. TOSHA overnight. vEEG placed yesterday for waxing/waning exam. EVD remains open at 10. plan is likely to shunt next week. Remains with intermittent periods of lethargy. EEG negative for seizure, d/c'ed. Restarted on 3% @25. LIJ noticed to be dislodged from proper position, notified RN toremove, hemostasis achieved with no hematoma formation noted. Patient with clear breath sounds and no respiratory distress. Additional peripheral IV.   : BD 17, Repeat CTH this am stable, EVD clamped at 10:30AM, remains on 3% at 25.  3/1: BD 18, EVD clamped overnight, neuro remained stable.   3/2: BD 19, POD0 s/p VPS. LE dopplers stable. CTH stable. stepdown. dilaudid and fentanyl needed for severe h/a. Given dilaudid 0.25 mg @ 10 PM.   3/3: BD 20. POD 1 R VPS placement (Certas @ 5). TOSHA o/n. stepdown status. florinef weaned  3/4: BD 21, POD2 R VPS placement (Certas @ 5). Febrile overnight to 103, pancultured. UE dopplers +new L IJ DVT. started on heparin gtt. florienf d/c.   3/4: BD 22, POD3 R VPS (Certas @ 5). On heparin gtt @ 14 for L IJ DVT    Vital Signs Last 24 Hrs  T(C): 37 (04 Mar 2022 22:06), Max: 38.3 (04 Mar 2022 15:55)  T(F): 98.6 (04 Mar 2022 22:06), Max: 100.9 (04 Mar 2022 15:55)  HR: 82 (05 Mar 2022 00:09) (80 - 98)  BP: 140/67 (05 Mar 2022 00:09) (123/64 - 166/72)  BP(mean): 96 (05 Mar 2022 00:09) (86 - 105)  RR: 18 (05 Mar 2022 00:09) (16 - 18)  SpO2: 97% (05 Mar 2022 00:09) (93% - 97%)    I&O's Summary    03 Mar 2022 07:01  -  04 Mar 2022 07:00  --------------------------------------------------------  IN: 500 mL / OUT: 2050 mL / NET: -1550 mL    04 Mar 2022 07:01  -  05 Mar 2022 00:25  --------------------------------------------------------  IN: 620 mL / OUT: 2000 mL / NET: -1380 mL        PHYSICAL EXAM:  General: NAD, pt is comfortably sitting up in bed, on room air  HEENT: CN II-XII grossly intact, PERRL 3mm briskly reactive, EOMI b/l, face symmetric, tongue midline, neck FROM  Cardiovascular: RRR, normal S1 and S2   Respiratory: lungs CTAB, no wheezing, rhonchi, or crackles   GI: normoactive BS to auscultation, abd soft, NTND   Neuro: A&Ox2, No aphasia, speech clear, no dysmetria, Follows commands.  AN x4 spontaneously, RU extremity 4+/5, RU extremity pronator drift otherwise, 5/5 strength in all extremities throughout. SILT throughout   Extremities: distal pulses 2+ x4      TUBES/LINES:  [] Haley  [] Lumbar Drain  [] Wound Drains  [] Others      DIET:  [] NPO  [x] Mechanical  [] Tube feeds    LABS:                        11.1   7.83  )-----------( 436      ( 04 Mar 2022 21:51 )             34.5     03-04    138  |  103  |  6<L>  ----------------------------<  177<H>  3.6   |  23  |  0.36<L>    Ca    9.3      04 Mar 2022 20:29  Phos  3.2     03-04  Mg     2.2     03-04      PTT - ( 04 Mar 2022 21:51 )  PTT:60.9 sec  Urinalysis Basic - ( 03 Mar 2022 21:39 )    Color: Yellow / Appearance: Clear / S.020 / pH: x  Gluc: x / Ketone: NEGATIVE  / Bili: Negative / Urobili: 0.2 E.U./dL   Blood: x / Protein: NEGATIVE mg/dL / Nitrite: NEGATIVE   Leuk Esterase: Trace / RBC: < 5 /HPF / WBC < 5 /HPF   Sq Epi: x / Non Sq Epi: 0-5 /HPF / Bacteria: Present /HPF          CAPILLARY BLOOD GLUCOSE          Drug Levels: [] N/A    CSF Analysis: [] N/A      Allergies    No Known Allergies    Intolerances      MEDICATIONS:  Antibiotics:    Neuro:  acetaminophen     Tablet .. 650 milliGRAM(s) Oral every 6 hours PRN  ibuprofen  Tablet. 400 milliGRAM(s) Oral every 8 hours PRN  oxyCODONE    IR 5 milliGRAM(s) Oral every 4 hours PRN    Anticoagulation:  heparin  Infusion.  Unit(s)/Hr IV Continuous <Continuous>    OTHER:  albuterol/ipratropium for Nebulization 3 milliLiter(s) Nebulizer every 6 hours PRN  amLODIPine   Tablet 10 milliGRAM(s) Oral daily  bisacodyl 5 milliGRAM(s) Oral every 12 hours  hydrALAZINE 100 milliGRAM(s) Oral every 8 hours  influenza   Vaccine 0.5 milliLiter(s) IntraMuscular once  labetalol 100 milliGRAM(s) Oral every 8 hours  lactobacillus acidophilus 1 Tablet(s) Oral daily  losartan 100 milliGRAM(s) Oral daily  polyethylene glycol 3350 17 Gram(s) Oral every 12 hours  povidone iodine 5% Nasal Swab 1 Application(s) Both Nostrils once  saline laxative (FLEET) Rectal Enema 1 Enema Rectal once  senna 2 Tablet(s) Oral at bedtime    IVF:  sodium chloride 3 Gram(s) Oral every 6 hours    CULTURES:  Culture Results:   No growth at 1 day. ( @ 22:02)  Culture Results:   No growth at 1 day. ( @ 22:02)    RADIOLOGY & ADDITIONAL TESTS:      ASSESSMENT:Patient 57 y/o female with PMHx of HTN, pre-DM w/ left thalamic parenchymal hematoma with extensive intraventricular hemorrhage. ICH score 2. NIHSS 18. s/p R frontal large bore EVD placement 22. Now s/p VPS Certas @5 3/2/22.     PLAN:   NEURO:  - neuro/vital q 4   - psotop CTH 3/2, stable, lethargic s/p fever 3/4 CTH stable  - needs repeat CTH when therapeutic on AC  - pain control PRN  - vEEG for fluctuating mental status - left hemisphere slowing, no seizures. (-)    Cardio:   - SBP goal 100-160   - cont. amlodipine 10mg QD, losartan 100mg, Hydralazine 100 mg TID, labetalol 100 q8h   - echo : LVH, EF: 65-70%     PULM:  - duonebs PRN   - IS     Renal:   - Normal sodium goal   - salt tabs 3 q6, wean as tolerated, florinef d/dario 3/4    GI:  - regular diet w/ ensure   - bowel regimen, last BM      HEME:  - SCDs only L leg/SQL   - LE dopplers : R IM calf thrombus, repeat dopplers  showing persistence but no propogation, repeat 3/2 stable   - UE dopp : superficial clots b/l cephalic, wam compresses and elevation, repeat showing L IJ DVT- started on heparin gtt PTT goal 58-99    ENDO:  - prediabetic, a1c 5.8, glucose under control, no insulin req   - TSH wnl     ID:  - blood cx  +staph epidermidis  - urine cx  +citrobacter koseri and klebsiella  - CSF cx  - pending. NGTD  - ID following s/p: vanco [-], ceftriaxone for UTI [-], cefepime [- . linezolid for phlebilitis [- ]   - Gallium scan with increased uptake in gluteal region, no cellulitis on physicial exam, probably d/t positioning   -febrile 3/3, pancultured    PSYCH:   - pt takes xanax at home    Dispo:   - SDU status/ PT/OT   - full code  - family updated with plan    PT recs: AR    D/w Dr. D'Amico

## 2022-03-05 NOTE — PROCEDURE NOTE - NSTOLERANCE_GEN_A_CORE
Patient tolerated procedure well.

## 2022-03-05 NOTE — PROCEDURE NOTE - NSCOMPLICATION_GEN_A_CORE
no complications

## 2022-03-05 NOTE — PROGRESS NOTE ADULT - SUBJECTIVE AND OBJECTIVE BOX
O/N Events: TOSHA  Subjective/ROS: No complaints. Denies HA, CP, SOB, n/v, changes in bowel/urinary habits.  12pt ROS otherwise negative.    VITALS  Vital Signs Last 24 Hrs  T(C): 37.1 (05 Mar 2022 09:05), Max: 38.3 (04 Mar 2022 15:55)  T(F): 98.7 (05 Mar 2022 09:05), Max: 100.9 (04 Mar 2022 15:55)  HR: 92 (05 Mar 2022 08:27) (80 - 98)  BP: 124/66 (05 Mar 2022 08:27) (123/64 - 166/72)  BP(mean): 89 (05 Mar 2022 08:27) (86 - 104)  RR: 18 (05 Mar 2022 08:27) (16 - 18)  SpO2: 96% (05 Mar 2022 08:27) (95% - 97%)    CAPILLARY BLOOD GLUCOSE      PHYSICAL EXAM  General: A&Ox3; NAD  Head: NC/AT; PERRL; EOMI; anicteric sclera  Neck: Supple; no JVD  Respiratory: CTA B/L; no wheezes/crackles/rales auscultated w/ good air movement  Cardiovascular: Regular rhythm/rate; S1/S2; no gallops or murmurs auscultated  Gastrointestinal: Soft; NTND w/out rebound tenderness or guarding; bowel sounds normal  Extremities: WWP; no edema or cyanosis; radial/pedal pulses palpable  Neurological:  CNII-XII grossly intact; Right sided weakness, slight r pronator drift  Vasc: +2 b/l radial   Skin: No rashes, crani wound dressing c/d/i, abdominal wound c/d/i   Psych: Appropriate affect    MEDICATIONS  (STANDING):  amLODIPine   Tablet 10 milliGRAM(s) Oral daily  bisacodyl 5 milliGRAM(s) Oral every 12 hours  heparin  Infusion.  Unit(s)/Hr (14 mL/Hr) IV Continuous <Continuous>  hydrALAZINE 100 milliGRAM(s) Oral every 8 hours  influenza   Vaccine 0.5 milliLiter(s) IntraMuscular once  labetalol 100 milliGRAM(s) Oral every 8 hours  lactobacillus acidophilus 1 Tablet(s) Oral daily  losartan 100 milliGRAM(s) Oral daily  polyethylene glycol 3350 17 Gram(s) Oral every 12 hours  potassium chloride    Tablet ER 20 milliEquivalent(s) Oral every 2 hours  povidone iodine 5% Nasal Swab 1 Application(s) Both Nostrils once  saline laxative (FLEET) Rectal Enema 1 Enema Rectal once  senna 2 Tablet(s) Oral at bedtime  sodium chloride 3 Gram(s) Oral every 6 hours    MEDICATIONS  (PRN):  acetaminophen     Tablet .. 650 milliGRAM(s) Oral every 6 hours PRN Temp greater or equal to 38C (100.4F), Mild Pain (1 - 3)  albuterol/ipratropium for Nebulization 3 milliLiter(s) Nebulizer every 6 hours PRN Shortness of Breath and/or Wheezing  ibuprofen  Tablet. 400 milliGRAM(s) Oral every 8 hours PRN Temp greater or equal to 38C (100.4F), Moderate Pain (4 - 6)  oxyCODONE    IR 5 milliGRAM(s) Oral every 4 hours PRN Moderate Pain (4 - 6)      No Known Allergies      LABS                        11.0   7.79  )-----------( 406      ( 05 Mar 2022 06:39 )             35.3     03-05    138  |  106  |  8   ----------------------------<  138<H>  3.7   |  21<L>  |  0.32<L>    Ca    9.3      05 Mar 2022 06:38  Phos  3.2     03-05  Mg     2.2     03-05      PTT - ( 05 Mar 2022 06:39 )  PTT:78.7 sec  Urinalysis Basic - ( 03 Mar 2022 21:39 )    Color: Yellow / Appearance: Clear / S.020 / pH: x  Gluc: x / Ketone: NEGATIVE  / Bili: Negative / Urobili: 0.2 E.U./dL   Blood: x / Protein: NEGATIVE mg/dL / Nitrite: NEGATIVE   Leuk Esterase: Trace / RBC: < 5 /HPF / WBC < 5 /HPF   Sq Epi: x / Non Sq Epi: 0-5 /HPF / Bacteria: Present /HPF            IMAGING/EKG/ETC  EKG:  Xray:  CT:  MRI:

## 2022-03-05 NOTE — PROCEDURE NOTE - NSPROCNAME_GEN_A_CORE
Arterial Puncture/Cannulation
General
Peripheral Line Insertion
General
Central Line Insertion
Peripheral Line Insertion
General
Peripheral Line Insertion
Feeding Tube Replacement
General
Peripheral Line Insertion
Arterial Puncture/Cannulation
External Ventricular Drain Insertion

## 2022-03-05 NOTE — PROVIDER CONTACT NOTE (OTHER) - ASSESSMENT
pt R 20G IV leaking; dressing changed and saturated within the hour; RN D/C'd IV access at this time; RN attempts to gain IV access but unable.

## 2022-03-05 NOTE — PROVIDER CONTACT NOTE (OTHER) - ACTION/TREATMENT ORDERED:
provider assessed at bedside and attempted access but was unable; RN to call clinical impact RN to assess at bedside provider assessed at bedside and attempted access but was unable; RN to call clinical impact RN to assess at bedside for IV access.

## 2022-03-05 NOTE — PROCEDURE NOTE - NSANESTHESIA_GEN_A_CORE
1% lidocaine
no anesthesia administered
1% lidocaine
no anesthesia administered
no anesthesia administered
1% lidocaine
no anesthesia administered

## 2022-03-05 NOTE — PROCEDURE NOTE - NSSITEPREP_SKIN_A_CORE
chlorhexidine
chlorhexidine/Adherence to aseptic technique: hand hygiene prior to donning barriers (gown, gloves), don cap and mask, sterile drape over patient
chlorhexidine
chlorhexidine/Adherence to aseptic technique: hand hygiene prior to donning barriers (gown, gloves), don cap and mask, sterile drape over patient

## 2022-03-05 NOTE — PROCEDURE NOTE - NSICDXPROCEDURE_GEN_ALL_CORE_FT
PROCEDURES:  Insertion, needle, vein 05-Mar-2022 20:11:03  Eufemia Saldivar  US guided vascular access 05-Mar-2022 20:11:08  Eufemia Saldivar  
PROCEDURES:  Insertion of intravenous catheter with ultrasound guidance 18-Feb-2022 19:46:25  Sharron Charlton  
PROCEDURES:  Insertion, catheter, intravenous 27-Feb-2022 22:55:23  Vesna Mccauley  Ultrasound guidance for vascular access 27-Feb-2022 22:55:38  Vesna Mccauley  
PROCEDURES:  Insertion of intravenous catheter with ultrasound guidance 19-Feb-2022 00:13:40  Justyn Ruby

## 2022-03-05 NOTE — PROCEDURE NOTE - NSAPPROACH_GEN_A_CORE
percutaneous
peripheral
percutaneous
peripheral
percutaneous
percutaneous
via natural/artificial opening
percutaneous

## 2022-03-05 NOTE — PROCEDURE NOTE - NSINFORMCONSENT_GEN_A_CORE
Benefits, risks, and possible complications of procedure explained to patient/caregiver who verbalized understanding and gave verbal consent.
Benefits, risks, and possible complications of procedure explained to patient/caregiver who verbalized understanding and gave written consent.
Benefits, risks, and possible complications of procedure explained to patient/caregiver who verbalized understanding and gave verbal consent.
consented by son, in chart/Benefits, risks, and possible complications of procedure explained to patient/caregiver who verbalized understanding and gave written consent.
Benefits, risks, and possible complications of procedure explained to patient/caregiver who verbalized understanding and gave verbal consent.
Benefits, risks, and possible complications of procedure explained to patient/caregiver who verbalized understanding and gave verbal consent.
This was an emergent procedure.

## 2022-03-05 NOTE — PROCEDURE NOTE - NSANATOMICLLOCATION_GEN_A_CORE
left/upper arm
right/forearm
left
right/radial artery
left/forearm
Left hand/left
left/upper arm
left
left/upper arm

## 2022-03-06 LAB
ANION GAP SERPL CALC-SCNC: 10 MMOL/L — SIGNIFICANT CHANGE UP (ref 5–17)
APTT BLD: 34.9 SEC — SIGNIFICANT CHANGE UP (ref 27.5–35.5)
APTT BLD: 38.9 SEC — HIGH (ref 27.5–35.5)
APTT BLD: 59.3 SEC — HIGH (ref 27.5–35.5)
APTT BLD: 67.4 SEC — HIGH (ref 27.5–35.5)
BUN SERPL-MCNC: 9 MG/DL — SIGNIFICANT CHANGE UP (ref 7–23)
CALCIUM SERPL-MCNC: 9.4 MG/DL — SIGNIFICANT CHANGE UP (ref 8.4–10.5)
CHLORIDE SERPL-SCNC: 104 MMOL/L — SIGNIFICANT CHANGE UP (ref 96–108)
CO2 SERPL-SCNC: 23 MMOL/L — SIGNIFICANT CHANGE UP (ref 22–31)
CREAT SERPL-MCNC: 0.34 MG/DL — LOW (ref 0.5–1.3)
EGFR: 120 ML/MIN/1.73M2 — SIGNIFICANT CHANGE UP
GLUCOSE SERPL-MCNC: 147 MG/DL — HIGH (ref 70–99)
HCT VFR BLD CALC: 35 % — SIGNIFICANT CHANGE UP (ref 34.5–45)
HGB BLD-MCNC: 11.1 G/DL — LOW (ref 11.5–15.5)
MAGNESIUM SERPL-MCNC: 2 MG/DL — SIGNIFICANT CHANGE UP (ref 1.6–2.6)
MCHC RBC-ENTMCNC: 30.2 PG — SIGNIFICANT CHANGE UP (ref 27–34)
MCHC RBC-ENTMCNC: 31.7 GM/DL — LOW (ref 32–36)
MCV RBC AUTO: 95.4 FL — SIGNIFICANT CHANGE UP (ref 80–100)
NRBC # BLD: 0 /100 WBCS — SIGNIFICANT CHANGE UP (ref 0–0)
PHOSPHATE SERPL-MCNC: 3.4 MG/DL — SIGNIFICANT CHANGE UP (ref 2.5–4.5)
PLATELET # BLD AUTO: 389 K/UL — SIGNIFICANT CHANGE UP (ref 150–400)
POTASSIUM SERPL-MCNC: 4 MMOL/L — SIGNIFICANT CHANGE UP (ref 3.5–5.3)
POTASSIUM SERPL-SCNC: 4 MMOL/L — SIGNIFICANT CHANGE UP (ref 3.5–5.3)
RBC # BLD: 3.67 M/UL — LOW (ref 3.8–5.2)
RBC # FLD: 13 % — SIGNIFICANT CHANGE UP (ref 10.3–14.5)
SODIUM SERPL-SCNC: 137 MMOL/L — SIGNIFICANT CHANGE UP (ref 135–145)
WBC # BLD: 6.94 K/UL — SIGNIFICANT CHANGE UP (ref 3.8–10.5)
WBC # FLD AUTO: 6.94 K/UL — SIGNIFICANT CHANGE UP (ref 3.8–10.5)

## 2022-03-06 PROCEDURE — 99024 POSTOP FOLLOW-UP VISIT: CPT

## 2022-03-06 PROCEDURE — 99233 SBSQ HOSP IP/OBS HIGH 50: CPT

## 2022-03-06 RX ORDER — HEPARIN SODIUM 5000 [USP'U]/ML
2000 INJECTION INTRAVENOUS; SUBCUTANEOUS
Qty: 25000 | Refills: 0 | Status: DISCONTINUED | OUTPATIENT
Start: 2022-03-06 | End: 2022-03-07

## 2022-03-06 RX ORDER — HEPARIN SODIUM 5000 [USP'U]/ML
1700 INJECTION INTRAVENOUS; SUBCUTANEOUS
Qty: 25000 | Refills: 0 | Status: DISCONTINUED | OUTPATIENT
Start: 2022-03-06 | End: 2022-03-06

## 2022-03-06 RX ADMIN — POLYETHYLENE GLYCOL 3350 17 GRAM(S): 17 POWDER, FOR SOLUTION ORAL at 05:51

## 2022-03-06 RX ADMIN — LOSARTAN POTASSIUM 100 MILLIGRAM(S): 100 TABLET, FILM COATED ORAL at 05:51

## 2022-03-06 RX ADMIN — Medication 1 TABLET(S): at 11:51

## 2022-03-06 RX ADMIN — Medication 650 MILLIGRAM(S): at 14:01

## 2022-03-06 RX ADMIN — SODIUM CHLORIDE 2 GRAM(S): 9 INJECTION INTRAMUSCULAR; INTRAVENOUS; SUBCUTANEOUS at 05:51

## 2022-03-06 RX ADMIN — OXYCODONE HYDROCHLORIDE 5 MILLIGRAM(S): 5 TABLET ORAL at 21:32

## 2022-03-06 RX ADMIN — POLYETHYLENE GLYCOL 3350 17 GRAM(S): 17 POWDER, FOR SOLUTION ORAL at 18:07

## 2022-03-06 RX ADMIN — SENNA PLUS 2 TABLET(S): 8.6 TABLET ORAL at 21:08

## 2022-03-06 RX ADMIN — Medication 100 MILLIGRAM(S): at 14:01

## 2022-03-06 RX ADMIN — SODIUM CHLORIDE 2 GRAM(S): 9 INJECTION INTRAMUSCULAR; INTRAVENOUS; SUBCUTANEOUS at 18:06

## 2022-03-06 RX ADMIN — HEPARIN SODIUM 20 UNIT(S)/HR: 5000 INJECTION INTRAVENOUS; SUBCUTANEOUS at 21:47

## 2022-03-06 RX ADMIN — Medication 100 MILLIGRAM(S): at 05:53

## 2022-03-06 RX ADMIN — OXYCODONE HYDROCHLORIDE 5 MILLIGRAM(S): 5 TABLET ORAL at 21:08

## 2022-03-06 RX ADMIN — Medication 100 MILLIGRAM(S): at 05:51

## 2022-03-06 RX ADMIN — Medication 100 MILLIGRAM(S): at 21:09

## 2022-03-06 RX ADMIN — AMLODIPINE BESYLATE 10 MILLIGRAM(S): 2.5 TABLET ORAL at 05:51

## 2022-03-06 RX ADMIN — SODIUM CHLORIDE 2 GRAM(S): 9 INJECTION INTRAMUSCULAR; INTRAVENOUS; SUBCUTANEOUS at 11:51

## 2022-03-06 RX ADMIN — Medication 650 MILLIGRAM(S): at 15:30

## 2022-03-06 RX ADMIN — Medication 5 MILLIGRAM(S): at 05:52

## 2022-03-06 RX ADMIN — Medication 100 MILLIGRAM(S): at 21:08

## 2022-03-06 RX ADMIN — Medication 5 MILLIGRAM(S): at 18:06

## 2022-03-06 NOTE — PROVIDER CONTACT NOTE (OTHER) - REASON
Pt agitated and restless
Pt c/o of generalized pain. HR sustained in the 120s. SBP>140mmHg. Cardene at 12.5 mg
Central line dislodgement
ICP sustained 20-25
Pt agitation
Pt somnolent and unable to remain awake long enough to take medications.
Unequal pupils
pt c/o SOB
temp of 103.1 Rectal.
Pt remains with HR sustained in the 120s. SBP>140mmHg. Cardene at 12.5 mg. Despite tramadol 25mg.
pt lost IV access
pt ptt 38.9

## 2022-03-06 NOTE — PROGRESS NOTE ADULT - SUBJECTIVE AND OBJECTIVE BOX
O/N Events: TOSHA  Subjective/ROS: No complaints. Denies HA, CP, SOB, n/v, changes in bowel/urinary habits.  12pt ROS otherwise negative.    VITALS  Vital Signs Last 24 Hrs  T(C): 36.5 (06 Mar 2022 09:31), Max: 37.1 (05 Mar 2022 21:39)  T(F): 97.7 (06 Mar 2022 09:31), Max: 98.8 (05 Mar 2022 21:39)  HR: 82 (06 Mar 2022 08:22) (82 - 92)  BP: 131/73 (06 Mar 2022 08:22) (124/60 - 141/72)  BP(mean): 95 (06 Mar 2022 08:22) (87 - 99)  RR: 18 (06 Mar 2022 08:22) (18 - 18)  SpO2: 95% (06 Mar 2022 08:22) (93% - 96%)    CAPILLARY BLOOD GLUCOSE    PHYSICAL EXAM  General: A&Ox3; NAD  Head: NC/AT; PERRL; EOMI; anicteric sclera  Neck: Supple; no JVD  Respiratory: CTA B/L; no wheezes/crackles/rales auscultated w/ good air movement  Cardiovascular: Regular rhythm/rate; S1/S2; no gallops or murmurs auscultated  Gastrointestinal: Soft; NTND w/out rebound tenderness or guarding; bowel sounds normal  Extremities: WWP; no edema or cyanosis; radial/pedal pulses palpable  Neurological:  CNII-XII grossly intact; Right sided weakness, slight r pronator drift  Vasc: +2 b/l radial   Skin: No rashes, crani wound dressing c/d/i, abdominal wound c/d/i   Psych: Appropriate affect    MEDICATIONS  (STANDING):  amLODIPine   Tablet 10 milliGRAM(s) Oral daily  bisacodyl 5 milliGRAM(s) Oral every 12 hours  heparin  Infusion.  Unit(s)/Hr (14 mL/Hr) IV Continuous <Continuous>  hydrALAZINE 100 milliGRAM(s) Oral every 8 hours  influenza   Vaccine 0.5 milliLiter(s) IntraMuscular once  labetalol 100 milliGRAM(s) Oral every 8 hours  lactobacillus acidophilus 1 Tablet(s) Oral daily  losartan 100 milliGRAM(s) Oral daily  polyethylene glycol 3350 17 Gram(s) Oral every 12 hours  povidone iodine 5% Nasal Swab 1 Application(s) Both Nostrils once  saline laxative (FLEET) Rectal Enema 1 Enema Rectal once  senna 2 Tablet(s) Oral at bedtime  sodium chloride 2 Gram(s) Oral every 6 hours    MEDICATIONS  (PRN):  acetaminophen     Tablet .. 650 milliGRAM(s) Oral every 6 hours PRN Temp greater or equal to 38C (100.4F), Mild Pain (1 - 3)  albuterol/ipratropium for Nebulization 3 milliLiter(s) Nebulizer every 6 hours PRN Shortness of Breath and/or Wheezing  ibuprofen  Tablet. 400 milliGRAM(s) Oral every 8 hours PRN Temp greater or equal to 38C (100.4F), Moderate Pain (4 - 6)  oxyCODONE    IR 5 milliGRAM(s) Oral every 4 hours PRN Moderate Pain (4 - 6)      No Known Allergies      LABS                        11.1   6.94  )-----------( 389      ( 06 Mar 2022 06:45 )             35.0     03-06    137  |  104  |  9   ----------------------------<  147<H>  4.0   |  23  |  0.34<L>    Ca    9.4      06 Mar 2022 06:45  Phos  3.4     03-06  Mg     2.0     03-06      PTT - ( 06 Mar 2022 06:45 )  PTT:59.3 sec          IMAGING/EKG/ETC reivewed

## 2022-03-06 NOTE — PROVIDER CONTACT NOTE (OTHER) - ASSESSMENT
pt ptt 38.9; currently on heparin drip at 14cc/hr pt ptt 38.9; currently on heparin drip at 14cc/hr; therapeutic ptt range desired is 58-99; order to notify provider for ptt under 40.

## 2022-03-06 NOTE — PROVIDER CONTACT NOTE (OTHER) - ACTION/TREATMENT ORDERED:
provider to order adjustment in heparin drip rate from 14cc/hr to 17cc/hr; RN to adjust as per order and continue with care plan as ordered.

## 2022-03-06 NOTE — PROVIDER CONTACT NOTE (OTHER) - BACKGROUND
pt s/p  shunt placement on 3/2 pt s/p  shunt placement on 3/2; pt has RLE DVT as well as L int jugular clot and L and R forearm clots; pt on heparin drip at 14cc/hr.

## 2022-03-06 NOTE — PROGRESS NOTE ADULT - ASSESSMENT
Patient 55 y/o female with PMHx of HTN, pre-DM w/ left thalamic parenchymal hematoma with extensive intraventricular hemorrhage. ICH score 2. NIHSS 18. s/p R frontal large bore EVD placement 2/12/22. Now s/p VPS Certas @5 3/2/22.

## 2022-03-06 NOTE — PROVIDER CONTACT NOTE (OTHER) - SITUATION
Pt remains restless with HR sustained in the 120s. SBP>140mmHg. Cardene at 12.5 mg. Despite tramadol 25mg.
PT HR 120s, arterial BP 260s, temperature 100.2 axillary, thrashing about, non-responsive to maximum amount of propofol
pt ptt 38.9
IV tubing disconnected from central line. Transferred pt from chair to bed with two person assist. Reassessed central line, found partially dislodged.
Pt agitated and restless while on precedex gtt
Pt somnolent and unable to remain awake long enough to take medications.
Unequal pupils on exam
pt lost IV access
Pt c/o of generalized pain. HR sustained in the 120s. SBP>140mmHg. Cardene at 12.5 mg
temp of 103.1 F Rectal.

## 2022-03-06 NOTE — PROGRESS NOTE ADULT - SUBJECTIVE AND OBJECTIVE BOX
HPI:  57 y/o female with PMHx of HTN, pre-DM presents transferred from [facility] for intracranial hemorrhage. As per [facility] ED provider and son, patient called son around 7:30-8:00AM c/o severe headache and nausea. Son immediately rushed to her house and found her out of bed, moaning and covered in her own vomit. During transit, EMS reported SBP within 240s with intractable vomiting and given Zofran 8mg. Upon arrival to [facility] ED, GCS 7, SBP within 180s, patient was given Decadron 10mg, Keppra 1g, started on a Cardene drip, Propofol, Mannitol 1mg/kg. 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 Patient was intubated for airway support and transferred to [facility] for further management. ICH2, NIHSS 8.  Denies use of anticoagulants/antiplatelets.  (12 Feb 2022 16:36)    OVERNIGHT EVENTS: TOSHA overnight, neuro stable    Hospital Course:  2/12: transferred from [facility]. R frontal EVD and R radial a-line placed. pend CTH/CTA. s/p 1L bolus for elevated lactate.   2/13: S/p R frontal EVD placedment @60dtY6F draining well. O/n pt w/ episode of possible storming; tachy, HTN, agitated, temp 100.2F, given 2 versed and 50mcg fentanyl w/ improvement in symptoms Neuro exam stable. Pt remains intubated and sedated, AN L>R. Trend lactate and abg. Amlodipine 5mg QD started for SBP goal < 140, cardene dc'd.  EVD dropped 2/13 at 1pm. propanolol and fent standing added for neuro storming, propofol switched to precedex, with resultant hypotension. Propanolol dc'd, fent changed to prn and precedex off, 1 L bolus given, levo prn   2/14: TOSHA overnight. Patient remains on propofol. EVD open at 66bnT1A. ICPs WNL. Neuro exam stable. increased bowel regimen. started SQL. extubated on precedex. given 0.5 Ativan + Fentanyl pushes PRN for agitation. 500cc NS bolus.   2/15: BD#4.  On HFNC d/t desat to 88% on 70% non-rebreather. On 0.5 precedex   2/16: BD5, TOSHA overnight, on 4L NC, s/p vomiting yesterday, TF being held, formal S/S pending, off precedex. EVD open @ 5. Started on 3% at 30, tmax 100.7   2/17: BD# 6   tachycardic, PE protocol. cardene gtt. neuro unchanged. EVD @ 5cmH2O. 1g IV tylenol for 102.7 fever, duonebs standing to PRN. UA with reflux ordered. Hydralazine 25mg TID added, increased to 50q8. 500cc NS bolus x2. started on nystatin for thrush. started oxy 5q6 PRN for pain for tachycardia. repeat Na 146, decreased 3% @50, started salt tabs 2q6. FEES done. started minced and moist diet  2/18: BD 7. 3% Increased 75 cc/hr  2/19: BD8. TOSHA o/n neuro stable. 3%@75cc/hr. central line placed for vascular access  2/20: BD9. tmax 101.3 o/n, neuro stable. 3%@100cc/hr. cardene gtt restarted. Ibuprofen x1 given for fever, ok per Dr. D'Amico. Vanc d/c'd per ID (MRSA neg), treating UTI not bacteremia, likely contaminant. Linezolid started for suspected thrombophlebitis and GS + coverage.   2/21: BD#10 Overnight, IV tylenol given for headache. Right radial arterial line re-wired then discontinued. EVD clamped at 8AM. ICPs WNL. Neuro exam stable. Remains on 3% lowered to 75cc/hr. CTH stable, EVD raised to 20. Febrile 102 - CSF and blood cx sent. EVD lowered from 20 to 5 to drain more CSF and blood, increased lethargy. Ceftriaxone changed to cefepime per ID for broader coverage.   2/22: BD#11 TOSHA o/n. neuro at baseline. EVD at 50zrY3N, 3% kept at 25cc/hr   2/23: BD#12 TOSHA overnight. Remain on 3% saline. EVD [id number]. ICPs WNL. Neuro exam stable.  2/24: BD#13 TOSHA overnight, Remains on 3% saline. EVD raised to 20, gallium scan done with increased uptake in gluteals but normal physical exam  2/25: BD 14. TOSHA overnight. Remains on hypertonics. EVD clamped at 20 yesterday evening, possible shunt in the afternoon. Patient lethargic with worsening headache. EVD reopened at 10, Linezolid and cefepime d/c'ed   2/26: BD 15. increasing lethargic overnight, CTH stable and reviewed with Dr. D'Amico. Remains on a course of hypertonics. EVD reopened at 10 yesterday morning due to patient experiencing increased lethargy and headaches. EEG for fluctuating mental status - left hemisphere slowing, no seizures. 3% d/c'ed remains on salt tabs   2/27: BD 16. TOSHA overnight. vEEG placed yesterday for waxing/waning exam. EVD remains open at 10. plan is likely to shunt next week. Remains with intermittent periods of lethargy. EEG negative for seizure, d/c'ed. Restarted on 3% @25. LIJ noticed to be dislodged from proper position, notified RN toremove, hemostasis achieved with no hematoma formation noted. Patient with clear breath sounds and no respiratory distress. Additional peripheral IV.   2/28: BD 17, Repeat CTH this am stable, EVD clamped at 10:30AM, remains on 3% at 25.  3/1: BD 18, EVD clamped overnight, neuro remained stable.   3/2: BD 19, POD0 s/p VPS. LE dopplers stable. CTH stable. stepdown. dilaudid and fentanyl needed for severe h/a. Given dilaudid 0.25 mg @ 10 PM.   3/3: BD 20. POD 1 R VPS placement (Certas @ 5). TOSHA o/n. stepdown status. florinef weaned  3/4: BD 21, POD2 R VPS placement (Certas @ 5). Febrile overnight to 103, pancultured. UE dopplers +new L IJ DVT. started on heparin gtt. florienf d/c.   3/5: BD 22, POD3 R VPS (Certas @ 5). On heparin gtt @ 14 for L IJ DVT  3/6: BD 23, POD4 R VPS (Certas @5), On heparin gtt @ 14    Vital Signs Last 24 Hrs  T(C): 37.1 (05 Mar 2022 21:39), Max: 37.2 (05 Mar 2022 04:35)  T(F): 98.8 (05 Mar 2022 21:39), Max: 99 (05 Mar 2022 04:35)  HR: 92 (05 Mar 2022 23:45) (80 - 92)  BP: 130/63 (05 Mar 2022 23:45) (124/66 - 141/72)  BP(mean): 90 (05 Mar 2022 23:45) (89 - 99)  RR: 18 (05 Mar 2022 23:45) (18 - 18)  SpO2: 93% (05 Mar 2022 23:45) (93% - 96%)    I&O's Summary    04 Mar 2022 07:01  -  05 Mar 2022 07:00  --------------------------------------------------------  IN: 718 mL / OUT: 2700 mL / NET: -1982 mL    05 Mar 2022 07:01  -  06 Mar 2022 02:00  --------------------------------------------------------  IN: 266 mL / OUT: 3200 mL / NET: -2934 mL        PHYSICAL EXAM:  General: NAD, pt is comfortably sitting up in bed, on room air  HEENT: CN II-XII grossly intact, PERRL 3mm briskly reactive, EOMI b/l, face symmetric, tongue midline, neck FROM  Cardiovascular: RRR, normal S1 and S2   Respiratory: lungs CTAB, no wheezing, rhonchi, or crackles   GI: normoactive BS to auscultation, abd soft, NTND   Neuro: A&Ox2, No aphasia, speech clear, no dysmetria, Follows commands.  AN x4 spontaneously, RU extremity 4+/5, RU extremity pronator drift otherwise, 5/5 strength in all extremities throughout. SILT throughout   Extremities: distal pulses 2+ x4      TUBES/LINES:  [] Haley  [] Lumbar Drain  [] Wound Drains  [] Others      DIET:  [] NPO  [x] Mechanical  [] Tube feeds    LABS:                        11.0   7.79  )-----------( 406      ( 05 Mar 2022 06:39 )             35.3     03-05    138  |  106  |  8   ----------------------------<  138<H>  3.7   |  21<L>  |  0.32<L>    Ca    9.3      05 Mar 2022 06:38  Phos  3.2     03-05  Mg     2.2     03-05      PTT - ( 06 Mar 2022 00:55 )  PTT:67.4 sec        CAPILLARY BLOOD GLUCOSE          Drug Levels: [] N/A    CSF Analysis: [] N/A      Allergies    No Known Allergies    Intolerances      MEDICATIONS:  Antibiotics:    Neuro:  acetaminophen     Tablet .. 650 milliGRAM(s) Oral every 6 hours PRN  ibuprofen  Tablet. 400 milliGRAM(s) Oral every 8 hours PRN  oxyCODONE    IR 5 milliGRAM(s) Oral every 4 hours PRN    Anticoagulation:  heparin  Infusion.  Unit(s)/Hr IV Continuous <Continuous>    OTHER:  albuterol/ipratropium for Nebulization 3 milliLiter(s) Nebulizer every 6 hours PRN  amLODIPine   Tablet 10 milliGRAM(s) Oral daily  bisacodyl 5 milliGRAM(s) Oral every 12 hours  hydrALAZINE 100 milliGRAM(s) Oral every 8 hours  influenza   Vaccine 0.5 milliLiter(s) IntraMuscular once  labetalol 100 milliGRAM(s) Oral every 8 hours  lactobacillus acidophilus 1 Tablet(s) Oral daily  losartan 100 milliGRAM(s) Oral daily  polyethylene glycol 3350 17 Gram(s) Oral every 12 hours  povidone iodine 5% Nasal Swab 1 Application(s) Both Nostrils once  saline laxative (FLEET) Rectal Enema 1 Enema Rectal once  senna 2 Tablet(s) Oral at bedtime    IVF:  sodium chloride 2 Gram(s) Oral every 6 hours    CULTURES:  Culture Results:   No growth at 2 days. (03-03 @ 22:02)  Culture Results:   No growth at 2 days. (03-03 @ 22:02)    RADIOLOGY & ADDITIONAL TESTS:      ASSESSMENT:  Patient 57 y/o female with PMHx of HTN, pre-DM w/ left thalamic parenchymal hematoma with extensive intraventricular hemorrhage. ICH score 2. NIHSS 18. s/p R frontal large bore EVD placement 2/12/22. Now s/p VPS Certas @5 3/2/22.     PLAN:   NEURO:  - neuro/vital q 4   - psotop CTH 3/2, stable, lethargic s/p fever 3/4 CTH stable  - needs repeat CTH when therapeutic on AC  - pain control PRN  - vEEG for fluctuating mental status - left hemisphere slowing, no seizures. (2/26-2/27)    Cardio:   - SBP goal 100-160   - cont. amlodipine 10mg QD, losartan 100mg, Hydralazine 100 mg TID, labetalol 100 q8h   - echo 2/14: LVH, EF: 65-70%     PULM:  - duonebs PRN   - IS     Renal:   - Normal sodium goal   - salt tabs 2 q6, wean as tolerated    GI:  - regular diet w/ ensure   - bowel regimen, last BM 3/4    HEME:  - SCDs only L leg/SQL   - LE dopplers 2/23: R IM calf thrombus, repeat dopplers 2/28 showing persistence but no propogation, repeat 3/2 stable   - UE dopp 2/23: superficial clots b/l cephalic, wam compresses and elevation, repeat showing L IJ DVT- started on heparin gtt PTT goal 58-99    ENDO:  - prediabetic, a1c 5.8, glucose under control, no insulin req   - TSH wnl     ID:  - blood cx 2/16 +staph epidermidis  - urine cx 2/17 +citrobacter koseri and klebsiella  - CSF cx 2/21 - pending. NGTD  - ID following s/p: vanco [2/18-2/20], ceftriaxone for UTI [2/19-22], cefepime [2/22- 2/25. linezolid for phlebilitis [2/20- 2/25]   - Gallium scan with increased uptake in gluteal region, no cellulitis on physicial exam, probably d/t positioning   -febrile 3/3, pancultured    PSYCH:   - pt takes xanax at home    Dispo:   - SDU status/ PT/OT   - full code  - family updated with plan    PT recs: AR    D/w Dr. D'Amico

## 2022-03-06 NOTE — PROVIDER CONTACT NOTE (OTHER) - DATE AND TIME:
01-Mar-2022 19:00
06-Mar-2022 14:35
13-Feb-2022 03:30
25-Feb-2022 22:25
28-Feb-2022 18:00
05-Mar-2022 19:11
03-Mar-2022 20:45
14-Feb-2022 17:00
24-Feb-2022 23:30
27-Feb-2022 16:00
16-Feb-2022 03:22
16-Feb-2022 03:55

## 2022-03-07 LAB
ANION GAP SERPL CALC-SCNC: 17 MMOL/L — SIGNIFICANT CHANGE UP (ref 5–17)
APTT BLD: 107.2 SEC — HIGH (ref 27.5–35.5)
APTT BLD: 86.2 SEC — HIGH (ref 27.5–35.5)
BUN SERPL-MCNC: 10 MG/DL — SIGNIFICANT CHANGE UP (ref 7–23)
CALCIUM SERPL-MCNC: 10.4 MG/DL — SIGNIFICANT CHANGE UP (ref 8.4–10.5)
CHLORIDE SERPL-SCNC: 102 MMOL/L — SIGNIFICANT CHANGE UP (ref 96–108)
CO2 SERPL-SCNC: 20 MMOL/L — LOW (ref 22–31)
CREAT SERPL-MCNC: 0.34 MG/DL — LOW (ref 0.5–1.3)
CULTURE RESULTS: NO GROWTH — SIGNIFICANT CHANGE UP
EGFR: 120 ML/MIN/1.73M2 — SIGNIFICANT CHANGE UP
GLUCOSE SERPL-MCNC: 133 MG/DL — HIGH (ref 70–99)
HCT VFR BLD CALC: 37.5 % — SIGNIFICANT CHANGE UP (ref 34.5–45)
HGB BLD-MCNC: 12 G/DL — SIGNIFICANT CHANGE UP (ref 11.5–15.5)
MAGNESIUM SERPL-MCNC: 2 MG/DL — SIGNIFICANT CHANGE UP (ref 1.6–2.6)
MCHC RBC-ENTMCNC: 30.2 PG — SIGNIFICANT CHANGE UP (ref 27–34)
MCHC RBC-ENTMCNC: 32 GM/DL — SIGNIFICANT CHANGE UP (ref 32–36)
MCV RBC AUTO: 94.2 FL — SIGNIFICANT CHANGE UP (ref 80–100)
NRBC # BLD: 0 /100 WBCS — SIGNIFICANT CHANGE UP (ref 0–0)
PHOSPHATE SERPL-MCNC: 4.1 MG/DL — SIGNIFICANT CHANGE UP (ref 2.5–4.5)
PLATELET # BLD AUTO: 425 K/UL — HIGH (ref 150–400)
POTASSIUM SERPL-MCNC: 3.7 MMOL/L — SIGNIFICANT CHANGE UP (ref 3.5–5.3)
POTASSIUM SERPL-SCNC: 3.7 MMOL/L — SIGNIFICANT CHANGE UP (ref 3.5–5.3)
RBC # BLD: 3.98 M/UL — SIGNIFICANT CHANGE UP (ref 3.8–5.2)
RBC # FLD: 13.2 % — SIGNIFICANT CHANGE UP (ref 10.3–14.5)
SODIUM SERPL-SCNC: 139 MMOL/L — SIGNIFICANT CHANGE UP (ref 135–145)
SPECIMEN SOURCE: SIGNIFICANT CHANGE UP
WBC # BLD: 7.41 K/UL — SIGNIFICANT CHANGE UP (ref 3.8–10.5)
WBC # FLD AUTO: 7.41 K/UL — SIGNIFICANT CHANGE UP (ref 3.8–10.5)

## 2022-03-07 PROCEDURE — 70450 CT HEAD/BRAIN W/O DYE: CPT | Mod: 26

## 2022-03-07 PROCEDURE — 99024 POSTOP FOLLOW-UP VISIT: CPT

## 2022-03-07 PROCEDURE — 99233 SBSQ HOSP IP/OBS HIGH 50: CPT

## 2022-03-07 RX ORDER — POTASSIUM CHLORIDE 20 MEQ
40 PACKET (EA) ORAL ONCE
Refills: 0 | Status: CANCELLED | OUTPATIENT
Start: 2022-03-07 | End: 2022-03-11

## 2022-03-07 RX ORDER — APIXABAN 2.5 MG/1
10 TABLET, FILM COATED ORAL EVERY 12 HOURS
Refills: 0 | Status: DISCONTINUED | OUTPATIENT
Start: 2022-03-07 | End: 2022-03-11

## 2022-03-07 RX ORDER — HEPARIN SODIUM 5000 [USP'U]/ML
1800 INJECTION INTRAVENOUS; SUBCUTANEOUS
Qty: 25000 | Refills: 0 | Status: DISCONTINUED | OUTPATIENT
Start: 2022-03-07 | End: 2022-03-07

## 2022-03-07 RX ORDER — APIXABAN 2.5 MG/1
10 TABLET, FILM COATED ORAL EVERY 12 HOURS
Refills: 0 | Status: DISCONTINUED | OUTPATIENT
Start: 2022-03-07 | End: 2022-03-07

## 2022-03-07 RX ORDER — LACTULOSE 10 G/15ML
10 SOLUTION ORAL ONCE
Refills: 0 | Status: DISCONTINUED | OUTPATIENT
Start: 2022-03-07 | End: 2022-03-07

## 2022-03-07 RX ADMIN — APIXABAN 10 MILLIGRAM(S): 2.5 TABLET, FILM COATED ORAL at 23:39

## 2022-03-07 RX ADMIN — OXYCODONE HYDROCHLORIDE 5 MILLIGRAM(S): 5 TABLET ORAL at 21:34

## 2022-03-07 RX ADMIN — Medication 1 TABLET(S): at 11:04

## 2022-03-07 RX ADMIN — HEPARIN SODIUM 20 UNIT(S)/HR: 5000 INJECTION INTRAVENOUS; SUBCUTANEOUS at 00:52

## 2022-03-07 RX ADMIN — SENNA PLUS 2 TABLET(S): 8.6 TABLET ORAL at 21:34

## 2022-03-07 RX ADMIN — Medication 100 MILLIGRAM(S): at 14:00

## 2022-03-07 RX ADMIN — OXYCODONE HYDROCHLORIDE 5 MILLIGRAM(S): 5 TABLET ORAL at 21:45

## 2022-03-07 RX ADMIN — Medication 100 MILLIGRAM(S): at 21:34

## 2022-03-07 RX ADMIN — Medication 100 MILLIGRAM(S): at 05:55

## 2022-03-07 RX ADMIN — Medication 650 MILLIGRAM(S): at 19:12

## 2022-03-07 RX ADMIN — APIXABAN 10 MILLIGRAM(S): 2.5 TABLET, FILM COATED ORAL at 11:04

## 2022-03-07 RX ADMIN — AMLODIPINE BESYLATE 10 MILLIGRAM(S): 2.5 TABLET ORAL at 05:55

## 2022-03-07 RX ADMIN — Medication 100 MILLIGRAM(S): at 05:54

## 2022-03-07 RX ADMIN — Medication 100 MILLIGRAM(S): at 21:33

## 2022-03-07 RX ADMIN — HEPARIN SODIUM 18 UNIT(S)/HR: 5000 INJECTION INTRAVENOUS; SUBCUTANEOUS at 03:55

## 2022-03-07 RX ADMIN — Medication 5 MILLIGRAM(S): at 05:55

## 2022-03-07 RX ADMIN — LOSARTAN POTASSIUM 100 MILLIGRAM(S): 100 TABLET, FILM COATED ORAL at 05:54

## 2022-03-07 NOTE — SWALLOW BEDSIDE ASSESSMENT ADULT - CONSISTENCIES ADMINISTERED
ice chips x 2,  3 oz. water via independent cup sips,, 5 bites chicken & rice
~3oz thins + 3tsp puree/thin liquid/pureed

## 2022-03-07 NOTE — PROGRESS NOTE ADULT - SUBJECTIVE AND OBJECTIVE BOX
HPI:  55 y/o female with PMHx of HTN, pre-DM presents transferred from [facility] for intracranial hemorrhage. As per [facility] ED provider and son, patient called son around 7:30-8:00AM c/o severe headache and nausea. Son immediately rushed to her house and found her out of bed, moaning and covered in her own vomit. During transit, EMS reported SBP within 240s with intractable vomiting and given Zofran 8mg. Upon arrival to [facility] ED, GCS 7, SBP within 180s, patient was given Decadron 10mg, Keppra 1g, started on a Cardene drip, Propofol, Mannitol 1mg/kg. 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 Patient was intubated for airway support and transferred to [facility] for further management. ICH2, NIHSS 8.  Denies use of anticoagulants/antiplatelets.  (12 Feb 2022 16:36)    OVERNIGHT EVENTS: TOSHA overnight, neuro stable    Hospital Course:  2/12: transferred from [facility]. R frontal EVD and R radial a-line placed. pend CTH/CTA. s/p 1L bolus for elevated lactate.   2/13: S/p R frontal EVD placedment @[id number] draining well. O/n pt w/ episode of possible storming; tachy, HTN, agitated, temp 100.2F, given 2 versed and 50mcg fentanyl w/ improvement in symptoms Neuro exam stable. Pt remains intubated and sedated, AN L>R. Trend lactate and abg. Amlodipine 5mg QD started for SBP goal < 140, cardene dc'd.  EVD dropped 2/13 at 1pm. propanolol and fent standing added for neuro storming, propofol switched to precedex, with resultant hypotension. Propanolol dc'd, fent changed to prn and precedex off, 1 L bolus given, levo prn   2/14: TOSHA overnight. Patient remains on propofol. EVD open at [id number]. ICPs WNL. Neuro exam stable. increased bowel regimen. started SQL. extubated on precedex. given 0.5 Ativan + Fentanyl pushes PRN for agitation. 500cc NS bolus.   2/15: BD#4.  On HFNC d/t desat to 88% on 70% non-rebreather. On 0.5 precedex   2/16: BD5, TOSHA overnight, on 4L NC, s/p vomiting yesterday, TF being held, formal S/S pending, off precedex. EVD open @ 5. Started on 3% at 30, tmax 100.7   2/17: BD# 6   tachycardic, PE protocol. cardene gtt. neuro unchanged. EVD @ 5cmH2O. 1g IV tylenol for 102.7 fever, duonebs standing to PRN. UA with reflux ordered. Hydralazine 25mg TID added, increased to 50q8. 500cc NS bolus x2. started on nystatin for thrush. started oxy 5q6 PRN for pain for tachycardia. repeat Na 146, decreased 3% @50, started salt tabs 2q6. FEES done. started minced and moist diet  2/18: BD 7. 3% Increased 75 cc/hr  2/19: BD8. TOSHA o/n neuro stable. 3%@75cc/hr. central line placed for vascular access  2/20: BD9. tmax 101.3 o/n, neuro stable. 3%@100cc/hr. cardene gtt restarted. Ibuprofen x1 given for fever, ok per Dr. D'Amico. Vanc d/c'd per ID (MRSA neg), treating UTI not bacteremia, likely contaminant. Linezolid started for suspected thrombophlebitis and GS + coverage.   2/21: BD#10 Overnight, IV tylenol given for headache. Right radial arterial line re-wired then discontinued. EVD clamped at 8AM. ICPs WNL. Neuro exam stable. Remains on 3% lowered to 75cc/hr. CTH stable, EVD raised to 20. Febrile 102 - CSF and blood cx sent. EVD lowered from 20 to 5 to drain more CSF and blood, increased lethargy. Ceftriaxone changed to cefepime per ID for broader coverage.   2/22: BD#11 TOSHA o/n. neuro at baseline. EVD at 22ofT6U, 3% kept at 25cc/hr   2/23: BD#12 TOSHA overnight. Remain on 3% saline. EVD on3mwL1H. ICPs WNL. Neuro exam stable.  2/24: BD#13 TOSHA overnight, Remains on 3% saline. EVD raised to 20, gallium scan done with increased uptake in gluteals but normal physical exam  2/25: BD 14. TOSHA overnight. Remains on hypertonics. EVD clamped at 20 yesterday evening, possible shunt in the afternoon. Patient lethargic with worsening headache. EVD reopened at 10, Linezolid and cefepime d/c'ed   2/26: BD 15. increasing lethargic overnight, CTH stable and reviewed with Dr. D'Amico. Remains on a course of hypertonics. EVD reopened at 10 yesterday morning due to patient experiencing increased lethargy and headaches. EEG for fluctuating mental status - left hemisphere slowing, no seizures. 3% d/c'ed remains on salt tabs   2/27: BD 16. TOSHA overnight. vEEG placed yesterday for waxing/waning exam. EVD remains open at 10. plan is likely to shunt next week. Remains with intermittent periods of lethargy. EEG negative for seizure, d/c'ed. Restarted on 3% @25. LIJ noticed to be dislodged from proper position, notified RN toremove, hemostasis achieved with no hematoma formation noted. Patient with clear breath sounds and no respiratory distress. Additional peripheral IV.   2/28: BD 17, Repeat CTH this am stable, EVD clamped at 10:30AM, remains on 3% at 25.  3/1: BD 18, EVD clamped overnight, neuro remained stable.   3/2: BD 19, POD0 s/p VPS. LE dopplers stable. CTH stable. stepdown. dilaudid and fentanyl needed for severe h/a. Given dilaudid 0.25 mg @ 10 PM.   3/3: BD 20. POD 1 R VPS placement (Certas @ 5). TOSHA o/n. stepdown status. florinef weaned  3/4: BD 21, POD2 R VPS placement (Certas @ 5). Febrile overnight to 103, pancultured. UE dopplers +new L IJ DVT. started on heparin gtt. florienf d/c.   3/5: BD 22, POD3 R VPS (Certas @ 5). On heparin gtt @ 14 for L IJ DVT  3/6: BD 23, POD4 R VPS (Certas @5), On heparin gtt @ 14  3/7: BD 24, POD 5 VPS Certas at 5. TOSHA overnight. remains on hep gtt for L IJ DVT. pending rehab placement      Vital Signs Last 24 Hrs  T(C): 36.8 (06 Mar 2022 22:10), Max: 36.9 (06 Mar 2022 18:12)  T(F): 98.2 (06 Mar 2022 22:10), Max: 98.5 (06 Mar 2022 18:12)  HR: 100 (06 Mar 2022 23:12) (82 - 100)  BP: 143/69 (06 Mar 2022 23:12) (115/59 - 143/69)  BP(mean): 99 (06 Mar 2022 23:12) (77 - 99)  RR: 18 (06 Mar 2022 23:12) (18 - 18)  SpO2: 97% (06 Mar 2022 23:12) (94% - 97%)    I&O's Detail    05 Mar 2022 07:01  -  06 Mar 2022 07:00  --------------------------------------------------------  IN:    Heparin Infusion: 336 mL  Total IN: 336 mL    OUT:    Voided (mL): 3450 mL  Total OUT: 3450 mL    Total NET: -3114 mL      06 Mar 2022 07:01  -  07 Mar 2022 02:25  --------------------------------------------------------  IN:    Heparin: 20 mL    Heparin Infusion: 122 mL    Heparin Infusion: 98 mL  Total IN: 240 mL    OUT:    Voided (mL): 1800 mL  Total OUT: 1800 mL    Total NET: -1560 mL        I&O's Summary    05 Mar 2022 07:01  -  06 Mar 2022 07:00  --------------------------------------------------------  IN: 336 mL / OUT: 3450 mL / NET: -3114 mL    06 Mar 2022 07:01  -  07 Mar 2022 02:25  --------------------------------------------------------  IN: 240 mL / OUT: 1800 mL / NET: -1560 mL        PHYSICAL EXAM:  General: NAD, pt is comfortably sitting up in bed, on room air  HEENT: CN II-XII grossly intact, PERRL 3mm briskly reactive, EOMI b/l, face symmetric, tongue midline, neck FROM  Cardiovascular: RRR, normal S1 and S2   Respiratory: lungs CTAB, no wheezing, rhonchi, or crackles   GI: normoactive BS to auscultation, abd soft, NTND   Neuro: A&Ox2, No aphasia, speech clear, no dysmetria, Follows commands.  AN x4 spontaneously, RU extremity 4+/5, RU extremity pronator drift otherwise, 5/5 strength in all extremities throughout. SILT throughout   Extremities: distal pulses 2+ x4      TUBES/LINES:  [] CVC  [] A-line  [] Lumbar Drain  [] Ventriculostomy  [] Other    DIET:  [] NPO  [] Mechanical  [] Tube feeds    LABS:                        11.1   6.94  )-----------( 389      ( 06 Mar 2022 06:45 )             35.0     03-06    137  |  104  |  9   ----------------------------<  147<H>  4.0   |  23  |  0.34<L>    Ca    9.4      06 Mar 2022 06:45  Phos  3.4     03-06  Mg     2.0     03-06      PTT - ( 06 Mar 2022 20:26 )  PTT:34.9 sec        CAPILLARY BLOOD GLUCOSE          Drug Levels: [] N/A    CSF Analysis: [] N/A      Allergies    No Known Allergies    Intolerances      MEDICATIONS:  Antibiotics:    Neuro:  acetaminophen     Tablet .. 650 milliGRAM(s) Oral every 6 hours PRN  ibuprofen  Tablet. 400 milliGRAM(s) Oral every 8 hours PRN  oxyCODONE    IR 5 milliGRAM(s) Oral every 4 hours PRN    Anticoagulation:  heparin  Infusion 2000 Unit(s)/Hr IV Continuous <Continuous>    OTHER:  albuterol/ipratropium for Nebulization 3 milliLiter(s) Nebulizer every 6 hours PRN  amLODIPine   Tablet 10 milliGRAM(s) Oral daily  bisacodyl 5 milliGRAM(s) Oral every 12 hours  hydrALAZINE 100 milliGRAM(s) Oral every 8 hours  influenza   Vaccine 0.5 milliLiter(s) IntraMuscular once  labetalol 100 milliGRAM(s) Oral every 8 hours  lactobacillus acidophilus 1 Tablet(s) Oral daily  losartan 100 milliGRAM(s) Oral daily  polyethylene glycol 3350 17 Gram(s) Oral every 12 hours  povidone iodine 5% Nasal Swab 1 Application(s) Both Nostrils once  saline laxative (FLEET) Rectal Enema 1 Enema Rectal once  senna 2 Tablet(s) Oral at bedtime    IVF:    CULTURES:  Culture Results:   No growth at 3 days. (03-03 @ 22:02)  Culture Results:   No growth at 3 days. (03-03 @ 22:02)    RADIOLOGY & ADDITIONAL TESTS:      ASSESSMENT:  Patient 55 y/o female with PMHx of HTN, pre-DM w/ left thalamic parenchymal hematoma with extensive intraventricular hemorrhage. ICH score 2. NIHSS 18. s/p R frontal large bore EVD placement 2/12/22. Now s/p VPS Certas @5 3/2/22.     HEAD BLEED    Handoff    MEWS Score    No pertinent past medical history    Hypertension    Pre-diabetes    Intraventricular hemorrhage    Intraventricular hemorrhage    Creation of ventriculo-peritoneal shunt    ICH (intracerebral hemorrhage)    ICH (intracerebral hemorrhage)    HTN (hypertension)    Pre-diabetes    Anemia due to acute blood loss    Thrombocytosis    Jugular vein thrombosis    Insertion of intravenous catheter with ultrasound guidance    Insertion of intravenous catheter with ultrasound guidance    Insertion, catheter, intravenous    Ultrasound guidance for vascular access    Creation of ventriculo-peritoneal shunt    Insertion, needle, vein    US guided vascular access    HTN (hypertension)    Hydrocephalus    Pre-diabetes    [email or web address]        PLAN:  NEURO:  - neuro/vital q 4   - psotop CTH 3/2, stable, lethargic s/p fever 3/4 CTH stable  - needs repeat CTH when therapeutic on AC  - pain control PRN  - vEEG for fluctuating mental status - left hemisphere slowing, no seizures. (2/26-2/27)    Cardio:   - SBP goal 100-160   - cont. amlodipine 10mg QD, losartan 100mg, Hydralazine 100 mg TID, labetalol 100 q8h   - echo 2/14: LVH, EF: 65-70%     PULM:  - duonebs PRN   - IS     Renal:   - Normal sodium goal   - salt tabs 2 q6 d/c     GI:  - regular diet w/ ensure   - bowel regimen, last BM 3/4    HEME:  - SCDs only L leg/SQL   - LE dopplers 2/23: R IM calf thrombus, repeat dopplers 2/28 showing persistence but no propogation, repeat 3/2 stable   - UE dopp 2/23: superficial clots b/l cephalic, wam compresses and elevation, repeat showing L IJ DVT- started on heparin gtt PTT goal 58-99    ENDO:  - prediabetic, a1c 5.8, glucose under control, no insulin req   - TSH wnl     ID:  - blood cx 2/16 +staph epidermidis  - urine cx 2/17 +citrobacter koseri and klebsiella  - CSF cx 2/21 - pending. NGTD  - ID following s/p: vanco [2/18-2/20], ceftriaxone for UTI [2/19-22], cefepime [2/22- 2/25. linezolid for phlebilitis [2/20- 2/25]   - Gallium scan with increased uptake in gluteal region, no cellulitis on physicial exam, probably d/t positioning   -febrile 3/3, pancultured    PSYCH:   - pt takes xanax at home    Dispo:   - SDU status/ PT/OT   - full code  - family updated with plan    PT recs: AR    D/w Dr. D'Amico         Assessment:  Present when checked    []  GCS  E   V  M     Heart Failure: []Acute, [] acute on chronic , []chronic  Heart Failure:  [] Diastolic (HFpEF), [] Systolic (HFrEF), []Combined (HFpEF and HFrEF), [] RHF, [] Pulm HTN, [] Other    [] ZAIDA, [] ATN, [] AIN, [] other  [] CKD1, [] CKD2, [] CKD 3, [] CKD 4, [] CKD 5, []ESRD    Encephalopathy: [] Metabolic, [] Hepatic, [] toxic, [] Neurological, [] Other    Abnormal Nurtitional Status: [] malnurtition (see nutrition note), [ ]underweight: BMI < 19, [] morbid obesity: BMI >40, [] Cachexia    [] Sepsis  [] hypovolemic shock,[] cardiogenic shock, [] hemorrhagic shock, [] neuogenic shock  [] Acute Respiratory Failure  []Cerebral edema, [] Brain compression/ herniation,   [] Functional quadriplegia  [] Acute blood loss anemia

## 2022-03-07 NOTE — SWALLOW BEDSIDE ASSESSMENT ADULT - SLP GENERAL OBSERVATIONS
Pt received sleeping in bed, awoke to noxious stimuli but remained drowsy. EVD clamped by RN prior to repositioning pt. Pt's son at bedside. Pt A&Ox3, in unilateral wrist restraint.
Patient received sleeping in bed but easily roused to voice.  Patient followed commands and responded to Qs appropriately.   No dysphonia or dysarthria noted.

## 2022-03-07 NOTE — SWALLOW BEDSIDE ASSESSMENT ADULT - SLP PERTINENT HISTORY OF CURRENT PROBLEM
Patient 55 y/o female with PMHx of HTN, pre-DM w/ left thalamic parenchymal hematoma with extensive intraventricular hemorrhage/ICH, s/p R frontal large bore EVD placement 2/12/22, and s/p VPS Certas @5 3/2/22.   Pt known to our service, last seen 2/17/22 for FEES with recs for minced and moist diet with thin liquids.  Pt has been upgraded by team to regular diet since 2/28/22 & pt has been tolerating this diet well.
pt with left thalamic ICH with IVH and hydrocephalus. S/p R frontal EVD placement

## 2022-03-07 NOTE — SWALLOW BEDSIDE ASSESSMENT ADULT - SPECIFY REASON(S)
consulted to assess swallowing d/t failed dysphagia screen
Re-assess swallowing functions for dispo planning

## 2022-03-07 NOTE — PROGRESS NOTE ADULT - SUBJECTIVE AND OBJECTIVE BOX
O/N Events: TOSHA  Subjective/ROS: Complains of small headache, incisional in location. Denies, CP, SOB, n/v, changes in bowel/urinary habits.  12pt ROS otherwise negative.    VITALS  Vital Signs Last 24 Hrs  T(C): 36.8 (07 Mar 2022 05:24), Max: 36.9 (06 Mar 2022 18:12)  T(F): 98.3 (07 Mar 2022 05:24), Max: 98.5 (06 Mar 2022 18:12)  HR: 78 (07 Mar 2022 05:57) (78 - 100)  BP: 128/67 (07 Mar 2022 05:57) (115/59 - 143/69)  BP(mean): 91 (07 Mar 2022 05:57) (77 - 99)  RR: 18 (07 Mar 2022 05:57) (18 - 18)  SpO2: 97% (07 Mar 2022 05:57) (94% - 97%)    CAPILLARY BLOOD GLUCOSE    PHYSICAL EXAM  General: A&Ox3; NAD  Head: NC/AT; PERRL; EOMI; anicteric sclera  Neck: Supple; no JVD  Respiratory: CTA B/L; no wheezes/crackles/rales auscultated w/ good air movement  Cardiovascular: Regular rhythm/rate; S1/S2; no gallops or murmurs auscultated  Gastrointestinal: Soft; NTND w/out rebound tenderness or guarding; bowel sounds normal  Extremities: WWP; no edema or cyanosis; radial/pedal pulses palpable  Neurological:  CNII-XII grossly intact; Right sided weakness, slight r pronator drift  Vasc: +2 b/l radial   Skin: No rashes, crani wound dressing c/d/i, abdominal wound c/d/i   Psych: Appropriate affect    MEDICATIONS  (STANDING):  amLODIPine   Tablet 10 milliGRAM(s) Oral daily  bisacodyl 5 milliGRAM(s) Oral every 12 hours  heparin  Infusion 1800 Unit(s)/Hr (18 mL/Hr) IV Continuous <Continuous>  hydrALAZINE 100 milliGRAM(s) Oral every 8 hours  influenza   Vaccine 0.5 milliLiter(s) IntraMuscular once  labetalol 100 milliGRAM(s) Oral every 8 hours  lactobacillus acidophilus 1 Tablet(s) Oral daily  losartan 100 milliGRAM(s) Oral daily  polyethylene glycol 3350 17 Gram(s) Oral every 12 hours  senna 2 Tablet(s) Oral at bedtime    MEDICATIONS  (PRN):  acetaminophen     Tablet .. 650 milliGRAM(s) Oral every 6 hours PRN Temp greater or equal to 38C (100.4F), Mild Pain (1 - 3)  albuterol/ipratropium for Nebulization 3 milliLiter(s) Nebulizer every 6 hours PRN Shortness of Breath and/or Wheezing  ibuprofen  Tablet. 400 milliGRAM(s) Oral every 8 hours PRN Temp greater or equal to 38C (100.4F), Moderate Pain (4 - 6)  oxyCODONE    IR 5 milliGRAM(s) Oral every 4 hours PRN Moderate Pain (4 - 6)      No Known Allergies      LABS                        11.1   6.94  )-----------( 389      ( 06 Mar 2022 06:45 )             35.0     03-06    137  |  104  |  9   ----------------------------<  147<H>  4.0   |  23  |  0.34<L>    Ca    9.4      06 Mar 2022 06:45  Phos  3.4     03-06  Mg     2.0     03-06      PTT - ( 07 Mar 2022 02:16 )  PTT:107.2 sec          IMAGING/EKG/ETC: reviewed

## 2022-03-07 NOTE — SWALLOW BEDSIDE ASSESSMENT ADULT - SWALLOW EVAL: DIAGNOSIS
Clinical signs of pharyngeal dysphagia s/p left thalamic ICH with IVH. Pt with delayed hawking and oral expectoration with denser boli. Given the above as well as reduced mobility and lethargy, pt appears at high risk for aspiration and its negative sequela. Recommend cont. NPO + NGT pending reassessment.
Pt with functional oral and pharyngeal stages of swallowing with no overt signs & symptoms of airway protection deficits across consistencies tested.  Pt deemed safe to continue on current regular diet with thin liquids with general aspiration precautions.

## 2022-03-08 LAB
ANION GAP SERPL CALC-SCNC: 12 MMOL/L — SIGNIFICANT CHANGE UP (ref 5–17)
APTT BLD: 35.3 SEC — SIGNIFICANT CHANGE UP (ref 27.5–35.5)
BUN SERPL-MCNC: 10 MG/DL — SIGNIFICANT CHANGE UP (ref 7–23)
CALCIUM SERPL-MCNC: 9.6 MG/DL — SIGNIFICANT CHANGE UP (ref 8.4–10.5)
CHLORIDE SERPL-SCNC: 103 MMOL/L — SIGNIFICANT CHANGE UP (ref 96–108)
CO2 SERPL-SCNC: 22 MMOL/L — SIGNIFICANT CHANGE UP (ref 22–31)
CREAT SERPL-MCNC: 0.36 MG/DL — LOW (ref 0.5–1.3)
CULTURE RESULTS: SIGNIFICANT CHANGE UP
CULTURE RESULTS: SIGNIFICANT CHANGE UP
EGFR: 118 ML/MIN/1.73M2 — SIGNIFICANT CHANGE UP
GLUCOSE SERPL-MCNC: 108 MG/DL — HIGH (ref 70–99)
HCT VFR BLD CALC: 36.2 % — SIGNIFICANT CHANGE UP (ref 34.5–45)
HGB BLD-MCNC: 11.4 G/DL — LOW (ref 11.5–15.5)
INR BLD: 1.27 — HIGH (ref 0.88–1.16)
MAGNESIUM SERPL-MCNC: 2 MG/DL — SIGNIFICANT CHANGE UP (ref 1.6–2.6)
MCHC RBC-ENTMCNC: 30.8 PG — SIGNIFICANT CHANGE UP (ref 27–34)
MCHC RBC-ENTMCNC: 31.5 GM/DL — LOW (ref 32–36)
MCV RBC AUTO: 97.8 FL — SIGNIFICANT CHANGE UP (ref 80–100)
NRBC # BLD: 0 /100 WBCS — SIGNIFICANT CHANGE UP (ref 0–0)
PHOSPHATE SERPL-MCNC: 4.3 MG/DL — SIGNIFICANT CHANGE UP (ref 2.5–4.5)
PLATELET # BLD AUTO: 323 K/UL — SIGNIFICANT CHANGE UP (ref 150–400)
POTASSIUM SERPL-MCNC: 4 MMOL/L — SIGNIFICANT CHANGE UP (ref 3.5–5.3)
POTASSIUM SERPL-SCNC: 4 MMOL/L — SIGNIFICANT CHANGE UP (ref 3.5–5.3)
PROTHROM AB SERPL-ACNC: 15.2 SEC — HIGH (ref 10.5–13.4)
RBC # BLD: 3.7 M/UL — LOW (ref 3.8–5.2)
RBC # FLD: 13.2 % — SIGNIFICANT CHANGE UP (ref 10.3–14.5)
SODIUM SERPL-SCNC: 137 MMOL/L — SIGNIFICANT CHANGE UP (ref 135–145)
SPECIMEN SOURCE: SIGNIFICANT CHANGE UP
SPECIMEN SOURCE: SIGNIFICANT CHANGE UP
WBC # BLD: 6.33 K/UL — SIGNIFICANT CHANGE UP (ref 3.8–10.5)
WBC # FLD AUTO: 6.33 K/UL — SIGNIFICANT CHANGE UP (ref 3.8–10.5)

## 2022-03-08 PROCEDURE — 99024 POSTOP FOLLOW-UP VISIT: CPT

## 2022-03-08 PROCEDURE — 99233 SBSQ HOSP IP/OBS HIGH 50: CPT

## 2022-03-08 RX ORDER — HYDRALAZINE HCL 50 MG
50 TABLET ORAL EVERY 8 HOURS
Refills: 0 | Status: DISCONTINUED | OUTPATIENT
Start: 2022-03-08 | End: 2022-03-11

## 2022-03-08 RX ORDER — HYDROMORPHONE HYDROCHLORIDE 2 MG/ML
0.5 INJECTION INTRAMUSCULAR; INTRAVENOUS; SUBCUTANEOUS ONCE
Refills: 0 | Status: DISCONTINUED | OUTPATIENT
Start: 2022-03-08 | End: 2022-03-08

## 2022-03-08 RX ORDER — LANOLIN ALCOHOL/MO/W.PET/CERES
5 CREAM (GRAM) TOPICAL AT BEDTIME
Refills: 0 | Status: DISCONTINUED | OUTPATIENT
Start: 2022-03-08 | End: 2022-03-11

## 2022-03-08 RX ADMIN — OXYCODONE HYDROCHLORIDE 5 MILLIGRAM(S): 5 TABLET ORAL at 20:03

## 2022-03-08 RX ADMIN — Medication 5 MILLIGRAM(S): at 21:44

## 2022-03-08 RX ADMIN — APIXABAN 10 MILLIGRAM(S): 2.5 TABLET, FILM COATED ORAL at 11:04

## 2022-03-08 RX ADMIN — Medication 50 MILLIGRAM(S): at 21:44

## 2022-03-08 RX ADMIN — POLYETHYLENE GLYCOL 3350 17 GRAM(S): 17 POWDER, FOR SOLUTION ORAL at 18:55

## 2022-03-08 RX ADMIN — APIXABAN 10 MILLIGRAM(S): 2.5 TABLET, FILM COATED ORAL at 23:22

## 2022-03-08 RX ADMIN — HYDROMORPHONE HYDROCHLORIDE 0.5 MILLIGRAM(S): 2 INJECTION INTRAMUSCULAR; INTRAVENOUS; SUBCUTANEOUS at 20:19

## 2022-03-08 RX ADMIN — Medication 5 MILLIGRAM(S): at 18:54

## 2022-03-08 RX ADMIN — Medication 50 MILLIGRAM(S): at 15:20

## 2022-03-08 RX ADMIN — POLYETHYLENE GLYCOL 3350 17 GRAM(S): 17 POWDER, FOR SOLUTION ORAL at 06:22

## 2022-03-08 RX ADMIN — Medication 100 MILLIGRAM(S): at 11:05

## 2022-03-08 RX ADMIN — OXYCODONE HYDROCHLORIDE 5 MILLIGRAM(S): 5 TABLET ORAL at 11:18

## 2022-03-08 RX ADMIN — Medication 5 MILLIGRAM(S): at 06:22

## 2022-03-08 RX ADMIN — Medication 1 TABLET(S): at 11:04

## 2022-03-08 RX ADMIN — Medication 100 MILLIGRAM(S): at 18:54

## 2022-03-08 RX ADMIN — LOSARTAN POTASSIUM 100 MILLIGRAM(S): 100 TABLET, FILM COATED ORAL at 11:04

## 2022-03-08 RX ADMIN — OXYCODONE HYDROCHLORIDE 5 MILLIGRAM(S): 5 TABLET ORAL at 19:03

## 2022-03-08 RX ADMIN — AMLODIPINE BESYLATE 10 MILLIGRAM(S): 2.5 TABLET ORAL at 11:04

## 2022-03-08 RX ADMIN — HYDROMORPHONE HYDROCHLORIDE 0.5 MILLIGRAM(S): 2 INJECTION INTRAMUSCULAR; INTRAVENOUS; SUBCUTANEOUS at 20:40

## 2022-03-08 NOTE — PROGRESS NOTE ADULT - SUBJECTIVE AND OBJECTIVE BOX
HPI:  55 y/o female with PMHx of HTN, pre-DM presents transferred from [facility] for intracranial hemorrhage. As per [place] ED provider and son, patient called son around 7:30-8:00AM c/o severe headache and nausea. Son immediately rushed to her house and found her out of bed, moaning and covered in her own vomit. During transit, EMS reported SBP within 240s with intractable vomiting and given Zofran 8mg. Upon arrival to [facility] ED, GCS 7, SBP within 180s, patient was given Decadron 10mg, Keppra 1g, started on a Cardene drip, Propofol, Mannitol 1mg/kg. 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 Patient was intubated for airway support and transferred to [facility] for further management. ICH2, NIHSS 8.  Denies use of anticoagulants/antiplatelets.  (12 Feb 2022 16:36)    OVERNIGHT EVENTS: TOSHA overnight, neuro stable. Remains on eliquis 10mg BID    HOSPITAL COURSE:  2/12: transferred from [place]. R frontal EVD and R radial a-line placed. pend CTH/CTA. s/p 1L bolus for elevated lactate.   2/13: S/p R frontal EVD placedment @60elY1D draining well. O/n pt w/ episode of possible storming; tachy, HTN, agitated, temp 100.2F, given 2 versed and 50mcg fentanyl w/ improvement in symptoms Neuro exam stable. Pt remains intubated and sedated, AN L>R. Trend lactate and abg. Amlodipine 5mg QD started for SBP goal < 140, cardene dc'd.  EVD dropped 2/13 at 1pm. propanolol and fent standing added for neuro storming, propofol switched to precedex, with resultant hypotension. Propanolol dc'd, fent changed to prn and precedex off, 1 L bolus given, levo prn   2/14: TOSHA overnight. Patient remains on propofol. EVD open at 03scM7M. ICPs WNL. Neuro exam stable. increased bowel regimen. started SQL. extubated on precedex. given 0.5 Ativan + Fentanyl pushes PRN for agitation. 500cc NS bolus.   2/15: BD#4.  On HFNC d/t desat to 88% on 70% non-rebreather. On 0.5 precedex   2/16: BD5, TOSHA overnight, on 4L NC, s/p vomiting yesterday, TF being held, formal S/S pending, off precedex. EVD open @ 5. Started on 3% at 30, tmax 100.7   2/17: BD# 6   tachycardic, PE protocol. cardene gtt. neuro unchanged. EVD @ 5cmH2O. 1g IV tylenol for 102.7 fever, duonebs standing to PRN. UA with reflux ordered. Hydralazine 25mg TID added, increased to 50q8. 500cc NS bolus x2. started on nystatin for thrush. started oxy 5q6 PRN for pain for tachycardia. repeat Na 146, decreased 3% @50, started salt tabs 2q6. FEES done. started minced and moist diet  2/18: BD 7. 3% Increased 75 cc/hr  2/19: BD8. TOSHA o/n neuro stable. 3%@75cc/hr. central line placed for vascular access  2/20: BD9. tmax 101.3 o/n, neuro stable. 3%@100cc/hr. cardene gtt restarted. Ibuprofen x1 given for fever, ok per Dr. D'Amico. Vanc d/c'd per ID (MRSA neg), treating UTI not bacteremia, likely contaminant. Linezolid started for suspected thrombophlebitis and GS + coverage.   2/21: BD#10 Overnight, IV tylenol given for headache. Right radial arterial line re-wired then discontinued. EVD clamped at 8AM. ICPs WNL. Neuro exam stable. Remains on 3% lowered to 75cc/hr. CTH stable, EVD raised to 20. Febrile 102 - CSF and blood cx sent. EVD lowered from 20 to 5 to drain more CSF and blood, increased lethargy. Ceftriaxone changed to cefepime per ID for broader coverage.   2/22: BD#11 TOSHA o/n. neuro at baseline. EVD at 61xyG0N, 3% kept at 25cc/hr   2/23: BD#12 TOSHA overnight. Remain on 3% saline. EVD th5eoM4T. ICPs WNL. Neuro exam stable.  2/24: BD#13 TOSHA overnight, Remains on 3% saline. EVD raised to 20, gallium scan done with increased uptake in gluteals but normal physical exam  2/25: BD 14. TOSHA overnight. Remains on hypertonics. EVD clamped at 20 yesterday evening, possible shunt in the afternoon. Patient lethargic with worsening headache. EVD reopened at 10, Linezolid and cefepime d/c'ed   2/26: BD 15. increasing lethargic overnight, CTH stable and reviewed with Dr. D'Amico. Remains on a course of hypertonics. EVD reopened at 10 yesterday morning due to patient experiencing increased lethargy and headaches. EEG for fluctuating mental status - left hemisphere slowing, no seizures. 3% d/c'ed remains on salt tabs   2/27: BD 16. TOSHA overnight. vEEG placed yesterday for waxing/waning exam. EVD remains open at 10. plan is likely to shunt next week. Remains with intermittent periods of lethargy. EEG negative for seizure, d/c'ed. Restarted on 3% @25. LIJ noticed to be dislodged from proper position, notified RN toremove, hemostasis achieved with no hematoma formation noted. Patient with clear breath sounds and no respiratory distress. Additional peripheral IV.   2/28: BD 17, Repeat CTH this am stable, EVD clamped at 10:30AM, remains on 3% at 25.  3/1: BD 18, EVD clamped overnight, neuro remained stable.   3/2: BD 19, POD0 s/p VPS. LE dopplers stable. CTH stable. stepdown. dilaudid and fentanyl needed for severe h/a. Given dilaudid 0.25 mg @ 10 PM.   3/3: BD 20. POD 1 R VPS placement (Certas @ 5). TOSHA o/n. stepdown status. florinef weaned  3/4: BD 21, POD2 R VPS placement (Certas @ 5). Febrile overnight to 103, pancultured. UE dopplers +new L IJ DVT. started on heparin gtt. florienf d/c.   3/5: BD 22, POD3 R VPS (Certas @ 5). On heparin gtt @ 14 for L IJ DVT  3/6: BD 23, POD4 R VPS (Certas @5), PTT 38, heparin gtt increased to 17  3/7: BD 24, POD 5 VPS Certas at 5. TOSHA overnight. remains on hep gtt for L IJ DVT. pending rehab placement. repeat CTH stable, dc;d heparin gtt started eliquis. salt tabs dc'd  3/8: BD25, POD6 VPS, remains on eliquis, TOSHA overnight, pending AR.       Vital Signs Last 24 Hrs  T(C): 37.1 (07 Mar 2022 21:39), Max: 37.2 (07 Mar 2022 13:00)  T(F): 98.8 (07 Mar 2022 21:39), Max: 98.9 (07 Mar 2022 13:00)  HR: 92 (07 Mar 2022 23:29) (78 - 98)  BP: 114/56 (07 Mar 2022 23:29) (114/56 - 133/65)  BP(mean): 79 (07 Mar 2022 23:29) (79 - 95)  RR: 18 (07 Mar 2022 23:29) (18 - 18)  SpO2: 95% (07 Mar 2022 23:29) (95% - 97%)    I&O's Summary    06 Mar 2022 07:01  -  07 Mar 2022 07:00  --------------------------------------------------------  IN: 314 mL / OUT: 2100 mL / NET: -1786 mL    07 Mar 2022 07:01  -  08 Mar 2022 00:13  --------------------------------------------------------  IN: 0 mL / OUT: 1300 mL / NET: -1300 mL        PHYSICAL EXAM:  General: NAD, pt is comfortably sitting up in bed, on room air  HEENT: CN II-XII grossly intact, PERRL 3mm briskly reactive, EOMI b/l, face symmetric, tongue midline, neck FROM  Cardiovascular: RRR, normal S1 and S2   Respiratory: lungs CTAB, no wheezing, rhonchi, or crackles   GI: normoactive BS to auscultation, abd soft, NTND, +Abdominal incisions c/d/i   Neuro: A&Ox2-3 (sometimes forget year but knows president), No aphasia, speech clear, no dysmetria, +Right pronator drift. Follows commands.AN x4 spontaneously, 5/5 strength in all extremities throughout except RUE 4+/5. SILT throughout   Extremities: distal pulses 2+ x4  Wound/incision: +Right VPS incision with staples in place, C/D/I      TUBES/LINES:  [] Haley  [] Lumbar Drain  [] Wound Drains  [] Others      DIET:  [] NPO  [X] Mechanical  [] Tube feeds    LABS:                        12.0   7.41  )-----------( 425      ( 07 Mar 2022 08:46 )             37.5     03-07    139  |  102  |  10  ----------------------------<  133<H>  3.7   |  20<L>  |  0.34<L>    Ca    10.4      07 Mar 2022 08:46  Phos  4.1     03-07  Mg     2.0     03-07      PTT - ( 07 Mar 2022 08:12 )  PTT:86.2 sec        CAPILLARY BLOOD GLUCOSE          Drug Levels: [] N/A    CSF Analysis: [] N/A      Allergies    No Known Allergies    Intolerances      MEDICATIONS:  Antibiotics:    Neuro:  acetaminophen     Tablet .. 650 milliGRAM(s) Oral every 6 hours PRN  ibuprofen  Tablet. 400 milliGRAM(s) Oral every 8 hours PRN  oxyCODONE    IR 5 milliGRAM(s) Oral every 4 hours PRN    Anticoagulation:  apixaban 10 milliGRAM(s) Oral every 12 hours    OTHER:  albuterol/ipratropium for Nebulization 3 milliLiter(s) Nebulizer every 6 hours PRN  amLODIPine   Tablet 10 milliGRAM(s) Oral daily  bisacodyl 5 milliGRAM(s) Oral every 12 hours  hydrALAZINE 100 milliGRAM(s) Oral every 8 hours  influenza   Vaccine 0.5 milliLiter(s) IntraMuscular once  labetalol 100 milliGRAM(s) Oral every 8 hours  lactobacillus acidophilus 1 Tablet(s) Oral daily  losartan 100 milliGRAM(s) Oral daily  polyethylene glycol 3350 17 Gram(s) Oral every 12 hours  senna 2 Tablet(s) Oral at bedtime    IVF:    CULTURES:  Culture Results:   No growth at 4 days. (03-03 @ 22:02)  Culture Results:   No growth at 4 days. (03-03 @ 22:02)    RADIOLOGY & ADDITIONAL TESTS:  < from: CT Head No Cont (03.07.22 @ 09:39) >  FINDINGS:    There is a right frontal approach ventricular drainage catheter with the   distal tip in the right frontal horn abutting the foramen Monro, in   unchanged position from prior imaging. There is stable size and   morphology of the ventricular system, without evidence of obstructive   hydrocephalus. There is small focal edema/blood products along the   catheter tract in the right frontal lobe and evolving postprocedural   changes in the overlying scalp.    There is continued evolution of intraparenchymal hemorrhage in the left   thalamus with associated intraventricular extension into the left lateral   ventricle. There is surrounding low-attenuation vasogenic edema resulting   in mass effect on the posterior body and atrium of the left lateral   ventricle, findings unchanged compared to prior. There is no evidence of   interval new hemorrhage or midline shift.    There is hypoattenuation of the subcortical and periventricular white   matter, which is nonspecific finding, but most likely represents sequela   of mild chronic microvascular ischemic disease. There are atherosclerotic   calcifications of the cavernous internal carotid arteries bilaterally.   There is prominence of the cortical sulci related to underlying mild   brain parenchymal volume loss.    There is no evidence of acute infarct or intracranial mass. There are no   extra-axial fluid collections.    The visualized intraorbital contents are normal. The imaged portions of   the paranasal sinuses are aerated. The mastoid air cells are clear. 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 Visualized soft tissues and osseous   structures appear otherwise unremarkable.      IMPRESSION:    Right frontal approachventricular drainage catheter in unchanged   position. Stable size and morphology of the ventricular system.    Continued evolution of left thalamic hemorrhage with intraventricular   extension.    < end of copied text >      ASSESSMENT:  Patient 55 y/o female with PMHx of HTN, pre-DM w/ left thalamic parenchymal hematoma with extensive intraventricular hemorrhage. ICH score 2. NIHSS 18. s/p R frontal large bore EVD placement 2/12/22. Now s/p VPS Certas @5 3/2/22.     HEAD BLEED    Handoff    MEWS Score    No pertinent past medical history    Hypertension    Pre-diabetes    Intraventricular hemorrhage    Intraventricular hemorrhage    Creation of ventriculo-peritoneal shunt    ICH (intracerebral hemorrhage)    ICH (intracerebral hemorrhage)    HTN (hypertension)    Pre-diabetes    Anemia due to acute blood loss    Thrombocytosis    Jugular vein thrombosis    Insertion of intravenous catheter with ultrasound guidance    Insertion of intravenous catheter with ultrasound guidance    Insertion, catheter, intravenous    Ultrasound guidance for vascular access    Creation of ventriculo-peritoneal shunt    Insertion, needle, vein    US guided vascular access    HTN (hypertension)    Hydrocephalus    Pre-diabetes    [email or web address]        PLAN:  NEURO:  - neuro/vital q 4   - psotop CTH 3/2, stable, lethargic s/p fever 3/4 CTH stable, CTH 3/7 stable   - pain control PRN  - vEEG for fluctuating mental status - left hemisphere slowing, no seizures. (2/26-2/27)    Cardio:   - SBP goal 100-160   - cont. amlodipine 10mg QD, losartan 100mg, Hydralazine 100 mg TID, labetalol 100 q8h   - echo 2/14: LVH, EF: 65-70%     PULM:  - duonebs PRN   - IS     Renal:   - Na goal 135-145  - salt tabs 2 q6 dc'd 3/7     GI:  - regular diet w/ ensure   - bowel regimen, last BM 3/7    HEME:  - SCDs only L leg/SQL   - LE dopplers 2/23: R IM calf thrombus, repeat dopplers 2/28 showing persistence but no propogation, repeat 3/2 stable   - UE dopp 2/23: superficial clots b/l cephalic, wam compresses and elevation, repeat showing L IJ DVT- heparin gtt PTT @ 14 (goal 58-99) dc'd 3/7  - started eliquis 10mg BID x7 days, then Eliqius 5mg on 3/14    ENDO:  - prediabetic, a1c 5.8, glucose under control, no insulin req   - TSH wnl     ID:  - blood cx 2/16 +staph epidermidis  - urine cx 2/17 +citrobacter koseri and klebsiella  - CSF cx 2/21 - pending. NGTD  - ID following s/p: vanco [2/18-2/20], ceftriaxone for UTI [2/19-22], cefepime [2/22- 2/25. linezolid for phlebilitis [2/20- 2/25]   - Gallium scan with increased uptake in gluteal region, no cellulitis on physicial exam, probably d/t positioning   -febrile 3/3, pancultured    PSYCH:   - pt takes xanax at home    Dispo:   - SDU status/ PT/OT   - full code  - family updated with plan    PT recs: AR    D/w Dr. D'Amico     Assessment:  Present when checked    []  GCS  E   V  M     Heart Failure: []Acute, [] acute on chronic , []chronic  Heart Failure:  [] Diastolic (HFpEF), [] Systolic (HFrEF), []Combined (HFpEF and HFrEF), [] RHF, [] Pulm HTN, [] Other    [] ZAIDA, [] ATN, [] AIN, [] other  [] CKD1, [] CKD2, [] CKD 3, [] CKD 4, [] CKD 5, []ESRD    Encephalopathy: [] Metabolic, [] Hepatic, [] toxic, [] Neurological, [] Other    Abnormal Nurtitional Status: [] malnurtition (see nutrition note), [ ]underweight: BMI < 19, [] morbid obesity: BMI >40, [] Cachexia    [] Sepsis  [] hypovolemic shock,[] cardiogenic shock, [] hemorrhagic shock, [] neuogenic shock  [] Acute Respiratory Failure  []Cerebral edema, [] Brain compression/ herniation,   [] Functional quadriplegia  [] Acute blood loss anemia

## 2022-03-08 NOTE — PROGRESS NOTE ADULT - SUBJECTIVE AND OBJECTIVE BOX
O/N Events: TOSAH  Subjective/ROS: No complaints. Denies HA, CP, SOB, n/v, changes in bowel/urinary habits.  12pt ROS otherwise negative.    VITALS  Vital Signs Last 24 Hrs  T(C): 36.9 (08 Mar 2022 12:33), Max: 37.1 (07 Mar 2022 21:39)  T(F): 98.5 (08 Mar 2022 12:33), Max: 98.8 (07 Mar 2022 21:39)  HR: 92 (08 Mar 2022 12:33) (76 - 98)  BP: 124/75 (08 Mar 2022 12:33) (103/71 - 133/65)  BP(mean): 90 (08 Mar 2022 11:20) (76 - 94)  RR: 18 (08 Mar 2022 12:33) (17 - 18)  SpO2: 96% (08 Mar 2022 12:33) (94% - 97%)    CAPILLARY BLOOD GLUCOSE    PHYSICAL EXAM  General: A&Ox3; NAD  Head: NC/AT; PERRL; EOMI; anicteric sclera  Neck: Supple; no JVD  Respiratory: CTA B/L; no wheezes/crackles/rales auscultated w/ good air movement  Cardiovascular: Regular rhythm/rate; S1/S2; no gallops or murmurs auscultated  Gastrointestinal: Soft; NTND w/out rebound tenderness or guarding; bowel sounds normal  Extremities: WWP; no edema or cyanosis; radial/pedal pulses palpable  Neurological:  CNII-XII grossly intact; Right sided weakness, slight r pronator drift  Vasc: +2 b/l radial   Skin: No rashes, crani wound dressing c/d/i, abdominal wound c/d/i   Psych: Appropriate affect    MEDICATIONS  (STANDING):  amLODIPine   Tablet 10 milliGRAM(s) Oral daily  apixaban 10 milliGRAM(s) Oral every 12 hours  bisacodyl 5 milliGRAM(s) Oral every 12 hours  hydrALAZINE 50 milliGRAM(s) Oral every 8 hours  influenza   Vaccine 0.5 milliLiter(s) IntraMuscular once  labetalol 100 milliGRAM(s) Oral every 8 hours  lactobacillus acidophilus 1 Tablet(s) Oral daily  losartan 100 milliGRAM(s) Oral daily  polyethylene glycol 3350 17 Gram(s) Oral every 12 hours  senna 2 Tablet(s) Oral at bedtime    MEDICATIONS  (PRN):  acetaminophen     Tablet .. 650 milliGRAM(s) Oral every 6 hours PRN Temp greater or equal to 38C (100.4F), Mild Pain (1 - 3)  albuterol/ipratropium for Nebulization 3 milliLiter(s) Nebulizer every 6 hours PRN Shortness of Breath and/or Wheezing  ibuprofen  Tablet. 400 milliGRAM(s) Oral every 8 hours PRN Temp greater or equal to 38C (100.4F), Moderate Pain (4 - 6)  oxyCODONE    IR 5 milliGRAM(s) Oral every 4 hours PRN Moderate Pain (4 - 6)      No Known Allergies      LABS                        11.4   6.33  )-----------( 323      ( 08 Mar 2022 06:29 )             36.2     03-08    137  |  103  |  10  ----------------------------<  108<H>  4.0   |  22  |  0.36<L>    Ca    9.6      08 Mar 2022 06:29  Phos  4.3     03-08  Mg     2.0     03-08      PT/INR - ( 08 Mar 2022 06:29 )   PT: 15.2 sec;   INR: 1.27          PTT - ( 08 Mar 2022 06:29 )  PTT:35.3 sec          IMAGING/EKG/ETC Reviewed

## 2022-03-09 PROBLEM — R73.03 PREDIABETES: Chronic | Status: ACTIVE | Noted: 2022-02-12

## 2022-03-09 LAB
SARS-COV-2 RNA SPEC QL NAA+PROBE: NEGATIVE — SIGNIFICANT CHANGE UP
SARS-COV-2 RNA SPEC QL NAA+PROBE: SIGNIFICANT CHANGE UP

## 2022-03-09 PROCEDURE — 99233 SBSQ HOSP IP/OBS HIGH 50: CPT

## 2022-03-09 PROCEDURE — 99024 POSTOP FOLLOW-UP VISIT: CPT

## 2022-03-09 RX ORDER — APIXABAN 2.5 MG/1
2 TABLET, FILM COATED ORAL
Qty: 0 | Refills: 0 | DISCHARGE
Start: 2022-03-09

## 2022-03-09 RX ORDER — LABETALOL HCL 100 MG
1 TABLET ORAL
Qty: 0 | Refills: 0 | DISCHARGE
Start: 2022-03-09

## 2022-03-09 RX ORDER — HYDRALAZINE HCL 50 MG
1 TABLET ORAL
Qty: 0 | Refills: 0 | DISCHARGE
Start: 2022-03-09

## 2022-03-09 RX ORDER — ALPRAZOLAM 0.25 MG
0.5 TABLET ORAL
Qty: 0 | Refills: 0 | DISCHARGE

## 2022-03-09 RX ORDER — AMLODIPINE BESYLATE 2.5 MG/1
1 TABLET ORAL
Qty: 0 | Refills: 0 | DISCHARGE
Start: 2022-03-09

## 2022-03-09 RX ORDER — IPRATROPIUM/ALBUTEROL SULFATE 18-103MCG
3 AEROSOL WITH ADAPTER (GRAM) INHALATION
Qty: 0 | Refills: 0 | DISCHARGE
Start: 2022-03-09

## 2022-03-09 RX ORDER — POLYETHYLENE GLYCOL 3350 17 G/17G
17 POWDER, FOR SOLUTION ORAL
Qty: 0 | Refills: 0 | DISCHARGE
Start: 2022-03-09

## 2022-03-09 RX ORDER — ACETAMINOPHEN 500 MG
2 TABLET ORAL
Qty: 0 | Refills: 0 | DISCHARGE
Start: 2022-03-09

## 2022-03-09 RX ORDER — HYDROMORPHONE HYDROCHLORIDE 2 MG/ML
0.5 INJECTION INTRAMUSCULAR; INTRAVENOUS; SUBCUTANEOUS ONCE
Refills: 0 | Status: DISCONTINUED | OUTPATIENT
Start: 2022-03-09 | End: 2022-03-09

## 2022-03-09 RX ORDER — LANOLIN ALCOHOL/MO/W.PET/CERES
1 CREAM (GRAM) TOPICAL
Qty: 0 | Refills: 0 | DISCHARGE
Start: 2022-03-09

## 2022-03-09 RX ORDER — LACTOBACILLUS ACIDOPHILUS 100MM CELL
1 CAPSULE ORAL
Qty: 0 | Refills: 0 | DISCHARGE
Start: 2022-03-09

## 2022-03-09 RX ORDER — OXYCODONE HYDROCHLORIDE 5 MG/1
1 TABLET ORAL
Qty: 0 | Refills: 0 | DISCHARGE
Start: 2022-03-09

## 2022-03-09 RX ORDER — OXYCODONE HYDROCHLORIDE 5 MG/1
10 TABLET ORAL EVERY 4 HOURS
Refills: 0 | Status: DISCONTINUED | OUTPATIENT
Start: 2022-03-09 | End: 2022-03-11

## 2022-03-09 RX ADMIN — OXYCODONE HYDROCHLORIDE 5 MILLIGRAM(S): 5 TABLET ORAL at 20:30

## 2022-03-09 RX ADMIN — Medication 5 MILLIGRAM(S): at 18:42

## 2022-03-09 RX ADMIN — Medication 100 MILLIGRAM(S): at 03:19

## 2022-03-09 RX ADMIN — OXYCODONE HYDROCHLORIDE 5 MILLIGRAM(S): 5 TABLET ORAL at 20:05

## 2022-03-09 RX ADMIN — LOSARTAN POTASSIUM 100 MILLIGRAM(S): 100 TABLET, FILM COATED ORAL at 09:08

## 2022-03-09 RX ADMIN — HYDROMORPHONE HYDROCHLORIDE 0.5 MILLIGRAM(S): 2 INJECTION INTRAMUSCULAR; INTRAVENOUS; SUBCUTANEOUS at 22:02

## 2022-03-09 RX ADMIN — Medication 50 MILLIGRAM(S): at 15:10

## 2022-03-09 RX ADMIN — HYDROMORPHONE HYDROCHLORIDE 0.5 MILLIGRAM(S): 2 INJECTION INTRAMUSCULAR; INTRAVENOUS; SUBCUTANEOUS at 21:24

## 2022-03-09 RX ADMIN — Medication 5 MILLIGRAM(S): at 06:13

## 2022-03-09 RX ADMIN — APIXABAN 10 MILLIGRAM(S): 2.5 TABLET, FILM COATED ORAL at 11:37

## 2022-03-09 RX ADMIN — Medication 100 MILLIGRAM(S): at 11:37

## 2022-03-09 RX ADMIN — Medication 5 MILLIGRAM(S): at 21:24

## 2022-03-09 RX ADMIN — Medication 50 MILLIGRAM(S): at 21:24

## 2022-03-09 RX ADMIN — Medication 1 TABLET(S): at 11:37

## 2022-03-09 RX ADMIN — POLYETHYLENE GLYCOL 3350 17 GRAM(S): 17 POWDER, FOR SOLUTION ORAL at 18:42

## 2022-03-09 RX ADMIN — Medication 100 MILLIGRAM(S): at 18:41

## 2022-03-09 RX ADMIN — Medication 50 MILLIGRAM(S): at 06:12

## 2022-03-09 NOTE — CHART NOTE - NSCHARTNOTEFT_GEN_A_CORE
Admitting Diagnosis:   Patient is a 56y old  Female who presents with a chief complaint of ICH (09 Mar 2022 08:12)      PAST MEDICAL & SURGICAL HISTORY:  Hypertension    Pre-diabetes        Current Nutrition Order:   regular diet    PO Intake: Good (%) [ x  ]  Fair (50-75%) [   ] Poor (<25%) [   ]    GI Issues: Denies n/v/d/c + BM 3/7    Pain: Pain well managed. mild pain reported at incision    Skin Integrity: Adalberto 19, no PU or edema noted    Labs:   03-08    137  |  103  |  10  ----------------------------<  108<H>  4.0   |  22  |  0.36<L>    Ca    9.6      08 Mar 2022 06:29  Phos  4.3     03-08  Mg     2.0     03-08      CAPILLARY BLOOD GLUCOSE          Medications:  MEDICATIONS  (STANDING):  amLODIPine   Tablet 10 milliGRAM(s) Oral daily  apixaban 10 milliGRAM(s) Oral every 12 hours  bisacodyl 5 milliGRAM(s) Oral every 12 hours  hydrALAZINE 50 milliGRAM(s) Oral every 8 hours  influenza   Vaccine 0.5 milliLiter(s) IntraMuscular once  labetalol 100 milliGRAM(s) Oral every 8 hours  lactobacillus acidophilus 1 Tablet(s) Oral daily  losartan 100 milliGRAM(s) Oral daily  melatonin 5 milliGRAM(s) Oral at bedtime  polyethylene glycol 3350 17 Gram(s) Oral every 12 hours  senna 2 Tablet(s) Oral at bedtime    MEDICATIONS  (PRN):  acetaminophen     Tablet .. 650 milliGRAM(s) Oral every 6 hours PRN Temp greater or equal to 38C (100.4F), Mild Pain (1 - 3)  albuterol/ipratropium for Nebulization 3 milliLiter(s) Nebulizer every 6 hours PRN Shortness of Breath and/or Wheezing  ibuprofen  Tablet. 400 milliGRAM(s) Oral every 8 hours PRN Temp greater or equal to 38C (100.4F), Moderate Pain (4 - 6)  oxyCODONE    IR 5 milliGRAM(s) Oral every 4 hours PRN Moderate Pain (4 - 6)    Adm Anthropometrics:  · Height for BMI (FEET)	5 Feet  · Height for BMI (INCHES)	1 Inch(s)  · Height for BMI (CENTIMETERS)	154.94 Centimeter(s)  · Weight for BMI (lbs)	167 lb  · Weight for BMI (kg)	75.7 kg  · Body Mass Index	31.5   · Ideal Body Weight (lbs)	105   · Ideal Body Weight (kg)	47.6     Weight Change:  2/12 167lbs  2/18 160lbs  2/25 167.5lbs    No new wts in EMR. Please cont to obtain new wts biweekly as able    Estimated energy needs:  Ideal body weight used for calculations as pt >120% of IBW (%IBW: 159). Adjusted for age, slight increase PRO for, wound healing, suspected malnutrition.   Kcal (25-30 kcal/kg): 5436-6147 kcal  Protein (1.2-1.4 g/kg pro): 57-67g pro  Fluids (25-30 ml/kg): 8590-6285 ml    Subjective:   57 y/o female with PMHx of HTN, pre-DM presents transferred from [facility] after c/o severe headache and nausea being found down by son covered in vomit. Initial CTH revealed left thalamic parenchymal hematoma with intraventricular hemorrhage. ICH score 2. NIHSS 18. s/p R frontal large bore EVD placement 2/12. Now s/p R frontal EVD placement- dropped 2/13. Pt weaned and extubated 2/14. S/p formal SLP eval 2/16 with recs for NPO + NGT except for tsp of water pending reassessment. Follow Up FEES 2/17 with recs for minced and moist diet. EN d/c 02/17 once diet was advanced.  S/p VPS placement (Certas @ 5) 3/2.  CTH stabled, stepdown. Pending rehab placement.    Pt seen resting in bed upon assessment, eating icee. Pt reports feeling fine overall. Reports good appetite, eating 100% of meals as observed on tray. Denies abd discomfort, no distention. No reports of n/v/d/c. Cont adequate PO and lean protein as emphasized. RD to follow per protocol.    Previous Nutrition Diagnosis:  Moderate PCM RT suspected inadequate energy intake AEB meeting <75% est needs x1 week, -7lb/ 4% wt loss x1 week.     Active [ x  ]  Resolved [   ]    If resolved, new PES:     Goal: Pt to meet >75% EER with good tolerance via tolerable route.    Recommendations:  1. Continue with regular diet  >>SLP to reassess as needed  >>can cont Ensure if pt amenable  2. Pain and BM regimen per team  3. Monitor BMP, lytes, replete prn    Education: No new edu provided. Pt meeting all needs    Risk Level: High [   ] Moderate [  x ] Low [   ]

## 2022-03-09 NOTE — PROGRESS NOTE ADULT - SUBJECTIVE AND OBJECTIVE BOX
HPI:  55 y/o female with PMHx of HTN, pre-DM presents transferred from [facility] for intracranial hemorrhage. As per [place] ED provider and son, patient called son around 7:30-8:00AM c/o severe headache and nausea. Son immediately rushed to her house and found her out of bed, moaning and covered in her own vomit. During transit, EMS reported SBP within 240s with intractable vomiting and given Zofran 8mg. Upon arrival to [place] ED, GCS 7, SBP within 180s, patient was given Decadron 10mg, Keppra 1g, started on a Cardene drip, Propofol, Mannitol 1mg/kg. 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 Patient was intubated for airway support and transferred to [facility] for further management. ICH2, NIHSS 8.  Denies use of anticoagulants/antiplatelets.  (12 Feb 2022 16:36)    OVERNIGHT EVENTS: Neuro stable, severe headache but at neurologic baseline, given dilaudid 0.5mg with relief    HOSPITAL COURSE:  2/12: transferred from [place]. R frontal EVD and R radial a-line placed. pend CTH/CTA. s/p 1L bolus for elevated lactate.   2/13: S/p R frontal EVD placedment @85poY1M draining well. O/n pt w/ episode of possible storming; tachy, HTN, agitated, temp 100.2F, given 2 versed and 50mcg fentanyl w/ improvement in symptoms Neuro exam stable. Pt remains intubated and sedated, AN L>R. Trend lactate and abg. Amlodipine 5mg QD started for SBP goal < 140, cardene dc'd.  EVD dropped 2/13 at 1pm. propanolol and fent standing added for neuro storming, propofol switched to precedex, with resultant hypotension. Propanolol dc'd, fent changed to prn and precedex off, 1 L bolus given, levo prn   2/14: TOSHA overnight. Patient remains on propofol. EVD open at 50cvH5R. ICPs WNL. Neuro exam stable. increased bowel regimen. started SQL. extubated on precedex. given 0.5 Ativan + Fentanyl pushes PRN for agitation. 500cc NS bolus.   2/15: BD#4.  On HFNC d/t desat to 88% on 70% non-rebreather. On 0.5 precedex   2/16: BD5, TOSHA overnight, on 4L NC, s/p vomiting yesterday, TF being held, formal S/S pending, off precedex. EVD open @ 5. Started on 3% at 30, tmax 100.7   2/17: BD# 6   tachycardic, PE protocol. cardene gtt. neuro unchanged. EVD @ 5cmH2O. 1g IV tylenol for 102.7 fever, duonebs standing to PRN. UA with reflux ordered. Hydralazine 25mg TID added, increased to 50q8. 500cc NS bolus x2. started on nystatin for thrush. started oxy 5q6 PRN for pain for tachycardia. repeat Na 146, decreased 3% @50, started salt tabs 2q6. FEES done. started minced and moist diet  2/18: BD 7. 3% Increased 75 cc/hr  2/19: BD8. TOSHA o/n neuro stable. 3%@75cc/hr. central line placed for vascular access  2/20: BD9. tmax 101.3 o/n, neuro stable. 3%@100cc/hr. cardene gtt restarted. Ibuprofen x1 given for fever, ok per Dr. D'Amico. Vanc d/c'd per ID (MRSA neg), treating UTI not bacteremia, likely contaminant. Linezolid started for suspected thrombophlebitis and GS + coverage.   2/21: BD#10 Overnight, IV tylenol given for headache. Right radial arterial line re-wired then discontinued. EVD clamped at 8AM. ICPs WNL. Neuro exam stable. Remains on 3% lowered to 75cc/hr. CTH stable, EVD raised to 20. Febrile 102 - CSF and blood cx sent. EVD lowered from 20 to 5 to drain more CSF and blood, increased lethargy. Ceftriaxone changed to cefepime per ID for broader coverage.   2/22: BD#11 TOSHA o/n. neuro at baseline. EVD at 72ymC3Q, 3% kept at 25cc/hr   2/23: BD#12 TOSHA overnight. Remain on 3% saline. EVD [id number]. ICPs WNL. Neuro exam stable.  2/24: BD#13 TOSHA overnight, Remains on 3% saline. EVD raised to 20, gallium scan done with increased uptake in gluteals but normal physical exam  2/25: BD 14. TOSHA overnight. Remains on hypertonics. EVD clamped at 20 yesterday evening, possible shunt in the afternoon. Patient lethargic with worsening headache. EVD reopened at 10, Linezolid and cefepime d/c'ed   2/26: BD 15. increasing lethargic overnight, CTH stable and reviewed with Dr. D'Amico. Remains on a course of hypertonics. EVD reopened at 10 yesterday morning due to patient experiencing increased lethargy and headaches. EEG for fluctuating mental status - left hemisphere slowing, no seizures. 3% d/c'ed remains on salt tabs   2/27: BD 16. TOSHA overnight. vEEG placed yesterday for waxing/waning exam. EVD remains open at 10. plan is likely to shunt next week. Remains with intermittent periods of lethargy. EEG negative for seizure, d/c'ed. Restarted on 3% @25. LIJ noticed to be dislodged from proper position, notified RN toremove, hemostasis achieved with no hematoma formation noted. Patient with clear breath sounds and no respiratory distress. Additional peripheral IV.   2/28: BD 17, Repeat CTH this am stable, EVD clamped at 10:30AM, remains on 3% at 25.  3/1: BD 18, EVD clamped overnight, neuro remained stable.   3/2: BD 19, POD0 s/p VPS. LE dopplers stable. CTH stable. stepdown. dilaudid and fentanyl needed for severe h/a. Given dilaudid 0.25 mg @ 10 PM.   3/3: BD 20. POD 1 R VPS placement (Certas @ 5). TOSHA o/n. stepdown status. florinef weaned  3/4: BD 21, POD2 R VPS placement (Certas @ 5). Febrile overnight to 103, pancultured. UE dopplers +new L IJ DVT. started on heparin gtt. florienf d/c.   3/5: BD 22, POD3 R VPS (Certas @ 5). On heparin gtt @ 14 for L IJ DVT  3/6: BD 23, POD4 R VPS (Certas @5), PTT 38, heparin gtt increased to 17  3/7: BD 24, POD 5 VPS Certas at 5. TOSHA overnight. remains on hep gtt for L IJ DVT. pending rehab placement. repeat CTH stable, dc;d heparin gtt started eliquis. salt tabs dc'd  3/8: BD25, POD6 VPS, remains on eliquis, TOSHA overnight, pending AR. Downgraded to regional.  3/9: BD26, POD7 VPS, TOSHA overnight, dilaudid 0.5mg for severe headache x 1 with improvement.       Vital Signs Last 24 Hrs  T(C): 37.3 (08 Mar 2022 21:31), Max: 37.3 (08 Mar 2022 21:31)  T(F): 99.2 (08 Mar 2022 21:31), Max: 99.2 (08 Mar 2022 21:31)  HR: 92 (08 Mar 2022 21:31) (76 - 97)  BP: 112/66 (08 Mar 2022 21:31) (103/71 - 125/71)  BP(mean): 90 (08 Mar 2022 11:20) (76 - 94)  RR: 17 (08 Mar 2022 21:31) (17 - 18)  SpO2: 94% (08 Mar 2022 21:31) (94% - 97%)    I&O's Summary    07 Mar 2022 07:01  -  08 Mar 2022 07:00  --------------------------------------------------------  IN: 0 mL / OUT: 1300 mL / NET: -1300 mL    08 Mar 2022 07:01  -  09 Mar 2022 01:09  --------------------------------------------------------  IN: 350 mL / OUT: 350 mL / NET: 0 mL        PHYSICAL EXAM:  General: NAD, pt is comfortably sitting up in bed, on room air  HEENT: CN II-XII grossly intact, PERRL 3mm briskly reactive, EOMI b/l, face symmetric, tongue midline, neck FROM  Cardiovascular: RRR, normal S1 and S2   Respiratory: lungs CTAB, no wheezing, rhonchi, or crackles   GI: normoactive BS to auscultation, abd soft, NTND, +Abdominal incisions c/d/i   Neuro: A&Ox2-3 (sometimes forget year but knows president), No aphasia, speech clear, no dysmetria, +Right pronator drift. Follows commands.AN x4 spontaneously, 5/5 strength in all extremities throughout except RUE 4+/5. SILT throughout   Extremities: distal pulses 2+ x4  Wound/incision: +Right VPS incision with staples in place, C/D/I      TUBES/LINES:  [] Haley  [] Lumbar Drain  [] Wound Drains  [] Others      DIET:  [] NPO  [X] Mechanical  [] Tube feeds    LABS:                        11.4   6.33  )-----------( 323      ( 08 Mar 2022 06:29 )             36.2     03-08    137  |  103  |  10  ----------------------------<  108<H>  4.0   |  22  |  0.36<L>    Ca    9.6      08 Mar 2022 06:29  Phos  4.3     03-08  Mg     2.0     03-08      PT/INR - ( 08 Mar 2022 06:29 )   PT: 15.2 sec;   INR: 1.27          PTT - ( 08 Mar 2022 06:29 )  PTT:35.3 sec        CAPILLARY BLOOD GLUCOSE          Drug Levels: [] N/A    CSF Analysis: [] N/A      Allergies    No Known Allergies    Intolerances      MEDICATIONS:  Antibiotics:    Neuro:  acetaminophen     Tablet .. 650 milliGRAM(s) Oral every 6 hours PRN  ibuprofen  Tablet. 400 milliGRAM(s) Oral every 8 hours PRN  melatonin 5 milliGRAM(s) Oral at bedtime  oxyCODONE    IR 5 milliGRAM(s) Oral every 4 hours PRN    Anticoagulation:  apixaban 10 milliGRAM(s) Oral every 12 hours    OTHER:  albuterol/ipratropium for Nebulization 3 milliLiter(s) Nebulizer every 6 hours PRN  amLODIPine   Tablet 10 milliGRAM(s) Oral daily  bisacodyl 5 milliGRAM(s) Oral every 12 hours  hydrALAZINE 50 milliGRAM(s) Oral every 8 hours  influenza   Vaccine 0.5 milliLiter(s) IntraMuscular once  labetalol 100 milliGRAM(s) Oral every 8 hours  lactobacillus acidophilus 1 Tablet(s) Oral daily  losartan 100 milliGRAM(s) Oral daily  polyethylene glycol 3350 17 Gram(s) Oral every 12 hours  senna 2 Tablet(s) Oral at bedtime    IVF:    CULTURES:  Culture Results:   No growth at 5 days. (03-03 @ 22:02)  Culture Results:   No growth at 5 days. (03-03 @ 22:02)    RADIOLOGY & ADDITIONAL TESTS:      ASSESSMENT:  Patient 55 y/o female with PMHx of HTN, pre-DM w/ left thalamic parenchymal hematoma with extensive intraventricular hemorrhage. ICH score 2. NIHSS 18. s/p R frontal large bore EVD placement 2/12/22. Now s/p VPS Certas @5 3/2/22 course c/b by L IJ thrombus, started on eliquis.   .     HEAD BLEED    Handoff    MEWS Score    No pertinent past medical history    Hypertension    Pre-diabetes    Intraventricular hemorrhage    Intraventricular hemorrhage    Creation of ventriculo-peritoneal shunt    ICH (intracerebral hemorrhage)    ICH (intracerebral hemorrhage)    HTN (hypertension)    Pre-diabetes    Anemia due to acute blood loss    Thrombocytosis    Jugular vein thrombosis    Insertion of intravenous catheter with ultrasound guidance    Insertion of intravenous catheter with ultrasound guidance    Insertion, catheter, intravenous    Ultrasound guidance for vascular access    Creation of ventriculo-peritoneal shunt    Insertion, needle, vein    US guided vascular access    HTN (hypertension)    Hydrocephalus    Pre-diabetes    Anemia due to acute blood loss    Thrombocytosis    Jugular vein thrombosis    Hyponatremia    [email or web address]        PLAN:  NEURO:  - neuro/vital q 8   - psotop CTH 3/2, stable, lethargic s/p fever 3/4 CTH stable, CTH 3/7 stable   - pain control PRN  - vEEG for fluctuating mental status - left hemisphere slowing, no seizures. (2/26-2/27)    Cardio:   - SBP goal 100-160   - cont. amlodipine 10mg QD, losartan 100mg, Hydralazine 50 mg TID, labetalol 100 q8h   - echo 2/14: LVH, EF: 65-70%     PULM:  - duonebs PRN   - IS     Renal:   - Na goal 135-145  - salt tabs 2 q6 dc'd 3/7     GI:  - regular diet w/ ensure   - bowel regimen, last BM 3/7    HEME:  - SCDs only L leg/SQL   - LE dopplers 2/23: R IM calf thrombus, repeat dopplers 2/28 showing persistence but no propogation, repeat 3/2 stable   - UE dopp 2/23: superficial clots b/l cephalic, wam compresses and elevation, repeat showing L IJ DVT- heparin gtt PTT @ 14 (goal 58-99) dc'd 3/7  - started eliquis 10mg BID x7 days, then Eliqius 5mg on 3/14    ENDO:  - prediabetic, a1c 5.8, glucose under control, no insulin req   - TSH wnl     ID:  - blood cx 2/16 +staph epidermidis  - urine cx 2/17 +citrobacter koseri and klebsiella  - CSF cx 2/21 - pending. NGTD  - ID following s/p: vanco [2/18-2/20], ceftriaxone for UTI [2/19-22], cefepime [2/22- 2/25. linezolid for phlebilitis [2/20- 2/25]   - Gallium scan with increased uptake in gluteal region, no cellulitis on physicial exam, probably d/t positioning   -febrile 3/3, pancultured    PSYCH:   - pt takes xanax at home    Dispo:   - SDU status/ PT/OT   - full code  - family updated with plan    PT recs: AR    D/w Dr. D'Amico     Assessment:  Present when checked    []  GCS  E   V  M     Heart Failure: []Acute, [] acute on chronic , []chronic  Heart Failure:  [] Diastolic (HFpEF), [] Systolic (HFrEF), []Combined (HFpEF and HFrEF), [] RHF, [] Pulm HTN, [] Other    [] ZAIDA, [] ATN, [] AIN, [] other  [] CKD1, [] CKD2, [] CKD 3, [] CKD 4, [] CKD 5, []ESRD    Encephalopathy: [] Metabolic, [] Hepatic, [] toxic, [] Neurological, [] Other    Abnormal Nurtitional Status: [] malnurtition (see nutrition note), [ ]underweight: BMI < 19, [] morbid obesity: BMI >40, [] Cachexia    [] Sepsis  [] hypovolemic shock,[] cardiogenic shock, [] hemorrhagic shock, [] neuogenic shock  [] Acute Respiratory Failure  []Cerebral edema, [] Brain compression/ herniation,   [] Functional quadriplegia  [] Acute blood loss anemia

## 2022-03-09 NOTE — PROGRESS NOTE ADULT - SUBJECTIVE AND OBJECTIVE BOX
O/N Events: TOSHA  Subjective/ROS: No complaints. Denies HA, CP, SOB, n/v, changes in bowel/urinary habits.  12pt ROS otherwise negative.    VITALS  Vital Signs Last 24 Hrs  T(C): 37.3 (09 Mar 2022 05:05), Max: 37.3 (08 Mar 2022 21:31)  T(F): 99.1 (09 Mar 2022 05:05), Max: 99.2 (08 Mar 2022 21:31)  HR: 96 (09 Mar 2022 05:05) (78 - 97)  BP: 122/70 (09 Mar 2022 05:05) (106/66 - 125/71)  BP(mean): 90 (08 Mar 2022 11:20) (90 - 94)  RR: 17 (09 Mar 2022 05:05) (17 - 18)  SpO2: 95% (09 Mar 2022 05:05) (94% - 96%)    CAPILLARY BLOOD GLUCOSE    PHYSICAL EXAM  General: A&Ox3; NAD  Head: NC/AT; PERRL; EOMI; anicteric sclera  Neck: Supple; no JVD  Respiratory: CTA B/L; no wheezes/crackles/rales auscultated w/ good air movement  Cardiovascular: Regular rhythm/rate; S1/S2; no gallops or murmurs auscultated  Gastrointestinal: Soft; NTND w/out rebound tenderness or guarding; bowel sounds normal  Extremities: WWP; no edema or cyanosis; radial/pedal pulses palpable  Neurological:  CNII-XII grossly intact; Right sided weakness, slight r pronator drift  Vasc: +2 b/l radial   Skin: No rashes, crani wound dressing c/d/i, abdominal wound c/d/i   Psych: Appropriate affect      MEDICATIONS  (STANDING):  amLODIPine   Tablet 10 milliGRAM(s) Oral daily  apixaban 10 milliGRAM(s) Oral every 12 hours  bisacodyl 5 milliGRAM(s) Oral every 12 hours  hydrALAZINE 50 milliGRAM(s) Oral every 8 hours  influenza   Vaccine 0.5 milliLiter(s) IntraMuscular once  labetalol 100 milliGRAM(s) Oral every 8 hours  lactobacillus acidophilus 1 Tablet(s) Oral daily  losartan 100 milliGRAM(s) Oral daily  melatonin 5 milliGRAM(s) Oral at bedtime  polyethylene glycol 3350 17 Gram(s) Oral every 12 hours  senna 2 Tablet(s) Oral at bedtime    MEDICATIONS  (PRN):  acetaminophen     Tablet .. 650 milliGRAM(s) Oral every 6 hours PRN Temp greater or equal to 38C (100.4F), Mild Pain (1 - 3)  albuterol/ipratropium for Nebulization 3 milliLiter(s) Nebulizer every 6 hours PRN Shortness of Breath and/or Wheezing  ibuprofen  Tablet. 400 milliGRAM(s) Oral every 8 hours PRN Temp greater or equal to 38C (100.4F), Moderate Pain (4 - 6)  oxyCODONE    IR 5 milliGRAM(s) Oral every 4 hours PRN Moderate Pain (4 - 6)      No Known Allergies      LABS                        11.4   6.33  )-----------( 323      ( 08 Mar 2022 06:29 )             36.2     03-08    137  |  103  |  10  ----------------------------<  108<H>  4.0   |  22  |  0.36<L>    Ca    9.6      08 Mar 2022 06:29  Phos  4.3     03-08  Mg     2.0     03-08      PT/INR - ( 08 Mar 2022 06:29 )   PT: 15.2 sec;   INR: 1.27          PTT - ( 08 Mar 2022 06:29 )  PTT:35.3 sec          IMAGING/EKG/ETC no complaints.

## 2022-03-10 PROCEDURE — 99232 SBSQ HOSP IP/OBS MODERATE 35: CPT

## 2022-03-10 PROCEDURE — 99024 POSTOP FOLLOW-UP VISIT: CPT

## 2022-03-10 RX ORDER — OXYCODONE HYDROCHLORIDE 5 MG/1
5 TABLET ORAL EVERY 4 HOURS
Refills: 0 | Status: DISCONTINUED | OUTPATIENT
Start: 2022-03-10 | End: 2022-03-11

## 2022-03-10 RX ADMIN — Medication 5 MILLIGRAM(S): at 17:50

## 2022-03-10 RX ADMIN — LOSARTAN POTASSIUM 100 MILLIGRAM(S): 100 TABLET, FILM COATED ORAL at 09:12

## 2022-03-10 RX ADMIN — Medication 50 MILLIGRAM(S): at 13:27

## 2022-03-10 RX ADMIN — Medication 5 MILLIGRAM(S): at 06:05

## 2022-03-10 RX ADMIN — Medication 100 MILLIGRAM(S): at 13:27

## 2022-03-10 RX ADMIN — APIXABAN 10 MILLIGRAM(S): 2.5 TABLET, FILM COATED ORAL at 11:13

## 2022-03-10 RX ADMIN — Medication 50 MILLIGRAM(S): at 21:25

## 2022-03-10 RX ADMIN — Medication 100 MILLIGRAM(S): at 06:05

## 2022-03-10 RX ADMIN — Medication 50 MILLIGRAM(S): at 06:06

## 2022-03-10 RX ADMIN — APIXABAN 10 MILLIGRAM(S): 2.5 TABLET, FILM COATED ORAL at 00:23

## 2022-03-10 RX ADMIN — Medication 650 MILLIGRAM(S): at 19:57

## 2022-03-10 RX ADMIN — AMLODIPINE BESYLATE 10 MILLIGRAM(S): 2.5 TABLET ORAL at 09:12

## 2022-03-10 RX ADMIN — OXYCODONE HYDROCHLORIDE 10 MILLIGRAM(S): 5 TABLET ORAL at 09:43

## 2022-03-10 RX ADMIN — Medication 400 MILLIGRAM(S): at 22:24

## 2022-03-10 RX ADMIN — SENNA PLUS 2 TABLET(S): 8.6 TABLET ORAL at 21:25

## 2022-03-10 RX ADMIN — Medication 1 TABLET(S): at 11:13

## 2022-03-10 RX ADMIN — Medication 5 MILLIGRAM(S): at 21:26

## 2022-03-10 RX ADMIN — POLYETHYLENE GLYCOL 3350 17 GRAM(S): 17 POWDER, FOR SOLUTION ORAL at 17:50

## 2022-03-10 RX ADMIN — OXYCODONE HYDROCHLORIDE 10 MILLIGRAM(S): 5 TABLET ORAL at 09:13

## 2022-03-10 RX ADMIN — POLYETHYLENE GLYCOL 3350 17 GRAM(S): 17 POWDER, FOR SOLUTION ORAL at 06:06

## 2022-03-10 RX ADMIN — Medication 400 MILLIGRAM(S): at 21:25

## 2022-03-10 NOTE — PROGRESS NOTE ADULT - SUBJECTIVE AND OBJECTIVE BOX
HPI:  55 y/o female with PMHx of HTN, pre-DM presents transferred from [facility] for intracranial hemorrhage. As per [place] ED provider and son, patient called son around 7:30-8:00AM c/o severe headache and nausea. Son immediately rushed to her house and found her out of bed, moaning and covered in her own vomit. During transit, EMS reported SBP within 240s with intractable vomiting and given Zofran 8mg. Upon arrival to [place] ED, GCS 7, SBP within 180s, patient was given Decadron 10mg, Keppra 1g, started on a Cardene drip, Propofol, Mannitol 1mg/kg. 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 Patient was intubated for airway support and transferred to [facility] for further management. ICH2, NIHSS 8.  Denies use of anticoagulants/antiplatelets.  (12 Feb 2022 16:36)    OVERNIGHT EVENTS: TOSHA overnight, neuro stable.    HOSPITAL COURSE:  2/12: transferred from [place]. R frontal EVD and R radial a-line placed. pend CTH/CTA. s/p 1L bolus for elevated lactate.   2/13: S/p R frontal EVD placedment @85dkT1F draining well. O/n pt w/ episode of possible storming; tachy, HTN, agitated, temp 100.2F, given 2 versed and 50mcg fentanyl w/ improvement in symptoms Neuro exam stable. Pt remains intubated and sedated, AN L>R. Trend lactate and abg. Amlodipine 5mg QD started for SBP goal < 140, cardene dc'd.  EVD dropped 2/13 at 1pm. propanolol and fent standing added for neuro storming, propofol switched to precedex, with resultant hypotension. Propanolol dc'd, fent changed to prn and precedex off, 1 L bolus given, levo prn   2/14: TOSHA overnight. Patient remains on propofol. EVD open at [id number]. ICPs WNL. Neuro exam stable. increased bowel regimen. started SQL. extubated on precedex. given 0.5 Ativan + Fentanyl pushes PRN for agitation. 500cc NS bolus.   2/15: BD#4.  On HFNC d/t desat to 88% on 70% non-rebreather. On 0.5 precedex   2/16: BD5, TOSHA overnight, on 4L NC, s/p vomiting yesterday, TF being held, formal S/S pending, off precedex. EVD open @ 5. Started on 3% at 30, tmax 100.7   2/17: BD# 6   tachycardic, PE protocol. cardene gtt. neuro unchanged. EVD @ 5cmH2O. 1g IV tylenol for 102.7 fever, duonebs standing to PRN. UA with reflux ordered. Hydralazine 25mg TID added, increased to 50q8. 500cc NS bolus x2. started on nystatin for thrush. started oxy 5q6 PRN for pain for tachycardia. repeat Na 146, decreased 3% @50, started salt tabs 2q6. FEES done. started minced and moist diet  2/18: BD 7. 3% Increased 75 cc/hr  2/19: BD8. TOSHA o/n neuro stable. 3%@75cc/hr. central line placed for vascular access  2/20: BD9. tmax 101.3 o/n, neuro stable. 3%@100cc/hr. cardene gtt restarted. Ibuprofen x1 given for fever, ok per Dr. D'Amico. Vanc d/c'd per ID (MRSA neg), treating UTI not bacteremia, likely contaminant. Linezolid started for suspected thrombophlebitis and GS + coverage.   2/21: BD#10 Overnight, IV tylenol given for headache. Right radial arterial line re-wired then discontinued. EVD clamped at 8AM. ICPs WNL. Neuro exam stable. Remains on 3% lowered to 75cc/hr. CTH stable, EVD raised to 20. Febrile 102 - CSF and blood cx sent. EVD lowered from 20 to 5 to drain more CSF and blood, increased lethargy. Ceftriaxone changed to cefepime per ID for broader coverage.   2/22: BD#11 TOSHA o/n. neuro at baseline. EVD at 09ahC5D, 3% kept at 25cc/hr   2/23: BD#12 TOSHA overnight. Remain on 3% saline. EVD [id number]. ICPs WNL. Neuro exam stable.  2/24: BD#13 TOSHA overnight, Remains on 3% saline. EVD raised to 20, gallium scan done with increased uptake in gluteals but normal physical exam  2/25: BD 14. TOSHA overnight. Remains on hypertonics. EVD clamped at 20 yesterday evening, possible shunt in the afternoon. Patient lethargic with worsening headache. EVD reopened at 10, Linezolid and cefepime d/c'ed   2/26: BD 15. increasing lethargic overnight, CTH stable and reviewed with Dr. D'Amico. Remains on a course of hypertonics. EVD reopened at 10 yesterday morning due to patient experiencing increased lethargy and headaches. EEG for fluctuating mental status - left hemisphere slowing, no seizures. 3% d/c'ed remains on salt tabs   2/27: BD 16. TOSHA overnight. vEEG placed yesterday for waxing/waning exam. EVD remains open at 10. plan is likely to shunt next week. Remains with intermittent periods of lethargy. EEG negative for seizure, d/c'ed. Restarted on 3% @25. LIJ noticed to be dislodged from proper position, notified RN toremove, hemostasis achieved with no hematoma formation noted. Patient with clear breath sounds and no respiratory distress. Additional peripheral IV.   2/28: BD 17, Repeat CTH this am stable, EVD clamped at 10:30AM, remains on 3% at 25.  3/1: BD 18, EVD clamped overnight, neuro remained stable.   3/2: BD 19, POD0 s/p VPS. LE dopplers stable. CTH stable. stepdown. dilaudid and fentanyl needed for severe h/a. Given dilaudid 0.25 mg @ 10 PM.   3/3: BD 20. POD 1 R VPS placement (Certas @ 5). TOSHA o/n. stepdown status. florinef weaned  3/4: BD 21, POD2 R VPS placement (Certas @ 5). Febrile overnight to 103, pancultured. UE dopplers +new L IJ DVT. started on heparin gtt. florienf d/c.   3/5: BD 22, POD3 R VPS (Certas @ 5). On heparin gtt @ 14 for L IJ DVT  3/6: BD 23, POD4 R VPS (Certas @5), PTT 38, heparin gtt increased to 17  3/7: BD 24, POD 5 VPS Certas at 5. TOSHA overnight. remains on hep gtt for L IJ DVT. pending rehab placement. repeat CTH stable, dc;d heparin gtt started eliquis. salt tabs dc'd  3/8: BD25, POD6 VPS, remains on eliquis, TOSHA overnight, pending [facility]. Downgraded to regional.  3/9: BD26, POD7 VPS, TOSHA overnight, dilaudid 0.5mg for severe headache x 1 with improvement. no bed at [facility] today. increased oxy to 5/10 for moderate/severe   3/10: BD27, POD8 VPS, TOSHA overnight, pending bed at [facility] today      Vital Signs Last 24 Hrs  T(C): 36.7 (09 Mar 2022 20:30), Max: 37.3 (09 Mar 2022 05:05)  T(F): 98 (09 Mar 2022 20:30), Max: 99.1 (09 Mar 2022 05:05)  HR: 96 (09 Mar 2022 20:30) (85 - 100)  BP: 126/70 (09 Mar 2022 20:30) (104/60 - 130/66)  BP(mean): --  RR: 17 (09 Mar 2022 20:30) (16 - 17)  SpO2: 98% (09 Mar 2022 20:30) (94% - 98%)    I&O's Summary    08 Mar 2022 07:01  -  09 Mar 2022 07:00  --------------------------------------------------------  IN: 350 mL / OUT: 550 mL / NET: -200 mL    09 Mar 2022 07:01  -  10 Mar 2022 00:55  --------------------------------------------------------  IN: 940 mL / OUT: 1250 mL / NET: -310 mL        PHYSICAL EXAM:  General: NAD, pt is comfortably sitting up in bed, on room air  HEENT: CN II-XII grossly intact, PERRL 3mm briskly reactive, EOMI b/l, face symmetric, tongue midline, neck FROM  Cardiovascular: RRR, normal S1 and S2   Respiratory: lungs CTAB, no wheezing, rhonchi, or crackles   GI: normoactive BS to auscultation, abd soft, NTND, +Abdominal incisions c/d/i   Neuro: A&Ox2-3 (sometimes forget year but knows president), No aphasia, speech clear, no dysmetria, +Right pronator drift. Follows commands.AN x4 spontaneously, 5/5 strength in all extremities throughout except RUE 4+/5. SILT throughout   Extremities: distal pulses 2+ x4  Wound/incision: +Right VPS incision with staples in place, C/D/I    TUBES/LINES:  [] Haley  [] Lumbar Drain  [] Wound Drains  [] Others      DIET:  [] NPO  [X] Mechanical  [] Tube feeds    LABS:                        11.4   6.33  )-----------( 323      ( 08 Mar 2022 06:29 )             36.2     03-08    137  |  103  |  10  ----------------------------<  108<H>  4.0   |  22  |  0.36<L>    Ca    9.6      08 Mar 2022 06:29  Phos  4.3     03-08  Mg     2.0     03-08      PT/INR - ( 08 Mar 2022 06:29 )   PT: 15.2 sec;   INR: 1.27          PTT - ( 08 Mar 2022 06:29 )  PTT:35.3 sec        CAPILLARY BLOOD GLUCOSE          Drug Levels: [] N/A    CSF Analysis: [] N/A      Allergies    No Known Allergies    Intolerances      MEDICATIONS:  Antibiotics:    Neuro:  acetaminophen     Tablet .. 650 milliGRAM(s) Oral every 6 hours PRN  ibuprofen  Tablet. 400 milliGRAM(s) Oral every 8 hours PRN  melatonin 5 milliGRAM(s) Oral at bedtime  oxyCODONE    IR 10 milliGRAM(s) Oral every 4 hours PRN  oxyCODONE    IR 5 milliGRAM(s) Oral every 4 hours PRN    Anticoagulation:  apixaban 10 milliGRAM(s) Oral every 12 hours    OTHER:  albuterol/ipratropium for Nebulization 3 milliLiter(s) Nebulizer every 6 hours PRN  amLODIPine   Tablet 10 milliGRAM(s) Oral daily  bisacodyl 5 milliGRAM(s) Oral every 12 hours  hydrALAZINE 50 milliGRAM(s) Oral every 8 hours  influenza   Vaccine 0.5 milliLiter(s) IntraMuscular once  labetalol 100 milliGRAM(s) Oral every 8 hours  lactobacillus acidophilus 1 Tablet(s) Oral daily  losartan 100 milliGRAM(s) Oral daily  polyethylene glycol 3350 17 Gram(s) Oral every 12 hours  senna 2 Tablet(s) Oral at bedtime    IVF:    CULTURES:  Culture Results:   No growth at 5 days. (03-03 @ 22:02)  Culture Results:   No growth at 5 days. (03-03 @ 22:02)    RADIOLOGY & ADDITIONAL TESTS:      ASSESSMENT:  Patient 55 y/o female with PMHx of HTN, pre-DM w/ left thalamic parenchymal hematoma with extensive intraventricular hemorrhage. ICH score 2. NIHSS 18. s/p R frontal large bore EVD placement 2/12/22. Now s/p VPS Certas @5 3/2/22, course c/b by L IJ thrombus, started on eliquis.     HEAD BLEED    Handoff    MEWS Score    No pertinent past medical history    Hypertension    Pre-diabetes    Intraventricular hemorrhage    Intraventricular hemorrhage    Creation of ventriculo-peritoneal shunt    ICH (intracerebral hemorrhage)    ICH (intracerebral hemorrhage)    HTN (hypertension)    Pre-diabetes    Anemia due to acute blood loss    Thrombocytosis    Jugular vein thrombosis    Insertion of intravenous catheter with ultrasound guidance    Insertion of intravenous catheter with ultrasound guidance    Insertion, catheter, intravenous    Ultrasound guidance for vascular access    Creation of ventriculo-peritoneal shunt    Insertion, needle, vein    US guided vascular access    HTN (hypertension)    Hydrocephalus    Pre-diabetes    Anemia due to acute blood loss    Thrombocytosis    Jugular vein thrombosis    Hyponatremia    [email or web address]        PLAN:  NEURO:  - neuro/vital q 8   - psotop CTH 3/2, stable, lethargic s/p fever 3/4 CTH stable, CTH 3/7 stable   - pain control PRN  - vEEG for fluctuating mental status - left hemisphere slowing, no seizures. (2/26-2/27)    Cardio:   - SBP goal 100-160   - cont. amlodipine 10mg QD, losartan 100mg, Hydralazine 50 mg TID, labetalol 100 q8h   - echo 2/14: LVH, EF: 65-70%     PULM:  - duonebs PRN   - IS     Renal:   - Na goal 135-145  - salt tabs 2 q6 dc'd 3/7     GI:  - regular diet w/ ensure   - bowel regimen, last BM 3/7    HEME:  - SCDs only L leg/SQL   - LE dopplers 2/23: R IM calf thrombus, repeat dopplers 2/28 showing persistence but no propogation, repeat 3/2 stable   - UE dopp 2/23: superficial clots b/l cephalic, wam compresses and elevation, repeat showing L IJ DVT- heparin gtt PTT @ 14 (goal 58-99) dc'd 3/7  - started eliquis 10mg BID x7 days, then Eliqius 5mg on 3/14    ENDO:  - prediabetic, a1c 5.8, glucose under control, no insulin req   - TSH wnl     ID:  - blood cx 2/16 +staph epidermidis  - urine cx 2/17 +citrobacter koseri and klebsiella  - CSF cx 2/21 - pending. NGTD  - ID following s/p: vanco [2/18-2/20], ceftriaxone for UTI [2/19-22], cefepime [2/22- 2/25. linezolid for phlebilitis [2/20- 2/25]   - Gallium scan with increased uptake in gluteal region, no cellulitis on physicial exam, probably d/t positioning   -febrile 3/3, pancultured    PSYCH:   - pt takes xanax at home    Dispo:   - SDU status/ PT/OT   - full code  - family updated with plan    PT recs: [facility]    D/w Dr. D'Amico     Assessment:  Present when checked    []  GCS  E   V  M     Heart Failure: []Acute, [] acute on chronic , []chronic  Heart Failure:  [] Diastolic (HFpEF), [] Systolic (HFrEF), []Combined (HFpEF and HFrEF), [] RHF, [] Pulm HTN, [] Other    [] ZAIDA, [] ATN, [] AIN, [] other  [] CKD1, [] CKD2, [] CKD 3, [] CKD 4, [] CKD 5, []ESRD    Encephalopathy: [] Metabolic, [] Hepatic, [] toxic, [] Neurological, [] Other    Abnormal Nurtitional Status: [] malnurtition (see nutrition note), [ ]underweight: BMI < 19, [] morbid obesity: BMI >40, [] Cachexia    [] Sepsis  [] hypovolemic shock,[] cardiogenic shock, [] hemorrhagic shock, [] neuogenic shock  [] Acute Respiratory Failure  []Cerebral edema, [] Brain compression/ herniation,   [] Functional quadriplegia  [] Acute blood loss anemia

## 2022-03-11 VITALS
TEMPERATURE: 99 F | DIASTOLIC BLOOD PRESSURE: 63 MMHG | SYSTOLIC BLOOD PRESSURE: 121 MMHG | RESPIRATION RATE: 16 BRPM | OXYGEN SATURATION: 95 % | HEART RATE: 85 BPM

## 2022-03-11 LAB
ANION GAP SERPL CALC-SCNC: 13 MMOL/L — SIGNIFICANT CHANGE UP (ref 5–17)
APPEARANCE UR: CLEAR — SIGNIFICANT CHANGE UP
BACTERIA # UR AUTO: PRESENT /HPF
BILIRUB UR-MCNC: NEGATIVE — SIGNIFICANT CHANGE UP
BUN SERPL-MCNC: 17 MG/DL — SIGNIFICANT CHANGE UP (ref 7–23)
CALCIUM SERPL-MCNC: 9.8 MG/DL — SIGNIFICANT CHANGE UP (ref 8.4–10.5)
CHLORIDE SERPL-SCNC: 103 MMOL/L — SIGNIFICANT CHANGE UP (ref 96–108)
CO2 SERPL-SCNC: 22 MMOL/L — SIGNIFICANT CHANGE UP (ref 22–31)
COLOR SPEC: YELLOW — SIGNIFICANT CHANGE UP
CREAT SERPL-MCNC: 0.39 MG/DL — LOW (ref 0.5–1.3)
DIFF PNL FLD: NEGATIVE — SIGNIFICANT CHANGE UP
EGFR: 116 ML/MIN/1.73M2 — SIGNIFICANT CHANGE UP
EPI CELLS # UR: ABNORMAL /HPF (ref 0–5)
GLUCOSE SERPL-MCNC: 124 MG/DL — HIGH (ref 70–99)
GLUCOSE UR QL: NEGATIVE — SIGNIFICANT CHANGE UP
HCT VFR BLD CALC: 34.2 % — LOW (ref 34.5–45)
HGB BLD-MCNC: 10.7 G/DL — LOW (ref 11.5–15.5)
KETONES UR-MCNC: NEGATIVE — SIGNIFICANT CHANGE UP
LEUKOCYTE ESTERASE UR-ACNC: ABNORMAL
MAGNESIUM SERPL-MCNC: 1.9 MG/DL — SIGNIFICANT CHANGE UP (ref 1.6–2.6)
MCHC RBC-ENTMCNC: 30.2 PG — SIGNIFICANT CHANGE UP (ref 27–34)
MCHC RBC-ENTMCNC: 31.3 GM/DL — LOW (ref 32–36)
MCV RBC AUTO: 96.6 FL — SIGNIFICANT CHANGE UP (ref 80–100)
NITRITE UR-MCNC: NEGATIVE — SIGNIFICANT CHANGE UP
NRBC # BLD: 0 /100 WBCS — SIGNIFICANT CHANGE UP (ref 0–0)
PH UR: 6 — SIGNIFICANT CHANGE UP (ref 5–8)
PHOSPHATE SERPL-MCNC: 4.8 MG/DL — HIGH (ref 2.5–4.5)
PLATELET # BLD AUTO: 311 K/UL — SIGNIFICANT CHANGE UP (ref 150–400)
POTASSIUM SERPL-MCNC: 3.9 MMOL/L — SIGNIFICANT CHANGE UP (ref 3.5–5.3)
POTASSIUM SERPL-SCNC: 3.9 MMOL/L — SIGNIFICANT CHANGE UP (ref 3.5–5.3)
PROT UR-MCNC: NEGATIVE MG/DL — SIGNIFICANT CHANGE UP
RBC # BLD: 3.54 M/UL — LOW (ref 3.8–5.2)
RBC # FLD: 12.7 % — SIGNIFICANT CHANGE UP (ref 10.3–14.5)
RBC CASTS # UR COMP ASSIST: < 5 /HPF — SIGNIFICANT CHANGE UP
SODIUM SERPL-SCNC: 138 MMOL/L — SIGNIFICANT CHANGE UP (ref 135–145)
SP GR SPEC: 1.02 — SIGNIFICANT CHANGE UP (ref 1–1.03)
UROBILINOGEN FLD QL: 0.2 E.U./DL — SIGNIFICANT CHANGE UP
WBC # BLD: 5.2 K/UL — SIGNIFICANT CHANGE UP (ref 3.8–10.5)
WBC # FLD AUTO: 5.2 K/UL — SIGNIFICANT CHANGE UP (ref 3.8–10.5)
WBC UR QL: > 10 /HPF

## 2022-03-11 PROCEDURE — 94002 VENT MGMT INPAT INIT DAY: CPT

## 2022-03-11 PROCEDURE — 87150 DNA/RNA AMPLIFIED PROBE: CPT

## 2022-03-11 PROCEDURE — 83721 ASSAY OF BLOOD LIPOPROTEIN: CPT

## 2022-03-11 PROCEDURE — 97164 PT RE-EVAL EST PLAN CARE: CPT

## 2022-03-11 PROCEDURE — 80307 DRUG TEST PRSMV CHEM ANLYZR: CPT

## 2022-03-11 PROCEDURE — 87070 CULTURE OTHR SPECIMN AEROBIC: CPT

## 2022-03-11 PROCEDURE — C1889: CPT

## 2022-03-11 PROCEDURE — 97116 GAIT TRAINING THERAPY: CPT

## 2022-03-11 PROCEDURE — 92610 EVALUATE SWALLOWING FUNCTION: CPT

## 2022-03-11 PROCEDURE — 87186 SC STD MICRODIL/AGAR DIL: CPT

## 2022-03-11 PROCEDURE — 97161 PT EVAL LOW COMPLEX 20 MIN: CPT

## 2022-03-11 PROCEDURE — 86900 BLOOD TYPING SEROLOGIC ABO: CPT

## 2022-03-11 PROCEDURE — 83036 HEMOGLOBIN GLYCOSYLATED A1C: CPT

## 2022-03-11 PROCEDURE — 86850 RBC ANTIBODY SCREEN: CPT

## 2022-03-11 PROCEDURE — 97110 THERAPEUTIC EXERCISES: CPT

## 2022-03-11 PROCEDURE — 80076 HEPATIC FUNCTION PANEL: CPT

## 2022-03-11 PROCEDURE — 71045 X-RAY EXAM CHEST 1 VIEW: CPT | Mod: 26

## 2022-03-11 PROCEDURE — 92526 ORAL FUNCTION THERAPY: CPT

## 2022-03-11 PROCEDURE — 82945 GLUCOSE OTHER FLUID: CPT

## 2022-03-11 PROCEDURE — 97530 THERAPEUTIC ACTIVITIES: CPT

## 2022-03-11 PROCEDURE — 82962 GLUCOSE BLOOD TEST: CPT

## 2022-03-11 PROCEDURE — 82803 BLOOD GASES ANY COMBINATION: CPT

## 2022-03-11 PROCEDURE — 86803 HEPATITIS C AB TEST: CPT

## 2022-03-11 PROCEDURE — 36415 COLL VENOUS BLD VENIPUNCTURE: CPT

## 2022-03-11 PROCEDURE — 87086 URINE CULTURE/COLONY COUNT: CPT

## 2022-03-11 PROCEDURE — A9556: CPT

## 2022-03-11 PROCEDURE — 84702 CHORIONIC GONADOTROPIN TEST: CPT

## 2022-03-11 PROCEDURE — 95716 VEEG EA 12-26HR CONT MNTR: CPT

## 2022-03-11 PROCEDURE — 94640 AIRWAY INHALATION TREATMENT: CPT

## 2022-03-11 PROCEDURE — 70496 CT ANGIOGRAPHY HEAD: CPT

## 2022-03-11 PROCEDURE — 99233 SBSQ HOSP IP/OBS HIGH 50: CPT

## 2022-03-11 PROCEDURE — 83690 ASSAY OF LIPASE: CPT

## 2022-03-11 PROCEDURE — 81025 URINE PREGNANCY TEST: CPT

## 2022-03-11 PROCEDURE — 80202 ASSAY OF VANCOMYCIN: CPT

## 2022-03-11 PROCEDURE — 80048 BASIC METABOLIC PNL TOTAL CA: CPT

## 2022-03-11 PROCEDURE — 87641 MR-STAPH DNA AMP PROBE: CPT

## 2022-03-11 PROCEDURE — 80053 COMPREHEN METABOLIC PANEL: CPT

## 2022-03-11 PROCEDURE — 81001 URINALYSIS AUTO W/SCOPE: CPT

## 2022-03-11 PROCEDURE — 74018 RADEX ABDOMEN 1 VIEW: CPT

## 2022-03-11 PROCEDURE — U0003: CPT

## 2022-03-11 PROCEDURE — 80061 LIPID PANEL: CPT

## 2022-03-11 PROCEDURE — 93970 EXTREMITY STUDY: CPT

## 2022-03-11 PROCEDURE — 95700 EEG CONT REC W/VID EEG TECH: CPT

## 2022-03-11 PROCEDURE — 84100 ASSAY OF PHOSPHORUS: CPT

## 2022-03-11 PROCEDURE — 85520 HEPARIN ASSAY: CPT

## 2022-03-11 PROCEDURE — 92612 ENDOSCOPY SWALLOW (FEES) VID: CPT

## 2022-03-11 PROCEDURE — 93306 TTE W/DOPPLER COMPLETE: CPT

## 2022-03-11 PROCEDURE — 85027 COMPLETE CBC AUTOMATED: CPT

## 2022-03-11 PROCEDURE — 84157 ASSAY OF PROTEIN OTHER: CPT

## 2022-03-11 PROCEDURE — 86901 BLOOD TYPING SEROLOGIC RH(D): CPT

## 2022-03-11 PROCEDURE — 97112 NEUROMUSCULAR REEDUCATION: CPT

## 2022-03-11 PROCEDURE — 70450 CT HEAD/BRAIN W/O DYE: CPT

## 2022-03-11 PROCEDURE — 84145 PROCALCITONIN (PCT): CPT

## 2022-03-11 PROCEDURE — 87205 SMEAR GRAM STAIN: CPT

## 2022-03-11 PROCEDURE — 71275 CT ANGIOGRAPHY CHEST: CPT

## 2022-03-11 PROCEDURE — 84443 ASSAY THYROID STIM HORMONE: CPT

## 2022-03-11 PROCEDURE — 97535 SELF CARE MNGMENT TRAINING: CPT

## 2022-03-11 PROCEDURE — 82150 ASSAY OF AMYLASE: CPT

## 2022-03-11 PROCEDURE — 87640 STAPH A DNA AMP PROBE: CPT

## 2022-03-11 PROCEDURE — 78802 RP LOCLZJ TUM WHBDY 1 D IMG: CPT

## 2022-03-11 PROCEDURE — 83605 ASSAY OF LACTIC ACID: CPT

## 2022-03-11 PROCEDURE — 85610 PROTHROMBIN TIME: CPT

## 2022-03-11 PROCEDURE — 83735 ASSAY OF MAGNESIUM: CPT

## 2022-03-11 PROCEDURE — 87040 BLOOD CULTURE FOR BACTERIA: CPT

## 2022-03-11 PROCEDURE — U0005: CPT

## 2022-03-11 PROCEDURE — 85730 THROMBOPLASTIN TIME PARTIAL: CPT

## 2022-03-11 PROCEDURE — 87635 SARS-COV-2 COVID-19 AMP PRB: CPT

## 2022-03-11 PROCEDURE — 89051 BODY FLUID CELL COUNT: CPT

## 2022-03-11 PROCEDURE — 71045 X-RAY EXAM CHEST 1 VIEW: CPT

## 2022-03-11 RX ORDER — NITROFURANTOIN MACROCRYSTAL 50 MG
100 CAPSULE ORAL
Refills: 0 | Status: DISCONTINUED | OUTPATIENT
Start: 2022-03-11 | End: 2022-03-11

## 2022-03-11 RX ORDER — NITROFURANTOIN MACROCRYSTAL 50 MG
1 CAPSULE ORAL
Qty: 0 | Refills: 0 | DISCHARGE
Start: 2022-03-11

## 2022-03-11 RX ADMIN — Medication 1 TABLET(S): at 11:53

## 2022-03-11 RX ADMIN — OXYCODONE HYDROCHLORIDE 10 MILLIGRAM(S): 5 TABLET ORAL at 06:20

## 2022-03-11 RX ADMIN — Medication 50 MILLIGRAM(S): at 05:35

## 2022-03-11 RX ADMIN — Medication 100 MILLIGRAM(S): at 00:07

## 2022-03-11 RX ADMIN — Medication 100 MILLIGRAM(S): at 07:07

## 2022-03-11 RX ADMIN — Medication 1 ENEMA: at 12:34

## 2022-03-11 RX ADMIN — LOSARTAN POTASSIUM 100 MILLIGRAM(S): 100 TABLET, FILM COATED ORAL at 08:54

## 2022-03-11 RX ADMIN — Medication 5 MILLIGRAM(S): at 05:35

## 2022-03-11 RX ADMIN — OXYCODONE HYDROCHLORIDE 10 MILLIGRAM(S): 5 TABLET ORAL at 01:29

## 2022-03-11 RX ADMIN — Medication 5 MILLIGRAM(S): at 19:18

## 2022-03-11 RX ADMIN — POLYETHYLENE GLYCOL 3350 17 GRAM(S): 17 POWDER, FOR SOLUTION ORAL at 05:35

## 2022-03-11 RX ADMIN — OXYCODONE HYDROCHLORIDE 10 MILLIGRAM(S): 5 TABLET ORAL at 05:33

## 2022-03-11 RX ADMIN — OXYCODONE HYDROCHLORIDE 10 MILLIGRAM(S): 5 TABLET ORAL at 00:22

## 2022-03-11 RX ADMIN — Medication 650 MILLIGRAM(S): at 16:37

## 2022-03-11 RX ADMIN — Medication 100 MILLIGRAM(S): at 15:19

## 2022-03-11 RX ADMIN — APIXABAN 10 MILLIGRAM(S): 2.5 TABLET, FILM COATED ORAL at 11:53

## 2022-03-11 RX ADMIN — AMLODIPINE BESYLATE 10 MILLIGRAM(S): 2.5 TABLET ORAL at 08:54

## 2022-03-11 RX ADMIN — OXYCODONE HYDROCHLORIDE 10 MILLIGRAM(S): 5 TABLET ORAL at 17:34

## 2022-03-11 RX ADMIN — Medication 100 MILLIGRAM(S): at 19:18

## 2022-03-11 RX ADMIN — Medication 50 MILLIGRAM(S): at 14:00

## 2022-03-11 RX ADMIN — APIXABAN 10 MILLIGRAM(S): 2.5 TABLET, FILM COATED ORAL at 00:52

## 2022-03-11 NOTE — STUDENT SIGN OFF DOCUMENT - DOCUMENTS STUDENTS ARE SIGNED OFF ON
no lesions,  no deformities,  no traumatic injuries,  no significant scars are present,  chest wall non-tender,  no masses present, breathing is unlabored without accessory muscle use,normal breath sounds Plan of Care

## 2022-03-11 NOTE — PROGRESS NOTE ADULT - SUBJECTIVE AND OBJECTIVE BOX
Subjective/Interval events:  Overnight, low grade fever 100.6F. This morning doing well, tolerating diet, pain well controlled, denies abdominal pain, dysuria, urinary frequency, SOB, cough, chest pain    MEDICATIONS  (STANDING):  amLODIPine   Tablet 10 milliGRAM(s) Oral daily  apixaban 10 milliGRAM(s) Oral every 12 hours  bisacodyl 5 milliGRAM(s) Oral every 12 hours  hydrALAZINE 50 milliGRAM(s) Oral every 8 hours  influenza   Vaccine 0.5 milliLiter(s) IntraMuscular once  labetalol 100 milliGRAM(s) Oral every 8 hours  lactobacillus acidophilus 1 Tablet(s) Oral daily  losartan 100 milliGRAM(s) Oral daily  melatonin 5 milliGRAM(s) Oral at bedtime  polyethylene glycol 3350 17 Gram(s) Oral every 12 hours  senna 2 Tablet(s) Oral at bedtime    MEDICATIONS  (PRN):  acetaminophen     Tablet .. 650 milliGRAM(s) Oral every 6 hours PRN Temp greater or equal to 38C (100.4F), Mild Pain (1 - 3)  albuterol/ipratropium for Nebulization 3 milliLiter(s) Nebulizer every 6 hours PRN Shortness of Breath and/or Wheezing  ibuprofen  Tablet. 400 milliGRAM(s) Oral every 8 hours PRN Temp greater or equal to 38C (100.4F), Moderate Pain (4 - 6)  oxyCODONE    IR 10 milliGRAM(s) Oral every 4 hours PRN Severe Pain (7 - 10)  oxyCODONE    IR 5 milliGRAM(s) Oral every 4 hours PRN Moderate Pain (4 - 6)    Vital Signs Last 24 Hrs  T(C): 36.6 (10 Mar 2022 14:00), Max: 36.8 (09 Mar 2022 15:15)  T(F): 97.8 (10 Mar 2022 14:00), Max: 98.3 (09 Mar 2022 15:15)  HR: 82 (10 Mar 2022 14:00) (82 - 100)  BP: 120/73 (10 Mar 2022 14:00) (115/71 - 130/70)  BP(mean): --  RR: 18 (10 Mar 2022 14:00) (16 - 18)  SpO2: 95% (10 Mar 2022 14:00) (94% - 98%)    PHYSICAL EXAM:  GENERAL: pleasant, appropriate, no acute distress. Participating appropriately in interview  HEENT - NC/AT, EOMI, PERLLA, oropharynx clear without exudate or erythema, moist mucus membranes  NECK: Soft, supple, No JVD, no lymphadenopathy  CHEST/LUNG: Clear to auscultation bilaterally; No rales, rhonchi, wheezing. Normal work of breathing, not tachypneic  HEART: Regular rate and rhythm; No murmurs, rubs, or gallops, normal s1/s2. Warm and well-perfused  ABDOMEN: obese, soft, Nontender, Nondistended. Normoactive bowel sounds.  EXTREMITIES:  2+ Peripheral Pulses, No clubbing, cyanosis, or edema  NEURO:  awake, alert, oriented to person, place, time, and situation. Cranial nerves intact, no motor or sensory deficits. Moves all extremities.  SKIN: No rashes or lesions. Surgical site c/d/i    LABS:  03-11    138  |  103  |  17  ----------------------------<  124<H>  3.9   |  22  |  0.39<L>    Ca    9.8      11 Mar 2022 07:38  Phos  4.8     03-11  Mg     1.9     03-11                          10.7   5.20  )-----------( 311      ( 11 Mar 2022 07:38 )             34.2       Culture - Blood (collected 03 Mar 2022 22:02)  Source: .Blood Blood-Peripheral  Final Report (08 Mar 2022 23:01):    No growth at 5 days.    Culture - Blood (collected 03 Mar 2022 22:02)  Source: .Blood Blood-Peripheral  Final Report (08 Mar 2022 23:01):    No growth at 5 days.      COVID-19 PCR: Negative (03-09-22 @ 10:33)  COVID-19 PCR: NotDetec (03-09-22 @ 06:44)  COVID-19 PCR: NotDetec (03-01-22 @ 15:29)  COVID-19 PCR: NotDetec (02-24-22 @ 18:23)  COVID-19 PCR: NotDetec (02-19-22 @ 06:35)      RADIOLOGY & ADDITIONAL TESTS:  < from: CT Head No Cont (03.07.22 @ 09:39) >  IMPRESSION:    Right frontal approachventricular drainage catheter in unchanged   position. Stable size and morphology of the ventricular system.    Continued evolution of left thalamic hemorrhage with intraventricular   extension.    < end of copied text >

## 2022-03-11 NOTE — PROGRESS NOTE ADULT - PROBLEM SELECTOR PROBLEM 6
Jugular vein thrombosis

## 2022-03-11 NOTE — PROGRESS NOTE ADULT - PROBLEM SELECTOR PLAN 1
Management as per primary team  BP goal systolic <160s
Management as per primary team  BP goal systolic <160s    #Fever  -intermittent fevers, attributed likely to IJ thrombus. On Eliquis. However spiked another fever last night, no localizing symptoms and normal WBC though UA positive, can give 3 days macrobid as well to cover for UTI if contributing.
Management as per primary team  BP goal systolic <160s

## 2022-03-11 NOTE — PROGRESS NOTE ADULT - PROBLEM SELECTOR PLAN 3
A1c slightly elevated  -continue MISS as some serum GLC slightly elevated

## 2022-03-11 NOTE — PROGRESS NOTE ADULT - PROBLEM SELECTOR PLAN 4
# Acute blood loss anemia   Hgb downtrended from time of admission  Hemoglobin: 10.8 g/dL (03-03-22 @ 06:35)  Hemoglobin: 11.3 g/dL (03-02-22 @ 02:55)  Hemoglobin: 10.8 g/dL (03-01-22 @ 05:03)  Hemoglobin: 12.0 g/dL (02-28-22 @ 05:22)  Hemoglobin: 11.2 g/dL (02-27-22 @ 04:14)  Hemoglobin: 11.2 g/dL (02-26-22 @ 05:28)  Hemoglobin: 10.9 g/dL (02-25-22 @ 05:40)   Partially attributable to operative blood losses   continue to trend daily CBC

## 2022-03-11 NOTE — PROGRESS NOTE ADULT - REASON FOR ADMISSION
ICH

## 2022-03-11 NOTE — PROGRESS NOTE ADULT - PROBLEM SELECTOR PROBLEM 3
Pre-diabetes

## 2022-03-11 NOTE — PROGRESS NOTE ADULT - PROBLEM SELECTOR PROBLEM 1
ICH (intracerebral hemorrhage)

## 2022-03-11 NOTE — PROGRESS NOTE ADULT - PROBLEM SELECTOR PLAN 2
Amlodipine 10mg  Hydralazine 100mg q8h  Labetalol 100mg q8h    Would aim to titrate Fludrocortisone off as will cause hypertension
Amlodipine 10mg  Hydralazine 100mg q8h  Labetalol 100mg q8h
Amlodipine 10mg  Hydralazine 100mg q8h  Labetalol 100mg q8h
Amlodipine 10mg  Hydralazine 100mg q8h  Labetalol 100mg q8h    Would aim to titrate Fludrocortisone off as will cause hypertension

## 2022-03-11 NOTE — PROGRESS NOTE ADULT - NUTRITIONAL ASSESSMENT
This patient has been assessed with a concern for Malnutrition and has been determined to have a diagnosis/diagnoses of Moderate protein-calorie malnutrition.    This patient is being managed with:   Diet Minced and Moist-  Entered: Feb 17 2022  1:49PM    
This patient has been assessed with a concern for Malnutrition and has been determined to have a diagnosis/diagnoses of Moderate protein-calorie malnutrition.    This patient is being managed with:   Diet Minced and Moist-  Entered: Feb 17 2022  1:49PM    
This patient has been assessed with a concern for Malnutrition and has been determined to have a diagnosis/diagnoses of Moderate protein-calorie malnutrition.    This patient is being managed with:   Diet NPO after Midnight-     NPO Start Date: 01-Mar-2022   NPO Start Time: 23:59  Except Medications  Entered: Mar  1 2022  7:02PM    Diet Regular-  Supplement Feeding Modality:  Oral  Ensure Enlive Cans or Servings Per Day:  1       Frequency:  Three Times a day  Entered: Feb 28 2022  8:11AM    
This patient has been assessed with a concern for Malnutrition and has been determined to have a diagnosis/diagnoses of Moderate protein-calorie malnutrition.    This patient is being managed with:   Diet NPO after Midnight-     NPO Start Date: 24-Feb-2022   NPO Start Time: 23:59  Except Medications  Entered: Feb 24 2022  8:06PM    Diet Minced and Moist-  Entered: Feb 17 2022  1:49PM      This patient has been assessed with a concern for Malnutrition and has been determined to have a diagnosis/diagnoses of Moderate protein-calorie malnutrition.    This patient is being managed with:   Diet NPO after Midnight-     NPO Start Date: 24-Feb-2022   NPO Start Time: 23:59  Except Medications  Entered: Feb 24 2022  8:06PM    Diet Minced and Moist-  Entered: Feb 17 2022  1:49PM    
This patient has been assessed with a concern for Malnutrition and has been determined to have a diagnosis/diagnoses of Moderate protein-calorie malnutrition.    This patient is being managed with:   Diet Regular-  Entered: Mar  2 2022  2:02PM    
This patient has been assessed with a concern for Malnutrition and has been determined to have a diagnosis/diagnoses of Moderate protein-calorie malnutrition.    This patient is being managed with:   Diet Minced and Moist-  Entered: Feb 17 2022  1:49PM    
This patient has been assessed with a concern for Malnutrition and has been determined to have a diagnosis/diagnoses of Moderate protein-calorie malnutrition.    This patient is being managed with:   Diet Minced and Moist-  Supplement Feeding Modality:  Oral  Ensure Enlive Cans or Servings Per Day:  1       Frequency:  Three Times a day  Entered: Feb 25 2022  2:40PM    
This patient has been assessed with a concern for Malnutrition and has been determined to have a diagnosis/diagnoses of Moderate protein-calorie malnutrition.    This patient is being managed with:   Diet Regular-  Entered: Mar  2 2022  2:02PM    
This patient has been assessed with a concern for Malnutrition and has been determined to have a diagnosis/diagnoses of Moderate protein-calorie malnutrition.    This patient is being managed with:   Diet Regular-  Entered: Mar  2 2022  2:02PM    
This patient has been assessed with a concern for Malnutrition and has been determined to have a diagnosis/diagnoses of Moderate protein-calorie malnutrition.    This patient is being managed with:   Diet Regular-  Supplement Feeding Modality:  Oral  Ensure Enlive Cans or Servings Per Day:  1       Frequency:  Three Times a day  Entered: Feb 28 2022  8:11AM    
This patient has been assessed with a concern for Malnutrition and has been determined to have a diagnosis/diagnoses of Moderate protein-calorie malnutrition.    This patient is being managed with:   Diet Minced and Moist-  Entered: Feb 17 2022  1:49PM    
This patient has been assessed with a concern for Malnutrition and has been determined to have a diagnosis/diagnoses of Moderate protein-calorie malnutrition.    This patient is being managed with:   Diet Minced and Moist-  Entered: Feb 17 2022  1:49PM    
This patient has been assessed with a concern for Malnutrition and has been determined to have a diagnosis/diagnoses of Moderate protein-calorie malnutrition.    This patient is being managed with:   Diet Minced and Moist-  Supplement Feeding Modality:  Oral  Ensure Enlive Cans or Servings Per Day:  1       Frequency:  Three Times a day  Entered: Feb 25 2022  2:40PM    
This patient has been assessed with a concern for Malnutrition and has been determined to have a diagnosis/diagnoses of Moderate protein-calorie malnutrition.    This patient is being managed with:   Diet Regular-  Supplement Feeding Modality:  Oral  Ensure Enlive Cans or Servings Per Day:  1       Frequency:  Three Times a day  Entered: Feb 28 2022  8:11AM    
This patient has been assessed with a concern for Malnutrition and has been determined to have a diagnosis/diagnoses of Moderate protein-calorie malnutrition.    This patient is being managed with:   Diet Minced and Moist-  Entered: Feb 17 2022  1:49PM    
This patient has been assessed with a concern for Malnutrition and has been determined to have a diagnosis/diagnoses of Moderate protein-calorie malnutrition.    This patient is being managed with:   Diet NPO after Midnight-     NPO Start Date: 01-Mar-2022   NPO Start Time: 23:59  Entered: Mar  1 2022  4:36PM    Diet Regular-  Supplement Feeding Modality:  Oral  Ensure Enlive Cans or Servings Per Day:  1       Frequency:  Three Times a day  Entered: Feb 28 2022  8:11AM    
This patient has been assessed with a concern for Malnutrition and has been determined to have a diagnosis/diagnoses of Moderate protein-calorie malnutrition.    This patient is being managed with:   Diet Regular-  Entered: Mar  2 2022  2:02PM    
This patient has been assessed with a concern for Malnutrition and has been determined to have a diagnosis/diagnoses of Moderate protein-calorie malnutrition.    This patient is being managed with:   Diet Regular-  Entered: Mar  2 2022  2:02PM    
This patient has been assessed with a concern for Malnutrition and has been determined to have a diagnosis/diagnoses of Moderate protein-calorie malnutrition.    This patient is being managed with:   Diet Minced and Moist-  Entered: Feb 17 2022  1:49PM    
This patient has been assessed with a concern for Malnutrition and has been determined to have a diagnosis/diagnoses of Moderate protein-calorie malnutrition.    This patient is being managed with:   Diet Minced and Moist-  Supplement Feeding Modality:  Oral  Ensure Enlive Cans or Servings Per Day:  1       Frequency:  Three Times a day  Entered: Feb 25 2022  2:40PM    
This patient has been assessed with a concern for Malnutrition and has been determined to have a diagnosis/diagnoses of Moderate protein-calorie malnutrition.    This patient is being managed with:   Diet NPO after Midnight-     NPO Start Date: 01-Mar-2022   NPO Start Time: 23:59  Except Medications  Entered: Mar  1 2022  7:02PM    Diet Regular-  Supplement Feeding Modality:  Oral  Ensure Enlive Cans or Servings Per Day:  1       Frequency:  Three Times a day  Entered: Feb 28 2022  8:11AM    
This patient has been assessed with a concern for Malnutrition and has been determined to have a diagnosis/diagnoses of Moderate protein-calorie malnutrition.    This patient is being managed with:   Diet NPO after Midnight-     NPO Start Date: 24-Feb-2022   NPO Start Time: 23:59  Except Medications  Entered: Feb 24 2022  8:06PM    Diet Minced and Moist-  Entered: Feb 17 2022  1:49PM    
This patient has been assessed with a concern for Malnutrition and has been determined to have a diagnosis/diagnoses of Moderate protein-calorie malnutrition.    This patient is being managed with:   Diet Regular-  Entered: Mar  2 2022  2:02PM    
This patient has been assessed with a concern for Malnutrition and has been determined to have a diagnosis/diagnoses of Moderate protein-calorie malnutrition.    This patient is being managed with:   Diet Minced and Moist-  Supplement Feeding Modality:  Oral  Ensure Enlive Cans or Servings Per Day:  1       Frequency:  Three Times a day  Entered: Feb 25 2022  2:40PM    
This patient has been assessed with a concern for Malnutrition and has been determined to have a diagnosis/diagnoses of Moderate protein-calorie malnutrition.    This patient is being managed with:   Diet Regular-  Entered: Mar  2 2022  2:02PM    
This patient has been assessed with a concern for Malnutrition and has been determined to have a diagnosis/diagnoses of Moderate protein-calorie malnutrition.    This patient is being managed with:   Diet Minced and Moist-  Entered: Feb 17 2022  1:49PM    
This patient has been assessed with a concern for Malnutrition and has been determined to have a diagnosis/diagnoses of Moderate protein-calorie malnutrition.    This patient is being managed with:   Diet Minced and Moist-  Entered: Feb 17 2022  1:49PM    
This patient has been assessed with a concern for Malnutrition and has been determined to have a diagnosis/diagnoses of Moderate protein-calorie malnutrition.    This patient is being managed with:   Diet Minced and Moist-  Supplement Feeding Modality:  Oral  Ensure Enlive Cans or Servings Per Day:  1       Frequency:  Three Times a day  Entered: Feb 25 2022  2:40PM    
This patient has been assessed with a concern for Malnutrition and has been determined to have a diagnosis/diagnoses of Moderate protein-calorie malnutrition.    This patient is being managed with:   Diet Regular-  Entered: Mar  2 2022  2:02PM    

## 2022-03-11 NOTE — PROGRESS NOTE ADULT - SUBJECTIVE AND OBJECTIVE BOX
HPI:  55 y/o female with PMHx of HTN, pre-DM presents transferred from [facility] for intracranial hemorrhage. As per [place] ED provider and son, patient called son around 7:30-8:00AM c/o severe headache and nausea. Son immediately rushed to her house and found her out of bed, moaning and covered in her own vomit. During transit, EMS reported SBP within 240s with intractable vomiting and given Zofran 8mg. Upon arrival to [facility] ED, GCS 7, SBP within 180s, patient was given Decadron 10mg, Keppra 1g, started on a Cardene drip, Propofol, Mannitol 1mg/kg. 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 Patient was intubated for airway support and transferred to [facility] for further management. ICH2, NIHSS 8.  Denies use of anticoagulants/antiplatelets.  (2022 16:36)    HOSPITAL COURSE:  : transferred from [facility]. R frontal EVD and R radial a-line placed. pend CTH/CTA. s/p 1L bolus for elevated lactate.   : S/p R frontal EVD placedment @94dtT2A draining well. O/n pt w/ episode of possible storming; tachy, HTN, agitated, temp 100.2F, given 2 versed and 50mcg fentanyl w/ improvement in symptoms Neuro exam stable. Pt remains intubated and sedated, AN L>R. Trend lactate and abg. Amlodipine 5mg QD started for SBP goal < 140, cardene dc'd.  EVD dropped  at 1pm. propanolol and fent standing added for neuro storming, propofol switched to precedex, with resultant hypotension. Propanolol dc'd, fent changed to prn and precedex off, 1 L bolus given, levo prn   : TOSHA overnight. Patient remains on propofol. EVD open at [id number]. ICPs WNL. Neuro exam stable. increased bowel regimen. started SQL. extubated on precedex. given 0.5 Ativan + Fentanyl pushes PRN for agitation. 500cc NS bolus.   2/15: BD#4.  On HFNC d/t desat to 88% on 70% non-rebreather. On 0.5 precedex   2/16: BD5, TOSHA overnight, on 4L NC, s/p vomiting yesterday, TF being held, formal S/S pending, off precedex. EVD open @ 5. Started on 3% at 30, tmax 100.7   : BD# 6   tachycardic, PE protocol. cardene gtt. neuro unchanged. EVD @ 5cmH2O. 1g IV tylenol for 102.7 fever, duonebs standing to PRN. UA with reflux ordered. Hydralazine 25mg TID added, increased to 50q8. 500cc NS bolus x2. started on nystatin for thrush. started oxy 5q6 PRN for pain for tachycardia. repeat Na 146, decreased 3% @50, started salt tabs 2q6. FEES done. started minced and moist diet  : BD 7. 3% Increased 75 cc/hr  : BD8. TOSHA o/n neuro stable. 3%@75cc/hr. central line placed for vascular access  : BD9. tmax 101.3 o/n, neuro stable. 3%@100cc/hr. cardene gtt restarted. Ibuprofen x1 given for fever, ok per Dr. D'Amico. Vanc d/c'd per ID (MRSA neg), treating UTI not bacteremia, likely contaminant. Linezolid started for suspected thrombophlebitis and GS + coverage.   : BD#10 Overnight, IV tylenol given for headache. Right radial arterial line re-wired then discontinued. EVD clamped at 8AM. ICPs WNL. Neuro exam stable. Remains on 3% lowered to 75cc/hr. CTH stable, EVD raised to 20. Febrile 102 - CSF and blood cx sent. EVD lowered from 20 to 5 to drain more CSF and blood, increased lethargy. Ceftriaxone changed to cefepime per ID for broader coverage.   : BD#11 TOSHA o/n. neuro at baseline. EVD at [id number], 3% kept at 25cc/hr   : BD#12 TOSHA overnight. Remain on 3% saline. EVD [id number]. ICPs WNL. Neuro exam stable.  : BD#13 TOSHA overnight, Remains on 3% saline. EVD raised to 20, gallium scan done with increased uptake in gluteals but normal physical exam  : BD 14. TOSHA overnight. Remains on hypertonics. EVD clamped at 20 yesterday evening, possible shunt in the afternoon. Patient lethargic with worsening headache. EVD reopened at 10, Linezolid and cefepime d/c'ed   : BD 15. increasing lethargic overnight, CTH stable and reviewed with Dr. D'Amico. Remains on a course of hypertonics. EVD reopened at 10 yesterday morning due to patient experiencing increased lethargy and headaches. EEG for fluctuating mental status - left hemisphere slowing, no seizures. 3% d/c'ed remains on salt tabs   : BD 16. TOSHA overnight. vEEG placed yesterday for waxing/waning exam. EVD remains open at 10. plan is likely to shunt next week. Remains with intermittent periods of lethargy. EEG negative for seizure, d/c'ed. Restarted on 3% @25. LIJ noticed to be dislodged from proper position, notified RN toremove, hemostasis achieved with no hematoma formation noted. Patient with clear breath sounds and no respiratory distress. Additional peripheral IV.   : BD 17, Repeat CTH this am stable, EVD clamped at 10:30AM, remains on 3% at 25.  3/1: BD 18, EVD clamped overnight, neuro remained stable.   3/2: BD 19, POD0 s/p VPS. LE dopplers stable. CTH stable. stepdown. dilaudid and fentanyl needed for severe h/a. Given dilaudid 0.25 mg @ 10 PM.   3/3: BD 20. POD 1 R VPS placement (Certas @ 5). TOSHA o/n. stepdown status. florinef weaned  3/4: BD 21, POD2 R VPS placement (Certas @ 5). Febrile overnight to 103, pancultured. UE dopplers +new L IJ DVT. started on heparin gtt. florienf d/c.   3/5: BD 22, POD3 R VPS (Certas @ 5). On heparin gtt @ 14 for L IJ DVT  3/6: BD 23, POD4 R VPS (Certas @5), PTT 38, heparin gtt increased to 17  3/7: BD 24, POD 5 VPS Certas at 5. TOSHA overnight. remains on hep gtt for L IJ DVT. pending rehab placement. repeat CTH stable, dc;d heparin gtt started eliquis. salt tabs dc'd  3: BD25, POD6 VPS, remains on eliquis, TOSHA overnight, pending AR. Downgraded to regional.  3/9: BD26, POD7 VPS, TOSHA overnight, dilaudid 0.5mg for severe headache x 1 with improvement. no bed at [facility] today. increased oxy to 5/10 for moderate/severe   3/10: BD27, POD8 VPS, TOSHA overnight, pending bed at [facility] today.  3/11: BD 28, POD#9 VPshunt. Neurological stable. Tolerates diet. Low grade fever overnight.     OVERNIGHT EVENTS: Low grade fever.  Vital Signs Last 24 Hrs  T(C): 36.4 (11 Mar 2022 08:53), Max: 38.1 (10 Mar 2022 23:10)  T(F): 97.5 (11 Mar 2022 08:53), Max: 100.6 (10 Mar 2022 23:10)  HR: 75 (11 Mar 2022 08:53) (75 - 93)  BP: 121/72 (11 Mar 2022 08:53) (120/72 - 138/79)  BP(mean): 98 (11 Mar 2022 05:07) (98 - 98)  RR: 18 (11 Mar 2022 08:53) (17 - 18)  SpO2: 95% (11 Mar 2022 08:53) (94% - 98%)    I&O's Summary    10 Mar 2022 07:01  -  11 Mar 2022 07:00  --------------------------------------------------------  IN: 1920 mL / OUT: 3100 mL / NET: -1180 mL    11 Mar 2022 07:01  -  11 Mar 2022 11:26  --------------------------------------------------------  IN: 480 mL / OUT: 600 mL / NET: -120 mL        PHYSICAL EXAM:  General: NAD, pt is comfortably sitting up in bed, on room air  HEENT: CN II-XII grossly intact, PERRL 3mm briskly reactive, EOMI b/l, face symmetric, tongue midline, neck FROM  Cardiovascular: RRR, normal S1 and S2   Respiratory: lungs CTAB, no wheezing, rhonchi, or crackles   GI: normoactive BS to auscultation, abd soft, NTND, +Abdominal incisions c/d/i   Neuro: A&Ox2-3 (sometimes forget year but knows president), No aphasia, speech clear, no dysmetria, +Right pronator drift. Follows commands.AN x4 spontaneously, 5/5 strength in all extremities throughout except RUE 4+/5. SILT throughout   Extremities: distal pulses 2+ x4  Wound/incision: +Right VPS incision with staples in place, C/D/I    TUBES/LINES: none        DIET: Regular      LABS:                        10.7   5.20  )-----------( 311      ( 11 Mar 2022 07:38 )             34.2     03-11    138  |  103  |  17  ----------------------------<  124<H>  3.9   |  22  |  0.39<L>    Ca    9.8      11 Mar 2022 07:38  Phos  4.8     03-11  Mg     1.9     03-11        Urinalysis Basic - ( 11 Mar 2022 10:35 )    Color: Yellow / Appearance: Clear / S.020 / pH: x  Gluc: x / Ketone: NEGATIVE  / Bili: Negative / Urobili: 0.2 E.U./dL   Blood: x / Protein: NEGATIVE mg/dL / Nitrite: NEGATIVE   Leuk Esterase: Small / RBC: < 5 /HPF / WBC > 10 /HPF   Sq Epi: x / Non Sq Epi: 5-10 /HPF / Bacteria: Present /HPF          CAPILLARY BLOOD GLUCOSE          Drug Levels: [] N/A    CSF Analysis: [] N/A      Allergies    No Known Allergies    Intolerances      MEDICATIONS:  Antibiotics:    Neuro:  acetaminophen     Tablet .. 650 milliGRAM(s) Oral every 6 hours PRN  ibuprofen  Tablet. 400 milliGRAM(s) Oral every 8 hours PRN  melatonin 5 milliGRAM(s) Oral at bedtime  oxyCODONE    IR 10 milliGRAM(s) Oral every 4 hours PRN  oxyCODONE    IR 5 milliGRAM(s) Oral every 4 hours PRN    Anticoagulation:  apixaban 10 milliGRAM(s) Oral every 12 hours    OTHER:  albuterol/ipratropium for Nebulization 3 milliLiter(s) Nebulizer every 6 hours PRN  amLODIPine   Tablet 10 milliGRAM(s) Oral daily  bisacodyl 5 milliGRAM(s) Oral every 12 hours  hydrALAZINE 50 milliGRAM(s) Oral every 8 hours  influenza   Vaccine 0.5 milliLiter(s) IntraMuscular once  labetalol 100 milliGRAM(s) Oral every 8 hours  lactobacillus acidophilus 1 Tablet(s) Oral daily  losartan 100 milliGRAM(s) Oral daily  polyethylene glycol 3350 17 Gram(s) Oral every 12 hours  senna 2 Tablet(s) Oral at bedtime    IVF:    CULTURES:  Culture Results:   No growth at 5 days. ( @ 22:02)  Culture Results:   No growth at 5 days. ( @ 22:02)    RADIOLOGY & ADDITIONAL TESTS:      ASSESSMENT:  Patient 55 y/o female with PMHx of HTN, pre-DM w/ left thalamic parenchymal hematoma with extensive intraventricular hemorrhage. ICH score 2. NIHSS 18. s/p R frontal large bore EVD placement 22. Now s/p VPS Certas @5 3/2/22, course c/b by L IJ thrombus, started on eliquis.     HEAD BLEED    Handoff    MEWS Score    No pertinent past medical history    Hypertension    Pre-diabetes    Intraventricular hemorrhage    Intraventricular hemorrhage    Creation of ventriculo-peritoneal shunt    ICH (intracerebral hemorrhage)    ICH (intracerebral hemorrhage)    HTN (hypertension)    Pre-diabetes    Anemia due to acute blood loss    Thrombocytosis    Jugular vein thrombosis    Insertion of intravenous catheter with ultrasound guidance    Insertion of intravenous catheter with ultrasound guidance    Insertion, catheter, intravenous    Ultrasound guidance for vascular access    Creation of ventriculo-peritoneal shunt    Insertion, needle, vein    US guided vascular access    HTN (hypertension)    Hydrocephalus    Pre-diabetes    Anemia due to acute blood loss    Thrombocytosis    Jugular vein thrombosis    Hyponatremia    [email or web address]        PLAN:  NEURO:  - neuro/vital q 8   - psotop CTH 3/2, stable, lethargic s/p fever 3/4 CTH stable, CTH 3/7 stable   - pain control PRN  - vEEG for fluctuating mental status - left hemisphere slowing, no seizures. (-)    Cardio:   - SBP goal 100-160   - cont. amlodipine 10mg QD, losartan 100mg, Hydralazine 50 mg TID, labetalol 100 q8h   - echo : LVH, EF: 65-70%     PULM:  - duonebs PRN   - IS     Renal:   - Na goal 135-145  - salt tabs 2 q6 dc'd 3/7     GI:  - regular diet w/ ensure   - bowel regimen, last BM 3/7    HEME:  - SCDs only L leg/SQL   - LE dopplers : R IM calf thrombus, repeat dopplers  showing persistence but no propogation, repeat 3/2 stable   - UE dopp : superficial clots b/l cephalic, wam compresses and elevation, repeat showing L IJ DVT- heparin gtt PTT @ 14 (goal 58-99) dc'd 3/7  - started eliquis 10mg BID x7 days, then Eliqius 5mg on 3/14    ENDO:  - prediabetic, a1c 5.8, glucose under control, no insulin req   - TSH wnl     ID:  - blood cx  +staph epidermidis  - urine cx  +citrobacter koseri and klebsiella  - CSF cx  - pending. NGTD  - ID following s/p: vanco [-], ceftriaxone for UTI [-], cefepime [- . linezolid for phlebilitis [- ]   - Gallium scan with increased uptake in gluteal region, no cellulitis on physicial exam, probably d/t positioning   -febrile 3/3, pancultured    PSYCH:   - pt takes xanax at home  DVT PROPHYLAXIS:  [x Venodynes                                [x] Heparin/Lovenox    DISPOSITION: Pending acute rehab at [facility]    Assessment:  Present when checked    []  GCS  E   V  M     Heart Failure: []Acute, [] acute on chronic , []chronic  Heart Failure:  [] Diastolic (HFpEF), [] Systolic (HFrEF), []Combined (HFpEF and HFrEF), [] RHF, [] Pulm HTN, [] Other    [] ZAIDA, [] ATN, [] AIN, [] other  [] CKD1, [] CKD2, [] CKD 3, [] CKD 4, [] CKD 5, []ESRD    Encephalopathy: [] Metabolic, [] Hepatic, [] toxic, [] Neurological, [] Other    Abnormal Nurtitional Status: [] malnurtition (see nutrition note), [ ]underweight: BMI < 19, [] morbid obesity: BMI >40, [] Cachexia    [] Sepsis  [] hypovolemic shock,[] cardiogenic shock, [] hemorrhagic shock, [] neuogenic shock  [] Acute Respiratory Failure  []Cerebral edema, [] Brain compression/ herniation,   [] Functional quadriplegia  [] Acute blood loss anemia

## 2022-03-11 NOTE — CONSULT NOTE ADULT - ASSESSMENT
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 ICH score 2. NIHSS 18. s/p R frontal large bore EVD placement 2/12/22. Now s/p VPS Certas @5 3/2/22, course c/b by L IJ thrombus, started on eliquis.     PLAN:  NEURO:  - neuro/vital q 8   - psotop CTH 3/2, stable, lethargic s/p fever 3/4 CTH stable, CTH 3/7 stable   - pain control PRN  - vEEG for fluctuating mental status - left hemisphere slowing, no seizures. (2/26-2/27)    Cardio:   - SBP goal 100-160   - cont. amlodipine 10mg QD, losartan 100mg, Hydralazine 50 mg TID, labetalol 100 q8h   - echo 2/14: LVH, EF: 65-70%     PULM:  - duonebs PRN   - IS     Renal:   - Na goal 135-145  - salt tabs 2 q6 dc'd 3/7     GI:  - regular diet w/ ensure   - bowel regimen, last BM 3/7    HEME:  - SCDs only L leg/SQL   - LE dopplers 2/23: R IM calf thrombus, repeat dopplers 2/28 showing persistence but no propogation, repeat 3/2 stable   - UE dopp 2/23: superficial clots b/l cephalic, wam compresses and elevation, repeat showing L IJ DVT- heparin gtt PTT @ 14 (goal 58-99) dc'd 3/7  - started eliquis 10mg BID x7 days, then Eliqius 5mg on 3/14    ENDO:  - prediabetic, a1c 5.8, glucose under control, no insulin req   - TSH wnl     ID:  - blood cx 2/16 +staph epidermidis  - urine cx 2/17 +citrobacter koseri and klebsiella  - CSF cx 2/21 - pending. NGTD  - ID following s/p: vanco [2/18-2/20], ceftriaxone for UTI [2/19-22], cefepime [2/22- 2/25. linezolid for phlebilitis [2/20- 2/25]   - Gallium scan with increased uptake in gluteal region, no cellulitis on physicial exam, probably d/t positioning   -febrile 3/3, pancultured    PSYCH:   - pt takes xanax at home    Dispo:   - SDU status/ PT/OT   - full code  - family updated with plan

## 2022-03-11 NOTE — PROGRESS NOTE ADULT - PROBLEM SELECTOR PLAN 5
Likely reactive, continue to monitor.

## 2022-03-11 NOTE — PROGRESS NOTE ADULT - PROVIDER SPECIALTY LIST ADULT
Infectious Disease
NSICU
NSICU
Neurosurgery
Surgery
NSICU
Neurosurgery
Vascular Surgery
Infectious Disease
Infectious Disease
NSICU
Neurosurgery
Surgery
Vascular Surgery
Hospitalist
Infectious Disease
Infectious Disease
NSICU
Infectious Disease
Neurosurgery
Vascular Surgery
NSICU
Hospitalist

## 2022-03-11 NOTE — PROGRESS NOTE ADULT - PROBLEM SELECTOR PLAN 6
Heparin gtt started  -Continue to monitor Neuro Exam closely, any change in exam or acute onset headache would hold Heparin gtt and get stat CTH  -Will transition to PO Eliquis if patient tolerates Heparin gtt overweekend
Would recommend starting Heparin gtt if there is concern with full AC given recent brain bleed
Heparin gtt started  -Continue to monitor Neuro Exam closely, any change in exam or acute onset headache would hold Heparin gtt and get stat CTH  -Will transition to PO Eliquis if patient tolerates Heparin gtt over weekend
Cause of fever, no fevers since AC started  Eliquis 10mg BID, after 7d would switch to 5mg BID
Cause of fever, no fevers since AC started  Eliquis 10mg BID, after 7d would switch to 5mg BID
-Repeat CTH today  -If no worsening bleed today would start Eliquis and stop Heparin gtt
Eliquis 10mg BID, after 7d would switch to 5mg BID
Cause of fever, no fevers since AC started  Eliquis 10mg BID, after 7d would switch to 5mg BID

## 2022-03-11 NOTE — CONSULT NOTE ADULT - SUBJECTIVE AND OBJECTIVE BOX
HPI:  55 yo F with HTN, pre-DM, s/p cholecystectomy transferred from [facility] on  for ICH.  ID consult for positive blood culture.  She had gone to [facility] on the morning of transfer with severe HA and nausea, then found in vomitus.  CTH showed L thalamic parenchymal hematoma with intraventricular hemorrhage.  She was intubated for airway support and transferred to [facility].  She underwent R EVD placement on  – received cefazolin X 1 dose.  CTA on  did not show an aneurysm.  She was extubated on , had episode of vomiting on 2/15.  She was undergoing intermittent straight catheterization for urinary retention.  She had been afebrile except Tm 100.2 on  until  when she had Tm 101.6. She had WBC 8.8, PCT 0.05, lactate not sent, UA with trace LE, 5-10 WBC, CT angio chest showed small consolidative and linear opacities in lower lobes with dependent distribution suggestive of atelectasis.  She was not given antibiotics.  On , she had Tm 101.7.  CSF culture was sent.  Today, had WBC 10K, Tm 102.1 so far.  Blood culture from  with GPCcl in anaerobic bottle, PCR with CNS and Enterococcus species.  ID consult requested.  She has never had colonoscopy.  Per Ms. Rockwell, no chills (nurse observed rigors), no HA, photophobia, neck stiffness, rhinorrhea, sore throat, cough, CP, SOB, N, V, diarrhea, abd pain, dysuria.  Urinary retention has resolved.    PAST MEDICAL & SURGICAL HISTORY:  Hypertension    Pre-diabetes    Cholecystectomy 15-20 years ago - does not remember why        MEDICATIONS  (STANDING):  amLODIPine   Tablet 10 milliGRAM(s) Oral daily  enoxaparin Injectable 40 milliGRAM(s) SubCutaneous every 24 hours  hydrALAZINE 50 milliGRAM(s) Oral every 8 hours  influenza   Vaccine 0.5 milliLiter(s) IntraMuscular once  labetalol 100 milliGRAM(s) Oral every 8 hours  losartan 100 milliGRAM(s) Oral daily  niCARdipine Infusion 5 mG/Hr (25 mL/Hr) IV Continuous <Continuous>  nystatin    Suspension 104313 Unit(s) Oral three times a day  polyethylene glycol 3350 17 Gram(s) Oral every 12 hours  senna 2 Tablet(s) Oral at bedtime  sodium chloride 3 Gram(s) Oral every 6 hours  sodium chloride 0.9% Bolus 500 milliLiter(s) IV Bolus once  sodium chloride 3%. 500 milliLiter(s) (100 mL/Hr) IV Continuous <Continuous>  vancomycin  IVPB 1000 milliGRAM(s) IV Intermittent every 12 hours    MEDICATIONS  (PRN):  acetaminophen    Suspension .. 650 milliGRAM(s) Oral every 6 hours PRN Temp greater or equal to 38C (100.4F), Mild Pain (1 - 3)  albuterol/ipratropium for Nebulization 3 milliLiter(s) Nebulizer every 6 hours PRN Shortness of Breath and/or Wheezing  hydrALAZINE Injectable 10 milliGRAM(s) IV Push every 4 hours PRN SBP>140  metoclopramide Injectable 10 milliGRAM(s) IV Push every 8 hours PRN nausea  oxyCODONE    IR 5 milliGRAM(s) Oral every 6 hours PRN Moderate Pain (4 - 6)  oxyCODONE    IR 10 milliGRAM(s) Oral every 6 hours PRN Severe Pain (7 - 10)      Allergies    No Known Allergies    Intolerances        SOCIAL HISTORY:  Is from [place] in  since age 14.  Single, lives alone.  Had 3 children (1 ).  Has dog.  No tobacco, alcohol or recreational drug use.    FAMILY HISTORY:  No pertinent FHx in first degree relatives.      Vital Signs Last 24 Hrs  T(C): 38.9 (2022 16:00), Max: 38.9 (2022 16:00)  T(F): 102.1 (2022 16:00), Max: 102.1 (2022 16:00)  HR: 108 (2022 18:00) (84 - 112)  BP: 142/70 (2022 18:00) (137/69 - 153/96)  BP(mean): 98 (2022 18:00) (95 - 121)  RR: 21 (2022 18:00) (20 - 33)  SpO2: 96% (2022 18:00) (94% - 99%)    PE:  Appears uncomfortable, nontoxic  HEENT: R EVD with bloody CSF, PERRL, sclerae anicteric, conjunctivae clear, EOMI.  Sinuses nontender, no nasal exudate.  No buccal or pharyngeal lesions, erythema or exudate  Neck:  Supple, no adenopathy  Lungs:  Clear to auscultation  Cor:  RRR, S1, S2, no murmur appreciated  Abd:  Symmetric, normoactive BS.  Soft, nontender, no masses, guarding or rebound.  Liver and spleen not enlarged    Primafit in place  Extrem:  No cyanosis or edema;  R hemiparesis;  L forearm with 2 areas of erythema/warmth related to IV sites - she states from today.  Skin:  No rashes.    LABS:                        11.5   10.04 )-----------( 245      ( 2022 02:00 )             34.7         137  |  109<H>  |  6<L>  ----------------------------<  161<H>  3.8   |  16<L>  |  0.40<L>    Ca    9.1      2022 18:28  Phos  2.4       Mg     1.8     18      Urinalysis Basic - ( 2022 05:51 )    Color: Yellow / Appearance: Clear / S.020 / pH: x  Gluc: x / Ketone: NEGATIVE  / Bili: Negative / Urobili: 1.0 E.U./dL   Blood: x / Protein: NEGATIVE mg/dL / Nitrite: NEGATIVE   Leuk Esterase: Trace / RBC: < 5 /HPF / WBC 5-10 /HPF   Sq Epi: x / Non Sq Epi: 0-5 /HPF / Bacteria: Present /HPF        RADIOLOGY & ADDITIONAL STUDIES:  < from: CT Head No Cont (22 @ 10:28) >  FINDINGS:    Partially imaged nasogastric tube is present.      Status post right frontal approach colostomy catheter placement with tip   within the right frontal horn. Redemonstration of a decompressed right   lateral ventricle small amount of intraventricular hemorrhage.   Redemonstration of moderate dilatation of the left lateral ventricle   containing hemorrhagic clot. There is additional hemorrhage within the   third and fourth ventricles. Ventricular caliber is overall not   significant changed from the February 15, 2022 exam allowing for   differences in technique.    There is unchanged intraparenchymal hemorrhage within the left thalamus   with surrounding edema.    No new areas of intracranial hemorrhage identified.    There is stable rightward midline shift of approximately 4 mm at the   level of foramen Thomson which is stable    No extra-axial fluid collection is identified.      Gray white differentiation is preserved without CT evidence for acute   transcortical infarction. Basilar cisterns are patent without evidence   for transtentorial herniation Sellar and suprasellar region are   unremarkable in appearance.    Visualized paranasal sinuses and mastoid air cells are well aerated.   Right al bullosa is present. No significant cerebellar tonsillar   herniation.    No displaced calvarial fractures are identified. No suspicious expansile   or destructive calvarial lesion identified. No significant scalp soft   tissue swelling identified.          IMPRESSION:    Compared to February 15, 2022 exam, stable multicompartment intracranial   hemorrhage as above including intraventricular hemorrhage and left   thalamic intraparenchymal hemorrhage. Stable associated regional mass   effect and rightward midline shift.    Ventricular caliber is overall not significantly changed from the prior   exam with right frontal approach ventriculostomy catheter in place.    < end of copied text >    < from: CT Angio Chest PE Protocol w/ IV Cont (22 @ 22:52) >  FINDINGS:    Tubes, catheters and devices: Enteric tube is noted.  Pulmonary arteries: Normal. No pulmonary emboli.  Aorta: Unremarkable. No aortic aneurysm. No aortic dissection.  Lungs: There is consolidation dependently in the lung bases with   associated areas of atelectasis.  Pleural spaces: Unremarkable. No pneumothorax. No pleural effusion.  Heart: Unremarkable. No cardiomegaly. No pericardial effusion.  Lymph nodes: Unremarkable. No enlarged lymph nodes.  Gallbladder and bile ducts: Surgically absent gallbladder.  Bones/joints: Unremarkable. No acute fracture.  Soft tissues: Unremarkable.    IMPRESSION:    1. No evidence for pulmonary embolism.  2. Small amount of consolidation dependently in the lung bases.    Thank you for allowing us to participate in the care of your patient.  Dictated and Authenticated by: Lakhwinder Narvaez MD  2022 12:56 AM Eastern Time ([place] & [place])    The above report was submitted by the [facility] attending radiologist and   copied to PowerScribe by resident Dr. Patrick.    FINAL ATTENDING RADIOLOGIST INTERPRETATION: Agree with the above report   with the following modifications: No pulmonary embolism seen. Small   consolidative and linear opacities in lower lobes with dependent   distribution suggestive of atelectasis. Satisfactory position enteric   tubing in stomach      < end of copied text >  
Neurovascular Consult Note     HPI: 56y Female with PMHx of HTN, Pre DM presents as a transfer to [facility] on  from [facility] after being found down by her family member at home in her own vomit. She called her son prior to being found saying that she had a terrible HA and was very nauseous. On route to [facility] SBP was in the 200s with perfuse vomitting. CTH upon arrival showed L thalamic ICH with hydro. Patient was intubated and received mannitol and Dex at [facility] and was transferred here, admitted to the Nsgy service. Of note on this hospitalization patient s/p EVD placement and was admitted to the ICU, course complicated by tachycardia in which a CT PE was completed on  and was negative.  Neurology was consulted on  to help assess patient and optimization.     T(C): 38.7 (22 @ 17:38), Max: 38.7 (22 @ 17:38)  HR: 119 (22 @ 18:00) (100 - 123)  BP: 152/76 (22 @ 22:00) (127/55 - 152/76)  RR: 27 (22 @ 18:00) (21 - 34)  SpO2: 96% (22 @ 18:00) (93% - 98%)    PAST MEDICAL & SURGICAL HISTORY:  Hypertension  Pre-diabetes    ROS:   Constitutional: No fever, weight loss or fatigue  Neurological: As per HPI    MEDICATIONS  (STANDING):  amLODIPine   Tablet 10 milliGRAM(s) Oral daily  enoxaparin Injectable 40 milliGRAM(s) SubCutaneous every 24 hours  hydrALAZINE 50 milliGRAM(s) Oral every 8 hours  influenza   Vaccine 0.5 milliLiter(s) IntraMuscular once  losartan 100 milliGRAM(s) Oral daily  niCARdipine Infusion 5 mG/Hr (25 mL/Hr) IV Continuous <Continuous>  nystatin    Suspension 462315 Unit(s) Oral three times a day  polyethylene glycol 3350 17 Gram(s) Oral every 12 hours  senna 2 Tablet(s) Oral at bedtime  sodium chloride 2 Gram(s) Oral every 6 hours  sodium chloride 3%. 500 milliLiter(s) (50 mL/Hr) IV Continuous <Continuous>    MEDICATIONS  (PRN):  acetaminophen    Suspension .. 650 milliGRAM(s) Oral every 6 hours PRN Temp greater or equal to 38C (100.4F), Mild Pain (1 - 3)  albuterol/ipratropium for Nebulization 3 milliLiter(s) Nebulizer every 6 hours PRN Shortness of Breath and/or Wheezing  hydrALAZINE Injectable 10 milliGRAM(s) IV Push every 4 hours PRN SBP>140  metoclopramide Injectable 10 milliGRAM(s) IV Push every 8 hours PRN nausea  oxyCODONE    IR 5 milliGRAM(s) Oral every 6 hours PRN Moderate Pain (4 - 6)    Allergies  No Known Allergies  Intolerances      Vital Signs Last 24 Hrs  T(C): 38.7 (2022 17:38), Max: 38.7 (2022 17:38)  T(F): 101.7 (2022 17:38), Max: 101.7 (2022 17:38)  HR: 119 (2022 18:00) (100 - 123)  BP: 152/76 (2022 22:00) (127/55 - 152/76)  BP(mean): 107 (2022 22:00) (85 - 107)  RR: 27 (2022 18:00) (21 - 34)  SpO2: 96% (2022 18:00) (93% - 98%)    Physical exam:  AxOx2 (name and place, did not know year) speech fluent, able to follow commands  Anisocoric R>L, EMOI, VFF, R facial   RUE/RLE 5/5, LLE antigravity, LUE within the plane of the bed, L HG 3/5      NIHSS: 18 on admission 22    Fingerstick Blood Glucose: CAPILLARY BLOOD GLUCOSE    LABS:                        12.8   8.57  )-----------( 260      ( 2022 02:16 )             38.8     02-17    x   |  110<H>  |  8   ----------------------------<  160<H>  3.6   |  18<L>  |  0.35<L>    Ca    8.8      2022 18:01  Phos  2.0     -  Mg     2.1     -    TPro  7.2  /  Alb  4.0  /  TBili  1.0  /  DBili  0.2  /  AST  29  /  ALT  25  /  AlkPhos  88  02-    Urinalysis Basic - ( 2022 05:51 )    Color: Yellow / Appearance: Clear / S.020 / pH: x  Gluc: x / Ketone: NEGATIVE  / Bili: Negative / Urobili: 1.0 E.U./dL   Blood: x / Protein: NEGATIVE mg/dL / Nitrite: NEGATIVE   Leuk Esterase: Trace / RBC: < 5 /HPF / WBC 5-10 /HPF   Sq Epi: x / Non Sq Epi: 0-5 /HPF / Bacteria: Present /HPF    
  Patient is a 56y old  Female who presents with a chief complaint of ICH (10 Mar 2022 14:51)       HPI:  55 y/o female with PMHx of HTN, pre-DM presents transferred from [facility] for intracranial hemorrhage. As per [facility] provider and son, patient called son around 7:30-8:00AM c/o severe headache and nausea. Son immediately rushed to her house and found her out of bed, moaning and covered in her own vomit. During transit, EMS reported SBP within 240s with intractable vomiting and given Zofran 8mg. Upon arrival to [facility], GCS 7, SBP within 180s, patient was given Decadron 10mg, Keppra 1g, started on a Cardene drip, Propofol, Mannitol 1mg/kg. 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 Patient was intubated for airway support and transferred to [facility] for further management. ICH2, NIHSS 8.  Denies use of anticoagulants/antiplatelets.  (12 Feb 2022 16:36)      PAST MEDICAL & SURGICAL HISTORY:  Hypertension    Pre-diabetes        MEDICATIONS  (STANDING):  amLODIPine   Tablet 10 milliGRAM(s) Oral daily  apixaban 10 milliGRAM(s) Oral every 12 hours  bisacodyl 5 milliGRAM(s) Oral every 12 hours  hydrALAZINE 50 milliGRAM(s) Oral every 8 hours  influenza   Vaccine 0.5 milliLiter(s) IntraMuscular once  labetalol 100 milliGRAM(s) Oral every 8 hours  lactobacillus acidophilus 1 Tablet(s) Oral daily  losartan 100 milliGRAM(s) Oral daily  melatonin 5 milliGRAM(s) Oral at bedtime  polyethylene glycol 3350 17 Gram(s) Oral every 12 hours  senna 2 Tablet(s) Oral at bedtime    MEDICATIONS  (PRN):  acetaminophen     Tablet .. 650 milliGRAM(s) Oral every 6 hours PRN Temp greater or equal to 38C (100.4F), Mild Pain (1 - 3)  albuterol/ipratropium for Nebulization 3 milliLiter(s) Nebulizer every 6 hours PRN Shortness of Breath and/or Wheezing  ibuprofen  Tablet. 400 milliGRAM(s) Oral every 8 hours PRN Temp greater or equal to 38C (100.4F), Moderate Pain (4 - 6)  oxyCODONE    IR 10 milliGRAM(s) Oral every 4 hours PRN Severe Pain (7 - 10)  oxyCODONE    IR 5 milliGRAM(s) Oral every 4 hours PRN Moderate Pain (4 - 6)        Social History:                -  Home Living Status:  lives alone in a private home, 1 step entry         -  Prior Home Care Services:   none         -  Family support:          -  Occupation:     Baseline Functional Level Prior to Admission:             - ADL's/ IADL's:  independent         - ambulatory status PTA:  walked without DME    FAMILY HISTORY:      CBC Full  -  ( 11 Mar 2022 07:38 )  WBC Count : 5.20 K/uL  RBC Count : 3.54 M/uL  Hemoglobin : 10.7 g/dL  Hematocrit : 34.2 %  Platelet Count - Automated : 311 K/uL  Mean Cell Volume : 96.6 fl  Mean Cell Hemoglobin : 30.2 pg  Mean Cell Hemoglobin Concentration : 31.3 gm/dL  Auto Neutrophil # : x  Auto Lymphocyte # : x  Auto Monocyte # : x  Auto Eosinophil # : x  Auto Basophil # : x  Auto Neutrophil % : x  Auto Lymphocyte % : x  Auto Monocyte % : x  Auto Eosinophil % : x  Auto Basophil % : x      03-11    138  |  103  |  x   ----------------------------<  124<H>  3.9   |  22  |  0.39<L>    Ca    9.8      11 Mar 2022 07:38  Phos  4.8     03-11  Mg     1.9     03-11              Radiology:    < from: CT Head No Cont (03.07.22 @ 09:39) >  ACC: [id number] EXAM:  CT BRAIN                          PROCEDURE DATE:  03/07/2022          INTERPRETATION:  CLINICAL INDICATION: Intracranial hemorrhage, now on   heparin.    TECHNIQUE: CT of the head was performed without the administration of   intravenous contrast.    COMPARISON: CT head 3/4/2022.    FINDINGS:    There is a right frontal approach ventricular drainage catheter with the   distal tip in the right frontal horn abutting the foramen Monro, in   unchanged position from prior imaging. There is stable size and   morphology of the ventricular system, without evidence of obstructive   hydrocephalus. There is small focal edema/blood products along the   catheter tract in the right frontal lobe and evolving postprocedural   changes in the overlying scalp.    There is continued evolution of intraparenchymal hemorrhage in the left   thalamus with associated intraventricular extension into the left lateral   ventricle. There is surrounding low-attenuation vasogenic edema resulting   in mass effect on the posterior body and atrium of the left lateral   ventricle, findings unchanged compared to prior. There is no evidence of   interval new hemorrhage or midline shift.    There is hypoattenuation of the subcortical and periventricular white   matter, which is nonspecific finding, but most likely represents sequela   of mild chronic microvascular ischemic disease. There are atherosclerotic   calcifications of the cavernous internal carotid arteries bilaterally.   There is prominence of the cortical sulci related to underlying mild   brain parenchymal volume loss.    There is no evidence of acute infarct or intracranial mass. There are no   extra-axial fluid collections.    The visualized intraorbital contents are normal. The imaged portions of   the paranasal sinuses are aerated. The mastoid air cells are clear. 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 Visualized soft tissues and osseous   structures appear otherwise unremarkable.      IMPRESSION:    Right frontal approachventricular drainage catheter in unchanged   position. Stable size and morphology of the ventricular system.    Continued evolution of left thalamic hemorrhage with intraventricular   extension.                Vital Signs Last 24 Hrs  T(C): 36.3 (11 Mar 2022 05:07), Max: 38.1 (10 Mar 2022 23:10)  T(F): 97.4 (11 Mar 2022 05:07), Max: 100.6 (10 Mar 2022 23:10)  HR: 77 (11 Mar 2022 05:07) (77 - 93)  BP: 138/79 (11 Mar 2022 05:07) (120/72 - 138/79)  BP(mean): 98 (11 Mar 2022 05:07) (98 - 98)  RR: 18 (11 Mar 2022 05:07) (17 - 18)  SpO2: 94% (11 Mar 2022 05:07) (94% - 98%)        REVIEW OF SYSTEMS:    CONSTITUTIONAL: No fever, weight loss, or fatigue  EYES: No eye pain, visual disturbances, or discharge  ENMT:  No difficulty hearing, tinnitus, vertigo; No sinus or throat pain  NECK: No pain or stiffness  BREASTS: No pain, masses, or nipple discharge  RESPIRATORY: No cough, wheezing, chills or hemoptysis; No shortness of breath  CARDIOVASCULAR: No chest pain, palpitations, dizziness, or leg swelling  GASTROINTESTINAL: No abdominal or epigastric pain. No nausea, vomiting, or hematemesis; No diarrhea or constipation. No melena or hematochezia.  GENITOURINARY: No dysuria, frequency, hematuria, or incontinence  NEUROLOGICAL: per HPI  SKIN: No itching, burning, rashes, or lesions   LYMPH NODES: No enlarged glands  ENDOCRINE: No heat or cold intolerance; No hair loss  MUSCULOSKELETAL: No joint pain or swelling; No muscle, back, or extremity pain  PSYCHIATRIC: No depression, anxiety, mood swings, or difficulty sleeping  HEME/LYMPH: No easy bruising, or bleeding gums  ALLERGY AND IMMUNOLOGIC: No hives or eczema  VASCULAR: no swelling, erythema,           Physical Exam:  WDWN 57 yo  woman lying in semi Car's position, awake, conversant, no acute complaints    Head: normocephalic, Right VPS incision with staples in place, C/D/I    Eyes: PERRLA, EOMI, no nystagmus, sclera anicteric    ENT: nasal discharge, uvula midline, no oropharyngeal erythema/exudate    Neck: supple, negative JVD, negative carotid bruits, no thyromegaly    Chest: CTA bilaterally, neg wheeze/ rhonchi/ rales/ crackles/ egophany    Cardiovascular: regular rate and rhythm, neg murmurs/rubs/gallops    Abdomen: soft, non distended, non tender to palpation in all 4 quadrants, negative rebound/guarding, normal bowel sounds    Extremities: WWP, neg cyanosis/clubbing/edema, negative calf tenderness to palpation, negative Maya's sign      Neurologic Exam:    Alert and oriented to person, place, date/year, speech fluent w/o dysarthria, follows commands, recent and remote memory intact, repetition intact, comprehension intact,  attention/concentration intact, fund of knowledge appropriate    Cranial Nerves:     II:                         pupils equal, round and reactive to light, visual fields intact   III/ IV/VI:              extraocular movements intact, neg nystagmus, neg ptosis  V:                        facial sensation intact, V1-3 normal  VII:                      face symmetric, no droop, normal eye closure and smile  VIII:                     hearing intact to finger rub bilaterally  IX and X:             no hoarseness, gag intact, palate/ uvula rise symmetrically  XI:                       SCM/ trapezius strength intact bilateral  XII:                      no tongue deviation    Motor Exam:    Right UE:             > 4/5 throughout                             pronator drift: slight    Left UE:              > 4/5 throughout                             pronator drift: neg      Right LE:             > 4/5 throughout    Left LE:               > 4/5 throughout               Sensation:           intact to light touch x 4 extremities                            No neglect or extinction on double simultaneous testing                       DTR:                  biceps/brachioradialis: equal                                                      patella/ankle: equal                                                      neg clonus                           neg Babinski                        Coordination:     Finger to Nose:  neg dysmetria bilaterally                              PT/OT assessment 3/10/2022    Cognitive/Neuro/Behavioral  Cognitive/Neuro/Behavioral [WDL Definition: Alert; opens eyes spontaneously; arouses to voice or touch; oriented x 4; follows commands; speech spontaneous, logical; purposeful motor response; behavior appropriate to situation]: WDL    Language Assistance  Preferred Language to Address Healthcare Preferred Language to Address Healthcare: English    Therapeutic Interventions      Bed Mobility  Bed Mobility Training Rolling/Turning: contact guard;  1 person assist;  nonverbal cues (demo/gestures);  verbal cues;  bed rails  Bed Mobility Training Scooting: contact guard;  1 person assist;  nonverbal cues (demo/gestures);  verbal cues;  bed rails  Bed Mobility Training Supine-to-Sit: contact guard;  1 person assist;  nonverbal cues (demo/gestures);  verbal cues;  bed rails  Bed Mobility Training Limitations: decreased ability to use arms for pushing/pulling;  decreased ability to use legs for bridging/pushing;  impaired ability to control trunk for mobility;  decreased strength;  impaired balance;  impaired postural control    Sit-Stand Transfer Training  Transfer Training Sit-to-Stand Transfer: minimum assist (75% patient effort);  1 person assist;  nonverbal cues (demo/gestures);  verbal cues;  full weight-bearing   rolling walker  Transfer Training Stand-to-Sit Transfer: minimum assist (75% patient effort);  1 person assist;  nonverbal cues (demo/gestures);  verbal cues;  full weight-bearing   rolling walker  Sit-to-Stand Transfer Training Transfer Safety Analysis: decreased balance;  decreased sequencing ability;  decreased weight-shifting ability;  decreased strength;  impaired balance;  rolling walker    Therapeutic Exercise  Therapeutic Exercise Detail: Pt assisted with transferring from bed to chair with Brooks and RW. Pt stating she was awaiting lunch tray, which arrived as pt was performing bed mobility.     Lower Body Dressing Training  Lower Body Dressing Training Assistance: supervsion;  supervision;  decreased strength;  impaired balance    Eating/Self-Feeding Training  Eating/Self-Feeding Training Assistance: independent    Grooming Training  Grooming Training Assistance: independent;  set-up required;  washing hands with wet cloth;  impaired balance;  decreased strength              PM&R Impression:    1)  Left thalamic parenchymal hematoma with extensive intraventricular hemorrhage. s/p R frontal large bore EVD placement 2/12/22, s/p VPS Certas  3/2/22,    secondary to L IJ thrombus          Disposition plan recommendation: acute rehab placement            
Attending:  Michael    HPI:  55 y/o female with PMHx of HTN, pre-DM presents transferred from [facility] for intracranial hemorrhage. As per [place] ED provider and son, patient called son around 7:30-8:00AM c/o severe headache and nausea. Son immediately rushed to her house and found her out of bed, moaning and covered in her own vomit. During transit, EMS reported SBP within 240s with intractable vomiting and given Zofran 8mg. Upon arrival to [place] ED, GCS 7, SBP within 180s, patient was given Decadron 10mg, Keppra 1g, started on a Cardene drip, Propofol, Mannitol 1mg/kg. 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 Patient was intubated for airway support and transferred to [facility] for further management. ICH2, NIHSS 8.  Denies use of anticoagulants/antiplatelets.  (12 Feb 2022 16:36)      PAST MEDICAL & SURGICAL HISTORY:  Hypertension    Pre-diabetes        MEDICATIONS  (STANDING):  amLODIPine   Tablet 10 milliGRAM(s) Oral daily  bromocriptine Capsule 10 milliGRAM(s) Oral every 8 hours  cefepime   IVPB 2000 milliGRAM(s) IV Intermittent every 8 hours  fludroCORTISONE 0.2 milliGRAM(s) Oral every 12 hours  hydrALAZINE 100 milliGRAM(s) Oral every 8 hours  influenza   Vaccine 0.5 milliLiter(s) IntraMuscular once  labetalol 100 milliGRAM(s) Oral every 8 hours  lactobacillus acidophilus 1 Tablet(s) Oral daily  linezolid    Tablet 600 milliGRAM(s) Oral every 12 hours  losartan 100 milliGRAM(s) Oral daily  polyethylene glycol 3350 17 Gram(s) Oral every 12 hours  potassium chloride    Tablet ER 40 milliEquivalent(s) Oral once  senna 2 Tablet(s) Oral at bedtime  sodium chloride 3 Gram(s) Oral every 6 hours  sodium chloride 3%. 500 milliLiter(s) (75 mL/Hr) IV Continuous <Continuous>    MEDICATIONS  (PRN):  acetaminophen     Tablet .. 650 milliGRAM(s) Oral every 6 hours PRN Temp greater or equal to 38C (100.4F), Mild Pain (1 - 3)  albuterol/ipratropium for Nebulization 3 milliLiter(s) Nebulizer every 6 hours PRN Shortness of Breath and/or Wheezing  ibuprofen  Tablet. 400 milliGRAM(s) Oral every 8 hours PRN Temp greater or equal to 38C (100.4F), Moderate Pain (4 - 6)      Allergies    No Known Allergies    Intolerances        SOCIAL HISTORY:    FAMILY HISTORY:        Vital Signs Last 24 Hrs  T(C): 37.1 (24 Feb 2022 14:07), Max: 38.4 (23 Feb 2022 17:00)  T(F): 98.7 (24 Feb 2022 14:07), Max: 101.2 (23 Feb 2022 17:00)  HR: 92 (24 Feb 2022 15:00) (71 - 97)  BP: 147/77 (24 Feb 2022 15:00) (13/61 - 164/83)  BP(mean): 103 (24 Feb 2022 15:00) (86 - 131)  RR: 20 (24 Feb 2022 15:00) (20 - 30)  SpO2: 97% (24 Feb 2022 15:00) (94% - 98%)    I&O's Summary    23 Feb 2022 07:01  -  24 Feb 2022 07:00  --------------------------------------------------------  IN: 2334.8 mL / OUT: 3673 mL / NET: -1338.2 mL    24 Feb 2022 07:01  -  24 Feb 2022 15:33  --------------------------------------------------------  IN: 525 mL / OUT: 1057 mL / NET: -532 mL        Physical Exam:  PHYSICAL EXAM:  General: AAOx3, NAD, WDWN, laying comfortably in bed  Cardio: S1,S2, No MRG, RRR  Pulm: Lungs bilaterally clear to auscultation  Abdomen: Soft, NTND  Extremities: WWP, bilateral lower extremities nontender, nonedematous with no overlying skin changes. Bilateral fem, pop, dp/pt pulses palpable. Significant motor weakness of RLE      Lines/drains/tubes:    LABS:                        11.0   9.96  )-----------( 323      ( 24 Feb 2022 05:42 )             33.3     02-24    141  |  110<H>  |  4<L>  ----------------------------<  136<H>  3.5   |  20<L>  |  0.29<L>    Ca    9.2      24 Feb 2022 13:59  Phos  2.6     02-24  Mg     2.0     02-24          CAPILLARY BLOOD GLUCOSE            Cultures:      RADIOLOGY & ADDITIONAL STUDIES:      Assessment:   55 y/o female with PMHx of HTN, pre-DM presents transferred from [facility] for intracranial hemorrhage on 2/12.  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 Per primary team, LE dopplers were obtained on 2/23 for multiple site infiltrations and r/o DVT. Vascular surgery consulted for finding of DVT of right intramuscular calf vein. On examination, pt reports no pain or swelling in the right leg. Physical exam unremarkable with no tenderness, edema, or overlying skin changes. Femoral, popliteal, DP, and PT pulses palpable bilaterally. Able to ambulate with assistance.     Recommendations:  - No acute vascular surgery intervention at this time  - Per primary team unable to anticoagulate patient in setting of recent bleed  - Obtain serial duplex exam on Monday, 2/28  - discussed with Chief on call  - Vascular will continue to follow  - call [phone] with questions

## 2022-03-11 NOTE — PROGRESS NOTE ADULT - PROBLEM SELECTOR PROBLEM 5
Thrombocytosis

## 2022-03-17 DIAGNOSIS — N39.0 URINARY TRACT INFECTION, SITE NOT SPECIFIED: ICD-10-CM

## 2022-03-17 DIAGNOSIS — D75.839 THROMBOCYTOSIS, UNSPECIFIED: ICD-10-CM

## 2022-03-17 DIAGNOSIS — G93.6 CEREBRAL EDEMA: ICD-10-CM

## 2022-03-17 DIAGNOSIS — I82.C12 ACUTE EMBOLISM AND THROMBOSIS OF LEFT INTERNAL JUGULAR VEIN: ICD-10-CM

## 2022-03-17 DIAGNOSIS — T42.6X5A ADVERSE EFFECT OF OTHER ANTIEPILEPTIC AND SEDATIVE-HYPNOTIC DRUGS, INITIAL ENCOUNTER: ICD-10-CM

## 2022-03-17 DIAGNOSIS — I95.2 HYPOTENSION DUE TO DRUGS: ICD-10-CM

## 2022-03-17 DIAGNOSIS — I80.8 PHLEBITIS AND THROMBOPHLEBITIS OF OTHER SITES: ICD-10-CM

## 2022-03-17 DIAGNOSIS — R29.718 NIHSS SCORE 18: ICD-10-CM

## 2022-03-17 DIAGNOSIS — I61.1 NONTRAUMATIC INTRACEREBRAL HEMORRHAGE IN HEMISPHERE, CORTICAL: ICD-10-CM

## 2022-03-17 DIAGNOSIS — R73.03 PREDIABETES: ICD-10-CM

## 2022-03-17 DIAGNOSIS — I61.5 NONTRAUMATIC INTRACEREBRAL HEMORRHAGE, INTRAVENTRICULAR: ICD-10-CM

## 2022-03-17 DIAGNOSIS — G93.5 COMPRESSION OF BRAIN: ICD-10-CM

## 2022-03-17 DIAGNOSIS — E44.0 MODERATE PROTEIN-CALORIE MALNUTRITION: ICD-10-CM

## 2022-03-17 DIAGNOSIS — K66.0 PERITONEAL ADHESIONS (POSTPROCEDURAL) (POSTINFECTION): ICD-10-CM

## 2022-03-17 DIAGNOSIS — I82.461 ACUTE EMBOLISM AND THROMBOSIS OF RIGHT CALF MUSCULAR VEIN: ICD-10-CM

## 2022-03-17 DIAGNOSIS — I10 ESSENTIAL (PRIMARY) HYPERTENSION: ICD-10-CM

## 2022-03-17 DIAGNOSIS — H57.02 ANISOCORIA: ICD-10-CM

## 2022-03-17 DIAGNOSIS — E87.1 HYPO-OSMOLALITY AND HYPONATREMIA: ICD-10-CM

## 2022-03-17 DIAGNOSIS — R29.810 FACIAL WEAKNESS: ICD-10-CM

## 2022-03-17 DIAGNOSIS — G90.8 OTHER DISORDERS OF AUTONOMIC NERVOUS SYSTEM: ICD-10-CM

## 2022-03-17 DIAGNOSIS — G91.0 COMMUNICATING HYDROCEPHALUS: ICD-10-CM

## 2022-03-17 DIAGNOSIS — R33.8 OTHER RETENTION OF URINE: ICD-10-CM

## 2022-03-17 DIAGNOSIS — D62 ACUTE POSTHEMORRHAGIC ANEMIA: ICD-10-CM

## 2022-03-17 DIAGNOSIS — R00.0 TACHYCARDIA, UNSPECIFIED: ICD-10-CM

## 2022-03-18 RX ORDER — HYDRALAZINE HYDROCHLORIDE 50 MG/1
50 TABLET ORAL
Refills: 0 | Status: ACTIVE | COMMUNITY

## 2022-03-18 RX ORDER — BISACODYL 5 MG/1
5 TABLET ORAL
Refills: 0 | Status: ACTIVE | COMMUNITY

## 2022-03-18 RX ORDER — LABETALOL HYDROCHLORIDE 100 MG/1
100 TABLET, FILM COATED ORAL
Refills: 0 | Status: ACTIVE | COMMUNITY

## 2022-03-18 RX ORDER — LOSARTAN POTASSIUM 100 MG/1
100 TABLET, FILM COATED ORAL
Refills: 0 | Status: ACTIVE | COMMUNITY

## 2022-03-23 ENCOUNTER — APPOINTMENT (OUTPATIENT)
Dept: NEUROSURGERY | Facility: CLINIC | Age: 57
End: 2022-03-23

## 2022-04-06 ENCOUNTER — APPOINTMENT (OUTPATIENT)
Dept: NEUROSURGERY | Facility: CLINIC | Age: 57
End: 2022-04-06
Payer: MEDICAID

## 2022-04-06 ENCOUNTER — NON-APPOINTMENT (OUTPATIENT)
Age: 57
End: 2022-04-06

## 2022-04-06 VITALS
BODY MASS INDEX: 26.49 KG/M2 | DIASTOLIC BLOOD PRESSURE: 81 MMHG | RESPIRATION RATE: 18 BRPM | TEMPERATURE: 98 F | WEIGHT: 159 LBS | HEART RATE: 106 BPM | SYSTOLIC BLOOD PRESSURE: 148 MMHG | OXYGEN SATURATION: 98 % | HEIGHT: 65 IN

## 2022-04-06 PROCEDURE — 99024 POSTOP FOLLOW-UP VISIT: CPT

## 2022-04-06 RX ORDER — APIXABAN 5 MG/1
TABLET, FILM COATED ORAL
Refills: 0 | Status: ACTIVE | COMMUNITY

## 2022-04-06 RX ORDER — LOSARTAN POTASSIUM AND HYDROCHLOROTHIAZIDE 100; 12.5 MG/1; MG/1
TABLET, FILM COATED ORAL
Refills: 0 | Status: ACTIVE | COMMUNITY

## 2022-04-06 RX ORDER — OMEGA-3/DHA/EPA/FISH OIL 300-1000MG
CAPSULE ORAL
Refills: 0 | Status: ACTIVE | COMMUNITY

## 2022-04-06 RX ORDER — TRAZODONE HYDROCHLORIDE 300 MG/1
TABLET ORAL
Refills: 0 | Status: ACTIVE | COMMUNITY

## 2022-04-06 NOTE — HISTORY OF PRESENT ILLNESS
[FreeTextEntry1] : 55 y/o Female with PMHx HTN, pre-DM, who presented to [facility] 2/12/22 after son found patient hanging off bed, vomiting. 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 She was intubated and transferred to [facility] for further care. Now s/p R frontral large bore EVD placement 2/12/22 and s/p Certas VPS 3/2/22 (Setting @5). Hospital course c/b L IJ thrombus, started on eliquis. \par \par DC to [place] 3/11/22\par \par She presents today with her son Bassam. \par She endorses she came home from rehab last week and has been participating in PT/OT at home with therapists a few times a week. She is able to walk with walker at home. States improving right sided upper and lower extremity weakness with PT. States numbness to right upper extremity intermittently, she takes gabapentin. \par Denies seizure activity, visual changes, or new/worsening focal neuro deficits.\par Endorses intermittent headaches, worse at night. \par She continues on eliquis. \par She plans to see PCP on Friday to establish care.

## 2022-04-06 NOTE — REVIEW OF SYSTEMS
[As Noted in HPI] : as noted in HPI [Arm Weakness] : arm weakness [Hand Weakness] :  hand weakness [Leg Weakness] : leg weakness [Numbness] : numbness [Tingling] : tingling [Difficulty Walking] : difficulty walking [Fever] : no fever [Chills] : no chills [Eyesight Problems] : no eyesight problems [Chest Pain] : no chest pain [Palpitations] : no palpitations [Shortness Of Breath] : no shortness of breath [Cough] : no cough [SOB on Exertion] : no shortness of breath during exertion [Incontinence] : no incontinence

## 2022-04-06 NOTE — DATA REVIEWED
[de-identified] : ACC: [id number] EXAM: CT BRAIN\par \par PROCEDURE DATE: 03/07/2022\par \par \par \par INTERPRETATION: CLINICAL INDICATION: Intracranial hemorrhage, now on heparin.\par \par TECHNIQUE: CT of the head was performed without the administration of intravenous contrast.\par \par COMPARISON: CT head 3/4/2022.\par \par FINDINGS:\par \par There is a right frontal approach ventricular drainage catheter with the distal tip in the right frontal horn abutting the foramen Monro, in unchanged position from prior imaging. There is stable size and morphology of the ventricular system, without evidence of obstructive hydrocephalus. There is small focal edema/blood products along the catheter tract in the right frontal lobe and evolving postprocedural changes in the overlying scalp.\par \par There is continued evolution of intraparenchymal hemorrhage in the left thalamus with associated intraventricular extension into the left lateral ventricle. There is surrounding low-attenuation vasogenic edema resulting in mass effect on the posterior body and atrium of the left lateral ventricle, findings unchanged compared to prior. There is no evidence of interval new hemorrhage or midline shift.\par \par There is hypoattenuation of the subcortical and periventricular white matter, which is nonspecific finding, but most likely represents sequela of mild chronic microvascular ischemic disease. There are atherosclerotic calcifications of the cavernous internal carotid arteries bilaterally. There is prominence of the cortical sulci related to underlying mild brain parenchymal volume loss.\par \par There is no evidence of acute infarct or intracranial mass. There are no extra-axial fluid collections.\par \par The visualized intraorbital contents are normal. The imaged portions of the paranasal sinuses are aerated. The mastoid air cells are clear. 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 Visualized soft tissues and osseous structures appear otherwise unremarkable.\par \par \par IMPRESSION:\par \par Right frontal approach ventricular drainage catheter in unchanged position. Stable size and morphology of the ventricular system.\par \par Continued evolution of left thalamic hemorrhage with intraventricular extension.\par \par --- End of Report ---\par \par

## 2022-04-06 NOTE — PHYSICAL EXAM
[General Appearance - Alert] : alert [General Appearance - In No Acute Distress] : in no acute distress [General Appearance - Well-Appearing] : healthy appearing [Oriented To Time, Place, And Person] : oriented to person, place, and time [Impaired Insight] : insight and judgment were intact [Affect] : the affect was normal [Memory Recent] : recent memory was not impaired [Motor Handedness Right-Handed] : the patient is right hand dominant [Clean] : clean [Dry] : dry [Well-Healed] : well-healed [No Drainage] : without drainage [Normal Skin] : normal [Person] : oriented to person [Place] : oriented to place [Time] : oriented to time [Short Term Intact] : short term memory intact [Cranial Nerves Optic (II)] : visual acuity intact bilaterally,  pupils equal round and reactive to light [Cranial Nerves Oculomotor (III)] : extraocular motion intact [Cranial Nerves Trigeminal (V)] : facial sensation intact symmetrically [Cranial Nerves Facial (VII)] : face symmetrical [Cranial Nerves Vestibulocochlear (VIII)] : hearing was intact bilaterally [Cranial Nerves Glossopharyngeal (IX)] : tongue and palate midline [Cranial Nerves Accessory (XI - Cranial And Spinal)] : head turning and shoulder shrug symmetric [Cranial Nerves Hypoglossal (XII)] : there was no tongue deviation with protrusion [Sclera] : the sclera and conjunctiva were normal [PERRL With Normal Accommodation] : pupils were equal in size, round, reactive to light, with normal accommodation [Hearing Threshold Finger Rub Not [place]] : hearing was normal [Neck Appearance] : the appearance of the neck was normal [] : no respiratory distress [Respiration, Rhythm And Depth] : normal respiratory rhythm and effort [Heart Rate And Rhythm] : heart rate was normal and rhythm regular [Abnormal Walk] : normal gait [Skin Color & Pigmentation] : normal skin color and pigmentation [Erythema] : not erythematous [Warm] : not warm [FreeTextEntry5] : RIMA/LE 5/5; RUE 4/5 RLE 3/5 with diminished sensation to right side

## 2022-04-06 NOTE — REASON FOR VISIT
[de-identified] : \par S/P R frontal large bore EVD placement 2/12/22 and S/P Certas VPS 3/2/22 (Setting @5)

## 2022-04-07 ENCOUNTER — NON-APPOINTMENT (OUTPATIENT)
Age: 57
End: 2022-04-07

## 2022-04-21 ENCOUNTER — RESULT REVIEW (OUTPATIENT)
Age: 57
End: 2022-04-21

## 2022-04-21 ENCOUNTER — OUTPATIENT (OUTPATIENT)
Dept: OUTPATIENT SERVICES | Facility: HOSPITAL | Age: 57
LOS: 1 days | End: 2022-04-21

## 2022-04-21 ENCOUNTER — APPOINTMENT (OUTPATIENT)
Dept: CT IMAGING | Facility: CLINIC | Age: 57
End: 2022-04-21
Payer: MEDICAID

## 2022-04-21 PROCEDURE — 70450 CT HEAD/BRAIN W/O DYE: CPT | Mod: 26

## 2022-05-17 ENCOUNTER — NON-APPOINTMENT (OUTPATIENT)
Age: 57
End: 2022-05-17

## 2022-05-18 ENCOUNTER — APPOINTMENT (OUTPATIENT)
Dept: NEUROSURGERY | Facility: CLINIC | Age: 57
End: 2022-05-18
Payer: MEDICAID

## 2022-05-18 ENCOUNTER — OUTPATIENT (OUTPATIENT)
Dept: OUTPATIENT SERVICES | Facility: HOSPITAL | Age: 57
LOS: 1 days | End: 2022-05-18
Payer: COMMERCIAL

## 2022-05-18 ENCOUNTER — NON-APPOINTMENT (OUTPATIENT)
Age: 57
End: 2022-05-18

## 2022-05-18 VITALS
BODY MASS INDEX: 26.49 KG/M2 | DIASTOLIC BLOOD PRESSURE: 76 MMHG | RESPIRATION RATE: 18 BRPM | HEIGHT: 65 IN | WEIGHT: 159 LBS | SYSTOLIC BLOOD PRESSURE: 117 MMHG | OXYGEN SATURATION: 98 % | HEART RATE: 85 BPM

## 2022-05-18 PROCEDURE — 74018 RADEX ABDOMEN 1 VIEW: CPT

## 2022-05-18 PROCEDURE — 70250 X-RAY EXAM OF SKULL: CPT | Mod: 26

## 2022-05-18 PROCEDURE — 99024 POSTOP FOLLOW-UP VISIT: CPT

## 2022-05-18 PROCEDURE — 74019 RADEX ABDOMEN 2 VIEWS: CPT | Mod: 26

## 2022-05-18 PROCEDURE — 71046 X-RAY EXAM CHEST 2 VIEWS: CPT | Mod: 26

## 2022-05-18 PROCEDURE — 70250 X-RAY EXAM OF SKULL: CPT

## 2022-05-18 PROCEDURE — 71045 X-RAY EXAM CHEST 1 VIEW: CPT

## 2022-05-18 NOTE — REVIEW OF SYSTEMS
[Fever] : no fever [Chills] : no chills [Eyesight Problems] : no eyesight problems [Chest Pain] : no chest pain [Palpitations] : no palpitations [Shortness Of Breath] : no shortness of breath [Cough] : no cough [SOB on Exertion] : no shortness of breath during exertion [Incontinence] : no incontinence

## 2022-05-18 NOTE — HISTORY OF PRESENT ILLNESS
[FreeTextEntry1] : 57 y/o Female with PMHx HTN, pre-DM, who presented to [facility] 2/12/22 after son found patient hanging off bed, vomiting. 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 She was intubated and transferred to [facility] for further care. Now s/p R frontal large bore EVD placement 2/12/22 and s/p Certas VPS 3/2/22 (Setting @5). Hospital course c/b L IJ thrombus, started on eliquis. \par \par DC to [place] 3/11/22\par \par 4/6/22 pt presented for f/u: She is home from rehab with improved RU/LE strength. Continues PT/OT and ambulates with walker at home. Endorses intermittent headaches, worse at night. \par She continues on eliquis. She plans to see PCP on Friday to establish care. \par Recommended CTH and referral to Dr. Licona stroke neurologist. \par \par Pt called office 5/16 with c/o pounding headache. CTH and Xray shunt series ordered. \par Check in 5/17 and pt endorsed headache subsided, studies pending. \par \par TODAY pt presents for f/u with her son. \par Endorses intermittent headaches, worse at night. States when she lies down headaches are better. \par She endorses continued right upper and lower extremity strength improvement. She is now able to walk with walker. She has completed course of PT and continues home exercises. \par Endorses right arm pain, numbness. Describes pain as sharp, shooting. She takes tylenol prn for pain. She also takes gabapentin. She has not seen pain management. \par She has not yet completed CTH. \par Denies signs of any postop wound infection which could include but not limited to redness, swelling, purulent drainage. Denies chest pain, shortness of breath, unilateral leg edema. \par  \par

## 2022-05-18 NOTE — ASSESSMENT
[FreeTextEntry1] : 56-year-old female with past medical history of hypertension and diabetes who presented initially to [facility] with left thalamic parenchymal hematoma with intraventricular hemorrhage status post right-sided EVD complicated by persistent hydrocephalus now status post a right frontal ventriculoperitoneal shunt placed March 2, 2022.  Doing well with significant improvement in right-sided function although still debilitated by right sided weakness and what seems like neuropathic pain.  CT in late April with significant reduction in ventriculomegaly.  Patient also with persistent headaches improved by laying flat.  Remains on Eliquis for left IJ thrombus.  Has not yet followed with stroke neurology.  Given the persistent headaches and the significant shrinking of the ventricular system, favor over drainage as a cause for headaches.  We will dial the shunt to a higher setting and obtain repeat CT head.  We will also refer the patient to pain management for evaluation of treatment for neuropathic pain.  Notably, the patient has tried gabapentin but does not like the side effects.\par \par Dr. D' Amico independently reviewed all available images with patient, CTH 4/21/22. \par \par VPS checked at today's visit = 5.0 \par VPS setting changed to = 6.0 \par \par PLAN: \par - Xray shunt series today \par - Repeat CTH\par - Referral for pain management\par - Referral to neurology \par - RTC after CTH to review and to re-evaluate \par \par Patient and her son verbalize understanding of today’s discussion and next steps in treatment plan. \par \par \par A total of 15 minutes was spent reviewing the labs, imaging and physical examination of the patient. We discussed the diagnosis, and the plan. The patient's questions were answered. The patient demonstrated an excellent understanding of the plan. \par \par  \par \par

## 2022-05-18 NOTE — PHYSICAL EXAM
[Erythema] : not erythematous [Warm] : not warm [FreeTextEntry5] : RIMA/LE 5/5; RUE 4/5 RLE 3/5 with diminished sensation to right side

## 2022-05-18 NOTE — PHYSICAL EXAM
[General Appearance - Alert] : alert [General Appearance - In No Acute Distress] : in no acute distress [General Appearance - Well-Appearing] : healthy appearing [Clean] : clean [Dry] : dry [Well-Healed] : well-healed [No Drainage] : without drainage [Oriented To Time, Place, And Person] : oriented to person, place, and time [Impaired Insight] : insight and judgment were intact [Affect] : the affect was normal [Memory Recent] : recent memory was not impaired [Person] : oriented to person [Place] : oriented to place [Time] : oriented to time [Short Term Intact] : short term memory intact [Cranial Nerves Optic (II)] : visual acuity intact bilaterally,  pupils equal round and reactive to light [Cranial Nerves Oculomotor (III)] : extraocular motion intact [Cranial Nerves Trigeminal (V)] : facial sensation intact symmetrically [Cranial Nerves Facial (VII)] : face symmetrical [Cranial Nerves Vestibulocochlear (VIII)] : hearing was intact bilaterally [Cranial Nerves Glossopharyngeal (IX)] : tongue and palate midline [Cranial Nerves Accessory (XI - Cranial And Spinal)] : head turning and shoulder shrug symmetric [Cranial Nerves Hypoglossal (XII)] : there was no tongue deviation with protrusion [Motor Handedness Right-Handed] : the patient is right hand dominant [Sclera] : the sclera and conjunctiva were normal [PERRL With Normal Accommodation] : pupils were equal in size, round, reactive to light, with normal accommodation [Hearing Threshold Finger Rub Not [place]] : hearing was normal [Neck Appearance] : the appearance of the neck was normal [] : no respiratory distress [Respiration, Rhythm And Depth] : normal respiratory rhythm and effort [Heart Rate And Rhythm] : heart rate was normal and rhythm regular [Skin Color & Pigmentation] : normal skin color and pigmentation

## 2022-05-18 NOTE — REVIEW OF SYSTEMS
[Arm Weakness] : arm weakness [Leg Weakness] : leg weakness [Numbness] : numbness [Tingling] : tingling [Difficulty Walking] : difficulty walking [As Noted in HPI] : as noted in HPI

## 2022-05-18 NOTE — REASON FOR VISIT
[FreeTextEntry1] : S/P R frontal large bore EVD placement 2/12/22 and S/P Certas VPS 3/2/22 (Setting @5)

## 2022-05-18 NOTE — DATA REVIEWED
[de-identified] : \par EXAM: CT BRAIN\par \par PROCEDURE DATE: 04/21/2022\par \par \par \par \par INTERPRETATION: PROCEDURE: CT head without intravenous contrast\par \par INDICATION: History of intracerebral hemorrhage; follow-up;  shunt;\par \par TECHNIQUE: Multiple axial images were obtained at 5 mm intervals from the skull base to the vertex. Sagittal and coronal reformatted images were obtained from the axial data set. The images were reviewed in brain and bone windows.\par \par COMPARISON: CT's head 3/7/2022, 3/4/2022 and 2/12/2022\par \par FINDINGS: The CT examination demonstrates the patient to be status post right frontal carlotta hole with a ventricular catheter coursing via the right frontal lobe with the tip at the level of the right foramen of Kelly/anterior third ventricle. There has been further interval decrease in ventricular size. There is no midline shift shift. There has been resolution of the hemorrhage within the left thalamus with resolution of the mass effect on the adjacent lateral ventricle. The gray white differentiation appears within normal limits. There is no intracranial hemorrhage or acute transcortical infarct.\par \par The bony windows demonstrates no fractures. The visualized paranasal sinuses are within normal limits. The mastoid air cells are well aerated.\par \par IMPRESSION: Right-sided EVD catheter in place with interval decrease in ventricular size. Interval resolution of the left thalamic hematoma.\par \par --- End of Report ---

## 2022-06-01 ENCOUNTER — NON-APPOINTMENT (OUTPATIENT)
Age: 57
End: 2022-06-01

## 2022-06-01 ENCOUNTER — APPOINTMENT (OUTPATIENT)
Dept: NEUROSURGERY | Facility: CLINIC | Age: 57
End: 2022-06-01
Payer: MEDICAID

## 2022-06-01 VITALS
BODY MASS INDEX: 25.88 KG/M2 | SYSTOLIC BLOOD PRESSURE: 117 MMHG | DIASTOLIC BLOOD PRESSURE: 68 MMHG | HEART RATE: 68 BPM | HEIGHT: 66 IN | RESPIRATION RATE: 98 BRPM | WEIGHT: 161 LBS

## 2022-06-01 PROCEDURE — 99212 OFFICE O/P EST SF 10 MIN: CPT

## 2022-06-01 RX ORDER — APIXABAN 2.5 MG/1
2.5 TABLET, FILM COATED ORAL TWICE DAILY
Qty: 60 | Refills: 0 | Status: ACTIVE | COMMUNITY
Start: 2022-06-01 | End: 1900-01-01

## 2022-06-01 RX ORDER — LABETALOL HYDROCHLORIDE 200 MG/1
200 TABLET, FILM COATED ORAL
Qty: 45 | Refills: 0 | Status: ACTIVE | COMMUNITY
Start: 2022-06-01 | End: 1900-01-01

## 2022-06-01 NOTE — ASSESSMENT
[FreeTextEntry1] : 56-year-old female with past medical history of hypertension and diabetes who presented initially to [facility] with left thalamic parenchymal hematoma with intraventricular hemorrhage status post right-sided EVD complicated by persistent hydrocephalus now status post a right frontal ventriculoperitoneal shunt placed March 2, 2022.  Doing well with significant improvement in right-sided function although still debilitated by right sided weakness and what seems like neuropathic pain.  CT in late April with significant reduction in ventriculomegaly.  Patient also with persistent headaches improved by laying flat.  Remains on Eliquis for left IJ thrombus.  Has not yet followed with stroke neurology.  Given the persistent headaches and the significant shrinking of the ventricular system, favor over drainage as a cause for headaches.  Headaches mildly improved with new setting. Awaiting CT scan.  We will also refer the patient to pain management for evaluation of treatment for neuropathic pain.  Notably, the patient has tried gabapentin but does not like the side effects.\par \par Dr. D' Amico independently reviewed all available images with patient, Xray shunt series 5/18/22. \par CTH not yet completed, pending insurance approval. \par \par VPS checked at today's visit = 6.0. \par \par PLAN: \par - CTH once insurance approves\par - RTC after CTH to review\par - F/u with PCP for comprehensive care and management of BP meds/ eliquis. Refill provided for gap of medications\par - F/u with neurology as scheduled. \par \par Patient verbalizes understanding of today’s discussion and next steps in treatment plan. \par \par \par A total of 15 minutes was spent reviewing the labs, imaging and physical examination of the patient. We discussed the diagnosis, and the plan. The patient's questions were answered. The patient demonstrated an excellent understanding of the plan. \par \par \par  \par \par

## 2022-06-01 NOTE — REVIEW OF SYSTEMS
[As Noted in HPI] : as noted in HPI [Arm Weakness] : arm weakness [Hand Weakness] :  hand weakness [Numbness] : numbness [Tingling] : tingling [Dizziness] : dizziness [Difficulty Walking] : difficulty walking [Fever] : no fever [Chills] : no chills [Eyesight Problems] : no eyesight problems [Chest Pain] : no chest pain [Palpitations] : no palpitations [Shortness Of Breath] : no shortness of breath [Cough] : no cough

## 2022-06-01 NOTE — DATA REVIEWED
[de-identified] : ACC: [id number] EXAM: XR SHUNT SERIES#\par \par PROCEDURE DATE: 05/18/2022\par \par \par \par INTERPRETATION: CLINICAL INFORMATION: Ventriculoperitoneal shunt, new onset headache\par \par EXAM: AP and lateral views of the skull, chest, and abdomen were obtained for a shunt series.\par \par Comparison: No similar prior radiographs. Recent head CT 04/21/2022 is reviewed\par \par FINDINGS:\par A ventriculostomy catheter enters cranial cavity via a right frontal approach. The shunt descends along the right cervical region, over the right anterior chest wall, and into the abdomen with the distal tip noted in the mid pelvis. There are no discontinuities or kinks identified along its course.\par \par Cholecystectomy clips incidentally seen. Lungs are clear and the cardiomediastinal contours are within normal limits. Abdomen shows normal bowel gas pattern. Osseous structures show disc degeneration of the cervical spine at C6-7 and lower lumbar facet arthrosis.\par \par \par IMPRESSION:\par \par Ventriculoperitoneal shunt catheter in place, without obvious kink or discontinuity.\par \par --- End of Report ---\par

## 2022-06-01 NOTE — HISTORY OF PRESENT ILLNESS
[FreeTextEntry1] : 55 y/o Female with PMHx HTN, pre-DM, who presented to [facility] 2/12/22 after son found patient hanging off bed, vomiting. 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 She was intubated and transferred to [facility] for further care. Now s/p R frontal large bore EVD placement 2/12/22 and s/p Certas VPS 3/2/22 (Setting @5). Hospital course c/b L IJ thrombus, started on eliquis. \par DC to [place] 3/11/22\par \par 4/6/22 pt presented for f/u: She is home from rehab with improved RU/LE strength. Continues PT/OT and ambulates with walker at home. Endorses intermittent headaches, worse at night. \par She continues on eliquis. She plans to see PCP on Friday to establish care. \par Recommended CTH and referral to Dr. Licona stroke neurologist. \par \par Pt called office 5/16 with c/o pounding headache. CTH and Xray shunt series ordered. \par Check in 5/17 and pt endorsed headache subsided, studies pending. \par \par 5/18/22 pt presented for f/u: \par Endorses intermittent headaches, worse at night. States when she lies down headaches are better. She has not yet completed CTH. \par She endorses continued right upper and lower extremity strength improvement. She is now able to walk with walker. She has completed course of PT and continues home exercises. Endorses right arm pain, numbness. Describes pain as sharp, shooting. She takes tylenol prn for pain. She also takes gabapentin. \par VPS setting changed from 5.0 to 6.0 \par Recommended pain management, f/u with neurologist, Xray shunt series. Plan made to RTC after Xray and CTH to review/ re-evaluate headaches. \par \par TODAY pt presents with c/o increased dizziness following shunt adjustment 2 weeks ago that has now improved over the past 3 days. States headaches with some improvement when lying flat. \par She states continued right arm pain described as sharp and shooting. States made apt for pain management but has not yet gone. \par She has made apt with stroke neurology for later this month. \par States ran out of eliquis and labetalol. She has PCP. \par

## 2022-06-01 NOTE — PHYSICAL EXAM
[General Appearance - Alert] : alert [General Appearance - In No Acute Distress] : in no acute distress [General Appearance - Well-Appearing] : healthy appearing [Clean] : clean [Dry] : dry [Well-Healed] : well-healed [Oriented To Time, Place, And Person] : oriented to person, place, and time [Impaired Insight] : insight and judgment were intact [Affect] : the affect was normal [Memory Recent] : recent memory was not impaired [Person] : oriented to person [Place] : oriented to place [Time] : oriented to time [Short Term Intact] : short term memory intact [Cranial Nerves Optic (II)] : visual acuity intact bilaterally,  pupils equal round and reactive to light [Cranial Nerves Oculomotor (III)] : extraocular motion intact [Cranial Nerves Trigeminal (V)] : facial sensation intact symmetrically [Cranial Nerves Facial (VII)] : face symmetrical [Cranial Nerves Vestibulocochlear (VIII)] : hearing was intact bilaterally [Cranial Nerves Glossopharyngeal (IX)] : tongue and palate midline [Cranial Nerves Accessory (XI - Cranial And Spinal)] : head turning and shoulder shrug symmetric [Cranial Nerves Hypoglossal (XII)] : there was no tongue deviation with protrusion [Motor Handedness Right-Handed] : the patient is right hand dominant [Sensation Tactile Decrease] : light touch was intact [Sclera] : the sclera and conjunctiva were normal [PERRL With Normal Accommodation] : pupils were equal in size, round, reactive to light, with normal accommodation [Hearing Threshold Finger Rub Not [place]] : hearing was normal [Neck Appearance] : the appearance of the neck was normal [] : no respiratory distress [Respiration, Rhythm And Depth] : normal respiratory rhythm and effort [Heart Rate And Rhythm] : heart rate was normal and rhythm regular [Skin Color & Pigmentation] : normal skin color and pigmentation [FreeTextEntry5] : RIMA/LE 5/5; RUE 4/5 RLE 3/5 with diminished sensation to right side  19-Apr-2019 07:04

## 2022-06-10 ENCOUNTER — NON-APPOINTMENT (OUTPATIENT)
Age: 57
End: 2022-06-10

## 2022-06-20 ENCOUNTER — NON-APPOINTMENT (OUTPATIENT)
Age: 57
End: 2022-06-20

## 2022-06-23 ENCOUNTER — APPOINTMENT (OUTPATIENT)
Dept: NEUROLOGY | Facility: CLINIC | Age: 57
End: 2022-06-23

## 2022-07-05 PROBLEM — G91.9 HYDROCEPHALUS IN ADULT: Status: ACTIVE | Noted: 2022-03-18

## 2022-07-05 PROBLEM — I61.9 ICH (INTRACEREBRAL HEMORRHAGE): Status: ACTIVE | Noted: 2022-03-18

## 2022-07-06 ENCOUNTER — APPOINTMENT (OUTPATIENT)
Dept: NEUROSURGERY | Facility: CLINIC | Age: 57
End: 2022-07-06

## 2022-07-06 ENCOUNTER — RX RENEWAL (OUTPATIENT)
Age: 57
End: 2022-07-06

## 2022-07-06 ENCOUNTER — NON-APPOINTMENT (OUTPATIENT)
Age: 57
End: 2022-07-06

## 2022-07-06 VITALS
TEMPERATURE: 97 F | RESPIRATION RATE: 18 BRPM | HEIGHT: 66 IN | BODY MASS INDEX: 25.88 KG/M2 | DIASTOLIC BLOOD PRESSURE: 81 MMHG | OXYGEN SATURATION: 98 % | SYSTOLIC BLOOD PRESSURE: 134 MMHG | WEIGHT: 161 LBS | HEART RATE: 81 BPM

## 2022-07-06 DIAGNOSIS — I61.9 NONTRAUMATIC INTRACEREBRAL HEMORRHAGE, UNSPECIFIED: ICD-10-CM

## 2022-07-06 DIAGNOSIS — G91.9 HYDROCEPHALUS, UNSPECIFIED: ICD-10-CM

## 2022-07-06 PROCEDURE — 99212 OFFICE O/P EST SF 10 MIN: CPT

## 2022-07-06 NOTE — DATA REVIEWED
[de-identified] : Exam requested by:\par RANDY D'AMICO MD\par [address], [address]\par [facility] [address]\par SITE PERFORMED: [facility]\par Patient: GOLDEN CALHOUN\par YOB: 1965\par Phone: [phone]\par MRN: [id number] Acc: [id number]\par Date of Exam: 06-\par  \par EXAM:  CT HEAD WITHOUT CONTRAST\par \par Note - This patient has received 0 CT studies and 0 Myocardial Perfusion studies within our network over the previous 12 month period.\par \par HISTORY: Headache status post shunt surgery, following stroke.\par \par TECHNIQUE:  CT of the head was performed without contrast with axial multislice multidetector technique from the skull base to vertex. Sagittal and coronal reformatted images were obtained. One or more of the following dose reduction techniques were used: automated exposure control, adjustment of the mA and/or kV according to patient size, use of iterative reconstruction technique. \par \par COMPARISON:  None.\par \par FINDINGS:\par There is status post right frontal approach ventriculostomy catheter placement, with tip at the region of the foramen Monro. No evidence for hydrocephalus with small lateral ventricular volumes and slitlike 4th and 3rd ventricles. No acute intracranial hemorrhage. No extra-axial fluid collection. No midline shift.\par \par There is no loss of the gray-white matter distinction to indicate acute territorial infarct. There is no mass effect.\par \par The sulci volumes are appropriate for age.\par \par No extra-axial fluid collection.\par \par The visualized orbits are within normal limits.\par \par The calvarium is intact. \par \par The bilateral tympanomastoid cavities are clear.\par \par There is a small right sphenoid sinus air-fluid level suggestive of acute right sphenoid sinusitis. The remaining visualized paranasal sinuses are clear.\par \par IMPRESSION: \par Status post right frontal approach ventriculostomy with tip in the region of foramen of Kelly. No hydrocephalus with small lateral ventricular volumes and slitlike 3rd and 4th ventricles. No acute intracranial hemorrhage, extra-axial fluid collection or midline shift. Clinical evaluation for over shunting is advised.\par \par Small right sphenoid sinus air-fluid level suggestive of acute sinusitis, which may resulted headaches.\par \par \par Thank you for the opportunity to participate in the care of this patient.

## 2022-07-06 NOTE — HISTORY OF PRESENT ILLNESS
[FreeTextEntry1] : 57 y/o Female with PMHx HTN, pre-DM, who presented to [facility] 2/12/22 after son found patient hanging off bed, vomiting. 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 She was intubated and transferred to [facility] for further care. Now s/p R frontal large bore EVD placement 2/12/22 and s/p Certas VPS 3/2/22 (Setting @5). Hospital course c/b L IJ thrombus, started on eliquis. \par DC to [place] 3/11/22\par \par 4/6/22 pt presented for f/u: She is home from rehab with improved RU/LE strength. Continues PT/OT and ambulates with walker at home. Endorses intermittent headaches, worse at night. \par She continues on eliquis. She plans to see PCP on Friday to establish care. \par Recommended CTH and referral to Dr. Licona stroke neurologist. \par \par Pt called office 5/16 with c/o pounding headache. CTH and Xray shunt series ordered. \par Check in telephone call 5/17 and pt endorsed headache subsided, studies pending. \par \par 5/18/22 pt presented for f/u: \par C/o intermittent headaches, worse at night, improvement when lying down. She has not yet completed CTH s/t insurance. VPS setting changed from 5.0 to 6.0 \par Continued right upper and lower extremity strength improvement, with right arm pain, numbness, on gabapentin. \par Recommended pain management, f/u with neurologist, Xray shunt series. Plan made to RTC after Xray and CTH to review/ re-evaluate headaches. \par \par 6/1/22 pt returned with c/o increased dizziness following shunt adjustment 2 weeks ago that has now improved over the past 3 days. States headaches with some improvement when lying flat. Maintained at 6.0. \par CTH not yet completed s/t insurance issues. \par \par TODAY pt presents with completed CTH. Her son is present via telephone. \par Denies balance issues, dizziness, N/V. \par She states right leg weakness has improved, she is able to walk unassisted without assistive device. \par She states continued right arm and leg diminished sensation and pains, also with right neck pain. \par She states intermittent headaches, no known pattern or positioning triggers. \par She has not yet seen neurologist, she has apt with Dr. Licona in august. She plans to complete MRI brain, scheduled at [facility]. \par She recently saw PCP. \par

## 2022-07-06 NOTE — ASSESSMENT
[FreeTextEntry1] : 56-year-old female with past medical history of hypertension and diabetes who presented initially to [facility] with left thalamic parenchymal hematoma with intraventricular hemorrhage status post right-sided EVD complicated by persistent hydrocephalus now status post a right frontal ventriculoperitoneal shunt placed March 2, 2022.  Doing well with significant improvement in right-sided function although still debilitated by right sided weakness and what seems like neuropathic pain.  CT in late April with significant reduction in ventriculomegaly.  Patient also with persistent headaches improved by laying flat.  Remains on Eliquis for left IJ thrombus.  Has not yet followed with stroke neurology.  Given the persistent headaches and the significant shrinking of the ventricular system, favor over drainage as a cause for headaches.  Headaches mildly improved with new setting.CT with excellent shunt placement. ventricles wnl.  Complaining of significant right-sided neck pain, arm pain and leg pain.  Although I believe this is due to the left-sided thalamic infarct, I would like to also obtain an MRI of the cervical spine to rule out cervical disc disease as contributory.  We will also refer the patient to pain management for evaluation of treatment for neuropathic pain.  Notably, the patient has tried gabapentin but does not like the side effects.\par \par VPS setting checked at today's visit = 6.0\par \par Dr. D' Amico independently reviewed all available images with patient, [facility] 6/28/22. \par \par PLAN: \par - MRI brain as planned\par - MRI cervical spine w/o contrast \par - Referral for physiatrist for evaluation \par - See Dr. Licona as scheduled \par - F/U with PCP for comprehensive care\par - RTC after imaging to review \par \par Patient and her son verbalize understanding of today’s discussion and next steps in treatment plan. \par \par \par A total of 15 minutes was spent reviewing the labs, imaging and physical examination of the patient. We discussed the diagnosis, and the plan. The patient's questions were answered. The patient demonstrated an excellent understanding of the plan. \par \par  \par \par

## 2022-07-06 NOTE — REVIEW OF SYSTEMS
[Hand Weakness] :  hand weakness [Leg Weakness] : leg weakness [Numbness] : numbness [Tingling] : tingling [As Noted in HPI] : as noted in HPI [Fever] : no fever [Chills] : no chills [Seizures] : no convulsions [Dizziness] : no dizziness [Eyesight Problems] : no eyesight problems [Chest Pain] : no chest pain [Shortness Of Breath] : no shortness of breath [Cough] : no cough [Incontinence] : no incontinence

## 2022-07-06 NOTE — PHYSICAL EXAM
[General Appearance - Alert] : alert [General Appearance - In No Acute Distress] : in no acute distress [General Appearance - Well-Appearing] : healthy appearing [Clean] : clean [Dry] : dry [Well-Healed] : well-healed [No Drainage] : without drainage [Normal Skin] : normal [Oriented To Time, Place, And Person] : oriented to person, place, and time [Impaired Insight] : insight and judgment were intact [Affect] : the affect was normal [Memory Recent] : recent memory was not impaired [Person] : oriented to person [Place] : oriented to place [Time] : oriented to time [Short Term Intact] : short term memory intact [Cranial Nerves Optic (II)] : visual acuity intact bilaterally,  pupils equal round and reactive to light [Cranial Nerves Oculomotor (III)] : extraocular motion intact [Cranial Nerves Trigeminal (V)] : facial sensation intact symmetrically [Cranial Nerves Facial (VII)] : face symmetrical [Cranial Nerves Vestibulocochlear (VIII)] : hearing was intact bilaterally [Cranial Nerves Glossopharyngeal (IX)] : tongue and palate midline [Cranial Nerves Accessory (XI - Cranial And Spinal)] : head turning and shoulder shrug symmetric [Cranial Nerves Hypoglossal (XII)] : there was no tongue deviation with protrusion [Motor Handedness Right-Handed] : the patient is right hand dominant [Sensation Tactile Decrease] : light touch was intact [Sclera] : the sclera and conjunctiva were normal [PERRL With Normal Accommodation] : pupils were equal in size, round, reactive to light, with normal accommodation [Hearing Threshold Finger Rub Not [place]] : hearing was normal [Neck Appearance] : the appearance of the neck was normal [] : no respiratory distress [Abnormal Walk] : normal gait [Skin Color & Pigmentation] : normal skin color and pigmentation [FreeTextEntry5] : RIMA/LE 5/5; RUE 4/5 RLE 4/5 with diminished sensation to right side

## 2022-07-14 ENCOUNTER — APPOINTMENT (OUTPATIENT)
Dept: PHYSICAL MEDICINE AND REHAB | Facility: CLINIC | Age: 57
End: 2022-07-14

## 2022-07-14 VITALS — DIASTOLIC BLOOD PRESSURE: 90 MMHG | OXYGEN SATURATION: 99 % | HEART RATE: 91 BPM | SYSTOLIC BLOOD PRESSURE: 160 MMHG

## 2022-07-14 DIAGNOSIS — Z91.81 HISTORY OF FALLING: ICD-10-CM

## 2022-07-14 DIAGNOSIS — G89.0 CENTRAL PAIN SYNDROME: ICD-10-CM

## 2022-07-14 DIAGNOSIS — Z74.1 NEED FOR ASSISTANCE WITH PERSONAL CARE: ICD-10-CM

## 2022-07-14 DIAGNOSIS — M21.371 FOOT DROP, RIGHT FOOT: ICD-10-CM

## 2022-07-14 DIAGNOSIS — I61.5 NONTRAUMATIC INTRACEREBRAL HEMORRHAGE, INTRAVENTRICULAR: ICD-10-CM

## 2022-07-14 DIAGNOSIS — R26.9 UNSPECIFIED ABNORMALITIES OF GAIT AND MOBILITY: ICD-10-CM

## 2022-07-14 DIAGNOSIS — G56.41 CAUSALGIA OF RIGHT UPPER LIMB: ICD-10-CM

## 2022-07-14 DIAGNOSIS — R20.3 HYPERESTHESIA: ICD-10-CM

## 2022-07-14 DIAGNOSIS — M79.2 NEURALGIA AND NEURITIS, UNSPECIFIED: ICD-10-CM

## 2022-07-14 PROCEDURE — 99205 OFFICE O/P NEW HI 60 MIN: CPT

## 2022-07-14 NOTE — PHYSICAL EXAM
[FreeTextEntry1] : Gen: A+O x 3 in NAD\par Psych: Normal mood and affect. Responds appropriately to commands\par Eyes: Anicteric. No discharge. EOMI.\par Resp: Breathing unlabored\par CV: DP pulses 2+ and equal. No varicosities noted\par Ext: No c/c/e\par Skin: No lesions noted\par \par Gait: + antalgic\par abnormal reciprocating heel to toe, unable to stand on toes and heels. \par \par Tandem gait intact\par Romberg negative\par \par unable to test Trendelenburg sign with single leg stance\par \par Inspection: Spine alignment is midline.\par Palpation: There is + tenderness over the midline spinous processes, paravertebral muscles of the thoracolumbar region\par Lumbar ROM: Flexion, extension, side-bending, rotation, limited in most planes\par +pain with lateral flexion\par +pain with oblique extension\par +pain with lateral rotation \par \par Hip ROM: neg pain at terminal ROM bilaterally.\par FAIR, FABERE negative bilaterally.\par \par right lower limb 3+/5 at all articulations\par left lower limb 5-/5\par \par Tone: Normal. No clonus.\par Sensation: Grossly intact to light touch bilateral lower limbs, relatively less on the right upper and lower limb\par Proprioception: Intact at big toes bilaterally.\par Reflexes: 2+ knee jerk, ankle jerk. 3+ knee, ankle jerk\par Plantars downgoing left, upgoing on the right \par \par SLR negative bilaterally\par Crossed SLR negative bilaterally.\par Slump Test negative bilaterally\par \par Inspection: Spine alignment is midline, with no evidence of scoliosis. Iliac crest heights and PSIS heights level. \par + Forward head position.\par \par Palpation: There is + tenderness over the upper traps, middle traps, levator scaps, rhomboids, articular pillars, cervical paraspinals\par \par Cervical ROM: Flexion, extension, side-bending, rotation, limited due to pain\par Finger to nose bilaterally intact on the left\par \par left upper limb 5-/5\par right upper limb 3+/5 in all articulations, 3-/5 in the right wrist, shoulder\par \par Tone: unable to fully test for spasticity in the right upper, lower limbs\par Sensation: Grossly intact to light touch and pinprick bilateral upper extremities, with right upper limb light touch producing pain response and discomfort\par Reflexes: 3+ symmetric biceps, pronators, brachioradialis, triceps\par Mora’s Sign + right\par Spurling's Sign negative\par Shoulder Abduction Test (Bakody’s) absent\par Lhermitte’s Sign negative\par

## 2022-07-14 NOTE — HISTORY OF PRESENT ILLNESS
[FreeTextEntry1] : Esa Cazares M.D.\par Sports Medicine and Interventional Spine\par Department of Physical Medicine and Rehabilitation \par [facility] \par Email: [email or web address] <[email or web address] \par [facility]\par [facility]\par [address]\[place] [address]\par \par [facility] [place] [facility]\par [address]\[place] [address]\par \par [facility] \par [facility]\par [address]\[place] [address]\par \par [facility] at [facility]\par [address] [facility] [address] [facility] [facility]\[place] [address]\par \par [facility] \par [facility]\par [address]\[place] [address]\par \par For [place] Appointments\par Phone: [phone]\par Fax: [phone]\par \par For [place] Appointments\par Phone: [phone]\par Fax: [phone]\par \par \par ----------------------------------------------------------------------------------------------------------------------------------------\par \par PATIENT: GOLDEN CALHOUN \par MRN: [id number] \par YOB: 1965 \par DATE OF VISIT: 07/14/2022 \par Referred by Unable to Collect PCP\par PCP ADDRESS:\par \par Jul 14, 2022 \par \par \par Dear Dr. none \par \par Thank you for referring GOLDEN CALHOUN to my Sports and Interventional Spine practice and office. Enclosed is a copy of the patient's consultation/progress note, which includes my complete assessment and recent studies completed during the patient's evaluation.\par \par If you have questions or have any patients who require nonsurgical, non-opiate management of any sports, spine, or musculoskeletal conditions, please do not hesitate to contact my , Xiomara Campos at [phone].\par \par I look forward to taking care of your patients along with you.\par \par Sincerely,\par \par Esa\par \par Esa Cazares MD\par Sports, Interventional Spine, & Regenerative Musculoskeletal Medicine\par [facility] [place] [facility]\par Email: [email or web address]\par \par \par                                                   Initial Consultation:\par CC: right sided pain\par \par HPI:  This is the first visit to [facility] [place] [facility].  \par \par GOLDEN CALHOUN presents with the chief complaint as above.  \par \par Initial Hx on 07/14/2022:\par Presents in person to [facility]\par following her CVA (2/2022 intra-ventricular hemorrhage), patient has right upper and lower limb pain\par patient reports having a course of PT/OT for the right upper limb following her CVA\par towards the end of her Pt sessions following outpatient Pt course for th shoulder patient noted worsening functional use of the right upper limb, unable to lift her arm to eye level or unable to perform simple, fine motor movements\par \par The patient’s difficulties began following her CVA\par The pain is graded as 5-7/10  \par The pain is described as burning pain over the posterior right shoulder, and focally sharp, stabbing over the articulations of the right upper limb\par The pain is intermittent\par The pain does not radiate rather manifests over multiple, predictable locations of the right upper >> lower limb\par The patient feels that the pain is overall worseing\par Patient denies other recent fall, MVA, injury, trauma, or accident besides presenting history above\par \par Aggravating: movement of any kind of the right upper limb, ambulation, prolonged sitting, prolonged standing, \par Alleviating: rest, activity modification, avoiding prolonged standing, pharmacologic treatments\par \par Meds: denies regular PO pain medications\par Therapy Program: no recent structured targeted therapy program\par HEP: doing HEP regularly\par \par Assoc Sx:\par Reports intermittent numbness, tingling, pins-and-needles paresthesia in the right UPPER >> LOWER limb in a non-dermatomal distribution\par Otherwise denies numbness, Tingling\par \par Denies Focal motor weakness in the upper or lower limbs\par Denies New or worsened bowel or bladder incontinence\par Denies Saddle anesthesia\par Denies Buckling\par Denies Using Orthotic(s)/Supportive devices\par Denies Swelling in the upper/lower extremities, had previous lower limb swelling from lyrica, gabapentin\par Denies Clicking\par They also deny frequent tripping, falling\par \par ROS: A 14 point review of systems was completed. Positive findings are pain as described above. The remaining systems negative.\par \par Breast Cancer Surveillance: up to date\par COVID HX: reviewed\par \par Assoc Hx:\par reports having peripheral edema due to gabapentin, \par reports having fell at home, but cannot recall specifically\par reports improvement of her previous dizziness, headache symptoms\par \par Ambulates with assistive device at home\par Injection Hx: denies locally directed treatment to the area in question\par Imaging Hx: reviewed\par \par Level of functioning: indep with ambulation, indep with ADLs\par Living Situation: dwelling with steps to enter, with adult son, private house, first floor; 5 days per week, 7 hours per day +HHA

## 2022-07-14 NOTE — ASSESSMENT
[FreeTextEntry1] :                                                       Assessment/Plan:\par \par GOLDEN CALHOUN is a 56 year female with right sided pain following intraventricular hemorrhage s/p  shunt here for initial consultation.\par \par 1. Status post intra-ventricular hemorrhage\par 2. Spastic hemiparesis of the right dominant side (upper and lower limb)\par 3. Central Pain Syndrome\par 4. 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 Knee instability, right\par 6. Neuropathic pain of the right shoulder, decreased functional use of the right shoulder with minimal residual active ROM of the RUE\par 7. Hyperesthesia of the right side (upper limb, chest, abdomen, thoracic back, lower limb)\par 8. At risk for falls\par 9. Requires daily assistance for ADLs and comfort needs\par \par - Tiers of treatment and management of above diagnosis(es) were discussed with patient\par - Optimal diet, weight, sleep, and lifestyle management to minimize stress and maximize well being counseling provided\par - Imaging reviewed and discussed with patient\par - Reviewed previous encounter notes from 7/6/2022 Dr. R.S. Damico (Neurosurgery)\par - Patient was advised to start a structured, targeted therapy program 2-3x/wk for 8 wks with goal toward HEP\par - Patient was educated on an appropriate home exercise program, provided with exercise recommendations, all questions answered\par - Patient advised to follow up with our service line's post-stroke specialists\par - Noted that patient has attempted several (pharmacologic) treatment options for pain, patient interested in discussing pharm/non-pharm options with Pain, Rx provided\par - Patient was advised to apply cool compresses or warm heat to affected regions PRN\par - Patient was encouraged to obtain their advanced diagnostic imaging studies\par \par - Educated about red flag symptoms including (but not limited to) new, worsened, or persistent: fever greater than 100F, bowel or bladder incontinence, bowel obstipation, inability to void urine, urinary leakage, Severe nausea or vomiting, Worsening numbness, worsening tingling/paresthesias, and/or new or progressive motor weakness; advised to seek immediate medical attention at his nearest Emergency department should they experience any of the above\par \par - Follow up PRN\par \par I have personally spent a total of at least 65 minutes preparing, reviewing internal and external records, explaining, counseling, and coordinating care for this patient encounter.\par \par Thank you, Dr, for allowing me to participate in the care of your patient. Please do not hesitate to contact me with questions/concerns.\par \par Esa Cazares M.D.\par Sports and Inter[facility] Spine\par Department of Physical Medicine and Rehabilitation \par [facility] \par Email: [email or web address]\par \par [facility] Physician [facility] \par [facility]\par [address] [facility]\[place] [address]\par \par Appointments: [phone]\par Fax: [phone]

## 2022-07-15 ENCOUNTER — APPOINTMENT (OUTPATIENT)
Dept: PHYSICAL MEDICINE AND REHAB | Facility: CLINIC | Age: 57
End: 2022-07-15

## 2022-08-01 ENCOUNTER — RX RENEWAL (OUTPATIENT)
Age: 57
End: 2022-08-01

## 2022-08-04 ENCOUNTER — RX RENEWAL (OUTPATIENT)
Age: 57
End: 2022-08-04

## 2022-08-19 ENCOUNTER — APPOINTMENT (OUTPATIENT)
Dept: NEUROLOGY | Facility: CLINIC | Age: 57
End: 2022-08-19

## 2023-03-25 ENCOUNTER — INPATIENT (INPATIENT)
Facility: HOSPITAL | Age: 58
LOS: 2 days | Discharge: HOME | DRG: 872 | End: 2023-03-28
Attending: INTERNAL MEDICINE | Admitting: INTERNAL MEDICINE
Payer: MEDICAID

## 2023-03-25 VITALS
HEART RATE: 122 BPM | RESPIRATION RATE: 20 BRPM | HEIGHT: 64 IN | SYSTOLIC BLOOD PRESSURE: 145 MMHG | WEIGHT: 160.06 LBS | OXYGEN SATURATION: 95 % | TEMPERATURE: 100 F | DIASTOLIC BLOOD PRESSURE: 88 MMHG

## 2023-03-25 DIAGNOSIS — R07.9 CHEST PAIN, UNSPECIFIED: ICD-10-CM

## 2023-03-25 DIAGNOSIS — I10 ESSENTIAL (PRIMARY) HYPERTENSION: ICD-10-CM

## 2023-03-25 DIAGNOSIS — Z86.718 PERSONAL HISTORY OF OTHER VENOUS THROMBOSIS AND EMBOLISM: ICD-10-CM

## 2023-03-25 DIAGNOSIS — R73.03 PREDIABETES: ICD-10-CM

## 2023-03-25 DIAGNOSIS — Z29.9 ENCOUNTER FOR PROPHYLACTIC MEASURES, UNSPECIFIED: ICD-10-CM

## 2023-03-25 DIAGNOSIS — R12 HEARTBURN: ICD-10-CM

## 2023-03-25 DIAGNOSIS — Z98.2 PRESENCE OF CEREBROSPINAL FLUID DRAINAGE DEVICE: Chronic | ICD-10-CM

## 2023-03-25 DIAGNOSIS — A41.9 SEPSIS, UNSPECIFIED ORGANISM: ICD-10-CM

## 2023-03-25 DIAGNOSIS — Z98.2 PRESENCE OF CEREBROSPINAL FLUID DRAINAGE DEVICE: ICD-10-CM

## 2023-03-25 DIAGNOSIS — J18.9 PNEUMONIA, UNSPECIFIED ORGANISM: ICD-10-CM

## 2023-03-25 LAB
ALBUMIN SERPL ELPH-MCNC: 3.5 G/DL — SIGNIFICANT CHANGE UP (ref 3.5–5)
ALP SERPL-CCNC: 116 U/L — SIGNIFICANT CHANGE UP (ref 40–120)
ALT FLD-CCNC: 28 U/L DA — SIGNIFICANT CHANGE UP (ref 10–60)
ANION GAP SERPL CALC-SCNC: 6 MMOL/L — SIGNIFICANT CHANGE UP (ref 5–17)
APPEARANCE UR: CLEAR — SIGNIFICANT CHANGE UP
APTT BLD: 37.9 SEC — HIGH (ref 27.5–35.5)
AST SERPL-CCNC: 16 U/L — SIGNIFICANT CHANGE UP (ref 10–40)
BACTERIA # UR AUTO: ABNORMAL /HPF
BASOPHILS # BLD AUTO: 0.06 K/UL — SIGNIFICANT CHANGE UP (ref 0–0.2)
BASOPHILS NFR BLD AUTO: 0.4 % — SIGNIFICANT CHANGE UP (ref 0–2)
BILIRUB SERPL-MCNC: 0.7 MG/DL — SIGNIFICANT CHANGE UP (ref 0.2–1.2)
BILIRUB UR-MCNC: NEGATIVE — SIGNIFICANT CHANGE UP
BUN SERPL-MCNC: 7 MG/DL — SIGNIFICANT CHANGE UP (ref 7–18)
CALCIUM SERPL-MCNC: 9.5 MG/DL — SIGNIFICANT CHANGE UP (ref 8.4–10.5)
CHLORIDE SERPL-SCNC: 104 MMOL/L — SIGNIFICANT CHANGE UP (ref 96–108)
CO2 SERPL-SCNC: 24 MMOL/L — SIGNIFICANT CHANGE UP (ref 22–31)
COLOR SPEC: YELLOW — SIGNIFICANT CHANGE UP
CREAT SERPL-MCNC: 0.63 MG/DL — SIGNIFICANT CHANGE UP (ref 0.5–1.3)
DIFF PNL FLD: NEGATIVE — SIGNIFICANT CHANGE UP
EGFR: 103 ML/MIN/1.73M2 — SIGNIFICANT CHANGE UP
EOSINOPHIL # BLD AUTO: 0.08 K/UL — SIGNIFICANT CHANGE UP (ref 0–0.5)
EOSINOPHIL NFR BLD AUTO: 0.5 % — SIGNIFICANT CHANGE UP (ref 0–6)
EPI CELLS # UR: SIGNIFICANT CHANGE UP /HPF
GLUCOSE SERPL-MCNC: 120 MG/DL — HIGH (ref 70–99)
GLUCOSE UR QL: NEGATIVE — SIGNIFICANT CHANGE UP
HCT VFR BLD CALC: 41 % — SIGNIFICANT CHANGE UP (ref 34.5–45)
HGB BLD-MCNC: 13.2 G/DL — SIGNIFICANT CHANGE UP (ref 11.5–15.5)
IMM GRANULOCYTES NFR BLD AUTO: 0.4 % — SIGNIFICANT CHANGE UP (ref 0–0.9)
INR BLD: 1.31 RATIO — HIGH (ref 0.88–1.16)
KETONES UR-MCNC: NEGATIVE — SIGNIFICANT CHANGE UP
LACTATE SERPL-SCNC: 1.6 MMOL/L — SIGNIFICANT CHANGE UP (ref 0.7–2)
LEUKOCYTE ESTERASE UR-ACNC: ABNORMAL
LIDOCAIN IGE QN: 53 U/L — LOW (ref 73–393)
LYMPHOCYTES # BLD AUTO: 1.98 K/UL — SIGNIFICANT CHANGE UP (ref 1–3.3)
LYMPHOCYTES # BLD AUTO: 13 % — SIGNIFICANT CHANGE UP (ref 13–44)
MCHC RBC-ENTMCNC: 29.2 PG — SIGNIFICANT CHANGE UP (ref 27–34)
MCHC RBC-ENTMCNC: 32.2 GM/DL — SIGNIFICANT CHANGE UP (ref 32–36)
MCV RBC AUTO: 90.7 FL — SIGNIFICANT CHANGE UP (ref 80–100)
MONOCYTES # BLD AUTO: 0.75 K/UL — SIGNIFICANT CHANGE UP (ref 0–0.9)
MONOCYTES NFR BLD AUTO: 4.9 % — SIGNIFICANT CHANGE UP (ref 2–14)
NEUTROPHILS # BLD AUTO: 12.34 K/UL — HIGH (ref 1.8–7.4)
NEUTROPHILS NFR BLD AUTO: 80.8 % — HIGH (ref 43–77)
NITRITE UR-MCNC: NEGATIVE — SIGNIFICANT CHANGE UP
NRBC # BLD: 0 /100 WBCS — SIGNIFICANT CHANGE UP (ref 0–0)
PH UR: 8 — SIGNIFICANT CHANGE UP (ref 5–8)
PLATELET # BLD AUTO: 401 K/UL — HIGH (ref 150–400)
POTASSIUM SERPL-MCNC: 4 MMOL/L — SIGNIFICANT CHANGE UP (ref 3.5–5.3)
POTASSIUM SERPL-SCNC: 4 MMOL/L — SIGNIFICANT CHANGE UP (ref 3.5–5.3)
PROT SERPL-MCNC: 8.8 G/DL — HIGH (ref 6–8.3)
PROT UR-MCNC: 15 MG/DL
PROTHROM AB SERPL-ACNC: 15.6 SEC — HIGH (ref 10.5–13.4)
RAPID RVP RESULT: SIGNIFICANT CHANGE UP
RBC # BLD: 4.52 M/UL — SIGNIFICANT CHANGE UP (ref 3.8–5.2)
RBC # FLD: 12.6 % — SIGNIFICANT CHANGE UP (ref 10.3–14.5)
RBC CASTS # UR COMP ASSIST: NEGATIVE /HPF — SIGNIFICANT CHANGE UP (ref 0–2)
SARS-COV-2 RNA SPEC QL NAA+PROBE: SIGNIFICANT CHANGE UP
SODIUM SERPL-SCNC: 134 MMOL/L — LOW (ref 135–145)
SP GR SPEC: 1.01 — SIGNIFICANT CHANGE UP (ref 1.01–1.02)
TROPONIN I, HIGH SENSITIVITY RESULT: 4.8 NG/L — SIGNIFICANT CHANGE UP
TROPONIN I, HIGH SENSITIVITY RESULT: 6.2 NG/L — SIGNIFICANT CHANGE UP
UROBILINOGEN FLD QL: NEGATIVE — SIGNIFICANT CHANGE UP
WBC # BLD: 15.27 K/UL — HIGH (ref 3.8–10.5)
WBC # FLD AUTO: 15.27 K/UL — HIGH (ref 3.8–10.5)
WBC UR QL: ABNORMAL /HPF (ref 0–5)

## 2023-03-25 PROCEDURE — 71045 X-RAY EXAM CHEST 1 VIEW: CPT | Mod: 26

## 2023-03-25 PROCEDURE — 74177 CT ABD & PELVIS W/CONTRAST: CPT | Mod: 26,MA

## 2023-03-25 PROCEDURE — 71260 CT THORAX DX C+: CPT | Mod: 26,MA

## 2023-03-25 PROCEDURE — 99284 EMERGENCY DEPT VISIT MOD MDM: CPT

## 2023-03-25 RX ORDER — LANOLIN ALCOHOL/MO/W.PET/CERES
3 CREAM (GRAM) TOPICAL AT BEDTIME
Refills: 0 | Status: DISCONTINUED | OUTPATIENT
Start: 2023-03-25 | End: 2023-03-28

## 2023-03-25 RX ORDER — PANTOPRAZOLE SODIUM 20 MG/1
40 TABLET, DELAYED RELEASE ORAL DAILY
Refills: 0 | Status: DISCONTINUED | OUTPATIENT
Start: 2023-03-25 | End: 2023-03-27

## 2023-03-25 RX ORDER — CEFEPIME 1 G/1
2000 INJECTION, POWDER, FOR SOLUTION INTRAMUSCULAR; INTRAVENOUS EVERY 8 HOURS
Refills: 0 | Status: DISCONTINUED | OUTPATIENT
Start: 2023-03-25 | End: 2023-03-28

## 2023-03-25 RX ORDER — HYDRALAZINE HCL 50 MG
1 TABLET ORAL
Refills: 0 | DISCHARGE

## 2023-03-25 RX ORDER — APIXABAN 2.5 MG/1
2.5 TABLET, FILM COATED ORAL
Refills: 0 | Status: DISCONTINUED | OUTPATIENT
Start: 2023-03-25 | End: 2023-03-28

## 2023-03-25 RX ORDER — ERGOCALCIFEROL 1.25 MG/1
5000 CAPSULE ORAL
Qty: 0 | Refills: 0 | DISCHARGE

## 2023-03-25 RX ORDER — BACLOFEN 100 %
10 POWDER (GRAM) MISCELLANEOUS AT BEDTIME
Refills: 0 | Status: DISCONTINUED | OUTPATIENT
Start: 2023-03-25 | End: 2023-03-28

## 2023-03-25 RX ORDER — ONDANSETRON 8 MG/1
4 TABLET, FILM COATED ORAL EVERY 8 HOURS
Refills: 0 | Status: DISCONTINUED | OUTPATIENT
Start: 2023-03-25 | End: 2023-03-28

## 2023-03-25 RX ORDER — LOSARTAN POTASSIUM 100 MG/1
100 TABLET, FILM COATED ORAL DAILY
Refills: 0 | Status: DISCONTINUED | OUTPATIENT
Start: 2023-03-25 | End: 2023-03-28

## 2023-03-25 RX ORDER — MAGNESIUM OXIDE 400 MG ORAL TABLET 241.3 MG
1 TABLET ORAL
Refills: 0 | DISCHARGE

## 2023-03-25 RX ORDER — BACLOFEN 100 %
1 POWDER (GRAM) MISCELLANEOUS
Refills: 0 | DISCHARGE

## 2023-03-25 RX ORDER — TRAZODONE HCL 50 MG
1 TABLET ORAL
Refills: 0 | DISCHARGE

## 2023-03-25 RX ORDER — ACETAMINOPHEN 500 MG
650 TABLET ORAL EVERY 6 HOURS
Refills: 0 | Status: DISCONTINUED | OUTPATIENT
Start: 2023-03-25 | End: 2023-03-28

## 2023-03-25 RX ORDER — CETIRIZINE HYDROCHLORIDE 10 MG/1
1 TABLET ORAL
Qty: 0 | Refills: 0 | DISCHARGE

## 2023-03-25 RX ORDER — BACLOFEN 100 %
5 POWDER (GRAM) MISCELLANEOUS AT BEDTIME
Refills: 0 | Status: DISCONTINUED | OUTPATIENT
Start: 2023-03-25 | End: 2023-03-25

## 2023-03-25 RX ORDER — SODIUM CHLORIDE 9 MG/ML
1000 INJECTION INTRAMUSCULAR; INTRAVENOUS; SUBCUTANEOUS
Refills: 0 | Status: DISCONTINUED | OUTPATIENT
Start: 2023-03-25 | End: 2023-03-28

## 2023-03-25 RX ORDER — SODIUM CHLORIDE 9 MG/ML
1000 INJECTION INTRAMUSCULAR; INTRAVENOUS; SUBCUTANEOUS ONCE
Refills: 0 | Status: COMPLETED | OUTPATIENT
Start: 2023-03-25 | End: 2023-03-25

## 2023-03-25 RX ORDER — MAGNESIUM OXIDE 400 MG ORAL TABLET 241.3 MG
400 TABLET ORAL AT BEDTIME
Refills: 0 | Status: DISCONTINUED | OUTPATIENT
Start: 2023-03-25 | End: 2023-03-28

## 2023-03-25 RX ORDER — KETOROLAC TROMETHAMINE 30 MG/ML
15 SYRINGE (ML) INJECTION EVERY 6 HOURS
Refills: 0 | Status: DISCONTINUED | OUTPATIENT
Start: 2023-03-25 | End: 2023-03-28

## 2023-03-25 RX ORDER — DONEPEZIL HYDROCHLORIDE 10 MG/1
1 TABLET, FILM COATED ORAL
Refills: 0 | DISCHARGE

## 2023-03-25 RX ORDER — DONEPEZIL HYDROCHLORIDE 10 MG/1
5 TABLET, FILM COATED ORAL AT BEDTIME
Refills: 0 | Status: DISCONTINUED | OUTPATIENT
Start: 2023-03-25 | End: 2023-03-28

## 2023-03-25 RX ORDER — LOSARTAN POTASSIUM 100 MG/1
1 TABLET, FILM COATED ORAL
Qty: 0 | Refills: 0 | DISCHARGE

## 2023-03-25 RX ORDER — KETOROLAC TROMETHAMINE 30 MG/ML
30 SYRINGE (ML) INJECTION ONCE
Refills: 0 | Status: DISCONTINUED | OUTPATIENT
Start: 2023-03-25 | End: 2023-03-25

## 2023-03-25 RX ORDER — MORPHINE SULFATE 50 MG/1
2 CAPSULE, EXTENDED RELEASE ORAL EVERY 6 HOURS
Refills: 0 | Status: DISCONTINUED | OUTPATIENT
Start: 2023-03-25 | End: 2023-03-27

## 2023-03-25 RX ORDER — AMITRIPTYLINE HCL 25 MG
25 TABLET ORAL AT BEDTIME
Refills: 0 | Status: DISCONTINUED | OUTPATIENT
Start: 2023-03-25 | End: 2023-03-28

## 2023-03-25 RX ORDER — TRAZODONE HCL 50 MG
50 TABLET ORAL AT BEDTIME
Refills: 0 | Status: DISCONTINUED | OUTPATIENT
Start: 2023-03-25 | End: 2023-03-28

## 2023-03-25 RX ORDER — AMITRIPTYLINE HCL 25 MG
1 TABLET ORAL
Refills: 0 | DISCHARGE

## 2023-03-25 RX ORDER — APIXABAN 2.5 MG/1
1 TABLET, FILM COATED ORAL
Refills: 0 | DISCHARGE

## 2023-03-25 RX ORDER — AMLODIPINE BESYLATE 2.5 MG/1
10 TABLET ORAL DAILY
Refills: 0 | Status: DISCONTINUED | OUTPATIENT
Start: 2023-03-25 | End: 2023-03-28

## 2023-03-25 RX ADMIN — Medication 650 MILLIGRAM(S): at 23:17

## 2023-03-25 RX ADMIN — SODIUM CHLORIDE 1000 MILLILITER(S): 9 INJECTION INTRAMUSCULAR; INTRAVENOUS; SUBCUTANEOUS at 15:00

## 2023-03-25 RX ADMIN — Medication 25 MILLIGRAM(S): at 22:45

## 2023-03-25 RX ADMIN — Medication 15 MILLIGRAM(S): at 22:42

## 2023-03-25 RX ADMIN — DONEPEZIL HYDROCHLORIDE 5 MILLIGRAM(S): 10 TABLET, FILM COATED ORAL at 22:47

## 2023-03-25 RX ADMIN — Medication 30 MILLIGRAM(S): at 15:00

## 2023-03-25 RX ADMIN — Medication 30 MILLILITER(S): at 21:25

## 2023-03-25 RX ADMIN — Medication 30 MILLIGRAM(S): at 15:30

## 2023-03-25 RX ADMIN — Medication 10 MILLIGRAM(S): at 22:46

## 2023-03-25 RX ADMIN — MAGNESIUM OXIDE 400 MG ORAL TABLET 400 MILLIGRAM(S): 241.3 TABLET ORAL at 22:49

## 2023-03-25 RX ADMIN — PANTOPRAZOLE SODIUM 40 MILLIGRAM(S): 20 TABLET, DELAYED RELEASE ORAL at 19:00

## 2023-03-25 RX ADMIN — Medication 50 MILLIGRAM(S): at 22:49

## 2023-03-25 RX ADMIN — CEFEPIME 100 MILLIGRAM(S): 1 INJECTION, POWDER, FOR SOLUTION INTRAMUSCULAR; INTRAVENOUS at 18:45

## 2023-03-25 RX ADMIN — LOSARTAN POTASSIUM 100 MILLIGRAM(S): 100 TABLET, FILM COATED ORAL at 22:47

## 2023-03-25 NOTE — H&P ADULT - PROBLEM SELECTOR PLAN 7
- Complains of heartburn due to decreased PO intake for the past few days  - PPI, antiemetics, Maalox

## 2023-03-25 NOTE — H&P ADULT - PROBLEM SELECTOR PLAN 5
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 S/p VPS Certas @5 3/2/22  - Has residual R sided pain and weakness

## 2023-03-25 NOTE — H&P ADULT - HISTORY OF PRESENT ILLNESS
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 For the past days, patient has been having coughing, sore throat, fever, headache, and green phlegm, and tried outpatient antibiotic(does not know. Likely Azithromycin or Fluoroquinolone) for 5 days (finished yesterday). It did not help. She also had been having increased urinary frequency, abdominal pain, and lower back pain. She has had decreased PO intake and now has heartburn and nausea. Patient had hemorrhagic stroke last year so she has baseline R sided weakness and right arm&right leg pain. Patient denies vomit, shortness of breath, diarrhea, constipation, dark/bloody stool, dysuria, hematuria. 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 For the past days, patient has been having coughing, sore throat, fever, headache, and green phlegm, and tried outpatient antibiotic(does not know. Likely Azithromycin or Fluoroquinolone) for 5 days (finished yesterday). It did not help. She also had been having increased urinary frequency, abdominal pain, and lower back pain. She has had decreased PO intake and now has heartburn and nausea. Patient had hemorrhagic stroke last year so she has baseline R sided weakness and right arm&right leg pain. Patient denies vomit, shortness of breath, diarrhea, constipation, dark/bloody stool, dysuria, hematuria.

## 2023-03-25 NOTE — H&P ADULT - NSHPPHYSICALEXAM_GEN_ALL_CORE
GENERAL: In distress  HEAD:  Atraumatic, Normocephalic  EYES: EOMI, PERRLA, conjunctiva and sclera clear  ENMT: No tonsillar erythema, exudates, or enlargement; Dry mucous membranes, No lesions  NECK: Supple, normal appearance, No JVD; Normal thyroid; Trachea midline  NERVOUS SYSTEM: Alert & Oriented X3,  Motor Strength 3/5 R upper and lower extremities, sensation intact  CHEST/LUNG: Lungs clear to auscultation bilaterally, No rales, rhonchi, wheezing   HEART: Regular rate and rhythm; No murmurs, rubs, or gallops  ABDOMEN: Soft, Nontender, Nondistended; Bowel sounds present  EXTREMITIES:  2+ Peripheral Pulses, No clubbing, cyanosis, or edema  LYMPH: No lymphadenopathy noted  SKIN: No rashes or lesions;  Good capillary refill

## 2023-03-25 NOTE — H&P ADULT - ASSESSMENT
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 Admitted for ACS rule out and sepsis possibly due to UTI.

## 2023-03-25 NOTE — ED PROVIDER NOTE - OBJECTIVE STATEMENT
GIORGIO Medrano: I was not involved in the care of this patient and am only entering information to back log for downtime. Please see paper chart for isolation and medical management details, as the mandatory fields in the disposition section may not be accurate.

## 2023-03-25 NOTE — H&P ADULT - NSICDXPASTMEDICALHX_GEN_ALL_CORE_FT
PAST MEDICAL HISTORY:  Hypertension     Pre-diabetes      PAST MEDICAL HISTORY:  Hypertension     Pre-diabetes     Thalamic hemorrhage

## 2023-03-25 NOTE — H&P ADULT - PROBLEM SELECTOR PLAN 4
- Home meds ____ - Home meds Hydralazine 50 BID, Losartan 100mg, Amlodipine 10mg  - Will hold Hydralazine for now, in case patient's BP drops from sepsis. Can be added as needed

## 2023-03-25 NOTE — H&P ADULT - PROBLEM SELECTOR PLAN 1
- P/w L chest pain, pressure like, but reproducible with palpation.  - Possibly costochondritis or myalgia from infection  - EKG = Sinus tachycardia  - Trop neg  - Admit to telemetry  - F/u echo  - Cardio Dr. Harper consulted - P/w L chest pain, pressure like, but reproducible with palpation.  - Possibly costochondritis or myalgia from infection  - EKG = Sinus tachycardia  - Trop neg, F/u trop 2  - Admit to telemetry  - F/u echo  - Cardio Dr. Harper consulted

## 2023-03-25 NOTE — H&P ADULT - PROBLEM SELECTOR PLAN 2
- P/w urinary frequency, WBC 15, fever  - UA positive  - S/p Cefepime in Ed  - C/w abx ____  - Gentle IVF  - ID Dr ricketts - P/w urinary frequency, WBC 15, fever  - UA positive  - S/p Cefepime in Ed  - C/w Cefepime  - Gentle IVF  - ID Dr Boykin consulted

## 2023-03-25 NOTE — ED ADULT NURSE REASSESSMENT NOTE - NS ED NURSE REASSESS COMMENT FT1
Patient is alert and oriented x 4, not in any form of distress. Patient transferred to [address] [place] [address] on cardiac monitor on sinus tachycardia rhythm per stretcher accompanied by RN and transport. Endorsed to [facility] in stable condition.

## 2023-03-25 NOTE — ED ADULT NURSE NOTE - OBJECTIVE STATEMENT
pt presented to ED accompanied by son c/o of chest pain, throat pain, back pain and fever x2 days, Rt upper arm contracture and Rt lower leg weakness s/p stroke 1 yea ago. pt presented to ED accompanied by son c/o of chest pain, throat pain, back pain and fever x2 days, Rt upper arm and  Rt lower leg weakness s/p stroke 1 yea ago.

## 2023-03-25 NOTE — H&P ADULT - PROBLEM SELECTOR PLAN 3
- P/w coughing, sore throat, fever, headache, and green phlegm  - Tried 5 days of unknown outpatient antibiotic  - F/u CT chest  - ID Dr ricketts consulted - P/w coughing, sore throat, fever, headache, and green phlegm  - Tried 5 days of unknown outpatient antibiotic  - F/u CT chest  - ID Dr Boykin consulted

## 2023-03-26 RX ADMIN — APIXABAN 2.5 MILLIGRAM(S): 2.5 TABLET, FILM COATED ORAL at 05:43

## 2023-03-26 RX ADMIN — CEFEPIME 100 MILLIGRAM(S): 1 INJECTION, POWDER, FOR SOLUTION INTRAMUSCULAR; INTRAVENOUS at 21:04

## 2023-03-26 RX ADMIN — AMLODIPINE BESYLATE 10 MILLIGRAM(S): 2.5 TABLET ORAL at 05:43

## 2023-03-26 RX ADMIN — APIXABAN 2.5 MILLIGRAM(S): 2.5 TABLET, FILM COATED ORAL at 17:30

## 2023-03-26 RX ADMIN — MAGNESIUM OXIDE 400 MG ORAL TABLET 400 MILLIGRAM(S): 241.3 TABLET ORAL at 21:03

## 2023-03-26 RX ADMIN — DONEPEZIL HYDROCHLORIDE 5 MILLIGRAM(S): 10 TABLET, FILM COATED ORAL at 21:04

## 2023-03-26 RX ADMIN — Medication 10 MILLIGRAM(S): at 21:04

## 2023-03-26 RX ADMIN — Medication 30 MILLILITER(S): at 17:30

## 2023-03-26 RX ADMIN — Medication 50 MILLIGRAM(S): at 21:04

## 2023-03-26 RX ADMIN — PANTOPRAZOLE SODIUM 40 MILLIGRAM(S): 20 TABLET, DELAYED RELEASE ORAL at 11:02

## 2023-03-26 RX ADMIN — Medication 3 MILLIGRAM(S): at 21:03

## 2023-03-26 RX ADMIN — SODIUM CHLORIDE 70 MILLILITER(S): 9 INJECTION INTRAMUSCULAR; INTRAVENOUS; SUBCUTANEOUS at 04:06

## 2023-03-26 RX ADMIN — CEFEPIME 100 MILLIGRAM(S): 1 INJECTION, POWDER, FOR SOLUTION INTRAMUSCULAR; INTRAVENOUS at 05:43

## 2023-03-26 RX ADMIN — Medication 30 MILLILITER(S): at 05:42

## 2023-03-26 RX ADMIN — CEFEPIME 100 MILLIGRAM(S): 1 INJECTION, POWDER, FOR SOLUTION INTRAMUSCULAR; INTRAVENOUS at 14:07

## 2023-03-26 RX ADMIN — SODIUM CHLORIDE 70 MILLILITER(S): 9 INJECTION INTRAMUSCULAR; INTRAVENOUS; SUBCUTANEOUS at 11:02

## 2023-03-26 RX ADMIN — Medication 25 MILLIGRAM(S): at 21:04

## 2023-03-26 NOTE — CONSULT NOTE ADULT - ASSESSMENT
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  1.Tele monitoring.  2.Echocardiogram.  3.Stress test in am.  4.Hx of DVT-eliquis.  5.HTN-cont bp medication.  6.Prediabetes-diet.  7.PPI.
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 For the past days, patient has been having coughing, sore throat, fever, headache, and green phlegm, and tried outpatient antibiotic, Likely Azithromycin or Fluoroquinolone for 5 days without significant relief. She also had been having increased urinary frequency, abdominal pain, and lower back pain. On admission, she found to have low grade fever, tachycardia, Leukocytosis and mild positive Urine  analysis. She has started on Cefepime and the ID consult requested to assist with further evaluation and antibiotic management.    # Sepsis ( Low grade fever + Tachycardia+ Leukocytosis )  # UTI    would recommend:    1. Follow up Urine and Blood cultures  2. Monitor WBC count  3. Monitor kidney function   4. Continue Cefepime until work up is done    will follow the patient with you and make further recommendation based on the clinical course and Lab results  Thank you for the opportunity to participate in Ms. CALHOUN's care      Attending Attestation:    Spent more than 65 minutes on total encounter, more than 50 % of the visit was spent counseling and/or coordinating care by the Attending physician.

## 2023-03-26 NOTE — PHYSICAL THERAPY INITIAL EVALUATION ADULT - IMPAIRMENTS FOUND, PT EVAL
aerobic capacity/endurance/gait, locomotion, and balance/gross motor/joint integrity and mobility/muscle strength/neuromotor development and sensory integration/posture/ROM/tone

## 2023-03-26 NOTE — PHYSICAL THERAPY INITIAL EVALUATION ADULT - GENERAL OBSERVATIONS, REHAB EVAL
pt seen supine in bed with IV line, denied any complaint of pain. pt was cooperative during the evaluation

## 2023-03-26 NOTE — PROGRESS NOTE ADULT - PROBLEM SELECTOR PLAN 4
- Home meds Hydralazine 50 BID, Losartan 100mg, Amlodipine 10mg  - Will hold Hydralazine for now, in case patient's BP drops from sepsis. Can be added as needed

## 2023-03-26 NOTE — PHYSICAL THERAPY INITIAL EVALUATION ADULT - PERTINENT HX OF CURRENT PROBLEM, REHAB EVAL
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 Pt was referred to PT due to weakness.

## 2023-03-26 NOTE — CONSULT NOTE ADULT - SUBJECTIVE AND OBJECTIVE BOX
CHIEF COMPLAINT:Patient is a 57y old  Female who presents with a chief complaint of r/o ACS .      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 For the past days, patient has been having coughing, sore throat, fever, headache, and green phlegm, and tried outpatient antibiotic(does not know. Likely Azithromycin or Fluoroquinolone) for 5 days (finished yesterday). It did not help. She also had been having increased urinary frequency, abdominal pain, and lower back pain. She has had decreased PO intake and now has heartburn and nausea. Patient had hemorrhagic stroke last year so she has baseline R sided weakness and right arm&right leg pain. Patient denies vomit, shortness of breath, diarrhea, constipation, dark/bloody stool, dysuria, hematuria. (25 Mar 2023 18:39)      PAST MEDICAL & SURGICAL HISTORY:  Hypertension      Pre-diabetes      Thalamic hemorrhage      S/P  shunt          MEDICATIONS  (STANDING):  aluminum hydroxide/magnesium hydroxide/simethicone Suspension 30 milliLiter(s) Oral every 12 hours  amitriptyline 25 milliGRAM(s) Oral at bedtime  amLODIPine   Tablet 10 milliGRAM(s) Oral daily  apixaban 2.5 milliGRAM(s) Oral two times a day  baclofen 10 milliGRAM(s) Oral at bedtime  cefepime   IVPB 2000 milliGRAM(s) IV Intermittent every 8 hours  donepezil 5 milliGRAM(s) Oral at bedtime  losartan 100 milliGRAM(s) Oral daily  magnesium oxide 400 milliGRAM(s) Oral at bedtime  pantoprazole  Injectable 40 milliGRAM(s) IV Push daily  sodium chloride 0.9%. 1000 milliLiter(s) (70 mL/Hr) IV Continuous <Continuous>  traZODone 50 milliGRAM(s) Oral at bedtime    MEDICATIONS  (PRN):  acetaminophen     Tablet .. 650 milliGRAM(s) Oral every 6 hours PRN Temp greater or equal to 38C (100.4F), Mild Pain (1 - 3)  ketorolac   Injectable 15 milliGRAM(s) IV Push every 6 hours PRN Moderate Pain (4 - 6)  melatonin 3 milliGRAM(s) Oral at bedtime PRN Insomnia  morphine  - Injectable 2 milliGRAM(s) IV Push every 6 hours PRN Severe Pain (7 - 10)  ondansetron Injectable 4 milliGRAM(s) IV Push every 8 hours PRN Nausea and/or Vomiting      FAMILY HISTORY:No hx of CAD      SOCIAL HISTORY:    [x ] Non-smoker    [x ] Alcohol-denies    Allergies    aspirin (Unknown)    Intolerances    	    REVIEW OF SYSTEMS:  CONSTITUTIONAL: No fever, weight loss, or fatigue  EYES: No eye pain, visual disturbances, or discharge  ENT:  No difficulty hearing, tinnitus, vertigo; No sinus or throat pain  NECK: No pain or stiffness  RESPIRATORY: No cough, wheezing, chills or hemoptysis; + Shortness of Breath  CARDIOVASCULAR: + chest pain, No palpitations, passing out, dizziness, or leg swelling  GASTROINTESTINAL: No abdominal or epigastric pain. No nausea, vomiting, or hematemesis; No diarrhea or constipation. No melena or hematochezia.  GENITOURINARY: No dysuria, frequency, hematuria, or incontinence  NEUROLOGICAL: No headaches, memory loss, loss of strength, numbness, or tremors  SKIN: No itching, burning, rashes, or lesions   LYMPH Nodes: No enlarged glands  ENDOCRINE: No heat or cold intolerance; No hair loss  MUSCULOSKELETAL: No joint pain or swelling; No muscle, back, or extremity pain  PSYCHIATRIC: No depression, anxiety, mood swings, or difficulty sleeping  HEME/LYMPH: No easy bruising, or bleeding gums  ALLERGY AND IMMUNOLOGIC: No hives or eczema	      PHYSICAL EXAM:  T(C): 37 (03-26-23 @ 07:20), Max: 37.8 (03-25-23 @ 13:49)  HR: 90 (03-26-23 @ 07:20) (88 - 122)  BP: 130/74 (03-26-23 @ 07:20) (127/76 - 160/70)  RR: 19 (03-26-23 @ 07:20) (18 - 20)  SpO2: 97% (03-26-23 @ 07:20) (93% - 97%)  Wt(kg): --  I&O's Summary    26 Mar 2023 07:01  -  26 Mar 2023 09:55  --------------------------------------------------------  IN: 177 mL / OUT: 0 mL / NET: 177 mL        Appearance: Normal	  HEENT:   Normal oral mucosa, PERRL, EOMI	  Lymphatic: No lymphadenopathy  Cardiovascular: Normal S1 S2, No JVD, No murmurs, No edema  Respiratory: Lungs clear to auscultation	  Psychiatry: A & O x 3, Mood & affect appropriate  Gastrointestinal:  Soft, Non-tender, + BS	  Skin: No rashes, No ecchymoses, No cyanosis	  Neurologic: Non-focal  Extremities: Normal range of motion, No clubbing, cyanosis or edema  Vascular: Peripheral pulses palpable 2+ bilaterally        ECG:  	sinus tachycardia,nl axis  	  	  LABS:	 	      Troponin I, High Sensitivity Result: 6.2 ng/L (03-25-23 @ 22:22)  Troponin I, High Sensitivity Result: 4.8 ng/L (03-25-23 @ 14:46)                          13.2   15.27 )-----------( 401      ( 25 Mar 2023 14:46 )             41.0     03-25    134<L>  |  104  |  7   ----------------------------<  120<H>  4.0   |  24  |  0.63    Ca    9.5      25 Mar 2023 14:46    TPro  8.8<H>  /  Alb  3.5  /  TBili  0.7  /  DBili  x   /  AST  16  /  ALT  28  /  AlkPhos  116  03-25    < from: CT Chest w/ IV Cont (03.25.23 @ 21:43) >  ACC: [id number] EXAM:  CT ABDOMEN AND PELVIS IC   ORDERED BY: DEVIN MARIN     ACC: [id number] EXAM:  CT CHEST IC   ORDERED BY: DEVIN MARIN     PROCEDURE DATE:  03/25/2023          INTERPRETATION:  CLINICAL INFORMATION: Severe chest pain for 2 days,   right costovertebral angle tenderness and diffuse abdominal pain    COMPARISON: CT chest from February 16, 2022, CT abdomen and pelvis from   June 27, 2017    CONTRAST/COMPLICATIONS:  IV Contrast: Omnipaque 350   90 cc administered   10 cc discarded  Oral Contrast: NONE  Complications: None reported at time of study completion    PROCEDURE:  CT of the Chest, Abdomen and Pelvis was performed.  Sagittal and coronal reformats were performed.    FINDINGS:  CHEST:  LUNGS AND LARGE AIRWAYS: Clear lungs  PLEURA: No pleural effusion.  VESSELS: Trace calcified plaque in the thoracic aorta, no aneurysm or   dissection. Scattered calcified plaque in the left coronary arteries.   Main pulmonary artery is normal in caliber.  HEART: Heart size is normal. No pericardial effusion.  MEDIASTINUM AND YURI: Mildly enlarged lymph node in the AP window   measures 2 x 1.1 cm.  CHEST WALL AND LOWER NECK: There is limited  shunt courses along the   anterior right chest wall, enters the peritoneum in the right upper   quadrant and terminates in the left hemipelvis. No discontinuity or   kinking. No pseudocyst formation.    ABDOMEN AND PELVIS:  LIVER: Within normal limits.  BILE DUCTS: Normal caliber.  GALLBLADDER: Cholecystectomy.  SPLEEN: Within normal limits.  PANCREAS: Within normal limits.  ADRENALS: Within normal limits.  KIDNEYS/URETERS: Within normal limits.    BLADDER: Within normal limits.  REPRODUCTIVE ORGANS: Leiomyomatous uterus    BOWEL: No bowel obstruction. Normal appendix.  PERITONEUM: No free air or free fluid  VESSELS: Atherosclerotic changes.  RETROPERITONEUM/LYMPH NODES: No enlarged lymph node measuring greater   than 10 mm in short axis  ABDOMINAL WALL: Post surgical changes in the buttocks, appears cosmetic.  BONES: No acute osseous abnormality    IMPRESSION:  No acute CT abnormality in the chest, abdomen and pelvis.        --- End of Report ---            PARMINDER PAYNE MD; Attending Radiologist  This document has been electronically signed. Mar 25 2023 10:15PM    < end of copied text >  < from: [facility] Duplex Upper Ext Vein Scan, Bilat (03.04.22 @ 10:49) >  ACC: [id number] EXAM:  US DPLX UPR EXT VEINS COMPL BI                          PROCEDURE DATE:  03/04/2022          INTERPRETATION:  CLINICAL INFORMATION: Follow-up superficial vein   thrombosis. Rule out new DVT.    COMPARISON: Upper extremity venous Doppler 2/23/2022    TECHNIQUE: Duplex sonography of the BILATERAL upper extremity veins with   color and spectral Doppler, with and without compression.    FINDINGS:  There is again noncompressibility of the right cephalic vein   (superficial) in the right antecubital fossa and right forearm with   absence of flow on color Doppler, now with slightly smaller luminal   caliber and hypoechoic filling defect seen in the cephalic vein of the   forearm. Appearance is suggestive of subacute superficial vein thrombosis.    The right internal jugular, subclavian, axillary and brachial veins are   patent and compressible.  The basilic vein (superficial) is patent and   without thrombus.      There is noncompressibility of the proximal left internal jugular vein   with hypoechoic filling defect and decreased flow on color Doppler,   consistent with deep vein thrombosis. This area was not visualized on   prior exam secondary to overlying dressing. The mid and distal left   internal jugular vein is patent and compressible with flow on color   Doppler.    There is noncompressibility of the left cephalic vein extending from the   mid to distal upper arm with absence of flow on color Doppler. The   cephalic vein in the antecubital fossa and forearmis now compressible   with flow on color Doppler.    The left subclavian, axillary and brachial veins are patent and   compressible.  The basilic vein (superficial) is patent and without   thrombus.    Doppler examination shows normal spontaneous andphasic flow.    IMPRESSION:  1. Acute deep venous thrombosis of the left proximal internal jugular   vein.    2. Persistent superficial venous thrombosis in the right cephalic vein in   the forearm.    3. Partial resolution of superficial venous thrombosis of the left   cephalic vein in the forearm, with persistent superficial venous   thrombosis of the left cephalic vein in the mid to distal arm.    Findings were communicated by Dr. Wells to HYACINTH Horner on   3/4/22 at 11:12AM.    < end of copied text >  < from: US Duplex Venous Lower Ext Complete, Bilateral (03.02.22 @ 15:38) >  ACC: [id number] EXAM:  US DPLX LWR EXT VEINS COMPL BI                          PROCEDURE DATE:  03/02/2022          INTERPRETATION:  CLINICAL INFORMATION: Right intramuscular calf vein DVT.   Follow-up exam.    COMPARISON: Lower extremity venous Doppler 2/28/2022, 2/23/2022, and   2/13/2022.    TECHNIQUE: Duplex sonography of the BILATERAL LOWER extremity veins with   color and spectral Doppler, with and without compression.    FINDINGS:    RIGHT:  Normal compressibility of the RIGHT common femoral, femoral and popliteal   veins.  Doppler examination shows normal spontaneous and phasic flow.  There is again noncompressibility of the right intramuscular calf vein   with echogenic filling defect and absence of flow on color Doppler,   grossly unchanged in appearance.    LEFT:  Normal compressibility of the LEFT common femoral, femoral and popliteal   veins.  Doppler examination shows normal spontaneous and phasic flow.  No LEFT calf vein thrombosis is detected.    IMPRESSION:  Persistent deep venous thrombosis in the right intramuscular calf veins.   No evidence of propagation or new deep venous thrombosis in either lower   extremity.  .    < end of copied text >

## 2023-03-26 NOTE — PATIENT PROFILE ADULT - FALL HARM RISK - FALL HARM RISK
Met w/ patient and his wife.  They were pleased w/ his progress both physically and mentally. Patient willing to go to SNU and continue dialysis.  No SI or HI.  Recommended that once he leaves SNU that they ask for a mental health home health RN. No further need for access center involvement.    No indicators present

## 2023-03-26 NOTE — CONSULT NOTE ADULT - SUBJECTIVE AND OBJECTIVE BOX
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 For the past days, patient has been having coughing, sore throat, fever, headache, and green phlegm, and tried outpatient antibiotic(does not know. Likely Azithromycin or Fluoroquinolone) for 5 days (finished yesterday). It did not help. She also had been having increased urinary frequency, abdominal pain, and lower back pain. She has had decreased PO intake and now has heartburn and nausea. Patient had hemorrhagic stroke last year so she has baseline R sided weakness and right arm&right leg pain. Patient denies vomit, shortness of breath.        REVIEW OF SYSTEMS: Total of twelve systems have been reviewed with patient and found to be negative unless mentioned in HPI        PAST MEDICAL & SURGICAL HISTORY:  Hypertension  Pre-diabetes  Thalamic hemorrhage  S/P  shunt        SOCIAL HISTORY  Alcohol: Does not drink  Tobacco: Does not smoke  Illicit substance use: None      FAMILY HISTORY: Non contributory to the present illness      ALLERGIES: aspirin (Unknown)      Vital Signs Last 24 Hrs  T(C): 36.6 (26 Mar 2023 11:25), Max: 37.4 (25 Mar 2023 20:04)  T(F): 97.9 (26 Mar 2023 11:25), Max: 99.3 (25 Mar 2023 20:04)  HR: 106 (26 Mar 2023 11:25) (88 - 115)  BP: 142/80 (26 Mar 2023 11:25) (127/76 - 160/70)  BP(mean): --  RR: 19 (26 Mar 2023 11:25) (18 - 19)  SpO2: 98% (26 Mar 2023 11:25) (93% - 98%)    Parameters below as of 26 Mar 2023 11:25  Patient On (Oxygen Delivery Method): room air        PHYSICAL EXAM:  GENERAL: Not in distress   CHEST/LUNG:  Aire ntry bilaterally  HEART: s1 and s2 present  ABDOMEN:  Nontender and  Nondistended  EXTREMITIES: No pedal  edema  CNS: Awake and Alert      LABS:                        13.2   15.27 )-----------( 401      ( 25 Mar 2023 14:46 )             41.0       03-25    134<L>  |  104  |  7   ----------------------------<  120<H>  4.0   |  24  |  0.63    Ca    9.5      25 Mar 2023 14:46    TPro  8.8<H>  /  Alb  3.5  /  TBili  0.7  /  DBili  x   /  AST  16  /  ALT  28  /  AlkPhos  116      PT/INR - ( 25 Mar 2023 14:46 )   PT: 15.6 sec;   INR: 1.31 ratio      PTT - ( 25 Mar 2023 14:46 )  PTT:37.9 sec          Urinalysis Basic - ( 25 Mar 2023 14:46 )  Color: Yellow / Appearance: Clear / S.010 / pH: x  Gluc: x / Ketone: Negative  / Bili: Negative / Urobili: Negative   Blood: x / Protein: 15 mg/dL / Nitrite: Negative   Leuk Esterase: Trace / RBC: Negative /HPF / WBC 6-10 /HPF   Sq Epi: x / Non Sq Epi: Few /HPF / Bacteria: Few /HPF        MEDICATIONS  (STANDING):  aluminum hydroxide/magnesium hydroxide/simethicone Suspension 30 milliLiter(s) Oral every 12 hours  amitriptyline 25 milliGRAM(s) Oral at bedtime  amLODIPine   Tablet 10 milliGRAM(s) Oral daily  apixaban 2.5 milliGRAM(s) Oral two times a day  baclofen 10 milliGRAM(s) Oral at bedtime  cefepime   IVPB 2000 milliGRAM(s) IV Intermittent every 8 hours  donepezil 5 milliGRAM(s) Oral at bedtime  losartan 100 milliGRAM(s) Oral daily  magnesium oxide 400 milliGRAM(s) Oral at bedtime  pantoprazole  Injectable 40 milliGRAM(s) IV Push daily  sodium chloride 0.9%. 1000 milliLiter(s) (70 mL/Hr) IV Continuous <Continuous>  traZODone 50 milliGRAM(s) Oral at bedtime    MEDICATIONS  (PRN):  acetaminophen     Tablet .. 650 milliGRAM(s) Oral every 6 hours PRN Temp greater or equal to 38C (100.4F), Mild Pain (1 - 3)  ketorolac   Injectable 15 milliGRAM(s) IV Push every 6 hours PRN Moderate Pain (4 - 6)  melatonin 3 milliGRAM(s) Oral at bedtime PRN Insomnia  morphine  - Injectable 2 milliGRAM(s) IV Push every 6 hours PRN Severe Pain (7 - 10)  ondansetron Injectable 4 milliGRAM(s) IV Push every 8 hours PRN Nausea and/or Vomiting        RADIOLOGY & ADDITIONAL TESTS:    < from: CT Chest w/ IV Cont (23 @ 21:43) >  No acute CT abnormality in the chest, abdomen and pelvis.      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 For the past days, patient has been having coughing, sore throat, fever, headache, and green phlegm, and tried outpatient antibiotic, Likely Azithromycin or Fluoroquinolone for 5 days without significant relief. She also had been having increased urinary frequency, abdominal pain, and lower back pain. On admission, she found to have low grade fever, tachycardia, Leukocytosis and mild positive Urine  analysis. She has started on Cefepime and the ID consult requested to assist with further evaluation and antibiotic management.      REVIEW OF SYSTEMS: Total of twelve systems have been reviewed with patient and found to be negative unless mentioned in HPI      PAST MEDICAL & SURGICAL HISTORY:  Hypertension  Pre-diabetes  Thalamic hemorrhage  S/P  shunt      SOCIAL HISTORY  Alcohol: Does not drink  Tobacco: Does not smoke  Illicit substance use: None      FAMILY HISTORY: Non contributory to the present illness      ALLERGIES: aspirin (Unknown)      Vital Signs Last 24 Hrs  T(C): 36.6 (26 Mar 2023 11:25), Max: 37.4 (25 Mar 2023 20:04)  T(F): 97.9 (26 Mar 2023 11:25), Max: 99.3 (25 Mar 2023 20:04)  HR: 106 (26 Mar 2023 11:25) (88 - 115)  BP: 142/80 (26 Mar 2023 11:25) (127/76 - 160/70)  BP(mean): --  RR: 19 (26 Mar 2023 11:25) (18 - 19)  SpO2: 98% (26 Mar 2023 11:25) (93% - 98%)    Parameters below as of 26 Mar 2023 11:25  Patient On (Oxygen Delivery Method): room air        PHYSICAL EXAM:  GENERAL: Not in distress   CHEST/LUNG: Not using accessory muscles   HEART: s1 and s2 present  ABDOMEN:  Nontender and  Nondistended  EXTREMITIES: No pedal  edema  CNS: Awake and Alert      LABS:                        13.2   15.27 )-----------( 401      ( 25 Mar 2023 14:46 )             41.0       03-25    134<L>  |  104  |  7   ----------------------------<  120<H>  4.0   |  24  |  0.63    Ca    9.5      25 Mar 2023 14:46    TPro  8.8<H>  /  Alb  3.5  /  TBili  0.7  /  DBili  x   /  AST  16  /  ALT  28  /  AlkPhos  116  03-25    PT/INR - ( 25 Mar 2023 14:46 )   PT: 15.6 sec;   INR: 1.31 ratio      PTT - ( 25 Mar 2023 14:46 )  PTT:37.9 sec        Urinalysis Basic - ( 25 Mar 2023 14:46 )  Color: Yellow / Appearance: Clear / S.010 / pH: x  Gluc: x / Ketone: Negative  / Bili: Negative / Urobili: Negative   Blood: x / Protein: 15 mg/dL / Nitrite: Negative   Leuk Esterase: Trace / RBC: Negative /HPF / WBC 6-10 /HPF   Sq Epi: x / Non Sq Epi: Few /HPF / Bacteria: Few /HPF        MEDICATIONS  (STANDING):  aluminum hydroxide/magnesium hydroxide/simethicone Suspension 30 milliLiter(s) Oral every 12 hours  amitriptyline 25 milliGRAM(s) Oral at bedtime  amLODIPine   Tablet 10 milliGRAM(s) Oral daily  apixaban 2.5 milliGRAM(s) Oral two times a day  baclofen 10 milliGRAM(s) Oral at bedtime  cefepime   IVPB 2000 milliGRAM(s) IV Intermittent every 8 hours  donepezil 5 milliGRAM(s) Oral at bedtime  losartan 100 milliGRAM(s) Oral daily  magnesium oxide 400 milliGRAM(s) Oral at bedtime  pantoprazole  Injectable 40 milliGRAM(s) IV Push daily  sodium chloride 0.9%. 1000 milliLiter(s) (70 mL/Hr) IV Continuous <Continuous>  traZODone 50 milliGRAM(s) Oral at bedtime    MEDICATIONS  (PRN):  acetaminophen     Tablet .. 650 milliGRAM(s) Oral every 6 hours PRN Temp greater or equal to 38C (100.4F), Mild Pain (1 - 3)  ketorolac   Injectable 15 milliGRAM(s) IV Push every 6 hours PRN Moderate Pain (4 - 6)  melatonin 3 milliGRAM(s) Oral at bedtime PRN Insomnia  morphine  - Injectable 2 milliGRAM(s) IV Push every 6 hours PRN Severe Pain (7 - 10)  ondansetron Injectable 4 milliGRAM(s) IV Push every 8 hours PRN Nausea and/or Vomiting        RADIOLOGY & ADDITIONAL TESTS:    3/25/23 : CT Chest w/ IV Cont (23 @ 21:43) No acute CT abnormality in the chest, abdomen and pelvis.      3/25/23 : Xray Chest 1 View- PORTABLE-Urgent (Xray Chest 1 View- PORTABLE-Urgent .) (23 @ 15:14) >  No acute pulmonary disease        MICROBIOLOGY DATA:    Respiratory Viral Panel with COVID-19 by NATALYA (23 @ 14:46)   Rapid RVP Result: [facility]  [facility]

## 2023-03-26 NOTE — PROGRESS NOTE ADULT - SUBJECTIVE AND OBJECTIVE BOX
GOLDEN CALHOUN  MR# [id number]  57yFemale        Patient is a 57y old  Female who presents with a chief complaint of r/o ACS (27 Mar 2023 14:37)      INTERVAL HPI/OVERNIGHT EVENTS:  Patient seen and examined at bedside. No notations of chest pain, palpitation, SOB, orthopnea, nausea, vomiting or abdominal pain.    ALLERGIES  aspirin (Unknown)      MEDICATIONS  acetaminophen     Tablet .. 650 milliGRAM(s) Oral every 6 hours PRN Temp greater or equal to 38C (100.4F), Mild Pain (1 - 3)  aluminum hydroxide/magnesium hydroxide/simethicone Suspension 30 milliLiter(s) Oral every 12 hours  amitriptyline 25 milliGRAM(s) Oral at bedtime  amLODIPine   Tablet 10 milliGRAM(s) Oral daily  apixaban 2.5 milliGRAM(s) Oral two times a day  baclofen 10 milliGRAM(s) Oral at bedtime  cefepime   IVPB 2000 milliGRAM(s) IV Intermittent every 8 hours  donepezil 5 milliGRAM(s) Oral at bedtime  ketorolac   Injectable 15 milliGRAM(s) IV Push every 6 hours PRN Moderate Pain (4 - 6)  losartan 100 milliGRAM(s) Oral daily  magnesium oxide 400 milliGRAM(s) Oral at bedtime  melatonin 3 milliGRAM(s) Oral at bedtime PRN Insomnia  morphine  - Injectable 2 milliGRAM(s) IV Push every 6 hours PRN Severe Pain (7 - 10)  ondansetron Injectable 4 milliGRAM(s) IV Push every 8 hours PRN Nausea and/or Vomiting  pantoprazole  Injectable 40 milliGRAM(s) IV Push daily  sodium chloride 0.9%. 1000 milliLiter(s) IV Continuous <Continuous>  traZODone 50 milliGRAM(s) Oral at bedtime              REVIEW OF SYSTEMS:  CONSTITUTIONAL: No fever, weight loss, or fatigue  EYES: No eye pain, visual disturbances, or discharge  ENT:  No difficulty hearing, tinnitus, vertigo; No sinus or throat pain  NECK: No pain or stiffness  RESPIRATORY: No cough, wheezing, chills or hemoptysis; No Shortness of Breath  CARDIOVASCULAR: No chest pain, palpitations, passing out, dizziness, or leg swelling  GASTROINTESTINAL: No abdominal or epigastric pain. No nausea, vomiting, or hematemesis; No diarrhea or constipation. No melena or hematochezia.  GENITOURINARY: No dysuria, frequency, hematuria, or incontinence  NEUROLOGICAL: No headaches, memory loss, loss of strength, numbness, or tremors  SKIN: No itching, burning, rashes, or lesions   LYMPH Nodes: No enlarged glands  ENDOCRINE: No heat or cold intolerance; No hair loss  MUSCULOSKELETAL: No joint pain or swelling; No muscle, back, or extremity pain  PSYCHIATRIC: No depression, anxiety, mood swings, or difficulty sleeping  HEME/LYMPH: No easy bruising, or bleeding gums  ALLERGY AND IMMUNOLOGIC: No hives or eczema	    [ ] All others negative	  [ ] Unable to obtain      T(C): 36.4 (03-27-23 @ 16:01), Max: 37.2 (03-26-23 @ 19:47)  T(F): 97.5 (03-27-23 @ 16:01), Max: 99 (03-26-23 @ 19:47)  HR: 82 (03-27-23 @ 16:01) (82 - 100)  BP: 132/85 (03-27-23 @ 16:01) (132/85 - 147/88)  RR: 19 (03-27-23 @ 16:01) (18 - 19)  SpO2: 98% (03-27-23 @ 16:01) (95% - 98%)  Wt(kg): --    I&O's Summary    26 Mar 2023 07:01  -  27 Mar 2023 07:00  --------------------------------------------------------  IN: 467 mL / OUT: 0 mL / NET: 467 mL          PHYSICAL EXAM:  A X O x  HEAD:  Atraumatic, Normocephalic  EYES: EOMI, PERRLA, conjunctiva and sclera clear  NECK: Supple, No JVD, Normal thyroid  Resp: CTAB, No crackles, wheezing,   CVS: Regular rate and rhythm; No discernable murmurs, rubs, or gallops  ABD: Soft, Nontender, Nondistended; Bowel sounds present  EXTREMITIES:  2+ Peripheral Pulses, No edema  LYMPH: No dicernable lymphadenopathy noted  GENERAL: NAD, well-groomed, well-developed      LABS:                        12.5   10.46 )-----------( 376      ( 27 Mar 2023 12:10 )             38.7     03-27    139  |  108  |  12  ----------------------------<  144<H>  3.6   |  26  |  0.57    Ca    9.4      27 Mar 2023 12:10    TPro  8.7<H>  /  Alb  3.2<L>  /  TBili  0.5  /  DBili  x   /  AST  9<L>  /  ALT  26  /  AlkPhos  97  03-27        CAPILLARY BLOOD GLUCOSE          Troponins:  ProBNP:  Lipid Profile:   HgA1c:  TSH:           RADIOLOGY & ADDITIONAL TESTS:    Imaging Personally Reviewed:  [ ] YES  [ ] NO      Consultant(s) Notes Reviewed:  [x ] YES  [ ] NO    Care Discussed with Consultants/Other Providers [ x] YES  [ ] NO          PAST MEDICAL & SURGICAL HISTORY:  Hypertension      Pre-diabetes      Thalamic hemorrhage      S/P  shunt            Chest pain    Handoff    MEWS Score    No pertinent past medical history    Hypertension    Pre-diabetes    Thalamic hemorrhage    Acute coronary syndrome    Chest pain    Pneumonia    Sepsis secondary to UTI    Hypertension    Pre-diabetes    S/P  shunt    History of internal jugular thrombosis    Prophylactic measure    Heartburn    S/P  shunt    A) ANGINA    90+    Sepsis secondary to UTI    SysAdmin_VisitLink

## 2023-03-27 ENCOUNTER — TRANSCRIPTION ENCOUNTER (OUTPATIENT)
Age: 58
End: 2023-03-27

## 2023-03-27 DIAGNOSIS — A41.9 SEPSIS, UNSPECIFIED ORGANISM: ICD-10-CM

## 2023-03-27 DIAGNOSIS — Z02.9 ENCOUNTER FOR ADMINISTRATIVE EXAMINATIONS, UNSPECIFIED: ICD-10-CM

## 2023-03-27 LAB
ALBUMIN SERPL ELPH-MCNC: 3.2 G/DL — LOW (ref 3.5–5)
ALP SERPL-CCNC: 97 U/L — SIGNIFICANT CHANGE UP (ref 40–120)
ALT FLD-CCNC: 26 U/L DA — SIGNIFICANT CHANGE UP (ref 10–60)
ANION GAP SERPL CALC-SCNC: 5 MMOL/L — SIGNIFICANT CHANGE UP (ref 5–17)
AST SERPL-CCNC: 9 U/L — LOW (ref 10–40)
BILIRUB SERPL-MCNC: 0.5 MG/DL — SIGNIFICANT CHANGE UP (ref 0.2–1.2)
BUN SERPL-MCNC: 12 MG/DL — SIGNIFICANT CHANGE UP (ref 7–18)
CALCIUM SERPL-MCNC: 9.4 MG/DL — SIGNIFICANT CHANGE UP (ref 8.4–10.5)
CHLORIDE SERPL-SCNC: 108 MMOL/L — SIGNIFICANT CHANGE UP (ref 96–108)
CO2 SERPL-SCNC: 26 MMOL/L — SIGNIFICANT CHANGE UP (ref 22–31)
CREAT SERPL-MCNC: 0.57 MG/DL — SIGNIFICANT CHANGE UP (ref 0.5–1.3)
CULTURE RESULTS: SIGNIFICANT CHANGE UP
EGFR: 105 ML/MIN/1.73M2 — SIGNIFICANT CHANGE UP
GLUCOSE SERPL-MCNC: 144 MG/DL — HIGH (ref 70–99)
HCT VFR BLD CALC: 38.7 % — SIGNIFICANT CHANGE UP (ref 34.5–45)
HGB BLD-MCNC: 12.5 G/DL — SIGNIFICANT CHANGE UP (ref 11.5–15.5)
MCHC RBC-ENTMCNC: 29.2 PG — SIGNIFICANT CHANGE UP (ref 27–34)
MCHC RBC-ENTMCNC: 32.3 GM/DL — SIGNIFICANT CHANGE UP (ref 32–36)
MCV RBC AUTO: 90.4 FL — SIGNIFICANT CHANGE UP (ref 80–100)
NRBC # BLD: 0 /100 WBCS — SIGNIFICANT CHANGE UP (ref 0–0)
PLATELET # BLD AUTO: 376 K/UL — SIGNIFICANT CHANGE UP (ref 150–400)
POTASSIUM SERPL-MCNC: 3.6 MMOL/L — SIGNIFICANT CHANGE UP (ref 3.5–5.3)
POTASSIUM SERPL-SCNC: 3.6 MMOL/L — SIGNIFICANT CHANGE UP (ref 3.5–5.3)
PROT SERPL-MCNC: 8.7 G/DL — HIGH (ref 6–8.3)
RBC # BLD: 4.28 M/UL — SIGNIFICANT CHANGE UP (ref 3.8–5.2)
RBC # FLD: 12.5 % — SIGNIFICANT CHANGE UP (ref 10.3–14.5)
SODIUM SERPL-SCNC: 139 MMOL/L — SIGNIFICANT CHANGE UP (ref 135–145)
SPECIMEN SOURCE: SIGNIFICANT CHANGE UP
TROPONIN I, HIGH SENSITIVITY RESULT: 3.2 NG/L — SIGNIFICANT CHANGE UP
WBC # BLD: 10.46 K/UL — SIGNIFICANT CHANGE UP (ref 3.8–10.5)
WBC # FLD AUTO: 10.46 K/UL — SIGNIFICANT CHANGE UP (ref 3.8–10.5)

## 2023-03-27 RX ORDER — PANTOPRAZOLE SODIUM 20 MG/1
40 TABLET, DELAYED RELEASE ORAL
Refills: 0 | Status: DISCONTINUED | OUTPATIENT
Start: 2023-03-27 | End: 2023-03-28

## 2023-03-27 RX ADMIN — Medication 30 MILLILITER(S): at 05:39

## 2023-03-27 RX ADMIN — PANTOPRAZOLE SODIUM 40 MILLIGRAM(S): 20 TABLET, DELAYED RELEASE ORAL at 12:03

## 2023-03-27 RX ADMIN — Medication 10 MILLIGRAM(S): at 21:48

## 2023-03-27 RX ADMIN — MAGNESIUM OXIDE 400 MG ORAL TABLET 400 MILLIGRAM(S): 241.3 TABLET ORAL at 21:50

## 2023-03-27 RX ADMIN — Medication 3 MILLIGRAM(S): at 21:52

## 2023-03-27 RX ADMIN — CEFEPIME 100 MILLIGRAM(S): 1 INJECTION, POWDER, FOR SOLUTION INTRAMUSCULAR; INTRAVENOUS at 13:57

## 2023-03-27 RX ADMIN — Medication 25 MILLIGRAM(S): at 21:48

## 2023-03-27 RX ADMIN — Medication 50 MILLIGRAM(S): at 21:49

## 2023-03-27 RX ADMIN — CEFEPIME 100 MILLIGRAM(S): 1 INJECTION, POWDER, FOR SOLUTION INTRAMUSCULAR; INTRAVENOUS at 21:49

## 2023-03-27 RX ADMIN — LOSARTAN POTASSIUM 100 MILLIGRAM(S): 100 TABLET, FILM COATED ORAL at 05:39

## 2023-03-27 RX ADMIN — DONEPEZIL HYDROCHLORIDE 5 MILLIGRAM(S): 10 TABLET, FILM COATED ORAL at 21:49

## 2023-03-27 RX ADMIN — CEFEPIME 100 MILLIGRAM(S): 1 INJECTION, POWDER, FOR SOLUTION INTRAMUSCULAR; INTRAVENOUS at 05:39

## 2023-03-27 RX ADMIN — APIXABAN 2.5 MILLIGRAM(S): 2.5 TABLET, FILM COATED ORAL at 18:35

## 2023-03-27 RX ADMIN — AMLODIPINE BESYLATE 10 MILLIGRAM(S): 2.5 TABLET ORAL at 05:39

## 2023-03-27 RX ADMIN — APIXABAN 2.5 MILLIGRAM(S): 2.5 TABLET, FILM COATED ORAL at 05:39

## 2023-03-27 RX ADMIN — Medication 30 MILLILITER(S): at 18:34

## 2023-03-27 NOTE — PROGRESS NOTE ADULT - SUBJECTIVE AND OBJECTIVE BOX
CHIEF COMPLAINT:Patient is a 57y old  Female who presents with a chief complaint of r/o ACS .Pt appears comfortable.    	  REVIEW OF SYSTEMS:  CONSTITUTIONAL: No fever, weight loss, or fatigue  EYES: No eye pain, visual disturbances, or discharge  ENT:  No difficulty hearing, tinnitus, vertigo; No sinus or throat pain  NECK: No pain or stiffness  RESPIRATORY: No cough, wheezing, chills or hemoptysis; No Shortness of Breath  CARDIOVASCULAR: No chest pain, palpitations, passing out, dizziness, or leg swelling  GASTROINTESTINAL: No abdominal or epigastric pain. No nausea, vomiting, or hematemesis; No diarrhea or constipation. No melena or hematochezia.  GENITOURINARY: No dysuria, frequency, hematuria, or incontinence  NEUROLOGICAL: No headaches, memory loss, loss of strength, numbness, or tremors  SKIN: No itching, burning, rashes, or lesions   LYMPH Nodes: No enlarged glands  ENDOCRINE: No heat or cold intolerance; No hair loss  MUSCULOSKELETAL: No joint pain or swelling; No muscle, back, or extremity pain  PSYCHIATRIC: No depression, anxiety, mood swings, or difficulty sleeping  HEME/LYMPH: No easy bruising, or bleeding gums  ALLERGY AND IMMUNOLOGIC: No hives or eczema	        PHYSICAL EXAM:  T(C): 36.9 (03-27-23 @ 07:25), Max: 37.3 (03-26-23 @ 15:57)  HR: 86 (03-27-23 @ 07:25) (85 - 108)  BP: 134/84 (03-27-23 @ 07:25) (134/84 - 149/74)  RR: 18 (03-27-23 @ 07:25) (18 - 20)  SpO2: 95% (03-27-23 @ 07:25) (95% - 98%)  Wt(kg): --  I&O's Summary    26 Mar 2023 07:01  -  27 Mar 2023 07:00  --------------------------------------------------------  IN: 467 mL / OUT: 0 mL / NET: 467 mL        Appearance: Normal	  HEENT:   Normal oral mucosa, PERRL, EOMI	  Lymphatic: No lymphadenopathy  Cardiovascular: Normal S1 S2, No JVD, No murmurs, No edema  Respiratory: Lungs clear to auscultation	  Psychiatry: A & O x 3, Mood & affect appropriate  Gastrointestinal:  Soft, Non-tender, + BS	  Skin: No rashes, No ecchymoses, No cyanosis	  Neurologic: Non-focal  Extremities: Normal range of motion, No clubbing, cyanosis or edema  Vascular: Peripheral pulses palpable 2+ bilaterally    MEDICATIONS  (STANDING):  aluminum hydroxide/magnesium hydroxide/simethicone Suspension 30 milliLiter(s) Oral every 12 hours  amitriptyline 25 milliGRAM(s) Oral at bedtime  amLODIPine   Tablet 10 milliGRAM(s) Oral daily  apixaban 2.5 milliGRAM(s) Oral two times a day  baclofen 10 milliGRAM(s) Oral at bedtime  cefepime   IVPB 2000 milliGRAM(s) IV Intermittent every 8 hours  donepezil 5 milliGRAM(s) Oral at bedtime  losartan 100 milliGRAM(s) Oral daily  magnesium oxide 400 milliGRAM(s) Oral at bedtime  pantoprazole  Injectable 40 milliGRAM(s) IV Push daily  sodium chloride 0.9%. 1000 milliLiter(s) (70 mL/Hr) IV Continuous <Continuous>  traZODone 50 milliGRAM(s) Oral at bedtime      LABS:	 	      Troponin I, High Sensitivity Result: 6.2 ng/L (03-25 @ 22:22)  Troponin I, High Sensitivity Result: 4.8 ng/L (03-25 @ 14:46)                            13.2   15.27 )-----------( 401      ( 25 Mar 2023 14:46 )             41.0     03-25    134<L>  |  104  |  7   ----------------------------<  120<H>  4.0   |  24  |  0.63    Ca    9.5      25 Mar 2023 14:46    TPro  8.8<H>  /  Alb  3.5  /  TBili  0.7  /  DBili  x   /  AST  16  /  ALT  28  /  AlkPhos  116  03-25

## 2023-03-27 NOTE — PROGRESS NOTE ADULT - ASSESSMENT
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  1.Tele monitoring.  2.Echocardiogram.  3.Stress test in am.  4.Hx of DVT-eliquis.  5.HTN-cont bp medication.  6.Prediabetes-diet.  7.PPI. 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  1.Tele monitoring.  2.Echocardiogram.  3.Stress test today.  4.Hx of DVT-eliquis.  5.HTN-cont bp medication.  6.Prediabetes-diet.  7.PPI.

## 2023-03-27 NOTE — PROGRESS NOTE ADULT - SUBJECTIVE AND OBJECTIVE BOX
GOLDEN CALHOUN  MR# [id number]  57yFemale        Patient is a 57y old  Female who presents with a chief complaint of r/o ACS (27 Mar 2023 14:37)      INTERVAL HPI/OVERNIGHT EVENTS:  Patient seen and examined at bedside. No notations of chest pain, palpitation, SOB, orthopnea, nausea, vomiting or abdominal pain.    ALLERGIES  aspirin (Unknown)      MEDICATIONS  acetaminophen     Tablet .. 650 milliGRAM(s) Oral every 6 hours PRN Temp greater or equal to 38C (100.4F), Mild Pain (1 - 3)  aluminum hydroxide/magnesium hydroxide/simethicone Suspension 30 milliLiter(s) Oral every 12 hours  amitriptyline 25 milliGRAM(s) Oral at bedtime  amLODIPine   Tablet 10 milliGRAM(s) Oral daily  apixaban 2.5 milliGRAM(s) Oral two times a day  baclofen 10 milliGRAM(s) Oral at bedtime  cefepime   IVPB 2000 milliGRAM(s) IV Intermittent every 8 hours  donepezil 5 milliGRAM(s) Oral at bedtime  ketorolac   Injectable 15 milliGRAM(s) IV Push every 6 hours PRN Moderate Pain (4 - 6)  losartan 100 milliGRAM(s) Oral daily  magnesium oxide 400 milliGRAM(s) Oral at bedtime  melatonin 3 milliGRAM(s) Oral at bedtime PRN Insomnia  morphine  - Injectable 2 milliGRAM(s) IV Push every 6 hours PRN Severe Pain (7 - 10)  ondansetron Injectable 4 milliGRAM(s) IV Push every 8 hours PRN Nausea and/or Vomiting  pantoprazole  Injectable 40 milliGRAM(s) IV Push daily  sodium chloride 0.9%. 1000 milliLiter(s) IV Continuous <Continuous>  traZODone 50 milliGRAM(s) Oral at bedtime              REVIEW OF SYSTEMS:  CONSTITUTIONAL: No fever, weight loss, or fatigue  EYES: No eye pain, visual disturbances, or discharge  ENT:  No difficulty hearing, tinnitus, vertigo; No sinus or throat pain  NECK: No pain or stiffness  RESPIRATORY: No cough, wheezing, chills or hemoptysis; No Shortness of Breath  CARDIOVASCULAR: No chest pain, palpitations, passing out, dizziness, or leg swelling  GASTROINTESTINAL: No abdominal or epigastric pain. No nausea, vomiting, or hematemesis; No diarrhea or constipation. No melena or hematochezia.  GENITOURINARY: No dysuria, frequency, hematuria, or incontinence  NEUROLOGICAL: No headaches, memory loss, loss of strength, numbness, or tremors  SKIN: No itching, burning, rashes, or lesions   LYMPH Nodes: No enlarged glands  ENDOCRINE: No heat or cold intolerance; No hair loss  MUSCULOSKELETAL: No joint pain or swelling; No muscle, back, or extremity pain  PSYCHIATRIC: No depression, anxiety, mood swings, or difficulty sleeping  HEME/LYMPH: No easy bruising, or bleeding gums  ALLERGY AND IMMUNOLOGIC: No hives or eczema	    [ ] All others negative	  [ ] Unable to obtain      T(C): 36.4 (03-27-23 @ 16:01), Max: 37.2 (03-26-23 @ 19:47)  T(F): 97.5 (03-27-23 @ 16:01), Max: 99 (03-26-23 @ 19:47)  HR: 82 (03-27-23 @ 16:01) (82 - 100)  BP: 132/85 (03-27-23 @ 16:01) (132/85 - 147/88)  RR: 19 (03-27-23 @ 16:01) (18 - 19)  SpO2: 98% (03-27-23 @ 16:01) (95% - 98%)  Wt(kg): --    I&O's Summary    26 Mar 2023 07:01  -  27 Mar 2023 07:00  --------------------------------------------------------  IN: 467 mL / OUT: 0 mL / NET: 467 mL          PHYSICAL EXAM:  A X O x  HEAD:  Atraumatic, Normocephalic  EYES: EOMI, PERRLA, conjunctiva and sclera clear  NECK: Supple, No JVD, Normal thyroid  Resp: CTAB, No crackles, wheezing,   CVS: Regular rate and rhythm; No discernable murmurs, rubs, or gallops  ABD: Soft, Nontender, Nondistended; Bowel sounds present  EXTREMITIES:  2+ Peripheral Pulses, No edema  LYMPH: No dicernable lymphadenopathy noted  GENERAL: NAD, well-groomed, well-developed      LABS:                        12.5   10.46 )-----------( 376      ( 27 Mar 2023 12:10 )             38.7     03-27    139  |  108  |  12  ----------------------------<  144<H>  3.6   |  26  |  0.57    Ca    9.4      27 Mar 2023 12:10    TPro  8.7<H>  /  Alb  3.2<L>  /  TBili  0.5  /  DBili  x   /  AST  9<L>  /  ALT  26  /  AlkPhos  97  03-27        CAPILLARY BLOOD GLUCOSE          Troponins:  ProBNP:  Lipid Profile:   HgA1c:  TSH:           RADIOLOGY & ADDITIONAL TESTS:    Imaging Personally Reviewed:  [ ] YES  [ ] NO      Consultant(s) Notes Reviewed:  [x ] YES  [ ] NO    Care Discussed with Consultants/Other Providers [ x] YES  [ ] NO          PAST MEDICAL & SURGICAL HISTORY:  Hypertension      Pre-diabetes      Thalamic hemorrhage      S/P  shunt            Chest pain    Handoff    MEWS Score    No pertinent past medical history    Hypertension    Pre-diabetes    Thalamic hemorrhage    Acute coronary syndrome    Chest pain    Pneumonia    Sepsis secondary to UTI    Hypertension    Pre-diabetes    S/P  shunt    History of internal jugular thrombosis    Prophylactic measure    Heartburn    S/P  shunt    A) ANGINA    90+    Sepsis secondary to UTI    SysAdmin_VisitLink

## 2023-03-27 NOTE — PROGRESS NOTE ADULT - ASSESSMENT
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 For the past days, patient has been having coughing, sore throat, fever, headache, and green phlegm, and tried outpatient antibiotic, Likely Azithromycin or Fluoroquinolone for 5 days without significant relief. She also had been having increased urinary frequency, abdominal pain, and lower back pain. On admission, she found to have low grade fever, tachycardia, Leukocytosis and mild positive Urine  analysis. She has started on Cefepime and the ID consult requested to assist with further evaluation and antibiotic management.    # Sepsis ( Low grade fever + Tachycardia+ Leukocytosis )  # UTI - urine cx has no growth     would recommend:    1. OOB to chair   2. Monitor WBC count  3. Monitor kidney function   4. Continue Cefepime until work up is done    Attending Attestation:    Spent more than 45 minutes on total encounter, more than 50 % of the visit was spent counseling and/or coordinating care by the Attending physician.       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 For the past days, patient has been having coughing, sore throat, fever, headache, and green phlegm, and tried outpatient antibiotic, Likely Azithromycin or Fluoroquinolone for 5 days without significant relief. She also had been having increased urinary frequency, abdominal pain, and lower back pain. On admission, she found to have low grade fever, tachycardia, Leukocytosis and mild positive Urine  analysis. She has started on Cefepime and the ID consult requested to assist with further evaluation and antibiotic management.    # Sepsis ( Low grade fever + Tachycardia+ Leukocytosis )- resolved   # UTI - urine cx has no growth     would recommend:    1. Please order CBC in AM to monitor WBC count  2. Monitor WBC count  3. Monitor kidney function   4. Continue Cefepime inpatient and may change to oral Augmentin 875 mg q 12hours on discharge to continue until 3/30/23    d/w Covering NP, Fam    Attending Attestation:    Spent more than 45 minutes on total encounter, more than 50 % of the visit was spent counseling and/or coordinating care by the Attending physician.

## 2023-03-27 NOTE — PROGRESS NOTE ADULT - PROBLEM SELECTOR PLAN 3
- P/w coughing, sore throat, fever, headache, and green phlegm  - Tried 5 days of unknown outpatient antibiotic  - F/u CT chest  - ID Dr Boykin consulted
- P/w coughing, sore throat, fever, headache, and green phlegm  - Tried 5 days of unknown outpatient antibiotic  - F/u CT chest  - ID Dr Boykin consulted
-controlled   -cont meds as above   -mon BP

## 2023-03-27 NOTE — PROGRESS NOTE ADULT - SUBJECTIVE AND OBJECTIVE BOX
Patient is seen and examined at the bed side, is afebrile. The Urine and Blood cultures have no growth to date.      REVIEW OF SYSTEMS: All other review systems are negative        ALLERGIES: aspirin (Unknown)      Vital Signs Last 24 Hrs  T(C): 36.5 (27 Mar 2023 12:08), Max: 37.3 (26 Mar 2023 15:57)  T(F): 97.7 (27 Mar 2023 12:08), Max: 99.1 (26 Mar 2023 15:57)  HR: 89 (27 Mar 2023 12:08) (85 - 101)  BP: 146/80 (27 Mar 2023 12:08) (134/84 - 147/88)  BP(mean): --  RR: 18 (27 Mar 2023 12:08) (18 - 20)  SpO2: 95% (27 Mar 2023 12:08) (95% - 97%)    Parameters below as of 27 Mar 2023 12:08  Patient On (Oxygen Delivery Method): room air        PHYSICAL EXAM:  GENERAL: Not in distress   CHEST/LUNG: Not using accessory muscles   HEART: s1 and s2 present  ABDOMEN:  Nontender and  Nondistended  EXTREMITIES: No pedal  edema  CNS: Awake and Alert      LABS: No new Labs                          13.2   15.27 )-----------( 401      ( 25 Mar 2023 14:46 )             41.0                  03-25    134<L>  |  104  |  7   ----------------------------<  120<H>  4.0   |  24  |  0.63    Ca    9.5      25 Mar 2023 14:46    TPro  8.8<H>  /  Alb  3.5  /  TBili  0.7  /  DBili  x   /  AST  16  /  ALT  28  /  AlkPhos  116  03-25    PT/INR - ( 25 Mar 2023 14:46 )   PT: 15.6 sec;   INR: 1.31 ratio      PTT - ( 25 Mar 2023 14:46 )  PTT:37.9 sec        Urinalysis Basic - ( 25 Mar 2023 14:46 )  Color: Yellow / Appearance: Clear / S.010 / pH: x  Gluc: x / Ketone: Negative  / Bili: Negative / Urobili: Negative   Blood: x / Protein: 15 mg/dL / Nitrite: Negative   Leuk Esterase: Trace / RBC: Negative /HPF / WBC 6-10 /HPF   Sq Epi: x / Non Sq Epi: Few /HPF / Bacteria: Few /HPF        MEDICATIONS  (STANDING):    aluminum hydroxide/magnesium hydroxide/simethicone Suspension 30 milliLiter(s) Oral every 12 hours  amitriptyline 25 milliGRAM(s) Oral at bedtime  amLODIPine   Tablet 10 milliGRAM(s) Oral daily  apixaban 2.5 milliGRAM(s) Oral two times a day  baclofen 10 milliGRAM(s) Oral at bedtime  cefepime   IVPB 2000 milliGRAM(s) IV Intermittent every 8 hours  donepezil 5 milliGRAM(s) Oral at bedtime  losartan 100 milliGRAM(s) Oral daily  magnesium oxide 400 milliGRAM(s) Oral at bedtime  pantoprazole  Injectable 40 milliGRAM(s) IV Push daily  sodium chloride 0.9%. 1000 milliLiter(s) (70 mL/Hr) IV Continuous <Continuous>  traZODone 50 milliGRAM(s) Oral at bedtime      RADIOLOGY & ADDITIONAL TESTS:    3/25/23 : CT Chest w/ IV Cont (23 @ 21:43) No acute CT abnormality in the chest, abdomen and pelvis.      3/25/23 : Xray Chest 1 View- PORTABLE-Urgent (Xray Chest 1 View- PORTABLE-Urgent .) (23 @ 15:14) >  No acute pulmonary disease        MICROBIOLOGY DATA:    Culture - Blood (23 @ 18:30)   Specimen Source: .Blood Blood-Peripheral  Culture Results: No growth to date.    Culture - Blood (23 @ 18:20)   Specimen Source: .Blood Blood-Peripheral  Culture Results: No growth to date.    Culture - Urine (23 @ 14:46)   Specimen Source: Clean Catch Clean Catch (Midstream)  Culture Results:   <10,000 CFU/mL Normal Urogenital Ernestine    Respiratory Viral Panel with COVID-19 by NATALYA (23 @ 14:46)   Rapid RVP Result: [facility]  SARS-CoV-2: [facility]           Patient is seen and examined at the bed side, is afebrile. The Urine and Blood cultures have no growth to date.      REVIEW OF SYSTEMS: All other review systems are negative      ALLERGIES: aspirin (Unknown)      Vital Signs Last 24 Hrs  T(C): 36.5 (27 Mar 2023 12:08), Max: 37.3 (26 Mar 2023 15:57)  T(F): 97.7 (27 Mar 2023 12:08), Max: 99.1 (26 Mar 2023 15:57)  HR: 89 (27 Mar 2023 12:08) (85 - 101)  BP: 146/80 (27 Mar 2023 12:08) (134/84 - 147/88)  BP(mean): --  RR: 18 (27 Mar 2023 12:08) (18 - 20)  SpO2: 95% (27 Mar 2023 12:08) (95% - 97%)    Parameters below as of 27 Mar 2023 12:08  Patient On (Oxygen Delivery Method): room air        PHYSICAL EXAM:  GENERAL: Not in distress   CHEST/LUNG: Not using accessory muscles   HEART: s1 and s2 present  ABDOMEN:  Nontender and  Nondistended  EXTREMITIES: No pedal  edema  CNS: Awake and Alert      LABS: No new Labs                          13.2   15.27 )-----------( 401      ( 25 Mar 2023 14:46 )             41.0                  03-25    134<L>  |  104  |  7   ----------------------------<  120<H>  4.0   |  24  |  0.63    Ca    9.5      25 Mar 2023 14:46    TPro  8.8<H>  /  Alb  3.5  /  TBili  0.7  /  DBili  x   /  AST  16  /  ALT  28  /  AlkPhos  116  03-25    PT/INR - ( 25 Mar 2023 14:46 )   PT: 15.6 sec;   INR: 1.31 ratio      PTT - ( 25 Mar 2023 14:46 )  PTT:37.9 sec        Urinalysis Basic - ( 25 Mar 2023 14:46 )  Color: Yellow / Appearance: Clear / S.010 / pH: x  Gluc: x / Ketone: Negative  / Bili: Negative / Urobili: Negative   Blood: x / Protein: 15 mg/dL / Nitrite: Negative   Leuk Esterase: Trace / RBC: Negative /HPF / WBC 6-10 /HPF   Sq Epi: x / Non Sq Epi: Few /HPF / Bacteria: Few /HPF        MEDICATIONS  (STANDING):    aluminum hydroxide/magnesium hydroxide/simethicone Suspension 30 milliLiter(s) Oral every 12 hours  amitriptyline 25 milliGRAM(s) Oral at bedtime  amLODIPine   Tablet 10 milliGRAM(s) Oral daily  apixaban 2.5 milliGRAM(s) Oral two times a day  baclofen 10 milliGRAM(s) Oral at bedtime  cefepime   IVPB 2000 milliGRAM(s) IV Intermittent every 8 hours  donepezil 5 milliGRAM(s) Oral at bedtime  losartan 100 milliGRAM(s) Oral daily  magnesium oxide 400 milliGRAM(s) Oral at bedtime  pantoprazole  Injectable 40 milliGRAM(s) IV Push daily  sodium chloride 0.9%. 1000 milliLiter(s) (70 mL/Hr) IV Continuous <Continuous>  traZODone 50 milliGRAM(s) Oral at bedtime      RADIOLOGY & ADDITIONAL TESTS:    3/25/23 : CT Chest w/ IV Cont (23 @ 21:43) No acute CT abnormality in the chest, abdomen and pelvis.      3/25/23 : Xray Chest 1 View- PORTABLE-Urgent (Xray Chest 1 View- PORTABLE-Urgent .) (23 @ 15:14) >  No acute pulmonary disease        MICROBIOLOGY DATA:    Culture - Blood (23 @ 18:30)   Specimen Source: .Blood Blood-Peripheral  Culture Results: No growth to date.    Culture - Blood (23 @ 18:20)   Specimen Source: .Blood Blood-Peripheral  Culture Results: No growth to date.    Culture - Urine (23 @ 14:46)   Specimen Source: Clean Catch Clean Catch (Midstream)  Culture Results:   <10,000 CFU/mL Normal Urogenital Ernestine    Respiratory Viral Panel with COVID-19 by NATALYA (23 @ 14:46)   Rapid RVP Result: [facility]  SARS-CoV-2: [facility]

## 2023-03-27 NOTE — PROGRESS NOTE ADULT - PROBLEM SELECTOR PLAN 4
-02/2022 = left thalamic parenchymal hematoma with extensive intraventricular hemorrhage. S/p VPS Certas @5 3/2/22  -has residual R sided pain and weakness -02/2022 = left thalamic parenchymal hematoma with extensive intraventricular hemorrhage. S/p VPS 3/2/22  -has residual R sided pain and weakness

## 2023-03-27 NOTE — DISCHARGE NOTE PROVIDER - HOSPITAL COURSE
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 EKG sinus tachycardia, trop negative times 3. Pt admitted to [facility] for ACS rule out and sepsis possibly due to UTI. Echocardiogram showed normal LV systolic function with trace mitral regurgitation, No acute CT abnormality in the chest, abdomen and pelvis, cardio Dr. Harper following. IV fluids and Cefepime started, ID Dr Boykin following, blood cultures NGTD.  INCOMPLETE.................                 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 EKG sinus tachycardia, trop negative times 3. Pt admitted to [facility] for ACS rule out and sepsis possibly due to UTI. Echocardiogram showed normal LV systolic function with trace mitral regurgitation, No acute CT abnormality in the chest, abdomen and pelvis, cardio Dr. Harper following. IV fluids and Cefepime started, ID Dr Boykin following, blood cultures NGTD.  Please note that this a brief summary of hospital course please refer to daily progress notes and consult notes for full course and events  INCOMPLETE.................                 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 Admitted for ACS rule out and sepsis possibly due to UTI. troponin negative, Echo with preserved EF and Stress test negative, Cardio Dr. Harper following   Pt was noted with fever, tachycardia and leukocytosis, started on abx, CT chest, abd and pelvis negative, started on abx, ID Dr. Boykin on board, recommends to keep IV abx until 3/28, cultures negative   Clinically improved, optimized for discharge with outpt follow up   Please note that this a brief summary of hospital course please refer to daily progress notes and consult notes for full course and events

## 2023-03-27 NOTE — PROGRESS NOTE ADULT - PROBLEM SELECTOR PLAN 6
- 02 2022 MARGARITA thrombosis  - C/w Eliquis 5 BID for now
-dvt ppx: on full dose Eliquis
- 02 2022 MARGARITA thrombosis  - C/w Eliquis 5 BID for now

## 2023-03-27 NOTE — PROGRESS NOTE ADULT - PROBLEM SELECTOR PLAN 1
- P/w L chest pain, pressure like, but reproducible with palpation.  - Possibly costochondritis or myalgia from infection  - EKG = Sinus tachycardia  - Trop neg, F/u trop 2  - Admit to telemetry  - F/u echo  - Cardio Dr. Harper consulted
- P/w L chest pain, pressure like, but reproducible with palpation.  - Possibly costochondritis or myalgia from infection  - EKG = Sinus tachycardia  - Trop neg, F/u trop 2  - Admit to telemetry  - F/u echo  - Cardio Dr. Harper consulted
-possibly costochondritis or myalgia from infection  -troponin negative   -Echo with preserved EF   -stress test negative   -Cardio Dr. Harper

## 2023-03-27 NOTE — PROGRESS NOTE ADULT - SUBJECTIVE AND OBJECTIVE BOX
Patient is a 57y old  Female who presents with a chief complaint of r/o ACS (27 Mar 2023 18:29)    OVERNIGHT EVENTS: no acute events overnight     REVIEW OF SYSTEMS:  CONSTITUTIONAL: No fever, chills  NECK: No pain or stiffness  RESPIRATORY: No cough, SOB  CARDIOVASCULAR: No chest pain, palpitations  GASTROINTESTINAL: No abdominal pain. No nausea, vomiting, or diarrhea  GENITOURINARY: No dysuria  NEUROLOGICAL: No HA  MUSCULOSKELETAL: No joint pain or swelling; No muscle, back, or extremity pain    T(C): 36.4 (03-27-23 @ 16:01), Max: 37.2 (03-26-23 @ 19:47)  HR: 82 (03-27-23 @ 16:01) (82 - 100)  BP: 132/85 (03-27-23 @ 16:01) (132/85 - 147/88)  RR: 19 (03-27-23 @ 16:01) (18 - 19)  SpO2: 98% (03-27-23 @ 16:01) (95% - 98%)  Wt(kg): --Vital Signs Last 24 Hrs  T(C): 36.4 (27 Mar 2023 16:01), Max: 37.2 (26 Mar 2023 19:47)  T(F): 97.5 (27 Mar 2023 16:01), Max: 99 (26 Mar 2023 19:47)  HR: 82 (27 Mar 2023 16:01) (82 - 100)  BP: 132/85 (27 Mar 2023 16:01) (132/85 - 147/88)  BP(mean): --  RR: 19 (27 Mar 2023 16:01) (18 - 19)  SpO2: 98% (27 Mar 2023 16:01) (95% - 98%)    Parameters below as of 27 Mar 2023 16:01  Patient On (Oxygen Delivery Method): room air    MEDICATIONS  (STANDING):  amitriptyline 25 milliGRAM(s) Oral at bedtime  amLODIPine   Tablet 10 milliGRAM(s) Oral daily  apixaban 2.5 milliGRAM(s) Oral two times a day  baclofen 10 milliGRAM(s) Oral at bedtime  cefepime   IVPB 2000 milliGRAM(s) IV Intermittent every 8 hours  donepezil 5 milliGRAM(s) Oral at bedtime  losartan 100 milliGRAM(s) Oral daily  magnesium oxide 400 milliGRAM(s) Oral at bedtime  pantoprazole  Injectable 40 milliGRAM(s) IV Push daily  sodium chloride 0.9%. 1000 milliLiter(s) (70 mL/Hr) IV Continuous <Continuous>  traZODone 50 milliGRAM(s) Oral at bedtime    MEDICATIONS  (PRN):  acetaminophen     Tablet .. 650 milliGRAM(s) Oral every 6 hours PRN Temp greater or equal to 38C (100.4F), Mild Pain (1 - 3)  ketorolac   Injectable 15 milliGRAM(s) IV Push every 6 hours PRN Moderate Pain (4 - 6)  melatonin 3 milliGRAM(s) Oral at bedtime PRN Insomnia  morphine  - Injectable 2 milliGRAM(s) IV Push every 6 hours PRN Severe Pain (7 - 10)  ondansetron Injectable 4 milliGRAM(s) IV Push every 8 hours PRN Nausea and/or Vomiting      PHYSICAL EXAM:  GENERAL: NAD  EYES: clear conjunctiva  ENMT: Moist mucous membranes  NECK: Supple, No JVD  CHEST/LUNG: Clear to auscultation bilaterally; No rales, rhonchi, wheezing, or rubs  HEART: S1, S2, Regular rate and rhythm  ABDOMEN: Soft, Nontender, Nondistended; Bowel sounds present  NEURO: Alert & Oriented X3  EXTREMITIES: No LE edema, no calf tenderness  SKIN: No rashes or lesions    Consultant(s) Notes Reviewed:  [x ] YES  [ ] NO  Care Discussed with Consultants/Other Providers [ x] YES  [ ] NO    LABS:                        12.5   10.46 )-----------( 376      ( 27 Mar 2023 12:10 )             38.7     03-27    139  |  108  |  12  ----------------------------<  144<H>  3.6   |  26  |  0.57    Ca    9.4      27 Mar 2023 12:10    TPro  8.7<H>  /  Alb  3.2<L>  /  TBili  0.5  /  DBili  x   /  AST  9<L>  /  ALT  26  /  AlkPhos  97  03-27    CAPILLARY BLOOD GLUCOSE    RADIOLOGY & ADDITIONAL TESTS:  < from: CT Chest w/ IV Cont (03.25.23 @ 21:43) >    ACC: [id number] EXAM:  CT ABDOMEN AND PELVIS IC   ORDERED BY: DEVIN MARIN     ACC: [id number] EXAM:  CT CHEST IC   ORDERED BY: DEVIN MARIN     PROCEDURE DATE:  03/25/2023          INTERPRETATION:  CLINICAL INFORMATION: Severe chest pain for 2 days,   right costovertebral angle tenderness and diffuse abdominal pain    COMPARISON: CT chest from February 16, 2022, CT abdomen and pelvis from   June 27, 2017    CONTRAST/COMPLICATIONS:  IV Contrast: Omnipaque 350   90 cc administered   10 cc discarded  Oral Contrast: NONE  Complications: None reported at time of study completion    PROCEDURE:  CT of the Chest, Abdomen and Pelvis was performed.  Sagittal and coronal reformats were performed.    FINDINGS:  CHEST:  LUNGS AND LARGE AIRWAYS: Clear lungs  PLEURA: No pleural effusion.  VESSELS: Trace calcified plaque in the thoracic aorta, no aneurysm or   dissection. Scattered calcified plaque in the left coronary arteries.   Main pulmonary artery is normal in caliber.  HEART: Heart size is normal. No pericardial effusion.  MEDIASTINUM AND YURI: Mildly enlarged lymph node in the AP window   measures 2 x 1.1 cm.  CHEST WALL AND LOWER NECK: There is limited  shunt courses along the   anterior right chest wall, enters the peritoneum in the right upper   quadrant and terminates in the left hemipelvis. No discontinuity or   kinking. No pseudocyst formation.    ABDOMEN AND PELVIS:  LIVER: Within normal limits.  BILE DUCTS: Normal caliber.  GALLBLADDER: Cholecystectomy.  SPLEEN: Within normal limits.  PANCREAS: Within normal limits.  ADRENALS: Within normal limits.  KIDNEYS/URETERS: Within normal limits.    BLADDER: Within normal limits.  REPRODUCTIVE ORGANS: Leiomyomatous uterus    BOWEL: No bowel obstruction. Normal appendix.  PERITONEUM: No free air or free fluid  VESSELS: Atherosclerotic changes.  RETROPERITONEUM/LYMPH NODES: No enlarged lymph node measuring greater   than 10 mm in short axis  ABDOMINAL WALL: Post surgical changes in the buttocks, appears cosmetic.  BONES: No acute osseous abnormality    IMPRESSION:  No acute CT abnormality in the chest, abdomen and pelvis.        < end of copied text >

## 2023-03-27 NOTE — PROGRESS NOTE ADULT - PROBLEM SELECTOR PLAN 7
-discharge disposition back home likely tomorrow if WBC remains normal
- Complains of heartburn due to decreased PO intake for the past few days  - PPI, antiemetics, Maalox
- Complains of heartburn due to decreased PO intake for the past few days  - PPI, antiemetics, Maalox

## 2023-03-27 NOTE — DISCHARGE NOTE PROVIDER - NSDCCPCAREPLAN_GEN_ALL_CORE_FT
PRINCIPAL DISCHARGE DIAGNOSIS  Diagnosis: Acute coronary syndrome  Assessment and Plan of Treatment: You presented to the ED for chest pain. EKG and labs did not show any acute heart muscle damage.  You were evaluated by a cardiologist. Ultrasound was negative. CAT scans of the chest, abdomen and pelvis did not show any acute pathology.         SECONDARY DISCHARGE DIAGNOSES  Diagnosis: Sepsis secondary to UTI  Assessment and Plan of Treatment: There was supicion of infection however all the work up for infectious disease came back negative. You were given antibiotics and IV fluids. Blood cultures came back negative.     PRINCIPAL DISCHARGE DIAGNOSIS  Diagnosis: Acute coronary syndrome  Assessment and Plan of Treatment: You presented to the ED for chest pain. EKG and labs did not show any acute heart muscle damage.  You were evaluated by a cardiologist. Ultrasound was negative. CAT scans of the chest, abdomen and pelvis did not show any acute pathology.   Please followup with your PCP within one week.   SEEK IMMEDIATE MEDICAL CARE IF:  You have increased chest pain   You develop shortness of breath, an increasing cough, or you are coughing up blood.        SECONDARY DISCHARGE DIAGNOSES  Diagnosis: Sepsis secondary to UTI  Assessment and Plan of Treatment: There was supicion of infection however all the work up for infectious disease came back negative. You were given antibiotics and IV fluids. Blood cultures came back negative.     PRINCIPAL DISCHARGE DIAGNOSIS  Diagnosis: Chest pain  Assessment and Plan of Treatment: you presented to hospital for chest pain. you were monitored on heart monitor and your heart rate and rhythm were normal. Cardiac enzymes (blood work that can show injury to heart muscle) were monitored and were negative   you were evaluated by Cardiologist   You had an ultrasound of heart done which showed normal function   you also had an stress test done which was negative   continue current medications as prescribed   Follow up with PCP and Cardiologist outpatient  HOME CARE INSTRUCTIONS  For the next few days, avoid physical activities that bring on chest pain. Continue physical activities as directed.  Do not smoke.  Avoid drinking alcohol.   Only take over-the-counter or prescription medicine for pain, discomfort, or fever as directed by your caregiver.  Follow your caregiver's suggestions for further testing if your chest pain does not go away..  SEEK IMMEDIATE MEDICAL CARE IF:  You have increased chest pain or pain that spreads to your arm, neck, jaw, back, or abdomen.   You develop shortness of breath, an increasing cough, or you are coughing up blood.  You have severe back or abdominal pain, feel nauseous, or vomit.  You develop severe weakness, fainting, or chills.  You have a fever        SECONDARY DISCHARGE DIAGNOSES  Diagnosis: Sepsis  Assessment and Plan of Treatment: There was supicion of infection however all the work up for infectious disease came back negative. You were given antibiotics and IV fluids. Blood cultures came back negative.  continue antibiotics for 2 more days for prophylaxis

## 2023-03-27 NOTE — DISCHARGE NOTE PROVIDER - NSDCMRMEDTOKEN_GEN_ALL_CORE_FT
amitriptyline 25 mg oral tablet: 1 tab(s) orally once a day (at bedtime)  amLODIPine 10 mg oral tablet: 1 tab(s) orally once a day  baclofen 10 mg oral tablet: 1 tab(s) orally once a day (at bedtime)  donepezil 5 mg oral tablet: 1 tab(s) orally once a day  Eliquis 2.5 mg oral tablet: 1 tab(s) orally 2 times a day  hydrALAZINE 25 mg oral tablet: 1 tab(s) orally 2 times a day  losartan 100 mg oral tablet: 1 tab(s) orally once a day  magnesium oxide 400 mg oral tablet: 1 tab(s) orally once a day (at bedtime)  traZODone 50 mg oral tablet: 1 tab(s) orally once a day (at bedtime)   amitriptyline 25 mg oral tablet: 1 tab(s) orally once a day (at bedtime)  amLODIPine 10 mg oral tablet: 1 tab(s) orally once a day  amoxicillin-clavulanate 875 mg-125 mg oral tablet: 1 tab(s) orally 2 times a day  baclofen 10 mg oral tablet: 1 tab(s) orally once a day (at bedtime)  donepezil 5 mg oral tablet: 1 tab(s) orally once a day  Eliquis 2.5 mg oral tablet: 1 tab(s) orally 2 times a day  hydrALAZINE 25 mg oral tablet: 1 tab(s) orally 2 times a day  losartan 100 mg oral tablet: 1 tab(s) orally once a day  magnesium oxide 400 mg oral tablet: 1 tab(s) orally once a day (at bedtime)  traZODone 50 mg oral tablet: 1 tab(s) orally once a day (at bedtime)

## 2023-03-27 NOTE — PROGRESS NOTE ADULT - PROBLEM SELECTOR PLAN 2
-p/w fever, tachycardia and leukocytosis unclear source   -CT chest, abd and pelvis negative   -cont Cefepime   -cultures negative   -f/u CBC in AM   -change to PO abx on d/c tomorrow if no leukocytosis
- P/w urinary frequency, WBC 15, fever  - UA positive  - S/p Cefepime in Ed  - C/w Cefepime  - Gentle IVF  - ID Dr Boykin consulted
- P/w urinary frequency, WBC 15, fever  - UA positive  - S/p Cefepime in Ed  - C/w Cefepime  - Gentle IVF  - ID Dr Boykin consulted

## 2023-03-27 NOTE — PROGRESS NOTE ADULT - PROBLEM SELECTOR PLAN 5
-02 2022 [facility] thrombosis  -cont Eliquis 5 BID for now -02/2022 [facility] thrombosis  -cont Eliquis 5 BID for now

## 2023-03-27 NOTE — DISCHARGE NOTE PROVIDER - PROVIDER TOKENS
PROVIDER:[TOKEN:[[id number]],FOLLOWUP:[1 week]],PROVIDER:[TOKEN:[[id number]],FOLLOWUP:[2 weeks]]

## 2023-03-27 NOTE — DISCHARGE NOTE PROVIDER - CARE PROVIDER_API CALL
AQUILES TAVAREZ  Internal Medicine  [address] [address]  Phone: [phone]  Fax: ()-  Follow Up Time: 1 week    April Harper)  Internal Medicine  [address] [place] [address]  Phone: [phone]  Fax: [phone]  Follow Up Time: 2 weeks

## 2023-03-27 NOTE — PROGRESS NOTE ADULT - ASSESSMENT
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 Admitted for ACS rule out and sepsis possibly due to UTI. troponin negative, Echo with preserved EF and Stress test negative, Cardio Dr. Harper following   Pt was noted with fever, tachycardia and leukocytosis, started on abx, CT chest, abd and pelvis negative, started on abx, ID Dr. Boykin on board, recommends to keep IV abx until 3/28, cultures negative

## 2023-03-28 ENCOUNTER — TRANSCRIPTION ENCOUNTER (OUTPATIENT)
Age: 58
End: 2023-03-28

## 2023-03-28 VITALS
TEMPERATURE: 99 F | HEART RATE: 94 BPM | RESPIRATION RATE: 18 BRPM | DIASTOLIC BLOOD PRESSURE: 77 MMHG | SYSTOLIC BLOOD PRESSURE: 134 MMHG | OXYGEN SATURATION: 94 %

## 2023-03-28 LAB
ANION GAP SERPL CALC-SCNC: 5 MMOL/L — SIGNIFICANT CHANGE UP (ref 5–17)
BUN SERPL-MCNC: 14 MG/DL — SIGNIFICANT CHANGE UP (ref 7–18)
CALCIUM SERPL-MCNC: 9.6 MG/DL — SIGNIFICANT CHANGE UP (ref 8.4–10.5)
CHLORIDE SERPL-SCNC: 107 MMOL/L — SIGNIFICANT CHANGE UP (ref 96–108)
CO2 SERPL-SCNC: 26 MMOL/L — SIGNIFICANT CHANGE UP (ref 22–31)
CREAT SERPL-MCNC: 0.53 MG/DL — SIGNIFICANT CHANGE UP (ref 0.5–1.3)
EGFR: 107 ML/MIN/1.73M2 — SIGNIFICANT CHANGE UP
GLUCOSE SERPL-MCNC: 113 MG/DL — HIGH (ref 70–99)
HCT VFR BLD CALC: 36.1 % — SIGNIFICANT CHANGE UP (ref 34.5–45)
HGB BLD-MCNC: 11.7 G/DL — SIGNIFICANT CHANGE UP (ref 11.5–15.5)
MCHC RBC-ENTMCNC: 29.5 PG — SIGNIFICANT CHANGE UP (ref 27–34)
MCHC RBC-ENTMCNC: 32.4 GM/DL — SIGNIFICANT CHANGE UP (ref 32–36)
MCV RBC AUTO: 90.9 FL — SIGNIFICANT CHANGE UP (ref 80–100)
NRBC # BLD: 0 /100 WBCS — SIGNIFICANT CHANGE UP (ref 0–0)
PLATELET # BLD AUTO: 383 K/UL — SIGNIFICANT CHANGE UP (ref 150–400)
POTASSIUM SERPL-MCNC: 3.8 MMOL/L — SIGNIFICANT CHANGE UP (ref 3.5–5.3)
POTASSIUM SERPL-SCNC: 3.8 MMOL/L — SIGNIFICANT CHANGE UP (ref 3.5–5.3)
RBC # BLD: 3.97 M/UL — SIGNIFICANT CHANGE UP (ref 3.8–5.2)
RBC # FLD: 12.4 % — SIGNIFICANT CHANGE UP (ref 10.3–14.5)
SODIUM SERPL-SCNC: 138 MMOL/L — SIGNIFICANT CHANGE UP (ref 135–145)
WBC # BLD: 9.98 K/UL — SIGNIFICANT CHANGE UP (ref 3.8–10.5)
WBC # FLD AUTO: 9.98 K/UL — SIGNIFICANT CHANGE UP (ref 3.8–10.5)

## 2023-03-28 PROCEDURE — 84484 ASSAY OF TROPONIN QUANT: CPT

## 2023-03-28 PROCEDURE — 80048 BASIC METABOLIC PNL TOTAL CA: CPT

## 2023-03-28 PROCEDURE — 87086 URINE CULTURE/COLONY COUNT: CPT

## 2023-03-28 PROCEDURE — 80053 COMPREHEN METABOLIC PANEL: CPT

## 2023-03-28 PROCEDURE — 93017 CV STRESS TEST TRACING ONLY: CPT

## 2023-03-28 PROCEDURE — 87040 BLOOD CULTURE FOR BACTERIA: CPT

## 2023-03-28 PROCEDURE — 99285 EMERGENCY DEPT VISIT HI MDM: CPT

## 2023-03-28 PROCEDURE — 74177 CT ABD & PELVIS W/CONTRAST: CPT | Mod: MA

## 2023-03-28 PROCEDURE — 83605 ASSAY OF LACTIC ACID: CPT

## 2023-03-28 PROCEDURE — A9502: CPT

## 2023-03-28 PROCEDURE — 97162 PT EVAL MOD COMPLEX 30 MIN: CPT

## 2023-03-28 PROCEDURE — 71045 X-RAY EXAM CHEST 1 VIEW: CPT

## 2023-03-28 PROCEDURE — 81001 URINALYSIS AUTO W/SCOPE: CPT

## 2023-03-28 PROCEDURE — 93005 ELECTROCARDIOGRAM TRACING: CPT

## 2023-03-28 PROCEDURE — 0225U NFCT DS DNA&RNA 21 SARSCOV2: CPT

## 2023-03-28 PROCEDURE — 85730 THROMBOPLASTIN TIME PARTIAL: CPT

## 2023-03-28 PROCEDURE — 36415 COLL VENOUS BLD VENIPUNCTURE: CPT

## 2023-03-28 PROCEDURE — 71260 CT THORAX DX C+: CPT | Mod: MA

## 2023-03-28 PROCEDURE — 78452 HT MUSCLE IMAGE SPECT MULT: CPT

## 2023-03-28 PROCEDURE — 83690 ASSAY OF LIPASE: CPT

## 2023-03-28 PROCEDURE — 93306 TTE W/DOPPLER COMPLETE: CPT

## 2023-03-28 PROCEDURE — 85610 PROTHROMBIN TIME: CPT

## 2023-03-28 PROCEDURE — 85027 COMPLETE CBC AUTOMATED: CPT

## 2023-03-28 PROCEDURE — 85025 COMPLETE CBC W/AUTO DIFF WBC: CPT

## 2023-03-28 RX ORDER — LANOLIN ALCOHOL/MO/W.PET/CERES
3 CREAM (GRAM) TOPICAL ONCE
Refills: 0 | Status: DISCONTINUED | OUTPATIENT
Start: 2023-03-28 | End: 2023-03-28

## 2023-03-28 RX ADMIN — APIXABAN 2.5 MILLIGRAM(S): 2.5 TABLET, FILM COATED ORAL at 05:49

## 2023-03-28 RX ADMIN — CEFEPIME 100 MILLIGRAM(S): 1 INJECTION, POWDER, FOR SOLUTION INTRAMUSCULAR; INTRAVENOUS at 05:49

## 2023-03-28 RX ADMIN — APIXABAN 2.5 MILLIGRAM(S): 2.5 TABLET, FILM COATED ORAL at 17:38

## 2023-03-28 RX ADMIN — CEFEPIME 100 MILLIGRAM(S): 1 INJECTION, POWDER, FOR SOLUTION INTRAMUSCULAR; INTRAVENOUS at 14:04

## 2023-03-28 RX ADMIN — LOSARTAN POTASSIUM 100 MILLIGRAM(S): 100 TABLET, FILM COATED ORAL at 05:49

## 2023-03-28 RX ADMIN — AMLODIPINE BESYLATE 10 MILLIGRAM(S): 2.5 TABLET ORAL at 05:50

## 2023-03-28 RX ADMIN — PANTOPRAZOLE SODIUM 40 MILLIGRAM(S): 20 TABLET, DELAYED RELEASE ORAL at 05:49

## 2023-03-28 NOTE — DISCHARGE NOTE NURSING/CASE MANAGEMENT/SOCIAL WORK - NSDCPEFALRISK_GEN_ALL_CORE
For information on Fall & Injury Prevention, visit: [email or web address].[facility]/news/fall-prevention-[email or web address]-and-maintains-health-and-mobility OR  https://www.[facility]/news/fall-prevention-tips-to-avoid-injury OR  https://www.cdc.gov/steadi/patient.html

## 2023-03-28 NOTE — DISCHARGE NOTE NURSING/CASE MANAGEMENT/SOCIAL WORK - PATIENT PORTAL LINK FT
You can access the FollowMyHealth Patient Portal offered by [facility] by registering at the following website: [email or web address]. By joining [employer]’s FollowMyHealth portal, you will also be able to view your health information using other applications (apps) compatible with our system.

## 2023-03-28 NOTE — PROGRESS NOTE ADULT - SUBJECTIVE AND OBJECTIVE BOX
Patient is seen and examined at the bed side, is afebrile. The Leukocytosis trended down to normal.       REVIEW OF SYSTEMS: All other review systems are negative      ALLERGIES: aspirin (Unknown)      Vital Signs Last 24 Hrs  T(C): 37.3 (28 Mar 2023 15:28), Max: 37.3 (28 Mar 2023 15:28)  T(F): 99.1 (28 Mar 2023 15:28), Max: 99.1 (28 Mar 2023 15:28)  HR: 94 (28 Mar 2023 15:28) (83 - 99)  BP: 112/67 (28 Mar 2023 15:28) (112/67 - 154/91)  BP(mean): --  RR: 18 (28 Mar 2023 15:28) (17 - 18)  SpO2: 98% (28 Mar 2023 15:28) (95% - 98%)    Parameters below as of 28 Mar 2023 15:28  Patient On (Oxygen Delivery Method): room air        PHYSICAL EXAM:  GENERAL: Not in distress   CHEST/LUNG: Not using accessory muscles   HEART: s1 and s2 present  ABDOMEN:  Nontender and  Nondistended  EXTREMITIES: No pedal  edema  CNS: Awake and Alert      LABS:                        11.7   9.98  )-----------( 383      ( 28 Mar 2023 04:59 )             36.1                             13.2   15.27 )-----------( 401      ( 25 Mar 2023 14:46 )             41.0                0328    138  |  107  |  14  ----------------------------<  113<H>  3.8   |  26  |  0.53    Ca    9.6      28 Mar 2023 04:59    TPro  8.7<H>  /  Alb  3.2<L>  /  TBili  0.5  /  DBili  x   /  AST  9<L>  /  ALT  26  /  AlkPhos  97  -    134<L>  |  104  |  7   ----------------------------<  120<H>  4.0   |  24  |  0.63    Ca    9.5      25 Mar 2023 14:46    TPro  8.8<H>  /  Alb  3.5  /  TBili  0.7  /  DBili  x   /  AST  16  /  ALT  28  /  AlkPhos  116  -    PT/INR - ( 25 Mar 2023 14:46 )   PT: 15.6 sec;   INR: 1.31 ratio      PTT - ( 25 Mar 2023 14:46 )  PTT:37.9 sec        Urinalysis Basic - ( 25 Mar 2023 14:46 )  Color: Yellow / Appearance: Clear / S.010 / pH: x  Gluc: x / Ketone: Negative  / Bili: Negative / Urobili: Negative   Blood: x / Protein: 15 mg/dL / Nitrite: Negative   Leuk Esterase: Trace / RBC: Negative /HPF / WBC 6-10 /HPF   Sq Epi: x / Non Sq Epi: Few /HPF / Bacteria: Few /HPF        MEDICATIONS  (STANDING):    amitriptyline 25 milliGRAM(s) Oral at bedtime  amLODIPine   Tablet 10 milliGRAM(s) Oral daily  apixaban 2.5 milliGRAM(s) Oral two times a day  baclofen 10 milliGRAM(s) Oral at bedtime  cefepime   IVPB 2000 milliGRAM(s) IV Intermittent every 8 hours  donepezil 5 milliGRAM(s) Oral at bedtime  losartan 100 milliGRAM(s) Oral daily  magnesium oxide 400 milliGRAM(s) Oral at bedtime  pantoprazole    Tablet 40 milliGRAM(s) Oral before breakfast  sodium chloride 0.9%. 1000 milliLiter(s) (70 mL/Hr) IV Continuous <Continuous>  traZODone 50 milliGRAM(s) Oral at bedtime        RADIOLOGY & ADDITIONAL TESTS:    3/25/23 : CT Chest w/ IV Cont (23 @ 21:43) No acute CT abnormality in the chest, abdomen and pelvis.      3/25/23 : Xray Chest 1 View- PORTABLE-Urgent (Xray Chest 1 View- PORTABLE-Urgent .) (23 @ 15:14) >  No acute pulmonary disease        MICROBIOLOGY DATA:    Culture - Blood (23 @ 18:30)   Specimen Source: .Blood Blood-Peripheral  Culture Results: No growth to date.    Culture - Blood (23 @ 18:20)   Specimen Source: .Blood Blood-Peripheral  Culture Results: No growth to date.    Culture - Urine (23 @ 14:46)   Specimen Source: Clean Catch Clean Catch (Midstream)  Culture Results:   <10,000 CFU/mL Normal Urogenital Ernestine    Respiratory Viral Panel with COVID-19 by NATALYA (23 @ 14:46)   Rapid RVP Result: NotDetec  SARS-CoV-2: NotDetec

## 2023-03-28 NOTE — PROGRESS NOTE ADULT - ATTENDING COMMENTS
D/C planning for home today on PO Augmentin 875mg q12 until 3/30/23.
D/C planning for home in a.m on PO Augmentin 875mg q12 until 3/30/23.

## 2023-03-28 NOTE — PROGRESS NOTE ADULT - ASSESSMENT
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 For the past days, patient has been having coughing, sore throat, fever, headache, and green phlegm, and tried outpatient antibiotic, Likely Azithromycin or Fluoroquinolone for 5 days without significant relief. She also had been having increased urinary frequency, abdominal pain, and lower back pain. On admission, she found to have low grade fever, tachycardia, Leukocytosis and mild positive Urine  analysis. She has started on Cefepime and the ID consult requested to assist with further evaluation and antibiotic management.    # Sepsis ( Low grade fever + Tachycardia+ Leukocytosis )- resolved - Urine and Blood cultures have no growth to date.  # UTI - urine cx has no growth     would recommend:    1. OOB to chair   2. Monitor WBC count, trended down to normal  3. Monitor kidney function   4. Continue Cefepime inpatient and may change to oral Augmentin 875 mg q 12hours on discharge to continue until 3/30/23    d/w Covering NP, Fam    Attending Attestation:    Spent more than 35 minutes on total encounter, more than 50 % of the visit was spent counseling and/or coordinating care by the Attending physician.

## 2023-03-28 NOTE — PROGRESS NOTE ADULT - ASSESSMENT
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  1.D/C Tele monitoring.  2.Hx of DVT-eliquis.  3.HTN-cont bp medication.  4.Prediabetes-diet.  5.Acute bronchitis-abx.  6.PPI.

## 2023-03-28 NOTE — PROGRESS NOTE ADULT - SUBJECTIVE AND OBJECTIVE BOX
GOLDEN CALHOUN  MR# [id number]  57yFemale        Patient is a 57y old  Female who presents with a chief complaint of r/o ACS (28 Mar 2023 14:34)      INTERVAL HPI/OVERNIGHT EVENTS:  Patient seen and examined at bedside. No notations of chest pain, palpitation, SOB, orthopnea, nausea, vomiting or abdominal pain.    ALLERGIES  aspirin (Unknown)      MEDICATIONS  acetaminophen     Tablet .. 650 milliGRAM(s) Oral every 6 hours PRN Temp greater or equal to 38C (100.4F), Mild Pain (1 - 3)  amitriptyline 25 milliGRAM(s) Oral at bedtime  amLODIPine   Tablet 10 milliGRAM(s) Oral daily  apixaban 2.5 milliGRAM(s) Oral two times a day  baclofen 10 milliGRAM(s) Oral at bedtime  cefepime   IVPB 2000 milliGRAM(s) IV Intermittent every 8 hours  donepezil 5 milliGRAM(s) Oral at bedtime  ketorolac   Injectable 15 milliGRAM(s) IV Push every 6 hours PRN Moderate Pain (4 - 6)  losartan 100 milliGRAM(s) Oral daily  magnesium oxide 400 milliGRAM(s) Oral at bedtime  melatonin 3 milliGRAM(s) Oral once PRN Insomnia  melatonin 3 milliGRAM(s) Oral at bedtime PRN Insomnia  ondansetron Injectable 4 milliGRAM(s) IV Push every 8 hours PRN Nausea and/or Vomiting  pantoprazole    Tablet 40 milliGRAM(s) Oral before breakfast  sodium chloride 0.9%. 1000 milliLiter(s) IV Continuous <Continuous>  traZODone 50 milliGRAM(s) Oral at bedtime              REVIEW OF SYSTEMS:  CONSTITUTIONAL: No fever, weight loss, or fatigue  EYES: No eye pain, visual disturbances, or discharge  ENT:  No difficulty hearing, tinnitus, vertigo; No sinus or throat pain  NECK: No pain or stiffness  RESPIRATORY: No cough, wheezing, chills or hemoptysis; No Shortness of Breath  CARDIOVASCULAR: No chest pain, palpitations, passing out, dizziness, or leg swelling  GASTROINTESTINAL: No abdominal or epigastric pain. No nausea, vomiting, or hematemesis; No diarrhea or constipation. No melena or hematochezia.  GENITOURINARY: No dysuria, frequency, hematuria, or incontinence  NEUROLOGICAL: No headaches, memory loss, loss of strength, numbness, or tremors  SKIN: No itching, burning, rashes, or lesions   LYMPH Nodes: No enlarged glands  ENDOCRINE: No heat or cold intolerance; No hair loss  MUSCULOSKELETAL: No joint pain or swelling; No muscle, back, or extremity pain  PSYCHIATRIC: No depression, anxiety, mood swings, or difficulty sleeping  HEME/LYMPH: No easy bruising, or bleeding gums  ALLERGY AND IMMUNOLOGIC: No hives or eczema	    [ ] All others negative	  [ ] Unable to obtain      T(C): 37.3 (03-28-23 @ 15:28), Max: 37.3 (03-28-23 @ 15:28)  T(F): 99.1 (03-28-23 @ 15:28), Max: 99.1 (03-28-23 @ 15:28)  HR: 94 (03-28-23 @ 15:28) (83 - 99)  BP: 112/67 (03-28-23 @ 15:28) (112/67 - 154/91)  RR: 18 (03-28-23 @ 15:28) (17 - 18)  SpO2: 98% (03-28-23 @ 15:28) (95% - 98%)  Wt(kg): --    I&O's Summary    28 Mar 2023 07:01  -  28 Mar 2023 16:45  --------------------------------------------------------  IN: 118 mL / OUT: 0 mL / NET: 118 mL          PHYSICAL EXAM:  A X O x  HEAD:  Atraumatic, Normocephalic  EYES: EOMI, PERRLA, conjunctiva and sclera clear  NECK: Supple, No JVD, Normal thyroid  Resp: CTAB, No crackles, wheezing,   CVS: Regular rate and rhythm; No discernable murmurs, rubs, or gallops  ABD: Soft, Nontender, Nondistended; Bowel sounds present  EXTREMITIES:  2+ Peripheral Pulses, No edema  LYMPH: No dicernable lymphadenopathy noted  GENERAL: NAD, well-groomed, well-developed      LABS:                        11.7   9.98  )-----------( 383      ( 28 Mar 2023 04:59 )             36.1     03-28    138  |  107  |  14  ----------------------------<  113<H>  3.8   |  26  |  0.53    Ca    9.6      28 Mar 2023 04:59    TPro  8.7<H>  /  Alb  3.2<L>  /  TBili  0.5  /  DBili  x   /  AST  9<L>  /  ALT  26  /  AlkPhos  97  03-27        CAPILLARY BLOOD GLUCOSE          Troponins:  ProBNP:  Lipid Profile:   HgA1c:  TSH:           RADIOLOGY & ADDITIONAL TESTS:    Imaging Personally Reviewed:  [ ] YES  [ ] NO      Consultant(s) Notes Reviewed:  [x ] YES  [ ] NO    Care Discussed with Consultants/Other Providers [ x] YES  [ ] NO          PAST MEDICAL & SURGICAL HISTORY:  Hypertension      Pre-diabetes      Thalamic hemorrhage      S/P  shunt            Chest pain    Handoff    MEWS Score    No pertinent past medical history    Hypertension    Pre-diabetes    Thalamic hemorrhage    Acute coronary syndrome    Chest pain    Chest pain    Pneumonia    Sepsis secondary to UTI    Hypertension    Pre-diabetes    S/P  shunt    History of internal jugular thrombosis    Prophylactic measure    Heartburn    Sepsis    Discharge planning issues    S/P  shunt    A) ANGINA    90+    Sepsis secondary to UTI    Sepsis    SysAdmin_VisitLink

## 2023-03-28 NOTE — PROGRESS NOTE ADULT - PROVIDER SPECIALTY LIST ADULT
Cardiology
Infectious Disease
Internal Medicine
Cardiology
Infectious Disease
Internal Medicine

## 2023-03-28 NOTE — PROGRESS NOTE ADULT - SUBJECTIVE AND OBJECTIVE BOX
CHIEF COMPLAINT:Patient is a 57y old  Female who presents with a chief complaint of r/o ACS .Pt appears comfortable.    	  REVIEW OF SYSTEMS:  CONSTITUTIONAL: No fever, weight loss, or fatigue  EYES: No eye pain, visual disturbances, or discharge  ENT:  No difficulty hearing, tinnitus, vertigo; No sinus or throat pain  NECK: No pain or stiffness  RESPIRATORY: No cough, wheezing, chills or hemoptysis; No Shortness of Breath  CARDIOVASCULAR: No chest pain, palpitations, passing out, dizziness, or leg swelling  GASTROINTESTINAL: No abdominal or epigastric pain. No nausea, vomiting, or hematemesis; No diarrhea or constipation. No melena or hematochezia.  GENITOURINARY: No dysuria, frequency, hematuria, or incontinence  NEUROLOGICAL: No headaches, memory loss, loss of strength, numbness, or tremors  SKIN: No itching, burning, rashes, or lesions   LYMPH Nodes: No enlarged glands  ENDOCRINE: No heat or cold intolerance; No hair loss  MUSCULOSKELETAL: No joint pain or swelling; No muscle, back, or extremity pain  PSYCHIATRIC: No depression, anxiety, mood swings, or difficulty sleeping  HEME/LYMPH: No easy bruising, or bleeding gums  ALLERGY AND IMMUNOLOGIC: No hives or eczema	      PHYSICAL EXAM:  T(C): 37 (03-28-23 @ 07:20), Max: 37 (03-27-23 @ 20:27)  HR: 83 (03-28-23 @ 07:20) (82 - 99)  BP: 123/74 (03-28-23 @ 07:20) (122/62 - 154/91)  RR: 17 (03-28-23 @ 07:20) (17 - 19)  SpO2: 97% (03-28-23 @ 07:20) (95% - 98%)  Wt(kg): --  I&O's Summary      Appearance: Normal	  HEENT:   Normal oral mucosa, PERRL, EOMI	  Lymphatic: No lymphadenopathy  Cardiovascular: Normal S1 S2, No JVD, No murmurs, No edema  Respiratory: Lungs clear to auscultation	  Psychiatry: A & O x 3, Mood & affect appropriate  Gastrointestinal:  Soft, Non-tender, + BS	  Skin: No rashes, No ecchymoses, No cyanosis	  Neurologic: Non-focal  Extremities: Normal range of motion, No clubbing, cyanosis or edema  Vascular: Peripheral pulses palpable 2+ bilaterally    MEDICATIONS  (STANDING):  amitriptyline 25 milliGRAM(s) Oral at bedtime  amLODIPine   Tablet 10 milliGRAM(s) Oral daily  apixaban 2.5 milliGRAM(s) Oral two times a day  baclofen 10 milliGRAM(s) Oral at bedtime  cefepime   IVPB 2000 milliGRAM(s) IV Intermittent every 8 hours  donepezil 5 milliGRAM(s) Oral at bedtime  losartan 100 milliGRAM(s) Oral daily  magnesium oxide 400 milliGRAM(s) Oral at bedtime  pantoprazole    Tablet 40 milliGRAM(s) Oral before breakfast  sodium chloride 0.9%. 1000 milliLiter(s) (70 mL/Hr) IV Continuous <Continuous>  traZODone 50 milliGRAM(s) Oral at bedtime      	  	  LABS:	 	    Troponin I, High Sensitivity Result: 3.2 ng/L (03-27 @ 12:10)  Troponin I, High Sensitivity Result: 6.2 ng/L (03-25 @ 22:22)  Troponin I, High Sensitivity Result: 4.8 ng/L (03-25 @ 14:46)                            11.7   9.98  )-----------( 383      ( 28 Mar 2023 04:59 )             36.1     03-28    138  |  107  |  14  ----------------------------<  113<H>  3.8   |  26  |  0.53    Ca    9.6      28 Mar 2023 04:59    TPro  8.7<H>  /  Alb  3.2<L>  /  TBili  0.5  /  DBili  x   /  AST  9<L>  /  ALT  26  /  AlkPhos  97  03-27    < from: Nuclear Stress Test-Pharmacologic (03.27.23 @ 09:54) >  IMPRESSIONS:Normal Study  * Negative ECG evidence of ischemia after IV of Lexiscan.  * Review of raw data shows: Breast attenuationartifact.  * There is a small, severe defect in anteroapical wall  that is fixed consistent with breast attenuation artifact.  * Gated wall motion analysis is performed, and shows  normal wall motion with post stress LVEF of 66%.    ------------------------------------------------------------------------      ------------------------------------------------------------------------    Confirmed on  3/27/2023 - 13:00:31 at [place] by  April Harper MD  ------------------------------------------------------------------------      	       Transthoracic Echocardiogram (03.26.23 @ 08:13)   OBSERVATIONS:  Mitral Valve: Normal mitral valve. Trace mitral  regurgitation.  Aortic Root: Normal aortic root.  Aortic Valve: Normal trileaflet aortic valve.  Left Atrium: Severely dilated left atrium.  LAvolume index  = 49 cc/m2.  Left Ventricle: Endocardium not well visualized; grossly  normal left ventricular systolic function. Mild concentric  left ventricular hypertrophy. The diastolic function is  indeterminate based on current diagnostic criteria.  Right Heart: Normal right atrium. Normal right ventricular  size and function. There is trace tricuspid regurgitation.  There is trace pulmonic regurgitation.  Pericardium/PleuraNormal pericardium with no pericardial  effusion.  Hemodynamic: Incomplete tricuspid regurgitation jet  precludes accurate assessment of pulmonary artery systolic  pressure.

## 2023-03-31 LAB
CULTURE RESULTS: SIGNIFICANT CHANGE UP
CULTURE RESULTS: SIGNIFICANT CHANGE UP
SPECIMEN SOURCE: SIGNIFICANT CHANGE UP
SPECIMEN SOURCE: SIGNIFICANT CHANGE UP

## 2023-11-09 PROBLEM — I61.0 NONTRAUMATIC INTRACEREBRAL HEMORRHAGE IN HEMISPHERE, SUBCORTICAL: Chronic | Status: ACTIVE | Noted: 2023-03-25

## 2023-11-13 ENCOUNTER — APPOINTMENT (OUTPATIENT)
Dept: NEUROSURGERY | Facility: CLINIC | Age: 58
End: 2023-11-13
Payer: MEDICAID

## 2023-11-13 DIAGNOSIS — M54.2 CERVICALGIA: ICD-10-CM

## 2023-11-13 DIAGNOSIS — Z98.2 PRESENCE OF CEREBROSPINAL FLUID DRAINAGE DEVICE: ICD-10-CM

## 2023-11-13 DIAGNOSIS — R53.1 WEAKNESS: ICD-10-CM

## 2023-11-13 PROCEDURE — 99441: CPT

## 2023-12-14 NOTE — ED CDU PROVIDER NOTE - PROGRESS NOTE ADDITIONAL3
From: Jess Wellington  To: John Zuluaga  Sent: 12/14/2023 7:13 AM CST  Subject: Cough    Hi DR. Is there something I can take to get my cough congestion .      The spray is good for a few hours then I am cough alot     Please advise Additional Progress Note...

## 2024-02-01 NOTE — PATIENT PROFILE ADULT - HOME ACCESSIBILITY CONCERNS
From: Alina Beard  To: Maribell Hernandez  Sent: 2/1/2024 8:40 AM CST  Subject: Ear pain    Hi,   I went to the ER for ear pain and decreased hearing Tuesday night/Wednesday morning. I’m wondering how long it should take for some relief from the pressure and for my hearing to come back. I’ve had the ear drops for 24 hours and I have maybe 10% hearing in my left ear. The hearing loss has gotten worse from yesterday, but the pain has gotten better so I wanted to see how long I should give the drops to work before needing an appointment to have it looked at again.   Thanks for your advice,   Alina   
Noted message.  Please return call to patient.  Antibiotics usually start working between 48-72 hours and same for the ear drops.  The dizziness may be from the ear infection also.  We will keep that appointment on February 6.  Complete antibiotic course.  Would recommend using Flonase 2 squirts per nostril daily as this will help with congestion, opens the eustachian tube.  
none

## 2024-02-21 NOTE — DISCHARGE NOTE PROVIDER - NSDCQMSTROKE_NEU_ALL_CORE
Patient's father returned call and states that he was emailed a list of providers to choose from that will be covered by insurance. He states 3 therapists are located at the [facility]. He provided the phone number [phone] and the fax number [phone]. Informed him that referral would be faxed to facility.   Referral faxed.    No

## 2024-03-28 NOTE — ED ADULT NURSE NOTE - NSFALLRSKASSISTTYPE_ED_ALL_ED
[Michael Franco MD, MA 
Standing/Walking/Toileting

## 2024-06-26 ENCOUNTER — EMERGENCY (EMERGENCY)
Facility: HOSPITAL | Age: 59
LOS: 1 days | Discharge: HOME | End: 2024-06-26
Attending: STUDENT IN AN ORGANIZED HEALTH CARE EDUCATION/TRAINING PROGRAM | Admitting: STUDENT IN AN ORGANIZED HEALTH CARE EDUCATION/TRAINING PROGRAM
Payer: COMMERCIAL

## 2024-06-26 VITALS
OXYGEN SATURATION: 98 % | TEMPERATURE: 99 F | DIASTOLIC BLOOD PRESSURE: 82 MMHG | RESPIRATION RATE: 18 BRPM | SYSTOLIC BLOOD PRESSURE: 166 MMHG | HEART RATE: 70 BPM

## 2024-06-26 VITALS
HEART RATE: 81 BPM | RESPIRATION RATE: 17 BRPM | SYSTOLIC BLOOD PRESSURE: 195 MMHG | HEIGHT: 65 IN | DIASTOLIC BLOOD PRESSURE: 99 MMHG | WEIGHT: 160.06 LBS | TEMPERATURE: 98 F

## 2024-06-26 DIAGNOSIS — I10 ESSENTIAL (PRIMARY) HYPERTENSION: ICD-10-CM

## 2024-06-26 DIAGNOSIS — R73.03 PREDIABETES: ICD-10-CM

## 2024-06-26 DIAGNOSIS — Z86.718 PERSONAL HISTORY OF OTHER VENOUS THROMBOSIS AND EMBOLISM: ICD-10-CM

## 2024-06-26 DIAGNOSIS — Z98.2 PRESENCE OF CEREBROSPINAL FLUID DRAINAGE DEVICE: Chronic | ICD-10-CM

## 2024-06-26 DIAGNOSIS — Z98.2 PRESENCE OF CEREBROSPINAL FLUID DRAINAGE DEVICE: ICD-10-CM

## 2024-06-26 DIAGNOSIS — Z79.01 LONG TERM (CURRENT) USE OF ANTICOAGULANTS: ICD-10-CM

## 2024-06-26 DIAGNOSIS — Z88.6 ALLERGY STATUS TO ANALGESIC AGENT: ICD-10-CM

## 2024-06-26 LAB
ANION GAP SERPL CALC-SCNC: 10 MMOL/L — SIGNIFICANT CHANGE UP (ref 5–17)
BASOPHILS # BLD AUTO: 0.05 K/UL — SIGNIFICANT CHANGE UP (ref 0–0.2)
BASOPHILS NFR BLD AUTO: 0.7 % — SIGNIFICANT CHANGE UP (ref 0–2)
BUN SERPL-MCNC: 10 MG/DL — SIGNIFICANT CHANGE UP (ref 7–23)
CALCIUM SERPL-MCNC: 9.8 MG/DL — SIGNIFICANT CHANGE UP (ref 8.4–10.5)
CHLORIDE SERPL-SCNC: 105 MMOL/L — SIGNIFICANT CHANGE UP (ref 96–108)
CO2 SERPL-SCNC: 25 MMOL/L — SIGNIFICANT CHANGE UP (ref 22–31)
CREAT SERPL-MCNC: 0.54 MG/DL — SIGNIFICANT CHANGE UP (ref 0.5–1.3)
EGFR: 106 ML/MIN/1.73M2 — SIGNIFICANT CHANGE UP
EOSINOPHIL # BLD AUTO: 0.14 K/UL — SIGNIFICANT CHANGE UP (ref 0–0.5)
EOSINOPHIL NFR BLD AUTO: 1.9 % — SIGNIFICANT CHANGE UP (ref 0–6)
GLUCOSE SERPL-MCNC: 100 MG/DL — HIGH (ref 70–99)
HCT VFR BLD CALC: 42.3 % — SIGNIFICANT CHANGE UP (ref 34.5–45)
HGB BLD-MCNC: 13.6 G/DL — SIGNIFICANT CHANGE UP (ref 11.5–15.5)
IMM GRANULOCYTES NFR BLD AUTO: 0.4 % — SIGNIFICANT CHANGE UP (ref 0–0.9)
LYMPHOCYTES # BLD AUTO: 2.13 K/UL — SIGNIFICANT CHANGE UP (ref 1–3.3)
LYMPHOCYTES # BLD AUTO: 29.3 % — SIGNIFICANT CHANGE UP (ref 13–44)
MAGNESIUM SERPL-MCNC: 2.2 MG/DL — SIGNIFICANT CHANGE UP (ref 1.6–2.6)
MCHC RBC-ENTMCNC: 30.1 PG — SIGNIFICANT CHANGE UP (ref 27–34)
MCHC RBC-ENTMCNC: 32.2 GM/DL — SIGNIFICANT CHANGE UP (ref 32–36)
MCV RBC AUTO: 93.6 FL — SIGNIFICANT CHANGE UP (ref 80–100)
MONOCYTES # BLD AUTO: 0.6 K/UL — SIGNIFICANT CHANGE UP (ref 0–0.9)
MONOCYTES NFR BLD AUTO: 8.3 % — SIGNIFICANT CHANGE UP (ref 2–14)
NEUTROPHILS # BLD AUTO: 4.31 K/UL — SIGNIFICANT CHANGE UP (ref 1.8–7.4)
NEUTROPHILS NFR BLD AUTO: 59.4 % — SIGNIFICANT CHANGE UP (ref 43–77)
NRBC # BLD: 0 /100 WBCS — SIGNIFICANT CHANGE UP (ref 0–0)
PLATELET # BLD AUTO: 246 K/UL — SIGNIFICANT CHANGE UP (ref 150–400)
POTASSIUM SERPL-MCNC: 4.1 MMOL/L — SIGNIFICANT CHANGE UP (ref 3.5–5.3)
POTASSIUM SERPL-SCNC: 4.1 MMOL/L — SIGNIFICANT CHANGE UP (ref 3.5–5.3)
RBC # BLD: 4.52 M/UL — SIGNIFICANT CHANGE UP (ref 3.8–5.2)
RBC # FLD: 12.4 % — SIGNIFICANT CHANGE UP (ref 10.3–14.5)
SODIUM SERPL-SCNC: 140 MMOL/L — SIGNIFICANT CHANGE UP (ref 135–145)
TROPONIN T, HIGH SENSITIVITY RESULT: <6 NG/L — SIGNIFICANT CHANGE UP (ref 0–51)
WBC # BLD: 7.26 K/UL — SIGNIFICANT CHANGE UP (ref 3.8–10.5)
WBC # FLD AUTO: 7.26 K/UL — SIGNIFICANT CHANGE UP (ref 3.8–10.5)

## 2024-06-26 PROCEDURE — 85025 COMPLETE CBC W/AUTO DIFF WBC: CPT

## 2024-06-26 PROCEDURE — 83735 ASSAY OF MAGNESIUM: CPT

## 2024-06-26 PROCEDURE — 99285 EMERGENCY DEPT VISIT HI MDM: CPT | Mod: 25

## 2024-06-26 PROCEDURE — 93005 ELECTROCARDIOGRAM TRACING: CPT

## 2024-06-26 PROCEDURE — 82962 GLUCOSE BLOOD TEST: CPT

## 2024-06-26 PROCEDURE — 70450 CT HEAD/BRAIN W/O DYE: CPT | Mod: 26,MC

## 2024-06-26 PROCEDURE — 70450 CT HEAD/BRAIN W/O DYE: CPT | Mod: MC

## 2024-06-26 PROCEDURE — 99285 EMERGENCY DEPT VISIT HI MDM: CPT

## 2024-06-26 PROCEDURE — 80048 BASIC METABOLIC PNL TOTAL CA: CPT

## 2024-06-26 PROCEDURE — 36415 COLL VENOUS BLD VENIPUNCTURE: CPT

## 2024-06-26 PROCEDURE — 84484 ASSAY OF TROPONIN QUANT: CPT

## 2024-06-26 PROCEDURE — 93010 ELECTROCARDIOGRAM REPORT: CPT

## 2024-06-26 NOTE — ED ADULT NURSE NOTE - CAS EDN DISCHARGE ASSESSMENT
Abstain from further alcohol use.  Start gabapentin 3 times daily.  See your primary care physician in follow-up early next week.  Obtain chemical dependency services either through the [facility] program or as listed below.  If you have signs of withdrawal including tremulousness, vomiting or confusion despite the use of the gabapentin return the emergency department.      Chemical Dependency Treatment     [facility]  [email or web address]/[email or web address]  [phone] or [phone]  [address] [place] [address]   Services include screening assessments, medicallysupervised detoxification, inpatient and outpatient evaluation and referral, combined inpatient to outpatient treatment sequences, family counseling and aftercare.      [place] [facility]   www.mntc.org  [phone]  [facility]   Serves adults and teens (minimum age is 16); has short-term treatment and a long-term recovery program; uses 12-step, cognitive behavioral therapy, motivational enhancement; faithbased, and recoverymanagement; high school on-site and miDrive programming available      [facility]   [email or web address]/psy/AlertaPhoneavenuecenter  [phone]  [facility]  [facility]     [facility]   www.[email or web address]  [phone]  [address] [facility] [address]     [facility] (Inpatient & Outpatient)  http://[email or web address]/  [phone]  Phone consultation available 24/7  In-person Assessments  [address] [place] [address], [facility] [address] [id number]  [address] [address] [place], [place] [address] [place] [address] [address] [place] [address] (Inpatient & Outpatient)  [email or web address].[facility].org/mental-health-addiction/about.html  [place], [place]  Contact  for Chemical Health Evaluation, [phone]  Funded by: Rule 25 in Matt Morales and  [facility]. MedicalAssistance (MA) providers of [facility], [facility], Blue Cross, [employer], [employer], Medicare A&B. Private insurance reviewed case by case.     [facility]-Based Inpatient & Outpatient)  [email or web address]  [phone]  [address] [place] [address]     [facility]  http://www.[facility]//specialties/mental-health/adap.html  [phone]  [facility] Drug Abuse Program - [place]  [address] [facility], [address]  [place] [address]  Assessments are available during the day and on a walk-in basis Monday-Friday starting at 8am.     [facility]  [phone]  [email or web address]  [address] [place] [address]     [facility] (Residential & Outpatient)  http://[facility].org/  Women s Programs: [phone], [address], [place] [address]     [facility] (Does Rule 25 Assessments)  http://[email or web address]/  [phone]  [address] [place] [address], [address], [place] [address]     [facility]  http://www.[facility]/  [phone]  [address] [place] [address]  [address] [address] [address]  Rule 25 Assessments, Substance Abuse Assessments, Men s & Women s Programs     [facility]  https://www.[facility].com/our-services/drug-alcohol-treatment  [phone], [address], [place] [address]  [phone], [address], [place] [address]  [phone], [address] [facility], [address], [place] [address]  [phone], [address], ([facility]), [place] [address]  [phone], [address] [address] [place] [address]     NationalIn[employer] on Drug Abuse  https://www.drugabuse.gov/nidamed-medical-health-professionals    Alert and oriented to person, place and time

## 2024-06-26 NOTE — ED PROVIDER NOTE - PATIENT PORTAL LINK FT
You can access the FollowMyHealth Patient Portal offered by [facility] by registering at the following website: [email or web address]. By joining [facility]’s FollowMyHealth portal, you will also be able to view your health information using other applications (apps) compatible with our system.

## 2024-06-26 NOTE — ED PROVIDER NOTE - ATTENDING APP SHARED VISIT CONTRIBUTION OF CARE
h/o HTN and  shunt here for headache and high BP. Took BP meds at home. Then had HA, SBP 200s at home. Came to ER. No chest pain, sob, leg swelling, neck pain or confusion. Hypertensive here, benign exam. Labs unremarkable. Head CT obtained given history of  shunt, unremarkable. BP downtrending wihtout intervention. HA resolved. most likely asymptomatic HtN with tension HA. c/w BP meds. c/w BP log. fu with PCP. Stable for discharge with strict return precautions.

## 2024-06-26 NOTE — ED PROVIDER NOTE - OBJECTIVE STATEMENT
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 pt here after elevated bp reading at home 200s/100. normally takes hydralazine 25mg, no missed doses. checked her blood pressure bc her head felt "heavy." and she overall did not feel right. no head pain, vision changes, neck pain, dizziness, extremity weakness / numbness, speech or gait changes. no infectious symptoms.

## 2024-06-26 NOTE — ED PROVIDER NOTE - CLINICAL SUMMARY MEDICAL DECISION MAKING FREE TEXT BOX
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 BP elevated to 200/100. no associated infectious or neurologic symptoms.   pt well appearing, 195/100, vitals otherwise ok, exam unremarkable - neuro intact  vague head heavy sensation and high bp - will get ct head r/o ich although low suspicion.   no other sx associated w end organ dysfunction / damage but family very anxious about elevated bp readings. will check labs / ecg as screening.  otherwise reassess

## 2024-06-26 NOTE — ED ADULT NURSE NOTE - OBJECTIVE STATEMENT
Pt c/o HA and "feeling like I can't take a deep breath" since 6pm tonight. Pt reports taking BP at home and it read 170/90s. Pt hx stroke x2 yr ago and HTN, pt on hydralazine and eliquis, reports compliance. Pt denies dizziness, numbness/tingling, weakness, visual changes. Pt speaking in full complete sentences, Pt c/o HA and "feeling like I can't take a deep breath" since 6pm tonight. Pt reports taking BP at home and it read 170/90s. Pt hx stroke x2 yr ago and HTN, pt on hydralazine and eliquis, reports compliance. Pt denies dizziness, numbness/tingling, weakness, visual changes. Pt speaking in full complete sentences, AAOx4, pt breathing spontaneously and unlabored.

## 2024-06-26 NOTE — ED PROVIDER NOTE - NSFOLLOWUPINSTRUCTIONS_ED_ALL_ED_FT
Continue all medications, including hydralazine as prescribed.     Drink fluids, stay hydrated.     Follow up with your primary care doctor within 1 week for continued evaluation.     Return to the Emergency Department for persistent, worsening or new symptoms including, sudden severe headache, change in vision, syncope or loss of consciousness, a facial droop, weakness of one side of your body or one of your extremities, uncontrollable nausea and vomiting, chest pain, shortness of breath, if you cannot keep down food/liquid, if your fever is very high and cannot be controlled by over the counter medication, if you pass out or lose consciousness, feel palpitations, chest pain, or shortness of breath, or if you have any other serious concerns.

## 2024-06-26 NOTE — ED PROVIDER NOTE - BIRTH SEX
Female

## 2024-06-26 NOTE — ED ADULT TRIAGE NOTE - CHIEF COMPLAINT QUOTE
Patient presents to ED with headahce and sob x 6pm yesterday. Pt is axox3, ambulated well without assistance. States that she checked her BP at home and was elevated. Denies numbness/tingling, denies dizziness, denies vision changes.

## 2024-08-20 NOTE — PROGRESS NOTE ADULT - SUBJECTIVE AND OBJECTIVE BOX
- Improved BP; continue current treatment  - Encouraged patient to continue monitoring at home  - Will assess CMP today   Subjective/Interval events:  No acute overnight events. Denies pain, weakness, numbness, fever, chills.    MEDICATIONS  (STANDING):  amLODIPine   Tablet 10 milliGRAM(s) Oral daily  apixaban 10 milliGRAM(s) Oral every 12 hours  bisacodyl 5 milliGRAM(s) Oral every 12 hours  hydrALAZINE 50 milliGRAM(s) Oral every 8 hours  influenza   Vaccine 0.5 milliLiter(s) IntraMuscular once  labetalol 100 milliGRAM(s) Oral every 8 hours  lactobacillus acidophilus 1 Tablet(s) Oral daily  losartan 100 milliGRAM(s) Oral daily  melatonin 5 milliGRAM(s) Oral at bedtime  polyethylene glycol 3350 17 Gram(s) Oral every 12 hours  senna 2 Tablet(s) Oral at bedtime    MEDICATIONS  (PRN):  acetaminophen     Tablet .. 650 milliGRAM(s) Oral every 6 hours PRN Temp greater or equal to 38C (100.4F), Mild Pain (1 - 3)  albuterol/ipratropium for Nebulization 3 milliLiter(s) Nebulizer every 6 hours PRN Shortness of Breath and/or Wheezing  ibuprofen  Tablet. 400 milliGRAM(s) Oral every 8 hours PRN Temp greater or equal to 38C (100.4F), Moderate Pain (4 - 6)  oxyCODONE    IR 10 milliGRAM(s) Oral every 4 hours PRN Severe Pain (7 - 10)  oxyCODONE    IR 5 milliGRAM(s) Oral every 4 hours PRN Moderate Pain (4 - 6)      Vital Signs Last 24 Hrs  T(C): 36.6 (10 Mar 2022 14:00), Max: 36.8 (09 Mar 2022 15:15)  T(F): 97.8 (10 Mar 2022 14:00), Max: 98.3 (09 Mar 2022 15:15)  HR: 82 (10 Mar 2022 14:00) (82 - 100)  BP: 120/73 (10 Mar 2022 14:00) (115/71 - 130/70)  BP(mean): --  RR: 18 (10 Mar 2022 14:00) (16 - 18)  SpO2: 95% (10 Mar 2022 14:00) (94% - 98%)    PHYSICAL EXAM:  GENERAL: pleasant, appropriate, no acute distress. Participating appropriately in interview  HEENT - NC/AT, EOMI, PERLLA, oropharynx clear without exudate or erythema, moist mucus membranes  NECK: Soft, supple, No JVD, no lymphadenopathy  CHEST/LUNG: Clear to auscultation bilaterally; No rales, rhonchi, wheezing. Normal work of breathing, not tachypneic  HEART: Regular rate and rhythm; No murmurs, rubs, or gallops, normal s1/s2. Warm and well-perfused  ABDOMEN: obese, soft, Nontender, Nondistended. Normoactive bowel sounds.  EXTREMITIES:  2+ Peripheral Pulses, No clubbing, cyanosis, or edema  NEURO:  awake, alert, oriented to person, place, time, and situation. Cranial nerves intact, no motor or sensory deficits. Moves all extremities.  SKIN: No rashes or lesions. Surgical site c/d/i    LABS:  03-08    137  |  103  |  10  ----------------------------<  108  4.0   |  22  |  0.36    Ca    9.6      08 Mar 2022 06:29  Phos  4.3     03-08  Mg     2.0     03-08                            11.4   6.33  )-----------( 323      ( 08 Mar 2022 06:29 )             36.2       Culture - Blood (collected 03 Mar 2022 22:02)  Source: .Blood Blood-Peripheral  Final Report (08 Mar 2022 23:01):    No growth at 5 days.    Culture - Blood (collected 03 Mar 2022 22:02)  Source: .Blood Blood-Peripheral  Final Report (08 Mar 2022 23:01):    No growth at 5 days.      COVID-19 PCR: Negative (03-09-22 @ 10:33)  COVID-19 PCR: NotDetec (03-09-22 @ 06:44)  COVID-19 PCR: NotDetec (03-01-22 @ 15:29)  COVID-19 PCR: NotDetec (02-24-22 @ 18:23)  COVID-19 PCR: NotDetec (02-19-22 @ 06:35)      RADIOLOGY & ADDITIONAL TESTS:  < from: CT Head No Cont (03.07.22 @ 09:39) >  IMPRESSION:    Right frontal approachventricular drainage catheter in unchanged   position. Stable size and morphology of the ventricular system.    Continued evolution of left thalamic hemorrhage with intraventricular   extension.    < end of copied text >

## 2025-01-29 NOTE — ASSESSMENT
[FreeTextEntry1] : 56-year-old female with past medical history of hypertension and diabetes who presented initially to [facility] with left thalamic parenchymal hematoma with intraventricular hemorrhage status post right-sided EVD complicated by persistent hydrocephalus now status post a right frontal ventriculoperitoneal shunt placed March 2, 2022.  Doing well with significant improvement in right-sided function although still debilitated.  Remains on Eliquis for left IJ thrombus.  Has not yet followed with stroke neurology.  Has mild headaches at night.  Has not had imaging since shunting.  We discussed the need for repeat CT of the head as well as referral to a stroke neurologist.\par \par Dr. D' Amico independently reviewed all available images with patient and her son, CTH without contrast 3/7/22. \par \par Scalp and abdominal incision CDI. Shunt pumps and refills, checked at today's visit @ 5.0. \par \par Educated to shower daily. Wash wound with mild or baby shampoo. Pat incision dry with separate dry towel. \par Report all S/S infection including drainage, redness, warmth to touch of incision, chills. \par No soaking of incision (ex: baths, tubs, pools) for 8 weeks from the time of surgery. \par Keep incision protected from the sun for 3-6 months from time of surgery. \par \par PLAN: \par - F/U with PCP as planned\par - Referral provided to neurologist Dr. Licona \par - Repeat CTH without contrast \par - RTC after imaging to review with Dr. D'Amico, telehealth ok \par \par Patient and family member verbalize understanding of today’s discussion and next steps in treatment plan. \par \par \par A total of 15 minutes was spent reviewing the labs, imaging and physical examination of the patient. We discussed the diagnosis, and the plan. The patient's questions were answered. The patient demonstrated an excellent understanding of the plan. \par \par  \par \par  Admission

## 2025-02-20 NOTE — PROCEDURE NOTE - NSPERFORMEDBY_GEN_A_CORE
Myself
Rx Refill Note  Requested Prescriptions     Pending Prescriptions Disp Refills    Amphetamine ER (adZENys XR-ODT) 18.8 MG Tablet Extended Release Dispersible 60 each 0     Sig: Place 1 each on the tongue 2 (Two) Times a Day.      Last office visit with prescribing clinician: 10/31/2024   Last telemedicine visit with prescribing clinician: Visit date not found   Next office visit with prescribing clinician: 2/28/2025   Office Visit with Hafsa Oneal MD (10/31/2024)   ToxAssure Flex 22, Ur w/DL - Urine, Random Void (06/27/2024 11:49)                     Would you like a call back once the refill request has been completed: [] Yes [] No    If the office needs to give you a call back, can they leave a voicemail: [] Yes [] No    Mimi Ramsey MA  02/20/25, 14:28 EST    PT WILL WAIT FOR A SHIPMENT AT [facility] [place]; LAST FILL 1-24-25; UPLOADED  
Myself
Lorrie Kaur/PA
Myself
CELESTE Loera/Myself/PA
Myself

## 2025-03-19 NOTE — ED ADULT NURSE NOTE - SUICIDE SCREENING QUESTION 2
PCP SIGNATURE NEEDED FOR [employer] FORM RECEIVED VIA FAX AND PLACED IN PCP FOLDER TO BE DELIVERED AT ASSIGNED TIMES.      [employer]/ Request for records    
No

## 2025-06-13 NOTE — PROGRESS NOTE ADULT - PROBLEM/PLAN-4
OT evaluate and treat
DISPLAY PLAN FREE TEXT

## 2025-07-10 NOTE — PATIENT PROFILE ADULT - FUNCTIONAL ASSESSMENT - DAILY ACTIVITY 3.
Anesthesia Post Evaluation    Patient: Brooklyn Islas    Procedure(s) Performed: Procedure(s) (LRB):  LAPAROTOMY, EXPLORATORY (N/A)  REPAIR, INCISIONAL HERNIA  RESECTION, SMALL BOWEL    Final Anesthesia Type: general      Patient location during evaluation: PACU  Patient participation: Yes- Able to Participate  Level of consciousness: awake and alert  Post-procedure vital signs: reviewed and stable  Pain management: adequate  Airway patency: patent    PONV status at discharge: No PONV  Anesthetic complications: no      Cardiovascular status: blood pressure returned to baseline  Respiratory status: unassisted  Hydration status: euvolemic  Follow-up not needed.              Vitals Value Taken Time   /87 07/10/25 18:46   Temp 37.1 °C (98.8 °F) 07/10/25 18:38   Pulse 91 07/10/25 18:54   Resp 16 07/10/25 18:45   SpO2 99 % 07/10/25 18:54   Vitals shown include unfiled device data.      No case tracking events are documented in the log.      Pain/Cyndy Score: Pain Rating Prior to Med Admin: 8 (7/10/2025  6:51 PM)  Cyndy Score: 10 (7/10/2025  6:45 PM)           2 = A lot of assistance

## (undated) DEVICE — SUT VICRYL PLUS 2-0 18" CT-1 (POP-OFF)

## (undated) DEVICE — APPLICATOR SKIN PREP CHG 3CC

## (undated) DEVICE — TROCAR APPLIED MEDICAL KII BALLOON BLUNT TIP 12MM X 100MM

## (undated) DEVICE — SPONGE SURGICAL STRIP 1/4 X 6"

## (undated) DEVICE — SUT SILK 2-0 12-18"

## (undated) DEVICE — WARMING BLANKET LOWER ADULT

## (undated) DEVICE — CODMAN PERFORATOR 14MM (BLUE)

## (undated) DEVICE — LUMBAR PUNCTURE KIT 20G X 3.5"

## (undated) DEVICE — SUT MAXON 0 30" HGU-46

## (undated) DEVICE — DRSG MASTISOL

## (undated) DEVICE — TROCAR ETHICON ENDOPATH XCEL BLADELESS 5MM X 100MM STABILITY

## (undated) DEVICE — SUT NUROLON 4-0 8-18" TF (POP-OFF)

## (undated) DEVICE — TUBING STRYKER PNEUMOSURE HI FLOW INSUFFLATOR

## (undated) DEVICE — SUT ETHILON 3-0 18" PS-1

## (undated) DEVICE — BLADE SCALPEL SAFETY #11 WITH PLASTIC GREEN HANDLE

## (undated) DEVICE — STAPLER SKIN PROXIMATE

## (undated) DEVICE — PACK UPPER BODY

## (undated) DEVICE — MARKING PEN W RULER

## (undated) DEVICE — SUT VICRYL 0 27" UR-6

## (undated) DEVICE — VENODYNE/SCD SLEEVE CALF MEDIUM

## (undated) DEVICE — DRAIN DRAINAGE SYSTEM W/O CATH

## (undated) DEVICE — Device

## (undated) DEVICE — D HELP - CLEARVIEW CLEARIFY SYSTEM

## (undated) DEVICE — GLV 8 PROTEXIS (WHITE)

## (undated) DEVICE — SUT BOOT STANDARD (ORIGINAL YELLOW) 5 PAIR

## (undated) DEVICE — TIP METZENBAUM SCISSOR MONOPOLAR ENDOCUT (ORANGE)

## (undated) DEVICE — DRAPE INSTRUMENT POUCH 6.75" X 11"

## (undated) DEVICE — SUT SILK 2-0 30" PSL

## (undated) DEVICE — SUT MONOCRYL 4-0 27" PS-2 UNDYED

## (undated) DEVICE — GLV 8.5 PROTEXIS (WHITE)

## (undated) DEVICE — ADAPTER LUER STUB 15G

## (undated) DEVICE — SPONGE SURGICAL STRIP 1/2 X 6"

## (undated) DEVICE — TROCAR ETHICON ENDOPATH XCEL UNIVERSAL SLEEVE WITH OPTIVIEW 5MM X 100MM